# Patient Record
Sex: FEMALE | Race: WHITE | Employment: OTHER | URBAN - METROPOLITAN AREA
[De-identification: names, ages, dates, MRNs, and addresses within clinical notes are randomized per-mention and may not be internally consistent; named-entity substitution may affect disease eponyms.]

---

## 2017-01-04 ENCOUNTER — TELEPHONE (OUTPATIENT)
Dept: CARDIOLOGY CLINIC | Age: 72
End: 2017-01-04

## 2017-01-05 ENCOUNTER — OFFICE VISIT (OUTPATIENT)
Dept: CARDIOLOGY CLINIC | Age: 72
End: 2017-01-05

## 2017-01-05 ENCOUNTER — HOSPITAL ENCOUNTER (OUTPATIENT)
Dept: LAB | Age: 72
Discharge: HOME | End: 2017-01-05
Payer: MEDICARE

## 2017-01-05 ENCOUNTER — TELEPHONE (OUTPATIENT)
Dept: CARDIOLOGY CLINIC | Age: 72
End: 2017-01-05

## 2017-01-05 ENCOUNTER — OFFICE VISIT (OUTPATIENT)
Dept: INTERNAL MEDICINE CLINIC | Age: 72
End: 2017-01-05

## 2017-01-05 VITALS
BODY MASS INDEX: 24.75 KG/M2 | HEIGHT: 66 IN | WEIGHT: 154 LBS | DIASTOLIC BLOOD PRESSURE: 62 MMHG | HEART RATE: 61 BPM | SYSTOLIC BLOOD PRESSURE: 112 MMHG

## 2017-01-05 VITALS
BODY MASS INDEX: 24.78 KG/M2 | HEIGHT: 66 IN | HEART RATE: 79 BPM | WEIGHT: 154.2 LBS | TEMPERATURE: 97.9 F | RESPIRATION RATE: 16 BRPM | SYSTOLIC BLOOD PRESSURE: 110 MMHG | DIASTOLIC BLOOD PRESSURE: 80 MMHG | OXYGEN SATURATION: 96 %

## 2017-01-05 DIAGNOSIS — E78.5 DYSLIPIDEMIA: ICD-10-CM

## 2017-01-05 DIAGNOSIS — I15.9 SECONDARY HYPERTENSION: ICD-10-CM

## 2017-01-05 DIAGNOSIS — I95.1 ORTHOSTATIC HYPOTENSION: ICD-10-CM

## 2017-01-05 DIAGNOSIS — A60.00 GENITAL HERPES SIMPLEX, UNSPECIFIED SITE: ICD-10-CM

## 2017-01-05 DIAGNOSIS — Z86.711 HX OF PULMONARY EMBOLUS: ICD-10-CM

## 2017-01-05 DIAGNOSIS — E78.2 MIXED HYPERLIPIDEMIA: ICD-10-CM

## 2017-01-05 DIAGNOSIS — E11.9 CONTROLLED TYPE 2 DIABETES MELLITUS WITHOUT COMPLICATION, WITHOUT LONG-TERM CURRENT USE OF INSULIN (HCC): ICD-10-CM

## 2017-01-05 DIAGNOSIS — F31.78 BIPOLAR DISORDER, IN FULL REMISSION, MOST RECENT EPISODE MIXED (HCC): ICD-10-CM

## 2017-01-05 DIAGNOSIS — I26.99 OTHER ACUTE PULMONARY EMBOLISM WITHOUT ACUTE COR PULMONALE (HCC): Primary | ICD-10-CM

## 2017-01-05 DIAGNOSIS — G25.2 RESTING TREMOR: ICD-10-CM

## 2017-01-05 DIAGNOSIS — I95.1 ORTHOSTATIC HYPOTENSION: Primary | ICD-10-CM

## 2017-01-05 PROCEDURE — 36415 COLL VENOUS BLD VENIPUNCTURE: CPT

## 2017-01-05 PROCEDURE — 82043 UR ALBUMIN QUANTITATIVE: CPT

## 2017-01-05 PROCEDURE — 80061 LIPID PANEL: CPT

## 2017-01-05 PROCEDURE — 83036 HEMOGLOBIN GLYCOSYLATED A1C: CPT

## 2017-01-05 RX ORDER — ACYCLOVIR 50 MG/G
OINTMENT TOPICAL
Qty: 2 G | Refills: 3 | Status: SHIPPED | OUTPATIENT
Start: 2017-01-05 | End: 2017-01-23 | Stop reason: ALTCHOICE

## 2017-01-05 RX ORDER — FLUDROCORTISONE ACETATE 0.1 MG/1
0.1 TABLET ORAL DAILY
Qty: 30 TAB | Refills: 5 | Status: SHIPPED | OUTPATIENT
Start: 2017-01-05 | End: 2017-01-23 | Stop reason: SDUPTHER

## 2017-01-05 NOTE — MR AVS SNAPSHOT
Visit Information Date & Time Provider Department Dept. Phone Encounter #  
 1/5/2017  1:00 PM Paige Walker MD [facility] Sirisha Hoffman [phone] [id number] Your Appointments 7/5/2017  2:00 PM  
COMPLETE PHYSICAL with Theresa Villareal MD  
[address] [address] Internal Medicine [address]) Appt Note: medicare wellness [address] [facility] [address] [facility] [address]  
[address] [address] [address] [place] [address] Upcoming Health Maintenance Date Due  
 MEDICARE YEARLY EXAM 10/1/2016 FOOT EXAM Q1 10/1/2016 MICROALBUMIN Q1 10/1/2016 EYE EXAM RETINAL OR DILATED Q1 11/24/2016 HEMOGLOBIN A1C Q6M 1/29/2017 LIPID PANEL Q1 5/23/2017 GLAUCOMA SCREENING Q2Y 11/24/2017 BREAST CANCER SCRN MAMMOGRAM 2/23/2018 DTaP/Tdap/Td series (2 - Td) 8/17/2023 COLONOSCOPY 6/27/2026 Allergies as of 1/5/2017  Review Complete On: 1/5/2017 By: Jamal Amador LPN Severity Noted Reaction Type Reactions Adhesive  09/08/2009    Itching Hydrocodone  07/19/2010    Itching Lorazepam  04/02/2015    Other (comments) Other Medication  09/08/2009    Rash Tide detergent Percocet [Oxycodone-acetaminophen]  09/08/2009    Itching Sudafed [Pseudoephedrine Hcl]  10/17/2010   Side Effect Other (comments) Vertigo Wellbutrin [Bupropion Hcl]  08/31/2011    Nausea and Vomiting Zithromax [Azithromycin]  06/22/2011    Vertigo Zyrtec [Cetirizine]  10/14/2009    Nausea Only Current Immunizations  Reviewed on 7/28/2016 Name Date Influenza High Dose Vaccine PF 8/29/2013 Influenza Vaccine 8/8/2016, 10/1/2015, 9/15/2014 Influenza Vaccine Whole 9/25/2012 Pneumococcal Conjugate (PCV-13) 3/17/2015 Pneumococcal Polysaccharide (PPSV-23) 4/23/2016 Pneumococcal Vaccine (Unspecified Type) 12/1/2009 TDAP Vaccine 7/3/2012 Tdap 8/17/2013 Varicella Virus Vaccine Live 9/1/2007 Not reviewed this visit You Were Diagnosed With   
  
 Codes Comments Orthostatic hypotension    -  Primary ICD-10-CM: I95.1 ICD-9-CM: 458.0 Secondary hypertension     ICD-10-CM: I15.9 ICD-9-CM: 405.99 Hx of pulmonary embolus     ICD-10-CM: M66.022 ICD-9-CM: V12.55 Vitals BP Pulse Height(growth percentile) Weight(growth percentile) BMI OB Status 112/62 (BP 1 Location: Right arm, BP Patient Position: Standing) 61 5' 6\" (1.676 m) 154 lb (69.9 kg) 24.86 kg/m2 Hysterectomy Smoking Status Never Smoker Vitals History BMI and BSA Data Body Mass Index Body Surface Area  
 24.86 kg/m 2 1.8 m 2 Preferred Pharmacy Pharmacy Name Phone [facility] [address] [place] [address] [phone] Your Updated Medication List  
  
   
This list is accurate as of: 1/5/17  1:18 PM.  Always use your most recent med list.  
  
  
  
  
 acyclovir 5 % ointment Commonly known as:  ZOVIRAX Apply  to affected area every three (3) hours. atorvastatin 20 mg tablet Commonly known as:  LIPITOR  
TAKE 1 TABLET BY MOUTH EVERY EVENING  
  
 butalbital-acetaminophen-caff -40 mg per capsule Commonly known as:  [employer] Take 1 Cap by mouth every four (4) hours as needed for Pain for up to 20 doses. Max Daily Amount: 6 Caps. fexofenadine 180 mg tablet Commonly known as:  Glenna Sark Take 1 Tab by mouth daily as needed. fludrocortisone 0.1 mg tablet Commonly known as:  FLORINEF Take 1 Tab by mouth daily. gabapentin 100 mg capsule Commonly known as:  NEURONTIN Take 200 mg by mouth daily. As directed. 1 tablet in the am and 2 tablets at bedtime  
  
 meclizine 25 mg tablet Commonly known as:  ANTIVERT Take 1 Tab by mouth three (3) times daily as needed. Indications: VERTIGO  
  
 midodrine 5 mg tablet Commonly known as:  Vasyl Beecham  
 Take 1 Tab by mouth three (3) times daily (with meals) for 90 days. NASACORT AQ 55 mcg nasal inhaler Generic drug:  triamcinolone 2 Sprays by Both Nostrils route daily as needed. ondansetron 4 mg disintegrating tablet Commonly known as:  ZOFRAN ODT Take 1 Tab by mouth every eight (8) hours as needed for Nausea. PARoxetine 20 mg tablet Commonly known as:  PAXIL Take 20 mg by mouth daily. pindolol 5 mg tablet Commonly known as:  VISKIN Take 1 Tab by mouth daily as needed. pyridostigmine 60 mg tablet Commonly known as:  MESTINON  
TAKE 1 TABLET BY MOUTH THREE TIMES DAILY QUEtiapine 25 mg tablet Commonly known as:  SEROquel Take 25 mg by mouth nightly as needed. TYLENOL PO Take  by mouth. Prescriptions Sent to Pharmacy Refills  
 fludrocortisone (FLORINEF) 0.1 mg tablet 5 Sig: Take 1 Tab by mouth daily. Class: Normal  
 Pharmacy: [facility] [address] [place] [address] Ph #: [phone] Route: Oral  
  
Patient Instructions Follow up with Dr. Mingo Barrios in 6 months. Introducing [facility]! Dear Rob Dockery: Thank you for requesting a [employer] account. Our records indicate that you already have an active [employer] account. You can access your account anytime at [email or web address] Did you know that you can access your hospital and ER discharge instructions at any time in [employer]? You can also review all of your test results from your hospital stay or ER visit. Additional Information If you have questions, please visit the Frequently Asked Questions section of the [employer] website at [email or web address] Remember, [employer] is NOT to be used for urgent needs. For medical emergencies, dial 911. Now available from your iPhone and Android! Please provide this summary of care documentation to your next provider. Your primary care clinician is listed as RYLEE RASHID. If you have any questions after today's visit, please call [phone].

## 2017-01-05 NOTE — PROGRESS NOTES
Chief Complaint   Patient presents with    Syncope     othostatic hypotension     Verified medications with the patient.

## 2017-01-05 NOTE — PROGRESS NOTES
HISTORY OF PRESENT ILLNESS  Preeti Sam is a 66 y.o. female. LIDIA Saadia Tate is seen today for follow up of hyperlipidemia and other problems. 1. Mixed hyperlipidemia, diabetes type 2 controlled without complication without long term insulin use. Due for routine labs. 2. Orthostatic hypotension. Up to date with cardiology follow up, seems better. 3. Pulmonary embolism. Follows with hematology and pulmonary medicine. Off anticoagulation per specialist direction. 4. Tremor. Saw neuro, reviewed notes. It is unclear as to whether this represents Parkinsonism. She has a spot on her mons pubis that is itching, this is recurrent. She has a history of genital herpes. This appears herpetic, will treat with topical Zovirax. Review of systems notable for resolution of recent sinus infection. MedDATA/gwo     Current Outpatient Prescriptions   Medication Sig    acyclovir (ZOVIRAX) 5 % ointment Apply  to affected area every three (3) hours.  midodrine (PROAMITINE) 5 mg tablet Take 1 Tab by mouth three (3) times daily (with meals) for 90 days.  pindolol (VISKIN) 5 mg tablet Take 1 Tab by mouth daily as needed.  ondansetron (ZOFRAN ODT) 4 mg disintegrating tablet Take 1 Tab by mouth every eight (8) hours as needed for Nausea.  pyridostigmine (MESTINON) 60 mg tablet TAKE 1 TABLET BY MOUTH THREE TIMES DAILY    ACETAMINOPHEN (TYLENOL PO) Take  by mouth.  triamcinolone (NASACORT AQ) 55 mcg nasal inhaler 2 Sprays by Both Nostrils route daily as needed.  butalbital-acetaminophen-caff (FIORICET) -40 mg per capsule Take 1 Cap by mouth every four (4) hours as needed for Pain for up to 20 doses. Max Daily Amount: 6 Caps.  meclizine (ANTIVERT) 25 mg tablet Take 1 Tab by mouth three (3) times daily as needed. Indications: VERTIGO    QUEtiapine (SEROQUEL) 25 mg tablet Take 25 mg by mouth nightly as needed.  PARoxetine (PAXIL) 20 mg tablet Take 20 mg by mouth daily.     atorvastatin (LIPITOR) 20 mg tablet TAKE 1 TABLET BY MOUTH EVERY EVENING    gabapentin (NEURONTIN) 100 mg capsule Take 200 mg by mouth daily. As directed. 1 tablet in the am and 2 tablets at bedtime    fexofenadine (ALLEGRA) 180 mg tablet Take 1 Tab by mouth daily as needed.  fludrocortisone (FLORINEF) 0.1 mg tablet Take 1 Tab by mouth daily. No current facility-administered medications for this visit. Review of Systems   Constitutional: Negative for weight loss. Respiratory: Negative. Cardiovascular: Negative for chest pain, palpitations, leg swelling and PND. Musculoskeletal: Negative for myalgias. Skin: Positive for rash. Neurological: Positive for dizziness. Negative for focal weakness. Endo/Heme/Allergies: Does not bruise/bleed easily. Physical Exam   Constitutional: She appears well-nourished. Neck: Carotid bruit is not present. Cardiovascular: Normal rate and regular rhythm. Exam reveals no gallop and no friction rub. No murmur heard. Pulmonary/Chest: Effort normal and breath sounds normal. No respiratory distress. Musculoskeletal: She exhibits no edema. Skin:        Nursing note and vitals reviewed. ASSESSMENT and PLAN  Ben Pyle was seen today for medication evaluation and vaginal itching. Diagnoses and all orders for this visit:    Other acute pulmonary embolism without acute cor pulmonale ([facility] [address]- See specialists  as directed. Bipolar disorder, in full remission, most recent episode mixed [facility])- See psychiatrist as directed     Resting tremor- See neurologist as directed     Orthostatic hypotension- See cardiologist as directed. Mixed hyperlipidemia  -     LIPID PANEL    Genital herpes simplex, unspecified site  -     acyclovir (ZOVIRAX) 5 % ointment; Apply  to affected area every three (3) hours.     Controlled type 2 diabetes mellitus without complication, without long-term current use of insulin (HCC)  -     HEMOGLOBIN A1C WITH EAG  -     MICROALBUMIN, UR, RAND W/ MICROALBUMIN/CREA RATIO    Other orders  -     Cancel: METABOLIC PANEL, COMPREHENSIVE  -     Cancel: CBC WITH AUTOMATED DIFF    This has been fully explained to the patient, who indicates understanding.

## 2017-01-05 NOTE — PROGRESS NOTES
Cardiac Electrophysiology Office Note     Subjective:      Dom Mejia is a 66 y.o. female patient who is seen for significant orthostatic hypotension and supine hypertension. Last month she was given pindolol for SBP greater than 160. She says she has only had to take the pindolol 3 times. She was able to get compression stockings and has been wearing them. She says they help. She says she is feeling much better. Dizziness, lightheadedness and nausea have resolved. She thinks the sinus infection she had could have been related. No fainting or falling. Her BP laying down has been around 140-150's. Northera application completed. She is here today because her blood pressure has been very high the past couple days and she had to go the ED last night, for hypertension, chest pressure and SOB. She has also been nausea everyday. No syncope. She says she is wearing her compression stockings daily with her current regiment of midodrine, mestinon and florinef and still has a drastic drop in BP. Head CT did not show bleeding or stroke. Lexican cardiolite stress test 9/2016 showed normal perfusion  LVEF vigorous 85%    Dr Derrick Brewster 8/26/16: CT[facility] at [facility] 5/7/16 small PE RLL  6/9/16 CTA [facility] normal CTA no PE  Hypercoagulable work up and d dimer pending- on xarelto until results return    She had a tilt table test 8/2/16. Tilt table test revealed the patient had orthostatic hypotension response with sudden change in upright position and resolved quickly when she became supine  She has been on midodrine 5 mg tid. Patient was seen by neurologist in hospital and not clear she will have autonomic dysfunction and Parkinson's disease. I recommended to add mestinon 60 mg tid. She is using compression stockings bought from [facility] for IBS, RLS, Bipolar d/s, fibromyalgia, DDD, Paget's dz, dyslipidemia, DM, and PTE on Xarelto 3 months.  She reports 3 episodes of syncopal episodes with \"almost blacking out\" which prompted her to come to the ED. Long standing h/o hypotension with orthostatics, but never blackouts. She admits to seeing Dr. Albaro Chiang 6-7 years ago and completing an Echo and stress test for which all were normal. Recent h/o PTE (5/2016) and on Xarelto currently. Patient Active Problem List    Diagnosis Date Noted    Near syncope 08/02/2016    Resting tremor 07/29/2016    Orthostatic hypotension 07/05/2016    Advance directive on file 05/24/2016    Pulmonary embolism ([facility] [address]     Mixed hyperlipidemia 02/02/2016    Diabetes mellitus type 2, controlled, without complications ([facility] [address] 42/48/4870    Tubulovillous adenoma 08/21/2012    Abnormal mammogram 06/04/2012    Encounter for long-term (current) use of other medications 11/30/2011    Hammertoe 09/12/2011    Allergic rhinitis, cause unspecified 01/26/2011    Other diseases of nasal cavity and sinuses(478.19) 07/19/2010    IBS (irritable bowel syndrome)     RLS (restless legs syndrome)     Bipolar disorder (HCC)     Fibromyalgia     DJD (degenerative joint disease)     Lumbar disc disease     Paget's disease of bone     Migraine     Genital herpes      Current Outpatient Prescriptions   Medication Sig Dispense Refill    acyclovir (ZOVIRAX) 5 % ointment Apply  to affected area every three (3) hours. 2 g 3    fludrocortisone (FLORINEF) 0.1 mg tablet Take 1 Tab by mouth daily. 30 Tab 5    midodrine (PROAMITINE) 5 mg tablet Take 1 Tab by mouth three (3) times daily (with meals) for 90 days. 90 Tab 6    pindolol (VISKIN) 5 mg tablet Take 1 Tab by mouth daily as needed. 30 Tab 3    ondansetron (ZOFRAN ODT) 4 mg disintegrating tablet Take 1 Tab by mouth every eight (8) hours as needed for Nausea. 30 Tab 3    pyridostigmine (MESTINON) 60 mg tablet TAKE 1 TABLET BY MOUTH THREE TIMES DAILY 90 Tab 1    ACETAMINOPHEN (TYLENOL PO) Take  by mouth.       triamcinolone (NASACORT AQ) 55 mcg nasal inhaler 2 Sprays by Both Nostrils route daily as needed.  butalbital-acetaminophen-caff (FIORICET) -40 mg per capsule Take 1 Cap by mouth every four (4) hours as needed for Pain for up to 20 doses. Max Daily Amount: 6 Caps. 20 Cap 0    meclizine (ANTIVERT) 25 mg tablet Take 1 Tab by mouth three (3) times daily as needed. Indications: VERTIGO 30 Tab 3    QUEtiapine (SEROQUEL) 25 mg tablet Take 25 mg by mouth nightly as needed.  PARoxetine (PAXIL) 20 mg tablet Take 20 mg by mouth daily.  atorvastatin (LIPITOR) 20 mg tablet TAKE 1 TABLET BY MOUTH EVERY EVENING 30 Tab 11    gabapentin (NEURONTIN) 100 mg capsule Take 200 mg by mouth daily. As directed. 1 tablet in the am and 2 tablets at bedtime      fexofenadine (ALLEGRA) 180 mg tablet Take 1 Tab by mouth daily as needed.  1 Tab 0     Allergies   Allergen Reactions    Adhesive Itching    Hydrocodone Itching    Lorazepam Other (comments)    Other Medication Rash     Tide detergent    Percocet [Oxycodone-Acetaminophen] Itching    Sudafed [Pseudoephedrine Hcl] Other (comments)     Vertigo    Wellbutrin [Bupropion Hcl] Nausea and Vomiting    Zithromax [Azithromycin] Vertigo    Zyrtec [Cetirizine] Nausea Only     Past Medical History   Diagnosis Date    Bipolar disorder (HCC)      Nazmy    Chronic pain      BACK PAIN    Diabetes ([facility] [address]      BORDERLINE TX WITH DIET AND EXERCISE    DJD (degenerative joint disease)     Fibromyalgia     Genital herpes     GERD (gastroesophageal reflux disease)     Hypercholesterolemia     IBS (irritable bowel syndrome)     Lumbar disc disease      stimulation-Honza    Migraine     Nausea & vomiting     Other ill-defined conditions(799.89)      SEASONAL allergies    Other ill-defined conditions(799.89)      dysphagia has had esophagus dilated    Paget's disease     Pulmonary embolism (HCC)     RLS (restless legs syndrome)      Past Surgical History   Procedure Laterality Date    Endoscopy, colon, diagnostic       12, due 15    Hx heent       T & A    Hx cholecystectomy      Hx appendectomy      Hx hysterectomy       total for fibroid tumors    Hx other surgical       EGD x 2 with dilation/colonoscopy - no polyps per pt    Hx orthopaedic       lumbar laminectomy then fusion/neurostimulator implanted - inactive now but still in    Hx orthopaedic       REMOVAL OF NEUROSTIMULATOR    Hx back surgery  9/17/2013     back surgery - total of 3 as of 12/20/2013    Colonoscopy N/A 6/27/2016     COL[facility] ENDOSCOPY    Tilt table evaluation  8/2/2016           Family History   Problem Relation Age of Onset    Colon Cancer Mother     Cancer Mother 68     colon    Heart Disease Father     Heart Disease Maternal Grandmother     Heart Disease Maternal Grandfather     Heart Disease Other      Social History   Substance Use Topics    Smoking status: Never Smoker    Smokeless tobacco: Never Used    Alcohol use No      Comment: 1 per month        Review of Systems:   Constitutional: Negative for fever, chills, weight loss. HEENT: Negative for nosebleeds, vision changes. Respiratory: Negative for cough, hemoptysis, sputum production, and wheezing. Cardiovascular: no chest pain, no palpitations, orthopnea, claudication, leg swelling, syncope, and PND. Gastrointestinal: no  nausea, no vomiting she does not have diarrhea, constipation, blood in stool and melena. Genitourinary: Negative for dysuria, and hematuria. Musculoskeletal: Negative for myalgias, + lower leg arthralgia. Skin: Negative for rash. Heme: Does not bleed or bruise easily. Neurological: Negative for speech change and focal weakness     Objective:     Visit Vitals    /62 (BP 1 Location: Right arm, BP Patient Position: Standing)    Pulse 61    Ht 5' 6\" (1.676 m)    Wt 154 lb (69.9 kg)    BMI 24.86 kg/m2      Physical Exam:   Constitutional: well-developed and well-nourished. No distress.    Head: Normocephalic and atraumatic. Eyes: Pupils are equal, round. Neck: supple. No JVD present. Cardiovascular: Normal rate, regular rhythm. Exam reveals no gallop and no friction rub. No murmur heard. Pulmonary/Chest: Effort normal and breath sounds normal. No wheezes. Abdominal: Soft, no tenderness. Musculoskeletal: no edema. compression stockings are on. Neurological: alert,oriented. resting tremor in RUE  Skin: Skin is warm and dry  Psychiatric: normal mood and affect. Behavior is normal. Judgment and thought content normal.      Assessment/Plan:       ICD-10-CM ICD-9-CM    1. Orthostatic hypotension I95.1 458.0    2. Secondary hypertension I15.9 405.99    3. Hx of pulmonary embolus Z86.711 V12.55    4. Dyslipidemia E78.5 272.4       Ms. Zoya Hill has been doing well, BP is much better. Orthostatic BP improved, she is wearing her compression stockings today and she says she actually likes them. Continue the Mestinon, florinef and midodrine. Florinef refilled today. She has pindolol for SBP >160. She has only had to take this x3, she monitor her supine BP every morning. Dizziness, lightheadedness and nausea has resolved. Continue current medical regiment. I will have our nurse follow up on her Northera application as she has submitted and filled this out. She will follow up with Dr. Mo Meyer in 6 months or sooner if she starts to have symptoms or uncontrolled BP. Patient agreeable. Our office will contact her as soon as we find out the status of her Northera application. Follow-up Disposition:  Return in about 6 months (around 7/5/2017). Thank you for involving me in this patient's care and please call with further concerns or questions.     Aj Morataya NP  [phone]

## 2017-01-05 NOTE — MR AVS SNAPSHOT
Visit Information Date & Time Provider Department Dept. Phone Encounter #  
 1/5/2017 11:00 AM Stefania Puckett, [address] [facility] Internal Medicine [phone] [id number] Follow-up Instructions Return in about 6 months (around 7/5/2017). Your Appointments 1/5/2017  1:00 PM  
ESTABLISHED PATIENT with Olegario Henriquez MD  
[facility] ([facility]) Appt Note: 3 month follow up per dr miguel; 11/14 LM to move up appt. [place] Ac; Per Nilda benedict to 10 to 1120; 1 month f/u  
 [address] [facility] [address]  
[address] [facility] 100 [facility] 7 [id number] Upcoming Health Maintenance Date Due  
 MEDICARE YEARLY EXAM 10/1/2016 FOOT EXAM Q1 10/1/2016 MICROALBUMIN Q1 10/1/2016 EYE EXAM RETINAL OR DILATED Q1 11/24/2016 HEMOGLOBIN A1C Q6M 1/29/2017 LIPID PANEL Q1 5/23/2017 GLAUCOMA SCREENING Q2Y 11/24/2017 BREAST CANCER SCRN MAMMOGRAM 2/23/2018 DTaP/Tdap/Td series (2 - Td) 8/17/2023 COLONOSCOPY 6/27/2026 Allergies as of 1/5/2017  Review Complete On: 1/5/2017 By: Stefania Puckett MD  
  
 Severity Noted Reaction Type Reactions Adhesive  09/08/2009    Itching Hydrocodone  07/19/2010    Itching Lorazepam  04/02/2015    Other (comments) Other Medication  09/08/2009    Rash Tide detergent Percocet [Oxycodone-acetaminophen]  09/08/2009    Itching Sudafed [Pseudoephedrine Hcl]  10/17/2010   Side Effect Other (comments) Vertigo Wellbutrin [Bupropion Hcl]  08/31/2011    Nausea and Vomiting Zithromax [Azithromycin]  06/22/2011    Vertigo Zyrtec [Cetirizine]  10/14/2009    Nausea Only Current Immunizations  Reviewed on 7/28/2016 Name Date Influenza High Dose Vaccine PF 8/29/2013 Influenza Vaccine 8/8/2016, 10/1/2015, 9/15/2014 Influenza Vaccine Whole 9/25/2012 Pneumococcal Conjugate (PCV-13) 3/17/2015 Pneumococcal Polysaccharide (PPSV-23) 4/23/2016 Pneumococcal Vaccine (Unspecified Type) 12/1/2009 TDAP Vaccine 7/3/2012 Tdap 8/17/2013 Varicella Virus Vaccine Live 9/1/2007 Not reviewed this visit You Were Diagnosed With   
  
 Codes Comments Other acute pulmonary embolism without acute cor pulmonale (HCC)    -  Primary ICD-10-CM: N61.93 Bipolar disorder, in full remission, most recent episode mixed [facility])     ICD-10-CM: F31.78 
ICD-9-CM: 296.66 Resting tremor     ICD-10-CM: R25.9 ICD-9-CM: 781.0 Orthostatic hypotension     ICD-10-CM: I95.1 ICD-9-CM: 458.0 Mixed hyperlipidemia     ICD-10-CM: E78.2 ICD-9-CM: 272.2 Genital herpes simplex, unspecified site     ICD-10-CM: A60.00 ICD-9-CM: 054.10 Controlled type 2 diabetes mellitus without complication, without long-term current use of insulin ([facility]     ICD-10-CM: E11.9 ICD-9-CM: 250.00 Vitals BP Pulse Temp Resp Height(growth percentile) Weight(growth percentile) 110/80 (BP 1 Location: Right arm, BP Patient Position: Sitting) 79 97.9 °F (36.6 °C) (Oral) 16 5' 6\" (1.676 m) 154 lb 3.2 oz (69.9 kg) SpO2 BMI OB Status Smoking Status 96% 24.89 kg/m2 Hysterectomy Never Smoker BMI and BSA Data Body Mass Index Body Surface Area  
 24.89 kg/m 2 1.8 m 2 Preferred Pharmacy Pharmacy Name Phone [facility] [address] [place] [address], [address] [place] [address] [phone] Your Updated Medication List  
  
   
This list is accurate as of: 1/5/17 12:05 PM.  Always use your most recent med list.  
  
  
  
  
 acyclovir 5 % ointment Commonly known as:  ZOVIRAX Apply  to affected area every three (3) hours. atorvastatin 20 mg tablet Commonly known as:  LIPITOR  
TAKE 1 TABLET BY MOUTH EVERY EVENING  
  
 butalbital-acetaminophen-caff -40 mg per capsule Commonly known as:  [employer] Take 1 Cap by mouth every four (4) hours as needed for Pain for up to 20 doses. Max Daily Amount: 6 Caps. cefUROXime 250 mg tablet Commonly known as:  CEFTIN Take 2 Tabs by mouth two (2) times a day. fexofenadine 180 mg tablet Commonly known as:  Glenna Sark Take 1 Tab by mouth daily as needed. fludrocortisone 0.1 mg tablet Commonly known as:  FLORINEF Take 1 Tab by mouth daily. gabapentin 100 mg capsule Commonly known as:  NEURONTIN Take 200 mg by mouth daily. As directed. 1 tablet in the am and 2 tablets at bedtime L. acidoph & paracasei- S therm- Bifido 8 billion cell Cap cap Commonly known as:  HINA-Q/RISAQUAD Take 1 Cap by mouth daily. meclizine 25 mg tablet Commonly known as:  ANTIVERT Take 1 Tab by mouth three (3) times daily as needed. Indications: VERTIGO  
  
 midodrine 5 mg tablet Commonly known as:  Vasyl Beecham Take 1 Tab by mouth three (3) times daily (with meals) for 90 days. NASACORT AQ 55 mcg nasal inhaler Generic drug:  triamcinolone 2 Sprays by Both Nostrils route daily as needed. ondansetron 4 mg disintegrating tablet Commonly known as:  ZOFRAN ODT Take 1 Tab by mouth every eight (8) hours as needed for Nausea. PARoxetine 20 mg tablet Commonly known as:  PAXIL Take 20 mg by mouth daily. pindolol 5 mg tablet Commonly known as:  VISKIN Take 1 Tab by mouth daily as needed. pyridostigmine 60 mg tablet Commonly known as:  MESTINON  
TAKE 1 TABLET BY MOUTH THREE TIMES DAILY QUEtiapine 25 mg tablet Commonly known as:  SEROquel Take 25 mg by mouth nightly as needed. TYLENOL PO Take  by mouth. Prescriptions Sent to Pharmacy Refills  
 acyclovir (ZOVIRAX) 5 % ointment 3 Sig: Apply  to affected area every three (3) hours.   
 Class: Normal  
 Pharmacy: [facility] [address], [address] [place] [address] AT [facility] [address] [facility] #: [phone] Route: Topical  
  
Follow-up Instructions Return in about 6 months (around 7/5/2017). Introducing [facility] SERVICES! Dear Aura Chaney: Thank you for requesting a [employer] account. Our records indicate that you already have an active [employer] account. You can access your account anytime at [email or web address] Did you know that you can access your hospital and ER discharge instructions at any time in [employer]? You can also review all of your test results from your hospital stay or ER visit. Additional Information If you have questions, please visit the Frequently Asked Questions section of the [employer] website at [email or web address] Remember, [employer] is NOT to be used for urgent needs. For medical emergencies, dial 911. Now available from your iPhone and Android! Please provide this summary of care documentation to your next provider. Your primary care clinician is listed as RYLEE RASHID. If you have any questions after today's visit, please call [phone].

## 2017-01-05 NOTE — TELEPHONE ENCOUNTER
PA obtained for brandon pérez at # [phone]. Will find out determination in 72hours. Ref # [id number].

## 2017-01-06 LAB
ALBUMIN/CREAT UR: 5.8 MG/G CREAT (ref 0–30)
CHOLEST SERPL-MCNC: 134 MG/DL (ref 100–199)
CREAT UR-MCNC: 143.4 MG/DL
EST. AVERAGE GLUCOSE BLD GHB EST-MCNC: 169 MG/DL
HBA1C MFR BLD: 7.5 % (ref 4.8–5.6)
HDLC SERPL-MCNC: 53 MG/DL
LDLC SERPL CALC-MCNC: 58 MG/DL (ref 0–99)
MICROALBUMIN UR-MCNC: 8.3 UG/ML
TRIGL SERPL-MCNC: 115 MG/DL (ref 0–149)
VLDLC SERPL CALC-MCNC: 23 MG/DL (ref 5–40)

## 2017-01-10 ENCOUNTER — TELEPHONE (OUTPATIENT)
Dept: INTERNAL MEDICINE CLINIC | Age: 72
End: 2017-01-10

## 2017-01-10 ENCOUNTER — HOSPITAL ENCOUNTER (OUTPATIENT)
Age: 72
Setting detail: OBSERVATION
Discharge: HOME HEALTH | End: 2017-01-13
Attending: EMERGENCY MEDICINE | Admitting: INTERNAL MEDICINE
Payer: MEDICARE

## 2017-01-10 ENCOUNTER — TELEPHONE (OUTPATIENT)
Dept: CARDIOLOGY CLINIC | Age: 72
End: 2017-01-10

## 2017-01-10 DIAGNOSIS — R19.7 DIARRHEA, UNSPECIFIED TYPE: Primary | ICD-10-CM

## 2017-01-10 DIAGNOSIS — E86.0 DEHYDRATION: ICD-10-CM

## 2017-01-10 LAB
ALBUMIN SERPL BCP-MCNC: 3.4 G/DL (ref 3.5–5)
ALBUMIN/GLOB SERPL: 0.9 {RATIO} (ref 1.1–2.2)
ALP SERPL-CCNC: 75 U/L (ref 45–117)
ALT SERPL-CCNC: 26 U/L (ref 12–78)
ANION GAP BLD CALC-SCNC: 11 MMOL/L (ref 5–15)
AST SERPL W P-5'-P-CCNC: 33 U/L (ref 15–37)
ATRIAL RATE: 74 BPM
BILIRUB SERPL-MCNC: 0.5 MG/DL (ref 0.2–1)
BUN SERPL-MCNC: 17 MG/DL (ref 6–20)
BUN/CREAT SERPL: 17 (ref 12–20)
CALCIUM SERPL-MCNC: 8.5 MG/DL (ref 8.5–10.1)
CALCULATED P AXIS, ECG09: 17 DEGREES
CALCULATED R AXIS, ECG10: 10 DEGREES
CALCULATED T AXIS, ECG11: 54 DEGREES
CHLORIDE SERPL-SCNC: 103 MMOL/L (ref 97–108)
CK MB CFR SERPL CALC: NORMAL % (ref 0–2.5)
CK MB SERPL-MCNC: <1 NG/ML (ref 5–25)
CK SERPL-CCNC: 64 U/L (ref 26–192)
CO2 SERPL-SCNC: 26 MMOL/L (ref 21–32)
CREAT SERPL-MCNC: 1.01 MG/DL (ref 0.55–1.02)
DIAGNOSIS, 93000: NORMAL
ERYTHROCYTE [DISTWIDTH] IN BLOOD BY AUTOMATED COUNT: 14 % (ref 11.5–14.5)
GLOBULIN SER CALC-MCNC: 3.7 G/DL (ref 2–4)
GLUCOSE SERPL-MCNC: 124 MG/DL (ref 65–100)
HCT VFR BLD AUTO: 45.7 % (ref 35–47)
HGB BLD-MCNC: 15.2 G/DL (ref 11.5–16)
MCH RBC QN AUTO: 30.3 PG (ref 26–34)
MCHC RBC AUTO-ENTMCNC: 33.3 G/DL (ref 30–36.5)
MCV RBC AUTO: 91.2 FL (ref 80–99)
P-R INTERVAL, ECG05: 142 MS
PLATELET # BLD AUTO: 278 K/UL (ref 150–400)
POTASSIUM SERPL-SCNC: 3.4 MMOL/L (ref 3.5–5.1)
PROT SERPL-MCNC: 7.1 G/DL (ref 6.4–8.2)
Q-T INTERVAL, ECG07: 432 MS
QRS DURATION, ECG06: 84 MS
QTC CALCULATION (BEZET), ECG08: 479 MS
RBC # BLD AUTO: 5.01 M/UL (ref 3.8–5.2)
SODIUM SERPL-SCNC: 140 MMOL/L (ref 136–145)
TROPONIN I SERPL-MCNC: <0.04 NG/ML
VENTRICULAR RATE, ECG03: 74 BPM
WBC # BLD AUTO: 5 K/UL (ref 3.6–11)

## 2017-01-10 PROCEDURE — 85027 COMPLETE CBC AUTOMATED: CPT | Performed by: STUDENT IN AN ORGANIZED HEALTH CARE EDUCATION/TRAINING PROGRAM

## 2017-01-10 PROCEDURE — 96360 HYDRATION IV INFUSION INIT: CPT

## 2017-01-10 PROCEDURE — 99285 EMERGENCY DEPT VISIT HI MDM: CPT

## 2017-01-10 PROCEDURE — 93005 ELECTROCARDIOGRAM TRACING: CPT

## 2017-01-10 PROCEDURE — 74011250637 HC RX REV CODE- 250/637: Performed by: INTERNAL MEDICINE

## 2017-01-10 PROCEDURE — 99218 HC RM OBSERVATION: CPT

## 2017-01-10 PROCEDURE — 82550 ASSAY OF CK (CPK): CPT | Performed by: EMERGENCY MEDICINE

## 2017-01-10 PROCEDURE — 80053 COMPREHEN METABOLIC PANEL: CPT | Performed by: STUDENT IN AN ORGANIZED HEALTH CARE EDUCATION/TRAINING PROGRAM

## 2017-01-10 PROCEDURE — 74011250636 HC RX REV CODE- 250/636: Performed by: EMERGENCY MEDICINE

## 2017-01-10 PROCEDURE — 74011250636 HC RX REV CODE- 250/636: Performed by: INTERNAL MEDICINE

## 2017-01-10 PROCEDURE — 96361 HYDRATE IV INFUSION ADD-ON: CPT

## 2017-01-10 PROCEDURE — 96374 THER/PROPH/DIAG INJ IV PUSH: CPT

## 2017-01-10 PROCEDURE — 36415 COLL VENOUS BLD VENIPUNCTURE: CPT | Performed by: STUDENT IN AN ORGANIZED HEALTH CARE EDUCATION/TRAINING PROGRAM

## 2017-01-10 PROCEDURE — 84484 ASSAY OF TROPONIN QUANT: CPT | Performed by: EMERGENCY MEDICINE

## 2017-01-10 RX ORDER — GUAIFENESIN 600 MG/1
600 TABLET, EXTENDED RELEASE ORAL 2 TIMES DAILY
COMMUNITY
End: 2017-03-03 | Stop reason: ALTCHOICE

## 2017-01-10 RX ORDER — ACETAMINOPHEN 325 MG/1
325 TABLET ORAL
COMMUNITY

## 2017-01-10 RX ORDER — SODIUM CHLORIDE 9 MG/ML
125 INJECTION, SOLUTION INTRAVENOUS CONTINUOUS
Status: DISCONTINUED | OUTPATIENT
Start: 2017-01-10 | End: 2017-01-10

## 2017-01-10 RX ORDER — MECLIZINE HCL 12.5 MG 12.5 MG/1
25 TABLET ORAL
Status: DISCONTINUED | OUTPATIENT
Start: 2017-01-10 | End: 2017-01-13 | Stop reason: HOSPADM

## 2017-01-10 RX ORDER — BUTALBITAL, ACETAMINOPHEN AND CAFFEINE 50; 325; 40 MG/1; MG/1; MG/1
1 TABLET ORAL
Status: DISCONTINUED | OUTPATIENT
Start: 2017-01-10 | End: 2017-01-13 | Stop reason: HOSPADM

## 2017-01-10 RX ORDER — GABAPENTIN 100 MG/1
100 CAPSULE ORAL 2 TIMES DAILY
Status: DISCONTINUED | OUTPATIENT
Start: 2017-01-10 | End: 2017-01-13 | Stop reason: HOSPADM

## 2017-01-10 RX ORDER — GABAPENTIN 100 MG/1
200 CAPSULE ORAL
COMMUNITY
End: 2017-03-03 | Stop reason: ALTCHOICE

## 2017-01-10 RX ORDER — GABAPENTIN 100 MG/1
200 CAPSULE ORAL
Status: DISCONTINUED | OUTPATIENT
Start: 2017-01-10 | End: 2017-01-13 | Stop reason: HOSPADM

## 2017-01-10 RX ORDER — MIDODRINE HYDROCHLORIDE 5 MG/1
5 TABLET ORAL
Status: DISCONTINUED | OUTPATIENT
Start: 2017-01-10 | End: 2017-01-13 | Stop reason: HOSPADM

## 2017-01-10 RX ORDER — LORAZEPAM 0.5 MG/1
0.5 TABLET ORAL
Status: DISCONTINUED | OUTPATIENT
Start: 2017-01-10 | End: 2017-01-13 | Stop reason: HOSPADM

## 2017-01-10 RX ORDER — ENOXAPARIN SODIUM 100 MG/ML
40 INJECTION SUBCUTANEOUS EVERY 24 HOURS
Status: DISCONTINUED | OUTPATIENT
Start: 2017-01-11 | End: 2017-01-13 | Stop reason: HOSPADM

## 2017-01-10 RX ORDER — MINERAL OIL
180 ENEMA (ML) RECTAL DAILY
COMMUNITY
End: 2018-04-12

## 2017-01-10 RX ORDER — SODIUM CHLORIDE 9 MG/ML
75 INJECTION, SOLUTION INTRAVENOUS CONTINUOUS
Status: DISCONTINUED | OUTPATIENT
Start: 2017-01-10 | End: 2017-01-13 | Stop reason: HOSPADM

## 2017-01-10 RX ORDER — SODIUM CHLORIDE 0.9 % (FLUSH) 0.9 %
5-10 SYRINGE (ML) INJECTION AS NEEDED
Status: DISCONTINUED | OUTPATIENT
Start: 2017-01-10 | End: 2017-01-13 | Stop reason: HOSPADM

## 2017-01-10 RX ORDER — GABAPENTIN 100 MG/1
100 CAPSULE ORAL
COMMUNITY
End: 2017-09-05 | Stop reason: ALTCHOICE

## 2017-01-10 RX ORDER — QUETIAPINE FUMARATE 25 MG/1
25 TABLET, FILM COATED ORAL
Status: DISCONTINUED | OUTPATIENT
Start: 2017-01-10 | End: 2017-01-13 | Stop reason: HOSPADM

## 2017-01-10 RX ORDER — SODIUM CHLORIDE 0.9 % (FLUSH) 0.9 %
5-10 SYRINGE (ML) INJECTION EVERY 8 HOURS
Status: DISCONTINUED | OUTPATIENT
Start: 2017-01-10 | End: 2017-01-13 | Stop reason: HOSPADM

## 2017-01-10 RX ORDER — VALACYCLOVIR HYDROCHLORIDE 500 MG/1
500 TABLET, FILM COATED ORAL EVERY 12 HOURS
Status: COMPLETED | OUTPATIENT
Start: 2017-01-10 | End: 2017-01-13

## 2017-01-10 RX ORDER — FLUDROCORTISONE ACETATE 0.1 MG/1
0.1 TABLET ORAL DAILY
Status: DISCONTINUED | OUTPATIENT
Start: 2017-01-11 | End: 2017-01-13 | Stop reason: HOSPADM

## 2017-01-10 RX ADMIN — Medication 10 ML: at 21:52

## 2017-01-10 RX ADMIN — GABAPENTIN 200 MG: 100 CAPSULE ORAL at 21:52

## 2017-01-10 RX ADMIN — SODIUM CHLORIDE 1000 ML: 900 INJECTION, SOLUTION INTRAVENOUS at 15:10

## 2017-01-10 RX ADMIN — QUETIAPINE FUMARATE 25 MG: 25 TABLET, FILM COATED ORAL at 21:52

## 2017-01-10 RX ADMIN — SODIUM CHLORIDE 125 ML/HR: 900 INJECTION, SOLUTION INTRAVENOUS at 13:07

## 2017-01-10 RX ADMIN — SODIUM CHLORIDE 1000 ML: 900 INJECTION, SOLUTION INTRAVENOUS at 13:07

## 2017-01-10 RX ADMIN — VALACYCLOVIR HYDROCHLORIDE 500 MG: 500 TABLET, FILM COATED ORAL at 21:52

## 2017-01-10 RX ADMIN — SODIUM CHLORIDE 125 ML/HR: 900 INJECTION, SOLUTION INTRAVENOUS at 21:53

## 2017-01-10 NOTE — PROGRESS NOTES
Admission Medication Reconciliation:    Information obtained from: patient, Rx Query    Significant PMH/Disease States:   Past Medical History   Diagnosis Date    Bipolar disorder ([facility]      Nazmy    Chronic pain      BACK PAIN    Diabetes ([facility]      BORDERLINE TX WITH DIET AND EXERCISE    DJD (degenerative joint disease)     Fibromyalgia     Genital herpes     GERD (gastroesophageal reflux disease)     Hypercholesterolemia     IBS (irritable bowel syndrome)     Lumbar disc disease      stimulation-Honza    Migraine     Nausea & vomiting     Other ill-defined conditions(799.89)      SEASONAL allergies    Other ill-defined conditions(799.89)      dysphagia has had esophagus dilated    Paget's disease     Pulmonary embolism (HCC)     RLS (restless legs syndrome)        Chief Complaint for this Admission:    Chief Complaint   Patient presents with    Fatigue       Allergies:  Adhesive; Hydrocodone; Lorazepam; Other medication; Percocet [oxycodone-acetaminophen]; Sudafed [pseudoephedrine hcl]; Wellbutrin [bupropion hcl]; Zithromax [azithromycin]; and Zyrtec [cetirizine]    Prior to Admission Medications:   Prior to Admission Medications   Prescriptions Last Dose Informant Patient Reported? Taking? PARoxetine (PAXIL) 20 mg tablet   Yes Yes   Sig: Take 20 mg by mouth daily. QUEtiapine (SEROQUEL) 25 mg tablet   Yes Yes   Sig: Take 25 mg by mouth nightly as needed. acetaminophen (TYLENOL) 325 mg tablet   Yes Yes   Sig: Take 325 mg by mouth every four (4) hours as needed for Pain. acyclovir (ZOVIRAX) 5 % ointment   No Yes   Sig: Apply  to affected area every three (3) hours. atorvastatin (LIPITOR) 20 mg tablet   No Yes   Sig: TAKE 1 TABLET BY MOUTH EVERY EVENING   butalbital-acetaminophen-caff (FIORICET) -40 mg per capsule   No Yes   Sig: Take 1 Cap by mouth every four (4) hours as needed for Pain for up to 20 doses. Max Daily Amount: 6 Caps.    fexofenadine (ALLEGRA) 180 mg tablet   Yes Yes Sig: Take 180 mg by mouth daily. fludrocortisone (FLORINEF) 0.1 mg tablet   No Yes   Sig: Take 1 Tab by mouth daily. gabapentin (NEURONTIN) 100 mg capsule   Yes Yes   Sig: Take 100 mg by mouth two (2) times a day. Morning and noon   gabapentin (NEURONTIN) 100 mg capsule   Yes Yes   Sig: Take 200 mg by mouth nightly. guaiFENesin ER (MUCINEX) 600 mg ER tablet   Yes Yes   Sig: Take 600 mg by mouth two (2) times a day. meclizine (ANTIVERT) 25 mg tablet   No Yes   Sig: Take 1 Tab by mouth three (3) times daily as needed. Indications: VERTIGO   midodrine (PROAMITINE) 5 mg tablet   No Yes   Sig: Take 1 Tab by mouth three (3) times daily (with meals) for 90 days. ondansetron (ZOFRAN ODT) 4 mg disintegrating tablet   No Yes   Sig: Take 1 Tab by mouth every eight (8) hours as needed for Nausea. pindolol (VISKIN) 5 mg tablet   No Yes   Sig: Take 1 Tab by mouth daily as needed. pyridostigmine (MESTINON) 60 mg tablet   No Yes   Sig: TAKE 1 TABLET BY MOUTH THREE TIMES DAILY   triamcinolone (NASACORT AQ) 55 mcg nasal inhaler   Yes Yes   Si Sprays by Both Nostrils route daily as needed. Facility-Administered Medications: None         Comments/Recommendations: Reviewed PTA meds and uptdated with following changes:  1) Modified: gabapentin dosing; Allegra to daily from PRN  2) Added Mucinex - pt reports being prescribed 2 new meds - one OTC (mentioned Mucinex) and one Rx for recent symptoms. Do not see any new Rx listed in notes from recent office visit and nothing new on Rx Query so am unsure of this med.

## 2017-01-10 NOTE — ED TRIAGE NOTES
Patient arrives from home via EMS for generalized weakness over the last 48 hours. Reports multiple episodes of feeling like she was going to pass out. Denies LOC or syncope. Patient reports diarrhea. Near syncopal event today on toilet. Patient arrives alert and oriented x4.  equal. Speech clear. Denies chest pain.

## 2017-01-10 NOTE — H&P
History & Physical    Primary Care Provider: Alvaro Santiago MD  Source of Information: Patient , daughter ,chart    History of Presenting Illness:     Daisha Hassan is a 66 y.o. female who presents with:  Daisha Hassan  Chief complaint: diarrhea  While at home   (Location); To point of not ludy to stand and laying in bed with towel, ended up on floor w/o hurting himself (quality);    severe (severity);   2 days  (duration); Recurrent with 10 plus stools in 24 hours  (timing); Has h/o IBS  (context); None  (modifying factors). Janey Bi per history taken at time of admission  ,  but:  no nausea, vomiting,  , sob, no increased work of breathing, no seizures, no LOC episodes, no fever, but +  chills. (associated signs and symptoms); Other wise note co-morbidities  listed in this section under other issues. Impression    · Diarrhea with dehydration ;      w supporting data:  Lab and history ; Plan:  Ivf/ bolus, ns, obs status, PT eval,     · Genital herpes, recurrent , will replace cutaneous acyclovir ;with oral regimen,        Code: full   PPX:  Lovenox   Ambulation status PTA:  Needs PT, else up walking.    Expected DC:  1/11  Come form:   Home   Will need PT:  yes  Will need OT: no  Will need Speech:  no  Will need Case: no           Review of Systems:    ·  constitutional Weakness, chills  ·  genitourinary   hepes   ·  eyes neg  ·  musculoskeletal neg    ·  ENT: --ears neg  ·  neurologic  neg     --nose/sinuses neg  ·  psychiatric neg     --mouth/thorat neg  ·  endocrine neg     --neck neg  ·  peripheral vascular neg    ·  respiratory neg  ·  Skin/breast neg    ·  cardiac neg  ·  Hematologic/lymph neg    ·  gastroentestinal See above  ·  Allergy/immunologic neg             Past, Family, and/or Social History [facility])    Past Medical History   Diagnosis Date    Bipolar disorder ([facility]      Nazmy    Chronic pain      BACK PAIN    Diabetes ([facility]      BORDERLINE TX WITH DIET AND EXERCISE    DJD (degenerative joint disease)     Fibromyalgia     Genital herpes     GERD (gastroesophageal reflux disease)     Hypercholesterolemia     IBS (irritable bowel syndrome)     Lumbar disc disease      stimulation-Honza    Migraine     Nausea & vomiting     Other ill-defined conditions(799.89)      SEASONAL allergies    Other ill-defined conditions(799.89)      dysphagia has had esophagus dilated    Paget's disease     Pulmonary embolism (HCC)     RLS (restless legs syndrome)       Past Surgical History   Procedure Laterality Date    Endoscopy, colon, diagnostic       12, due 13    Hx heent       T & A    Hx cholecystectomy      Hx appendectomy      Hx hysterectomy       total for fibroid tumors    Hx other surgical       EGD x 2 with dilation/colonoscopy - no polyps per pt    Hx orthopaedic       lumbar laminectomy then fusion/neurostimulator implanted - inactive now but still in    Hx orthopaedic       REMOVAL OF NEUROSTIMULATOR    Hx back surgery  9/17/2013     back surgery - total of 3 as of 12/20/2013    Colonoscopy N/A 6/27/2016     COLONOSCOPY [facility] at [facility] ENDOSCOPY    Tilt table evaluation  8/2/2016           Prior to Admission medications    Medication Sig Start Date End Date Taking? Authorizing Provider   acetaminophen (TYLENOL) 325 mg tablet Take 325 mg by mouth every four (4) hours as needed for Pain. Yes Historical Provider   fexofenadine (ALLEGRA) 180 mg tablet Take 180 mg by mouth daily. Yes Historical Provider   gabapentin (NEURONTIN) 100 mg capsule Take 100 mg by mouth two (2) times a day. Morning and noon   Yes Historical Provider   gabapentin (NEURONTIN) 100 mg capsule Take 200 mg by mouth nightly. Yes Historical Provider   guaiFENesin ER (MUCINEX) 600 mg ER tablet Take 600 mg by mouth two (2) times a day. Yes Historical Provider   acyclovir (ZOVIRAX) 5 % ointment Apply  to affected area every three (3) hours.  1/5/17  Yes Yordan Soriano MD   fludrocortisone (FLORINEF) 0.1 mg tablet Take 1 Tab by mouth daily. 1/5/17  Yes Gen Charles NP   midodrine (PROAMITINE) 5 mg tablet Take 1 Tab by mouth three (3) times daily (with meals) for 90 days. 12/9/16 3/9/17 Yes Gen Charles NP   pindolol (VISKIN) 5 mg tablet Take 1 Tab by mouth daily as needed. 12/2/16  Yes Nataliya Tolliver MD   ondansetron (ZOFRAN ODT) 4 mg disintegrating tablet Take 1 Tab by mouth every eight (8) hours as needed for Nausea. 12/2/16  Yes Nataliya Tolliver MD   pyridostigmine (MESTINON) 60 mg tablet TAKE 1 TABLET BY MOUTH THREE TIMES DAILY 11/29/16  Yes Nataliya Tolliver MD   triamcinolone (NASACORT AQ) 55 mcg nasal inhaler 2 Sprays by Both Nostrils route daily as needed. Yes Historical Provider   butalbital-acetaminophen-caff (FIORICET) -40 mg per capsule Take 1 Cap by mouth every four (4) hours as needed for Pain for up to 20 doses. Max Daily Amount: 6 Caps. 11/9/16  Yes Genaro Abbott MD   meclizine (ANTIVERT) 25 mg tablet Take 1 Tab by mouth three (3) times daily as needed. Indications: VERTIGO 9/23/16  Yes Yordan Soriano MD   QUEtiapine (SEROQUEL) 25 mg tablet Take 25 mg by mouth nightly as needed. Yes Historical Provider   PARoxetine (PAXIL) 20 mg tablet Take 20 mg by mouth daily.    Yes Historical Provider   atorvastatin (LIPITOR) 20 mg tablet TAKE 1 TABLET BY MOUTH EVERY EVENING 5/25/16  Yes Yordan Soriano MD     Allergies   Allergen Reactions    Adhesive Itching    Hydrocodone Itching    Lorazepam Other (comments)    Other Medication Rash     Tide detergent    Percocet [Oxycodone-Acetaminophen] Itching    Sudafed [Pseudoephedrine Hcl] Other (comments)     Vertigo    Wellbutrin [Bupropion Hcl] Nausea and Vomiting    Zithromax [Azithromycin] Vertigo    Zyrtec [Cetirizine] Nausea Only      Family History   Problem Relation Age of Onset    Colon Cancer Mother     Cancer Mother 68     colon    Heart Disease Father     Heart Disease Maternal Grandmother     Heart Disease Maternal Grandfather     Heart Disease Other         SOCIAL HISTORY:  Patient resides:  Independently x   Assisted Living    SNF    With family care       Smoking history:   None x   Former    Chronic      Alcohol history:   None x   Social    Chronic      Patient ambulates:  Independently x   With Cane    With Walker    With WC             Objective:       Physical Exam:           Visit Vitals    /70    Pulse 65    Temp 98 °F (36.7 °C)    Resp 18    Ht 5' 6\" (1.676 m)    Wt 69.9 kg (154 lb)    SpO2 97%    BMI 24.86 kg/m2      O2 Device: Room air  General appearance: no acute distress  ,  Alert ;  conversant ;  Eyes: anicteric sclerae, moist conjunctivae; no lid-lag; PERRLA;  HENT: Atraumatic; oropharynx clear with moist mucous membranes and no mucosal ulcerations; normal hard and soft palate;  Neck: Trachea midline; FROM, supple, no thyromegaly or lymphadenopathy;  Lungs: CTA, with normal respiratory effort and no intercostal retractions  CV: RRR, no MRGs ; Abdomen: Soft, non-tender; no masses or HSM;  Extremities: No  peripheral edema or extremity lymphadenopathy;  Skin: Normal temperature ; turgor is fair ; the texture  nl ; herpetic rash, labia, ulcers or subcutaneous nodules; Neuro: CN 2- 12 intact, no focal motor weakness , sensation distally grossly nl ,  Psych: Appropriate affect, alert  and oriented to person, place and time;                    Data Review:     Recent Days:  Recent Labs      01/10/17   1207   WBC  5.0   HGB  15.2   HCT  45.7   PLT  278     Recent Labs      01/10/17   1207   NA  140   K  3.4*   CL  103   CO2  26   GLU  124*   BUN  17   CREA  1.01   CA  8.5   ALB  3.4*   TBILI  0.5   SGOT  33   ALT  26     No results for input(s): PH, PCO2, PO2, HCO3, FIO2 in the last 72 hours.     24 Hour Results:  Recent Results (from the past 24 hour(s))   EKG, 12 LEAD, INITIAL    Collection Time: 01/10/17 12:03 PM   Result Value Ref Range    Ventricular Rate 74 BPM    Atrial Rate 74 BPM    P-R Interval 142 ms    QRS Duration 84 ms    Q-T Interval 432 ms    QTC Calculation (Bezet) 479 ms    Calculated P Axis 17 degrees    Calculated R Axis 10 degrees    Calculated T Axis 54 degrees    Diagnosis       Sinus rhythm with occasional premature ventricular complexes  Nonspecific ST abnormality  When compared with ECG of 01-DEC-2016 17:07,  premature ventricular complexes are now present     CBC W/O DIFF    Collection Time: 01/10/17 12:07 PM   Result Value Ref Range    WBC 5.0 3.6 - 11.0 K/uL    RBC 5.01 3.80 - 5.20 M/uL    HGB 15.2 11.5 - 16.0 g/dL    HCT 45.7 35.0 - 47.0 %    MCV 91.2 80.0 - 99.0 FL    MCH 30.3 26.0 - 34.0 PG    MCHC 33.3 30.0 - 36.5 g/dL    RDW 14.0 11.5 - 14.5 %    PLATELET 261 965 - 159 K/uL   METABOLIC PANEL, COMPREHENSIVE    Collection Time: 01/10/17 12:07 PM   Result Value Ref Range    Sodium 140 136 - 145 mmol/L    Potassium 3.4 (L) 3.5 - 5.1 mmol/L    Chloride 103 97 - 108 mmol/L    CO2 26 21 - 32 mmol/L    Anion gap 11 5 - 15 mmol/L    Glucose 124 (H) 65 - 100 mg/dL    BUN 17 6 - 20 MG/DL    Creatinine 1.01 0.55 - 1.02 MG/DL    BUN/Creatinine ratio 17 12 - 20      GFR est AA >60 >60 ml/min/1.73m2    GFR est non-AA 54 (L) >60 ml/min/1.73m2    Calcium 8.5 8.5 - 10.1 MG/DL    Bilirubin, total 0.5 0.2 - 1.0 MG/DL    ALT 26 12 - 78 U/L    AST 33 15 - 37 U/L    Alk. phosphatase 75 45 - 117 U/L    Protein, total 7.1 6.4 - 8.2 g/dL    Albumin 3.4 (L) 3.5 - 5.0 g/dL    Globulin 3.7 2.0 - 4.0 g/dL    A-G Ratio 0.9 (L) 1.1 - 2.2     TROPONIN I    Collection Time: 01/10/17 12:07 PM   Result Value Ref Range    Troponin-I, Qt. <0.04 <0.05 ng/mL   CK W/ CKMB & INDEX    Collection Time: 01/10/17 12:07 PM   Result Value Ref Range    CK 64 26 - 192 U/L    CK - MB <1.0 <3.6 NG/ML    CK-MB Index CANNOT BE CALCULATED 0 - 2.5           Imaging:   CXR reviewed by me personally = na;  ECG reviewed by me personally = na; Old records:   Old records reviewed: = ;    Assessment: and Plan       Pt with new problem to me  yes   Pt and her problems are new to me  na   Life threatening issue is  na           ·  Diarrhea with dehydration ;      w supporting data:  Lab and history ; Plan:  Ivf/ bolus, ns, obs status, PT eval, , expect can go in am if lab and diarrhea stable/improved. Stool cult pending.    ·   · Genital herpes, recurrent , will replace cutaneous acyclovir ;with oral regimen,      Else see PMH,     Past Medical History   Diagnosis Date    B[facility] disorder ([facility]      Nazmy    Chronic pain      BACK PAIN    Diabetes ([facility]      BORDERLINE TX WITH DIET AND EXERCISE    DJD (degenerative joint disease)     Fibromyalgia     Genital herpes     GERD (gastroesophageal reflux disease)     Hypercholesterolemia     IBS (irritable bowel syndrome)     Lumbar disc disease      stimulation-Honza    Migraine     Nausea & vomiting     Other ill-defined conditions(799.89)      SEASONAL allergies    Other ill-defined conditions(799.89)      dysphagia has had esophagus dilated    Paget's disease     Pulmonary embolism (HCC)     RLS (restless legs syndrome)                     Signed By: Mandeep Hagen MD     January 10, 2017

## 2017-01-10 NOTE — TELEPHONE ENCOUNTER
Pt called stating has had diarrhea x 3 days. Passed out x 2 yesterday and once this morning. MD aware - advised to go to ED for further evaluation. She is calling rescue squad to transport her. Called Dr George Roman office - left message with Martita Mon to advise his nurse that pt is going to ED.

## 2017-01-10 NOTE — ED NOTES
Pt assisted to use bed pan. Pt voided 300 ml. Pt repositioned in bed, provided with warm blanket, and socks.

## 2017-01-10 NOTE — TELEPHONE ENCOUNTER
Spoke with Óscar Escobar and she just wanted to let us know that she passed out twice since having diarrhea.   Will inform MD.

## 2017-01-10 NOTE — IP AVS SNAPSHOT
[address] [place] [address] 
[phone] Patient: Nancy Ramsay MRN: [id number] STP:4/6/0298 You are allergic to the following Allergen Reactions Adhesive Itching Hydrocodone Itching Lorazepam Other (comments) Other Medication Rash Tide detergent Percocet (Oxycodone-Acetaminophen) Itching Sudafed (Pseudoephedrine Hcl) Other (comments) Vertigo Wellbutrin (Bupropion Hcl) Nausea and Vomiting Zithromax (Azithromycin) Vertigo Zyrtec (Cetirizine) Nausea Only Recent Documentation Height Weight BMI OB Status Smoking Status 1.676 m 69.9 kg 24.86 kg/m2 Hysterectomy Never Smoker Emergency Contacts  (Rel.) Home Phone Work Phone Mobile Phone Minna Coello (Child) [phone] -- -- About your hospitalization You were admitted on:  January 10, 2017 You last received care in the:  [facility] You were discharged on:  January 13, 2017 Why you were hospitalized Your primary diagnosis was:  Not on File Providers Seen During Your Hospitalizations Provider Role Specialty Primary office phone Hunter Duran MD Attending Provider Emergency Medicine [phone] Mandeep Hagen MD Attending Provider Internal Medicine [phone] Bette Malloy MD Attending Provider Internal Medicine [phone] Your Primary Care Physician (PCP) Primary Care Physician Office Phone Office Fax Itz Peralta [phone] Follow-up Information Follow up With Details Comments Contact Info Jade Mcneal MD In 1 week  [address] [facility] [address] [place] [address] 
[phone] Appointments for Next 14 days 1/23/2017  1:40 PM  [facility] Current Discharge Medication List  
  
 CONTINUE these medications which have NOT CHANGED Dose & Instructions Dispensing Information Comments Morning Noon Evening Bedtime  
 acetaminophen 325 mg tablet Commonly known as:  TYLENOL Your next dose is: Today, Tomorrow Other:  _________ Dose:  325 mg Take 325 mg by mouth every four (4) hours as needed for Pain. Refills:  0  
     
   
   
   
  
 acyclovir 5 % ointment Commonly known as:  ZOVIRAX Your next dose is: Today, Tomorrow Other:  _________ Apply  to affected area every three (3) hours. Quantity:  2 g Refills:  3  
     
   
   
   
  
 atorvastatin 20 mg tablet Commonly known as:  LIPITOR Your next dose is: Today, Tomorrow Other:  _________ TAKE 1 TABLET BY MOUTH EVERY EVENING Quantity:  30 Tab Refills:  11  
     
   
   
   
  
 butalbital-acetaminophen-caff -40 mg per capsule Commonly known as:  [employer] Your next dose is: Today, Tomorrow Other:  _________ Dose:  1 Cap Take 1 Cap by mouth every four (4) hours as needed for Pain for up to 20 doses. Max Daily Amount: 6 Caps. Quantity:  20 Cap Refills:  0  
     
   
   
   
  
 fexofenadine 180 mg tablet Commonly known as:  Kacey Mix Your next dose is: Today, Tomorrow Other:  _________ Dose:  180 mg Take 180 mg by mouth daily. Refills:  0  
     
   
   
   
  
 fludrocortisone 0.1 mg tablet Commonly known as:  FLORINEF Your next dose is: Today, Tomorrow Other:  _________ Dose:  0.1 mg Take 1 Tab by mouth daily. Quantity:  30 Tab Refills:  5  
     
   
   
   
  
 * gabapentin 100 mg capsule Commonly known as:  NEURONTIN Your next dose is: Today, Tomorrow Other:  _________ Dose:  100 mg Take 100 mg by mouth two (2) times a day. Morning and noon Refills:  0  
     
   
   
   
  
 * gabapentin 100 mg capsule Commonly known as:  NEURONTIN Your next dose is: Today, Tomorrow Other:  _________ Dose:  200 mg Take 200 mg by mouth nightly. Refills:  0  
     
   
   
   
  
 meclizine 25 mg tablet Commonly known as:  ANTIVERT Your next dose is: Today, Tomorrow Other:  _________ Dose:  25 mg Take 1 Tab by mouth three (3) times daily as needed. Indications: VERTIGO Quantity:  30 Tab Refills:  3  
     
   
   
   
  
 midodrine 5 mg tablet Commonly known as:  Samantha Harpreet Your next dose is: Today, Tomorrow Other:  _________ Dose:  5 mg Take 1 Tab by mouth three (3) times daily (with meals) for 90 days. Quantity:  90 Tab Refills:  6 MUCINEX 600 mg ER tablet Generic drug:  guaiFENesin ER Your next dose is: Today, Tomorrow Other:  _________ Dose:  600 mg Take 600 mg by mouth two (2) times a day. Refills:  0  
     
   
   
   
  
 NASACORT AQ 55 mcg nasal inhaler Generic drug:  triamcinolone Your next dose is: Today, Tomorrow Other:  _________ Dose:  2 Spray 2 Sprays by Both Nostrils route daily as needed. Refills:  0  
     
   
   
   
  
 ondansetron 4 mg disintegrating tablet Commonly known as:  ZOFRAN ODT Your next dose is: Today, Tomorrow Other:  _________ Dose:  4 mg Take 1 Tab by mouth every eight (8) hours as needed for Nausea. Quantity:  30 Tab Refills:  3 PARoxetine 20 mg tablet Commonly known as:  PAXIL Your next dose is: Today, Tomorrow Other:  _________ Dose:  20 mg Take 20 mg by mouth daily. Refills:  0  
     
   
   
   
  
 pindolol 5 mg tablet Commonly known as:  VISKIN Your next dose is: Today, Tomorrow Other:  _________ Dose:  5 mg Take 1 Tab by mouth daily as needed. Quantity:  30 Tab Refills:  3 pyridostigmine 60 mg tablet Commonly known as:  MESTINON Your next dose is: Today, Tomorrow Other:  _________ TAKE 1 TABLET BY MOUTH THREE TIMES DAILY Quantity:  90 Tab Refills:  1 QUEtiapine 25 mg tablet Commonly known as:  SEROquel Your next dose is: Today, Tomorrow Other:  _________ Dose:  25 mg Take 25 mg by mouth nightly as needed. Refills:  0  
     
   
   
   
  
 * Notice: This list has 2 medication(s) that are the same as other medications prescribed for you. Read the directions carefully, and ask your doctor or other care provider to review them with you. Discharge Instructions Discharge Instructions PATIENT ID: Corbin Parkinson MRN: [id number] YOB: 1945 DATE OF ADMISSION: 1/10/2017 11:51 AM   
DATE OF DISCHARGE: 1/13/2017 PRIMARY CARE PROVIDER: Nahomy Ramos MD  
 
ATTENDING PHYSICIAN: Jeanie Montiel MD 
DISCHARGING PROVIDER: Jeanie Montiel MD   
To contact this individual call [phone] and ask the  to page. If unavailable ask to be transferred the Adult Hospitalist Department. DISCHARGE DIAGNOSES Diarrhea,flare of IBS Dehydration CONSULTATIONS: IP CONSULT TO HOSPITALIST 
 
PROCEDURES/SURGERIES: * No surgery found * PENDING TEST RESULTS:  
At the time of discharge the following test results are still pending: none FOLLOW UP APPOINTMENTS:  
Follow-up Information Follow up With Details Comments Contact Info Nahomy Ramos MD In 1 week  [address] [facility] [address] [place] [address] 
[phone] ADDITIONAL CARE RECOMMENDATIONS:  
   
Dehydration: Care Instructions Your Care Instructions Dehydration happens when your body loses too much fluid. This might happen when you do not drink enough water or you lose large amounts of fluids from your body because of diarrhea, vomiting, or sweating.  Severe dehydration can be life-threatening. Water and minerals called electrolytes help put your body fluids back in balance. Learn the early signs of fluid loss, and drink more fluids to prevent dehydration. Follow-up care is a key part of your treatment and safety. Be sure to make and go to all appointments, and call your doctor if you are having problems. It's also a good idea to know your test results and keep a list of the medicines you take. How can you care for yourself at home? · To prevent dehydration, drink plenty of fluids, enough so that your urine is light yellow or clear like water. Choose water and other caffeine-free clear liquids until you feel better. If you have kidney, heart, or liver disease and have to limit fluids, talk with your doctor before you increase the amount of fluids you drink. · If you do not feel like eating or drinking, try taking small sips of water, sports drinks, or other rehydration drinks. · Get plenty of rest. 
To prevent dehydration · Add more fluids to your diet and daily routine, unless your doctor has told you not to. · During hot weather, drink more fluids. Drink even more fluids if you exercise a lot. Stay away from drinks with alcohol or caffeine. · Watch for the symptoms of dehydration. These include: ¨ A dry, sticky mouth. ¨ Dark yellow urine, and not much of it. ¨ Dry and sunken eyes. ¨ Feeling very tired. · Learn what problems can lead to dehydration. These include: ¨ Diarrhea, fever, and vomiting. ¨ Any illness with a fever, such as pneumonia or the flu. ¨ Activities that cause heavy sweating, such as endurance races and heavy outdoor work in hot or humid weather. ¨ Alcohol or drug abuse or withdrawal. 
¨ Certain medicines, such as cold and allergy pills (antihistamines), diet pills (diuretics), and laxatives. ¨ Certain diseases, such as diabetes, cancer, and heart or kidney disease. When should you call for help? Call 911 anytime you think you may need emergency care. For example, call if: 
· You passed out (lost consciousness). Call your doctor now or seek immediate medical care if: 
· You are confused and cannot think clearly. · You are dizzy or lightheaded, or you feel like you may faint. · You have signs of needing more fluids. You have sunken eyes and a dry mouth, and you pass only a little dark urine. · You cannot keep fluids down. Watch closely for changes in your health, and be sure to contact your doctor if: 
· You are not making tears. · Your skin is very dry and sags slowly back into place after you pinch it. · Your mouth and eyes are very dry. Where can you learn more? Go to http://ash-jeronimo.info/. Enter X273 in the search box to learn more about \"Dehydration: Care Instructions. \" Current as of: May 27, 2016 Content Version: 11.1 © 3004-3783 [employer]). Care instructions adapted under license by [employer] (which disclaims liability or warranty for this information). If you have questions about a medical condition or this instruction, always ask your healthcare professional. [place] [address] any warranty or liability for your use of this information. 
  
 
DIET: Cardiac Diet ACTIVITY: Activity as tolerated WOUND CARE: NA 
 
EQUIPMENT needed: NA 
 
 
DISCHARGE MEDICATIONS: 
 See Medication Reconciliation Form · It is important that you take the medication exactly as they are prescribed. · Keep your medication in the bottles provided by the pharmacist and keep a list of the medication names, dosages, and times to be taken in your wallet. · Do not take other medications without consulting your doctor. NOTIFY YOUR PHYSICIAN FOR ANY OF THE FOLLOWING:  
Fever over 101 degrees for 24 hours.   
Chest pain, shortness of breath, fever, chills, nausea, vomiting, diarrhea, change in mentation, falling, weakness, bleeding. Severe pain or pain not relieved by medications. Or, any other signs or symptoms that you may have questions about. DISPOSITION: 
 x Home With: 
 OT  PT  [facility]  RN  
  
 SNF/Inpatient Rehab/LTAC Independent/assisted living Hospice Other:  
 
 
 
Signed: Zoey Main MD 
1/13/2017 
4:00 PM 
 
Discharge Orders None [email or web address] Announcement We are excited to announce that we are making your provider's discharge notes available to you in [employer]. You will see these notes when they are completed and signed by the physician that discharged you from your recent hospital stay. If you have any questions or concerns about any information you see in [employer], please call the Health Information Department where you were seen or reach out to your Primary Care Provider for more information about your plan of care. Introducing [facility] [place] [facility]! Dear Juanito Tanner: Thank you for requesting a [employer] account. Our records indicate that you already have an active [employer] account. You can access your account anytime at [email or web address] Did you know that you can access your hospital and ER discharge instructions at any time in [employer]? You can also review all of your test results from your hospital stay or ER visit. Additional Information If you have questions, please visit the Frequently Asked Questions section of the [employer] website at [email or web address] Remember, [employer] is NOT to be used for urgent needs. For medical emergencies, dial 911. Now available from your iPhone and Android! General Information Please provide this summary of care documentation to your next provider. Patient Signature:  ____________________________________________________________  Date:  ____________________________________________________________  
  
Nish Lopez    
    
 Provider Signature:  ____________________________________________________________ Date:  ____________________________________________________________

## 2017-01-10 NOTE — TELEPHONE ENCOUNTER
Onelia Aranda from [facility] called and stated pt called with symptoms of diarrhea and syncope. She stated pt was sent to the [facility].  Onelia Aranda can be reached at [phone]

## 2017-01-10 NOTE — ED PROVIDER NOTES
HPI Comments: 66 y.o. female with past medical history significant for IBS, RLS, bipolar disorder, fibromyalgia, lumbar disc disorder, Paget's disease, DJD, migraine, hypercholesterolemia, borderline DM, and PE who presents from home via EMS with chief complaint of fatigue. Pt reports that she has been treated for sinusitis for the last 2 - 3 weeks with Abx, last taking a dose yesterday, with continued cough and congestion. For the last 3 days, pt states that she has been unable to eat or drink without diarrhea, having 3 - 4 episodes per day over that same period. Today, pt states that she felt like she would fall over after going to the bathroom so she lowered herself to the floor and was unable to get up for some time. Eventually she was able to get herself up and move back to bed. Denies vomiting, fever, chills, and blood in stool. No h/o heart disease. Borderline DM. There are no other acute medical concerns at this time. Social hx: Never smoker. Rare alcohol use. PCP: Alvaro Santiago MD    Note written by Yinka Bonilla Do, as dictated by Ruth Rosado MD 12:22 PM    The history is provided by the patient.         Past Medical History:   Diagnosis Date    Bipolar disorder ([facility] [address]      Nazmy    Chronic pain      BACK PAIN    Diabetes ([facility] [address]      BORDERLINE TX WITH DIET AND EXERCISE    DJD (degenerative joint disease)     Fibromyalgia     Genital herpes     GERD (gastroesophageal reflux disease)     Hypercholesterolemia     IBS (irritable bowel syndrome)     Lumbar disc disease      stimulation-Honza    Migraine     Nausea & vomiting     Other ill-defined conditions(799.89)      SEASONAL allergies    Other ill-defined conditions(799.89)      dysphagia has had esophagus dilated    Paget's disease     Pulmonary embolism (HCC)     RLS (restless legs syndrome)        Past Surgical History:   Procedure Laterality Date    Endoscopy, colon, diagnostic       12, due 15    Hx heent T & A    Hx cholecystectomy      Hx appendectomy      Hx hysterectomy       total for fibroid tumors    Hx other surgical       EGD x 2 with dilation/colonoscopy - no polyps per pt    Hx orthopaedic       lumbar laminectomy then fusion/neurostimulator implanted - inactive now but still in    Hx orthopaedic       REMOVAL OF NEUROSTIMULATOR    Hx back surgery  9/17/2013     back surgery - total of 3 as of 12/20/2013    Colonoscopy N/A 6/27/2016     COLONOS[facility] at [facility] ENDOSCOPY    Tilt table evaluation  8/2/2016               Family History:   Problem Relation Age of Onset    Colon Cancer Mother     Cancer Mother 68     colon    Heart Disease Father     Heart Disease Maternal Grandmother     Heart Disease Maternal Grandfather     Heart Disease Other        Social History     Social History    Marital status: SINGLE     Spouse name: N/A    Number of children: N/A    Years of education: N/A     Occupational History    volunteer Retired     [employer]     Social History Main Topics    Smoking status: Never Smoker    Smokeless tobacco: Never Used    Alcohol use No      Comment: 1 per month    Drug use: No    Sexual activity: Not on file     Other Topics Concern    Not on file     Social History Narrative         ALLERGIES: Adhesive; Hydrocodone; Lorazepam; Other medication; Percocet [oxycodone-acetaminophen]; Sudafed [pseudoephedrine hcl]; Wellbutrin [bupropion hcl]; Zithromax [azithromycin]; and Zyrtec [cetirizine]    Review of Systems   Constitutional: Positive for fatigue. Negative for activity change and appetite change. HENT: Positive for congestion. Negative for ear pain, facial swelling, sore throat and trouble swallowing. Eyes: Negative for pain, discharge and visual disturbance. Respiratory: Positive for cough. Negative for chest tightness, shortness of breath and wheezing. Cardiovascular: Negative for chest pain and palpitations. Gastrointestinal: Positive for diarrhea. Negative for abdominal pain, blood in stool, nausea and vomiting. Genitourinary: Negative for difficulty urinating, flank pain and hematuria. Musculoskeletal: Negative for arthralgias, joint swelling, myalgias and neck pain. Skin: Negative for color change and rash. Neurological: Positive for weakness. Negative for dizziness, numbness and headaches. Hematological: Negative for adenopathy. Does not bruise/bleed easily. Psychiatric/Behavioral: Negative for behavioral problems, confusion and sleep disturbance. All other systems reviewed and are negative. Vitals:    01/10/17 1157   BP: 104/67   Pulse: 75   Resp: 16   Temp: 98 °F (36.7 °C)   SpO2: 96%   Weight: 69.9 kg (154 lb)   Height: 5' 6\" (1.676 m)            Physical Exam   Constitutional: She is oriented to person, place, and time. She appears well-developed and well-nourished. No distress. HENT:   Head: Normocephalic and atraumatic. Nose: Nose normal.   Mouth/Throat: Oropharynx is clear and moist.   Eyes: Conjunctivae and EOM are normal. Pupils are equal, round, and reactive to light. No scleral icterus. Neck: Normal range of motion. Neck supple. No JVD present. No tracheal deviation present. No thyromegaly present. No carotid bruits noted. Cardiovascular: Normal rate, regular rhythm, normal heart sounds and intact distal pulses. Exam reveals no gallop and no friction rub. No murmur heard. Hypotensive. Pulse rate normal    Pulmonary/Chest: Effort normal and breath sounds normal. No respiratory distress. She has no wheezes. She has no rales. She exhibits no tenderness. Abdominal: Soft. She exhibits no distension and no mass. There is no tenderness. There is no rebound and no guarding. Hyperactive bowel sounds. Musculoskeletal: Normal range of motion. She exhibits no edema or tenderness. Lymphadenopathy:     She has no cervical adenopathy.    Neurological: She is alert and oriented to person, place, and time. She has normal reflexes. No cranial nerve deficit. Coordination normal.   Skin: Skin is warm and dry. No rash noted. No erythema. There is pallor. Psychiatric: She has a normal mood and affect. Her behavior is normal. Judgment and thought content normal.   Nursing note and vitals reviewed. Note written by Yinka Larose, as dictated by Saida Hilton MD 12:29 PM    MDM  Number of Diagnoses or Management Options     Amount and/or Complexity of Data Reviewed  Clinical lab tests: ordered and reviewed  Decide to obtain previous medical records or to obtain history from someone other than the patient: yes  Review and summarize past medical records: yes    Risk of Complications, Morbidity, and/or Mortality  Presenting problems: moderate  Diagnostic procedures: moderate  Management options: moderate    Patient Progress  Patient progress: stable    ED Course       Procedures    ED MD EKG interpretation: NSR with rate 74 BPM. No ectopy noted. LAD noted. Intervals are normal. Non specific ST T changes noted. Saida Hilton MD      2:02 PM  Putting in a consult with hospitalist for nausea and diarrhea over 3 days with weakness and HTN. CONSULT NOTE:  2:13 PM Saida Hilton MD spoke with Dr. Phill Fisher, Consult for Hospitalist.  Discussed available diagnostic tests and clinical findings. He is in agreement with care plans as outlined. Dr. Phill Fisher will evaluate and admit the pt.

## 2017-01-11 LAB
ANION GAP BLD CALC-SCNC: 9 MMOL/L (ref 5–15)
BUN SERPL-MCNC: 11 MG/DL (ref 6–20)
BUN/CREAT SERPL: 15 (ref 12–20)
CALCIUM SERPL-MCNC: 7.9 MG/DL (ref 8.5–10.1)
CHLORIDE SERPL-SCNC: 108 MMOL/L (ref 97–108)
CO2 SERPL-SCNC: 26 MMOL/L (ref 21–32)
CREAT SERPL-MCNC: 0.74 MG/DL (ref 0.55–1.02)
ERYTHROCYTE [DISTWIDTH] IN BLOOD BY AUTOMATED COUNT: 14.1 % (ref 11.5–14.5)
GLUCOSE SERPL-MCNC: 69 MG/DL (ref 65–100)
HCT VFR BLD AUTO: 41.6 % (ref 35–47)
HGB BLD-MCNC: 13.7 G/DL (ref 11.5–16)
MCH RBC QN AUTO: 30 PG (ref 26–34)
MCHC RBC AUTO-ENTMCNC: 32.9 G/DL (ref 30–36.5)
MCV RBC AUTO: 91.2 FL (ref 80–99)
PLATELET # BLD AUTO: 239 K/UL (ref 150–400)
POTASSIUM SERPL-SCNC: 3.3 MMOL/L (ref 3.5–5.1)
RBC # BLD AUTO: 4.56 M/UL (ref 3.8–5.2)
SODIUM SERPL-SCNC: 143 MMOL/L (ref 136–145)
WBC # BLD AUTO: 4.2 K/UL (ref 3.6–11)

## 2017-01-11 PROCEDURE — 80048 BASIC METABOLIC PNL TOTAL CA: CPT | Performed by: INTERNAL MEDICINE

## 2017-01-11 PROCEDURE — 74011250637 HC RX REV CODE- 250/637: Performed by: INTERNAL MEDICINE

## 2017-01-11 PROCEDURE — 97530 THERAPEUTIC ACTIVITIES: CPT

## 2017-01-11 PROCEDURE — 97116 GAIT TRAINING THERAPY: CPT

## 2017-01-11 PROCEDURE — 36415 COLL VENOUS BLD VENIPUNCTURE: CPT | Performed by: INTERNAL MEDICINE

## 2017-01-11 PROCEDURE — 96361 HYDRATE IV INFUSION ADD-ON: CPT

## 2017-01-11 PROCEDURE — 85027 COMPLETE CBC AUTOMATED: CPT | Performed by: INTERNAL MEDICINE

## 2017-01-11 PROCEDURE — 74011250637 HC RX REV CODE- 250/637: Performed by: HOSPITALIST

## 2017-01-11 PROCEDURE — 99218 HC RM OBSERVATION: CPT

## 2017-01-11 PROCEDURE — 74011250636 HC RX REV CODE- 250/636: Performed by: INTERNAL MEDICINE

## 2017-01-11 PROCEDURE — 96372 THER/PROPH/DIAG INJ SC/IM: CPT

## 2017-01-11 PROCEDURE — 97162 PT EVAL MOD COMPLEX 30 MIN: CPT

## 2017-01-11 RX ORDER — LORATADINE 10 MG/1
10 TABLET ORAL DAILY
Status: DISCONTINUED | OUTPATIENT
Start: 2017-01-12 | End: 2017-01-13 | Stop reason: HOSPADM

## 2017-01-11 RX ORDER — ATORVASTATIN CALCIUM 20 MG/1
20 TABLET, FILM COATED ORAL
Status: DISCONTINUED | OUTPATIENT
Start: 2017-01-11 | End: 2017-01-13 | Stop reason: HOSPADM

## 2017-01-11 RX ORDER — PAROXETINE HYDROCHLORIDE 20 MG/1
20 TABLET, FILM COATED ORAL DAILY
Status: DISCONTINUED | OUTPATIENT
Start: 2017-01-12 | End: 2017-01-13 | Stop reason: HOSPADM

## 2017-01-11 RX ORDER — PYRIDOSTIGMINE BROMIDE 60 MG/1
60 TABLET ORAL EVERY 8 HOURS
Status: DISCONTINUED | OUTPATIENT
Start: 2017-01-11 | End: 2017-01-13 | Stop reason: HOSPADM

## 2017-01-11 RX ORDER — ACETAMINOPHEN 325 MG/1
325 TABLET ORAL
Status: DISCONTINUED | OUTPATIENT
Start: 2017-01-11 | End: 2017-01-13 | Stop reason: HOSPADM

## 2017-01-11 RX ORDER — POTASSIUM CHLORIDE 750 MG/1
40 TABLET, FILM COATED, EXTENDED RELEASE ORAL DAILY
Status: COMPLETED | OUTPATIENT
Start: 2017-01-11 | End: 2017-01-13

## 2017-01-11 RX ADMIN — ACETAMINOPHEN 325 MG: 325 TABLET, FILM COATED ORAL at 21:08

## 2017-01-11 RX ADMIN — PYRIDOSTIGMINE BROMIDE 60 MG: 60 TABLET ORAL at 23:40

## 2017-01-11 RX ADMIN — FLUDROCORTISONE ACETATE 100 MCG: 0.1 TABLET ORAL at 09:03

## 2017-01-11 RX ADMIN — MIDODRINE HYDROCHLORIDE 5 MG: 5 TABLET ORAL at 18:26

## 2017-01-11 RX ADMIN — SODIUM CHLORIDE 125 ML/HR: 900 INJECTION, SOLUTION INTRAVENOUS at 05:50

## 2017-01-11 RX ADMIN — VALACYCLOVIR HYDROCHLORIDE 500 MG: 500 TABLET, FILM COATED ORAL at 21:03

## 2017-01-11 RX ADMIN — POTASSIUM CHLORIDE 40 MEQ: 750 TABLET, FILM COATED, EXTENDED RELEASE ORAL at 15:02

## 2017-01-11 RX ADMIN — GABAPENTIN 100 MG: 100 CAPSULE ORAL at 18:26

## 2017-01-11 RX ADMIN — ENOXAPARIN SODIUM 40 MG: 40 INJECTION SUBCUTANEOUS at 09:03

## 2017-01-11 RX ADMIN — MIDODRINE HYDROCHLORIDE 5 MG: 5 TABLET ORAL at 09:03

## 2017-01-11 RX ADMIN — Medication 10 ML: at 21:03

## 2017-01-11 RX ADMIN — GABAPENTIN 100 MG: 100 CAPSULE ORAL at 09:03

## 2017-01-11 RX ADMIN — GABAPENTIN 200 MG: 100 CAPSULE ORAL at 21:03

## 2017-01-11 RX ADMIN — Medication 10 ML: at 15:03

## 2017-01-11 RX ADMIN — MIDODRINE HYDROCHLORIDE 5 MG: 5 TABLET ORAL at 12:16

## 2017-01-11 RX ADMIN — VALACYCLOVIR HYDROCHLORIDE 500 MG: 500 TABLET, FILM COATED ORAL at 09:03

## 2017-01-11 RX ADMIN — PYRIDOSTIGMINE BROMIDE 60 MG: 60 TABLET ORAL at 15:50

## 2017-01-11 RX ADMIN — ATORVASTATIN CALCIUM 20 MG: 20 TABLET, FILM COATED ORAL at 21:03

## 2017-01-11 RX ADMIN — QUETIAPINE FUMARATE 25 MG: 25 TABLET, FILM COATED ORAL at 21:03

## 2017-01-11 NOTE — PROGRESS NOTES
TRANSFER - IN REPORT:    Verbal report received from Radha Hurley RN(name) on Silvia Brower  being received from ED(unit) for routine progression of care      Report consisted of patients Situation, Background, Assessment and   Recommendations(SBAR). Information from the following report(s) SBAR was reviewed with the receiving nurse. Opportunity for questions and clarification was provided. Assessment completed upon patients arrival to unit and care assumed.          Primary Nurse Alethea Avalos and Jenni RN performed a dual skin assessment on this patient No impairment noted  Chaitanya score is 18

## 2017-01-11 NOTE — PROGRESS NOTES
Spiritual Care Assessment/Progress Notes    Phill Suárez [id number]  [id number]    1945  66 y.o.  female    Patient Telephone Number: [phone] (home)   Sabianism Affiliation: Ubaldo Liter   Language: English   Extended Emergency Contact Information  Primary Emergency Contact: [address] Phone: [phone]  Relation: Child   Patient Active Problem List    Diagnosis Date Noted    Near syncope 08/02/2016    Resting tremor 07/29/2016    Orthostatic hypotension 07/05/2016    Advance directive on file 05/24/2016    Pulmonary embolism ([facility]     Mixed hyperlipidemia 02/02/2016    Diabetes mellitus type 2, controlled, without complications [facility] 88/98/0004    Tubulovillous adenoma 08/21/2012    Abnormal mammogram 06/04/2012    Encounter for long-term (current) use of other medications 11/30/2011    Hammertoe 09/12/2011    Allergic rhinitis, cause unspecified 01/26/2011    Other diseases of nasal cavity and sinuses(478.19) 07/19/2010    IBS (irritable bowel syndrome)     RLS (restless legs syndrome)     Bipolar disorder (HCC)     Fibromyalgia     DJD (degenerative joint disease)     Lumbar disc disease     Paget's disease of bone     Migraine     Genital herpes         Date: 1/11/2017       Level of Sabianism/Spiritual Activity:  [x]         Involved in amy tradition/spiritual practice    []         Not involved in amy tradition/spiritual practice  []         Spiritually oriented    []         Claims no spiritual orientation    []         seeking spiritual identity  []         Feels alienated from Yarsani practice/tradition  []         Feels angry about Yarsani practice/tradition  [x]         Spirituality/Yarsani tradition is a resource for coping at this time.   []         Not able to assess due to medical condition    Services Provided Today:  []         crisis intervention    []         reading Scriptures  [x]         spiritual assessment    [x] prayer  [x]         empathic listening/emotional support  []         rites and rituals (cite in comments)  []         life review     []         Zoroastrian support  []         theological development   []         advocacy  []         ethical dialog     []         blessing  []         bereavement support    []         support to family  []         anticipatory grief support   []         help with AMD  []         spiritual guidance    []         meditation      Spiritual Care Needs  []         Emotional Support  []         Spiritual/Sikh Care  []         Loss/Adjustment  []         Advocacy/Referral                /Ethics  [x]         No needs expressed at               this time  []         Other: (note in               comments)  Spiritual Care Plan  []         Follow up visits with               pt/family  []         Provide materials  []         Schedule sacraments  []         Contact Community               Clergy  [x]         Follow up as needed  []         Other: (note in               comments)     Comments: Ms. Raj Echeverria was visited by a [facility] Partner (volunteer) and requested a visit from me. She is a former volunteer of mine. Patricia is a very significant coping resource for her. I provided an encouraging presence and assisted her in discovering meaning in the midst of her current health challenges. I also prayed with her at the conclusion of the visit. Spiritual Care Services remains available as needed. Rev. Gregory Morrissey M.Div, [facility]

## 2017-01-11 NOTE — PROGRESS NOTES
Hospitalist Progress Note  Brennan Bowen MD  Office: [phone]  Cell: [phone]      Date of Service:  2017  NAME:  Bereket Johnson  :  1945  MRN:  [id number]      Admission Summary:   She came for weakness,near syncope and diarrhea. She states her IBS acted up and she was not eating for fear of diarrhea,she knows\" I am not supposed to do that. \"  Feeling better, but still weak,she is not confident to return today. She lives in [address] [place] [address] and hoping if she can get to the skilled level same place. NO more diarrhea since yesterday. Past Medical History   Diagnosis Date    Bipolar disorder ([facility]      Nazmy    Chronic pain      BACK PAIN    Diabetes ([facility]      BORDERLINE TX WITH DIET AND EXERCISE    DJD (degenerative joint disease)     Fibromyalgia     Genital herpes     GERD (gastroesophageal reflux disease)     Hypercholesterolemia     IBS (irritable bowel syndrome)     Lumbar disc disease      stimulation-Honza    Migraine     Nausea & vomiting     Other ill-defined conditions(799.89)      SEASONAL allergies    Other ill-defined conditions(799.89)      dysphagia has had esophagus dilated    Paget's disease     Pulmonary embolism (HCC)     RLS (restless legs syndrome)        Interval history / Subjective:     Feeling better, but still weak,she is not confident to return today. She lives in [address] [place] [address] and hoping if she can get to the skilled level same place. NO more diarrhea since yesterday. She has had tremor for the last year. She sees a neurologist,they are  considering may be parkinson's disease,not confirmed. .     Assessment & Plan:     Weakness,presyncope due to dehydration due to diarrhea , limiting oral intake,possibly non infection,flare of IBS  -Diarrhea stopped  -IV fluids,decreased to 75 cc/hr  -Advance diet  -No specimen for lab  -Orthostatic negative     She is on midodrine and florinef suspect for orthostatic hypotension:-Orthostatic negative 1/11    Hypokalemia: replete  Continue the rest of home medications        Code status: full  DVT prophylaxis: [address] discussed with: Patient/Family and Nurse  Disposition: TBD,she did not feel well enough to leave today and PT to re eval.     Hospital Problems  Date Reviewed: 1/5/2017    None            Review of Systems:   Pertinent items are noted in HPI. Vital Signs:    Last 24hrs VS reviewed since prior progress note. Most recent are:  Visit Vitals    /79 (BP 1 Location: Left arm, BP Patient Position: Sitting;Post activity)    Pulse 88    Temp 98.3 °F (36.8 °C)    Resp 18    Ht 5' 6\" (1.676 m)    Wt 69.9 kg (154 lb)    SpO2 95%    BMI 24.86 kg/m2         Intake/Output Summary (Last 24 hours) at 01/11/17 1356  Last data filed at 01/11/17 1046   Gross per 24 hour   Intake              240 ml   Output             1000 ml   Net             -760 ml        Physical Examination:             Constitutional:  No acute distress, cooperative, pleasant    ENT:  Oral mucous moist, oropharynx benign. Neck supple,    Resp:  CTA bilaterally. No wheezing/rhonchi/rales. No accessory muscle use   CV:  Regular rhythm, normal rate, no murmurs, gallops, rubs    GI:  Soft, non distended, non tender. normoactive bowel sounds, no hepatosplenomegaly     Musculoskeletal:  No edema, warm, 2+ pulses throughout    Neurologic:  Moves all extremities. AAOx3, CN II-XII reviewed     Psych:  Good insight, Not anxious nor agitated.   Skin:  Good turgor, no rashes or ulcers       Data Review:    Review and/or order of clinical lab test  Review and/or order of tests in the radiology section of CPT  Review and/or order of tests in the medicine section of CPT      Labs:     Recent Labs      01/11/17   0258  01/10/17   1207   WBC  4.2  5.0   HGB  13.7  15.2   HCT  41.6  45.7   PLT  239  278     Recent Labs      01/11/17   0258  01/10/17   1207   NA  143  140   K  3.3*  3.4*   CL  108  103 CO2  26  26   BUN  11  17   CREA  0.74  1.01   GLU  69  124*   CA  7.9*  8.5     Recent Labs      01/10/17   1207   SGOT  33   ALT  26   AP  75   TBILI  0.5   TP  7.1   ALB  3.4*   GLOB  3.7     No results for input(s): INR, PTP, APTT in the last 72 hours. No lab exists for component: INREXT   No results for input(s): FE, TIBC, PSAT, FERR in the last 72 hours. No results found for: FOL, RBCF   No results for input(s): PH, PCO2, PO2 in the last 72 hours.   Recent Labs      01/10/17   1207   CPK  64   CKNDX  CANNOT BE CALCULATED   TROIQ  <0.04     Lab Results   Component Value Date/Time    Cholesterol, total 134 01/05/2017 12:31 PM    HDL Cholesterol 53 01/05/2017 12:31 PM    LDL, calculated 58 01/05/2017 12:31 PM    Triglyceride 115 01/05/2017 12:31 PM    CHOL/HDL Ratio 3.3 07/23/2010 08:37 AM     Lab Results   Component Value Date/Time    Glucose (POC) 96 07/29/2016 12:21 PM    Glucose (POC) 98 07/29/2016 06:45 AM    Glucose (POC) 126 07/28/2016 09:44 PM    Glucose (POC) 84 07/28/2016 07:15 PM    Glucose (POC) 118 09/16/2015 08:20 AM    Glucose  09/30/2010 10:42 AM     Lab Results   Component Value Date/Time    Color YELLOW/STRAW 07/28/2016 02:39 PM    Appearance CLEAR 07/28/2016 02:39 PM    Specific gravity <1.005 07/28/2016 02:39 PM    pH (UA) 7.0 07/28/2016 02:39 PM    Protein NEGATIVE  07/28/2016 02:39 PM    Glucose NEGATIVE  07/28/2016 02:39 PM    Ketone NEGATIVE  07/28/2016 02:39 PM    Bilirubin NEGATIVE  07/28/2016 02:39 PM    Urobilinogen 0.2 07/28/2016 02:39 PM    Nitrites NEGATIVE  07/28/2016 02:39 PM    Leukocyte Esterase NEGATIVE  07/28/2016 02:39 PM    Epithelial cells FEW 07/28/2016 02:39 PM    Bacteria NEGATIVE  07/28/2016 02:39 PM    WBC 0-4 07/28/2016 02:39 PM    RBC 0-5 07/28/2016 02:39 PM         Medications Reviewed:     Current Facility-Administered Medications   Medication Dose Route Frequency    potassium chloride SR (KLOR-CON 10) tablet 40 mEq  40 mEq Oral DAILY    acetaminophen (TYLENOL) tablet 325 mg  325 mg Oral Q4H PRN    . PHARMACY TO SUBSTITUTE PER PROTOCOL    Per Protocol    pyridostigmine (MESTINON) tablet 60 mg  60 mg Oral Q8H    [START ON 1/12/2017] PARoxetine (PAXIL) tablet 20 mg  20 mg Oral DAILY    atorvastatin (LIPITOR) tablet 20 mg  20 mg Oral QHS    butalbital-acetaminophen-caffeine (FIORICET, ESGIC) -40 mg per tablet 1 Tab  1 Tab Oral Q4H PRN    fludrocortisone (FLORINEF) tablet 100 mcg  0.1 mg Oral DAILY    gabapentin (NEURONTIN) capsule 100 mg  100 mg Oral BID    gabapentin (NEURONTIN) capsule 200 mg  200 mg Oral QHS    meclizine (ANTIVERT) tablet 25 mg  25 mg Oral Q6H PRN    QUEtiapine (SEROquel) tablet 25 mg  25 mg Oral QHS    midodrine (PROAMITINE) tablet 5 mg  5 mg Oral TID WITH MEALS    0.9% sodium chloride infusion  75 mL/hr IntraVENous CONTINUOUS    sodium chloride (NS) flush 5-10 mL  5-10 mL IntraVENous Q8H    sodium chloride (NS) flush 5-10 mL  5-10 mL IntraVENous PRN    LORazepam (ATIVAN) tablet 0.5 mg  0.5 mg Oral BID PRN    enoxaparin (LOVENOX) injection 40 mg  40 mg SubCUTAneous Q24H    valACYclovir (VALTREX) tablet 500 mg  500 mg Oral Q12H     ______________________________________________________________________  EXPECTED LENGTH OF ST               Alex Faulkner MD

## 2017-01-11 NOTE — PROGRESS NOTES
NUTRITION       Nutrition screening referral was triggered based on results obtained during nursing admission assessment. The patient's chart was reviewed and nutrition assessment is not indicated at this time. Plan to see patient for rescreen as indicated. Thank you.     Wt Readings from Last 10 Encounters:   01/10/17 69.9 kg (154 lb)   01/05/17 69.9 kg (154 lb)   01/05/17 69.9 kg (154 lb 3.2 oz)   12/28/16 68.3 kg (150 lb 9.6 oz)   12/22/16 69.2 kg (152 lb 9.6 oz)   12/02/16 68.9 kg (152 lb)   12/01/16 70.3 kg (155 lb)   11/23/16 69.9 kg (154 lb)   11/08/16 71.6 kg (157 lb 12.8 oz)   10/11/16 72.2 kg (159 lb 3.2 oz)     Telma Matias, [address]

## 2017-01-11 NOTE — PROGRESS NOTES
Problem: Mobility Impaired (Adult and Pediatric)  Goal: *Acute Goals and Plan of Care (Insert Text)  Physical Therapy Goals  Initiated 1/11/2017    1. Patient will transfer from bed to chair and chair to bed with supervision/set-up using the least restrictive device within 7 day(s). 3. Patient will perform supine to sit and sit to stand with modified independence and no signs/symptoms of orthostatic hypotension within 7 day(s). 4. Patient will ambulate with modified independence on level surface for 250 feet with the least restrictive device within 7 day(s). 5. Patient will be able to stand statically unsupported for >/= 3 minutes without LOB within 7 days. PHYSICAL THERAPY EVALUATION  Patient: Sarai Lara (89 y.o. female)  Date: 1/11/2017  Primary Diagnosis: dehydration        Precautions:  Contact, Fall      ASSESSMENT :  Based on the objective data described below, the patient presents with generally decreased strength and ROM, impaired balance (dynamic sitting and static/dynamic standing), decreased activity tolerance, gait abnormalities, and fall risk. The patient did have >15 point drop in systolic blood pressure moving supine to sit, but did not experience dizziness; no drop in BP going sit to stand. She reports that her blood pressure has been dropping when she sits up, but it seems to be improving. The patient required only supervision to [facility] for all bed mobility and transfers. However, with gait she required Oscar without AD and CGA with use of rolling walker. The primary concern for this patient is impaired standing balance. She tends to sway posteriorly during static standing and required frequent verbal cuing to avoid leaning backward for support during guarding. She demonstrates increased trunk sway (without RW)  and path deviations during ambulation; she did much better with RW vs. CGA.  The patient was able to stand unsupported with EC, turn in a [place], and perform appropriate posterior stepping response with AP perturbation. She demonstrates a tremor in her (R) hand with pill-rolling pattern and presents with slow, shuffling gait. The patient reports that she is following with neurologist concerning possible Parkinson's Disease. The patient was educated regarding fall prevention and contacting nurse for help getting OOB to use the bathroom. Patient was encouraged to start trying to use the commode vs. going in her brief, which she did upon standing during treatment. Patient will benefit from skilled intervention to address the above impairments. Recommend SNF vs. Home health upon discharge. Patients rehabilitation potential is considered to be FAIR    Factors which may influence rehabilitation potential include:   [ ]         None noted  [ ]         Mental ability/status  [ ]         Medical condition  [X]         Home/family situation and support systems  [X]         Safety awareness  [ ]         Pain tolerance/management  [X]         Other:possible PD       PLAN :  Recommendations and Planned Interventions:  [ ]           Bed Mobility Training             [X]    Neuromuscular Re-Education  [X]           Transfer Training                   [ ]    Orthotic/Prosthetic Training  [X]           Gait Training                         [ ]    Modalities  [X]           Therapeutic Exercises           [ ]    Edema Management/Control  [X]           Therapeutic Activities            [X]    Patient and Family Training/Education  [ ]           Other (comment):     Frequency/Duration: Patient will be followed by physical therapy 5 times a week to address goals. Discharge Recommendations: [address]  Further Equipment Recommendations for Discharge: if going home with home health, then patient should use rolling walker. Patient only has rollator at home, but would be a safety concern. RN to notify  and PT will follow up later today.        SUBJECTIVE:   Patient stated I am definitely weaker than I was before all this started. I may need some help before I go home alone.       OBJECTIVE DATA SUMMARY:   HISTORY:    Past Medical History   Diagnosis Date    Bipolar disorder ([facility]         Nazmy    Chronic pain         BACK PAIN    Diabetes ([facility]         BORDERLINE TX WITH DIET AND EXERCISE    DJD (degenerative joint disease)      Fibromyalgia      Genital herpes      GERD (gastroesophageal reflux disease)      Hypercholesterolemia      IBS (irritable bowel syndrome)      Lumbar disc disease         stimulation-Honza    Migraine      Nausea & vomiting      Other ill-defined conditions(799.89)         SEASONAL allergies    Other ill-defined conditions(799.89)         dysphagia has had esophagus dilated    Paget's disease      Pulmonary embolism (HCC)      RLS (restless legs syndrome)       Past Surgical History   Procedure Laterality Date    Endoscopy, colon, diagnostic           12, due 13    Hx heent           T & A    Hx cholecystectomy        Hx appendectomy        Hx hysterectomy           total for fibroid tumors    Hx other surgical           EGD x 2 with dilation/colonoscopy - no polyps per pt    Hx orthopaedic           lumbar laminectomy then fusion/neurostimulator implanted - inactive now but still in    Hx orthopaedic           REMOVAL OF NEUROSTIMULATOR    Hx back surgery   9/17/2013       back surgery - total of 3 as of 12/20/2013    Colonoscopy N/A 6/27/2016       COLONOSCOPY performed by Janeen Carmona MD at [facility] ENDOSCOPY    Tilt table evaluation   8/2/2016             Prior Level of Function/Home Situation: Independent with all ADLs and functional mobility. Personal factors and/or comorbidities impacting plan of care: possible PD, lives alone.      Home Situation  Home Environment: Apartment (3rd floor--elevator)  # Steps to Enter: 0  One/Two Story Residence: One story  Living Alone: Yes  Support Systems: Family member(s)  Patient Expects to be Discharged to[de-identified] Apartment  Current DME Used/Available at Home: [employer], rollator  Tub or Shower Type: Tub/Shower combination (tub cut out)     EXAMINATION/PRESENTATION/DECISION MAKING:   Critical Behavior:  Neurologic State: Alert, Appropriate for age  Orientation Level: Oriented X4  Cognition: Appropriate decision making, Appropriate for age attention/concentration, Appropriate safety awareness, Follows commands     Skin:  Good integrity  Strength:    Strength: Generally decreased, functional: primarily hip flexors and knee extensors        Range Of Motion:  AROM: Generally decreased, functional  Coordination:  Coordination: Generally decreased, functional     Functional Mobility:  Bed Mobility:  Rolling: Modified independent (used bed rail)  Supine to Sit: Modified independent (used bed rail)        Transfers:  Sit to Stand: Supervision (pushed from bed)  Stand to Sit: Supervision        Bed to Chair: Supervision              Balance:   Sitting: Impaired  Sitting - Static: Good (unsupported)  Sitting - Dynamic: Fair (occasional)  Standing: Impaired  Standing - Static: Fair (occasional LOB--posterior sway)  Standing - Dynamic : Fair  Ambulation/Gait Training:  Distance (ft): 150 Feet (ft)  Assistive Device: Walker, rolling  Ambulation - Level of Assistance: Adaptive equipment (Oscar without walker, CGA with walker)        Gait Abnormalities: Decreased step clearance;Shuffling gait        Base of Support: Narrowed     Speed/Penny: Shuffled;Pace decreased (<100 feet/min)  Step Length: Left shortened;Right shortened        Interventions: Safety awareness training;Verbal cues;Manual cues                    The patient is able to ambulate with Oscar without AD, but demonstrates improved confidence and gait speed (still decreased) with RW. At this time, recommend ambulation with RW for safety reasons.                   Stairs: n/a since patient does not have to ascend/descend stairs during daily activities. Functional Measure:  Tinetti test:      Sitting Balance: 1  Arises: 1  Attempts to Rise: 2  Immediate Standing Balance: 0  Standing Balance: 0  Nudged: 1  Eyes Closed: 0  Turn 360 Degrees - Continuous/Discontinuous: 1  Turn 360 Degrees - Steady/Unsteady: 0  Sitting Down: 2  Balance Score: 8  Indication of Gait: 1  R Step Length/Height: 0  L Step Length/Height: 0  R Foot Clearance: 1  L Foot Clearance: 1  Step Symmetry: 1  Step Continuity: 1  Path: 1  Trunk: 0  Walking Time: 1  Gait Score: 7  Total Score: 15         Tinetti Test and G-code impairment scale:  Percentage of Impairment CH     0%    CI     1-19% CJ     20-39% CK     40-59% CL     60-79% CM     80-99% CN      100%   Tinetti  Score 0-28 28 23-27 17-22 12-16 6-11 1-5 0          Tinetti Tool Score Risk of Falls  <19 = High Fall Risk  19-24 = Moderate Fall Risk  25-28 = Low Fall Risk  Tinetti ME. Performance-Oriented Assessment of Mobility Problems in Elderly Patients. [address]; [id number]. (Scoring Description: PT Bulletin Feb. 10, 1993)     Older adults: Wendy Morillo et al, 2009; n = 1000 [facility] elderly evaluated with ABC, CRUZ, ADL, and IADL)  · Mean CRUZ score for males aged 69-68 years = 26.21(3.40)  · Mean CRUZ score for females age 69-68 years = 25.16(4.30)  · Mean CRUZ score for males over 80 years = 23.29(6.02)  · Mean CRUZ score for females over 80 years = 17.20(8.32)         G codes: In compliance with CMSs Claims Based Outcome Reporting, the following G-code set was chosen for this patient based on their primary functional limitation being treated: The outcome measure chosen to determine the severity of the functional limitation was the Tinnetti with a score of 15/28 which was correlated with the impairment scale.       · Mobility - Walking and Moving Around:               - CURRENT STATUS:    CK - 40%-59% impaired, limited or restricted               - GOAL STATUS:           CJ - 20%-39% impaired, limited or restricted  CL - 60%-79% impaired, limited or restricted               - D/C STATUS:                       ---------------To be determined---------------      Physical Therapy Evaluation Charge Determination   History Examination Presentation Decision-Making   MEDIUM  Complexity : 1-2 comorbidities / personal factors will impact the outcome/ POC  MEDIUM Complexity : 3 Standardized tests and measures addressing body structure, function, activity limitation and / or participation in recreation  MEDIUM Complexity : Evolving with changing characteristics  MEDIUM Complexity : FOTO score of 26-74      Based on the above components, the patient evaluation is determined to be of the following complexity level: MEDIUM     Pain:  Pain Scale 1: Numeric (0 - 10)  Pain Intensity 1: 0              Activity Tolerance:   Reduced, but did not experience any dizziness/lightheadedness  Please refer to the flowsheet for vital signs taken during this treatment. After treatment:   [X]         Patient left in no apparent distress sitting up in chair  [ ]         Patient left in no apparent distress in bed  [X]         Call bell left within reach  [X]         Nursing notified  [ ]         Caregiver present  [ ]         Bed alarm activated      COMMUNICATION/EDUCATION:   The patients plan of care was discussed with: Physical Therapist and Registered Nurse.  [X]         Fall prevention education was provided and the patient/caregiver indicated understanding. [X]         Patient/family have participated as able in goal setting and plan of care. [X]         Patient/family agree to work toward stated goals and plan of care. [ ]         Patient understands intent and goals of therapy, but is neutral about his/her participation. [ ]         Patient is unable to participate in goal setting and plan of care. Regarding student involvement in patient care:  A student participated in this treatment session.  Per CMS Medicare statements and APTA guidelines I certify that the following was true:  1. I was present and directly observed the entire session. 2. I made all skilled judgments and clinical decisions regarding care. 3. I am the practitioner responsible for assessment, treatment, and documentation.     Thank you for this referral.  Julio Valentine   Time Calculation: 45 mins

## 2017-01-11 NOTE — ED NOTES
TRANSFER - OUT REPORT:    Verbal report given to BECK King(name) on Chanel Rodas  being transferred to [address] (unit) for routine progression of care       Report consisted of patients Situation, Background, Assessment and   Recommendations(SBAR). Information from the following report(s) SBAR, ED Summary, [facility] and Recent Results was reviewed with the receiving nurse. Lines:   Peripheral IV 01/10/17 Right Wrist (Active)   Site Assessment Clean, dry, & intact 1/10/2017 11:55 AM   Phlebitis Assessment 0 1/10/2017 11:55 AM   Infiltration Assessment 0 1/10/2017 11:55 AM   Dressing Status Clean, dry, & intact 1/10/2017 11:55 AM        Opportunity for questions and clarification was provided.

## 2017-01-12 LAB
ANION GAP BLD CALC-SCNC: 7 MMOL/L (ref 5–15)
BUN SERPL-MCNC: 6 MG/DL (ref 6–20)
BUN/CREAT SERPL: 10 (ref 12–20)
CALCIUM SERPL-MCNC: 8 MG/DL (ref 8.5–10.1)
CHLORIDE SERPL-SCNC: 107 MMOL/L (ref 97–108)
CO2 SERPL-SCNC: 28 MMOL/L (ref 21–32)
CREAT SERPL-MCNC: 0.61 MG/DL (ref 0.55–1.02)
GLUCOSE SERPL-MCNC: 86 MG/DL (ref 65–100)
HEMOCCULT STL QL: NEGATIVE
POTASSIUM SERPL-SCNC: 3.5 MMOL/L (ref 3.5–5.1)
SODIUM SERPL-SCNC: 142 MMOL/L (ref 136–145)

## 2017-01-12 PROCEDURE — 74011250636 HC RX REV CODE- 250/636: Performed by: HOSPITALIST

## 2017-01-12 PROCEDURE — 96374 THER/PROPH/DIAG INJ IV PUSH: CPT

## 2017-01-12 PROCEDURE — 74011250637 HC RX REV CODE- 250/637: Performed by: INTERNAL MEDICINE

## 2017-01-12 PROCEDURE — 74011250636 HC RX REV CODE- 250/636

## 2017-01-12 PROCEDURE — 99218 HC RM OBSERVATION: CPT

## 2017-01-12 PROCEDURE — 74011250636 HC RX REV CODE- 250/636: Performed by: INTERNAL MEDICINE

## 2017-01-12 PROCEDURE — 97530 THERAPEUTIC ACTIVITIES: CPT

## 2017-01-12 PROCEDURE — 82272 OCCULT BLD FECES 1-3 TESTS: CPT | Performed by: HOSPITALIST

## 2017-01-12 PROCEDURE — 96376 TX/PRO/DX INJ SAME DRUG ADON: CPT

## 2017-01-12 PROCEDURE — G8978 MOBILITY CURRENT STATUS: HCPCS

## 2017-01-12 PROCEDURE — G8979 MOBILITY GOAL STATUS: HCPCS

## 2017-01-12 PROCEDURE — 97116 GAIT TRAINING THERAPY: CPT

## 2017-01-12 PROCEDURE — 36415 COLL VENOUS BLD VENIPUNCTURE: CPT | Performed by: HOSPITALIST

## 2017-01-12 PROCEDURE — 74011250637 HC RX REV CODE- 250/637: Performed by: HOSPITALIST

## 2017-01-12 PROCEDURE — 89055 LEUKOCYTE ASSESSMENT FECAL: CPT | Performed by: HOSPITALIST

## 2017-01-12 PROCEDURE — 96372 THER/PROPH/DIAG INJ SC/IM: CPT

## 2017-01-12 PROCEDURE — 96361 HYDRATE IV INFUSION ADD-ON: CPT

## 2017-01-12 PROCEDURE — 80048 BASIC METABOLIC PNL TOTAL CA: CPT | Performed by: HOSPITALIST

## 2017-01-12 RX ORDER — ONDANSETRON 2 MG/ML
INJECTION INTRAMUSCULAR; INTRAVENOUS
Status: COMPLETED
Start: 2017-01-12 | End: 2017-01-12

## 2017-01-12 RX ORDER — ONDANSETRON 2 MG/ML
4 INJECTION INTRAMUSCULAR; INTRAVENOUS
Status: DISCONTINUED | OUTPATIENT
Start: 2017-01-12 | End: 2017-01-13 | Stop reason: HOSPADM

## 2017-01-12 RX ADMIN — PAROXETINE HYDROCHLORIDE 20 MG: 20 TABLET, FILM COATED ORAL at 09:21

## 2017-01-12 RX ADMIN — QUETIAPINE FUMARATE 25 MG: 25 TABLET, FILM COATED ORAL at 21:40

## 2017-01-12 RX ADMIN — PYRIDOSTIGMINE BROMIDE 60 MG: 60 TABLET ORAL at 08:08

## 2017-01-12 RX ADMIN — FLUDROCORTISONE ACETATE 100 MCG: 0.1 TABLET ORAL at 09:21

## 2017-01-12 RX ADMIN — MIDODRINE HYDROCHLORIDE 5 MG: 5 TABLET ORAL at 12:14

## 2017-01-12 RX ADMIN — LORATADINE 10 MG: 10 TABLET ORAL at 09:21

## 2017-01-12 RX ADMIN — PYRIDOSTIGMINE BROMIDE 60 MG: 60 TABLET ORAL at 22:52

## 2017-01-12 RX ADMIN — Medication 10 ML: at 21:37

## 2017-01-12 RX ADMIN — MIDODRINE HYDROCHLORIDE 5 MG: 5 TABLET ORAL at 08:04

## 2017-01-12 RX ADMIN — ONDANSETRON 4 MG: 2 INJECTION INTRAMUSCULAR; INTRAVENOUS at 13:03

## 2017-01-12 RX ADMIN — ENOXAPARIN SODIUM 40 MG: 40 INJECTION SUBCUTANEOUS at 09:21

## 2017-01-12 RX ADMIN — VALACYCLOVIR HYDROCHLORIDE 500 MG: 500 TABLET, FILM COATED ORAL at 09:21

## 2017-01-12 RX ADMIN — PYRIDOSTIGMINE BROMIDE 60 MG: 60 TABLET ORAL at 13:39

## 2017-01-12 RX ADMIN — VALACYCLOVIR HYDROCHLORIDE 500 MG: 500 TABLET, FILM COATED ORAL at 21:39

## 2017-01-12 RX ADMIN — MIDODRINE HYDROCHLORIDE 5 MG: 5 TABLET ORAL at 17:06

## 2017-01-12 RX ADMIN — Medication 10 ML: at 13:03

## 2017-01-12 RX ADMIN — ONDANSETRON 4 MG: 2 INJECTION INTRAMUSCULAR; INTRAVENOUS at 17:14

## 2017-01-12 RX ADMIN — ATORVASTATIN CALCIUM 20 MG: 20 TABLET, FILM COATED ORAL at 21:39

## 2017-01-12 RX ADMIN — GABAPENTIN 200 MG: 100 CAPSULE ORAL at 21:40

## 2017-01-12 RX ADMIN — ACETAMINOPHEN 325 MG: 325 TABLET, FILM COATED ORAL at 20:07

## 2017-01-12 RX ADMIN — GABAPENTIN 100 MG: 100 CAPSULE ORAL at 17:06

## 2017-01-12 RX ADMIN — GABAPENTIN 100 MG: 100 CAPSULE ORAL at 09:21

## 2017-01-12 RX ADMIN — SODIUM CHLORIDE 75 ML/HR: 900 INJECTION, SOLUTION INTRAVENOUS at 04:41

## 2017-01-12 RX ADMIN — POTASSIUM CHLORIDE 40 MEQ: 750 TABLET, FILM COATED, EXTENDED RELEASE ORAL at 09:21

## 2017-01-12 NOTE — PROGRESS NOTES
1200: Patient c/o nausea. States she has no appetite and hasn't eaten due to nausea. Patient states she wants to try to drink some Ginger Ale to see if that helps with the nausea. 1250: Patient called out stating that she would like some medication for nausea as because the Arleen Coronado is not working. Spoke with Dr. Ozzie Razo who ordered Zofran 4mg IV Q6 hrs PRN for nausea. Will administer per MD orders.

## 2017-01-12 NOTE — PROGRESS NOTES
Patient requesting nausea medication so she can eat dinner. According to [facility], it is too soon for her PRN nausea medication. Spoke with Dr. Ozzie Razo who states that it is okay to give the patient her nausea medication early so she can eat dinner. Will administer per MD orders.

## 2017-01-12 NOTE — PROGRESS NOTES
Problem: Mobility Impaired (Adult and Pediatric)  Goal: *Acute Goals and Plan of Care (Insert Text)  Physical Therapy Goals  Initiated 1/11/2017    1. Patient will transfer from bed to chair and chair to bed with supervision/set-up using the least restrictive device within 7 day(s). 3. Patient will perform supine to sit and sit to stand with modified independence and no signs/symptoms of orthostatic hypotension within 7 day(s). 4. Patient will ambulate with modified independence on level surface for 250 feet with the least restrictive device within 7 day(s). 5. Patient will be able to stand statically unsupported for >/= 3 minutes without LOB within 7 days. PHYSICAL THERAPY TREATMENT  Patient: Marleen Quispe (21 y.o. female)  Date: 1/12/2017  Diagnosis: dehydration <principal problem not specified>       Precautions: Contact, Fall      ASSESSMENT:  Pt received in the bed agreeable to PT. She was able to come to sitting at EOB, mod I, stood to a rolling walker with supervision and amb with a rolling walker with supervision. She is more stable today in standing and with amb but am concerned about her safely meeting her self care needs in independent living. I would recommend a short rehab stay and and OT consult. If she is unable to be discharged to snf for rehab, then Sharron Mohamud and [address] [place] [address] Progression toward goals:  [ ]    Improving appropriately and progressing toward goals  [X]    Improving slowly and progressing toward goals  [ ]    Not making progress toward goals and plan of care will be adjusted       PLAN:  Patient continues to benefit from skilled intervention to address the above impairments. Continue treatment per established plan of care. Discharge Recommendations:  Rehab, [address] and To Be Determined  Further Equipment Recommendations for Discharge:  A rolling walker was delivered to pt's room yesterday.        SUBJECTIVE:   Patient stated I'm really tied from all that walking to the bathroom yesterday.       OBJECTIVE DATA SUMMARY:   Chart checked, pt cleared for PT  Critical Behavior:  Neurologic State: Alert, Appropriate for age  Orientation Level: Oriented X4  Cognition: Appropriate decision making, Appropriate for age attention/concentration, Appropriate safety awareness, Follows commands     Functional Mobility Training:  Bed Mobility:     Supine to Sit: Modified independent              Transfers:  Sit to Stand: Supervision  Stand to Sit: Supervision                             Balance:  Sitting: Intact  Standing: Impaired  Standing - Static: Good  Standing - Dynamic : Good  Ambulation/Gait Training:  Distance (ft): 200 Feet (ft)  Assistive Device: Gait belt;Walker, rolling  Ambulation - Level of Assistance: Supervision        Gait Abnormalities: Decreased step clearance        Base of Support: Narrowed     Speed/Penny: Slow  Step Length: Left shortened;Right shortened                               Stairs:                       Neuro Re-Education:     Therapeutic Exercises:      Pain:  Pain Scale 1: Numeric (0 - 10)  Pain Intensity 1: 0              Activity Tolerance:   Pt in NAD     After treatment:   [X]    Patient left in no apparent distress sitting up in chair  [ ]    Patient left in no apparent distress in bed  [X]    Call bell left within reach  [X]    Nursing notified  [ ]    Caregiver present  [ ]    Bed alarm activated      COMMUNICATION/COLLABORATION:   The patients plan of care was discussed with: Registered Nurse and      Elsy Mayorga   Time Calculation: 24 mins

## 2017-01-12 NOTE — PROGRESS NOTES
Bedside and Verbal shift change report given to Juanito Trotter RN (oncoming nurse) by Carolyn Benavides RN (offgoing nurse). Report included the following information SBAR, Kardex, MAR and Recent Results.

## 2017-01-12 NOTE — PROGRESS NOTES
Hospitalist Progress Note  Joslyn Salinas MD  Office: [phone]  Cell: [phone]      Date of Service:  2017  NAME:  Chanel Rodas  :  1945  MRN:  [id number]      Admission Summary:   She came for weakness,near syncope and diarrhea. She states her IBS acted up and she was not eating for fear of diarrhea,she knows\" I am not supposed to do that. \"  Feeling better, but still weak,she is not confident to return today. She lives in [address] [place] [address] and hoping if she can get to the skilled level same place. NO more diarrhea since yesterday. Past Medical History   Diagnosis Date    Bipolar disorder ([facility]      Nazmy    Chronic pain      BACK PAIN    Diabetes ([facility]      BORDERLINE TX WITH DIET AND EXERCISE    DJD (degenerative joint disease)     Fibromyalgia     Genital herpes     GERD (gastroesophageal reflux disease)     Hypercholesterolemia     IBS (irritable bowel syndrome)     Lumbar disc disease      stimulation-Honza    Migraine     Nausea & vomiting     Other ill-defined conditions(799.89)      SEASONAL allergies    Other ill-defined conditions(799.89)      dysphagia has had esophagus dilated    Paget's disease     Pulmonary embolism (HCC)     RLS (restless legs syndrome)        Interval history / Subjective:     Complains of nausea. No diarrhea since admission. Denied vomiting or abdominal pain.      Assessment & Plan:     Weakness,presyncope due to dehydration due to diarrhea , limiting oral intake,possibly non infection,flare of IBS  -Diarrhea stopped  -D/c IV fluid.  -Advance diet  -No specimen for lab  -Orthostatic negative     She is on midodrine and florinef suspect for orthostatic hypotension:-Orthostatic negative     Hypokalemia: replete  Continue the rest of home medications        Code status: full  DVT prophylaxis: [address] discussed with: Patient/Family and Nurse  Disposition:PT recommended inpatient rehab vs snf vs HHPT. Discussed with patient and daughter. Patient wanted us try inpatient rehab,I informed her she can't do SNF due to observation status. Daughter okay if she goes home with HHPT. Awaiting inpatient rehabeval outcome for discharge. Hospital Problems  Date Reviewed: 1/5/2017    None            Review of Systems:   Pertinent items are noted in HPI. Vital Signs:    Last 24hrs VS reviewed since prior progress note. Most recent are:  Visit Vitals    /72 (BP 1 Location: Left arm, BP Patient Position: At rest)    Pulse 74    Temp 98.5 °F (36.9 °C)    Resp 18    Ht 5' 6\" (1.676 m)    Wt 69.9 kg (154 lb)    SpO2 97%    BMI 24.86 kg/m2         Intake/Output Summary (Last 24 hours) at 01/12/17 1451  Last data filed at 01/12/17 1309   Gross per 24 hour   Intake             1849 ml   Output                0 ml   Net             1849 ml        Physical Examination:             Constitutional:  No acute distress, cooperative, pleasant    ENT:  Oral mucous moist, oropharynx benign. Neck supple,    Resp:  CTA bilaterally. No wheezing/rhonchi/rales. No accessory muscle use   CV:  Regular rhythm, normal rate, no murmurs, gallops, rubs    GI:  Soft, non distended, non tender. normoactive bowel sounds, no hepatosplenomegaly     Musculoskeletal:  No edema, warm, 2+ pulses throughout    Neurologic:  Moves all extremities. AAOx3, CN II-XII reviewed     Psych:  Good insight, Not anxious nor agitated.   Skin:  Good turgor, no rashes or ulcers       Data Review:    Review and/or order of clinical lab test  Review and/or order of tests in the radiology section of CPT  Review and/or order of tests in the medicine section of CPT      Labs:     Recent Labs      01/11/17   0258  01/10/17   1207   WBC  4.2  5.0   HGB  13.7  15.2   HCT  41.6  45.7   PLT  239  278     Recent Labs      01/12/17   0245  01/11/17   0258  01/10/17   1207   NA  142  143  140   K  3.5  3.3*  3.4*   CL  107  108  103   CO2  28  26  26   BUN  6 11  17   CREA  0.61  0.74  1.01   GLU  86  69  124*   CA  8.0*  7.9*  8.5     Recent Labs      01/10/17   1207   SGOT  33   ALT  26   AP  75   TBILI  0.5   TP  7.1   ALB  3.4*   GLOB  3.7     No results for input(s): INR, PTP, APTT in the last 72 hours. No lab exists for component: INREXT, INREXT   No results for input(s): FE, TIBC, PSAT, FERR in the last 72 hours. No results found for: FOL, RBCF   No results for input(s): PH, PCO2, PO2 in the last 72 hours.   Recent Labs      01/10/17   1207   CPK  64   CKNDX  CANNOT BE CALCULATED   TROIQ  <0.04     Lab Results   Component Value Date/Time    Cholesterol, total 134 01/05/2017 12:31 PM    HDL Cholesterol 53 01/05/2017 12:31 PM    LDL, calculated 58 01/05/2017 12:31 PM    Triglyceride 115 01/05/2017 12:31 PM    CHOL/HDL Ratio 3.3 07/23/2010 08:37 AM     Lab Results   Component Value Date/Time    Glucose (POC) 96 07/29/2016 12:21 PM    Glucose (POC) 98 07/29/2016 06:45 AM    Glucose (POC) 126 07/28/2016 09:44 PM    Glucose (POC) 84 07/28/2016 07:15 PM    Glucose (POC) 118 09/16/2015 08:20 AM    Glucose  09/30/2010 10:42 AM     Lab Results   Component Value Date/Time    Color YELLOW/STRAW 07/28/2016 02:39 PM    Appearance CLEAR 07/28/2016 02:39 PM    Specific gravity <1.005 07/28/2016 02:39 PM    pH (UA) 7.0 07/28/2016 02:39 PM    Protein NEGATIVE  07/28/2016 02:39 PM    Glucose NEGATIVE  07/28/2016 02:39 PM    Ketone NEGATIVE  07/28/2016 02:39 PM    Bilirubin NEGATIVE  07/28/2016 02:39 PM    Urobilinogen 0.2 07/28/2016 02:39 PM    Nitrites NEGATIVE  07/28/2016 02:39 PM    Leukocyte Esterase NEGATIVE  07/28/2016 02:39 PM    Epithelial cells FEW 07/28/2016 02:39 PM    Bacteria NEGATIVE  07/28/2016 02:39 PM    WBC 0-4 07/28/2016 02:39 PM    RBC 0-5 07/28/2016 02:39 PM         Medications Reviewed:     Current Facility-Administered Medications   Medication Dose Route Frequency    ondansetron (ZOFRAN) injection 4 mg  4 mg IntraVENous Q6H PRN    potassium chloride SR (KLOR-CON 10) tablet 40 mEq  40 mEq Oral DAILY    acetaminophen (TYLENOL) tablet 325 mg  325 mg Oral Q4H PRN    loratadine (CLARITIN) tablet 10 mg  10 mg Oral DAILY    pyridostigmine (MESTINON) tablet 60 mg  60 mg Oral Q8H    PARoxetine (PAXIL) tablet 20 mg  20 mg Oral DAILY    atorvastatin (LIPITOR) tablet 20 mg  20 mg Oral QHS    butalbital-acetaminophen-caffeine (FIORICET, ESGIC) -40 mg per tablet 1 Tab  1 Tab Oral Q4H PRN    fludrocortisone (FLORINEF) tablet 100 mcg  0.1 mg Oral DAILY    gabapentin (NEURONTIN) capsule 100 mg  100 mg Oral BID    gabapentin (NEURONTIN) capsule 200 mg  200 mg Oral QHS    meclizine (ANTIVERT) tablet 25 mg  25 mg Oral Q6H PRN    QUEtiapine (SEROquel) tablet 25 mg  25 mg Oral QHS    midodrine (PROAMITINE) tablet 5 mg  5 mg Oral TID WITH MEALS    0.9% sodium chloride infusion  75 mL/hr IntraVENous CONTINUOUS    sodium chloride (NS) flush 5-10 mL  5-10 mL IntraVENous Q8H    sodium chloride (NS) flush 5-10 mL  5-10 mL IntraVENous PRN    LORazepam (ATIVAN) tablet 0.5 mg  0.5 mg Oral BID PRN    enoxaparin (LOVENOX) injection 40 mg  40 mg SubCUTAneous Q24H    valACYclovir (VALTREX) tablet 500 mg  500 mg Oral Q12H     ______________________________________________________________________  EXPECTED LENGTH OF ST Abdirahman Dean MD

## 2017-01-13 VITALS
OXYGEN SATURATION: 96 % | WEIGHT: 154 LBS | TEMPERATURE: 99.2 F | HEIGHT: 66 IN | RESPIRATION RATE: 17 BRPM | SYSTOLIC BLOOD PRESSURE: 186 MMHG | BODY MASS INDEX: 24.75 KG/M2 | HEART RATE: 64 BPM | DIASTOLIC BLOOD PRESSURE: 100 MMHG

## 2017-01-13 LAB
GLUCOSE BLD STRIP.AUTO-MCNC: 101 MG/DL (ref 65–100)
SERVICE CMNT-IMP: ABNORMAL
WBC #/AREA STL HPF: NORMAL /HPF (ref 0–4)

## 2017-01-13 PROCEDURE — 82962 GLUCOSE BLOOD TEST: CPT

## 2017-01-13 PROCEDURE — 74011250637 HC RX REV CODE- 250/637: Performed by: INTERNAL MEDICINE

## 2017-01-13 PROCEDURE — 97165 OT EVAL LOW COMPLEX 30 MIN: CPT

## 2017-01-13 PROCEDURE — G8988 SELF CARE GOAL STATUS: HCPCS

## 2017-01-13 PROCEDURE — 96372 THER/PROPH/DIAG INJ SC/IM: CPT

## 2017-01-13 PROCEDURE — 74011250637 HC RX REV CODE- 250/637: Performed by: HOSPITALIST

## 2017-01-13 PROCEDURE — 74011250636 HC RX REV CODE- 250/636: Performed by: INTERNAL MEDICINE

## 2017-01-13 PROCEDURE — 99218 HC RM OBSERVATION: CPT

## 2017-01-13 PROCEDURE — G8987 SELF CARE CURRENT STATUS: HCPCS

## 2017-01-13 RX ORDER — GABAPENTIN 100 MG/1
100 CAPSULE ORAL ONCE
Status: COMPLETED | OUTPATIENT
Start: 2017-01-13 | End: 2017-01-13

## 2017-01-13 RX ADMIN — MIDODRINE HYDROCHLORIDE 5 MG: 5 TABLET ORAL at 12:32

## 2017-01-13 RX ADMIN — ENOXAPARIN SODIUM 40 MG: 40 INJECTION SUBCUTANEOUS at 09:22

## 2017-01-13 RX ADMIN — MIDODRINE HYDROCHLORIDE 5 MG: 5 TABLET ORAL at 07:43

## 2017-01-13 RX ADMIN — GABAPENTIN 100 MG: 100 CAPSULE ORAL at 09:22

## 2017-01-13 RX ADMIN — Medication 10 ML: at 07:42

## 2017-01-13 RX ADMIN — PAROXETINE HYDROCHLORIDE 20 MG: 20 TABLET, FILM COATED ORAL at 09:22

## 2017-01-13 RX ADMIN — PYRIDOSTIGMINE BROMIDE 60 MG: 60 TABLET ORAL at 14:06

## 2017-01-13 RX ADMIN — GABAPENTIN 100 MG: 100 CAPSULE ORAL at 14:06

## 2017-01-13 RX ADMIN — LORATADINE 10 MG: 10 TABLET ORAL at 09:21

## 2017-01-13 RX ADMIN — POTASSIUM CHLORIDE 40 MEQ: 750 TABLET, FILM COATED, EXTENDED RELEASE ORAL at 09:21

## 2017-01-13 RX ADMIN — VALACYCLOVIR HYDROCHLORIDE 500 MG: 500 TABLET, FILM COATED ORAL at 09:25

## 2017-01-13 RX ADMIN — PYRIDOSTIGMINE BROMIDE 60 MG: 60 TABLET ORAL at 07:41

## 2017-01-13 RX ADMIN — FLUDROCORTISONE ACETATE 100 MCG: 0.1 TABLET ORAL at 09:22

## 2017-01-13 NOTE — DISCHARGE SUMMARY
Discharge Summary       PATIENT ID: Eloy Mayo  MRN: [id number]   YOB: 1945    DATE OF ADMISSION: 1/10/2017 11:51 AM    DATE OF DISCHARGE: 1/13/2017  PRIMARY CARE PROVIDER: Qiana Lopez MD     ATTENDING PHYSICIAN: Roseanne Polk MD  DISCHARGING PROVIDER: Roseanne Polk MD    To contact this individual call [phone] and ask the  to page. If unavailable ask to be transferred the Adult Hospitalist Department. CONSULTATIONS: IP CONSULT TO HOSPITALIST    PROCEDURES/SURGERIES: * No surgery found *    ADMITTING [address] [place] [address] COURSE:      Admission Summary:   She came for weakness,near syncope and diarrhea. She states her IBS acted up and she was not eating for fear of diarrhea,she knows\" I am not supposed to do that. \"  Feeling better, but still weak,she is not confident to return today. She lives in [address] [place] [address] and hoping if she can get to the skilled level same place. No more diarrhea since admission        Past Medical History   Diagnosis Date    Bipolar disorder (HCC)         Nazmy    Chronic pain         BACK PAIN    Diabetes ([facility]         BORDERLINE TX WITH DIET AND EXERCISE    DJD (degenerative joint disease)      Fibromyalgia      Genital herpes      GERD (gastroesophageal reflux disease)      Hypercholesterolemia      IBS (irritable bowel syndrome)      Lumbar disc disease         stimulation-Honza    Migraine      Nausea & vomiting      Other ill-defined conditions(799.89)         SEASONAL allergies    Other ill-defined conditions(799.89)         dysphagia has had esophagus dilated    Paget's disease      Pulmonary embolism (HCC)      RLS (restless legs syndrome)           Interval history / Subjective:      Complains of nausea. No diarrhea since admission. Denied vomiting or abdominal pain.      Assessment & Plan:      Weakness,presyncope due to dehydration due to diarrhea , limiting oral intake,possibly non infection,flare of IBS  -Diarrhea stopped  -D/c IV fluid.  -Advance diet  -Orthostatic negative as of 1/11     She is on midodrine and florinef suspect for orthostatic hypotension:-Orthostatic negative 1/11  Patient Vitals for the past 4 hrs:   Temp Pulse Resp BP SpO2   01/13/17 1523 99.2 °F (37.3 °C) 64 17 (!) 186/100 96 %       1/13  prior to discharge checked BP myself with automatic machine and manually   SBP ranged 737-225 systolic and DBP in the 69P  Standing 81/58,patient up for 3-5 minutes,no symptoms at all. She has been up and moving all day with out problems. I advised her to monitor and log supine,night time  and standing  Mainly day time BP and discuss results with Dr Mary Hagan so medications can be adjusted to prevent excessive supine hypertension and symptomatic orthostatic hypotension! Counseled her on the importance of hydration especially during periods of fluid loss such as diarrhea, vomiting and febrile illness etc.       Hypokalemia: corrected  Continue the rest of home medications    Inpatient rehab declined. Patient going to stay with her daughter ,HHPT arranged. She is agreeable with plan and medically stable on discharge for discharge.                 DISCHARGE DIAGNOSES / PLAN:      See above. PENDING TEST RESULTS:   At the time of discharge the following test results are still pending: none    FOLLOW UP APPOINTMENTS:    Follow-up Information     Follow up With Details Comments [address] [place] [address] Shady Copeland MD In 1 week  [address] [facility] [address]  [phone]             ADDITIONAL CARE RECOMMENDATIONS:     DIET: Cardiac Diet    ACTIVITY: Activity as tolerated    WOUND CARE: NA    EQUIPMENT needed: NA      DISCHARGE MEDICATIONS:  Current Discharge Medication List      CONTINUE these medications which have NOT CHANGED    Details   acetaminophen (TYLENOL) 325 mg tablet Take 325 mg by mouth every four (4) hours as needed for Pain.       fexofenadine (ALLEGRA) 180 mg tablet Take 180 mg by mouth daily.      !! gabapentin (NEURONTIN) 100 mg capsule Take 100 mg by mouth two (2) times a day. Morning and noon      !! gabapentin (NEURONTIN) 100 mg capsule Take 200 mg by mouth nightly. guaiFENesin ER (MUCINEX) 600 mg ER tablet Take 600 mg by mouth two (2) times a day. acyclovir (ZOVIRAX) 5 % ointment Apply  to affected area every three (3) hours. Qty: 2 g, Refills: 3    Associated Diagnoses: Genital herpes simplex, unspecified site      fludrocortisone (FLORINEF) 0.1 mg tablet Take 1 Tab by mouth daily. Qty: 30 Tab, Refills: 5      midodrine (PROAMITINE) 5 mg tablet Take 1 Tab by mouth three (3) times daily (with meals) for 90 days. Qty: 90 Tab, Refills: 6      pindolol (VISKIN) 5 mg tablet Take 1 Tab by mouth daily as needed. Qty: 30 Tab, Refills: 3      ondansetron (ZOFRAN ODT) 4 mg disintegrating tablet Take 1 Tab by mouth every eight (8) hours as needed for Nausea. Qty: 30 Tab, Refills: 3      pyridostigmine (MESTINON) 60 mg tablet TAKE 1 TABLET BY MOUTH THREE TIMES DAILY  Qty: 90 Tab, Refills: 1      triamcinolone (NASACORT AQ) 55 mcg nasal inhaler 2 Sprays by Both Nostrils route daily as needed. butalbital-acetaminophen-caff (FIORICET) -40 mg per capsule Take 1 Cap by mouth every four (4) hours as needed for Pain for up to 20 doses. Max Daily Amount: 6 Caps. Qty: 20 Cap, Refills: 0      meclizine (ANTIVERT) 25 mg tablet Take 1 Tab by mouth three (3) times daily as needed. Indications: VERTIGO  Qty: 30 Tab, Refills: 3      QUEtiapine (SEROQUEL) 25 mg tablet Take 25 mg by mouth nightly as needed. PARoxetine (PAXIL) 20 mg tablet Take 20 mg by mouth daily. atorvastatin (LIPITOR) 20 mg tablet TAKE 1 TABLET BY MOUTH EVERY EVENING  Qty: 30 Tab, Refills: 11       !! - Potential duplicate medications found. Please discuss with provider. NOTIFY YOUR PHYSICIAN FOR ANY OF THE FOLLOWING:   Fever over 101 degrees for 24 hours.    Chest pain, shortness of breath, fever, chills, nausea, vomiting, diarrhea, change in mentation, falling, weakness, bleeding. Severe pain or pain not relieved by medications. Or, any other signs or symptoms that you may have questions about. DISPOSITION:  x  Home With:   OT x PT x HH  RN       Long term SNF/Inpatient Rehab    Independent/assisted living    Hospice    Other:       PATIENT CONDITION AT DISCHARGE:     Functional status    Poor    x Deconditioned     Independent      Cognition   x  Lucid     Forgetful     Dementia      Catheters/lines (plus indication)    Jc     PICC     PEG    x None      Code status    x Full code     DNR      PHYSICAL EXAMINATION AT DISCHARGE:     Visit Vitals    BP (!) 186/100    Pulse 64    Temp 99.2 °F (37.3 °C)    Resp 17    Ht 5' 6\" (1.676 m)    Wt 69.9 kg (154 lb)    SpO2 96%    BMI 24.86 kg/m2      O2 Device: Room air      Constitutional: No acute distress, cooperative, pleasant    ENT: Oral mucous moist, oropharynx benign. Neck supple,    Resp: CTA bilaterally. No wheezing/rhonchi/rales. No accessory muscle use   CV: Regular rhythm, normal rate, no murmurs, gallops, rubs   GI: Soft, non distended, non tender. normoactive bowel sounds, no hepatosplenomegaly    Musculoskeletal: No edema, warm, 2+ pulses throughout   Neurologic: Moves all extremities. AAOx3, CN II-XII reviewed   Psych: Good insight, Not anxious nor agitated. Skin: Good turgor, no rashes or ulcers        CHRONIC MEDICAL DIAGNOSES:  Problem List as of 1/13/2017  Date Reviewed: 1/5/2017          Codes Class Noted - Resolved    Near syncope ICD-10-CM: R55  ICD-9-CM: 780.2  8/2/2016 - Present    Overview Signed 8/2/2016  4:12 PM by Robert Abdullahi MD     Tilt test 8/2/2016: normal supine BP but abrupt drop in SBP consistent with orthostatic hypotension  Midodrine 5 mg tid has not improved.   Add mestinon 60 mg tid               Resting tremor ICD-10-CM: R25.9  ICD-9-CM: 781.0  7/29/2016 - Present        Orthostatic hypotension ICD-10-CM: I95.1  ICD-9-CM: 458.0  7/5/2016 - Present        Pulmonary embolism ([facility] ICD-10-CM: I26.99  ICD-9-CM: 415.19  Unknown - Present    Overview Addendum 9/15/2016  8:39 AM by TAI [address] Dr 8/26/16  [facility] at [facility] 5/7/16 small PE RLL    6/9/16 CTA [facility] normal CTA no PE    Hypercoagulable work up negative with th exception of low factor II at 72%  D dimer 0.22  Xarelto d/c'd 9/13/16             Advance directive on file ICD-10-CM: Z78.9  ICD-9-CM: V49.89  5/24/2016 - Present        Mixed hyperlipidemia ICD-10-CM: E78.2  ICD-9-CM: 272.2  2/2/2016 - Present        Diabetes mellitus type 2, controlled, without complications ([facility] ELK-55-HD: E11.9  ICD-9-CM: 250.00  10/1/2015 - Present        Tubulovillous adenoma ICD-10-CM: D36.9  ICD-9-CM: 229.9  8/21/2012 - Present        Abnormal mammogram ICD-10-CM: R92.8  ICD-9-CM: 793.80  6/4/2012 - Present        Encounter for long-term (current) use of other medications ICD-10-CM: Z79.899  ICD-9-CM: V58.69  11/30/2011 - Present        Hamm[place]oe ICD-10-CM: M20.40  ICD-9-CM: 735.4  9/12/2011 - Present        Allergic rhinitis, cause unspecified ICD-10-CM: J30.9  ICD-9-CM: 477.9  1/26/2011 - Present        Other diseases of nasal cavity and sinuses(478.19) ICD-9-CM: 478.19  7/19/2010 - Present        IBS (irritable bowel syndrome) ICD-10-CM: K58.9  ICD-9-CM: 564.1  Unknown - Present        RLS (restless legs syndrome) ICD-10-CM: G25.81  ICD-9-CM: 333.94  Unknown - Present        Bipolar disorder (HCC) ICD-10-CM: F31.9  ICD-9-CM: 296.80  Unknown - Present        Fibromyalgia ICD-10-CM: M79.7  ICD-9-CM: 729.1  Unknown - Present        DJD (degenerative joint disease) ICD-10-CM: M19.90  ICD-9-CM: 715.90  Unknown - Present        Lumbar disc disease ICD-10-CM: M51.9  ICD-9-CM: 722.93  Unknown - Present        Paget's disease of bone ICD-10-CM: M88.9  ICD-9-CM: 731.0  Unknown - Present        Migraine ICD-10-CM: 346  ICD-9-CM: 346  Unknown - Present        Genital herpes ICD-10-CM: A60.00  ICD-9-CM: 054.10  Unknown - Present        RESOLVED: Rectal bleeding ICD-10-CM: K62.5  ICD-9-CM: 569.3  6/25/2016 - 6/27/2016        RESOLVED: Rectal bleed ICD-10-CM: K62.5  ICD-9-CM: 569.3  6/25/2016 - 6/27/2016        RESOLVED: DM w/o complication type II ([facility] ICD-10-CM: E11.9  ICD-9-CM: 250.00  11/30/2011 - 10/1/2015        RESOLVED: Hypercholesterolemia ICD-10-CM: E78.00  ICD-9-CM: 272.0  Unknown - 10/1/2015        RESOLVED: Diabetes ([facility] ICD-10-CM: E11.9  ICD-9-CM: 250.00  Unknown - 11/30/2011                  Signed:    Nadja Fuentes MD  1/13/2017  4:02 PM

## 2017-01-13 NOTE — PROGRESS NOTES
Bedside and Verbal shift change report given to Bianka Gaffney (oncoming nurse) by Otto Ceja (offgoing nurse). Report included the following information SBAR and Kardex.

## 2017-01-13 NOTE — PROGRESS NOTES
Problem: Self Care Deficits Care Plan (Adult)  Goal: *Acute Goals and Plan of Care (Insert Text)  Occupational Therapy Goals  Initiated 1/13/2017  1. Patient will perform bathing with independence within 7 day(s). 2. Patient will perform grooming standing sink level with independence within 7 day(s). 3. Patient will perform lower body dressing with independence within 7 day(s). 4. Patient will perform toilet transfers with independence within 7 day(s). 5. Patient will perform all aspects of toileting with independence within 7 day(s). 6. Patient will participate in upper extremity therapeutic exercise/activities with independence for 10 minutes within 7 day(s). 7. Patient will utilize energy conservation techniques during functional activities with verbal and visual cues within 7 day(s). OCCUPATIONAL THERAPY EVALUATION  Patient: Juan Rivers (26 y.o. female)  Date: 1/13/2017  Primary Diagnosis: dehydration        Precautions:   Fall, Chair/bed alarm      ASSESSMENT :  Pt cleared for OT by RN. Pt received supine in bed, agreed to OT. Based on the objective data described below, the patient presents with generalized weakness, decreased cognition/safety with higher level ADL/IADL tasks, and impaired independence with ADL and related mobility. Pt with overall ADL performance minimum to set up assist. Pt with overall functional transfer performance CGA. Pt frequently changing direction of conversation/requires direction from therapist back to training/safety. Pt reportedly drives, completes grocery shopping using motorized cart then reportedly uses a shopping cart to transport groceries from car to apartment. Pt endorsed having significant difficulty with toileting/bowel management while in her apart PTA leading to stool throughout bathroom. Pt endorses she essentially was not eating/drinking anything for several days PTA as a strategy to manage IBS/diarrhea.  Patient will benefit from skilled intervention to address the above impairments. Pt presents with decline from baseline ADL performance (independent for ADL and modified independent for IADL) related to recent medical course. Do not recommend pt return home to [address] [place] [address] at current functional/cognitive levels. Patients rehabilitation potential is considered to be Good  Factors which may influence rehabilitation potential include:   [X]             None noted  [ ]             Mental ability/status  [ ]             Medical condition  [ ]             Home/family situation and support systems  [ ]             Safety awareness  [ ]             Pain tolerance/management  [ ]             Other:        PLAN :  Recommendations and Planned Interventions:  [X]               Self Care Training                  [X]        Therapeutic Activities  [X]               Functional Mobility Training    [X]        Cognitive Retraining  [X]               Therapeutic Exercises           [X]        Endurance Activities  [X]               Balance Training                   [ ]        Neuromuscular Re-Education  [ ]               Visual/Perceptual Training     [X]   Home Safety Training  [X]               Patient Education                 [X]        Family Training/Education  [ ]               Other (comment):     Frequency/Duration: Patient will be followed by occupational therapy 5 times a week to address goals. Discharge Recommendations: Rehab  Further Equipment Recommendations for Discharge: TBD at rehab       SUBJECTIVE:   Patient stated I have two speeds. Nothing and wide open.       OBJECTIVE DATA SUMMARY:   HISTORY:   Past Medical History   Diagnosis Date    Bipolar disorder ([facility]         Nazmy    Chronic pain         BACK PAIN    Diabetes ([facility]         BORDERLINE TX WITH DIET AND EXERCISE    DJD (degenerative joint disease)      Fibromyalgia      Genital herpes      GERD (gastroesophageal reflux disease)      Hypercholesterolemia      IBS (irritable bowel syndrome)  Lumbar disc disease         stimulation-Honza    Migraine      Nausea & vomiting      Other ill-defined conditions(799.89)         SEASONAL allergies    Other ill-defined conditions(799.89)         dysphagia has had esophagus dilated    Paget's disease      Pulmonary embolism (HCC)      RLS (restless legs syndrome)       Past Surgical History   Procedure Laterality Date    Endoscopy, colon, diagnostic           12, due 13    Hx heent           T & A    Hx cholecystectomy        Hx appendectomy        Hx hysterectomy           total for fibroid tumors    Hx other surgical           EGD x 2 with dilation/colonoscopy - no polyps per pt    Hx orthopaedic           lumbar laminectomy then fusion/neurostimulator implanted - inactive now but still in    Hx orthopaedic           REMOVAL OF NEUROSTIMULATOR    Hx back surgery   9/17/2013       back surgery - total of 3 as of 12/20/2013    Colonoscopy N/A 6/27/2016       COLONOSCOPY performed by Magalys Cote MD at [facility] ENDOSCOPY    Tilt table evaluation   8/2/2016                Prior Level of Function/Home Situation: Pt reportedly resides at [address] [place] [address] at [facility].  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  Expanded or extensive additional review of patient history:      Home Situation  Home Environment: Independent living ([address] [place] [address] apartment)  # Steps to Enter: 0  One/Two Story Residence: One story (Third floor apartment-elevator to access)  Living Alone: Yes  Support Systems: Child(lexis)  Patient Expects to be Discharged to[de-identified] Rehabilitation facility  Current DME Used/Available at Home: Watchung Ehrich, rollator, Walker, rolling  Tub or Shower Type: Tub/Shower combination  [X]  Right hand dominant             [ ]  Left hand dominant     EXAMINATION OF PERFORMANCE DEFICITS:  Cognitive/Behavioral Status:  Neurologic State: Alert  Orientation Level: Oriented to person;Oriented to place;Oriented to situation  Cognition: Follows commands  Perception: Appears intact  Perseveration: No perseveration noted  Safety/Judgement: Insight into deficits  Skin: Appears intact BUE  Edema: No edema noted BUE  Vision/Perceptual:    Tracking:  (Appears intact)              Visual Fields:  (Appears intact)       Acuity:  (Appears intact)    Corrective Lenses: Glasses  Range of Motion:     AROM: Generally decreased, functional                       Strength:     Strength: Generally decreased, functional              Coordination:  Coordination: Generally decreased, functional  Fine Motor Skills-Upper:  (Diminished bilaterally)    Gross Motor Skills-Upper: Right Intact; Left Intact  Tone & Sensation:     Tone: Normal  Sensation: Intact                       Balance:  Sitting: Impaired  Sitting - Static: Good (unsupported)  Sitting - Dynamic: Fair (occasional)  Standing: Impaired  Standing - Static: Constant support;Good  Standing - Dynamic : Fair     Functional Mobility and Transfers for ADLs:  Bed Mobility:  Rolling: Supervision     Transfers:  Sit to Stand: Contact guard assistance  Stand to Sit: Stand-by asssistance  Bed to Chair: Stand-by asssistance  Toilet Transfer : Contact guard assistance     ADL Assessment:  Feeding: Setup     Oral Facial Hygiene/Grooming: Stand-by assistance (Standing sink level)     Bathing: Minimum assistance     Upper Body Dressing: Setup     Lower Body Dressing: Contact guard assistance     Toileting: Contact guard assistance                 ADL Intervention and task modifications: For strengthening recommend pt OOB-chair at least 3x/day (all meals) and complete ADL chair level with nursing assistance. Recommend 1 person assist with toileting needs.                                        Cognitive Retraining  Safety/Judgement: Insight into deficits     Functional Measure:  Barthel Index:      Bathin  Bladder: 5  Bowels: 5  Groomin  Dressin  Feeding: 10  Mobility: 10  Stairs: 0  Toilet Use: 5  Transfer (Bed to Chair and Back): 10  Total: 55         Barthel and G-code impairment scale:  Percentage of impairment CH  0% CI  1-19% CJ  20-39% CK  40-59% CL  60-79% CM  80-99% CN  100%   Barthel Score 0-100 100 99-80 79-60 59-40 20-39 1-19    0   Barthel Score 0-20 20 17-19 13-16 9-12 5-8 1-4 0      The Barthel ADL Index: Guidelines  1. The index should be used as a record of what a patient does, not as a record of what a patient could do. 2. The main aim is to establish degree of independence from any help, physical or verbal, however minor and for whatever reason. 3. The need for supervision renders the patient not independent. 4. A patient's performance should be established using the best available evidence. Asking the patient, friends/relatives and nurses are the usual sources, but direct observation and common sense are also important. However direct testing is not needed. 5. Usually the patient's performance over the preceding 24-48 hours is important, but occasionally longer periods will be relevant. 6. Middle categories imply that the patient supplies over 50 per cent of the effort. 7. Use of aids to be independent is allowed. Marleen Collins., Barthel, D.W. (4401). Functional evaluation: the Barthel Index. [address] [facility] (14)2. PRABHU Enciso, Shamir Kay, Rosy Pereira., [place], [address] [place] [address] (1999). Measuring the change indisability after inpatient rehabilitation; comparison of the responsiveness of the Barthel Index and Functional [place] Measure. Journal of Neurology, Neurosurgery, and Psychiatry, 66(4), 540-393. Ita Martinez, N.J.A, HUMBERTO Navarrete, & Avery Dunlap, M.A. (2004.) Assessment of post-stroke quality of life in cost-effectiveness studies: The usefulness of the Barthel Index and the EuroQoL-5D. Quality of Life Research, 13, 302-11         G codes:   In compliance with CMSs Claims Based Outcome Reporting, the following G-code set was chosen for this patient based on their primary functional limitation being treated: The outcome measure chosen to determine the severity of the functional limitation was the Barthel Index with a score of 55/100 which was correlated with the impairment scale. · Self Care:               - CURRENT STATUS:    CK - 40%-59% impaired, limited or restricted               - GOAL STATUS:           CI - 1%-19% impaired, limited or restricted               - D/C STATUS:                       ---------------To be determined---------------      Occupational Therapy Evaluation Charge Determination   History Examination Decision-Making   LOW Complexity : Brief history review  LOW Complexity : 1-3 performance deficits relating to physical, cognitive , or psychosocial skils that result in activity limitations and / or participation restrictions  LOW Complexity : No comorbidities that affect functional and no verbal or physical assistance needed to complete eval tasks       Based on the above components, the patient evaluation is determined to be of the following complexity level: LOW               Activity Tolerance:   Pt denied dizziness and dyspnea throughout session. After treatment:   [X] Patient left in no apparent distress sitting up in chair  [ ] Patient left in no apparent distress in bed  [X] Call bell left within reach  [X] Nursing notified  [ ] Caregiver present  [X] Chair alarm activated      COMMUNICATION/EDUCATION:   The patients plan of care was discussed with: Physical Therapist and Registered Nurse.  [X] Home safety education was provided and the patient/caregiver indicated understanding. [X] Patient/family have participated as able in goal setting and plan of care. [X] Patient/family agree to work toward stated goals and plan of care. [ ] Patient understands intent and goals of therapy, but is neutral about his/her participation. [ ] Patient is unable to participate in goal setting and plan of care.   This patients plan of care is appropriate for delegation to EWA.      Thank you for this referral.  Gerrianne Gowers  Time Calculation: 17 mins

## 2017-01-13 NOTE — PROGRESS NOTES
I have reviewed discharge instructions with the patient. The patient verbalized understanding. Patient taken to vehicle via wheelchair accompanied by volunteer. Daughter at bedside. Patient's walker sent home with her. No s/s of distress noted upon departure.

## 2017-01-13 NOTE — DISCHARGE INSTRUCTIONS
Discharge Instructions       PATIENT ID: Max De  MRN: [id number]   YOB: 1945    DATE OF ADMISSION: 1/10/2017 11:51 AM    DATE OF DISCHARGE: 1/13/2017    PRIMARY CARE PROVIDER: Lashay Lanier MD     ATTENDING PHYSICIAN: Kaela Benson MD  DISCHARGING PROVIDER: Kaela Benson MD    To contact this individual call [phone] and ask the  to page. If unavailable ask to be transferred the Adult Hospitalist Department. DISCHARGE DIAGNOSES   Diarrhea,flare of IBS  Dehydration    CONSULTATIONS: IP CONSULT TO HOSPITALIST    PROCEDURES/SURGERIES: * No surgery found *    PENDING TEST RESULTS:   At the time of discharge the following test results are still pending: none    FOLLOW UP APPOINTMENTS:   Follow-up Information     Follow up With Details Comments [address] [place] [address] Shady Copeland MD In 1 week  [address] [facility]  [address] [facility]  [phone]             ADDITIONAL CARE RECOMMENDATIONS:       Dehydration: Care Instructions  Your Care Instructions  Dehydration happens when your body loses too much fluid. This might happen when you do not drink enough water or you lose large amounts of fluids from your body because of diarrhea, vomiting, or sweating. Severe dehydration can be life-threatening. Water and minerals called electrolytes help put your body fluids back in balance. Learn the early signs of fluid loss, and drink more fluids to prevent dehydration. Follow-up care is a key part of your treatment and safety. Be sure to make and go to all appointments, and call your doctor if you are having problems. It's also a good idea to know your test results and keep a list of the medicines you take. How can you care for yourself at home? · To prevent dehydration, drink plenty of fluids, enough so that your urine is light yellow or clear like water. Choose water and other caffeine-free clear liquids until you feel better.  If you have kidney, heart, or liver disease and have to limit fluids, talk with your doctor before you increase the amount of fluids you drink. · If you do not feel like eating or drinking, try taking small sips of water, sports drinks, or other rehydration drinks. · Get plenty of rest.  To prevent dehydration  · Add more fluids to your diet and daily routine, unless your doctor has told you not to. · During hot weather, drink more fluids. Drink even more fluids if you exercise a lot. Stay away from drinks with alcohol or caffeine. · Watch for the symptoms of dehydration. These include:  ¨ A dry, sticky mouth. ¨ Dark yellow urine, and not much of it. ¨ Dry and sunken eyes. ¨ Feeling very tired. · Learn what problems can lead to dehydration. These include:  ¨ Diarrhea, fever, and vomiting. ¨ Any illness with a fever, such as pneumonia or the flu. ¨ Activities that cause heavy sweating, such as endurance races and heavy outdoor work in hot or humid weather. ¨ Alcohol or drug abuse or withdrawal.  ¨ Certain medicines, such as cold and allergy pills (antihistamines), diet pills (diuretics), and laxatives. ¨ Certain diseases, such as diabetes, cancer, and heart or kidney disease. When should you call for help? Call 911 anytime you think you may need emergency care. For example, call if:  · You passed out (lost consciousness). Call your doctor now or seek immediate medical care if:  · You are confused and cannot think clearly. · You are dizzy or lightheaded, or you feel like you may faint. · You have signs of needing more fluids. You have sunken eyes and a dry mouth, and you pass only a little dark urine. · You cannot keep fluids down. Watch closely for changes in your health, and be sure to contact your doctor if:  · You are not making tears. · Your skin is very dry and sags slowly back into place after you pinch it. · Your mouth and eyes are very dry. Where can you learn more?   Go to http://ash-jeronimo.info/. Enter X361 in the search box to learn more about \"Dehydration: Care Instructions. \"  Current as of: May 27, 2016  Content Version: 11.1  © 6639-4856 [employer]. Care instructions adapted under license by [employer] (which disclaims liability or warranty for this information). If you have questions about a medical condition or this instruction, always ask your healthcare professional. [place] [address] any warranty or liability for your use of this information.       DIET: Cardiac Diet    ACTIVITY: Activity as tolerated    WOUND CARE: NA    EQUIPMENT needed: NA      DISCHARGE MEDICATIONS:   See Medication Reconciliation Form    · It is important that you take the medication exactly as they are prescribed. · Keep your medication in the bottles provided by the pharmacist and keep a list of the medication names, dosages, and times to be taken in your wallet. · Do not take other medications without consulting your doctor. NOTIFY YOUR PHYSICIAN FOR ANY OF THE FOLLOWING:   Fever over 101 degrees for 24 hours. Chest pain, shortness of breath, fever, chills, nausea, vomiting, diarrhea, change in mentation, falling, weakness, bleeding. Severe pain or pain not relieved by medications. Or, any other signs or symptoms that you may have questions about. DISPOSITION:   x Home With:   OT  PT  HH  RN       SNF/Inpatient Rehab/LTAC    Independent/assisted living    Hospice    Other:         Signed:    Zoey Main MD  1/13/2017  4:00 PM

## 2017-01-13 NOTE — PROGRESS NOTES
Patient complaining of leg pain. Offered patient PRN Tylenol for pain. Patient states that she wants her gabapentin that she takes 3 times a day. Per orders, gabapentin is scheduled for 0900 (100 mg), 1800 (100 mg), and 2100 (200 mg). Spoke with Dr. Benita Olivares who states to give the patient a one time dose of gabapentin now as patient is scheduled to leave later today. Will administer per orders.

## 2017-01-13 NOTE — PROGRESS NOTES
Patient denied inpatient rehab admission to [address] [place] [address]. Patient is agreeable to home health. Patient will be recuperating with her daughter ([place] [address] [place] # is [phone]. [place] of choice offered to patient and no preference given. CM spoke with [facility] liaison Cece Brannon) and agency can't have therapist out until next Tuesday. Referral will be sent to [facility] via [employer].    Jj Gifford, BSW, CRM

## 2017-01-13 NOTE — PROGRESS NOTES
Bedside and Verbal shift change report given to Hiram Butler RN (oncoming nurse) by Zuleika Mcdonald RN (offgoing nurse). Report included the following information SBAR, Kardex, MAR and Recent Results.

## 2017-01-16 ENCOUNTER — TELEPHONE (OUTPATIENT)
Dept: CARDIOLOGY CLINIC | Age: 72
End: 2017-01-16

## 2017-01-16 ENCOUNTER — TELEPHONE (OUTPATIENT)
Dept: INTERNAL MEDICINE CLINIC | Age: 72
End: 2017-01-16

## 2017-01-16 NOTE — TELEPHONE ENCOUNTER
Pt home health nurse, Ian Tello, called with a possible drug interaction for florinef and mestinon. Ian Tello can be reached at [phone]. Thank you!

## 2017-01-16 NOTE — TELEPHONE ENCOUNTER
Will you follow with home helen?  If so she is having some drug interactions and would need an order for a home health aid

## 2017-01-16 NOTE — TELEPHONE ENCOUNTER
Spoke with Soledad Galvez with [facility] - she wanted to know if MD will follow her for home health and orders? Requesting order for pt to have home health aid to assist pt with ADL - due to weakness. She also states while reviewing pt med list noted a few meds that had interactions with each other - Seroquel & Fioricet, Florinef & Mestinon. Wants to know if ok for pt to continue?  Will forward to MD.

## 2017-01-16 NOTE — TELEPHONE ENCOUNTER
Spoke with Sharon Uribe - advised MD will follow [facility] - pt may have home health aid to assist with ADL. She needs to call Dr Cortes He re mestinon and florinef - he prescribes those.  Discontinue fioricet - has been removed from list.remove from the list

## 2017-01-17 NOTE — TELEPHONE ENCOUNTER
Notified Destiney that we are aware that she is on both medications. She also states that her BP still drops when she stands up and that she is still not eating or drinking that much due to nausea. Notified her that we gave her zofran and told her to use for nausea to see if this helps. Has appt with Dr Pimentel Mon 1/23/17 and she will call if symptoms get worse.

## 2017-01-19 ENCOUNTER — PATIENT OUTREACH (OUTPATIENT)
Dept: INTERNAL MEDICINE CLINIC | Age: 72
End: 2017-01-19

## 2017-01-19 NOTE — PROGRESS NOTES
NNTOCIP    NN Transitions of Care Note:  Hospital Follow Up for [facility] Admission from 1/10/17 - 1/13/17 for Generalized Weakness. RRAT score: 16 Moderate    Advance Medical Directive on file in EMR? yes has an advanced directive - a copy has been provided  Future Appointments  Date Time Provider Reji Thibodeauxi   1/23/2017 1:40 PM Lyn Posada  [address]   1/23/2017 2:20 PM Yoseph Montgomery MD [facility] KRISTIN SCHED   7/5/2017 11:40 AM Lyn Posada  [address]   7/5/2017 2:00 PM Yoseph Montgomery MD [address] [facility]       Called daughter Sobia Smalls (on HIPPA) and verified with 2 identifiers. Pt is staying with her at this time while recovering. Reports pt is doing well and working with therapy. HHA was out today to assist with washing. States that she seems \"down or fatigued\". Recommended that they take advantage of the warm weather over the next couple days and at least let her walk outside with assist for a few minutes. Pt is scheduled to see Dr. Scarlet Obrien on Monday, 1/23/17 for hospital f/u and med adjust. PCP is managing HH and F2F needed, so daughter agreed to 2:20 appt. She will register pt when they arrive to Dr. Scarlet Obrien so that she isn't further delayed with that process once she gets to [facility]. Red flags reviewed. Course of current Hospitalization (referenced by Dr. Alexandrea Stevenson Hospitalist Discharge Summary):   Discharge Summary         PATIENT ID: Ian Tamayo  MRN: [id number]   YOB: 1945    DATE OF ADMISSION: 1/10/2017 11:51 AM    DATE OF DISCHARGE: 1/13/2017  PRIMARY CARE PROVIDER: Yoseph Montgomery MD      ATTENDING PHYSICIAN: María Heath MD  DISCHARGING PROVIDER: María Heath MD   To contact this individual call [phone] and ask the  to page.  If unavailable ask to be transferred the Adult Hospitalist Department.     CONSULTATIONS: IP CONSULT TO HOSPITALIST     PROCEDURES/SURGERIES: * No surgery found *     ADMITTING [address] [place] [address] COURSE:    Admission Summary:   She came for weakness,near syncope and diarrhea. She states her IBS acted up and she was not eating for fear of diarrhea,she knows\" I am not supposed to do that. \"  Feeling better, but still weak,she is not confident to return today. She lives in [address] [place] [address] and hoping if she can get to the skilled level same place. No more diarrhea since admission            Past Medical History   Diagnosis Date    Bipolar disorder ([facility]            Nazmy    Chronic pain            BACK PAIN    Diabetes ([facility]            BORDERLINE TX WITH DIET AND EXERCISE    DJD (degenerative joint disease)       Fibromyalgia       Genital herpes       GERD (gastroesophageal reflux disease)       Hypercholesterolemia       IBS (irritable bowel syndrome)       Lumbar disc disease            stimulation-Honza    Migraine       Nausea & vomiting       Other ill-defined conditions(799.89)            SEASONAL allergies    Other ill-defined conditions(799.89)            dysphagia has had esophagus dilated    Paget's disease       Pulmonary embolism (HCC)       RLS (restless legs syndrome)              Interval history / Subjective:       Complains of nausea. No diarrhea since admission. Denied vomiting or abdominal pain.       Assessment & Plan:   Toan Quintero due to dehydration due to diarrhea , limiting oral intake,possibly non infection,flare of IBS  -Diarrhea stopped  -D/c IV fluid.  -Advance diet  -Orthostatic negative as of 1/11      She is on midodrine and florinef suspect for orthostatic hypotension:-Orthostatic negative 1/11  Patient Vitals for the past 4 hrs:    Temp Pulse Resp BP SpO2   01/13/17 1523 99.2 °F (37.3 °C) 64 17 (!) 186/100 96 %      1/13 prior to discharge checked BP myself with automatic machine and manually   SBP ranged 568-669 systolic and DBP in the 15R  Standing 81/58,patient up for 3-5 minutes,no symptoms at all. She has been up and moving all day with out problems. I advised her to monitor and log supine,night time and standing Mainly day time BP and discuss results with Dr Tye Renner so medications can be adjusted to prevent excessive supine hypertension and symptomatic orthostatic hypotension! Counseled her on the importance of hydration especially during periods of fluid loss such as diarrhea, vomiting and febrile illness etc.         Hypokalemia: corrected  Continue the rest of home medications     Inpatient rehab declined. Patient going to stay with her daughter ,HHPT arranged. She is agreeable with plan and medically stable on discharge for discharge.              DISCHARGE DIAGNOSES / PLAN:       See above.         PENDING TEST RESULTS:   At the time of discharge the following test results are still pending: none     FOLLOW UP APPOINTMENTS:   Follow-up Information     Follow up With Details Comments Contact Info     Laith Roger MD In 1 week   [address]   [address] [facility]  [phone]            DISCHARGE MEDICATIONS:       Current Discharge Medication List            CONTINUE these medications which have NOT CHANGED     Details   acetaminophen (TYLENOL) 325 mg tablet Take 325 mg by mouth every four (4) hours as needed for Pain.       fexofenadine (ALLEGRA) 180 mg tablet Take 180 mg by mouth daily.       !! gabapentin (NEURONTIN) 100 mg capsule Take 100 mg by mouth two (2) times a day. Morning and noon       !! gabapentin (NEURONTIN) 100 mg capsule Take 200 mg by mouth nightly.       guaiFENesin ER (MUCINEX) 600 mg ER tablet Take 600 mg by mouth two (2) times a day.       acyclovir (ZOVIRAX) 5 % ointment Apply to affected area every three (3) hours. Qty: 2 g, Refills: 3     Associated Diagnoses: Genital herpes simplex, unspecified site       fludrocortisone (FLORINEF) 0.1 mg tablet Take 1 Tab by mouth daily. Qty: 30 Tab, Refills: 5       midodrine (PROAMITINE) 5 mg tablet Take 1 Tab by mouth three (3) times daily (with meals) for 90 days.   Qty: 90 Tab, Refills: 6       pindolol (VISKIN) 5 mg tablet Take 1 Tab by mouth daily as needed. Qty: 30 Tab, Refills: 3       ondansetron (ZOFRAN ODT) 4 mg disintegrating tablet Take 1 Tab by mouth every eight (8) hours as needed for Nausea. Qty: 30 Tab, Refills: 3       pyridostigmine (MESTINON) 60 mg tablet TAKE 1 TABLET BY MOUTH THREE TIMES DAILY  Qty: 90 Tab, Refills: 1       triamcinolone (NASACORT AQ) 55 mcg nasal inhaler 2 Sprays by Both Nostrils route daily as needed.       butalbital-acetaminophen-caff (FIORICET) -40 mg per capsule Take 1 Cap by mouth every four (4) hours as needed for Pain for up to 20 doses. Max Daily Amount: 6 Caps. Qty: 20 Cap, Refills: 0       meclizine (ANTIVERT) 25 mg tablet Take 1 Tab by mouth three (3) times daily as needed. Indications: VERTIGO  Qty: 30 Tab, Refills: 3       QUEtiapine (SEROQUEL) 25 mg tablet Take 25 mg by mouth nightly as needed.       PARoxetine (PAXIL) 20 mg tablet Take 20 mg by mouth daily.       atorvastatin (LIPITOR) 20 mg tablet TAKE 1 TABLET BY MOUTH EVERY EVENING  Qty: 30 Tab, Refills: 11       !! - Potential duplicate medications found. Please discuss with provider.             DISPOSITION:  x  Home With:    OT x PT x HH   RN        Long term SNF/Inpatient Rehab     Independent/assisted living     Hospice     Other:         PATIENT CONDITION AT DISCHARGE:      Functional status     Poor    x Deconditioned      Independent       Cognition   x  Lucid      Forgetful      Dementia       Catheters/lines (plus indication)     Jc      PICC      PEG    x None       Code status   x Full code      DNR       PHYSICAL EXAMINATION AT DISCHARGE:           Visit Vitals    BP (!) 186/100    Pulse 64    Temp 99.2 °F (37.3 °C)    Resp 17    Ht 5' 6\" (1.676 m)    Wt 69.9 kg (154 lb)    SpO2 96%    BMI 24.86 kg/m2      O2 Device: Room air      Signed:    Rudy Levi MD  1/13/2017  4:02 PM    Significant Lab/Diagnostic Findings:   Lab Results  Component Value Date/Time WBC 4.2 01/11/2017 02:58 AM   HGB 13.7 01/11/2017 02:58 AM   HCT 41.6 01/11/2017 02:58 AM   PLATELET 683 71/02/4764 02:58 AM   MCV 91.2 01/11/2017 02:58 AM       Lab Results   Component Value Date/Time    Sodium 142 01/12/2017 02:45 AM    Potassium 3.5 01/12/2017 02:45 AM    Chloride 107 01/12/2017 02:45 AM    CO2 28 01/12/2017 02:45 AM    Anion gap 7 01/12/2017 02:45 AM    Glucose 86 01/12/2017 02:45 AM    BUN 6 01/12/2017 02:45 AM    Creatinine 0.61 01/12/2017 02:45 AM    BUN/Creatinine ratio 10 01/12/2017 02:45 AM    GFR est AA >60 01/12/2017 02:45 AM    GFR est non-AA >60 01/12/2017 02:45 AM    Calcium 8.0 01/12/2017 02:45 AM    Bilirubin, total 0.5 01/10/2017 12:07 PM    ALT 26 01/10/2017 12:07 PM    AST 33 01/10/2017 12:07 PM    Alk. phosphatase 75 01/10/2017 12:07 PM    Protein, total 7.1 01/10/2017 12:07 PM    Albumin 3.4 01/10/2017 12:07 PM    Globulin 3.7 01/10/2017 12:07 PM    A-G Ratio 0.9 01/10/2017 12:07 PM        Total Hospitalizations/ED visits last 12 months? 7 ED visits and 2 Hospitalization    Home Health orders at discharge? yes If yes, what agency and what services?

## 2017-01-23 ENCOUNTER — OFFICE VISIT (OUTPATIENT)
Dept: INTERNAL MEDICINE CLINIC | Age: 72
End: 2017-01-23

## 2017-01-23 ENCOUNTER — OFFICE VISIT (OUTPATIENT)
Dept: CARDIOLOGY CLINIC | Age: 72
End: 2017-01-23

## 2017-01-23 VITALS
BODY MASS INDEX: 24.59 KG/M2 | DIASTOLIC BLOOD PRESSURE: 63 MMHG | HEIGHT: 66 IN | SYSTOLIC BLOOD PRESSURE: 98 MMHG | HEART RATE: 78 BPM | WEIGHT: 153 LBS

## 2017-01-23 VITALS
OXYGEN SATURATION: 95 % | HEART RATE: 89 BPM | BODY MASS INDEX: 24.75 KG/M2 | WEIGHT: 154 LBS | SYSTOLIC BLOOD PRESSURE: 120 MMHG | DIASTOLIC BLOOD PRESSURE: 80 MMHG | HEIGHT: 66 IN | TEMPERATURE: 98.9 F

## 2017-01-23 DIAGNOSIS — K58.0 IRRITABLE BOWEL SYNDROME WITH DIARRHEA: ICD-10-CM

## 2017-01-23 DIAGNOSIS — I15.9 SECONDARY HYPERTENSION: ICD-10-CM

## 2017-01-23 DIAGNOSIS — R42 DIZZINESS: ICD-10-CM

## 2017-01-23 DIAGNOSIS — G25.2 RESTING TREMOR: ICD-10-CM

## 2017-01-23 DIAGNOSIS — J34.89 RHINORRHEA: ICD-10-CM

## 2017-01-23 DIAGNOSIS — I95.1 ORTHOSTATIC HYPOTENSION: Primary | ICD-10-CM

## 2017-01-23 DIAGNOSIS — K59.1 FUNCTIONAL DIARRHEA: Primary | ICD-10-CM

## 2017-01-23 DIAGNOSIS — I95.1 ORTHOSTATIC HYPOTENSION: ICD-10-CM

## 2017-01-23 DIAGNOSIS — E11.9 CONTROLLED TYPE 2 DIABETES MELLITUS WITHOUT COMPLICATION, WITHOUT LONG-TERM CURRENT USE OF INSULIN (HCC): ICD-10-CM

## 2017-01-23 RX ORDER — IPRATROPIUM BROMIDE 42 UG/1
2 SPRAY, METERED NASAL 4 TIMES DAILY
Qty: 15 ML | Refills: 5 | Status: SHIPPED | OUTPATIENT
Start: 2017-01-23 | End: 2017-07-27

## 2017-01-23 RX ORDER — FLUDROCORTISONE ACETATE 0.1 MG/1
0.1 TABLET ORAL 2 TIMES DAILY
Qty: 180 TAB | Refills: 1 | Status: SHIPPED | OUTPATIENT
Start: 2017-01-23 | End: 2017-08-10 | Stop reason: SDUPTHER

## 2017-01-23 NOTE — MR AVS SNAPSHOT
Visit Information Date & Time Provider Department Dept. Phone Encounter #  
 1/23/2017  2:20 PM Patrick Wright, [address] [facility] [phone] [id number] Your Appointments 3/3/2017  9:40 AM  
ESTABLISHED PATIENT with Jeremy Snyder MD  
[facility] ([facility]) Appt Note: 1 month per Dr. Raina Otto [address]  
[address] [facility] [address]  
  
    
 7/5/2017 11:40 AM  
ESTABLISHED PATIENT with Jeremy Snyder MD  
[facility] ([facility]) Appt Note: 6 month follow up  
 Yuri  [facility]  
[phone]  
  
    
 7/5/2017  2:00 PM  
COMPLETE PHYSICAL with Patrick Wright MD  
[address] [address] Internal Medicine [facility]) Appt Note: medicare wellness [address] [facility] [address] [facility] [address]  
[address] [address] [address] Upcoming Health Maintenance Date Due  
 MEDICARE YEARLY EXAM 10/1/2016 FOOT EXAM Q1 10/1/2016 EYE EXAM RETINAL OR DILATED Q1 11/24/2016 HEMOGLOBIN A1C Q6M 7/5/2017 GLAUCOMA SCREENING Q2Y 11/24/2017 MICROALBUMIN Q1 1/5/2018 LIPID PANEL Q1 1/5/2018 BREAST CANCER SCRN MAMMOGRAM 2/23/2018 DTaP/Tdap/Td series (2 - Td) 8/17/2023 COLONOSCOPY 6/27/2026 Allergies as of 1/23/2017  Review Complete On: 1/23/2017 By: Keny You Severity Noted Reaction Type Reactions Adhesive  09/08/2009    Itching Hydrocodone  07/19/2010    Itching Lorazepam  04/02/2015    Other (comments) Other Medication  09/08/2009    Rash Tide detergent Percocet [Oxycodone-acetaminophen]  09/08/2009    Itching Sudafed [Pseudoephedrine Hcl]  10/17/2010   Side Effect Other (comments) Vertigo Wellbutrin [Bupropion Hcl]  08/31/2011    Nausea and Vomiting Zithromax [Azithromycin]  06/22/2011    Vertigo Zyrtec [Cetirizine]  10/14/2009    Nausea Only Current Immunizations  Reviewed on 7/28/2016 Name Date Influenza High Dose Vaccine PF 8/29/2013 Influenza Vaccine 8/8/2016, 10/1/2015, 9/15/2014 Influenza Vaccine Whole 9/25/2012 Pneumococcal Conjugate (PCV-13) 3/17/2015 Pneumococcal Polysaccharide (PPSV-23) 4/23/2016 Pneumococcal Vaccine (Unspecified Type) 12/1/2009 TDAP Vaccine 7/3/2012 Tdap 8/17/2013 Varicella Virus Vaccine Live 9/1/2007 Not reviewed this visit You Were Diagnosed With   
  
 Codes Comments Functional diarrhea    -  Primary ICD-10-CM: K59.1 ICD-9-CM: 564.5 Controlled type 2 diabetes mellitus without complication, without long-term current use of insulin ([facility]     ICD-10-CM: E11.9 ICD-9-CM: 250.00 Orthostatic hypotension     ICD-10-CM: I95.1 ICD-9-CM: 458.0 Rhinorrhea     ICD-10-CM: J34.89 ICD-9-CM: 478.19 Vitals BP Pulse Temp Height(growth percentile) Weight(growth percentile) SpO2  
 120/80 (BP 1 Location: Right arm, BP Patient Position: Sitting) 89 98.9 °F (37.2 °C) (Oral) 5' 6\" (1.676 m) 154 lb (69.9 kg) 95% BMI OB Status Smoking Status 24.86 kg/m2 Hysterectomy Never Smoker BMI and BSA Data Body Mass Index Body Surface Area  
 24.86 kg/m 2 1.8 m 2 Preferred Pharmacy Pharmacy Name Phone [facility] [address] [place] [address], [address] [place] [address] [phone] Your Updated Medication List  
  
   
This list is accurate as of: 1/23/17  3:50 PM.  Always use your most recent med list.  
  
  
  
  
 acetaminophen 325 mg tablet Commonly known as:  TYLENOL Take 325 mg by mouth every four (4) hours as needed for Pain. atorvastatin 20 mg tablet Commonly known as:  LIPITOR  
TAKE 1 TABLET BY MOUTH EVERY EVENING  
  
 fexofenadine 180 mg tablet Commonly known as:  Rella Soulier  
 Take 180 mg by mouth daily. fludrocortisone 0.1 mg tablet Commonly known as:  FLORINEF Take 1 Tab by mouth two (2) times a day. * gabapentin 100 mg capsule Commonly known as:  NEURONTIN Take 100 mg by mouth two (2) times a day. Morning and noon * gabapentin 100 mg capsule Commonly known as:  NEURONTIN Take 200 mg by mouth nightly. ipratropium 0.06 % nasal spray Commonly known as:  ATROVENT  
2 Sprays by Both Nostrils route four (4) times daily. meclizine 25 mg tablet Commonly known as:  ANTIVERT Take 1 Tab by mouth three (3) times daily as needed. Indications: VERTIGO  
  
 midodrine 5 mg tablet Commonly known as:  Tammi Alea Take 1 Tab by mouth three (3) times daily (with meals) for 90 days. MUCINEX 600 mg ER tablet Generic drug:  guaiFENesin ER Take 600 mg by mouth two (2) times a day. PARoxetine 20 mg tablet Commonly known as:  PAXIL Take 20 mg by mouth daily. pindolol 5 mg tablet Commonly known as:  VISKIN Take 1 Tab by mouth daily as needed. pyridostigmine 60 mg tablet Commonly known as:  MESTINON  
TAKE 1 TABLET BY MOUTH THREE TIMES DAILY QUEtiapine 25 mg tablet Commonly known as:  SEROquel Take 25 mg by mouth nightly as needed. * Notice: This list has 2 medication(s) that are the same as other medications prescribed for you. Read the directions carefully, and ask your doctor or other care provider to review them with you. Prescriptions Sent to Pharmacy Refills  
 ipratropium (ATROVENT) 0.06 % nasal spray 5 Si Sprays by Both Nostrils route four (4) times daily. Class: Normal  
 Pharmacy: [facility] [address],  [facility]nai Lombardi of [address] [place] [address] Ph #: [phone] Route: Both Nostrils Introducing [facility] & HEALTH SERVICES! Dear Soham Bhakta: Thank you for requesting a [employer] account.   Our records indicate that you already have an active [employer] account. You can access your account anytime at [email or web address] Did you know that you can access your hospital and ER discharge instructions at any time in [employer]? You can also review all of your test results from your hospital stay or ER visit. Additional Information If you have questions, please visit the Frequently Asked Questions section of the [employer] website at [email or web address] Remember, [employer] is NOT to be used for urgent needs. For medical emergencies, dial 911. Now available from your iPhone and Android! Please provide this summary of care documentation to your next provider. Your primary care clinician is listed as RYLEE RASHID. If you have any questions after today's visit, please call [phone].

## 2017-01-23 NOTE — PATIENT INSTRUCTIONS
Increase your Florinef to 0.1mg twice daily. You will need to call [facility] at [phone] to initiate shipment of the medication. Return to see Dr. Luba Ceron in 1 month.

## 2017-01-23 NOTE — MR AVS SNAPSHOT
Visit Information Date & Time Provider Department Dept. Phone Encounter #  
 1/23/2017  1:40 PM Delisa Valenzuela MD [facility] [phone] [id number] Your Appointments 7/5/2017 11:40 AM  
ESTABLISHED PATIENT with Delisa Valenzuela MD  
[facility] ([address]) Appt Note: 6 month follow up  
 Brenita Cymro [address] [facility] [address]  
[address] [place] [address]  
  
    
 7/5/2017  2:00 PM  
COMPLETE PHYSICAL with Rj Dorantes MD  
[facility] [address]) Appt Note: medicare wellness [address] [facility] [address]  
[address] [address] [address] [place] [address] Upcoming Health Maintenance Date Due  
 MEDICARE YEARLY EXAM 10/1/2016 FOOT EXAM Q1 10/1/2016 EYE EXAM RETINAL OR DILATED Q1 11/24/2016 HEMOGLOBIN A1C Q6M 7/5/2017 GLAUCOMA SCREENING Q2Y 11/24/2017 MICROALBUMIN Q1 1/5/2018 LIPID PANEL Q1 1/5/2018 BREAST CANCER SCRN MAMMOGRAM 2/23/2018 DTaP/Tdap/Td series (2 - Td) 8/17/2023 COLONOSCOPY 6/27/2026 Allergies as of 1/23/2017  Review Complete On: 1/23/2017 By: Delisa Valenzuela MD  
  
 Severity Noted Reaction Type Reactions Adhesive  09/08/2009    Itching Hydrocodone  07/19/2010    Itching Lorazepam  04/02/2015    Other (comments) Other Medication  09/08/2009    Rash Tide detergent Percocet [Oxycodone-acetaminophen]  09/08/2009    Itching Sudafed [Pseudoephedrine Hcl]  10/17/2010   Side Effect Other (comments) Vertigo Wellbutrin [Bupropion Hcl]  08/31/2011    Nausea and Vomiting Zithromax [Azithromycin]  06/22/2011    Vertigo Zyrtec [Cetirizine]  10/14/2009    Nausea Only Current Immunizations  Reviewed on 7/28/2016 Name Date Influenza High Dose Vaccine PF 8/29/2013 Influenza Vaccine 8/8/2016, 10/1/2015, 9/15/2014 Influenza Vaccine Whole 9/25/2012 Pneumococcal Conjugate (PCV-13) 3/17/2015 Pneumococcal Polysaccharide (PPSV-23) 4/23/2016 Pneumococcal Vaccine (Unspecified Type) 12/1/2009 TDAP Vaccine 7/3/2012 Tdap 8/17/2013 Varicella Virus Vaccine Live 9/1/2007 Not reviewed this visit Vitals BP Pulse Height(growth percentile) Weight(growth percentile) BMI OB Status 98/63 (BP 1 Location: Right arm, BP Patient Position: Sitting) 78 5' 6\" (1.676 m) 153 lb (69.4 kg) 24.69 kg/m2 Hysterectomy Smoking Status Never Smoker Vitals History BMI and BSA Data Body Mass Index Body Surface Area  
 24.69 kg/m 2 1.8 m 2 Preferred Pharmacy Pharmacy Name Phone [facility] [address] [place] [address], [address] [place] [address] [phone] Your Updated Medication List  
  
   
This list is accurate as of: 1/23/17  2:55 PM.  Always use your most recent med list.  
  
  
  
  
 acetaminophen 325 mg tablet Commonly known as:  TYLENOL Take 325 mg by mouth every four (4) hours as needed for Pain. atorvastatin 20 mg tablet Commonly known as:  LIPITOR  
TAKE 1 TABLET BY MOUTH EVERY EVENING  
  
 fexofenadine 180 mg tablet Commonly known as:  Madrigal Holstein Take 180 mg by mouth daily. fludrocortisone 0.1 mg tablet Commonly known as:  FLORINEF Take 1 Tab by mouth two (2) times a day. * gabapentin 100 mg capsule Commonly known as:  NEURONTIN Take 100 mg by mouth two (2) times a day. Morning and noon * gabapentin 100 mg capsule Commonly known as:  NEURONTIN Take 200 mg by mouth nightly. meclizine 25 mg tablet Commonly known as:  ANTIVERT Take 1 Tab by mouth three (3) times daily as needed. Indications: VERTIGO  
  
 midodrine 5 mg tablet Commonly known as:  Gurinder Sallies Take 1 Tab by mouth three (3) times daily (with meals) for 90 days. MUCINEX 600 mg ER tablet Generic drug:  guaiFENesin ER Take 600 mg by mouth two (2) times a day. PARoxetine 20 mg tablet Commonly known as:  PAXIL Take 20 mg by mouth daily. pindolol 5 mg tablet Commonly known as:  VISKIN Take 1 Tab by mouth daily as needed. pyridostigmine 60 mg tablet Commonly known as:  MESTINON  
TAKE 1 TABLET BY MOUTH THREE TIMES DAILY QUEtiapine 25 mg tablet Commonly known as:  SEROquel Take 25 mg by mouth nightly as needed. * Notice: This list has 2 medication(s) that are the same as other medications prescribed for you. Read the directions carefully, and ask your doctor or other care provider to review them with you. Prescriptions Sent to Pharmacy Refills  
 fludrocortisone (FLORINEF) 0.1 mg tablet 1 Sig: Take 1 Tab by mouth two (2) times a day. Class: Normal  
 Pharmacy: [facility] [address] [place] [address] #: [phone] Route: Oral  
  
Patient Instructions Increase your Florinef to 0.1mg twice daily. You will need to call [facility] at [phone] to initiate shipment of the medication. Return to see Dr. Grace Kapoor in 1 month. Introducing \[facility]\"" & HEALTH SERVICES! Dear Rosa Fails: Thank you for requesting a [employer] account. Our records indicate that you already have an active [employer] account. You can access your account anytime at [email or web address] Did you know that you can access your hospital and ER discharge instructions at any time in [employer]? You can also review all of your test results from your hospital stay or ER visit. Additional Information If you have questions, please visit the Frequently Asked Questions section of the [employer] website at [email or web address] Remember, [employer] is NOT to be used for urgent needs. For medical emergencies, dial 911. Now available from your iPhone and Android! Please provide this summary of care documentation to your next provider. Your primary care clinician is listed as RYLEE RASHID. If you have any questions after today's visit, please call [phone].

## 2017-01-23 NOTE — PROGRESS NOTES
Chief Complaint   Patient presents with    Hypotension     Orthostatic    Dizziness    Follow-up     To discuss starting Northera     Denies chest pain, shortness of breath, and palpitations. Verified medications with the patient.

## 2017-01-24 NOTE — PROGRESS NOTES
Cardiac Electrophysiology Office Note     Subjective:      Corrinne Peabody is a 66 y.o. female patient who is seen because of dizziness and high BP   She is here today because her blood pressure has been lower again and she was in hospital with syncope and diarrhea  Her daughter is here with her today, Ranjit Courser  She says she is wearing her compression stockings daily with her current regiment of midodrine, mestinon and florinef and still has a drastic drop in BP. Head CT did not show bleeding or stroke     Lexican cardiolite stress test 9/2016 showed normal perfusion  LVEF vigorous 85%    Dr Carlos Bledsoe 8/26/16: [facility] at [facility] 5/7/16 small PE RLL  6/9/16 CTA [facility] normal CTA no PE  Hypercoagulable work up and d dimer pending- on xarelto until results return    She had a tilt table test 8/2/16. Tilt table test revealed the patient had orthostatic hypotension response with sudden change in upright position and resolved quickly when she became supine  She has been on midodrine 5 mg tid. Patient was seen by neurologist in hospital and not clear she will have autonomic dysfunction and Parkinson's disease. I recommended to add mestinon 60 mg tid. She is using compression stockings bought from [facility] for IBS, RLS, Bipolar d/s, fibromyalgia, DDD, Paget's dz, dyslipidemia, DM, and PTE on Xarelto 3 months. She reports 3 episodes of syncopal episodes with \"almost blacking out\" which prompted her to come to the ED. Long standing h/o hypotension with orthostatics, but never blackouts. She admits to seeing Dr. Neeraj Martines 6-7 years ago and completing an Echo and stress test for which all were normal. Recent h/o PTE (5/2016) and on Xarelto currently.       Patient Active Problem List    Diagnosis Date Noted    Near syncope 08/02/2016    Resting tremor 07/29/2016    Orthostatic hypotension 07/05/2016    Advance directive on file 05/24/2016    Pulmonary embolism ([facility]     Mixed hyperlipidemia 02/02/2016    Diabetes mellitus type 2, controlled, without complications [facility] 45/74/2444    Tubulovillous adenoma 08/21/2012    Abnormal mammogram 06/04/2012    Encounter for long-term (current) use of other medications 11/30/2011    Rupertoertoe 09/12/2011    Allergic rhinitis, cause unspecified 01/26/2011    Other diseases of nasal cavity and sinuses(478.19) 07/19/2010    IBS (irritable bowel syndrome)     RLS (restless legs syndrome)     Bipolar disorder (HCC)     Fibromyalgia     DJD (degenerative joint disease)     Lumbar disc disease     Paget's disease of bone     Migraine     Genital herpes      Current Outpatient Prescriptions   Medication Sig Dispense Refill    fludrocortisone (FLORINEF) 0.1 mg tablet Take 1 Tab by mouth two (2) times a day. 180 Tab 1    acetaminophen (TYLENOL) 325 mg tablet Take 325 mg by mouth every four (4) hours as needed for Pain.  fexofenadine (ALLEGRA) 180 mg tablet Take 180 mg by mouth daily.  gabapentin (NEURONTIN) 100 mg capsule Take 100 mg by mouth two (2) times a day. Morning and noon      gabapentin (NEURONTIN) 100 mg capsule Take 200 mg by mouth nightly.  guaiFENesin ER (MUCINEX) 600 mg ER tablet Take 600 mg by mouth two (2) times a day.  midodrine (PROAMITINE) 5 mg tablet Take 1 Tab by mouth three (3) times daily (with meals) for 90 days. 90 Tab 6    pindolol (VISKIN) 5 mg tablet Take 1 Tab by mouth daily as needed. 30 Tab 3    pyridostigmine (MESTINON) 60 mg tablet TAKE 1 TABLET BY MOUTH THREE TIMES DAILY 90 Tab 1    meclizine (ANTIVERT) 25 mg tablet Take 1 Tab by mouth three (3) times daily as needed. Indications: VERTIGO 30 Tab 3    QUEtiapine (SEROQUEL) 25 mg tablet Take 25 mg by mouth nightly as needed.  PARoxetine (PAXIL) 20 mg tablet Take 20 mg by mouth daily.       atorvastatin (LIPITOR) 20 mg tablet TAKE 1 TABLET BY MOUTH EVERY EVENING 30 Tab 11    ipratropium (ATROVENT) 0.06 % nasal spray 2 Sprays by Both Nostrils route four (4) times daily.  15 mL 5     Allergies   Allergen Reactions    Adhesive Itching    Hydrocodone Itching    Lorazepam Other (comments)    Other Medication Rash     Tide detergent    Percocet [Oxycodone-Acetaminophen] Itching    Sudafed [Pseudoephedrine Hcl] Other (comments)     Vertigo    Wellbutrin [Bupropion Hcl] Nausea and Vomiting    Zithromax [Azithromycin] Vertigo    Zyrtec [Cetirizine] Nausea Only     Past Medical History   Diagnosis Date    Bipolar disorder ([facility]      Nazmy    Chronic pain      BACK PAIN    Diabetes ([facility]      BORDERLINE TX WITH DIET AND EXERCISE    DJD (degenerative joint disease)     Fibromyalgia     Genital herpes     GERD (gastroesophageal reflux disease)     Hypercholesterolemia     IBS (irritable bowel syndrome)     Lumbar disc disease      stimulation-Honza    Migraine     Nausea & vomiting     Other ill-defined conditions(799.89)      SEASONAL allergies    Other ill-defined conditions(799.89)      dysphagia has had esophagus dilated    Paget's disease     Pulmonary embolism (HCC)     RLS (restless legs syndrome)      Past Surgical History   Procedure Laterality Date    Endoscopy, colon, diagnostic       12, due 15    Hx heent       T & A    Hx cholecystectomy      Hx appendectomy      Hx hysterectomy       total for fibroid tumors    Hx other surgical       EGD x 2 with dilation/colonoscopy - no polyps per pt    Hx orthopaedic       lumbar laminectomy then fusion/neurostimulator implanted - inactive now but still in    Hx orthopaedic       REMOVAL OF NEUROSTIMULATOR    Hx back surgery  9/17/2013     back surgery - total of 3 as of 12/20/2013    Colonoscopy N/A 6/27/2016     COLONOSCOPY performed by [facility]ong [facility] at [facility] ENDOSCOPY    Tilt table evaluation  8/2/2016           Family History   Problem Relation Age of Onset    Colon Cancer Mother     Cancer Mother 68     colon    Heart Disease Father     Heart Disease Maternal Grandmother     Heart Disease Maternal Grandfather     Heart Disease Other      Social History   Substance Use Topics    Smoking status: Never Smoker    Smokeless tobacco: Never Used    Alcohol use No      Comment: 1 per month        Review of Systems:   Constitutional: Negative for fever, chills, weight loss, + fatigue, + dizziness. HEENT: Negative for nosebleeds, vision changes. Respiratory: Negative for cough, hemoptysis, sputum production, and wheezing. Cardiovascular: no chest pain, no palpitations, orthopnea, claudication, leg swelling, syncope, and PND. Gastrointestinal: + nausea, no vomiting she does not have diarrhea, constipation, blood in stool and melena. Genitourinary: Negative for dysuria, and hematuria. Musculoskeletal: Negative for myalgias, + lower leg arthralgia. Skin: Negative for rash. Heme: Does not bleed or bruise easily. Neurological: Negative for speech change and focal weakness     Objective:     Visit Vitals    BP 98/63 (BP 1 Location: Right arm, BP Patient Position: Sitting)    Pulse 78    Ht 5' 6\" (1.676 m)    Wt 153 lb (69.4 kg)    BMI 24.69 kg/m2      Physical Exam:   Constitutional: well-developed and well-nourished. No distress. Head: Normocephalic and atraumatic. Eyes: Pupils are equal, round  Neck: supple. No JVD present. Cardiovascular: Normal rate, regular rhythm. Exam reveals no gallop and no friction rub. No murmur heard. Pulmonary/Chest: Effort normal and breath sounds normal. No wheezes. Abdominal: Soft, no tenderness. Musculoskeletal: no edema. Neurological: alert,oriented. resting tremor in RUE  Skin: Skin is warm and dry  Psychiatric: normal mood and affect. Behavior is normal. Judgment and thought content normal.      Assessment/Plan:       ICD-10-CM ICD-9-CM    1. Orthostatic hypotension I95.1 458.0    2. Secondary hypertension I15.9 405.99    3. Dizziness R42 780.4    4. Resting tremor R25.9 781.0    5.  Irritable bowel syndrome with diarrhea K58.0 564.1       Very difficult to treat her current conditions  Increase Florinef to 0.1mg twice daily. she will need to call [facility] at [phone] to initiate shipment of the medication. wear compression stockings daily. I gave her pindolol 5 for supine BP > 160 mmHg. Zofran for for nausea PRN. Continue to monitor BP at home and call in as needed. Normal nuclear stress test 9/2016. This has been difficult to strike a balance with supine hypertension and orthostatic hypotension  She sees Dr Matt Esquivel of GI for IBS  She is on neurontin for neuropathy  Her daughter said she will see neurologist again for ? Parkinson disease  She takes seroquel at sleep time only and this could worsen orthostatic hypotension if she takes daytime    Follow-up Disposition:  Return in about 4 weeks (around 2/20/2017). Thank you for involving me in this patient's care and please call with further concerns or questions. Eleazar Trimble M.D.   Electrophysiology/Cardiology  [facility]  [facility], [facility] [address] [place] [address]  [phone]

## 2017-01-26 ENCOUNTER — TELEPHONE (OUTPATIENT)
Dept: CARDIOLOGY CLINIC | Age: 72
End: 2017-01-26

## 2017-01-26 RX ORDER — PYRIDOSTIGMINE BROMIDE 60 MG/1
TABLET ORAL
Qty: 90 TAB | Refills: 0 | Status: SHIPPED | OUTPATIENT
Start: 2017-01-26 | End: 2017-03-03 | Stop reason: ALTCHOICE

## 2017-01-26 NOTE — TELEPHONE ENCOUNTER
Spoke with Ms. Konstantin Renae regarding her Northera medication. She received shipment from the company, they are providing first 30 days free. She is afraid to take the medication and had some questions. We reviewed that Dr. Mingo Barrios did prescribe for her to take the medication, she would be fine to initiate the drug. She is worried about affordability, I have advised the patient to begin taking medication and let us know in 1-2 weeks if effective. If effective, will work with patient to determine if drug cost assistance is available to the patient. The patient verbalized understanding and will call our office with any further questions or concerns.

## 2017-01-26 NOTE — TELEPHONE ENCOUNTER
Please call Ms. Nohelia Manley at [phone]. She received her supply of Northera and she's decided not to take it due to cost and other reasons. She was advised by the drug company that she could not return it but that she could bring it to our office to have it disposed of.      Thank you,  Megan Florian

## 2017-01-26 NOTE — TELEPHONE ENCOUNTER
Patient called regarding a new medication that has been added. She stated is was \"special order\" medication. She wants to let Dr. Marti Zhou know that she will start taking it she just wants to make sure it will not interact with any other medications she is currently taking.  Please give her a call at [phone]

## 2017-01-27 NOTE — TELEPHONE ENCOUNTER
Spoke with Ms. Longjackeline Martin regarding her Northera medication. She was advised may proceed to take the medication as prescribed. She states she did take a dose on yesterday and feels that she may have felt better-- did some walking, and \"felt like doing things. \" Indra Christiansenra her to continue and let our office know how she is feeling after 1-2 weeks on medication. The patient verbalized understanding and will call our office with any further questions or concerns.

## 2017-02-02 ENCOUNTER — TELEPHONE (OUTPATIENT)
Dept: CARDIOLOGY CLINIC | Age: 72
End: 2017-02-02

## 2017-02-02 NOTE — TELEPHONE ENCOUNTER
Please call Destiney with At [facility] at [phone]. Re:  Orthostatic /90 while sitting 100/70 while standing.      Thank you,      Shelley Severin

## 2017-02-02 NOTE — TELEPHONE ENCOUNTER
Attempted to reach [address] [place] [address] by telephone. A message was left for return call.

## 2017-02-03 NOTE — TELEPHONE ENCOUNTER
Attempted to reach [facility] by telephone. A message was left for return call. Patient has known history of orthostasis, this is not a change in status.

## 2017-02-06 NOTE — TELEPHONE ENCOUNTER
Spoke with Derril Severance, she states patient was doing laundry at time of vitals and had no new symptoms, other than those that are chronic for this patient. She will continue to monitor. The patient is taking all medications as prescribed.

## 2017-02-06 NOTE — TELEPHONE ENCOUNTER
[address] nurse called to report patient BP readings from this am:    BP   Sitting 143/96          Standing 75-61    If you would like to speak with Jessica Lowery she can be reached at [phone].  Thank you

## 2017-02-15 ENCOUNTER — TELEPHONE (OUTPATIENT)
Dept: CARDIOLOGY CLINIC | Age: 72
End: 2017-02-15

## 2017-02-15 NOTE — TELEPHONE ENCOUNTER
Pt calling because another Dr. Avi Sutton to put her on Amantadine 100 for her shaking hand. She need to know if that would be ok. She can be reached at [phone].  [employer]

## 2017-02-15 NOTE — TELEPHONE ENCOUNTER
She may try amantadine  1-5% chance it could orthostatic hypotension   If she notices she is having difficultly with the BP after starting then she will have to stop

## 2017-02-21 ENCOUNTER — TELEPHONE (OUTPATIENT)
Dept: INTERNAL MEDICINE CLINIC | Age: 72
End: 2017-02-21

## 2017-02-21 NOTE — TELEPHONE ENCOUNTER
Spoke with Destiney with At [facility] - she states pt's neuro Dr Marylen Laura started pt on Amantadine 100 mg bid which sometimes interferes with BP medications. Josephine Lab is requesting to extend home health for 2 weeks to make sure no effects from new med and BP med.  Will forward to MD.

## 2017-02-21 NOTE — TELEPHONE ENCOUNTER
Left detailed message for Destiney with At [facility] that MD has approved her request to extend pt's home health for 2 weeks.

## 2017-02-21 NOTE — TELEPHONE ENCOUNTER
Please call Yoli: suppose to discharge pt today, Dr. Ashely Gaines, neurologist gave pt new bp meds today, she wants to extend home health for 2 weeks to make sure no effects from new bp meds

## 2017-02-23 ENCOUNTER — OFFICE VISIT (OUTPATIENT)
Dept: INTERNAL MEDICINE CLINIC | Age: 72
End: 2017-02-23

## 2017-02-23 VITALS
BODY MASS INDEX: 24.23 KG/M2 | SYSTOLIC BLOOD PRESSURE: 100 MMHG | WEIGHT: 150.8 LBS | TEMPERATURE: 98.7 F | DIASTOLIC BLOOD PRESSURE: 80 MMHG | OXYGEN SATURATION: 96 % | HEART RATE: 79 BPM | RESPIRATION RATE: 16 BRPM | HEIGHT: 66 IN

## 2017-02-23 DIAGNOSIS — E78.2 MIXED HYPERLIPIDEMIA: ICD-10-CM

## 2017-02-23 DIAGNOSIS — E11.9 CONTROLLED TYPE 2 DIABETES MELLITUS WITHOUT COMPLICATION, WITHOUT LONG-TERM CURRENT USE OF INSULIN (HCC): ICD-10-CM

## 2017-02-23 DIAGNOSIS — G25.2 RESTING TREMOR: ICD-10-CM

## 2017-02-23 DIAGNOSIS — I95.1 ORTHOSTATIC HYPOTENSION: Primary | ICD-10-CM

## 2017-02-23 RX ORDER — DROXIDOPA 100 MG/1
100 CAPSULE ORAL 3 TIMES DAILY
Qty: 1 CAP | Refills: 0
Start: 2017-02-23 | End: 2017-07-27 | Stop reason: SDUPTHER

## 2017-02-23 RX ORDER — AMANTADINE HYDROCHLORIDE 100 MG/1
CAPSULE, GELATIN COATED ORAL 2 TIMES DAILY
COMMUNITY
End: 2017-03-03 | Stop reason: ALTCHOICE

## 2017-02-27 ENCOUNTER — TELEPHONE (OUTPATIENT)
Dept: CARDIOLOGY CLINIC | Age: 72
End: 2017-02-27

## 2017-02-27 NOTE — TELEPHONE ENCOUNTER
----- Message from Debi Michaud NP sent at 2/27/2017 11:25 AM EST -----   She has an appt on the 3/3.  Please call and check on her thanks Adelaida Gonzalez  ----- Message -----     From: Carlene White MD     Sent: 2/26/2017   9:50 AM       To: Maribel Mosley MD, Debi Michaud NP    Presyncope again   Told her to stop amantadine and follow up with you

## 2017-02-27 NOTE — TELEPHONE ENCOUNTER
Spoke with Ms. Daily Born to f/u on her call this weekend. She reports feeling better since d/c amantadine. She had another \"episode\" this morning, but states overall feeling better without medication amantadine. Advised her to continue holding the medication, continue to monitor/record BP and to attend MD visit this week. The patient verbalized understanding and will call our office with any further questions or concerns.     Future Appointments  Date Time Provider Reji Smith   2/28/2017 9:30 AM [facility] 1 [address] [place] [address]   3/3/2017 9:40 AM Ruma Herman MD [facility] KRISTIN SCHED   4/25/2017 9:20 AM Gemini Macario MD [facility] KRISTIN SCHED   7/5/2017 11:40 AM Ruma Herman  [address]   7/5/2017 2:00 PM Gemini Macario MD [address] [facility]

## 2017-02-28 ENCOUNTER — HOSPITAL ENCOUNTER (OUTPATIENT)
Dept: MAMMOGRAPHY | Age: 72
Discharge: HOME | End: 2017-02-28
Attending: INTERNAL MEDICINE
Payer: MEDICARE

## 2017-02-28 DIAGNOSIS — Z12.31 VISIT FOR SCREENING MAMMOGRAM: ICD-10-CM

## 2017-02-28 PROCEDURE — 77067 SCR MAMMO BI INCL CAD: CPT

## 2017-03-03 ENCOUNTER — OFFICE VISIT (OUTPATIENT)
Dept: CARDIOLOGY CLINIC | Age: 72
End: 2017-03-03

## 2017-03-03 VITALS
SYSTOLIC BLOOD PRESSURE: 110 MMHG | BODY MASS INDEX: 24.27 KG/M2 | WEIGHT: 151 LBS | HEART RATE: 64 BPM | HEIGHT: 66 IN | DIASTOLIC BLOOD PRESSURE: 62 MMHG

## 2017-03-03 DIAGNOSIS — I10 ESSENTIAL HYPERTENSION: ICD-10-CM

## 2017-03-03 DIAGNOSIS — G25.2 RESTING TREMOR: ICD-10-CM

## 2017-03-03 DIAGNOSIS — I95.1 ORTHOSTATIC HYPOTENSION: Primary | ICD-10-CM

## 2017-03-03 NOTE — MR AVS SNAPSHOT
Visit Information Date & Time Provider Department Dept. Phone Encounter #  
 3/3/2017  9:40 AM Elsy Zhou MD [facility] [place] [phone] [id number] Your Appointments 4/25/2017  9:20 AM  
ROUTINE CARE with Cosme Nogueira MD  
[facility] Internal Medicine [address] [place] [address]) Appt Note: 2mo f/u  
 [address] [place] [address] [address]  
[id number] [place] [address] [place]  
  
    
 7/5/2017 11:40 AM  
ESTABLISHED PATIENT with Elsy Zhou MD  
[facility] ([address]) Appt Note: 6 month follow up  
 [address] [place] [address] [address]  
[address] [address]  
  
    
 7/5/2017  2:00 PM  
COMPLETE PHYSICAL with Cosme Nogueira MD  
[facility] Internal Medicine [address] [place] [address]) Appt Note: medicare wellness [address] [place] [address] [address]  
[id number] [address] Upcoming Health Maintenance Date Due  
 MEDICARE YEARLY EXAM 10/1/2016 FOOT EXAM Q1 10/1/2016 HEMOGLOBIN A1C Q6M 7/5/2017 MICROALBUMIN Q1 1/5/2018 LIPID PANEL Q1 1/5/2018 EYE EXAM RETINAL OR DILATED Q1 2/9/2018 GLAUCOMA SCREENING Q2Y 2/9/2019 BREAST CANCER SCRN MAMMOGRAM 2/28/2019 DTaP/Tdap/Td series (2 - Td) 8/17/2023 COLONOSCOPY 6/27/2026 Allergies as of 3/3/2017  Review Complete On: 3/3/2017 By: Elsy Zhou MD  
  
 Severity Noted Reaction Type Reactions Adhesive  09/08/2009    Itching Hydrocodone  07/19/2010    Itching Lorazepam  04/02/2015    Other (comments) Other Medication  09/08/2009    Rash Tide detergent Percocet [Oxycodone-acetaminophen]  09/08/2009    Itching Sudafed [Pseudoephedrine Hcl]  10/17/2010   Side Effect Other (comments) Vertigo Wellbutrin [Bupropion Hcl]  08/31/2011    Nausea and Vomiting Zithromax [Azithromycin]  06/22/2011    Vertigo Zyrtec [Cetirizine]  10/14/2009    Nausea Only Current Immunizations  Reviewed on 7/28/2016 Name Date Influenza High Dose Vaccine PF 8/29/2013 Influenza Vaccine 8/8/2016, 10/1/2015, 9/15/2014 Influenza Vaccine Whole 9/25/2012 Pneumococcal Conjugate (PCV-13) 3/17/2015 Pneumococcal Polysaccharide (PPSV-23) 4/23/2016 Pneumococcal Vaccine (Unspecified Type) 12/1/2009 TDAP Vaccine 7/3/2012 Tdap 8/17/2013 Varicella Virus Vaccine Live 9/1/2007 Not reviewed this visit You Were Diagnosed With   
  
 Codes Comments Orthostatic hypotension    -  Primary ICD-10-CM: I95.1 ICD-9-CM: 458.0 Resting tremor     ICD-10-CM: R25.9 ICD-9-CM: 781.0 Essential hypertension     ICD-10-CM: I10 
ICD-9-CM: 401.9 Vitals BP  
  
  
  
  
  
 110/62 (BP 1 Location: Left arm, BP Patient Position: Sitting) Vitals History BMI and BSA Data Body Mass Index Body Surface Area  
 24.37 kg/m 2 1.79 m 2 Preferred Pharmacy Pharmacy Name Phone [facility] [address] [place] [address] [phone] Your Updated Medication List  
  
   
This list is accurate as of: 3/3/17 10:43 AM.  Always use your most recent med list.  
  
  
  
  
 acetaminophen 325 mg tablet Commonly known as:  TYLENOL Take 325 mg by mouth every four (4) hours as needed for Pain. atorvastatin 20 mg tablet Commonly known as:  LIPITOR  
TAKE 1 TABLET BY MOUTH EVERY EVENING  
  
 droxidopa 100 mg Cap capsule Commonly known as:  [place] Beat Take 1 Cap by mouth three (3) times daily. Per Dr Steph Ruff  
  
 fexofenadine 180 mg tablet Commonly known as:  Sri Ravens Take 180 mg by mouth daily. fludrocortisone 0.1 mg tablet Commonly known as:  FLORINEF Take 1 Tab by mouth two (2) times a day.  
  
 gabapentin 100 mg capsule Commonly known as:  NEURONTIN  
 Take 100 mg by mouth three (3) times daily. Then two tablets at bedtime  
  
 ipratropium 0.06 % nasal spray Commonly known as:  ATROVENT  
2 Sprays by Both Nostrils route four (4) times daily. meclizine 25 mg tablet Commonly known as:  ANTIVERT Take 1 Tab by mouth three (3) times daily as needed. Indications: VERTIGO  
  
 midodrine 5 mg tablet Commonly known as:  Dylan Cobia Take 1 Tab by mouth three (3) times daily (with meals) for 90 days. PARoxetine 20 mg tablet Commonly known as:  PAXIL Take 20 mg by mouth daily. pindolol 5 mg tablet Commonly known as:  VISKIN Take 1 Tab by mouth daily as needed. pyridostigmine 60 mg tablet Commonly known as:  MESTINON  
TAKE 1 TABLET BY MOUTH THREE TIMES DAILY QUEtiapine 25 mg tablet Commonly known as:  SEROquel Take 25 mg by mouth nightly as needed. Patient Instructions Follow up with Dr. Cornelious Felty in July 2017. Introducing [facility] & [facility] SERVICES! Dear Denver Gilman: Thank you for requesting a [employer] account. Our records indicate that you already have an active [employer] account. You can access your account anytime at [email or web address] [place] [email or web address]. [facility] [place] Did you know that you can access your hospital and ER discharge instructions at any time in [employer]? You can also review all of your test results from your hospital stay or ER visit. Additional Information If you have questions, please visit the Frequently Asked Questions section of the [employer] website at [email or web address]. [facility] [place] Remember, [employer] is NOT to be used for urgent needs. For medical emergencies, dial 911. Now available from your iPhone and Android! Please provide this summary of care documentation to your next provider. Your primary care clinician is listed as RYLEE RASHID. If you have any questions after today's visit, please call [phone].

## 2017-03-03 NOTE — PROGRESS NOTES
Chief Complaint   Patient presents with    Hypotension    Follow-up     1 month follow up      Verified medications with the patient. Verified pharmacy with patient.

## 2017-03-03 NOTE — PROGRESS NOTES
Cardiac Electrophysiology Office Note     Subjective:      Eloy Mayo is a 67 y.o. female patient who is seen after [facility]  She is better but intermittently she needs pindolol for SBP > 160 mmHg  Her daughter is here with her today, Annemarie Cardoza  She says she is wearing her compression stockings and back binder daily with her current regiment of midodrine, mestinon and florinef   Head CT did not show bleeding or stroke  No recent diarrhea     Lexican cardiolite stress test 9/2016 showed normal perfusion  LVEF vigorous 85%    Dr Vince Mast 8/26/16: CTA at [facility] 5/7/16 small PE RLL  6/9/16 CTA [facility] normal CTA no PE  Hypercoagulable work up and d dimer pending- on xarelto until results return    She had a tilt table test 8/2/16. Tilt table test revealed the patient had orthostatic hypotension response with sudden change in upright position and resolved quickly when she became supine  She has been on midodrine 5 mg tid. Patient was seen by neurologist in hospital and not clear she will have autonomic dysfunction and Parkinson's disease. I recommended to add mestinon 60 mg tid. She is using compression stockings bought from [facility] for IBS, RLS, Bipolar d/s, fibromyalgia, DDD, Paget's dz, dyslipidemia, DM, and PTE on Xarelto 3 months. She reports 3 episodes of syncopal episodes with \"almost blacking out\" which prompted her to come to the ED. Long standing h/o hypotension with orthostatics, but never blackouts. She admits to seeing Dr. Tamir Dickens 6-7 years ago and completing an Echo and stress test for which all were normal. Recent h/o PTE (5/2016) and on Xarelto currently.       Patient Active Problem List    Diagnosis Date Noted    Near syncope 08/02/2016    Resting tremor 07/29/2016    Orthostatic hypotension 07/05/2016    Advance directive on file 05/24/2016    Pulmonary embolism ([facility]     Mixed hyperlipidemia 02/02/2016    Diabetes mellitus type 2, controlled, without complications ([facility] [address] 10/01/2015    Tubulovillous adenoma 08/21/2012    Abnormal mammogram 06/04/2012    Encounter for long-term (current) use of other medications 11/30/2011    Rupertoertoe 09/12/2011    Allergic rhinitis, cause unspecified 01/26/2011    Other diseases of nasal cavity and sinuses(478.19) 07/19/2010    IBS (irritable bowel syndrome)     RLS (restless legs syndrome)     Bipolar disorder (HCC)     Fibromyalgia     DJD (degenerative joint disease)     Lumbar disc disease     Paget's disease of bone     Migraine     Genital herpes      Current Outpatient Prescriptions   Medication Sig Dispense Refill    droxidopa (NORTHERA) 100 mg cap capsule Take 1 Cap by mouth three (3) times daily. Per Dr Gallego Seat 1 Cap 0    fludrocortisone (FLORINEF) 0.1 mg tablet Take 1 Tab by mouth two (2) times a day. 180 Tab 1    ipratropium (ATROVENT) 0.06 % nasal spray 2 Sprays by Both Nostrils route four (4) times daily. 15 mL 5    acetaminophen (TYLENOL) 325 mg tablet Take 325 mg by mouth every four (4) hours as needed for Pain.  fexofenadine (ALLEGRA) 180 mg tablet Take 180 mg by mouth daily.  gabapentin (NEURONTIN) 100 mg capsule Take 100 mg by mouth three (3) times daily. Then two tablets at bedtime      midodrine (PROAMITINE) 5 mg tablet Take 1 Tab by mouth three (3) times daily (with meals) for 90 days. 90 Tab 6    pindolol (VISKIN) 5 mg tablet Take 1 Tab by mouth daily as needed. 30 Tab 3    pyridostigmine (MESTINON) 60 mg tablet TAKE 1 TABLET BY MOUTH THREE TIMES DAILY 90 Tab 1    meclizine (ANTIVERT) 25 mg tablet Take 1 Tab by mouth three (3) times daily as needed. Indications: VERTIGO 30 Tab 3    QUEtiapine (SEROQUEL) 25 mg tablet Take 25 mg by mouth nightly as needed.  PARoxetine (PAXIL) 20 mg tablet Take 20 mg by mouth daily.  atorvastatin (LIPITOR) 20 mg tablet TAKE 1 TABLET BY MOUTH EVERY EVENING 30 Tab 11    LACTOBACILLUS ACIDOPHILUS (PROBIOTIC PO) Take  by mouth.        Allergies   Allergen Reactions    Adhesive Itching    Hydrocodone Itching    Lorazepam Other (comments)    Other Medication Rash     Tide detergent    Percocet [Oxycodone-Acetaminophen] Itching    Sudafed [Pseudoephedrine Hcl] Other (comments)     Vertigo    Wellbutrin [Bupropion Hcl] Nausea and Vomiting    Zithromax [Azithromycin] Vertigo    Zyrtec [Cetirizine] Nausea Only     Past Medical History:   Diagnosis Date    Bipolar disorder (HCC)     Nazmy    Chronic pain     BACK PAIN    Diabetes ([address]     BORDERLINE TX WITH DIET AND EXERCISE    DJD (degenerative joint disease)     Fibromyalgia     Genital herpes     GERD (gastroesophageal reflux disease)     Hypercholesterolemia     IBS (irritable bowel syndrome)     Lumbar disc disease     stimulation-Honza    Migraine     Nausea & vomiting     Other ill-defined conditions(799.89)     SEASONAL allergies    Other ill-defined conditions(799.89)     dysphagia has had esophagus dilated    Paget's disease     Pulmonary embolism (HCC)     RLS (restless legs syndrome)      Past Surgical History:   Procedure Laterality Date    COLONOSCOPY N/A 6/27/2016    COLONOSCOPY performed by [facility] Duong [facility] at [facility] ENDOSCOPY    ENDOSCOPY, COLON, DIAGNOSTIC      12, due 13    HX APPENDECTOMY      HX BACK SURGERY  9/17/2013    back surgery - total of 3 as of 12/20/2013    HX BREAST BIOPSY Right years ago    negative    HX CHOLECYSTECTOMY      HX HEENT      T & A    HX HYSTERECTOMY      total for fibroid tumors    HX ORTHOPAEDIC      lumbar laminectomy then fusion/neurostimulator implanted - inactive now but still in    HX ORTHOPAEDIC      REMOVAL OF NEUROSTIMULATOR    HX OTHER SURGICAL      EGD x 2 with dilation/colonoscopy - no polyps per pt    TILT TABLE EVALUATION  8/2/2016          Family History   Problem Relation Age of Onset    Colon Cancer Mother     Cancer Mother 68     colon    Heart Disease Father     Heart Disease Maternal Grandmother     Heart Disease Maternal Grandfather     Heart Disease Other      Social History   Substance Use Topics    Smoking status: Never Smoker    Smokeless tobacco: Never Used    Alcohol use No      Comment: 1 per month        Review of Systems:   Constitutional: Negative for fever, chills, weight loss,   + dizziness. HEENT: Negative for nosebleeds, vision changes. Respiratory: Negative for cough, hemoptysis, sputum production, and wheezing. Cardiovascular: no chest pain, no palpitations, orthopnea, claudication, leg swelling, syncope, and PND. Gastrointestinal: + nausea, no vomiting she does not have diarrhea, constipation, blood in stool and melena. Genitourinary: Negative for dysuria, and hematuria. Musculoskeletal: Negative for myalgias   Skin: Negative for rash. Heme: Does not bleed or bruise easily. Neurological: Negative for speech change and focal weakness     Objective:     Visit Vitals    /62 (BP 1 Location: Left arm, BP Patient Position: Sitting)    Pulse 64    Ht 5' 6\" (1.676 m)    Wt 151 lb (68.5 kg)    BMI 24.37 kg/m2      Physical Exam:   Constitutional: well-developed and well-nourished. No distress. Head: Normocephalic and atraumatic. Eyes: Pupils are equal, round  Neck: supple. No JVD present. Cardiovascular: Normal rate, regular rhythm. Exam reveals no gallop and no friction rub. No murmur heard. Pulmonary/Chest: Effort normal and breath sounds normal. No wheezes. Abdominal: Soft, no tenderness. Musculoskeletal: no edema. Neurological: alert,oriented. resting tremor in BUE  Skin: Skin is warm and dry  Psychiatric: normal mood and affect. Behavior is normal. Judgment and thought content normal.      Assessment/Plan:       ICD-10-CM ICD-9-CM    1. Orthostatic hypotension I95.1 458.0    2. Resting tremor R25.9 781.0    3.  Essential hypertension I10 401.9       Very difficult to treat her current conditions  Northera and 3 other drugs to keep her BP up  wear compression stockings and abdominal compression daily. I gave her pindolol 5 for supine BP > 180 mmHg. I am concerned that she would drop BP too much with it  Continue to monitor BP at home and call in as needed. Normal nuclear stress test 9/2016. This has been difficult to strike a balance with supine hypertension and orthostatic hypotension  She is using walker now  She sees Dr Davida Faulkner of  for IBS  She will see neurologist Dr Oskar Trinidad and told that she does not have Parkinson disease  She takes seroquel at sleep time only and this could worsen orthostatic hypotension if she takes daytime    Follow-up Disposition: 7/5/2017      Thank you for involving me in this patient's care and please call with further concerns or questions. Brittni Wheeler M.D.   Electrophysiology/Cardiology  [facility]  [address], [facility] [address] [place] [address]  [phone]

## 2017-03-07 ENCOUNTER — TELEPHONE (OUTPATIENT)
Dept: CARDIOLOGY CLINIC | Age: 72
End: 2017-03-07

## 2017-03-07 NOTE — TELEPHONE ENCOUNTER
Called and spoke with Leon Chavis regarding the patient's BP readings. She is required to report any SBP > 150 and so was reporting. Patient is feeling well today. Patient is baseline orthostatic. Advised to call if symptoms. The patient's home health nurse verbalized understanding and will call our office with any further questions or concerns.

## 2017-03-07 NOTE — TELEPHONE ENCOUNTER
Nayeli Mtz called with pt's BP readings   165/90 sitting  138/74 standing   She can be reached at [phone]. Thanks!

## 2017-03-16 ENCOUNTER — OFFICE VISIT (OUTPATIENT)
Dept: INTERNAL MEDICINE CLINIC | Age: 72
End: 2017-03-16

## 2017-03-16 VITALS
SYSTOLIC BLOOD PRESSURE: 140 MMHG | WEIGHT: 150.4 LBS | OXYGEN SATURATION: 97 % | RESPIRATION RATE: 16 BRPM | HEART RATE: 85 BPM | BODY MASS INDEX: 24.17 KG/M2 | DIASTOLIC BLOOD PRESSURE: 80 MMHG | TEMPERATURE: 98.1 F | HEIGHT: 66 IN

## 2017-03-16 DIAGNOSIS — M94.0 COSTOCHONDRITIS: Primary | ICD-10-CM

## 2017-03-16 RX ORDER — DICLOFENAC SODIUM 75 MG/1
75 TABLET, DELAYED RELEASE ORAL
Qty: 30 TAB | Refills: 0 | Status: SHIPPED | OUTPATIENT
Start: 2017-03-16 | End: 2017-07-27

## 2017-03-16 NOTE — PATIENT INSTRUCTIONS
Costochondritis: Care Instructions  Your Care Instructions  You have chest pain because the cartilage of your rib cage is inflamed. This problem is called costochondritis. This type of chest wall pain may last from days to weeks. It is not a heart problem. Sometimes costochondritis occurs with a cold or the flu, and other times the exact cause is not known. Follow-up care is a key part of your treatment and safety. Be sure to make and go to all appointments, and call your doctor if you are having problems. Its also a good idea to know your test results and keep a list of the medicines you take. How can you care for yourself at home? · Take medicines for pain and inflammation exactly as directed. ¨ If the doctor gave you a prescription medicine, take it as prescribed. ¨ If you are not taking a prescription pain medicine, ask your doctor if you can take an over-the-counter medicine. ¨ Do not take two or more pain medicines at the same time unless the doctor told you to. Many pain medicines have acetaminophen, which is Tylenol. Too much acetaminophen (Tylenol) can be harmful. · It may help to use a warm compress or heating pad (set on low) on your chest. You can also try alternating heat and ice. Put ice or a cold pack on the area for 10 to 20 minutes at a time. Put a thin cloth between the ice and your skin. · Avoid any activity that strains the chest area. As your pain gets better, you can slowly return to your normal activities. · Do not use tape, an elastic bandage, a \"rib belt,\" or anything else that restricts your chest wall motion. When should you call for help? Call 911 anytime you think you may need emergency care. For example, call if:  · You have new or different chest pain or pressure. This may occur with:  ¨ Sweating. ¨ Shortness of breath. ¨ Nausea or vomiting. ¨ Pain that spreads from the chest to the neck, jaw, or one or both shoulders or arms. ¨ Dizziness or lightheadedness.   ¨ A fast or uneven pulse. After calling 911, chew 1 adult-strength aspirin. Wait for an ambulance. Do not try to drive yourself. · You have severe trouble breathing. Call your doctor now or seek immediate medical care if:  · You have a fever or cough. · You have any trouble breathing. · Your chest pain gets worse. Watch closely for changes in your health, and be sure to contact your doctor if:  · Your chest pain continues even though you are taking anti-inflammatory medicine. · Your chest wall pain has not improved after 5 to 7 days. Where can you learn more? Go to http://ash-jeronimo.info/. Enter W656 in the search box to learn more about \"Costochondritis: Care Instructions. \"  Current as of: May 27, 2016  Content Version: 11.1  © 4291-5335 [employer]. Care instructions adapted under license by [employer] (which disclaims liability or warranty for this information). If you have questions about a medical condition or this instruction, always ask your healthcare professional. [address] any warranty or liability for your use of this information.

## 2017-03-16 NOTE — MR AVS SNAPSHOT
Visit Information Date & Time Provider Department Dept. Phone Encounter #  
 3/16/2017  4:35 PM Janell Limon, [facility] Internal Medicine [phone] [id number] Follow-up Instructions Return if symptoms worsen or fail to improve. Your Appointments 4/25/2017  9:20 AM  
ROUTINE CARE with Janell Limon MD  
[facility] Internal Medicine [facility] Appt Note: 2mo f/u  
 [address] [facility] [address]  
[phone] [id number] [address]  
  
    
 7/5/2017 11:40 AM  
ESTABLISHED PATIENT with Zaida Alvarez MD  
[facility] ([facility]) Appt Note: 6 month follow up  
 [address] [facility] [address]  
[address] [facility] [address]  
  
    
 7/5/2017  2:00 PM  
COMPLETE PHYSICAL with Janell Limon MD  
[facility] Internal Medicine [facility]) Appt Note: medicare wellness [address] [facility] [address]  
[address] [id number] [place] [address] Upcoming Health Maintenance Date Due  
 MEDICARE YEARLY EXAM 10/1/2016 FOOT EXAM Q1 10/1/2016 HEMOGLOBIN A1C Q6M 7/5/2017 MICROALBUMIN Q1 1/5/2018 LIPID PANEL Q1 1/5/2018 EYE EXAM RETINAL OR DILATED Q1 2/9/2018 GLAUCOMA SCREENING Q2Y 2/9/2019 BREAST CANCER SCRN MAMMOGRAM 2/28/2019 DTaP/Tdap/Td series (2 - Td) 8/17/2023 COLONOSCOPY 6/27/2026 Allergies as of 3/16/2017  Review Complete On: 3/16/2017 By: Janell Limon MD  
  
 Severity Noted Reaction Type Reactions Adhesive  09/08/2009    Itching Hydrocodone  07/19/2010    Itching Lorazepam  04/02/2015    Other (comments) Other Medication  09/08/2009    Rash Tide detergent Percocet [Oxycodone-acetaminophen]  09/08/2009    Itching Sudafed [Pseudoephedrine Hcl]  10/17/2010   Side Effect Other (comments) Vertigo Wellbutrin [Bupropion Hcl]  08/31/2011    Nausea and Vomiting Zithromax [Azithromycin]  06/22/2011    Vertigo Zyrtec [Cetirizine]  10/14/2009    Nausea Only Current Immunizations  Reviewed on 7/28/2016 Name Date Influenza High Dose Vaccine PF 8/29/2013 Influenza Vaccine 8/8/2016, 10/1/2015, 9/15/2014 Influenza Vaccine Whole 9/25/2012 Pneumococcal Conjugate (PCV-13) 3/17/2015 Pneumococcal Polysaccharide (PPSV-23) 4/23/2016 Pneumococcal Vaccine (Unspecified Type) 12/1/2009 TDAP Vaccine 7/3/2012 Tdap 8/17/2013 Varicella Virus Vaccine Live 9/1/2007 Not reviewed this visit You Were Diagnosed With   
  
 Codes Comments Costochondritis    -  Primary ICD-10-CM: M94.0 ICD-9-CM: 733.6 Vitals BP Pulse Temp Resp Height(growth percentile) Weight(growth percentile) 140/80 (BP 1 Location: Right arm, BP Patient Position: Sitting) 85 98.1 °F (36.7 °C) (Oral) 16 5' 6\" (1.676 m) 150 lb 6.4 oz (68.2 kg) SpO2 BMI OB Status Smoking Status 97% 24.28 kg/m2 Hysterectomy Never Smoker BMI and BSA Data Body Mass Index Body Surface Area  
 24.28 kg/m 2 1.78 m 2 Preferred Pharmacy Pharmacy Name Phone [facility] [address] [place] [address] [phone] Your Updated Medication List  
  
   
This list is accurate as of: 3/16/17  5:25 PM.  Always use your most recent med list.  
  
  
  
  
 acetaminophen 325 mg tablet Commonly known as:  TYLENOL Take 325 mg by mouth every four (4) hours as needed for Pain. atorvastatin 20 mg tablet Commonly known as:  LIPITOR  
TAKE 1 TABLET BY MOUTH EVERY EVENING  
  
 diclofenac EC 75 mg EC tablet Commonly known as:  VOLTAREN Take 1 Tab by mouth two (2) times daily as needed. Indications: pain  
  
 droxidopa 100 mg Cap capsule Commonly known as:  Kalin Patter Take 1 Cap by mouth three (3) times daily. Per Dr Patricia Lopez  
  
 fexofenadine 180 mg tablet Commonly known as:  Maureen Jc Take 180 mg by mouth daily. fludrocortisone 0.1 mg tablet Commonly known as:  FLORINEF Take 1 Tab by mouth two (2) times a day.  
  
 gabapentin 100 mg capsule Commonly known as:  NEURONTIN Take 100 mg by mouth three (3) times daily. Then two tablets at bedtime  
  
 ipratropium 0.06 % nasal spray Commonly known as:  ATROVENT  
2 Sprays by Both Nostrils route four (4) times daily. meclizine 25 mg tablet Commonly known as:  ANTIVERT Take 1 Tab by mouth three (3) times daily as needed. Indications: VERTIGO PARoxetine 20 mg tablet Commonly known as:  PAXIL Take 20 mg by mouth daily. pindolol 5 mg tablet Commonly known as:  VISKIN Take 1 Tab by mouth daily as needed. pyridostigmine 60 mg tablet Commonly known as:  MESTINON  
TAKE 1 TABLET BY MOUTH THREE TIMES DAILY QUEtiapine 25 mg tablet Commonly known as:  SEROquel Take 25 mg by mouth nightly as needed. Prescriptions Sent to Pharmacy Refills  
 diclofenac EC (VOLTAREN) 75 mg EC tablet 0 Sig: Take 1 Tab by mouth two (2) times daily as needed. Indications: pain  
 Class: Normal  
 Pharmacy: [facility] [address] [place] [address] #: [phone] Route: Oral  
  
Follow-up Instructions Return if symptoms worsen or fail to improve. Patient Instructions Costochondritis: Care Instructions Your Care Instructions You have chest pain because the cartilage of your rib cage is inflamed. This problem is called costochondritis. This type of chest wall pain may last from days to weeks. It is not a heart problem. Sometimes costochondritis occurs with a cold or the flu, and other times the exact cause is not known. Follow-up care is a key part of your treatment and safety. Be sure to make and go to all appointments, and call your doctor if you are having problems. Its also a good idea to know your test results and keep a list of the medicines you take. How can you care for yourself at home? · Take medicines for pain and inflammation exactly as directed. ¨ If the doctor gave you a prescription medicine, take it as prescribed. ¨ If you are not taking a prescription pain medicine, ask your doctor if you can take an over-the-counter medicine. ¨ Do not take two or more pain medicines at the same time unless the doctor told you to. Many pain medicines have acetaminophen, which is Tylenol. Too much acetaminophen (Tylenol) can be harmful. · It may help to use a warm compress or heating pad (set on low) on your chest. You can also try alternating heat and ice. Put ice or a cold pack on the area for 10 to 20 minutes at a time. Put a thin cloth between the ice and your skin. · Avoid any activity that strains the chest area. As your pain gets better, you can slowly return to your normal activities. · Do not use tape, an elastic bandage, a \"rib belt,\" or anything else that restricts your chest wall motion. When should you call for help? Call 911 anytime you think you may need emergency care. For example, call if: 
· You have new or different chest pain or pressure. This may occur with: ¨ Sweating. ¨ Shortness of breath. ¨ Nausea or vomiting. ¨ Pain that spreads from the chest to the neck, jaw, or one or both shoulders or arms. ¨ Dizziness or lightheadedness. ¨ A fast or uneven pulse. After calling 911, chew 1 adult-strength aspirin. Wait for an ambulance. Do not try to drive yourself. · You have severe trouble breathing. Call your doctor now or seek immediate medical care if: 
· You have a fever or cough. · You have any trouble breathing. · Your chest pain gets worse. Watch closely for changes in your health, and be sure to contact your doctor if: 
· Your chest pain continues even though you are taking anti-inflammatory medicine. · Your chest wall pain has not improved after 5 to 7 days. Where can you learn more? Go to http://ash-jeronimo.info/. Enter C462 in the search box to learn more about \"Costochondritis: Care Instructions. \" Current as of: May 27, 2016 Content Version: 11.1 © 4400-2996 [employer]. Care instructions adapted under license by [employer] (which disclaims liability or warranty for this information). If you have questions about a medical condition or this instruction, always ask your healthcare professional. [address] any warranty or liability for your use of this information. Introducing [facility]! Dear Aiden Jackson: Thank you for requesting a [employer] account. Our records indicate that you already have an active [employer] account. You can access your account anytime at [email or web address] Did you know that you can access your hospital and ER discharge instructions at any time in [employer]? You can also review all of your test results from your hospital stay or ER visit. Additional Information If you have questions, please visit the Frequently Asked Questions section of the [employer] website at [email or web address] Remember, [employer] is NOT to be used for urgent needs. For medical emergencies, dial 911. Now available from your iPhone and Android! Please provide this summary of care documentation to your next provider. Your primary care clinician is listed as RYLEE RASHID. If you have any questions after today's visit, please call [phone].

## 2017-03-17 NOTE — PROGRESS NOTES
HISTORY OF PRESENT ILLNESS  Jaret Back is a 67 y.o. female. Chest Pain    The history is provided by the patient and relative (daughter present. she was evaluated on site by EMS, found to be stable and Wilver Keita declined ER evaluation ). This is a recurrent problem. Episode onset: 3/11/17. The problem has been resolved. Duration of episode(s) is 30 minutes. The pain is associated with normal activity. The pain is present in the lateral region and left side. The pain is moderate. The quality of the pain is described as sharp. The pain radiates to the left shoulder. The symptoms are aggravated by movement, deep breathing and certain positions. Associated symptoms include dizziness. Pertinent negatives include no exertional chest pressure. She has tried aspirin for the symptoms. The treatment provided significant relief. Risk factors include diabetes mellitus and dyslipidemia. Her past medical history is significant for DM and PE. Her past medical history does not include CHF. Review of Systems   Cardiovascular: Positive for chest pain. Neurological: Positive for dizziness. Physical Exam   Constitutional: No distress. Cardiovascular: Normal rate and regular rhythm. Exam reveals no gallop and no friction rub. No murmur heard. Pulmonary/Chest: Effort normal and breath sounds normal. She exhibits no tenderness, no bony tenderness, no deformity and no swelling. Nursing note and vitals reviewed. ASSESSMENT and PLAN  Wilver Keita was seen today for medication evaluation. Diagnoses and all orders for this visit:    Costochondritis  -     diclofenac EC (VOLTAREN) 75 mg EC tablet; Take 1 Tab by mouth two (2) times daily as needed. Indications: pain   See cardiologist as directed.  If sx are recurrent    Plan was reviewed with patient and family, understanding expressed

## 2017-04-03 ENCOUNTER — TELEPHONE (OUTPATIENT)
Dept: INTERNAL MEDICINE CLINIC | Age: 72
End: 2017-04-03

## 2017-04-03 NOTE — TELEPHONE ENCOUNTER
[phone] pt wants to know when she can start driving again, her bp has been under control for a while.

## 2017-04-25 ENCOUNTER — OFFICE VISIT (OUTPATIENT)
Dept: INTERNAL MEDICINE CLINIC | Age: 72
End: 2017-04-25

## 2017-04-25 VITALS
TEMPERATURE: 97.9 F | HEART RATE: 68 BPM | WEIGHT: 152 LBS | RESPIRATION RATE: 16 BRPM | DIASTOLIC BLOOD PRESSURE: 90 MMHG | HEIGHT: 66 IN | SYSTOLIC BLOOD PRESSURE: 140 MMHG | BODY MASS INDEX: 24.43 KG/M2 | OXYGEN SATURATION: 96 %

## 2017-04-25 DIAGNOSIS — G25.2 RESTING TREMOR: ICD-10-CM

## 2017-04-25 DIAGNOSIS — E78.2 MIXED HYPERLIPIDEMIA: ICD-10-CM

## 2017-04-25 DIAGNOSIS — Z11.59 ENCOUNTER FOR HEPATITIS C SCREENING TEST FOR LOW RISK PATIENT: ICD-10-CM

## 2017-04-25 DIAGNOSIS — I95.1 ORTHOSTATIC HYPOTENSION: ICD-10-CM

## 2017-04-25 DIAGNOSIS — F31.78 BIPOLAR DISORDER, IN FULL REMISSION, MOST RECENT EPISODE MIXED (HCC): ICD-10-CM

## 2017-04-25 DIAGNOSIS — E11.9 ENCOUNTER FOR COMPREHENSIVE DIABETIC FOOT EXAMINATION, TYPE 2 DIABETES MELLITUS (HCC): ICD-10-CM

## 2017-04-25 DIAGNOSIS — E11.9 CONTROLLED TYPE 2 DIABETES MELLITUS WITHOUT COMPLICATION, WITHOUT LONG-TERM CURRENT USE OF INSULIN (HCC): Primary | ICD-10-CM

## 2017-04-25 RX ORDER — MIDODRINE HYDROCHLORIDE 5 MG/1
5 TABLET ORAL 3 TIMES DAILY
COMMUNITY
End: 2017-07-12 | Stop reason: SDUPTHER

## 2017-04-25 NOTE — PROGRESS NOTES
HISTORY OF PRESENT ILLNESS  Parrish Alcantar is a 67 y.o. female. HPI Azam Gonzáles is seen today for follow up of diabetes and other problems. 1. Diabetes, hyperlipidemia. Due for routine labs. 2. Orthostatic hypotension. Clinically stable, seems better. She follows up with Dr. Nimesh Story as directed. 3. Tremor. Follows up with neurology as directed. 4. Bipolar disorder. Follows up with psychiatry as directed. Full medication review undertaken. She is on multiple meds for her other concerns. The problems mentioned above account for eight different medications. In reviewing this with her, it is not entirely clear that she fully understands all meds. I have asked her to bring her full collection of meds that she is taking and review them with my nurse at a nurse visit. She will do this when she has her lab work done later this week. MedDATA/gwo     Current Outpatient Prescriptions   Medication Sig    midodrine (PROAMITINE) 5 mg tablet Take 5 mg by mouth three (3) times daily. Dr Kavitha Alvarado diclofenac EC (VOLTAREN) 75 mg EC tablet Take 1 Tab by mouth two (2) times daily as needed. Indications: pain    droxidopa (NORTHERA) 100 mg cap capsule Take 1 Cap by mouth three (3) times daily. Per Dr Kavitha Alvarado fludrocortisone (FLORINEF) 0.1 mg tablet Take 1 Tab by mouth two (2) times a day. (Patient taking differently: Take 0.1 mg by mouth two (2) times a day. Dr Nimesh Story)    ipratropium (ATROVENT) 0.06 % nasal spray 2 Sprays by Both Nostrils route four (4) times daily. (Patient taking differently: 2 Sprays by Both Nostrils route four (4) times daily as needed. Dr Jacklyn Velazquez  Indications: CHRONIC NON-ALLERGIC RHINITIS)    acetaminophen (TYLENOL) 325 mg tablet Take 325 mg by mouth every four (4) hours as needed for Pain.  fexofenadine (ALLEGRA) 180 mg tablet Take 180 mg by mouth daily.  gabapentin (NEURONTIN) 100 mg capsule Take 100 mg by mouth five (5) times daily.     pindolol (VISKIN) 5 mg tablet Take 1 Tab by mouth daily as needed. (Patient taking differently: Take 5 mg by mouth daily as needed. Dr Jayashree Crawford take if systolic > 782  Indications: hypertension)    pyridostigmine (MESTINON) 60 mg tablet TAKE 1 TABLET BY MOUTH THREE TIMES DAILY (Patient taking differently: TAKE 1 TABLET BY MOUTH THREE TIMES DAILY Dr Jayashree Crawford)   Honor Maclachlnikko meclizine (ANTIVERT) 25 mg tablet Take 1 Tab by mouth three (3) times daily as needed. Indications: VERTIGO    QUEtiapine (SEROQUEL) 25 mg tablet Take 25 mg by mouth nightly as needed. Take 1-2 tabs qhs prn Charleen Boston NP/Dr Orlando Roberts atorvastatin (LIPITOR) 20 mg tablet TAKE 1 TABLET BY MOUTH EVERY EVENING (Patient taking differently: Take 20 mg by mouth daily. TAKE 1 TABLET BY Jenae [facility] Dr Sd Slade)   Valley Maclachlnikko PARoxetine (PAXIL) 30 mg tablet Take 30 mg by mouth daily. Charleen Boston NP/ Dr Orlando Roberts primidone (MYSOLINE) 50 mg tablet Take 12.5 mg by mouth nightly. Dr Alo Pereira is titrating to 4 tabs qhs   Indications: ESSENTIAL TREMOR     No current facility-administered medications for this visit. Review of Systems   Constitutional: Negative for weight loss. Respiratory: Negative. Cardiovascular: Negative for chest pain, palpitations, leg swelling and PND. Musculoskeletal: Negative for myalgias. Neurological: Positive for dizziness. Negative for focal weakness. Physical Exam   Constitutional: She appears well-nourished. Neck: Carotid bruit is not present. Cardiovascular: Normal rate and regular rhythm. Exam reveals no gallop and no friction rub. No murmur heard. Pulmonary/Chest: Effort normal and breath sounds normal. No respiratory distress. Musculoskeletal: She exhibits no edema. Feet without lesion or ulcer    Nursing note and vitals reviewed. ASSESSMENT and PLAN  Laura Moreno was seen today for medication evaluation.     Diagnoses and all orders for this visit:    Controlled type 2 diabetes mellitus without complication, without long-term current use of insulin ([facility]  -     HEMOGLOBIN A1C WITH EAG  -     METABOLIC PANEL, COMPREHENSIVE    Mixed hyperlipidemia  -     LIPID PANEL  -     CBC WITH AUTOMATED DIFF    Encounter for hepatitis C screening test for low risk patient  -     HEPATITIS C AB    Bipolar disorder, in full remission, most recent episode [facility])- See psychiatrist as directed     Resting tremor- See neurologist as directed     Orthostatic hypotension- See cardiologist as directed.      Encounter for comprehensive diabetic foot examination, type 2 diabetes mellitus ([facility].)  -      DIABETES FOOT EXAM    Other orders  -     Cancel: MICROALBUMIN, UR, RAND W/ MICROALBUMIN/CREA RATIO

## 2017-04-25 NOTE — MR AVS SNAPSHOT
Visit Information Date & Time Provider Department Dept. Phone Encounter #  
 4/25/2017  9:20 AM Nupur Red, [address] [facility] [phone] [id number] Your Appointments 7/5/2017 11:40 AM  
ESTABLISHED PATIENT with Johnathan Moise MD  
[facility] ([facility]) Appt Note: 6 month follow up  
 [address] [place] [address] [address]  
[address] [address]  
  
    
 7/5/2017  2:00 PM  
COMPLETE PHYSICAL with Nupur Red MD  
[facility] Internal [facility]) Appt Note: medicare wellness [address] [place] [address] [address]  
[address] [id number] [place] [address] Upcoming Health Maintenance Date Due  
 MEDICARE YEARLY EXAM 10/1/2016 FOOT EXAM Q1 10/1/2016 HEMOGLOBIN A1C Q6M 7/5/2017 MICROALBUMIN Q1 1/5/2018 LIPID PANEL Q1 1/5/2018 EYE EXAM RETINAL OR DILATED Q1 2/9/2018 GLAUCOMA SCREENING Q2Y 2/9/2019 BREAST CANCER SCRN MAMMOGRAM 2/28/2019 DTaP/Tdap/Td series (2 - Td) 8/17/2023 COLONOSCOPY 6/27/2026 Allergies as of 4/25/2017  Review Complete On: 4/25/2017 By: Nupur Red MD  
  
 Severity Noted Reaction Type Reactions Adhesive  09/08/2009    Itching Hydrocodone  07/19/2010    Itching Lorazepam  04/02/2015    Other (comments) Other Medication  09/08/2009    Rash Tide detergent Percocet [Oxycodone-acetaminophen]  09/08/2009    Itching Sudafed [Pseudoephedrine Hcl]  10/17/2010   Side Effect Other (comments) Vertigo Wellbutrin [Bupropion Hcl]  08/31/2011    Nausea and Vomiting Zithromax [Azithromycin]  06/22/2011    Vertigo Zyrtec [Cetirizine]  10/14/2009    Nausea Only Current Immunizations  Reviewed on 7/28/2016 Name Date Influenza High Dose Vaccine PF 8/29/2013 Influenza Vaccine 8/8/2016, 10/1/2015, 9/15/2014 Influenza Vaccine Whole 9/25/2012 Pneumococcal Conjugate (PCV-13) 3/17/2015 Pneumococcal Polysaccharide (PPSV-23) 4/23/2016 Pneumococcal Vaccine (Unspecified Type) 12/1/2009 TDAP Vaccine 7/3/2012 Tdap 8/17/2013 Varicella Virus Vaccine Live 9/1/2007 Not reviewed this visit You Were Diagnosed With   
  
 Codes Comments Controlled type 2 diabetes mellitus without complication, without long-term current use of insulin ([facility]    -  Primary ICD-10-CM: E11.9 ICD-9-CM: 250.00 Mixed hyperlipidemia     ICD-10-CM: E78.2 ICD-9-CM: 272.2 Encounter for hepatitis C screening test for low risk patient     ICD-10-CM: Z11.59 
ICD-9-CM: V73.89 Bipolar disorder, in full remission, most recent episode [facility])     ICD-10-CM: F31.78 
ICD-9-CM: 296.66 Resting tremor     ICD-10-CM: R25.9 ICD-9-CM: 781.0 Orthostatic hypotension     ICD-10-CM: I95.1 ICD-9-CM: 458.0 Encounter for comprehensive diabetic foot examination, type 2 diabetes mellitus ([facility]     ICD-10-CM: E11.9 ICD-9-CM: 250.00 Vitals BP Pulse Temp Resp Height(growth percentile) Weight(growth percentile) 140/90 (BP 1 Location: Right arm, BP Patient Position: Sitting) 68 97.9 °F (36.6 °C) (Oral) 16 5' 6\" (1.676 m) 152 lb (68.9 kg) SpO2 BMI OB Status Smoking Status 96% 24.53 kg/m2 Hysterectomy Never Smoker BMI and BSA Data Body Mass Index Body Surface Area 24.53 kg/m 2 1.79 m 2 Preferred Pharmacy Pharmacy Name Phone [facility] [address] [place] [address], [address] [place] [address] [phone] Your Updated Medication List  
  
   
This list is accurate as of: 4/25/17 10:06 AM.  Always use your most recent med list.  
  
  
  
  
 acetaminophen 325 mg tablet Commonly known as:  TYLENOL Take 325 mg by mouth every four (4) hours as needed for Pain. atorvastatin 20 mg tablet Commonly known as:  LIPITOR TAKE 1 TABLET BY MOUTH EVERY EVENING  
  
 diclofenac EC 75 mg EC tablet Commonly known as:  VOLTAREN Take 1 Tab by mouth two (2) times daily as needed. Indications: pain  
  
 droxidopa 100 mg Cap capsule Commonly known as:  Jeanie Lanes Take 1 Cap by mouth three (3) times daily. Per Dr Jayashree Crawford  
  
 fexofenadine 180 mg tablet Commonly known as:  Linette Don Take 180 mg by mouth daily. fludrocortisone 0.1 mg tablet Commonly known as:  FLORINEF Take 1 Tab by mouth two (2) times a day.  
  
 gabapentin 100 mg capsule Commonly known as:  NEURONTIN Take 100 mg by mouth three (3) times daily. Then two tablets at bedtime  
  
 ipratropium 0.06 % nasal spray Commonly known as:  ATROVENT  
2 Sprays by Both Nostrils route four (4) times daily. meclizine 25 mg tablet Commonly known as:  ANTIVERT Take 1 Tab by mouth three (3) times daily as needed. Indications: VERTIGO  
  
 midodrine 5 mg tablet Commonly known as:  Shiprock Richer Take  by mouth three (3) times daily. PARoxetine 20 mg tablet Commonly known as:  PAXIL Take 20 mg by mouth daily. pindolol 5 mg tablet Commonly known as:  VISKIN Take 1 Tab by mouth daily as needed. pyridostigmine 60 mg tablet Commonly known as:  MESTINON  
TAKE 1 TABLET BY MOUTH THREE TIMES DAILY QUEtiapine 25 mg tablet Commonly known as:  SEROquel Take 25 mg by mouth nightly as needed. We Performed the Following CBC WITH AUTOMATED DIFF [72759 CPT(R)] HEMOGLOBIN A1C WITH EAG [96879 CPT(R)] HEPATITIS C AB [74092 CPT(R)]  DIABETES FOOT EXAM [HM7 Custom] LIPID PANEL [18103 CPT(R)] METABOLIC PANEL, COMPREHENSIVE [29168 CPT(R)] Introducing \[facility] [place] [facility]\"" & HEALTH SERVICES! Dear Laura Moreno: Thank you for requesting a [employer] account. Our records indicate that you already have an active [employer] account. You can access your account anytime at [email or web address] MD/[employer] Did you know that you can access your hospital and ER discharge instructions at any time in [employer]? You can also review all of your test results from your hospital stay or ER visit. Additional Information If you have questions, please visit the Frequently Asked Questions section of the [employer] website at [email or web address] Remember, [employer] is NOT to be used for urgent needs. For medical emergencies, dial 911. Now available from your iPhone and Android! Please provide this summary of care documentation to your next provider. Your primary care clinician is listed as RYLEE RASHID. If you have any questions after today's visit, please call [phone].

## 2017-04-27 ENCOUNTER — CLINICAL SUPPORT (OUTPATIENT)
Dept: INTERNAL MEDICINE CLINIC | Age: 72
End: 2017-04-27

## 2017-04-27 ENCOUNTER — HOSPITAL ENCOUNTER (OUTPATIENT)
Dept: LAB | Age: 72
Discharge: HOME | End: 2017-04-27
Payer: MEDICARE

## 2017-04-27 DIAGNOSIS — Z79.899 POLYPHARMACY: Primary | ICD-10-CM

## 2017-04-27 PROCEDURE — 85025 COMPLETE CBC W/AUTO DIFF WBC: CPT

## 2017-04-27 PROCEDURE — 86803 HEPATITIS C AB TEST: CPT

## 2017-04-27 PROCEDURE — 83036 HEMOGLOBIN GLYCOSYLATED A1C: CPT

## 2017-04-27 PROCEDURE — 36415 COLL VENOUS BLD VENIPUNCTURE: CPT

## 2017-04-27 PROCEDURE — 80053 COMPREHEN METABOLIC PANEL: CPT

## 2017-04-27 PROCEDURE — 80061 LIPID PANEL: CPT

## 2017-04-27 RX ORDER — PRIMIDONE 50 MG/1
12.5 TABLET ORAL
COMMUNITY
End: 2017-07-27

## 2017-04-27 RX ORDER — PAROXETINE 30 MG/1
30 TABLET, FILM COATED ORAL DAILY
COMMUNITY
End: 2019-11-27 | Stop reason: ALTCHOICE

## 2017-04-28 LAB
ALBUMIN SERPL-MCNC: 4.1 G/DL (ref 3.5–4.8)
ALBUMIN/GLOB SERPL: 2.1 {RATIO} (ref 1.2–2.2)
ALP SERPL-CCNC: 80 IU/L (ref 39–117)
ALT SERPL-CCNC: 18 IU/L (ref 0–32)
AST SERPL-CCNC: 23 IU/L (ref 0–40)
BASOPHILS # BLD AUTO: 0 X10E3/UL (ref 0–0.2)
BASOPHILS NFR BLD AUTO: 1 %
BILIRUB SERPL-MCNC: 0.6 MG/DL (ref 0–1.2)
BUN SERPL-MCNC: 13 MG/DL (ref 8–27)
BUN/CREAT SERPL: 18 (ref 12–28)
CALCIUM SERPL-MCNC: 8.7 MG/DL (ref 8.7–10.3)
CHLORIDE SERPL-SCNC: 100 MMOL/L (ref 96–106)
CHOLEST SERPL-MCNC: 139 MG/DL (ref 100–199)
CO2 SERPL-SCNC: 28 MMOL/L (ref 18–29)
CREAT SERPL-MCNC: 0.71 MG/DL (ref 0.57–1)
EOSINOPHIL # BLD AUTO: 0.3 X10E3/UL (ref 0–0.4)
EOSINOPHIL NFR BLD AUTO: 4 %
ERYTHROCYTE [DISTWIDTH] IN BLOOD BY AUTOMATED COUNT: 14.2 % (ref 12.3–15.4)
EST. AVERAGE GLUCOSE BLD GHB EST-MCNC: 148 MG/DL
GLOBULIN SER CALC-MCNC: 2 G/DL (ref 1.5–4.5)
GLUCOSE SERPL-MCNC: 121 MG/DL (ref 65–99)
HBA1C MFR BLD: 6.8 % (ref 4.8–5.6)
HCT VFR BLD AUTO: 42.7 % (ref 34–46.6)
HCV AB S/CO SERPL IA: <0.1 S/CO RATIO (ref 0–0.9)
HDLC SERPL-MCNC: 61 MG/DL
HGB BLD-MCNC: 14 G/DL (ref 11.1–15.9)
IMM GRANULOCYTES # BLD: 0 X10E3/UL (ref 0–0.1)
IMM GRANULOCYTES NFR BLD: 0 %
LDLC SERPL CALC-MCNC: 56 MG/DL (ref 0–99)
LYMPHOCYTES # BLD AUTO: 2.4 X10E3/UL (ref 0.7–3.1)
LYMPHOCYTES NFR BLD AUTO: 37 %
MCH RBC QN AUTO: 30.4 PG (ref 26.6–33)
MCHC RBC AUTO-ENTMCNC: 32.8 G/DL (ref 31.5–35.7)
MCV RBC AUTO: 93 FL (ref 79–97)
MONOCYTES # BLD AUTO: 0.5 X10E3/UL (ref 0.1–0.9)
MONOCYTES NFR BLD AUTO: 7 %
NEUTROPHILS # BLD AUTO: 3.3 X10E3/UL (ref 1.4–7)
NEUTROPHILS NFR BLD AUTO: 51 %
PLATELET # BLD AUTO: 298 X10E3/UL (ref 150–379)
POTASSIUM SERPL-SCNC: 3.3 MMOL/L (ref 3.5–5.2)
PROT SERPL-MCNC: 6.1 G/DL (ref 6–8.5)
RBC # BLD AUTO: 4.61 X10E6/UL (ref 3.77–5.28)
SODIUM SERPL-SCNC: 146 MMOL/L (ref 134–144)
TRIGL SERPL-MCNC: 108 MG/DL (ref 0–149)
VLDLC SERPL CALC-MCNC: 22 MG/DL (ref 5–40)
WBC # BLD AUTO: 6.5 X10E3/UL (ref 3.4–10.8)

## 2017-05-01 RX ORDER — POTASSIUM CHLORIDE 20 MEQ/1
20 TABLET, EXTENDED RELEASE ORAL DAILY
Qty: 30 TAB | Refills: 11 | Status: SHIPPED | OUTPATIENT
Start: 2017-05-01 | End: 2018-04-18 | Stop reason: SDUPTHER

## 2017-05-01 NOTE — PROGRESS NOTES
Advised pt her labs look great overall - cholesterol and sugar are great. Potassium continues to be low. Start K Dur 20 meq every day - sent to pharmacy - continue this as a maintenance medication.

## 2017-05-01 NOTE — PROGRESS NOTES
Call- Labs look great overall , cholesterol and sugar are great. Potassium continues to be low.  Start K Dur 20 meq every day, continue this as a maintenance medication

## 2017-05-01 NOTE — PROGRESS NOTES
Advised pt labs look great overall - cholesterol and sugar are great. Potassium continues to be low. Start K Dur 20 meq every day - sent to pharmacy - continue this as a maintenance medication.

## 2017-05-03 RX ORDER — PYRIDOSTIGMINE BROMIDE 60 MG/1
TABLET ORAL
Qty: 90 TAB | Refills: 1 | Status: SHIPPED | OUTPATIENT
Start: 2017-05-03 | End: 2017-07-27

## 2017-05-03 NOTE — TELEPHONE ENCOUNTER
Request for mestinon 60mg three times a day. Last office visit 3/3/17, next office visit 7/5/17. Refills per verbal order from Dr. Felix Smith.

## 2017-05-31 RX ORDER — ATORVASTATIN CALCIUM 20 MG/1
TABLET, FILM COATED ORAL
Qty: 30 TAB | Refills: 11 | Status: SHIPPED | OUTPATIENT
Start: 2017-05-31 | End: 2017-07-28 | Stop reason: SDUPTHER

## 2017-06-25 ENCOUNTER — HOSPITAL ENCOUNTER (EMERGENCY)
Age: 72
Discharge: HOME | End: 2017-06-25
Attending: EMERGENCY MEDICINE
Payer: MEDICARE

## 2017-06-25 ENCOUNTER — TELEPHONE (OUTPATIENT)
Dept: INTERNAL MEDICINE CLINIC | Age: 72
End: 2017-06-25

## 2017-06-25 VITALS
RESPIRATION RATE: 16 BRPM | HEIGHT: 66 IN | SYSTOLIC BLOOD PRESSURE: 156 MMHG | WEIGHT: 150.6 LBS | DIASTOLIC BLOOD PRESSURE: 95 MMHG | BODY MASS INDEX: 24.2 KG/M2 | TEMPERATURE: 98.4 F | HEART RATE: 67 BPM | OXYGEN SATURATION: 98 %

## 2017-06-25 DIAGNOSIS — M54.42 CHRONIC BILATERAL LOW BACK PAIN WITH BILATERAL SCIATICA: Primary | ICD-10-CM

## 2017-06-25 DIAGNOSIS — G89.29 CHRONIC BILATERAL LOW BACK PAIN WITH BILATERAL SCIATICA: Primary | ICD-10-CM

## 2017-06-25 DIAGNOSIS — M54.41 CHRONIC BILATERAL LOW BACK PAIN WITH BILATERAL SCIATICA: Primary | ICD-10-CM

## 2017-06-25 PROCEDURE — 99284 EMERGENCY DEPT VISIT MOD MDM: CPT

## 2017-06-25 RX ORDER — CYCLOBENZAPRINE HCL 5 MG
5 TABLET ORAL 2 TIMES DAILY
Qty: 20 TAB | Refills: 0 | Status: SHIPPED | OUTPATIENT
Start: 2017-06-25 | End: 2017-07-27

## 2017-06-25 RX ORDER — METHYLPREDNISOLONE 4 MG/1
TABLET ORAL
Qty: 1 DOSE PACK | Refills: 0 | Status: SHIPPED | OUTPATIENT
Start: 2017-06-25 | End: 2017-07-27

## 2017-06-25 NOTE — TELEPHONE ENCOUNTER
Patient called to let me know she went to the ER for her back and leg pain and her BP was 187/108 there. She is still in the ER and being treated there. I asked her to continue to seek treatment there and I would notify Dr. Yadira Velez.

## 2017-06-25 NOTE — TELEPHONE ENCOUNTER
Patient called for back and leg pain but did not answer phone. Left message for her to return call if needed.

## 2017-06-25 NOTE — DISCHARGE INSTRUCTIONS
Back Pain: Care Instructions  Your Care Instructions    Back pain has many possible causes. It is often related to problems with muscles and ligaments of the back. It may also be related to problems with the nerves, discs, or bones of the back. Moving, lifting, standing, sitting, or sleeping in an awkward way can strain the back. Sometimes you don't notice the injury until later. Arthritis is another common cause of back pain. Although it may hurt a lot, back pain usually improves on its own within several weeks. Most people recover in 12 weeks or less. Using good home treatment and being careful not to stress your back can help you feel better sooner. Follow-up care is a key part of your treatment and safety. Be sure to make and go to all appointments, and call your doctor if you are having problems. Its also a good idea to know your test results and keep a list of the medicines you take. How can you care for yourself at home? · Sit or lie in positions that are most comfortable and reduce your pain. Try one of these positions when you lie down:  ¨ Lie on your back with your knees bent and supported by large pillows. ¨ Lie on the floor with your legs on the seat of a sofa or chair. Nickola Armstrong on your side with your knees and hips bent and a pillow between your legs. ¨ Lie on your stomach if it does not make pain worse. · Do not sit up in bed, and avoid soft couches and twisted positions. Bed rest can help relieve pain at first, but it delays healing. Avoid bed rest after the first day of back pain. · Change positions every 30 minutes. If you must sit for long periods of time, take breaks from sitting. Get up and walk around, or lie in a comfortable position. · Try using a heating pad on a low or medium setting for 15 to 20 minutes every 2 or 3 hours. Try a warm shower in place of one session with the heating pad. · You can also try an ice pack for 10 to 15 minutes every 2 to 3 hours.  Put a thin cloth between the ice pack and your skin. · Take pain medicines exactly as directed. ¨ If the doctor gave you a prescription medicine for pain, take it as prescribed. ¨ If you are not taking a prescription pain medicine, ask your doctor if you can take an over-the-counter medicine. · Take short walks several times a day. You can start with 5 to 10 minutes, 3 or 4 times a day, and work up to longer walks. Walk on level surfaces and avoid hills and stairs until your back is better. · Return to work and other activities as soon as you can. Continued rest without activity is usually not good for your back. · To prevent future back pain, do exercises to stretch and strengthen your back and stomach. Learn how to use good posture, safe lifting techniques, and proper body mechanics. When should you call for help? Call your doctor now or seek immediate medical care if:  · You have new or worsening numbness in your legs. · You have new or worsening weakness in your legs. (This could make it hard to stand up.)  · You lose control of your bladder or bowels. Watch closely for changes in your health, and be sure to contact your doctor if:  · Your pain gets worse. · You are not getting better after 2 weeks. Where can you learn more? Go to http://ash-jeronimo.info/. Enter K223 in the search box to learn more about \"Back Pain: Care Instructions. \"  Current as of: March 21, 2017  Content Version: 11.3  © 1898-3850 [email or web address]. Care instructions adapted under license by [employer] (which disclaims liability or warranty for this information). If you have questions about a medical condition or this instruction, always ask your healthcare professional. [address] any warranty or liability for your use of this information. We hope that we have addressed all of your medical concerns.  The examination and treatment you received in the Emergency Department were for an emergent problem and were not intended as complete care. It is important that you follow up with your healthcare provider(s) for ongoing care. If your symptoms worsen or do not improve as expected, and you are unable to reach your usual health care provider(s), you should return to the Emergency Department. Today's healthcare is undergoing tremendous change, and patient satisfaction surveys are one of the many tools to assess the quality of medical care. You may receive a survey from the [employer] regarding your experience in the Emergency Department. I hope that your experience has been completely positive, particularly the medical care that I provided. As such, please participate in the survey; anything less than excellent does not meet my expectations or intentions. [facility] and [facility] participate in nationally recognized quality of care measures. If your blood pressure is greater than 120/80, as reported below, we urge that you seek medical care to address the potential of high blood pressure, commonly known as hypertension. Hypertension can be hereditary or can be caused by certain medical conditions, pain, stress, or \"white coat syndrome. \"       Please make an appointment with your health care provider(s) for follow up of your Emergency Department visit. VITALS:   Patient Vitals for the past 8 hrs:   Temp Pulse Resp BP   06/25/17 1300 98.5 °F (36.9 °C) 67 16 (!) 183/101          Thank you for allowing us to provide you with medical care today. We realize that you have many choices for your emergency care needs. Please choose us in the future for any continued health care needs. Douglas Mobley, [facility] Patrick Patel: [phone]            No results found for this or any previous visit (from the past 24 hour(s)). No results found.

## 2017-06-26 ENCOUNTER — TELEPHONE (OUTPATIENT)
Dept: INTERNAL MEDICINE CLINIC | Age: 72
End: 2017-06-26

## 2017-06-26 NOTE — CALL BACK NOTE
[facility] Emergency Department Follow Up Call Record    Discharged to : Home/Family Home/Home Health/Skilled Facility/Rehab/Assisted Living/Other___home____  1) Did you receive your discharge instructions? Yes        2) Do you understand them? Yes    Lori Tejada reports some relief from pain due to medication treatment/prescriptions. She will be following up with [facility]. 3) Are you able to follow them? Yes          If NO, what can I clarify for you? 4) Do you understand your diagnosis? Yes         5) Do you know which symptoms should prompt you to call the doctor? Yes     6) Were you able to fill and  any medications that were prescribed? Yes     7) You were prescribed _flexeril, medrol dose pack __________for ___sciatic pain_________________. Common side effects of this medication are_rash, drowsiness___________________. This is not a complete list so please review the forms given from the pharmacy for a complete list.      8) Are there any questions about your medications? No            Have you scheduled any recommended doctors appointments (specialty, PCP) YES. Appointment with Dr. Mushtaq Rosario July 7,2017. If NO, what barriers are you encountering (transportation/lost contact info/cost/  didnt think necessary/no PCP  9) If discharged with Home Health, has the agency contacted you to schedule visit? Not applicable  10) Is there anyone available to help you at home (meals, errands, transportation    monitoring) (adult children, neighbors, private duty companions) Yes    11) Are you on a special diet? No         If YES, do you understand the requirements for this diet? Education provided? 12) If presented with cough, bronchitis, COPD, asthma, is it ok to ask that the   respiratory disease management educator call you? Not applicable      13)  A) If presented with fall, were you issued an assistive device in the ED    Are you using? No  B) If given RX for device, have you obtained? Not applicable       If NO, barriers? C) Therapist recommended:NO   Are you able to implement the suggestions? Not applicable        If NO, barriers to implementation? D) Are you having any difficulties with mobility inside your home?     (steps, bed, tub)Yes Some pain with ambulation due to sciatic pain   If YES, ask if the [facility] PT can contact patient and good time and number?  14)  At the end of your discharge instructions, there is information about accessing [facility] SERVICES, have you had a chance to review those? No         Do you have any questions about signing up for this service? We encourage our patients to be active participants in their healthcare and this site is one of the ways to do that. It will allow you to access parts of your medical record, email your doctors office, schedule appointments, and request medications refills . 15) Are there any other questions that I can answer for you regarding    your Emergency department visit?  NO             Estimated Call Time:___11:52 AM    ________________ Date/Time:_______________

## 2017-06-26 NOTE — ED PROVIDER NOTES
HPI Comments: 67 y.o. female with past medical history significant for chronic low back pain (lumbar laminectomy by Dr Herman Magaña), fibromyalgia, sciatica, and dyslipidemia presents with complaints of back pain radiating down both of her legs x 1 week. The pt explained that she normal experiences her sciatica flare ups on the left side but now she is experiencing them on both sides. She denies loss of bowel/bladder continence, fever, weight loss, or or hx of IV drug use. She has been taking gabapentin at home for the pain. She has been seen by Dr. Herman aMgaña in the past for chronic low back pain. There are no other acute medical complaints at this time. PCP: MD Rebeca Torres PA-C      Patient is a 67 y.o. female presenting with back pain. Back Pain    Pertinent negatives include no chest pain, no fever, no numbness, no abdominal pain and no dysuria.         Past Medical History:   Diagnosis Date    Bipolar disorder ([facility]     Nazmy    Chronic pain     BACK PAIN    Diabetes ([facility]     BORDERLINE TX WITH DIET AND EXERCISE    DJD (degenerative joint disease)     Fibromyalgia     Genital herpes     GERD (gastroesophageal reflux disease)     Hypercholesterolemia     IBS (irritable bowel syndrome)     Lumbar disc disease     stimulation-Honza    Migraine     Nausea & vomiting     Other ill-defined conditions     SEASONAL allergies    Other ill-defined conditions     dysphagia has had esophagus dilated    Paget's disease     Pulmonary embolism (HCC)     RLS (restless legs syndrome)        Past Surgical History:   Procedure Laterality Date    COLONOSCOPY N/A 6/27/2016    COLONOSCOPY performed by Lisha Toribio MD at [facility] ENDOSCOPY    ENDOSCOPY, COLON, DIAGNOSTIC      12, due 15    HX APPENDECTOMY      HX BACK SURGERY  9/17/2013    back surgery - total of 3 as of 12/20/2013    HX BREAST BIOPSY Right years ago    negative    HX CHOLECYSTECTOMY      HX HEENT      T & A    HX HYSTERECTOMY      total for fibroid tumors    HX ORTHOPAEDIC      lumbar laminectomy then fusion/neurostimulator implanted - inactive now but still in    HX ORTHOPAEDIC      REMOVAL OF NEUROSTIMULATOR    HX OTHER SURGICAL      EGD x 2 with dilation/colonoscopy - no polyps per pt    TILT TABLE EVALUATION  8/2/2016              Family History:   Problem Relation Age of Onset    Colon Cancer Mother     Cancer Mother 68     colon    Heart Disease Father     Heart Disease Maternal Grandmother     Heart Disease Maternal Grandfather     Heart Disease Other        Social History     Social History    Marital status: SINGLE     Spouse name: N/A    Number of children: N/A    Years of education: N/A     Occupational History    volunteer Retired     [address] History Main Topics    Smoking status: Never Smoker    Smokeless tobacco: Never Used    Alcohol use No      Comment: 1 per month    Drug use: No    Sexual activity: Not on file     Other Topics Concern    Not on file     Social History Narrative         ALLERGIES: Adhesive; Hydrocodone; Lorazepam; Other medication; Percocet [oxycodone-acetaminophen]; Sudafed [pseudoephedrine hcl]; Wellbutrin [bupropion hcl]; Zithromax [azithromycin]; and Zyrtec [cetirizine]    Review of Systems   Constitutional: Negative for activity change, appetite change, diaphoresis and fever. HENT: Negative for ear discharge, ear pain, facial swelling, rhinorrhea, sore throat, tinnitus, trouble swallowing and voice change. Eyes: Negative for photophobia, pain, discharge, redness and visual disturbance. Respiratory: Negative for cough, chest tightness, shortness of breath, wheezing and stridor. Cardiovascular: Negative for chest pain and palpitations. Gastrointestinal: Negative for abdominal pain, constipation, diarrhea, nausea and vomiting. Endocrine: Negative for polydipsia and polyuria. Genitourinary: Negative for dysuria, flank pain and hematuria. Musculoskeletal: Positive for back pain. Negative for arthralgias and myalgias. Skin: Negative for color change and rash. Neurological: Negative for dizziness, syncope, speech difficulty, light-headedness and numbness. Psychiatric/Behavioral: Negative for behavioral problems. Vitals:    06/25/17 1300 06/25/17 1330 06/25/17 1400 06/25/17 1500   BP: (!) 183/101 (!) 176/93 (!) 173/99 (!) 156/95   Pulse: 67   67   Resp: 16   16   Temp: 98.5 °F (36.9 °C)   98.4 °F (36.9 °C)   SpO2:  97% 98% 98%   Weight: 68.3 kg (150 lb 9.6 oz)      Height: 5' 6\" (1.676 m)               Physical Exam   Constitutional: She is oriented to person, place, and time. She appears well-developed and well-nourished. HENT:   Head: Normocephalic and atraumatic. Eyes: Conjunctivae are normal. Pupils are equal, round, and reactive to light. Right eye exhibits no discharge. Left eye exhibits no discharge. Neck: Normal range of motion. Neck supple. No thyromegaly present. Cardiovascular: Normal rate, regular rhythm and normal heart sounds. Exam reveals no gallop and no friction rub. No murmur heard. Pulmonary/Chest: Effort normal and breath sounds normal. No respiratory distress. She has no wheezes. Abdominal: Soft. Bowel sounds are normal. She exhibits no distension. There is no tenderness. There is no rebound and no guarding. Musculoskeletal: Normal range of motion. No C, T, L, S spine tenderness. Pt has full mobility of upper and lower extremities. Pt is able to ambulate without difficulty. No perineal numbness. Pt is NVI. Neurological: She is alert and oriented to person, place, and time. Skin: Skin is warm. Psychiatric: She has a normal mood and affect. MDM  Number of Diagnoses or Management Options  Chronic bilateral low back pain with bilateral sciatica:   Diagnosis management comments: Pt with hx of chronic low back pain. Followed by Dr. Zia Perea. No loss of bowel/bladder continence. Presentation does not suggest central cord syndrome or any other acute emergent conditions. Pt is able to ambulate. Will d/c home and advise follow up with Dr. Christen Perry for further evaluation of symptoms. Reviewed treatment plan with attending and they agree.     Nelda Hernandez PA-C      ED Course       Procedures

## 2017-06-26 NOTE — TELEPHONE ENCOUNTER
Verified patient identity with two identifiers. Spoke with patient by phone to follow up from ED visit for back pain yesterday 6/25/17. Patient was given steroid taper dose pack and Flexeril. Patient states she has gotten some pain relief from taking these medications and was able to sleep last night. Chronic low back pain (s/p lumbar laminectomy by Dr Nishant Farmer), patient has made follow up appointment with Dr. Nishant Farmer on 7/7/17. Follow up appointment with Dr. Siri Robert, [address] [place] [address] cardiology  on 7/5/17. Red flags reviewed that would warrant medical care. Patient verbalized understanding.

## 2017-07-12 RX ORDER — MIDODRINE HYDROCHLORIDE 5 MG/1
5 TABLET ORAL 3 TIMES DAILY
Qty: 90 TAB | Refills: 11 | Status: SHIPPED | OUTPATIENT
Start: 2017-07-12 | End: 2017-07-27 | Stop reason: ALTCHOICE

## 2017-07-17 RX ORDER — PYRIDOSTIGMINE BROMIDE 60 MG/1
TABLET ORAL
Qty: 90 TAB | Refills: 1 | Status: SHIPPED | OUTPATIENT
Start: 2017-07-17 | End: 2017-07-27

## 2017-07-17 NOTE — TELEPHONE ENCOUNTER
Request for mestinon 60mg three times a day. Last office visit 3/3/17, next office visit 7/27/17. Refills per verbal order from Dr. Ken Suh.

## 2017-07-19 RX ORDER — POTASSIUM CHLORIDE 20 MEQ/1
20 TABLET, EXTENDED RELEASE ORAL DAILY
Qty: 30 TAB | Refills: 11 | OUTPATIENT
Start: 2017-07-19

## 2017-07-27 ENCOUNTER — OFFICE VISIT (OUTPATIENT)
Dept: CARDIOLOGY CLINIC | Age: 72
End: 2017-07-27

## 2017-07-27 VITALS
SYSTOLIC BLOOD PRESSURE: 110 MMHG | WEIGHT: 150 LBS | HEART RATE: 82 BPM | OXYGEN SATURATION: 98 % | HEIGHT: 66 IN | DIASTOLIC BLOOD PRESSURE: 80 MMHG | BODY MASS INDEX: 24.11 KG/M2

## 2017-07-27 DIAGNOSIS — I95.1 ORTHOSTATIC HYPOTENSION: Primary | ICD-10-CM

## 2017-07-27 DIAGNOSIS — E11.9 CONTROLLED TYPE 2 DIABETES MELLITUS WITHOUT COMPLICATION, WITHOUT LONG-TERM CURRENT USE OF INSULIN (HCC): ICD-10-CM

## 2017-07-27 DIAGNOSIS — R42 DIZZINESS: ICD-10-CM

## 2017-07-27 RX ORDER — DROXIDOPA 100 MG/1
200 CAPSULE ORAL 3 TIMES DAILY
Qty: 180 CAP | Refills: 12 | Status: SHIPPED | OUTPATIENT
Start: 2017-07-27 | End: 2017-07-27 | Stop reason: SDUPTHER

## 2017-07-27 RX ORDER — DROXIDOPA 100 MG/1
200 CAPSULE ORAL 3 TIMES DAILY
Qty: 180 CAP | Refills: 12 | Status: SHIPPED | OUTPATIENT
Start: 2017-07-27 | End: 2017-10-17 | Stop reason: SDUPTHER

## 2017-07-27 NOTE — MR AVS SNAPSHOT
Visit Information Date & Time Provider Department Dept. Phone Encounter #  
 7/27/2017  9:40 AM Braulio Polk MD [facility] [place] [phone] [id number] Follow-up Instructions Return in about 3 months (around 10/27/2017). Your Appointments 7/31/2017 11:00 AM  
Medicare Physical with Sara Hairston MD  
[facility] [address] [place] [address]) Appt Note: medicare wellness; ; reschedule from 7/5/17  
 [address] [facility] [address]  
[address]  
  
    
 9/5/2017  9:35 AM  
ROUTINE CARE with Sara Hairston MD  
[facility] [address] [place] [address]) Appt Note: 4 month f/u  
 [address] [facility] [address]  
[address] Upcoming Health Maintenance Date Due  
 MEDICARE YEARLY EXAM 10/1/2016 INFLUENZA AGE 9 TO ADULT 8/1/2017 HEMOGLOBIN A1C Q6M 10/27/2017 MICROALBUMIN Q1 1/5/2018 EYE EXAM RETINAL OR DILATED Q1 2/9/2018 FOOT EXAM Q1 4/25/2018 LIPID PANEL Q1 4/27/2018 GLAUCOMA SCREENING Q2Y 2/9/2019 BREAST CANCER SCRN MAMMOGRAM 2/28/2019 DTaP/Tdap/Td series (2 - Td) 8/17/2023 COLONOSCOPY 6/27/2026 Allergies as of 7/27/2017  Review Complete On: 7/27/2017 By: Braulio Polk MD  
  
 Severity Noted Reaction Type Reactions Adhesive  09/08/2009    Itching Hydrocodone  07/19/2010    Itching Lorazepam  04/02/2015    Other (comments) Other Medication  09/08/2009    Rash Tide detergent Percocet [Oxycodone-acetaminophen]  09/08/2009    Itching Sudafed [Pseudoephedrine Hcl]  10/17/2010   Side Effect Other (comments) Vertigo Wellbutrin [Bupropion Hcl]  08/31/2011    Nausea and Vomiting Zithromax [Azithromycin]  06/22/2011    Vertigo Zyrtec [Cetirizine]  10/14/2009    Nausea Only Current Immunizations  Reviewed on 7/28/2016 Name Date Influenza High Dose Vaccine PF 8/29/2013 Influenza Vaccine 8/8/2016, 10/1/2015, 9/15/2014 Influenza Vaccine Whole 9/25/2012 Pneumococcal Conjugate (PCV-13) 3/17/2015 Pneumococcal Polysaccharide (PPSV-23) 4/23/2016 TDAP Vaccine 7/3/2012 Tdap 8/17/2013 Varicella Virus Vaccine Live 9/1/2007 ZZZ-RETIRED (DO NOT USE) Pneumococcal Vaccine (Unspecified Type) 12/1/2009 Not reviewed this visit You Were Diagnosed With   
  
 Codes Comments Orthostatic hypotension    -  Primary ICD-10-CM: I95.1 ICD-9-CM: 458.0 Dizziness     ICD-10-CM: A32 ICD-9-CM: 780.4 Controlled type 2 diabetes mellitus without complication, without long-term current use of insulin ([facility]     ICD-10-CM: E11.9 ICD-9-CM: 250.00 Vitals BP Pulse Height(growth percentile) Weight(growth percentile) SpO2 BMI  
 110/80 (BP 1 Location: Left arm, BP Patient Position: Sitting) 82 5' 6\" (1.676 m) 150 lb (68 kg) 98% 24.21 kg/m2 OB Status Smoking Status Hysterectomy Never Smoker Vitals History BMI and BSA Data Body Mass Index Body Surface Area  
 24.21 kg/m 2 1.78 m 2 Preferred Pharmacy Pharmacy Name Phone [address] [place] [address] [phone] Your Updated Medication List  
  
   
This list is accurate as of: 7/27/17 10:22 AM.  Always use your most recent med list.  
  
  
  
  
 acetaminophen 325 mg tablet Commonly known as:  TYLENOL Take 325 mg by mouth every four (4) hours as needed for Pain. atorvastatin 20 mg tablet Commonly known as:  LIPITOR  
TAKE 1 TABLET BY MOUTH EVERY EVENING  
  
 droxidopa 100 mg Cap capsule Commonly known as:  Summer Bachelor Take 2 Caps by mouth three (3) times daily. Per Dr Carlita Winston  Indications: Symptomatic Orthostatic Hypotension  
  
 fexofenadine 180 mg tablet Commonly known as:  Marietta Hernandez  
 Take 180 mg by mouth daily. fludrocortisone 0.1 mg tablet Commonly known as:  FLORINEF Take 1 Tab by mouth two (2) times a day.  
  
 gabapentin 100 mg capsule Commonly known as:  NEURONTIN Take 100 mg by mouth five (5) times daily. meclizine 25 mg tablet Commonly known as:  ANTIVERT Take 1 Tab by mouth three (3) times daily as needed. Indications: VERTIGO PARoxetine 30 mg tablet Commonly known as:  PAXIL Take 30 mg by mouth daily. Memo Blackburn NP/ Dr Celeste Butt  
  
 potassium chloride 20 mEq tablet Commonly known as:  K-DUR, KLOR-CON Take 1 Tab by mouth daily. pyridostigmine 60 mg tablet Commonly known as:  MESTINON  
TAKE 1 TABLET BY MOUTH THREE TIMES DAILY QUEtiapine 25 mg tablet Commonly known as:  SEROquel Take 25 mg by mouth nightly as needed. Take 1-2 tabs qhs prn Memo Blackburn NP/Dr Celeste Butt Prescriptions Sent to Pharmacy Refills  
 droxidopa (NORTHERA) 100 mg cap capsule 12 Sig: Take 2 Caps by mouth three (3) times daily. Per Dr Aline Cruz  Indications: Symptomatic Orthostatic Hypotension Class: Normal  
 Pharmacy: [address] Ph #: [phone] Route: Oral  
  
Follow-up Instructions Return in about 3 months (around 10/27/2017). Patient Instructions Increase Northera to 200mg three times a day Stop Midodrine You will need to follow up in clinic with Dr. Aline Cruz in 3 months. Introducing [facility]! Dear Tatiana Ellis: Thank you for requesting a [email or web address] account. Our records indicate that you already have an active [email or web address] account. You can access your account anytime at [email or web address] "[email or web address] Did you know that you can access your hospital and ER discharge instructions at any time in [email or web address]? You can also review all of your test results from your hospital stay or ER visit. Additional Information If you have questions, please visit the Frequently Asked Questions section of the [employer] website at [email or web address] Remember, [employer] is NOT to be used for urgent needs. For medical emergencies, dial 911. Now available from your iPhone and Android! Please provide this summary of care documentation to your next provider. Your primary care clinician is listed as RYLEE RASHID. If you have any questions after today's visit, please call [phone].

## 2017-07-27 NOTE — PATIENT INSTRUCTIONS
Increase Northera to 200mg three times a day    Stop Midodrine    You will need to follow up in clinic with Dr. Ramiro Chavis in 3 months.

## 2017-07-27 NOTE — PROGRESS NOTES
Cardiac Electrophysiology Office Note     Subjective:      Mayda Hutchison is a 67 y.o. female patient who is seen after [facility]  She is better but intermittently she needs pindolol for SBP > 160 mmHg     She says she is wearing her compression stockings and back binder daily with her current regiment of midodrine, mestinon and florinef   She fell only once in bathroom when she tried to step over the side of the tub  No syncope     Lexican cardiolite stress test 9/2016 showed normal perfusion  LVEF vigorous 85%    Dr Hay James 8/26/16: CT[facility] at [facility] 5/7/16 small PE RLL  6/9/16 CTA [facility] normal CTA no PE  Hypercoagulable work up and d dimer pending- on xarelto until results return    She had a tilt table test 8/2/16. Tilt table test revealed the patient had orthostatic hypotension response with sudden change in upright position and resolved quickly when she became supine  She has been on midodrine 5 mg tid. Patient was seen by neurologist in hospital and not clear she will have autonomic dysfunction and Parkinson's disease. I recommended to add mestinon 60 mg tid. She is using compression stockings bought from [facility] for IBS, RLS, Bipolar d/s, fibromyalgia, DDD, Paget's dz, dyslipidemia, DM, and PTE on Xarelto 3 months. She reports 3 episodes of syncopal episodes with \"almost blacking out\" which prompted her to come to the ED. Long standing h/o hypotension with orthostatics, but never blackouts. She admits to seeing Dr. Rosa Stone 6-7 years ago and completing an Echo and stress test for which all were normal. Recent h/o PTE (5/2016) and on Xarelto currently.       Patient Active Problem List    Diagnosis Date Noted    Near syncope 08/02/2016    Resting tremor 07/29/2016    Orthostatic hypotension 07/05/2016    Advance directive on file 05/24/2016    Pulmonary embolism ([address]     Mixed hyperlipidemia 02/02/2016    Diabetes mellitus type 2, controlled, without complications ([address] 23/12/9991  Tubulovillous adenoma 08/21/2012    Abnormal mammogram 06/04/2012    Encounter for long-term (current) use of other medications 11/30/2011    Hammertoe 09/12/2011    Allergic rhinitis, cause unspecified 01/26/2011    Other diseases of nasal cavity and sinuses 07/19/2010    IBS (irritable bowel syndrome)     RLS (restless legs syndrome)     Bipolar disorder (HCC)     Fibromyalgia     DJD (degenerative joint disease)     Lumbar disc disease     Paget's disease of bone     Migraine     Genital herpes      Current Outpatient Prescriptions   Medication Sig Dispense Refill    droxidopa (NORTHERA) 100 mg cap capsule Take 2 Caps by mouth three (3) times daily. Per Dr Scarlet Obrien  Indications: Symptomatic Orthostatic Hypotension 180 Cap 12    atorvastatin (LIPITOR) 20 mg tablet TAKE 1 TABLET BY MOUTH EVERY EVENING 30 Tab 11    potassium chloride (K-DUR, KLOR-CON) 20 mEq tablet Take 1 Tab by mouth daily. 30 Tab 11    PARoxetine (PAXIL) 30 mg tablet Take 30 mg by mouth daily. Gwen Zhu NP/ Dr Eun Mcarthur fludrocortisone (FLORINEF) 0.1 mg tablet Take 1 Tab by mouth two (2) times a day. (Patient taking differently: Take 0.1 mg by mouth two (2) times a day. Dr Scarlet Obrien) 180 Tab 1    acetaminophen (TYLENOL) 325 mg tablet Take 325 mg by mouth every four (4) hours as needed for Pain.  fexofenadine (ALLEGRA) 180 mg tablet Take 180 mg by mouth daily.  gabapentin (NEURONTIN) 100 mg capsule Take 100 mg by mouth five (5) times daily.  pyridostigmine (MESTINON) 60 mg tablet TAKE 1 TABLET BY MOUTH THREE TIMES DAILY (Patient taking differently: TAKE 1 TABLET BY MOUTH THREE TIMES DAILY Dr Scarlet Obrien) 90 Tab 1    meclizine (ANTIVERT) 25 mg tablet Take 1 Tab by mouth three (3) times daily as needed. Indications: VERTIGO 30 Tab 3    QUEtiapine (SEROQUEL) 25 mg tablet Take 25 mg by mouth nightly as needed.  Take 1-2 tabs qhs prn Gwen Zhu NP/Dr Eun Mcarthur pyridostigmine (MESTINON) 60 mg tablet TAKE 1 TABLET BY MOUTH THREE TIMES DAILY 90 Tab 1    methylPREDNISolone (MEDROL, SELMA,) 4 mg tablet Please take as directed. 1 Dose Pack 0    cyclobenzaprine (FLEXERIL) 5 mg tablet Take 1 Tab by mouth two (2) times a day. 20 Tab 0    pyridostigmine (MESTINON) 60 mg tablet TAKE 1 TABLET BY MOUTH THREE TIMES DAILY 90 Tab 1    primidone (MYSOLINE) 50 mg tablet Take 12.5 mg by mouth nightly. Dr Dion Herzog is titrating to 4 tabs qhs   Indications: ESSENTIAL TREMOR      diclofenac EC (VOLTAREN) 75 mg EC tablet Take 1 Tab by mouth two (2) times daily as needed. Indications: pain 30 Tab 0    ipratropium (ATROVENT) 0.06 % nasal spray 2 Sprays by Both Nostrils route four (4) times daily. (Patient taking differently: 2 Sprays by Both Nostrils route four (4) times daily as needed. Dr Lyssa Rose  Indications: CHRONIC NON-ALLERGIC RHINITIS) 15 mL 5    pindolol (VISKIN) 5 mg tablet Take 1 Tab by mouth daily as needed. (Patient taking differently: Take 5 mg by mouth daily as needed.  Dr Tye Renner take if systolic > 104  Indications: hypertension) 30 Tab 3     Allergies   Allergen Reactions    Adhesive Itching    Hydrocodone Itching    Lorazepam Other (comments)    Other Medication Rash     Tide detergent    Percocet [Oxycodone-Acetaminophen] Itching    Sudafed [Pseudoephedrine Hcl] Other (comments)     Vertigo    Wellbutrin [Bupropion Hcl] Nausea and Vomiting    Zithromax [Azithromycin] Vertigo    Zyrtec [Cetirizine] Nausea Only     Past Medical History:   Diagnosis Date    Bipolar disorder ([facility]     Nazmy    Chronic pain     BACK PAIN    Diabetes ([facility]     BORDERLINE TX WITH DIET AND EXERCISE    DJD (degenerative joint disease)     Fibromyalgia     Genital herpes     GERD (gastroesophageal reflux disease)     Hypercholesterolemia     IBS (irritable bowel syndrome)     Lumbar disc disease     stimulation-Honza    Migraine     Nausea & vomiting     Other ill-defined conditions     SEASONAL allergies    Other ill-defined conditions dysphagia has had esophagus dilated    Paget's disease     Pulmonary embolism (HCC)     RLS (restless legs syndrome)      Past Surgical History:   Procedure Laterality Date    COLONOSCOPY N/A 6/27/2016    [facility] ENDOSCOPY    ENDOSCOPY, COLON, DIAGNOSTIC      12, due 13    HX APPENDECTOMY      HX BACK SURGERY  9/17/2013    back surgery - total of 3 as of 12/20/2013    HX BREAST BIOPSY Right years ago    negative    HX CHOLECYSTECTOMY      HX HEENT      T & A    HX HYSTERECTOMY      total for fibroid tumors    HX ORTHOPAEDIC      lumbar laminectomy then fusion/neurostimulator implanted - inactive now but still in    HX ORTHOPAEDIC      REMOVAL OF NEUROSTIMULATOR    HX OTHER SURGICAL      EGD x 2 with dilation/colonoscopy - no polyps per pt    TILT TABLE EVALUATION  8/2/2016          Family History   Problem Relation Age of Onset    Colon Cancer Mother     Cancer Mother 68     colon    Heart Disease Father     Heart Disease Maternal Grandmother     Heart Disease Maternal Grandfather     Heart Disease Other      Social History   Substance Use Topics    Smoking status: Never Smoker    Smokeless tobacco: Never Used    Alcohol use No      Comment: 1 per month        Review of Systems:   Constitutional: Negative for fever, chills, weight loss   HEENT: Negative for nosebleeds, vision changes. Respiratory: Negative for cough, hemoptysis, sputum production, and wheezing. Cardiovascular: no chest pain, no palpitations, orthopnea, claudication, leg swelling, syncope, and PND. Gastrointestinal:  no vomiting she does not have diarrhea, constipation, blood in stool and melena. Genitourinary: Negative for dysuria, and hematuria. Musculoskeletal: Negative for myalgias   Skin: Negative for rash. Heme: Does not bleed or bruise easily.    Neurological: Negative for speech change and focal weakness     Objective:     Visit Vitals    /80 (BP 1 Location: Left arm, BP Patient Position: Sitting)    Pulse 82    Ht 5' 6\" (1.676 m)    Wt 150 lb (68 kg)    SpO2 98%    BMI 24.21 kg/m2      Physical Exam:   Constitutional: well-developed and well-nourished. No distress. Head: Normocephalic and atraumatic. Eyes: Pupils are equal, round  Neck: supple. No JVD present. Cardiovascular: Normal rate, regular rhythm. Exam reveals no gallop and no friction rub. No murmur heard. Pulmonary/Chest: Effort normal and breath sounds normal. No wheezes. Abdominal: Soft, no tenderness. Musculoskeletal: no edema. Neurological: alert,oriented. resting tremor in BUE  Skin: Skin is warm and dry  Psychiatric: normal mood and affect. Behavior is normal. Judgment and thought content normal.      Assessment/Plan:       ICD-10-CM ICD-9-CM    1. Orthostatic hypotension I95.1 458.0 droxidopa (NORTHERA) 100 mg cap capsule   2. Dizziness R42 780.4    3. Controlled type 2 diabetes mellitus without complication, without long-term current use of insulin (HCC) E11.9 250.00       Very difficult to treat her current condition   Northera and 3 other drugs to keep her BP up  wear compression stockings    I gave her pindolol 5 for supine BP > 180 mmHg. She has done well in the past few months  Will increase Northera to 200 mg tid and when she gets this from the drug company (they said to  from [employer]) and stop midodrine  If BP tolerates and she has no syncope or severe dizziness, I would try to go up on Northera to 300 mg tid and take her off mestinon    Normal nuclear stress test 9/2016. She sees Dr Marni Cox of GI for IBS  She will see neurologist Dr Ayanna Victor and told that she does not have Parkinson disease     RTC 3 months    Thank you for involving me in this patient's care and please call with further concerns or questions. Ronni Bonner M.D.   Electrophysiology/Cardiology  [facility] [place]  [address] [place] [address] Elie Alfaro [address] [place] [address]                             [facility]  [phone]

## 2017-07-28 RX ORDER — ATORVASTATIN CALCIUM 20 MG/1
TABLET, FILM COATED ORAL
Qty: 90 TAB | Refills: 11 | Status: SHIPPED | OUTPATIENT
Start: 2017-07-28 | End: 2018-10-26 | Stop reason: SDUPTHER

## 2017-07-31 ENCOUNTER — OFFICE VISIT (OUTPATIENT)
Dept: INTERNAL MEDICINE CLINIC | Age: 72
End: 2017-07-31

## 2017-07-31 VITALS
DIASTOLIC BLOOD PRESSURE: 84 MMHG | HEART RATE: 81 BPM | HEIGHT: 66 IN | BODY MASS INDEX: 24.4 KG/M2 | RESPIRATION RATE: 18 BRPM | TEMPERATURE: 98 F | WEIGHT: 151.8 LBS | OXYGEN SATURATION: 98 % | SYSTOLIC BLOOD PRESSURE: 120 MMHG

## 2017-07-31 DIAGNOSIS — F31.78 BIPOLAR DISORDER, IN FULL REMISSION, MOST RECENT EPISODE MIXED (HCC): ICD-10-CM

## 2017-07-31 DIAGNOSIS — H81.13 BPV (BENIGN POSITIONAL VERTIGO), BILATERAL: ICD-10-CM

## 2017-07-31 DIAGNOSIS — D36.9 TUBULOVILLOUS ADENOMA: ICD-10-CM

## 2017-07-31 DIAGNOSIS — Z00.00 MEDICARE ANNUAL WELLNESS VISIT, SUBSEQUENT: Primary | ICD-10-CM

## 2017-07-31 DIAGNOSIS — Z13.39 SCREENING FOR ALCOHOLISM: ICD-10-CM

## 2017-07-31 DIAGNOSIS — E78.2 MIXED HYPERLIPIDEMIA: ICD-10-CM

## 2017-07-31 DIAGNOSIS — E11.9 CONTROLLED TYPE 2 DIABETES MELLITUS WITHOUT COMPLICATION, WITHOUT LONG-TERM CURRENT USE OF INSULIN (HCC): ICD-10-CM

## 2017-07-31 DIAGNOSIS — G25.2 RESTING TREMOR: ICD-10-CM

## 2017-07-31 DIAGNOSIS — I95.1 ORTHOSTATIC HYPOTENSION: ICD-10-CM

## 2017-07-31 DIAGNOSIS — M48.061 SPINAL STENOSIS OF LUMBAR REGION: ICD-10-CM

## 2017-07-31 DIAGNOSIS — Z13.31 SCREENING FOR DEPRESSION: ICD-10-CM

## 2017-07-31 RX ORDER — MECLIZINE HYDROCHLORIDE 25 MG/1
25 TABLET ORAL
Qty: 30 TAB | Refills: 3 | Status: SHIPPED | OUTPATIENT
Start: 2017-07-31 | End: 2018-03-14 | Stop reason: ALTCHOICE

## 2017-07-31 NOTE — PROGRESS NOTES
HISTORY OF PRESENT ILLNESS  Maxie Opitz is a 67 y.o. female. [facility] is seen today for a complete physical examination, a Medicare Wellness Visit and follow up of chronic problems. Preventive medicine. Fully reviewed today. She is due for a complete physical examination. She is up to date with the remainder of measures including bone density study and mammogram.    Chronic medical problems are reviewed. 1. Diabetes, hyperlipidemia. Up to date with labs. 2. Orthostatic hypotension. She is up to date with cardiology follow up. She is off one of her medications. 3. Chronic anxiety. She is up to date with psychiatry follow up. 4. Tremor, memory loss. Up to date with neurology. Review of systems notable for some nausea. She has this at times. She also has a little back pain. An MRI has been completed and an epidural is pending. MedDATA/gwo     Current Outpatient Prescriptions   Medication Sig    meclizine (ANTIVERT) 25 mg tablet Take 1 Tab by mouth three (3) times daily as needed. Indications: VERTIGO    atorvastatin (LIPITOR) 20 mg tablet TAKE 1 TABLET BY MOUTH EVERY EVENING    droxidopa (NORTHERA) 100 mg cap capsule Take 2 Caps by mouth three (3) times daily. Per Dr Kori Blanchard  Indications: Symptomatic Orthostatic Hypotension    potassium chloride (K-DUR, KLOR-CON) 20 mEq tablet Take 1 Tab by mouth daily.  PARoxetine (PAXIL) 30 mg tablet Take 30 mg by mouth daily. Fayrene Serum NP/ Dr Luis Angel Shabazz fludrocortisone (FLORINEF) 0.1 mg tablet Take 1 Tab by mouth two (2) times a day. (Patient taking differently: Take 0.1 mg by mouth two (2) times a day. Dr Kori Blanchard)   Stephen acetaminophen (TYLENOL) 325 mg tablet Take 325 mg by mouth every four (4) hours as needed for Pain.  fexofenadine (ALLEGRA) 180 mg tablet Take 180 mg by mouth daily.  gabapentin (NEURONTIN) 100 mg capsule Take 100 mg by mouth five (5) times daily.     pyridostigmine (MESTINON) 60 mg tablet TAKE 1 TABLET BY MOUTH THREE TIMES DAILY (Patient taking differently: TAKE 1 TABLET BY MOUTH THREE TIMES DAILY Dr Chastity Keene)   [facility] QUEtiapine (SEROQUEL) 25 mg tablet Take 25 mg by mouth nightly as needed. Take 1-2 tabs qhs prn Leonela Martin NP/Dr Cody Lombardo     No current facility-administered medications for this visit. Review of Systems   Constitutional: Negative for weight loss. Respiratory: Negative. Cardiovascular: Negative for chest pain, palpitations, leg swelling and PND. Gastrointestinal: Positive for nausea. Episodic   Musculoskeletal: Positive for back pain and falls. Negative for myalgias. Neurological: Positive for dizziness and tremors. Negative for focal weakness. Psychiatric/Behavioral: The patient is nervous/anxious. Physical Exam   Constitutional: She is oriented to person, place, and time. She appears well-developed and well-nourished. No distress. HENT:   Head: Normocephalic and atraumatic. Right Ear: Tympanic membrane, external ear and ear canal normal.   Left Ear: Tympanic membrane, external ear and ear canal normal.   Eyes: EOM are normal. Pupils are equal, round, and reactive to light. Right eye exhibits no discharge. Left eye exhibits no discharge. Neck: Normal range of motion. Neck supple. Carotid bruit is not present. No thyromegaly present. Cardiovascular: Normal rate, regular rhythm, normal heart sounds and intact distal pulses. Exam reveals no gallop and no friction rub. No murmur heard. Pulmonary/Chest: Effort normal and breath sounds normal. No respiratory distress. She has no wheezes. She has no rales. Abdominal: Soft. Bowel sounds are normal. She exhibits no distension and no mass. There is no tenderness. There is no rebound and no guarding. Musculoskeletal: Normal range of motion. She exhibits no edema or tenderness. Lymphadenopathy:     She has no cervical adenopathy. Neurological: She is alert and oriented to person, place, and time. She has normal reflexes.    Skin: Skin is warm and dry. No rash noted. Psychiatric: She has a normal mood and affect. Her behavior is normal.   Nursing note and vitals reviewed. ASSESSMENT and PLAN  Diagnoses and all orders for this visit:    1. Medicare annual wellness visit, subsequent    2. Controlled type 2 diabetes mellitus without complication, without long-term current use of insulin (HCC)  -     HEMOGLOBIN A1C WITH EAG  -     URINALYSIS W/ RFLX MICROSCOPIC    3. Bipolar disorder, in full remission, most recent episode mixed [facility])- See psychiatrist as directed     4. Spinal stenosis of lumbar region- See orthopedic surgeon as directed     5. Resting tremor- See neurologist as directed     6. Orthostatic hypotension- Continue current regimen of prescription and / or OTC medications , See cardiologist as directed. 7. Tubulovillous adenoma- See gastroenterologist as directed     8. BPV (benign positional vertigo), bilateral  -     meclizine (ANTIVERT) 25 mg tablet; Take 1 Tab by mouth three (3) times daily as needed. Indications: VERTIGO    9. Mixed hyperlipidemia  -     TSH 3RD GENERATION  -     METABOLIC PANEL, COMPREHENSIVE  -     CBC WITH AUTOMATED DIFF  -     LIPID PANEL- Check lipid panel and appropriate studies to rule out med side effects in 6 months. High risk med management. 10. Screening for alcoholism    11.  Screening for depression  -     Depression Screen Annual

## 2017-07-31 NOTE — PROGRESS NOTES
This is a Subsequent Medicare Annual Wellness Visit providing Personalized Prevention Plan Services (PPPS) (Performed 12 months after initial AWV and PPPS )    I have reviewed the patient's medical history in detail and updated the computerized patient record. History     Past Medical History:   Diagnosis Date    Bipolar disorder (HCC)     Nazmy    Chronic pain     BACK PAIN    Diabetes (HCC)     BORDERLINE TX WITH DIET AND EXERCISE    DJD (degenerative joint disease)     Fibromyalgia     Genital herpes     GERD (gastroesophageal reflux disease)     Hypercholesterolemia     IBS (irritable bowel syndrome)     Lumbar disc disease     stimulation-Honza    Migraine     Nausea & vomiting     Other ill-defined conditions     SEASONAL allergies    Other ill-defined conditions     dysphagia has had esophagus dilated    Paget's disease     Pulmonary embolism (HCC)     RLS (restless legs syndrome)     Spinal stenosis       Past Surgical History:   Procedure Laterality Date    COLONOSCOPY N/A 6/27/2016    COLONOSCOPY performed by [facility]teo [facility] at [facility] ENDOSCOPY    ENDOSCOPY, COLON, DIAGNOSTIC      12, due 13    HX APPENDECTOMY      HX BACK SURGERY  9/17/2013    back surgery - total of 3 as of 12/20/2013    HX BREAST BIOPSY Right years ago    negative    HX CHOLECYSTECTOMY      HX HEENT      T & A    HX HYSTERECTOMY      total for fibroid tumors    HX ORTHOPAEDIC      lumbar laminectomy then fusion/neurostimulator implanted - inactive now but still in    HX ORTHOPAEDIC      REMOVAL OF NEUROSTIMULATOR    HX OTHER SURGICAL      EGD x 2 with dilation/colonoscopy - no polyps per pt    TILT TABLE EVALUATION  8/2/2016          Current Outpatient Prescriptions   Medication Sig Dispense Refill    meclizine (ANTIVERT) 25 mg tablet Take 1 Tab by mouth three (3) times daily as needed.  Indications: VERTIGO 30 Tab 3    atorvastatin (LIPITOR) 20 mg tablet TAKE 1 TABLET BY MOUTH EVERY EVENING 90 Tab 11    droxidopa (NORTHERA) 100 mg cap capsule Take 2 Caps by mouth three (3) times daily. Per Dr Barb Walker  Indications: Symptomatic Orthostatic Hypotension 180 Cap 12    potassium chloride (K-DUR, KLOR-CON) 20 mEq tablet Take 1 Tab by mouth daily. 30 Tab 11    PARoxetine (PAXIL) 30 mg tablet Take 30 mg by mouth daily. Joy Motley NP/ Dr Rachell Lew fludrocortisone (FLORINEF) 0.1 mg tablet Take 1 Tab by mouth two (2) times a day. (Patient taking differently: Take 0.1 mg by mouth two (2) times a day. Dr Barb Walker) 180 Tab 1    acetaminophen (TYLENOL) 325 mg tablet Take 325 mg by mouth every four (4) hours as needed for Pain.  fexofenadine (ALLEGRA) 180 mg tablet Take 180 mg by mouth daily.  gabapentin (NEURONTIN) 100 mg capsule Take 100 mg by mouth five (5) times daily.  pyridostigmine (MESTINON) 60 mg tablet TAKE 1 TABLET BY MOUTH THREE TIMES DAILY (Patient taking differently: TAKE 1 TABLET BY MOUTH THREE TIMES DAILY Dr Barb Walker) 90 Tab 1    QUEtiapine (SEROQUEL) 25 mg tablet Take 25 mg by mouth nightly as needed.  Take 1-2 tabs qhs prn Joy Motley NP/Dr Del Toro Captain       Allergies   Allergen Reactions    Adhesive Itching    Hydrocodone Itching    Lorazepam Other (comments)    Other Medication Rash     Tide detergent    Percocet [Oxycodone-Acetaminophen] Itching    Sudafed [Pseudoephedrine Hcl] Other (comments)     Vertigo    Wellbutrin [Bupropion Hcl] Nausea and Vomiting    Zithromax [Azithromycin] Vertigo    Zyrtec [Cetirizine] Nausea Only     Family History   Problem Relation Age of Onset    Colon Cancer Mother     Cancer Mother 68     colon    Heart Disease Father     Heart Disease Maternal Grandmother     Heart Disease Maternal Grandfather     Heart Disease Other      Social History   Substance Use Topics    Smoking status: Never Smoker    Smokeless tobacco: Never Used    Alcohol use No      Comment: 1 per month     Patient Active Problem List   Diagnosis Code    IBS (irritable bowel syndrome) K58.9    RLS (restless legs syndrome) G25.81    Bipolar disorder ([facility]) F31.9    Fibromyalgia M79.7    DJD (degenerative joint disease) M19.90    Lumbar disc disease M51.9    Paget's disease of bone M88.9    Migraine 346    Genital herpes A60.00    Other diseases of nasal cavity and sinuses J34.89    Allergic rhinitis, cause unspecified J30.9    Hammertoe M20.40    Encounter for long-term (current) use of other medications Z79.899    Abnormal mammogram R92.8    Tubulovillous adenoma D36.9    Diabetes mellitus type 2, controlled, without complications ([facility]) E93.1    Mixed hyperlipidemia E78.2    Pulmonary embolism ([facility]) I26.99    Advance directive on file Z78.9    Orthostatic hypotension I95.1    Resting tremor R25.9    Near syncope R55    Spinal stenosis M48.00       Depression Risk Factor Screening:     PHQ over the last two weeks 10/1/2015   Little interest or pleasure in doing things Not at all   Feeling down, depressed or hopeless Not at all   Total Score PHQ 2 0     Alcohol Risk Factor Screening: On any occasion during the past 3 months, have you had more than 3 drinks containing alcohol? No    Do you average more than 7 drinks per week? No        Functional Ability and Level of Safety:     Hearing Loss   normal-to-mild    Activities of Daily Living   Self-care. Requires assistance with: no ADLs    Fall Risk   Fall Risk Assessment, last 12 mths 7/31/2017   Able to walk? Yes   Fall in past 12 months? Yes   Fall with injury? Yes   Number of falls in past 12 months 4   Fall Risk Score 5     Abuse Screen   Patient is not abused    Review of Systems   A comprehensive review of systems was negative except for that written in the HPI. Physical Examination     Evaluation of Cognitive Function:  Mood/affect:  neutral  Appearance: age appropriate  Family member/caregiver input: Not applicable      exam performed today, See attached exam documentation .     Patient Care Team:  Juan Galo Marsha Riddle MD as PCP - [facility] MD Monica as Physician (Orthopedic Surgery)  Eloy Younger RN as [address] (Internal Medicine)  Cathy Daily MD as Physician (Gastroenterology)  Rosemary Obregon MD as Physician (Rheumatology)  Radha De La Fuente MD as Physician (Ophthalmology)  Gerhard Coe, RN as Nurse Navigator (Internal Medicine)  Memo Fraser MD as Physician (Psychiatry)  Jose A Joy MD (Cardiology)  Cherie Clemens, TAI (Nurse Practitioner)  Gerhard Coe, RN as Ambulatory Care Navigator (Internal Medicine)    Advice/Referrals/Counseling   Education and counseling provided:  Are appropriate based on today's review and evaluation      Assessment/Plan   Diagnoses and all orders for this visit:    1. Medicare annual wellness visit, subsequent    2. Controlled type 2 diabetes mellitus without complication, without long-term current use of insulin (HCC)  -     HEMOGLOBIN A1C WITH EAG  -     URINALYSIS W/ RFLX MICROSCOPIC    3. Bipolar disorder, in full remission, most recent episode mixed ([address]    4. Spinal stenosis of lumbar region    5. Resting tremor    6. Orthostatic hypotension    7. Tubulovillous adenoma    8. BPV (benign positional vertigo), bilateral  -     meclizine (ANTIVERT) 25 mg tablet; Take 1 Tab by mouth three (3) times daily as needed. Indications: VERTIGO    9. Mixed hyperlipidemia  -     TSH 3RD GENERATION  -     METABOLIC PANEL, COMPREHENSIVE  -     CBC WITH AUTOMATED DIFF  -     LIPID PANEL    10. Screening for alcoholism    11.  Screening for depression  -     Depression Screen Annual    .

## 2017-07-31 NOTE — MR AVS SNAPSHOT
Visit Information Date & Time Provider Department Dept. Phone Encounter #  
 7/31/2017 11:00 AM Randall Shahid, [facility], [facility] Internal Medicine [phone] [id number] Follow-up Instructions Return in about 6 months (around 1/31/2018). Your Appointments 9/5/2017  9:35 AM  
ROUTINE CARE with Randall Shahid MD  
[address] [address] Internal Medicine [address]) Appt Note: 4 month f/u  
 [address] [facility] [address]  
[address] [address] [place]  
  
    
 10/17/2017  9:40 AM  
ESTABLISHED PATIENT with Donovan Hernandez MD  
[facility] ([address]) Appt Note: 3 mnth f/u  
 [address] [place] [address]  
[address] [facility] [address] Upcoming Health Maintenance Date Due INFLUENZA AGE 9 TO ADULT 8/1/2017 HEMOGLOBIN A1C Q6M 10/27/2017 MICROALBUMIN Q1 1/5/2018 EYE EXAM RETINAL OR DILATED Q1 2/9/2018 FOOT EXAM Q1 4/25/2018 LIPID PANEL Q1 4/27/2018 MEDICARE YEARLY EXAM 8/1/2018 GLAUCOMA SCREENING Q2Y 2/9/2019 BREAST CANCER SCRN MAMMOGRAM 2/28/2019 DTaP/Tdap/Td series (2 - Td) 8/17/2023 COLONOSCOPY 6/27/2026 Allergies as of 7/31/2017  Review Complete On: 7/31/2017 By: Randall Shahid MD  
  
 Severity Noted Reaction Type Reactions Adhesive  09/08/2009    Itching Hydrocodone  07/19/2010    Itching Lorazepam  04/02/2015    Other (comments) Other Medication  09/08/2009    Rash Tide detergent Percocet [Oxycodone-acetaminophen]  09/08/2009    Itching Sudafed [Pseudoephedrine Hcl]  10/17/2010   Side Effect Other (comments) Vertigo Wellbutrin [Bupropion Hcl]  08/31/2011    Nausea and Vomiting Zithromax [Azithromycin]  06/22/2011    Vertigo Zyrtec [Cetirizine]  10/14/2009    Nausea Only Current Immunizations  Reviewed on 7/31/2017 Name Date Influenza High Dose Vaccine PF 8/29/2013 Influenza Vaccine 8/8/2016, 10/1/2015, 9/15/2014 Influenza Vaccine Whole 9/25/2012 Pneumococcal Conjugate (PCV-13) 3/17/2015 Pneumococcal Polysaccharide (PPSV-23) 4/23/2016 TDAP Vaccine 7/3/2012 Tdap 8/17/2013 Varicella Virus Vaccine Live 9/1/2007 ZZZ-RETIRED (DO NOT USE) Pneumococcal Vaccine (Unspecified Type) 12/1/2009 Reviewed by Evelin Barrios MD on 7/31/2017 at 11:45 AM  
You Were Diagnosed With   
  
 Codes Comments Medicare annual wellness visit, subsequent    -  Primary ICD-10-CM: Z00.00 ICD-9-CM: V70.0 Controlled type 2 diabetes mellitus without complication, without long-term current use of insulin ([facility] [address]     ICD-10-CM: E11.9 ICD-9-CM: 250.00 Bipolar disorder, in full remission, most recent episode mixed [facility])     ICD-10-CM: F31.78 
ICD-9-CM: 296.66 Spinal stenosis of lumbar region     ICD-10-CM: M48.06 
ICD-9-CM: 724.02 Resting tremor     ICD-10-CM: R25.9 ICD-9-CM: 781.0 Orthostatic hypotension     ICD-10-CM: I95.1 ICD-9-CM: 458.0 Tubulovillous adenoma     ICD-10-CM: D36.9 ICD-9-CM: 229.9 BPV (benign positional vertigo), bilateral     ICD-10-CM: H81.13 
ICD-9-CM: 386.11 Mixed hyperlipidemia     ICD-10-CM: E78.2 ICD-9-CM: 272.2 Screening for alcoholism     ICD-10-CM: Z13.89 ICD-9-CM: V79.1 Screening for depression     ICD-10-CM: Z13.89 ICD-9-CM: V79.0 Vitals BP Pulse Temp Resp Height(growth percentile) Weight(growth percentile) 120/84 (BP 1 Location: Right arm, BP Patient Position: Sitting) 81 98 °F (36.7 °C) (Oral) 18 5' 5.5\" (1.664 m) 151 lb 12.8 oz (68.9 kg) SpO2 BMI OB Status Smoking Status 98% 24.88 kg/m2 Hysterectomy Never Smoker BMI and BSA Data Body Mass Index Body Surface Area  
 24.88 kg/m 2 1.78 m 2 Preferred Pharmacy Pharmacy Name Phone  [phone] [place] [address] [address] [phone] Your Updated Medication List  
  
   
This list is accurate as of: 7/31/17 11:50 AM.  Always use your most recent med list.  
  
  
  
  
 acetaminophen 325 mg tablet Commonly known as:  TYLENOL Take 325 mg by mouth every four (4) hours as needed for Pain. atorvastatin 20 mg tablet Commonly known as:  LIPITOR  
TAKE 1 TABLET BY MOUTH EVERY EVENING  
  
 droxidopa 100 mg Cap capsule Commonly known as:  Aleene Pounds Take 2 Caps by mouth three (3) times daily. Per Dr Zaria Edwards  Indications: Symptomatic Orthostatic Hypotension  
  
 fexofenadine 180 mg tablet Commonly known as:  Kenith Fitting Take 180 mg by mouth daily. fludrocortisone 0.1 mg tablet Commonly known as:  FLORINEF Take 1 Tab by mouth two (2) times a day.  
  
 gabapentin 100 mg capsule Commonly known as:  NEURONTIN Take 100 mg by mouth five (5) times daily. meclizine 25 mg tablet Commonly known as:  ANTIVERT Take 1 Tab by mouth three (3) times daily as needed. Indications: VERTIGO PARoxetine 30 mg tablet Commonly known as:  PAXIL Take 30 mg by mouth daily. Peter Gaffney NP/ Dr Marietta Cole  
  
 potassium chloride 20 mEq tablet Commonly known as:  K-DUR, KLOR-CON Take 1 Tab by mouth daily. pyridostigmine 60 mg tablet Commonly known as:  MESTINON  
TAKE 1 TABLET BY MOUTH THREE TIMES DAILY QUEtiapine 25 mg tablet Commonly known as:  SEROquel Take 25 mg by mouth nightly as needed. Take 1-2 tabs qhs prn Peter Gaffney NP/Dr Marietta Cole Prescriptions Sent to Pharmacy Refills  
 meclizine (ANTIVERT) 25 mg tablet 3 Sig: Take 1 Tab by mouth three (3) times daily as needed. Indications: VERTIGO Class: Normal  
 Pharmacy: [facility] [address], [address] [place] [address] Katt Baires of [address] [place] [address] Ph #: [phone] Route: Oral  
  
We Performed the Following CBC WITH AUTOMATED DIFF [65342 CPT(R)] [address] [[id number] \[facility]\""] HEMOGLOBIN A1C WITH EAG [37232 CPT(R)] LIPID PANEL [76915 CPT(R)] METABOLIC PANEL, COMPREHENSIVE [15117 CPT(R)] TSH 3RD GENERATION [82985 CPT(R)] URINALYSIS W/ RFLX MICROSCOPIC [58691 CPT(R)] Follow-up Instructions Return in about 6 months (around 1/31/2018). Introducing [facility]! Dear Cathy Cee: Thank you for requesting a [employer] account. Our records indicate that you already have an active [employer] account. You can access your account anytime at [email or web address] Did you know that you can access your hospital and ER discharge instructions at any time in [employer]? You can also review all of your test results from your hospital stay or ER visit. Additional Information If you have questions, please visit the Frequently Asked Questions section of the [employer] website at [email or web address] Remember, [employer] is NOT to be used for urgent needs. For medical emergencies, dial 911. Now available from your iPhone and Android! Please provide this summary of care documentation to your next provider. Your primary care clinician is listed as RYLEE RASHID. If you have any questions after today's visit, please call [phone].

## 2017-08-04 ENCOUNTER — TELEPHONE (OUTPATIENT)
Dept: CARDIOLOGY CLINIC | Age: 72
End: 2017-08-04

## 2017-08-04 NOTE — TELEPHONE ENCOUNTER
Pt is feeling nauseous and would like to speak with you about this. Please give her a call back at [phone].     Thank you,  Juan Trimble

## 2017-08-04 NOTE — TELEPHONE ENCOUNTER
PA requested for Northera. Sent request thru cover my med's. Will take 3-5 days for a response by fax.

## 2017-08-04 NOTE — TELEPHONE ENCOUNTER
Patient having persistent nausea since increasing northera  She can decrease northera back down to 100 mg TID

## 2017-08-04 NOTE — TELEPHONE ENCOUNTER
Verified patient with two types of identifiers. /81 standing. Notified her that she can decrease the Northera back to her other dose or she could try to see how next week will go with the increased dose. Told to call next week to let us know how she is doing and let us know what she has decided to do.

## 2017-08-04 NOTE — TELEPHONE ENCOUNTER
Verified patient with two types of identifiers. Spoke to patient and she states that she has been having symptoms since increasing the Northera. She is laying down now due to feeling dizzy and nauseous. Her BP she checked laying down was a systolic of 043. She is afraid to stand up and take her other Northera dose.   Will check with NP.

## 2017-08-10 RX ORDER — FLUDROCORTISONE ACETATE 0.1 MG/1
TABLET ORAL
Qty: 180 TAB | Refills: 0 | Status: SHIPPED | OUTPATIENT
Start: 2017-08-10 | End: 2017-11-02 | Stop reason: SDUPTHER

## 2017-08-22 ENCOUNTER — TELEPHONE (OUTPATIENT)
Dept: INTERNAL MEDICINE CLINIC | Age: 72
End: 2017-08-22

## 2017-08-22 NOTE — TELEPHONE ENCOUNTER
Spoke with pt - states she started Sunday 8/20/17 with neck pain/pressure which runs across her shoulders. Noted swelling back of neck today. No other symptoms such as dizziness or headache. Has not taken any medication for pain nor tried heat or ice. Laying down relieves pressure. She states has appt with Dr Patrick Thomas (back & spine specialist) om Thursday 8/24/17. Wants to know what MD can recommend for pain until then?  Will forward to MD

## 2017-08-23 NOTE — TELEPHONE ENCOUNTER
Advised pt MD recommends she try Tylenol 500 mg qid as needed - as well as heat alternating with ice. She states she is feeling a little better today.

## 2017-09-05 ENCOUNTER — HOSPITAL ENCOUNTER (OUTPATIENT)
Dept: LAB | Age: 72
Discharge: HOME | End: 2017-09-05
Payer: MEDICARE

## 2017-09-05 ENCOUNTER — TELEPHONE (OUTPATIENT)
Dept: INTERNAL MEDICINE CLINIC | Age: 72
End: 2017-09-05

## 2017-09-05 ENCOUNTER — OFFICE VISIT (OUTPATIENT)
Dept: INTERNAL MEDICINE CLINIC | Age: 72
End: 2017-09-05

## 2017-09-05 VITALS
SYSTOLIC BLOOD PRESSURE: 138 MMHG | RESPIRATION RATE: 18 BRPM | HEART RATE: 80 BPM | OXYGEN SATURATION: 94 % | TEMPERATURE: 98.6 F | DIASTOLIC BLOOD PRESSURE: 80 MMHG | HEIGHT: 66 IN | BODY MASS INDEX: 24.27 KG/M2 | WEIGHT: 151 LBS

## 2017-09-05 DIAGNOSIS — G25.2 RESTING TREMOR: ICD-10-CM

## 2017-09-05 DIAGNOSIS — M79.7 FIBROMYALGIA: ICD-10-CM

## 2017-09-05 DIAGNOSIS — M48.061 SPINAL STENOSIS OF LUMBAR REGION: ICD-10-CM

## 2017-09-05 DIAGNOSIS — E11.9 CONTROLLED TYPE 2 DIABETES MELLITUS WITHOUT COMPLICATION, WITHOUT LONG-TERM CURRENT USE OF INSULIN (HCC): Primary | ICD-10-CM

## 2017-09-05 DIAGNOSIS — I95.1 ORTHOSTATIC HYPOTENSION: ICD-10-CM

## 2017-09-05 DIAGNOSIS — E78.2 MIXED HYPERLIPIDEMIA: ICD-10-CM

## 2017-09-05 DIAGNOSIS — K59.1 FUNCTIONAL DIARRHEA: ICD-10-CM

## 2017-09-05 DIAGNOSIS — F31.78 BIPOLAR DISORDER, IN FULL REMISSION, MOST RECENT EPISODE MIXED (HCC): ICD-10-CM

## 2017-09-05 PROCEDURE — 36415 COLL VENOUS BLD VENIPUNCTURE: CPT

## 2017-09-05 PROCEDURE — 85025 COMPLETE CBC W/AUTO DIFF WBC: CPT

## 2017-09-05 PROCEDURE — 80061 LIPID PANEL: CPT

## 2017-09-05 PROCEDURE — 80053 COMPREHEN METABOLIC PANEL: CPT

## 2017-09-05 PROCEDURE — 83036 HEMOGLOBIN GLYCOSYLATED A1C: CPT

## 2017-09-05 PROCEDURE — 84443 ASSAY THYROID STIM HORMONE: CPT

## 2017-09-05 PROCEDURE — 81001 URINALYSIS AUTO W/SCOPE: CPT

## 2017-09-05 PROCEDURE — 81003 URINALYSIS AUTO W/O SCOPE: CPT

## 2017-09-05 RX ORDER — DIPHENOXYLATE HYDROCHLORIDE AND ATROPINE SULFATE 2.5; .025 MG/1; MG/1
1 TABLET ORAL
Qty: 30 TAB | Refills: 1 | Status: SHIPPED | OUTPATIENT
Start: 2017-09-05 | End: 2017-10-10 | Stop reason: ALTCHOICE

## 2017-09-05 RX ORDER — CARBIDOPA AND LEVODOPA 25; 100 MG/1; MG/1
1 TABLET ORAL 3 TIMES DAILY
Status: ON HOLD | COMMUNITY
End: 2020-06-03

## 2017-09-05 NOTE — MR AVS SNAPSHOT
Visit Information Date & Time Provider Department Dept. Phone Encounter #  
 9/5/2017  9:35 AM Randall Shahid, [address] [facility] [phone] [id number] Your Appointments 10/17/2017  9:40 AM  
ESTABLISHED PATIENT with Donovan Hernandez MD  
[facility] ([address]) Appt Note: 3 mnth f/u  
 [address]  [address] [place] [address]  
[address] [address] Upcoming Health Maintenance Date Due HEMOGLOBIN A1C Q6M 10/27/2017 MICROALBUMIN Q1 1/5/2018 EYE EXAM RETINAL OR DILATED Q1 2/9/2018 FOOT EXAM Q1 4/25/2018 LIPID PANEL Q1 4/27/2018 MEDICARE YEARLY EXAM 8/1/2018 GLAUCOMA SCREENING Q2Y 2/9/2019 BREAST CANCER SCRN MAMMOGRAM 2/28/2019 DTaP/Tdap/Td series (2 - Td) 8/17/2023 COLONOSCOPY 6/27/2026 Allergies as of 9/5/2017  Review Complete On: 9/5/2017 By: Randall Shahid MD  
  
 Severity Noted Reaction Type Reactions Adhesive  09/08/2009    Itching Hydrocodone  07/19/2010    Itching Lorazepam  04/02/2015    Other (comments) Other Medication  09/08/2009    Rash Tide detergent Percocet [Oxycodone-acetaminophen]  09/08/2009    Itching Sudafed [Pseudoephedrine Hcl]  10/17/2010   Side Effect Other (comments) Vertigo Wellbutrin [Bupropion Hcl]  08/31/2011    Nausea and Vomiting Zithromax [Azithromycin]  06/22/2011    Vertigo Zyrtec [Cetirizine]  10/14/2009    Nausea Only Current Immunizations  Reviewed on 8/15/2017 Name Date Influenza High Dose Vaccine PF 8/14/2017, 8/29/2013 Influenza Vaccine 8/8/2016, 10/1/2015, 9/15/2014 Influenza Vaccine Whole 9/25/2012 Pneumococcal Conjugate (PCV-13) 3/17/2015 Pneumococcal Polysaccharide (PPSV-23) 4/23/2016 TDAP Vaccine 7/3/2012 Tdap 8/17/2013 Varicella Virus Vaccine Live 9/1/2007  ZZZ-RETIRED (DO NOT USE) Pneumococcal Vaccine (Unspecified Type) 12/1/2009 Not reviewed this visit You Were Diagnosed With   
  
 Codes Comments Controlled type 2 diabetes mellitus without complication, without long-term current use of insulin ([facility]    -  Primary ICD-10-CM: E11.9 ICD-9-CM: 250.00 Mixed hyperlipidemia     ICD-10-CM: E78.2 ICD-9-CM: 272.2 Bipolar disorder, in full remission, most recent episode mixed [facility])     ICD-10-CM: F31.78 
ICD-9-CM: 296.66 Spinal stenosis of lumbar region     ICD-10-CM: M48.06 
ICD-9-CM: 724.02 Orthostatic hypotension     ICD-10-CM: I95.1 ICD-9-CM: 458.0 Resting tremor     ICD-10-CM: R25.9 ICD-9-CM: 781.0 Fibromyalgia     ICD-10-CM: M79.7 ICD-9-CM: 729.1 Functional diarrhea     ICD-10-CM: K59.1 ICD-9-CM: 564.5 Vitals BP Pulse Temp Resp Height(growth percentile) Weight(growth percentile) 138/80 (BP 1 Location: Left arm, BP Patient Position: Sitting) 80 98.6 °F (37 °C) (Oral) 18 5' 5.5\" (1.664 m) 151 lb (68.5 kg) SpO2 BMI OB Status Smoking Status 94% 24.75 kg/m2 Hysterectomy Never Smoker BMI and BSA Data Body Mass Index Body Surface Area 24.75 kg/m 2 1.78 m 2 Preferred Pharmacy Pharmacy Name Phone [facility] [address] [place] [address] [phone] Your Updated Medication List  
  
   
This list is accurate as of: 9/5/17 10:31 AM.  Always use your most recent med list.  
  
  
  
  
 acetaminophen 325 mg tablet Commonly known as:  TYLENOL Take 325 mg by mouth every four (4) hours as needed for Pain. atorvastatin 20 mg tablet Commonly known as:  LIPITOR  
TAKE 1 TABLET BY MOUTH EVERY EVENING  
  
 carbidopa-levodopa  mg per tablet Commonly known as:  SINEMET Take 1 Tab by mouth two (2) times a day. diphenoxylate-atropine 2.5-0.025 mg per tablet Commonly known as:  LOMOTIL Take 1 Tab by mouth four (4) times daily as needed for Diarrhea. Max Daily Amount: 4 Tabs. droxidopa 100 mg Cap capsule Commonly known as:  Ross Grant Take 2 Caps by mouth three (3) times daily. Per Dr Marly Slade  Indications: Symptomatic Orthostatic Hypotension  
  
 fexofenadine 180 mg tablet Commonly known as:  Nahomy Calico Take 180 mg by mouth daily. fludrocortisone 0.1 mg tablet Commonly known as:  FLORINEF  
TAKE 1 TABLET BY MOUTH TWICE DAILY  
  
 meclizine 25 mg tablet Commonly known as:  ANTIVERT Take 1 Tab by mouth three (3) times daily as needed. Indications: VERTIGO PARoxetine 30 mg tablet Commonly known as:  PAXIL Take 30 mg by mouth daily. Cindy Negron NP/ Dr Alverto Nair  
  
 potassium chloride 20 mEq tablet Commonly known as:  K-DUR, KLOR-CON Take 1 Tab by mouth daily. pyridostigmine 60 mg tablet Commonly known as:  MESTINON  
TAKE 1 TABLET BY MOUTH THREE TIMES DAILY QUEtiapine 25 mg tablet Commonly known as:  SEROquel Take 25 mg by mouth nightly as needed. Take 1-2 tabs qhs prn Cindy Negron NP/Dr Alverto Nair Prescriptions Printed Refills diphenoxylate-atropine (LOMOTIL) 2.5-0.025 mg per tablet 1 Sig: Take 1 Tab by mouth four (4) times daily as needed for Diarrhea. Max Daily Amount: 4 Tabs. Class: Print Route: Oral  
  
We Performed the Following CBC WITH AUTOMATED DIFF [70801 CPT(R)] HEMOGLOBIN A1C WITH EAG [69999 CPT(R)] LIPID PANEL [67616 CPT(R)] METABOLIC PANEL, COMPREHENSIVE [34319 CPT(R)] Introducing [facility]! Dear Sheila Mills: Thank you for requesting a [employer] account. Our records indicate that you already have an active [employer] account. You can access your account anytime at [email or web address] Did you know that you can access your hospital and ER discharge instructions at any time in [employer]? You can also review all of your test results from your hospital stay or ER visit. Additional Information If you have questions, please visit the Frequently Asked Questions section of the [employer] website at [email or web address] Remember, [employer] is NOT to be used for urgent needs. For medical emergencies, dial 911. Now available from your iPhone and Android! Please provide this summary of care documentation to your next provider. Your primary care clinician is listed as RYLEE RASHID. If you have any questions after today's visit, please call [phone].

## 2017-09-05 NOTE — PROGRESS NOTES
HISTORY OF PRESENT ILLNESS  Domonique Estrella is a 67 y.o. female. HPI Beata Corrales is seen today for follow up of diabetes and other problems. New problem, diarrhea. This appears to be diet triggered. She will be more careful with avoidance but it does not appear she needs a workup at this time. Orthostatic low blood pressure. Doing much better. One of her four medications has been discontinued. Tremor. Up to date with neurology follow up and symptoms are improved. Diabetes, hyperlipidemia. Due for routine labs. Bipolar disorder. Up to date with psychiatry follow up. Fibromyalgia. Up to date with rheumatology follow up. Medications reviewed as noted above. MedDATA/gwo     Current Outpatient Prescriptions   Medication Sig    carbidopa-levodopa (SINEMET)  mg per tablet Take 1 Tab by mouth two (2) times a day.  diphenoxylate-atropine (LOMOTIL) 2.5-0.025 mg per tablet Take 1 Tab by mouth four (4) times daily as needed for Diarrhea. Max Daily Amount: 4 Tabs.  fludrocortisone (FLORINEF) 0.1 mg tablet TAKE 1 TABLET BY MOUTH TWICE DAILY    meclizine (ANTIVERT) 25 mg tablet Take 1 Tab by mouth three (3) times daily as needed. Indications: VERTIGO    atorvastatin (LIPITOR) 20 mg tablet TAKE 1 TABLET BY MOUTH EVERY EVENING    droxidopa (NORTHERA) 100 mg cap capsule Take 2 Caps by mouth three (3) times daily. Per Dr Susi Ortega  Indications: Symptomatic Orthostatic Hypotension    potassium chloride (K-DUR, KLOR-CON) 20 mEq tablet Take 1 Tab by mouth daily.  PARoxetine (PAXIL) 30 mg tablet Take 30 mg by mouth daily. Marcelina Ballard NP/ Dr Hermila Florian acetaminophen (TYLENOL) 325 mg tablet Take 325 mg by mouth every four (4) hours as needed for Pain.  fexofenadine (ALLEGRA) 180 mg tablet Take 180 mg by mouth daily.     pyridostigmine (MESTINON) 60 mg tablet TAKE 1 TABLET BY MOUTH THREE TIMES DAILY (Patient taking differently: TAKE 1 TABLET BY MOUTH THREE TIMES DAILY Dr Susi Ortega)    QUEtiapine (SEROQUEL) 25 mg tablet Take 25 mg by mouth nightly as needed. Take 1-2 tabs qhs prn Ti Rosa NP/Dr Gerhardt Puls     No current facility-administered medications for this visit. Review of Systems   Constitutional: Negative for weight loss. Respiratory: Negative. Cardiovascular: Negative for chest pain, palpitations, leg swelling and PND. Musculoskeletal: Negative for myalgias. Neurological: Positive for dizziness and tremors. Negative for focal weakness. Psychiatric/Behavioral: Negative for depression. Physical Exam   Constitutional: She appears well-nourished. Neck: Carotid bruit is not present. Cardiovascular: Normal rate and regular rhythm. Exam reveals no gallop and no friction rub. No murmur heard. Pulmonary/Chest: Effort normal and breath sounds normal. No respiratory distress. Musculoskeletal: She exhibits no edema. Nursing note and vitals reviewed. ASSESSMENT and PLAN  Diagnoses and all orders for this visit:    1. Controlled type 2 diabetes mellitus without complication, without long-term current use of insulin (HCC)  -     HEMOGLOBIN A1C WITH EAG  -     METABOLIC PANEL, COMPREHENSIVE    2. Mixed hyperlipidemia  -     LIPID PANEL  -     CBC WITH AUTOMATED DIFF    3. Bipolar disorder, in full remission, most recent episode mixed [facility])- See psychiatrist as directed     4. Spinal stenosis of lumbar region- See orthopedic surgeon as directed     5. Orthostatic hypotension- See cardiologist as directed. 6. Resting tremor- See neurologist as directed     7. Fibromyalgia- See rheumatologist as directed     8. Functional diarrhea  -     diphenoxylate-atropine (LOMOTIL) 2.5-0.025 mg per tablet; Take 1 Tab by mouth four (4) times daily as needed for Diarrhea. Max Daily Amount: 4 Tabs.

## 2017-09-06 DIAGNOSIS — N39.0 URINARY TRACT INFECTION WITHOUT HEMATURIA, SITE UNSPECIFIED: Primary | ICD-10-CM

## 2017-09-06 LAB
ALBUMIN SERPL-MCNC: 4.4 G/DL (ref 3.5–4.8)
ALBUMIN/GLOB SERPL: 2 {RATIO} (ref 1.2–2.2)
ALP SERPL-CCNC: 78 IU/L (ref 39–117)
ALT SERPL-CCNC: 7 IU/L (ref 0–32)
APPEARANCE UR: ABNORMAL
AST SERPL-CCNC: 19 IU/L (ref 0–40)
BACTERIA #/AREA URNS HPF: ABNORMAL /[HPF]
BASOPHILS # BLD AUTO: 0 X10E3/UL (ref 0–0.2)
BASOPHILS NFR BLD AUTO: 1 %
BILIRUB SERPL-MCNC: 0.4 MG/DL (ref 0–1.2)
BILIRUB UR QL STRIP: NEGATIVE
BUN SERPL-MCNC: 9 MG/DL (ref 8–27)
BUN/CREAT SERPL: 15 (ref 12–28)
CALCIUM SERPL-MCNC: 9.1 MG/DL (ref 8.7–10.3)
CASTS URNS QL MICRO: ABNORMAL /LPF
CHLORIDE SERPL-SCNC: 102 MMOL/L (ref 96–106)
CHOLEST SERPL-MCNC: 128 MG/DL (ref 100–199)
CO2 SERPL-SCNC: 27 MMOL/L (ref 18–29)
COLOR UR: YELLOW
CREAT SERPL-MCNC: 0.61 MG/DL (ref 0.57–1)
CRYSTALS URNS MICRO: ABNORMAL
EOSINOPHIL # BLD AUTO: 0.3 X10E3/UL (ref 0–0.4)
EOSINOPHIL NFR BLD AUTO: 4 %
EPI CELLS #/AREA URNS HPF: ABNORMAL /HPF
ERYTHROCYTE [DISTWIDTH] IN BLOOD BY AUTOMATED COUNT: 13.2 % (ref 12.3–15.4)
EST. AVERAGE GLUCOSE BLD GHB EST-MCNC: 143 MG/DL
GLOBULIN SER CALC-MCNC: 2.2 G/DL (ref 1.5–4.5)
GLUCOSE SERPL-MCNC: 106 MG/DL (ref 65–99)
GLUCOSE UR QL: NEGATIVE
HBA1C MFR BLD: 6.6 % (ref 4.8–5.6)
HCT VFR BLD AUTO: 43.3 % (ref 34–46.6)
HDLC SERPL-MCNC: 61 MG/DL
HGB BLD-MCNC: 14.3 G/DL (ref 11.1–15.9)
HGB UR QL STRIP: NEGATIVE
IMM GRANULOCYTES # BLD: 0 X10E3/UL (ref 0–0.1)
IMM GRANULOCYTES NFR BLD: 0 %
KETONES UR QL STRIP: NEGATIVE
LDLC SERPL CALC-MCNC: 38 MG/DL (ref 0–99)
LEUKOCYTE ESTERASE UR QL STRIP: ABNORMAL
LYMPHOCYTES # BLD AUTO: 2.4 X10E3/UL (ref 0.7–3.1)
LYMPHOCYTES NFR BLD AUTO: 33 %
MCH RBC QN AUTO: 31.2 PG (ref 26.6–33)
MCHC RBC AUTO-ENTMCNC: 33 G/DL (ref 31.5–35.7)
MCV RBC AUTO: 95 FL (ref 79–97)
MICRO URNS: ABNORMAL
MONOCYTES # BLD AUTO: 0.5 X10E3/UL (ref 0.1–0.9)
MONOCYTES NFR BLD AUTO: 7 %
MUCOUS THREADS URNS QL MICRO: PRESENT
NEUTROPHILS # BLD AUTO: 4 X10E3/UL (ref 1.4–7)
NEUTROPHILS NFR BLD AUTO: 55 %
NITRITE UR QL STRIP: NEGATIVE
PH UR STRIP: 6.5 [PH] (ref 5–7.5)
PLATELET # BLD AUTO: 332 X10E3/UL (ref 150–379)
POTASSIUM SERPL-SCNC: 3.7 MMOL/L (ref 3.5–5.2)
PROT SERPL-MCNC: 6.6 G/DL (ref 6–8.5)
PROT UR QL STRIP: NEGATIVE
RBC # BLD AUTO: 4.58 X10E6/UL (ref 3.77–5.28)
RBC #/AREA URNS HPF: ABNORMAL /HPF
SODIUM SERPL-SCNC: 141 MMOL/L (ref 134–144)
SP GR UR: 1.01 (ref 1–1.03)
TRIGL SERPL-MCNC: 143 MG/DL (ref 0–149)
TSH SERPL DL<=0.005 MIU/L-ACNC: 1.25 UIU/ML (ref 0.45–4.5)
UNIDENT CRYS URNS QL MICRO: PRESENT
UROBILINOGEN UR STRIP-MCNC: 0.2 MG/DL (ref 0.2–1)
VLDLC SERPL CALC-MCNC: 29 MG/DL (ref 5–40)
WBC # BLD AUTO: 7.2 X10E3/UL (ref 3.4–10.8)
WBC #/AREA URNS HPF: ABNORMAL /HPF

## 2017-09-06 NOTE — PROGRESS NOTES
There may be a uti. If symptoms are present obtain culture and start treatment. If no symptoms, obtain culture and treat only if positive.    Will review rest of labs following return of urine culture

## 2017-09-06 NOTE — PROGRESS NOTES
Advised pt her urinalysis shows she may have a UTI. Denies any symptoms at this time. MD recommends she return for urine culture - will treat if positive. Lab slip at . Pt will return tomorrow. MD will review rest of labs following return of urine culture.

## 2017-09-07 ENCOUNTER — HOSPITAL ENCOUNTER (OUTPATIENT)
Dept: LAB | Age: 72
Discharge: HOME | End: 2017-09-07
Payer: MEDICARE

## 2017-09-07 PROCEDURE — 87086 URINE CULTURE/COLONY COUNT: CPT

## 2017-09-14 RX ORDER — PYRIDOSTIGMINE BROMIDE 60 MG/1
TABLET ORAL
Qty: 90 TAB | Refills: 0 | Status: SHIPPED | OUTPATIENT
Start: 2017-09-14 | End: 2017-10-10 | Stop reason: SDUPTHER

## 2017-09-22 LAB — BACTERIA UR CULT: NORMAL

## 2017-10-10 ENCOUNTER — OFFICE VISIT (OUTPATIENT)
Dept: INTERNAL MEDICINE CLINIC | Age: 72
End: 2017-10-10

## 2017-10-10 VITALS
HEART RATE: 76 BPM | TEMPERATURE: 98.5 F | HEIGHT: 66 IN | DIASTOLIC BLOOD PRESSURE: 76 MMHG | SYSTOLIC BLOOD PRESSURE: 120 MMHG | WEIGHT: 148.4 LBS | RESPIRATION RATE: 20 BRPM | BODY MASS INDEX: 23.85 KG/M2 | OXYGEN SATURATION: 96 %

## 2017-10-10 DIAGNOSIS — K58.2 IRRITABLE BOWEL SYNDROME WITH BOTH CONSTIPATION AND DIARRHEA: Primary | ICD-10-CM

## 2017-10-10 DIAGNOSIS — R11.0 NAUSEA: ICD-10-CM

## 2017-10-10 RX ORDER — ONDANSETRON 4 MG/1
4 TABLET, ORALLY DISINTEGRATING ORAL
Qty: 20 TAB | Refills: 1 | Status: SHIPPED | OUTPATIENT
Start: 2017-10-10 | End: 2018-01-18

## 2017-10-10 NOTE — MR AVS SNAPSHOT
Visit Information Date & Time Provider Department Dept. Phone Encounter #  
 10/10/2017 11:45 AM Lety Presley MD [facility] [phone] [id number] Follow-up Instructions Return if symptoms worsen or fail to improve. Your Appointments 10/17/2017  9:40 AM  
ESTABLISHED PATIENT with Edilma Domínguez MD  
[facility] ([address]) Appt Note: 3 mnth f/u  
 [address] [facility] [address]  
[address] [place] [address]  
  
    
 3/13/2018  9:35 AM  
ROUTINE CARE with Lety Presley MD  
[facility] [address]) Appt Note: 6 month f/u  
 [address] [facility] [address]  
[address] [address] [address] [place] [address] Upcoming Health Maintenance Date Due MICROALBUMIN Q1 1/5/2018 EYE EXAM RETINAL OR DILATED Q1 2/9/2018 HEMOGLOBIN A1C Q6M 3/5/2018 FOOT EXAM Q1 4/25/2018 MEDICARE YEARLY EXAM 8/1/2018 LIPID PANEL Q1 9/5/2018 GLAUCOMA SCREENING Q2Y 2/9/2019 BREAST CANCER SCRN MAMMOGRAM 2/28/2019 DTaP/Tdap/Td series (2 - Td) 8/17/2023 COLONOSCOPY 6/27/2026 Allergies as of 10/10/2017  Review Complete On: 10/10/2017 By: Lety Presley MD  
  
 Severity Noted Reaction Type Reactions Adhesive  09/08/2009    Itching Hydrocodone  07/19/2010    Itching Lorazepam  04/02/2015    Other (comments) Other Medication  09/08/2009    Rash Tide detergent Percocet [Oxycodone-acetaminophen]  09/08/2009    Itching Sudafed [Pseudoephedrine Hcl]  10/17/2010   Side Effect Other (comments) Vertigo Wellbutrin [Bupropion Hcl]  08/31/2011    Nausea and Vomiting Zithromax [Azithromycin]  06/22/2011    Vertigo Zyrtec [Cetirizine]  10/14/2009    Nausea Only Current Immunizations  Reviewed on 8/15/2017 Name Date Influenza High Dose Vaccine PF 8/14/2017, 8/29/2013 Influenza Vaccine 8/8/2016, 10/1/2015, 9/15/2014 Influenza Vaccine Whole 9/25/2012 Pneumococcal Conjugate (PCV-13) 3/17/2015 Pneumococcal Polysaccharide (PPSV-23) 4/23/2016 TDAP Vaccine 7/3/2012 Tdap 8/17/2013 Varicella Virus Vaccine Live 9/1/2007 ZZZ-RETIRED (DO NOT USE) Pneumococcal Vaccine (Unspecified Type) 12/1/2009 Not reviewed this visit You Were Diagnosed With   
  
 Codes Comments Irritable bowel syndrome with both constipation and diarrhea    -  Primary ICD-10-CM: N57.2 ICD-9-CM: 938.0 Nausea     ICD-10-CM: R11.0 ICD-9-CM: 787.02 Vitals BP Pulse Temp Resp Height(growth percentile) Weight(growth percentile) 120/76 (BP 1 Location: Right arm, BP Patient Position: Sitting) 76 98.5 °F (36.9 °C) (Oral) 20 5' 5.5\" (1.664 m) 148 lb 6.4 oz (67.3 kg) SpO2 BMI OB Status Smoking Status 96% 24.32 kg/m2 Hysterectomy Never Smoker BMI and BSA Data Body Mass Index Body Surface Area  
 24.32 kg/m 2 1.76 m 2 Preferred Pharmacy Pharmacy Name Phone [facility] [address] [place] [address] [phone] Your Updated Medication List  
  
   
This list is accurate as of: 10/10/17 12:29 PM.  Always use your most recent med list.  
  
  
  
  
 acetaminophen 325 mg tablet Commonly known as:  TYLENOL Take 325 mg by mouth every four (4) hours as needed for Pain. ANTI-DIARRHEA PO Take  by mouth. Otc med. ANTI-NAUSEA PO Take  by mouth. otc med. atorvastatin 20 mg tablet Commonly known as:  LIPITOR  
TAKE 1 TABLET BY MOUTH EVERY EVENING  
  
 carbidopa-levodopa  mg per tablet Commonly known as:  SINEMET Take 1 Tab by mouth two (2) times a day. droxidopa 100 mg Cap capsule Commonly known as:  Gillermina Sensor Take 2 Caps by mouth three (3) times daily.  Per Dr Felicia Muñoz  Indications: Symptomatic Orthostatic Hypotension  
  
 fexofenadine 180 mg tablet Commonly known as:  Marina Chock Take 180 mg by mouth daily. fludrocortisone 0.1 mg tablet Commonly known as:  FLORINEF  
TAKE 1 TABLET BY MOUTH TWICE DAILY  
  
 meclizine 25 mg tablet Commonly known as:  ANTIVERT Take 1 Tab by mouth three (3) times daily as needed. Indications: VERTIGO  
  
 ondansetron 4 mg disintegrating tablet Commonly known as:  ZOFRAN ODT Take 1 Tab by mouth every eight (8) hours as needed for Nausea. PARoxetine 30 mg tablet Commonly known as:  PAXIL Take 30 mg by mouth daily. Leatha Us NP/ Dr Yue De Los Santos  
  
 potassium chloride 20 mEq tablet Commonly known as:  K-DUR, KLOR-CON Take 1 Tab by mouth daily. pyridostigmine 60 mg tablet Commonly known as:  MESTINON  
TAKE 1 TABLET BY MOUTH THREE TIMES DAILY QUEtiapine 25 mg tablet Commonly known as:  SEROquel Take 25 mg by mouth nightly as needed. Take 1-2 tabs qhs prn Leatha Us NP/Dr Yue De Los Santos Prescriptions Sent to Pharmacy Refills  
 ondansetron (ZOFRAN ODT) 4 mg disintegrating tablet 1 Sig: Take 1 Tab by mouth every eight (8) hours as needed for Nausea. Class: Normal  
 Pharmacy: [facility] [address] [place] [address] Ph #: [phone] Route: Oral  
  
We Performed the Following C DIFFICILE TOXIN A & B BY EIA [26643 CPT(R)] CULTURE, STOOL [id number] CPT(R)] OVA & PARASITES, STOOL [id number] CPT(R)] REFERRAL TO GASTROENTEROLOGY [RIF93 Custom] WBC, STOOL [55674 CPT(R)] Follow-up Instructions Return if symptoms worsen or fail to improve. Referral Information Referral ID Referred By Referred To  
  
 [id number] G[address] [facility] [address] [place] [address] Phone: [phone] Fax: [phone] Visits Status Start Date End Date 1 New Request 10/10/17 10/10/18 If your referral has a status of pending review or denied, additional information will be sent to support the outcome of this decision. Patient Instructions Irritable Bowel Syndrome: Care Instructions Your Care Instructions Irritable bowel syndrome, or IBS, is a problem with the intestines that causes belly pain, bloating, gas, constipation, and diarrhea. The cause of IBS is not well known. IBS can last for many years, but it does not get worse over time or lead to serious disease. Most people can control their symptoms by changing their diet and reducing stress. Follow-up care is a key part of your treatment and safety. Be sure to make and go to all appointments, and call your doctor if you are having problems. It's also a good idea to know your test results and keep a list of the medicines you take. How can you care for yourself at home? · For constipation: 
¨ Include fruits, vegetables, beans, and whole grains in your diet each day. These foods are high in fiber. ¨ Drink plenty of fluids, enough so that your urine is light yellow or clear like water. If you have kidney, heart, or liver disease and have to limit fluids, talk with your doctor before you increase the amount of fluids you drink. ¨ Get some exercise every day. Build up slowly to 30 to 60 minutes a day on 5 or more days of the week. ¨ Take a fiber supplement, such as Citrucel or Metamucil, every day if needed. Read and follow all instructions on the label. ¨ Schedule time each day for a bowel movement. Having a daily routine may help. Take your time and do not strain when having a bowel movement. · If you often have diarrhea, limit foods and drinks that make it worse.  These are different for each person but may include caffeine (found in coffee, tea, chocolate, and cola drinks), alcohol, fatty foods, gas-producing foods (such as beans, cabbage, and broccoli), some dairy products, and spicy foods. Do not eat candy or gum that contains sorbitol. · Keep a daily diary of what you eat and what symptoms you have. This may help find foods that cause you problems. · Eat slowly. Try to make mealtime relaxing. · Find ways to reduce stress. · Get at least 30 minutes of exercise on most days of the week. Exercise can help reduce tension and prevent constipation. Walking is a good choice. You also may want to do other activities, such as running, swimming, cycling, or playing tennis or team sports. When should you call for help? Call your doctor now or seek immediate medical care if: 
· Your pain is different than usual or occurs with fever. · You lose weight without trying, or you lose your appetite and you do not know why. · Your symptoms often wake you from sleep. · Your stools are black and tarlike or have streaks of blood. Watch closely for changes in your health, and be sure to contact your doctor if: 
· Your IBS symptoms get worse or begin to disrupt your day-to-day life. · You become more tired than usual. 
· Your home treatment stops working. Where can you learn more? Go to http://ash-jeronimo.info/. Enter R855 in the search box to learn more about \"Irritable Bowel Syndrome: Care Instructions. \" Current as of: August 9, 2016 Content Version: 11.3 © 2594-2498 Movitas Mobile. Care instructions adapted under license by ShowNearby (which disclaims liability or warranty for this information). If you have questions about a medical condition or this instruction, always ask your healthcare professional. [place] [address] any warranty or liability for your use of this information. Diet for Irritable Bowel Syndrome: Care Instructions Your Care Instructions Irritable bowel syndrome, or IBS, is a problem with the intestines. IBS can cause belly pain, bloating, gas, constipation, and diarrhea.  Most people can control their symptoms by changing their diet and easing stress. No specific foods cause everyone with IBS to have symptoms. Doctors don't offer a specific diet to manage symptoms. But many people find that they feel better when they stop eating certain foods. A high-fiber diet may help if you have constipation. Follow-up care is a key part of your treatment and safety. Be sure to make and go to all appointments, and call your doctor if you are having problems. It's also a good idea to know your test results and keep a list of the medicines you take. How can you care for yourself at home? To reduce constipation · Include fruits, vegetables, beans, and whole grains in your diet each day. These foods are high in fiber. Slowly increase the amount of fiber you eat. This helps you avoid a lot of gas. · Drink plenty of fluids, enough so that your urine is light yellow or clear like water. If you have kidney, heart, or liver disease and have to limit fluids, talk with your doctor before you increase the amount of fluids you drink. · Get some exercise every day. Build up slowly to 30 to 60 minutes a day on 5 or more days of the week. · Take a fiber supplement, such as Citrucel or Metamucil, every day if needed. Read and follow all instructions on the label. · Schedule time each day for a bowel movement. Having a daily routine may help. Take your time and do not strain when having a bowel movement. · Check with your doctor before you increase the amount of fiber in your diet. For some people who have IBS, eating more fiber may make some symptoms worse. This includes bloating. To reduce diarrhea You may try giving up foods or drinks one at a time to see whether symptoms improve. Limit or avoid the following: · Alcohol · Caffeine, which is found in coffee, tea, cola drinks, and chocolate · Nicotine, from smoking or chewing tobacco 
· Gas-producing foods, such as beans, broccoli, cabbage, and apples · Dairy products that contain lactose (milk sugar), such as ice cream, milk, cheese, and sour cream 
· Foods and drinks high in sugar, especially fruit juice, soda, candy, and other packaged sweets (such as cookies) · Foods high in fat, including cardona, sausage, butter, oils, and anything deep-fried · Sorbitol and xylitol, artificial sweeteners found in some sugarless candies and chewing gum Keep track of foods · Some people with IBS use a daily food diary to keep track of what they eat and whether they have any symptoms after eating certain foods. The diary also can be a good way to record what is going on in your life. · Stress plays a role in IBS. So if you are aware that certain stresses bring on symptoms, you can try to reduce those stresses. Keep mealtimes pleasant · Try to maintain a pleasant environment when you eat. This may reduce stress that can make symptoms likely to occur. · Give yourself plenty of time to eat, rather than eating on the go. Chew your food slowly. Try not to swallow air, which can cause bloating. Where can you learn more? Go to http://ahs-jeronimo.info/. Enter H771 in the search box to learn more about \"Diet for Irritable Bowel Syndrome: Care Instructions. \" Current as of: July 26, 2016 Content Version: 11.3 © 3052-6650 IMayGou. Care instructions adapted under license by iContainers (which disclaims liability or warranty for this information). If you have questions about a medical condition or this instruction, always ask your healthcare professional. [place] [address] any warranty or liability for your use of this information. Introducing [facility]! Dear Trey Bautista: Thank you for requesting a Recipharm account. Our records indicate that you already have an active Recipharm account. You can access your account anytime at [email or web address] Did you know that you can access your hospital and ER discharge instructions at any time in TechLoaner? You can also review all of your test results from your hospital stay or ER visit. Additional Information If you have questions, please visit the Frequently Asked Questions section of the TechLoaner website at [email or web address] Remember, TechLoaner is NOT to be used for urgent needs. For medical emergencies, dial 911. Now available from your iPhone and Android! Please provide this summary of care documentation to your next provider. Your primary care clinician is listed as RYLEE RASHID. If you have any questions after today's visit, please call [phone].

## 2017-10-10 NOTE — PATIENT INSTRUCTIONS
Irritable Bowel Syndrome: Care Instructions  Your Care Instructions  Irritable bowel syndrome, or IBS, is a problem with the intestines that causes belly pain, bloating, gas, constipation, and diarrhea. The cause of IBS is not well known. IBS can last for many years, but it does not get worse over time or lead to serious disease. Most people can control their symptoms by changing their diet and reducing stress. Follow-up care is a key part of your treatment and safety. Be sure to make and go to all appointments, and call your doctor if you are having problems. It's also a good idea to know your test results and keep a list of the medicines you take. How can you care for yourself at home? · For constipation:  ¨ Include fruits, vegetables, beans, and whole grains in your diet each day. These foods are high in fiber. ¨ Drink plenty of fluids, enough so that your urine is light yellow or clear like water. If you have kidney, heart, or liver disease and have to limit fluids, talk with your doctor before you increase the amount of fluids you drink. ¨ Get some exercise every day. Build up slowly to 30 to 60 minutes a day on 5 or more days of the week. ¨ Take a fiber supplement, such as Citrucel or Metamucil, every day if needed. Read and follow all instructions on the label. ¨ Schedule time each day for a bowel movement. Having a daily routine may help. Take your time and do not strain when having a bowel movement. · If you often have diarrhea, limit foods and drinks that make it worse. These are different for each person but may include caffeine (found in coffee, tea, chocolate, and cola drinks), alcohol, fatty foods, gas-producing foods (such as beans, cabbage, and broccoli), some dairy products, and spicy foods. Do not eat candy or gum that contains sorbitol. · Keep a daily diary of what you eat and what symptoms you have. This may help find foods that cause you problems. · Eat slowly.  Try to make mealtime relaxing. · Find ways to reduce stress. · Get at least 30 minutes of exercise on most days of the week. Exercise can help reduce tension and prevent constipation. Walking is a good choice. You also may want to do other activities, such as running, swimming, cycling, or playing tennis or team sports. When should you call for help? Call your doctor now or seek immediate medical care if:  · Your pain is different than usual or occurs with fever. · You lose weight without trying, or you lose your appetite and you do not know why. · Your symptoms often wake you from sleep. · Your stools are black and tarlike or have streaks of blood. Watch closely for changes in your health, and be sure to contact your doctor if:  · Your IBS symptoms get worse or begin to disrupt your day-to-day life. · You become more tired than usual.  · Your home treatment stops working. Where can you learn more? Go to http://ashA LITTLE WORLDjeronimo.info/. Enter H227 in the search box to learn more about \"Irritable Bowel Syndrome: Care Instructions. \"  Current as of: August 9, 2016  Content Version: 11.3  © 0656-0255 Scality. Care instructions adapted under license by Cook Taste Eat (which disclaims liability or warranty for this information). If you have questions about a medical condition or this instruction, always ask your healthcare professional. [address] any warranty or liability for your use of this information. Diet for Irritable Bowel Syndrome: Care Instructions  Your Care Instructions  Irritable bowel syndrome, or IBS, is a problem with the intestines. IBS can cause belly pain, bloating, gas, constipation, and diarrhea. Most people can control their symptoms by changing their diet and easing stress. No specific foods cause everyone with IBS to have symptoms. Doctors don't offer a specific diet to manage symptoms.  But many people find that they feel better when they stop eating certain foods. A high-fiber diet may help if you have constipation. Follow-up care is a key part of your treatment and safety. Be sure to make and go to all appointments, and call your doctor if you are having problems. It's also a good idea to know your test results and keep a list of the medicines you take. How can you care for yourself at home? To reduce constipation  · Include fruits, vegetables, beans, and whole grains in your diet each day. These foods are high in fiber. Slowly increase the amount of fiber you eat. This helps you avoid a lot of gas. · Drink plenty of fluids, enough so that your urine is light yellow or clear like water. If you have kidney, heart, or liver disease and have to limit fluids, talk with your doctor before you increase the amount of fluids you drink. · Get some exercise every day. Build up slowly to 30 to 60 minutes a day on 5 or more days of the week. · Take a fiber supplement, such as Citrucel or Metamucil, every day if needed. Read and follow all instructions on the label. · Schedule time each day for a bowel movement. Having a daily routine may help. Take your time and do not strain when having a bowel movement. · Check with your doctor before you increase the amount of fiber in your diet. For some people who have IBS, eating more fiber may make some symptoms worse. This includes bloating. To reduce diarrhea  You may try giving up foods or drinks one at a time to see whether symptoms improve.  Limit or avoid the following:  · Alcohol  · Caffeine, which is found in coffee, tea, cola drinks, and chocolate  · Nicotine, from smoking or chewing tobacco  · Gas-producing foods, such as beans, broccoli, cabbage, and apples  · Dairy products that contain lactose (milk sugar), such as ice cream, milk, cheese, and sour cream  · Foods and drinks high in sugar, especially fruit juice, soda, candy, and other packaged sweets (such as cookies)  · Foods high in fat, including cardona, sausage, butter, oils, and anything deep-fried  · Sorbitol and xylitol, artificial sweeteners found in some sugarless candies and chewing gum  Keep track of foods  · Some people with IBS use a daily food diary to keep track of what they eat and whether they have any symptoms after eating certain foods. The diary also can be a good way to record what is going on in your life. · Stress plays a role in IBS. So if you are aware that certain stresses bring on symptoms, you can try to reduce those stresses. Keep mealtimes pleasant  · Try to maintain a pleasant environment when you eat. This may reduce stress that can make symptoms likely to occur. · Give yourself plenty of time to eat, rather than eating on the go. Chew your food slowly. Try not to swallow air, which can cause bloating. Where can you learn more? Go to http://ash-jeronimo.info/. Enter U761 in the search box to learn more about \"Diet for Irritable Bowel Syndrome: Care Instructions. \"  Current as of: July 26, 2016  Content Version: 11.3  © 7370-4336 Mobbr Crowd Payments. Care instructions adapted under license by Kaikeba.com (which disclaims liability or warranty for this information). If you have questions about a medical condition or this instruction, always ask your healthcare professional. [address] any warranty or liability for your use of this information.

## 2017-10-10 NOTE — PROGRESS NOTES
HISTORY OF PRESENT ILLNESS  Sebastian Rouse is a 67 y.o. female. Diarrhea    The history is provided by the patient. This is a recurrent problem. Episode onset: flare up 6 wks. The problem has not changed since onset. There has been no fever. The stool consistency is described as loose. Associated symptoms include back pain. Pertinent negatives include no vomiting, no chills, no sweats and no anal bleeding. She has tried anti-motility drugs for the symptoms. The treatment provided mild relief. Her past medical history is significant for irritable bowel syndrome. Nausea    The history is provided by the patient. This is a recurrent problem. The problem has not changed since onset. Associated symptoms include diarrhea. Pertinent negatives include no chills and no sweats. Her past medical history is significant for irritable bowel syndrome. Review of Systems   Constitutional: Negative for chills. Gastrointestinal: Positive for diarrhea and nausea. Negative for anal bleeding and vomiting. Musculoskeletal: Positive for back pain. Chronic       Physical Exam   Constitutional: No distress. Cardiovascular: Normal rate and regular rhythm. Exam reveals no gallop and no friction rub. No murmur heard. Pulmonary/Chest: Effort normal and breath sounds normal.   Abdominal: Soft. Bowel sounds are normal. She exhibits distension. She exhibits no mass. There is no tenderness. There is no rebound and no guarding. Nursing note and vitals reviewed. ASSESSMENT and PLAN  Diagnoses and all orders for this visit:    1. Irritable bowel syndrome with both constipation and diarrhea  -     WBC, STOOL  -     OVA & PARASITES, STOOL  -     CULTURE, STOOL  -     C DIFFICILE TOXIN A & B BY EIA  -     REFERRAL TO GASTROENTEROLOGY    2. Nausea  -     ondansetron (ZOFRAN ODT) 4 mg disintegrating tablet; Take 1 Tab by mouth every eight (8) hours as needed for Nausea.

## 2017-10-14 ENCOUNTER — HOSPITAL ENCOUNTER (OUTPATIENT)
Dept: LAB | Age: 72
Discharge: HOME | End: 2017-10-14
Payer: MEDICARE

## 2017-10-14 PROCEDURE — 87427 SHIGA-LIKE TOXIN AG IA: CPT

## 2017-10-14 PROCEDURE — 87209 SMEAR COMPLEX STAIN: CPT

## 2017-10-14 PROCEDURE — 87324 CLOSTRIDIUM AG IA: CPT

## 2017-10-17 ENCOUNTER — HOSPITAL ENCOUNTER (OUTPATIENT)
Dept: LAB | Age: 72
Discharge: HOME | End: 2017-10-17
Payer: MEDICARE

## 2017-10-17 ENCOUNTER — OFFICE VISIT (OUTPATIENT)
Dept: CARDIOLOGY CLINIC | Age: 72
End: 2017-10-17

## 2017-10-17 VITALS
BODY MASS INDEX: 25.29 KG/M2 | SYSTOLIC BLOOD PRESSURE: 106 MMHG | WEIGHT: 151.8 LBS | DIASTOLIC BLOOD PRESSURE: 64 MMHG | HEIGHT: 65 IN | HEART RATE: 80 BPM

## 2017-10-17 DIAGNOSIS — E11.9 CONTROLLED TYPE 2 DIABETES MELLITUS WITHOUT COMPLICATION, WITHOUT LONG-TERM CURRENT USE OF INSULIN (HCC): ICD-10-CM

## 2017-10-17 DIAGNOSIS — I95.1 ORTHOSTATIC HYPOTENSION: ICD-10-CM

## 2017-10-17 DIAGNOSIS — R11.0 NAUSEA: ICD-10-CM

## 2017-10-17 DIAGNOSIS — R55 SYNCOPE AND COLLAPSE: Primary | ICD-10-CM

## 2017-10-17 DIAGNOSIS — K58.2 IRRITABLE BOWEL SYNDROME WITH BOTH CONSTIPATION AND DIARRHEA: ICD-10-CM

## 2017-10-17 DIAGNOSIS — R55 SYNCOPE AND COLLAPSE: ICD-10-CM

## 2017-10-17 PROCEDURE — 89055 LEUKOCYTE ASSESSMENT FECAL: CPT

## 2017-10-17 RX ORDER — DIPHENOXYLATE HYDROCHLORIDE AND ATROPINE SULFATE 2.5; .025 MG/1; MG/1
TABLET ORAL
COMMUNITY
End: 2018-01-18

## 2017-10-17 RX ORDER — DROXIDOPA 100 MG/1
100 CAPSULE ORAL 2 TIMES DAILY
Qty: 180 CAP | Refills: 12
Start: 2017-10-17 | End: 2017-12-13 | Stop reason: SDUPTHER

## 2017-10-17 NOTE — PROGRESS NOTES
Cardiac Electrophysiology Office Note     Subjective:      Jeyson Bagley is a 67 y.o. female patient who is seen after starting Northera. She is taking 200 mg TID. She called in August c/o persistent nausea and lightheadedness with the increase dose. I instructed her decrease dose back to 100 mg TID, but she did not do this. She saw Dr Jonathan Morales yesterday and he sent stool samples. She has been vomitting  Reportedly BP sitting 130-140. No syncope. She is now using a rolling walker. She is better but intermittently she needs pindolol for SBP > 160 mmHg. She says she is wearing her compression stockings and back binder daily with her current regiment of northera, mestinon and florinef. Lexican cardiolite stress test 9/2016 showed normal perfusion  LVEF vigorous 85%. Dr Quita Jasso 8/26/16: CTA at [facility] 5/7/16 small PE RLL  6/9/16 CTA [facility] normal CTA no PE  Hypercoagulable work up and d dimer pending- on xarelto until results return    She had a tilt table test 8/2/16. Tilt table test revealed the patient had orthostatic hypotension response with sudden change in upright position and resolved quickly when she became supine  She has been on midodrine 5 mg tid. Patient was seen by neurologist in hospital and not clear she will have autonomic dysfunction and Parkinson's disease. I recommended to add mestinon 60 mg tid. She is using compression stockings bought from [facility] for IBS, RLS, Bipolar d/s, fibromyalgia, DDD, Paget's dz, dyslipidemia, DM, and PTE on Xarelto 3 months. She reports 3 episodes of syncopal episodes with \"almost blacking out\" which prompted her to come to the ED. Long standing h/o hypotension with orthostatics, but never blackouts. She admits to seeing Dr. Roxy Miramontes 6-7 years ago and completing an Echo and stress test for which all were normal. Recent h/o PTE (5/2016) and on Xarelto currently.       Patient Active Problem List    Diagnosis Date Noted    Spinal stenosis     Near syncope 08/02/2016    Resting tremor 07/29/2016    Orthostatic hypotension 07/05/2016    Advance directive on file 05/24/2016    Pulmonary embolism ([facility] [address]     Mixed hyperlipidemia 02/02/2016    Diabetes mellitus type 2, controlled, without complications ([facility] [address] 61/40/2867    Tubulovillous adenoma 08/21/2012    Abnormal mammogram 06/04/2012    Encounter for long-term (current) use of other medications 11/30/2011    Hammertoe 09/12/2011    Allergic rhinitis, cause unspecified 01/26/2011    Other diseases of nasal cavity and sinuses(478.19) 07/19/2010    IBS (irritable bowel syndrome)     RLS (restless legs syndrome)     Bipolar disorder (HCC)     Fibromyalgia     DJD (degenerative joint disease)     Lumbar disc disease     Paget's disease of bone     Migraine     Genital herpes      Current Outpatient Prescriptions   Medication Sig Dispense Refill    diphenoxylate-atropine (LOMOTIL) 2.5-0.025 mg per tablet Take  by mouth four (4) times daily as needed for Diarrhea.  droxidopa (NORTHERA) 100 mg cap capsule Take 1 Cap by mouth two (2) times a day. Per Dr Herbert Conway  Indications: Symptomatic Orthostatic Hypotension 180 Cap 12    ondansetron (ZOFRAN ODT) 4 mg disintegrating tablet Take 1 Tab by mouth every eight (8) hours as needed for Nausea. 20 Tab 1    carbidopa-levodopa (SINEMET)  mg per tablet Take 1 Tab by mouth two (2) times a day.  fludrocortisone (FLORINEF) 0.1 mg tablet TAKE 1 TABLET BY MOUTH TWICE DAILY 180 Tab 0    meclizine (ANTIVERT) 25 mg tablet Take 1 Tab by mouth three (3) times daily as needed. Indications: VERTIGO 30 Tab 3    atorvastatin (LIPITOR) 20 mg tablet TAKE 1 TABLET BY MOUTH EVERY EVENING 90 Tab 11    potassium chloride (K-DUR, KLOR-CON) 20 mEq tablet Take 1 Tab by mouth daily. 30 Tab 11    PARoxetine (PAXIL) 30 mg tablet Take 30 mg by mouth daily.  Martin Fraire NP/ Dr Samson Genaro acetaminophen (TYLENOL) 325 mg tablet Take 325 mg by mouth every four (4) hours as needed for Pain.  fexofenadine (ALLEGRA) 180 mg tablet Take 180 mg by mouth daily.  pyridostigmine (MESTINON) 60 mg tablet TAKE 1 TABLET BY MOUTH THREE TIMES DAILY (Patient taking differently: TAKE 1 TABLET BY MOUTH THREE TIMES DAILY Dr Loya January) 90 Tab 1    QUEtiapine (SEROQUEL) 25 mg tablet Take 25 mg by mouth nightly as needed.  Take 1-2 tabs qhs prn Chaparrita Pale NP/Dr Gutierrez Malin       Allergies   Allergen Reactions    Adhesive Itching    Hydrocodone Itching    Lorazepam Other (comments)    Other Medication Rash     Tide detergent    Percocet [Oxycodone-Acetaminophen] Itching    Sudafed [Pseudoephedrine Hcl] Other (comments)     Vertigo    Wellbutrin [Bupropion Hcl] Nausea and Vomiting    Zithromax [Azithromycin] Vertigo    Zyrtec [Cetirizine] Nausea Only     Past Medical History:   Diagnosis Date    Bipolar disorder (HCC)     Nazmy    Chronic pain     BACK PAIN    Diabetes ([address]     BORDERLINE TX WITH DIET AND EXERCISE    DJD (degenerative joint disease)     Fibromyalgia     Genital herpes     GERD (gastroesophageal reflux disease)     Hypercholesterolemia     IBS (irritable bowel syndrome)     Lumbar disc disease     stimulation-Honza    Migraine     Nausea & vomiting     Other ill-defined conditions(799.89)     SEASONAL allergies    Other ill-defined conditions(799.89)     dysphagia has had esophagus dilated    Paget's disease     Pulmonary embolism (HCC)     RLS (restless legs syndrome)     Spinal stenosis      Past Surgical History:   Procedure Laterality Date    COLONOSCOPY N/A 6/27/2016    COLONOSCOPY performed by Lukas Auguste MD at [facility] ENDOSCOPY    ENDOSCOPY, COLON, DIAGNOSTIC      12, due 15    HX APPENDECTOMY      HX BACK SURGERY  9/17/2013    back surgery - total of 3 as of 12/20/2013    HX BREAST BIOPSY Right years ago    negative    HX CHOLECYSTECTOMY      HX HEENT      T & A    HX HYSTERECTOMY      total for fibroid tumors    HX ORTHOPAEDIC      lumbar laminectomy then fusion/neurostimulator implanted - inactive now but still in    HX ORTHOPAEDIC      REMOVAL OF NEUROSTIMULATOR    HX OTHER SURGICAL      EGD x 2 with dilation/colonoscopy - no polyps per pt    TILT TABLE EVALUATION  8/2/2016          Family History   Problem Relation Age of Onset    Colon Cancer Mother     Cancer Mother 68     colon    Heart Disease Father     Heart Disease Maternal Grandmother     Heart Disease Maternal Grandfather     Heart Disease Other      Social History   Substance Use Topics    Smoking status: Never Smoker    Smokeless tobacco: Never Used    Alcohol use No      Comment: 1 per month        Review of Systems:   Constitutional: Negative for fever, chills, weight loss. + fatigue  HEENT: Negative for nosebleeds, vision changes. Respiratory: Negative for cough, hemoptysis, sputum production, and wheezing. Cardiovascular: no chest pain, no palpitations, orthopnea, claudication, leg swelling, syncope, and PND. Gastrointestinal:  no vomiting, she does have diarrhea, no constipation, blood in stool and melena. + nausea   Genitourinary: Negative for dysuria, and hematuria. Musculoskeletal: Negative for myalgias. + sciatica   Skin: Negative for rash. Heme: Does not bleed or bruise easily. Neurological: Negative for speech change and focal weakness     Objective:     Visit Vitals    /64 (BP 1 Location: Left arm, BP Patient Position: Sitting)    Pulse 80    Ht 5' 5\" (1.651 m)    Wt 151 lb 12.8 oz (68.9 kg)    BMI 25.26 kg/m2      Physical Exam:   Constitutional: well-developed and well-nourished. No distress. Head: Normocephalic and atraumatic. Eyes: Pupils are equal, round  Neck: supple. No JVD present. Cardiovascular: Normal rate, regular rhythm. Exam reveals no gallop and no friction rub. No murmur heard. Pulmonary/Chest: Effort normal and breath sounds normal. No wheezes. Abdominal: Soft, no tenderness. Musculoskeletal: no edema. compression stockings on  Neurological: alert,oriented. resting tremor in BUE  Skin: Skin is warm and dry  Psychiatric: normal mood and affect. Behavior is normal. Judgment and thought content normal.      Assessment/Plan:       ICD-10-CM ICD-9-CM    1. Syncope and collapse R55 780.2 LOOP MONITOR   2. Orthostatic hypotension I95.1 458.0 droxidopa (NORTHERA) 100 mg cap capsule      LOOP MONITOR   3. Irritable bowel syndrome with both constipation and diarrhea K58.2 564.1 LOOP MONITOR   4. Nausea R11.0 787.02 LOOP MONITOR   5. Controlled type 2 diabetes mellitus without complication, without long-term current use of insulin (HCC) E11.9 250.00 LOOP MONITOR      Very difficult to treat her current condition. BP better with increased dose of Northera, but I think this could be the cause of her nausea/diarrhea. She will once again decrease it  Northera and 3 other drugs to keep her BP up  She is wearing compression stockings as well  I gave her pindolol 5 for supine BP > 180 mmHg. I recommend loop monitor for sinus rate. If bradycardia, will recommend pacemaker with CLS   She sees Dr Alvin Hooper of GI for IBS  She will see neurologist Dr Jair Trinh and told that she does not have Parkinson disease     RTC 3 months    Thank you for involving me in this patient's care and please call with further concerns or questions. Anette Greene M.D.   Electrophysiology/Cardiology  [facility] [place]  [address] , [address]  [phone]

## 2017-10-17 NOTE — PROGRESS NOTES
Cardiac Electrophysiology Office Note     Subjective:      Navin Aguilar is a 67 y.o. female patient who is seen after starting Northera. She is taking 200 mg TID. She called in August c/o persistent nausea and lightheadedness with the increase dose. I instructed her decrease dose back to 100 mg TID, but she did not do this. She saw Dr Juanjose King yesterday and he sent stool samples. Reportedly BP sitting 130-140. No syncope. She is now using a rolling walker. She is better but intermittently she needs pindolol for SBP > 160 mmHg. She says she is wearing her compression stockings and back binder daily with her current regiment of northera, mestinon and florinef. Lexican cardiolite stress test 9/2016 showed normal perfusion  LVEF vigorous 85%. Dr Earlene Pino 8/26/16: [facility] at [facility] 5/7/16 small PE RLL  6/9/16  [facility] normal CTA no PE  Hypercoagulable work up and d dimer pending- on xarelto until results return    She had a tilt table test 8/2/16. Tilt table test revealed the patient had orthostatic hypotension response with sudden change in upright position and resolved quickly when she became supine  She has been on midodrine 5 mg tid. Patient was seen by neurologist in hospital and not clear she will have autonomic dysfunction and Parkinson's disease. I recommended to add mestinon 60 mg tid. She is using compression stockings bought from [facility] for IBS, RLS, Bipolar d/s, fibromyalgia, DDD, Paget's dz, dyslipidemia, DM, and PTE on Xarelto 3 months. She reports 3 episodes of syncopal episodes with \"almost blacking out\" which prompted her to come to the ED. Long standing h/o hypotension with orthostatics, but never blackouts. She admits to seeing Dr. Cathy Esparza 6-7 years ago and completing an Echo and stress test for which all were normal. Recent h/o PTE (5/2016) and on Xarelto currently.       Patient Active Problem List    Diagnosis Date Noted    Spinal stenosis     Near syncope 08/02/2016    Resting tremor 07/29/2016    Orthostatic hypotension 07/05/2016    Advance directive on file 05/24/2016    Pulmonary embolism ([facility] [address]     Mixed hyperlipidemia 02/02/2016    Diabetes mellitus type 2, controlled, without complications ([facility] [address] 15/50/6102    Tubulovillous adenoma 08/21/2012    Abnormal mammogram 06/04/2012    Encounter for long-term (current) use of other medications 11/30/2011    Hammertoe 09/12/2011    Allergic rhinitis, cause unspecified 01/26/2011    Other diseases of nasal cavity and sinuses(478.19) 07/19/2010    IBS (irritable bowel syndrome)     RLS (restless legs syndrome)     Bipolar disorder (HCC)     Fibromyalgia     DJD (degenerative joint disease)     Lumbar disc disease     Paget's disease of bone     Migraine     Genital herpes      Current Outpatient Prescriptions   Medication Sig Dispense Refill    diphenoxylate-atropine (LOMOTIL) 2.5-0.025 mg per tablet Take  by mouth four (4) times daily as needed for Diarrhea.  ondansetron (ZOFRAN ODT) 4 mg disintegrating tablet Take 1 Tab by mouth every eight (8) hours as needed for Nausea. 20 Tab 1    carbidopa-levodopa (SINEMET)  mg per tablet Take 1 Tab by mouth two (2) times a day.  fludrocortisone (FLORINEF) 0.1 mg tablet TAKE 1 TABLET BY MOUTH TWICE DAILY 180 Tab 0    meclizine (ANTIVERT) 25 mg tablet Take 1 Tab by mouth three (3) times daily as needed. Indications: VERTIGO 30 Tab 3    atorvastatin (LIPITOR) 20 mg tablet TAKE 1 TABLET BY MOUTH EVERY EVENING 90 Tab 11    droxidopa (NORTHERA) 100 mg cap capsule Take 2 Caps by mouth three (3) times daily. Per Dr Kinjal Brady  Indications: Symptomatic Orthostatic Hypotension 180 Cap 12    potassium chloride (K-DUR, KLOR-CON) 20 mEq tablet Take 1 Tab by mouth daily. 30 Tab 11    PARoxetine (PAXIL) 30 mg tablet Take 30 mg by mouth daily.  Onel Lozada NP/ Dr Yvette Martinez acetaminophen (TYLENOL) 325 mg tablet Take 325 mg by mouth every four (4) hours as needed for Pain.  fexofenadine (ALLEGRA) 180 mg tablet Take 180 mg by mouth daily.  pyridostigmine (MESTINON) 60 mg tablet TAKE 1 TABLET BY MOUTH THREE TIMES DAILY (Patient taking differently: TAKE 1 TABLET BY MOUTH THREE TIMES DAILY Dr Mary Hagan) 90 Tab 1    QUEtiapine (SEROQUEL) 25 mg tablet Take 25 mg by mouth nightly as needed.  Take 1-2 tabs qhs prn Genie Space NP/Dr Fabrice Landis       Allergies   Allergen Reactions    Adhesive Itching    Hydrocodone Itching    Lorazepam Other (comments)    Other Medication Rash     Tide detergent    Percocet [Oxycodone-Acetaminophen] Itching    Sudafed [Pseudoephedrine Hcl] Other (comments)     Vertigo    Wellbutrin [Bupropion Hcl] Nausea and Vomiting    Zithromax [Azithromycin] Vertigo    Zyrtec [Cetirizine] Nausea Only     Past Medical History:   Diagnosis Date    Bipolar disorder (HCC)     Nazmy    Chronic pain     BACK PAIN    Diabetes ([address]     BORDERLINE TX WITH DIET AND EXERCISE    DJD (degenerative joint disease)     Fibromyalgia     Genital herpes     GERD (gastroesophageal reflux disease)     Hypercholesterolemia     IBS (irritable bowel syndrome)     Lumbar disc disease     stimulation-Honza    Migraine     Nausea & vomiting     Other ill-defined conditions(799.89)     SEASONAL allergies    Other ill-defined conditions(799.89)     dysphagia has had esophagus dilated    Paget's disease     Pulmonary embolism (HCC)     RLS (restless legs syndrome)     Spinal stenosis      Past Surgical History:   Procedure Laterality Date    COLONOSCOPY N/A 6/27/2016    COL[facility] ENDOSCOPY    ENDOSCOPY, COLON, DIAGNOSTIC      12, due 15    HX APPENDECTOMY      HX BACK SURGERY  9/17/2013    back surgery - total of 3 as of 12/20/2013    HX BREAST BIOPSY Right years ago    negative    HX CHOLECYSTECTOMY      HX HEENT      T & A    HX HYSTERECTOMY      total for fibroid tumors    HX ORTHOPAEDIC lumbar laminectomy then fusion/neurostimulator implanted - inactive now but still in    HX ORTHOPAEDIC      REMOVAL OF NEUROSTIMULATOR    HX OTHER SURGICAL      EGD x 2 with dilation/colonoscopy - no polyps per pt    TILT TABLE EVALUATION  8/2/2016          Family History   Problem Relation Age of Onset    Colon Cancer Mother     Cancer Mother 68     colon    Heart Disease Father     Heart Disease Maternal Grandmother     Heart Disease Maternal Grandfather     Heart Disease Other      Social History   Substance Use Topics    Smoking status: Never Smoker    Smokeless tobacco: Never Used    Alcohol use No      Comment: 1 per month        Review of Systems:   Constitutional: Negative for fever, chills, weight loss. + fatigue  HEENT: Negative for nosebleeds, vision changes. Respiratory: Negative for cough, hemoptysis, sputum production, and wheezing. Cardiovascular: no chest pain, no palpitations, orthopnea, claudication, leg swelling, syncope, and PND. Gastrointestinal:  no vomiting, she does have diarrhea, no constipation, blood in stool and melena. + nausea   Genitourinary: Negative for dysuria, and hematuria. Musculoskeletal: Negative for myalgias. + sciatica   Skin: Negative for rash. Heme: Does not bleed or bruise easily. Neurological: Negative for speech change and focal weakness     Objective:     Visit Vitals    /64 (BP 1 Location: Left arm, BP Patient Position: Sitting)    Pulse 80    Ht 5' 5\" (1.651 m)    Wt 151 lb 12.8 oz (68.9 kg)    BMI 25.26 kg/m2      Physical Exam:   Constitutional: well-developed and well-nourished. No distress. Head: Normocephalic and atraumatic. Eyes: Pupils are equal, round  Neck: supple. No JVD present. Cardiovascular: Normal rate, regular rhythm. Exam reveals no gallop and no friction rub. No murmur heard. Pulmonary/Chest: Effort normal and breath sounds normal. No wheezes. Abdominal: Soft, no tenderness. Musculoskeletal: no edema. compression stockings on  Neurological: alert,oriented. resting tremor in BUE  Skin: Skin is warm and dry  Psychiatric: normal mood and affect. Behavior is normal. Judgment and thought content normal.      Assessment/Plan:       ICD-10-CM ICD-9-CM    1. Orthostatic hypotension I95.1 458.0    2. Irritable bowel syndrome with both constipation and diarrhea K58.2 564.1    3. Nausea R11.0 787.02    4. Controlled type 2 diabetes mellitus without complication, without long-term current use of insulin ([facility]) E11.9 250.00       Very difficult to treat her current condition. BP better with increased dose of Northera, but I think this could be the cause of her nausea. If not then we can go back to 200 mg TID. Northera and 3 other drugs to keep her BP up  She is wearing compression stockings    I gave her pindolol 5 for supine BP > 180 mmHg. She sees Dr Melinda Deng of GI for IBS  She will see neurologist Dr Kayla Zayas and told that she does not have Parkinson disease     RTC 3 months    Thank you for involving me in this patient's care and please call with further concerns or questions. Mady Kaminski M.D.   Electrophysiology/Cardiology  [facility] [place]  [address], [address] [address] , [address]  [phone]

## 2017-10-17 NOTE — PATIENT INSTRUCTIONS
Decrease Northera to 100mg twice a day. A 30 day loop monitor has been ordered for you. This will be mailed to the address given to us. Please read over the instructions given to you about [employer] loop monitors. If you have any insurance questions regarding coverage and out of pocket cost please contact the number on the instructions. You will need to follow up in clinic with Dr. Loya January in 3 months.

## 2017-10-17 NOTE — PROGRESS NOTES
Chief Complaint   Patient presents with    Follow-up     Verified medications with the patient's medication bottles     Verified pharmacy .

## 2017-10-17 NOTE — MR AVS SNAPSHOT
Visit Information Date & Time Provider Department Dept. Phone Encounter #  
 10/17/2017  9:40 AM Zainab Fernandes MD [facility] Kameron Godoy [phone] [id number] Follow-up Instructions Return in about 3 months (around 1/17/2018). Your Appointments 3/13/2018  9:35 AM  
ROUTINE CARE with Ira Winston MD  
[facility] Internal Medicine [address]) Appt Note: 6 month f/u  
 [address] [facility] [address] [facility] [address]  
[address] [address] [facility] [place] [address] Upcoming Health Maintenance Date Due MICROALBUMIN Q1 1/5/2018 EYE EXAM RETINAL OR DILATED Q1 2/9/2018 HEMOGLOBIN A1C Q6M 3/5/2018 FOOT EXAM Q1 4/25/2018 MEDICARE YEARLY EXAM 8/1/2018 LIPID PANEL Q1 9/5/2018 GLAUCOMA SCREENING Q2Y 2/9/2019 BREAST CANCER SCRN MAMMOGRAM 2/28/2019 DTaP/Tdap/Td series (2 - Td) 8/17/2023 COLONOSCOPY 6/27/2026 Allergies as of 10/17/2017  Review Complete On: 10/17/2017 By: Zainab Fernandes MD  
  
 Severity Noted Reaction Type Reactions Adhesive  09/08/2009    Itching Hydrocodone  07/19/2010    Itching Lorazepam  04/02/2015    Other (comments) Other Medication  09/08/2009    Rash Tide detergent Percocet [Oxycodone-acetaminophen]  09/08/2009    Itching Sudafed [Pseudoephedrine Hcl]  10/17/2010   Side Effect Other (comments) Vertigo Wellbutrin [Bupropion Hcl]  08/31/2011    Nausea and Vomiting Zithromax [Azithromycin]  06/22/2011    Vertigo Zyrtec [Cetirizine]  10/14/2009    Nausea Only Current Immunizations  Reviewed on 8/15/2017 Name Date Influenza High Dose Vaccine PF 8/14/2017, 8/29/2013 Influenza Vaccine 8/8/2016, 10/1/2015, 9/15/2014 Influenza Vaccine Whole 9/25/2012 Pneumococcal Conjugate (PCV-13) 3/17/2015 Pneumococcal Polysaccharide (PPSV-23) 4/23/2016 TDAP Vaccine 7/3/2012 Tdap 8/17/2013 Varicella Virus Vaccine Live 9/1/2007 ZZZ-RETIRED (DO NOT USE) Pneumococcal Vaccine (Unspecified Type) 12/1/2009 Not reviewed this visit You Were Diagnosed With   
  
 Codes Comments Syncope and collapse    -  Primary ICD-10-CM: R55 
ICD-9-CM: 780.2 Orthostatic hypotension     ICD-10-CM: I95.1 ICD-9-CM: 458.0 Irritable bowel syndrome with both constipation and diarrhea     ICD-10-CM: K58.2 ICD-9-CM: 687.8 Nausea     ICD-10-CM: R11.0 ICD-9-CM: 787.02 Controlled type 2 diabetes mellitus without complication, without long-term current use of insulin ([facility] [address]     ICD-10-CM: E11.9 ICD-9-CM: 250.00 Vitals BP Pulse Height(growth percentile) Weight(growth percentile) BMI OB Status 106/64 (BP 1 Location: Left arm, BP Patient Position: Sitting) 80 5' 5\" (1.651 m) 151 lb 12.8 oz (68.9 kg) 25.26 kg/m2 Hysterectomy Smoking Status Never Smoker Vitals History BMI and BSA Data Body Mass Index Body Surface Area  
 25.26 kg/m 2 1.78 m 2 Preferred Pharmacy Pharmacy Name Phone [facility] [address] [place] [address] [phone] Your Updated Medication List  
  
   
This list is accurate as of: 10/17/17 10:33 AM.  Always use your most recent med list.  
  
  
  
  
 acetaminophen 325 mg tablet Commonly known as:  TYLENOL Take 325 mg by mouth every four (4) hours as needed for Pain. atorvastatin 20 mg tablet Commonly known as:  LIPITOR  
TAKE 1 TABLET BY MOUTH EVERY EVENING  
  
 carbidopa-levodopa  mg per tablet Commonly known as:  SINEMET Take 1 Tab by mouth two (2) times a day. diphenoxylate-atropine 2.5-0.025 mg per tablet Commonly known as:  LOMOTIL Take  by mouth four (4) times daily as needed for Diarrhea. droxidopa 100 mg Cap capsule Commonly known as:  Gae Flash Take 1 Cap by mouth two (2) times a day.  Per Dr Luba Ceron  Indications: Symptomatic Orthostatic Hypotension  
  
 fexofenadine 180 mg tablet Commonly known as:  Ltanya Warner Take 180 mg by mouth daily. fludrocortisone 0.1 mg tablet Commonly known as:  FLORINEF  
TAKE 1 TABLET BY MOUTH TWICE DAILY  
  
 meclizine 25 mg tablet Commonly known as:  ANTIVERT Take 1 Tab by mouth three (3) times daily as needed. Indications: VERTIGO  
  
 ondansetron 4 mg disintegrating tablet Commonly known as:  ZOFRAN ODT Take 1 Tab by mouth every eight (8) hours as needed for Nausea. PARoxetine 30 mg tablet Commonly known as:  PAXIL Take 30 mg by mouth daily. Jim Cannon NP/ Dr Martine Millan  
  
 potassium chloride 20 mEq tablet Commonly known as:  K-DUR, KLOR-CON Take 1 Tab by mouth daily. pyridostigmine 60 mg tablet Commonly known as:  MESTINON  
TAKE 1 TABLET BY MOUTH THREE TIMES DAILY QUEtiapine 25 mg tablet Commonly known as:  SEROquel Take 25 mg by mouth nightly as needed. Take 1-2 tabs qhs prn Jim Cannon NP/Dr Martine Millan Follow-up Instructions Return in about 3 months (around 1/17/2018). To-Do List   
 10/17/2017 Cardiac Services:  LOOP MONITOR Patient Instructions Decrease Northera to 100mg twice a day. A 30 day loop monitor has been ordered for you. This will be mailed to the address given to us. Please read over the instructions given to you about [employer] loop monitors. If you have any insurance questions regarding coverage and out of pocket cost please contact the number on the instructions. You will need to follow up in clinic with Dr. Mingo Barrios in 3 months. Introducing [facility] & HEALTH SERVICES! Dear Rob Dockery: Thank you for requesting a [employer] account. Our records indicate that you already have an active [employer] account. You can access your account anytime at [email or web address] Did you know that you can access your hospital and ER discharge instructions at any time in HeadSprout? You can also review all of your test results from your hospital stay or ER visit. Additional Information If you have questions, please visit the Frequently Asked Questions section of the HeadSpr[employer] website at [email or web address] Remember, HeadSprout is NOT to be used for urgent needs. For medical emergencies, dial 911. Now available from your iPhone and Android! Please provide this summary of care documentation to your next provider. Your primary care clinician is listed as RYLEE RASHID. If you have any questions after today's visit, please call [phone].

## 2017-10-19 LAB — C DIFF TOX A+B STL QL IA: NEGATIVE

## 2017-10-20 LAB — O+P SPEC MICRO: NORMAL

## 2017-10-21 LAB
CAMPYLOBACTER STL CULT: NORMAL
E COLI SXT STL QL IA: NEGATIVE
SALM + SHIG STL CULT: NORMAL

## 2017-10-24 LAB — WBC STL QL MICRO: NORMAL

## 2017-10-24 RX ORDER — PYRIDOSTIGMINE BROMIDE 60 MG/1
TABLET ORAL
Qty: 90 TAB | Refills: 6 | Status: SHIPPED | OUTPATIENT
Start: 2017-10-24 | End: 2018-05-23 | Stop reason: SDUPTHER

## 2017-10-24 NOTE — TELEPHONE ENCOUNTER
Request for mestinon 60mg three times a day. Last office visit 10/7/17, next office visit 1/18/18. Refills per verbal order from Dr. Rachael Verdugo.

## 2017-11-03 RX ORDER — FLUDROCORTISONE ACETATE 0.1 MG/1
TABLET ORAL
Qty: 180 TAB | Refills: 0 | Status: SHIPPED | OUTPATIENT
Start: 2017-11-03 | End: 2018-02-04 | Stop reason: SDUPTHER

## 2017-11-07 ENCOUNTER — TELEPHONE (OUTPATIENT)
Dept: CARDIOLOGY CLINIC | Age: 72
End: 2017-11-07

## 2017-11-07 NOTE — TELEPHONE ENCOUNTER
Patient states that she would like discontinue use of the 30 day monitor. States that it is causing pain. Requests a call back at [phone]. Thanks!

## 2017-11-07 NOTE — TELEPHONE ENCOUNTER
Verified patient with two types of identifiers. States that she is having back pain and feels the monitor is causing this. Asked if the weight of the monitor is causing the pain and she states that it is the monitor that must be doing something to cause it. Notified her that the monitor does not cause pain and that it does not put out any energy. Notified her to contact her PCP about her back pain. Patient verbalizes understanding. And will call with any questions or concerns.

## 2017-12-13 ENCOUNTER — TELEPHONE (OUTPATIENT)
Dept: CARDIOLOGY CLINIC | Age: 72
End: 2017-12-13

## 2017-12-13 DIAGNOSIS — I95.1 ORTHOSTATIC HYPOTENSION: ICD-10-CM

## 2017-12-13 RX ORDER — DROXIDOPA 100 MG/1
100 CAPSULE ORAL 2 TIMES DAILY
Qty: 180 CAP | Refills: 3 | Status: SHIPPED | OUTPATIENT
Start: 2017-12-13 | End: 2018-04-12

## 2017-12-28 LAB — CREATININE, EXTERNAL: 0.68

## 2018-01-18 ENCOUNTER — OFFICE VISIT (OUTPATIENT)
Dept: CARDIOLOGY CLINIC | Age: 73
End: 2018-01-18

## 2018-01-18 VITALS
DIASTOLIC BLOOD PRESSURE: 80 MMHG | BODY MASS INDEX: 25.16 KG/M2 | WEIGHT: 151 LBS | HEART RATE: 56 BPM | OXYGEN SATURATION: 98 % | RESPIRATION RATE: 16 BRPM | HEIGHT: 65 IN | SYSTOLIC BLOOD PRESSURE: 140 MMHG

## 2018-01-18 DIAGNOSIS — I95.1 ORTHOSTATIC HYPOTENSION: Primary | ICD-10-CM

## 2018-01-18 DIAGNOSIS — R42 DIZZINESS: ICD-10-CM

## 2018-01-18 DIAGNOSIS — G25.2 RESTING TREMOR: ICD-10-CM

## 2018-01-18 DIAGNOSIS — R07.9 CHEST PAIN AT REST: ICD-10-CM

## 2018-01-18 NOTE — MR AVS SNAPSHOT
[address] [facility] [address] [place] [address] 
[phone] Patient: Aleksander Smalls MRN:  [id number] Visit Information Date & Time Provider Department Dept. Phone Encounter #  
 1/18/2018  1:20 PM Sonido Becker MD [facility] [place] [phone] [id number] Follow-up Instructions Return in about 6 months (around 7/18/2018). Your Appointments 3/13/2018  9:35 AM  
ROUTINE CARE with Solomon Holden MD  
[facility] Internal Medicine [facility]) Appt Note: 6 month f/u  
 [address] [facility] [address] [facility] [address]  
[facility] [address] [place] [address] Upcoming Health Maintenance Date Due MICROALBUMIN Q1 1/5/2018 EYE EXAM RETINAL OR DILATED Q1 2/9/2018 HEMOGLOBIN A1C Q6M 3/5/2018 FOOT EXAM Q1 4/25/2018 MEDICARE YEARLY EXAM 8/1/2018 LIPID PANEL Q1 9/5/2018 GLAUCOMA SCREENING Q2Y 2/9/2019 BREAST CANCER SCRN MAMMOGRAM 2/28/2019 DTaP/Tdap/Td series (2 - Td) 8/17/2023 COLONOSCOPY 6/27/2026 Allergies as of 1/18/2018  Review Complete On: 1/18/2018 By: Sonido Becker MD  
  
 Severity Noted Reaction Type Reactions Adhesive  09/08/2009    Itching Hydrocodone  07/19/2010    Itching Lorazepam  04/02/2015    Other (comments) Other Medication  09/08/2009    Rash Tide detergent Percocet [Oxycodone-acetaminophen]  09/08/2009    Itching Sudafed [Pseudoephedrine Hcl]  10/17/2010   Side Effect Other (comments) Vertigo Wellbutrin [Bupropion Hcl]  08/31/2011    Nausea and Vomiting Zithromax [Azithromycin]  06/22/2011    Vertigo Zyrtec [Cetirizine]  10/14/2009    Nausea Only Current Immunizations  Reviewed on 8/15/2017 Name Date Influenza High Dose Vaccine PF 8/14/2017, 8/29/2013 Influenza Vaccine 8/8/2016, 10/1/2015, 9/15/2014 Influenza Vaccine Whole 9/25/2012 Pneumococcal Conjugate (PCV-13) 3/17/2015 Pneumococcal Polysaccharide (PPSV-23) 4/23/2016 TDAP Vaccine 7/3/2012 Tdap 8/17/2013 Varicella Virus Vaccine Live 9/1/2007 ZZZ-RETIRED (DO NOT USE) Pneumococcal Vaccine (Unspecified Type) 12/1/2009 Not reviewed this visit You Were Diagnosed With   
  
 Codes Comments Orthostatic hypotension    -  Primary ICD-10-CM: I95.1 ICD-9-CM: 458.0 Dizziness     ICD-10-CM: O16 ICD-9-CM: 780.4 Resting tremor     ICD-10-CM: R25.9 ICD-9-CM: 117. 0 Vitals BP Pulse Resp Height(growth percentile) Weight(growth percentile) SpO2  
 140/80 (BP 1 Location: Left arm, BP Patient Position: Sitting) (!) 56 16 5' 5\" (1.651 m) 151 lb (68.5 kg) 98% BMI OB Status Smoking Status 25.13 kg/m2 Hysterectomy Never Smoker Vitals History BMI and BSA Data Body Mass Index Body Surface Area  
 25.13 kg/m 2 1.77 m 2 Preferred Pharmacy Pharmacy Name Phone [facility] [address] [place] [address] [phone] Your Updated Medication List  
  
   
This list is accurate as of: 1/18/18  1:57 PM.  Always use your most recent med list.  
  
  
  
  
 acetaminophen 325 mg tablet Commonly known as:  TYLENOL Take 325 mg by mouth every four (4) hours as needed for Pain. atorvastatin 20 mg tablet Commonly known as:  LIPITOR  
TAKE 1 TABLET BY MOUTH EVERY EVENING  
  
 carbidopa-levodopa  mg per tablet Commonly known as:  SINEMET Take 1 Tab by mouth two (2) times a day. droxidopa 100 mg Cap capsule Commonly known as:  Hetal Irene Take 1 Cap by mouth two (2) times a day. Per Dr Mohini Shea  Indications: Symptomatic Orthostatic Hypotension  
  
 fexofenadine 180 mg tablet Commonly known as:  Kris Bimler Take 180 mg by mouth daily. fludrocortisone 0.1 mg tablet Commonly known as:  FLORINEF  
TAKE 1 TABLET BY MOUTH TWICE DAILY meclizine 25 mg tablet Commonly known as:  ANTIVERT Take 1 Tab by mouth three (3) times daily as needed. Indications: VERTIGO PARoxetine 30 mg tablet Commonly known as:  PAXIL Take 30 mg by mouth daily. Alisa Belcher NP/ Dr Magali Uriarte  
  
 potassium chloride 20 mEq tablet Commonly known as:  K-DUR, KLOR-CON Take 1 Tab by mouth daily. pyridostigmine 60 mg tablet Commonly known as:  MESTINON  
TAKE 1 TABLET BY MOUTH THREE TIMES DAILY Dr Franki Hayward QUEtiapine 25 mg tablet Commonly known as:  SEROquel Take 25 mg by mouth nightly as needed. Take 1-2 tabs qhs prn Alisa Belcher NP/Dr Magali Uriarte Follow-up Instructions Return in about 6 months (around 7/18/2018). To-Do List   
 01/18/2018 ECG:  STRESS TEST LEXISCAN/CARDIOLITE Patient Instructions You will be scheduled for a Lexiscan stress test.  
 
You will need to follow up in clinic with Dr. Franki Hayward in 6 months. Introducing [facility] & [facility] SERVICES! Dear Nick Lopez: Thank you for requesting a [employer] account. Our records indicate that you already have an active [employer] account. You can access your account anytime at [email or web address] ([email or web address][employer] [email or web address] Did you know that you can access your hospital and ER discharge instructions at any time in [employer]? You can also review all of your test results from your hospital stay or ER visit. Additional Information If you have questions, please visit the Frequently Asked Questions section of the [employer] website at [email or web address] Remember, [employer] is NOT to be used for urgent needs. For medical emergencies, dial 911. Now available from your iPhone and Android! Please provide this summary of care documentation to your next provider. Your primary care clinician is listed as RYLEE RASHID. If you have any questions after today's visit, please call [phone].

## 2018-01-18 NOTE — PROGRESS NOTES
Cardiac Electrophysiology Office Note     Subjective:      Berkley Muhammad is a 67 y.o. female patient who is seen after starting Northera. No syncope. She is now using a rolling walker. She is better but intermittently she needs pindolol for SBP > 160 mmHg at home. She has chest pain again    Lexican cardiolite stress test 9/2016 showed normal perfusion  LVEF vigorous 85%. Dr Savage Mercado 8/26/16: [facility] 5/7/16 small PE RLL  6/9/16 CTA [facility] normal CTA no PE  Hypercoagulable work up and d dimer pending- on xarelto until results return    She had a tilt table test 8/2/16. Tilt table test revealed the patient had orthostatic hypotension response with sudden change in upright position and resolved quickly when she became supine  She has been on midodrine 5 mg tid. Patient was seen by neurologist in hospital and not clear she will have autonomic dysfunction and Parkinson's disease. I recommended to add mestinon 60 mg tid. She is using compression stockings bought from [facility] for IBS, RLS, Bipolar d/s, fibromyalgia, DDD, Paget's dz, dyslipidemia, DM, and PTE on Xarelto 3 months. She reports 3 episodes of syncopal episodes with \"almost blacking out\" which prompted her to come to the ED. Long standing h/o hypotension with orthostatics, but never blackouts. She admits to seeing Dr. Lili Doyle 6-7 years ago and completing an Echo and stress test for which all were normal. Recent h/o PTE (5/2016) and on Xarelto currently.       Patient Active Problem List    Diagnosis Date Noted    Spinal stenosis     Near syncope 08/02/2016    Resting tremor 07/29/2016    Orthostatic hypotension 07/05/2016    Advance directive on file 05/24/2016    Pulmonary embolism ([address]     Mixed hyperlipidemia 02/02/2016    Diabetes mellitus type 2, controlled, without complications ([address] 30/53/3030    Tubulovillous adenoma 08/21/2012    Abnormal mammogram 06/04/2012    Encounter for long-term (current) use of other medications 11/30/2011    Luisito 09/12/2011    Allergic rhinitis, cause unspecified 01/26/2011    Other diseases of nasal cavity and sinuses(478.19) 07/19/2010    IBS (irritable bowel syndrome)     RLS (restless legs syndrome)     Bipolar disorder (HCC)     Fibromyalgia     DJD (degenerative joint disease)     Lumbar disc disease     Paget's disease of bone     Migraine     Genital herpes      Current Outpatient Prescriptions   Medication Sig Dispense Refill    droxidopa (NORTHERA) 100 mg cap capsule Take 1 Cap by mouth two (2) times a day. Per Dr Franklin Sanchez  Indications: Symptomatic Orthostatic Hypotension 180 Cap 3    fludrocortisone (FLORINEF) 0.1 mg tablet TAKE 1 TABLET BY MOUTH TWICE DAILY 180 Tab 0    pyridostigmine (MESTINON) 60 mg tablet TAKE 1 TABLET BY MOUTH THREE TIMES DAILY Dr Franklin Sanchez 90 Tab 6    carbidopa-levodopa (SINEMET)  mg per tablet Take 1 Tab by mouth two (2) times a day.  meclizine (ANTIVERT) 25 mg tablet Take 1 Tab by mouth three (3) times daily as needed. Indications: VERTIGO 30 Tab 3    atorvastatin (LIPITOR) 20 mg tablet TAKE 1 TABLET BY MOUTH EVERY EVENING 90 Tab 11    potassium chloride (K-DUR, KLOR-CON) 20 mEq tablet Take 1 Tab by mouth daily. 30 Tab 11    PARoxetine (PAXIL) 30 mg tablet Take 30 mg by mouth daily. Maxime Chiang NP/ Dr Fatou Gan acetaminophen (TYLENOL) 325 mg tablet Take 325 mg by mouth every four (4) hours as needed for Pain.  fexofenadine (ALLEGRA) 180 mg tablet Take 180 mg by mouth daily.  QUEtiapine (SEROQUEL) 25 mg tablet Take 25 mg by mouth nightly as needed. Take 1-2 tabs qhs prn Maxime Chiang NP/Dr Fatou Gan diphenoxylate-atropine (LOMOTIL) 2.5-0.025 mg per tablet Take  by mouth four (4) times daily as needed for Diarrhea.  ondansetron (ZOFRAN ODT) 4 mg disintegrating tablet Take 1 Tab by mouth every eight (8) hours as needed for Nausea.  20 Tab 1     Allergies   Allergen Reactions    Adhesive Itching    Hydrocodone Itching    Lorazepam Other (comments)    Other Medication Rash     Tide detergent    Percocet [Oxycodone-Acetaminophen] Itching    Sudafed [Pseudoephedrine Hcl] Other (comments)     Vertigo    Wellbutrin [Bupropion Hcl] Nausea and Vomiting    Zithromax [Azithromycin] Vertigo    Zyrtec [Cetirizine] Nausea Only     Past Medical History:   Diagnosis Date    Bipolar disorder ([facility] [address]     Nazmy    Chronic pain     BACK PAIN    Diabetes ([facility] [address]     BORDERLINE TX WITH DIET AND EXERCISE    DJD (degenerative joint disease)     Fibromyalgia     Genital herpes     GERD (gastroesophageal reflux disease)     Hypercholesterolemia     IBS (irritable bowel syndrome)     Lumbar disc disease     stimulation-Honza    Migraine     Nausea & vomiting     Other ill-defined conditions(799.89)     SEASONAL allergies    Other ill-defined conditions(799.89)     dysphagia has had esophagus dilated    Paget's disease     Pulmonary embolism (HCC)     RLS (restless legs syndrome)     Spinal stenosis      Past Surgical History:   Procedure Laterality Date    COLONOSCOPY N/A 6/27/2016    COL[facility]ilo [facility] at [facility] ENDOSCOPY    ENDOSCOPY, COLON, DIAGNOSTIC      12, due 13    HX APPENDECTOMY      HX BACK SURGERY  9/17/2013    back surgery - total of 3 as of 12/20/2013    HX BREAST BIOPSY Right years ago    negative    HX CHOLECYSTECTOMY      HX HEENT      T & A    HX HYSTERECTOMY      total for fibroid tumors    HX ORTHOPAEDIC      lumbar laminectomy then fusion/neurostimulator implanted - inactive now but still in    HX ORTHOPAEDIC      REMOVAL OF NEUROSTIMULATOR    HX OTHER SURGICAL      EGD x 2 with dilation/colonoscopy - no polyps per pt    TILT TABLE EVALUATION  8/2/2016          Family History   Problem Relation Age of Onset    Colon Cancer Mother     Cancer Mother 68     colon    Heart Disease Father     Heart Disease Maternal Grandmother     Heart Disease Maternal Grandfather     Heart Disease Other      Social History   Substance Use Topics    Smoking status: Never Smoker    Smokeless tobacco: Never Used    Alcohol use No      Comment: 1 per month        Review of Systems:   Constitutional: Negative for fever, chills, weight loss   HEENT: Negative for nosebleeds, vision changes. Respiratory: Negative for cough, hemoptysis, sputum production, and wheezing. Cardiovascular: no palpitations, orthopnea, claudication, leg swelling, syncope, and PND. Gastrointestinal:  no vomiting, blood in stool and melena. Genitourinary: Negative for dysuria, and hematuria. Musculoskeletal: Negative for myalgias. + sciatica   Skin: Negative for rash. Heme: Does not bleed or bruise easily. Neurological: Negative for speech change and focal weakness     Objective:     Visit Vitals    /80 (BP 1 Location: Left arm, BP Patient Position: Sitting)    Pulse (!) 56    Resp 16    Ht 5' 5\" (1.651 m)    Wt 151 lb (68.5 kg)    SpO2 98%    BMI 25.13 kg/m2      Physical Exam:   Constitutional: well-developed and well-nourished. No distress. Head: Normocephalic and atraumatic. Eyes: Pupils are equal, round  Neck: supple. No JVD present. Cardiovascular: Normal rate, regular rhythm. Exam reveals no gallop and no friction rub. No murmur heard. Pulmonary/Chest: Effort normal and breath sounds normal. No wheezes. Abdominal: Soft, distended  Musculoskeletal: no edema. compression stockings on  Neurological: alert,oriented. resting tremor in BUE  Skin: Skin is warm and dry  Psychiatric: normal mood and affect. Behavior is normal. Judgment and thought content normal.      Assessment/Plan:       ICD-10-CM ICD-9-CM    1. Orthostatic hypotension I95.1 458.0 STRESS TEST LEXISCAN/CARDIOLITE   2. Dizziness R42 780.4 STRESS TEST LEXISCAN/CARDIOLITE   3. Resting tremor R25.9 781.0 STRESS TEST LEXISCAN/CARDIOLITE   4.  Chest pain at rest R07.9 786.50         Northera BID and if BP is high in am she can hold it  I gave her pindolol 5 for supine BP > 180 mmHg. I recommend lexican cardiolite stress test given recurrent and new chest pain at rest again  She sees Dr Diana Srivastava of GI for IBS and there is no change in IBS  She will see neurologist Dr Jayla Andrea and told that she does not have Parkinson disease     RTC 6 months    Thank you for involving me in this patient's care and please call with further concerns or questions. Bettye Suárez M.D.   Electrophysiology/Cardiology  [facility] [place]  [address] [place] [address]  [phone]

## 2018-01-18 NOTE — PATIENT INSTRUCTIONS
You will be scheduled for a Lexiscan stress test.     You will need to follow up in clinic with Dr. Patricio Rodriges in 6 months.

## 2018-01-24 ENCOUNTER — TELEPHONE (OUTPATIENT)
Dept: CARDIOLOGY CLINIC | Age: 73
End: 2018-01-24

## 2018-01-24 ENCOUNTER — CLINICAL SUPPORT (OUTPATIENT)
Dept: CARDIOLOGY CLINIC | Age: 73
End: 2018-01-24

## 2018-01-24 DIAGNOSIS — R07.9 CHEST PAIN, UNSPECIFIED TYPE: Primary | ICD-10-CM

## 2018-01-24 DIAGNOSIS — G25.2 RESTING TREMOR: ICD-10-CM

## 2018-01-24 DIAGNOSIS — I95.1 ORTHOSTATIC HYPOTENSION: ICD-10-CM

## 2018-01-24 DIAGNOSIS — R42 DIZZINESS: ICD-10-CM

## 2018-01-24 NOTE — TELEPHONE ENCOUNTER
Verified patient with two types of identifiers. Advised patient that it looks like she needs to reapply for the assistance for Northera. She will contact them to see what she needs to do next. She will call if we need anything else.

## 2018-01-24 NOTE — TELEPHONE ENCOUNTER
Came in for her nuclear stress test today stating that her [place] [employer] will no longer be covered and wants to know what to do at this point. She has plenty for right now but wanted to check about changing for once she runs out.  Will check with MD.

## 2018-01-24 NOTE — PROGRESS NOTES
See scanned document. Ordering Doctor:Dr. Rik Miramontes  Reading Doctor:Dr. Rik Miramontes    Patient tolerated procedure well.

## 2018-01-24 NOTE — TELEPHONE ENCOUNTER
It was supplied by company and this has helped her when other meds did not  Is this due to her income status  If not then perhaps Orlin Thomas can look into options

## 2018-01-24 NOTE — PROGRESS NOTES
aniceto cardiolite stress test 1/24/2018  Normal   LVEF 74%  Short run of atrial tachycardia     Continue follow up and medications

## 2018-01-25 ENCOUNTER — TELEPHONE (OUTPATIENT)
Dept: CARDIOLOGY CLINIC | Age: 73
End: 2018-01-25

## 2018-01-25 NOTE — TELEPHONE ENCOUNTER
----- Message from Jeffery Mcneill MD sent at 1/24/2018  4:59 PM EST -----  lexican cardiolite stress test 1/24/2018  Normal   LVEF 74%  Short run of atrial tachycardia     Continue follow up and medications

## 2018-01-26 ENCOUNTER — TELEPHONE (OUTPATIENT)
Dept: CARDIOLOGY CLINIC | Age: 73
End: 2018-01-26

## 2018-01-26 DIAGNOSIS — R07.9 CHEST PAIN, UNSPECIFIED TYPE: Primary | ICD-10-CM

## 2018-01-26 NOTE — TELEPHONE ENCOUNTER
Make sure her BP is controlled or she is at risk of aortic dissection, MI  Stress test was normal but if pain persists, need chest CT with contrast for PE, aortic dissection

## 2018-01-26 NOTE — TELEPHONE ENCOUNTER
Verified patient with two types of identifiers. Patient states that her blood pressure has been controled- today it was 134/84 but she is still having chest pain. Notified patient of Dr. José Luis Womack recommendation. Chest CT with contrast ordered. Notified patient that coordination of care will be contacting patient to schedule. Patient verbalized understanding and will call with any other questions.

## 2018-01-26 NOTE — TELEPHONE ENCOUNTER
Verified patient with two types of identifiers. Patient states that she has still been having chest pain and was wondering about her stress test results. Notified patient results were sent via a Streak message but that her test was negative. Patient states that her pain radiates through to her back. Patient states pain does not change with resting or activity. Patient states she has not taken her medication yet this morning. Recommended patient take her medication.  Will notify MD.

## 2018-01-30 ENCOUNTER — OFFICE VISIT (OUTPATIENT)
Dept: INTERNAL MEDICINE CLINIC | Age: 73
End: 2018-01-30

## 2018-01-30 ENCOUNTER — TELEPHONE (OUTPATIENT)
Dept: CARDIOLOGY CLINIC | Age: 73
End: 2018-01-30

## 2018-01-30 VITALS
BODY MASS INDEX: 24.79 KG/M2 | SYSTOLIC BLOOD PRESSURE: 170 MMHG | HEIGHT: 65 IN | OXYGEN SATURATION: 97 % | HEART RATE: 75 BPM | RESPIRATION RATE: 19 BRPM | WEIGHT: 148.8 LBS | DIASTOLIC BLOOD PRESSURE: 99 MMHG

## 2018-01-30 DIAGNOSIS — R05.9 COUGH: ICD-10-CM

## 2018-01-30 DIAGNOSIS — J30.9 ALLERGIC SINUSITIS: Primary | ICD-10-CM

## 2018-01-30 RX ORDER — AMOXICILLIN 875 MG/1
875 TABLET, FILM COATED ORAL 2 TIMES DAILY
Qty: 14 TAB | Refills: 0 | Status: SHIPPED | OUTPATIENT
Start: 2018-01-30 | End: 2018-02-06

## 2018-01-30 RX ORDER — METHYLPREDNISOLONE 4 MG/1
TABLET ORAL
Refills: 0 | COMMUNITY
Start: 2018-01-27 | End: 2018-03-14 | Stop reason: ALTCHOICE

## 2018-01-30 NOTE — TELEPHONE ENCOUNTER
Walked in to let us know that she has tried to contacted everyone her insurance company suggested for help for the [address] [place] [address] Road but they are not helping pay for it any more. Notified Dr Keyanna Zamorano of this and he would like to see if [facility] has any other routes to take. Will check with Ely Ashraf RN.

## 2018-01-30 NOTE — PROGRESS NOTES
Acute Care Note    Marcelo Cantu is 67 y.o. female. she presents for evaluation of Wheezing and Cough    History of allergy. She had a hacking cough starting 4 days ago. Went to [facility] on Saturday and given a medrol dosepack. She has some ongoing cough which has persisted despite the medrol. She presents today with this. No fevers, no chills. She does note ongoing nasal congestion and maxillary sinus pain. Prior to Admission medications    Medication Sig Start Date End Date Taking? Authorizing Provider   methylPREDNISolone (MEDROL DOSEPACK) 4 mg tablet TK UTD 1/27/18  Yes Historical Provider   droxidopa (NORTHERA) 100 mg cap capsule Take 1 Cap by mouth two (2) times a day. Per Dr Trevor Friedman  Indications: Symptomatic Orthostatic Hypotension 12/13/17  Yes Kate Eisenberg MD   fludrocortisone (FLORINEF) 0.1 mg tablet TAKE 1 TABLET BY MOUTH TWICE DAILY 11/3/17  Yes Kate Eisenberg MD   pyridostigmine (MESTINON) 60 mg tablet TAKE 1 TABLET BY MOUTH THREE TIMES DAILY Dr Trevor Friedman 10/24/17  Yes Kate Eisenberg MD   carbidopa-levodopa (SINEMET)  mg per tablet Take 1 Tab by mouth two (2) times a day. Yes Historical Provider   meclizine (ANTIVERT) 25 mg tablet Take 1 Tab by mouth three (3) times daily as needed. Indications: VERTIGO 7/31/17  Yes Giovani Graham MD   atorvastatin (LIPITOR) 20 mg tablet TAKE 1 TABLET BY MOUTH EVERY EVENING 7/28/17  Yes Giovani Graham MD   potassium chloride (K-DUR, KLOR-CON) 20 mEq tablet Take 1 Tab by mouth daily. 5/1/17  Yes Giovani Graham MD   PARoxetine (PAXIL) 30 mg tablet Take 30 mg by mouth daily. Katty Franks NP/ Dr Kate Irizarry   Yes Historical Provider   acetaminophen (TYLENOL) 325 mg tablet Take 325 mg by mouth every four (4) hours as needed for Pain. Yes Historical Provider   fexofenadine (ALLEGRA) 180 mg tablet Take 180 mg by mouth daily. Yes Historical Provider   QUEtiapine (SEROQUEL) 25 mg tablet Take 25 mg by mouth nightly as needed.  Take 1-2 tabs qhs prn Sagar Romeo NP/Dr Gely Gutierrez   Yes Historical Provider         Patient Active Problem List   Diagnosis Code    IBS (irritable bowel syndrome) K58.9    RLS (restless legs syndrome) G25.81    Bipolar disorder ([facility]) F31.9    Fibromyalgia M79.7    DJD (degenerative joint disease) M19.90    Lumbar disc disease M51.9    Paget's disease of bone M88.9    Migraine 346    Genital herpes A60.00    Other diseases of nasal cavity and sinuses(478.19) J34.89    Allergic rhinitis, cause unspecified J30.9    Hammertoe M20.40    Encounter for long-term (current) use of other medications Z79.899    Abnormal mammogram R92.8    Tubulovillous adenoma D36.9    Diabetes mellitus type 2, controlled, without complications ([facility]) C51.9    Mixed hyperlipidemia E78.2    Pulmonary embolism ([facility]) I26.99    Advance directive on file Z78.9    Orthostatic hypotension I95.1    Resting tremor R25.9    Near syncope R55    Spinal stenosis M48.00         Review of Systems   Constitutional: Negative. HENT: Positive for sinus pain. Respiratory: Positive for cough. Negative for sputum production. Cardiovascular: Negative. Visit Vitals    BP (!) 170/99 (BP 1 Location: Right arm, BP Patient Position: Sitting)    Pulse 75    Resp 19    Ht 5' 5\" (1.651 m)    Wt 148 lb 12.8 oz (67.5 kg)    SpO2 97%    BMI 24.76 kg/m2       Physical Exam   Constitutional: No distress. HENT:   Nose: Right sinus exhibits maxillary sinus tenderness. Right sinus exhibits no frontal sinus tenderness. Left sinus exhibits maxillary sinus tenderness. Left sinus exhibits no frontal sinus tenderness. Cardiovascular: Normal rate and regular rhythm. Pulmonary/Chest: Effort normal and breath sounds normal. She has no wheezes. ASSESSMENT/PLAN  Diagnoses and all orders for this visit:    1. Allergic sinusitis - Given the ongoing issues with the patient's sinuses, we will cover with an antibiotic.   Discussed with the patient that if no improvement, she should return to the clinic.   -     amoxicillin (AMOXIL) 875 mg tablet; Take 1 Tab by mouth two (2) times a day for 7 days. 2. Cough       Advised the patient to call back or return to office if symptoms worsen/change/persist.   Discussed expected course/resolution/complications of diagnosis in detail with patient. Medication risks/benefits/costs/interactions/alternatives discussed with patient. The patient was given an after visit summary which includes diagnoses, current medications, & vitals. They expressed understanding with the diagnosis and plan.

## 2018-01-30 NOTE — PROGRESS NOTES
Pt was diagnosed with rhinitis on Saturday by [facility]; medrol dose pack still in progress; pt is c/o cough and wheezing.

## 2018-02-01 ENCOUNTER — HOSPITAL ENCOUNTER (OUTPATIENT)
Dept: CT IMAGING | Age: 73
Discharge: HOME | End: 2018-02-01
Attending: INTERNAL MEDICINE
Payer: MEDICARE

## 2018-02-01 DIAGNOSIS — R07.9 CHEST PAIN, UNSPECIFIED TYPE: ICD-10-CM

## 2018-02-01 PROCEDURE — 71260 CT THORAX DX C+: CPT

## 2018-02-01 RX ORDER — SODIUM CHLORIDE 9 MG/ML
50 INJECTION, SOLUTION INTRAVENOUS
Status: ACTIVE | OUTPATIENT
Start: 2018-02-01 | End: 2018-02-02

## 2018-02-02 ENCOUNTER — TELEPHONE (OUTPATIENT)
Dept: CARDIOLOGY CLINIC | Age: 73
End: 2018-02-02

## 2018-02-02 PROCEDURE — 74011250636 HC RX REV CODE- 250/636: Performed by: INTERNAL MEDICINE

## 2018-02-02 PROCEDURE — 74011636320 HC RX REV CODE- 636/320: Performed by: INTERNAL MEDICINE

## 2018-02-02 RX ORDER — SODIUM CHLORIDE 9 MG/ML
50 INJECTION, SOLUTION INTRAVENOUS
Status: COMPLETED | OUTPATIENT
Start: 2018-02-02 | End: 2018-02-02

## 2018-02-02 RX ADMIN — SODIUM CHLORIDE 50 ML/HR: 900 INJECTION, SOLUTION INTRAVENOUS at 13:00

## 2018-02-02 RX ADMIN — IOPAMIDOL 100 ML: 755 INJECTION, SOLUTION INTRAVENOUS at 13:00

## 2018-02-02 NOTE — TELEPHONE ENCOUNTER
Contacted brandon at [phone] to see why the Karen Fuentes is not covered anymore. They stated that all she has is a copay as it is covered under her insurance and the foundation that was helping pay for most of her copay is no longer open. No other foundations will help cover her northera. Will check with MD to see what the next step would be.

## 2018-02-03 NOTE — TELEPHONE ENCOUNTER
I would see her back in 1 week after she completely run out of Reyes Haney (if she can count her pill and make appt) so I can recheck BP and all other meds

## 2018-02-05 RX ORDER — FLUDROCORTISONE ACETATE 0.1 MG/1
TABLET ORAL
Qty: 180 TAB | Refills: 0 | Status: SHIPPED | OUTPATIENT
Start: 2018-02-05 | End: 2018-04-30 | Stop reason: SDUPTHER

## 2018-02-05 NOTE — TELEPHONE ENCOUNTER
Verified patient with two types of identifiers. States that she has about a years worth of Northera. Advised her to contact the office once she is down to 6 months left and we will contact northera to see if anything has changed and if we can get her assistance. Patient verbalizes understanding. And will call with any questions or concerns.

## 2018-02-07 ENCOUNTER — TELEPHONE (OUTPATIENT)
Dept: CARDIOLOGY CLINIC | Age: 73
End: 2018-02-07

## 2018-02-07 NOTE — TELEPHONE ENCOUNTER
Verified patient with two types of identifiers. Moved appt up with Dr Franklin Sanchez. Patient verbalizes understanding. And will call with any questions or concerns.

## 2018-02-07 NOTE — TELEPHONE ENCOUNTER
Patient said she spoke with you the other day regarding her medication. She said not counting this week she has 105 left and she takes two a day.    Phone: [phone]

## 2018-02-20 ENCOUNTER — OFFICE VISIT (OUTPATIENT)
Dept: INTERNAL MEDICINE CLINIC | Age: 73
End: 2018-02-20

## 2018-02-20 VITALS
TEMPERATURE: 97.6 F | RESPIRATION RATE: 17 BRPM | HEIGHT: 65 IN | DIASTOLIC BLOOD PRESSURE: 60 MMHG | BODY MASS INDEX: 24.03 KG/M2 | WEIGHT: 144.2 LBS | SYSTOLIC BLOOD PRESSURE: 80 MMHG | OXYGEN SATURATION: 95 % | HEART RATE: 80 BPM

## 2018-02-20 DIAGNOSIS — R05.8 POST-VIRAL COUGH SYNDROME: Primary | ICD-10-CM

## 2018-02-20 RX ORDER — BENZONATATE 100 MG/1
100 CAPSULE ORAL
Qty: 30 CAP | Refills: 0 | Status: SHIPPED | OUTPATIENT
Start: 2018-02-20 | End: 2018-02-27

## 2018-02-20 NOTE — PATIENT INSTRUCTIONS
Please purchase Coricidin for your nasal congestion. Also, you can use the Tessalon (that I ordered) for your cough. Also continue Mucinex. Please also check to see that you are taking your FLORINEF (also knows as FLUDRICORTISONE) every day. Cough: Care Instructions  Your Care Instructions    A cough is your body's response to something that bothers your throat or airways. Many things can cause a cough. You might cough because of a cold or the flu, bronchitis, or asthma. Smoking, postnasal drip, allergies, and stomach acid that backs up into your throat also can cause coughs. A cough is a symptom, not a disease. Most coughs stop when the cause, such as a cold, goes away. You can take a few steps at home to cough less and feel better. Follow-up care is a key part of your treatment and safety. Be sure to make and go to all appointments, and call your doctor if you are having problems. It's also a good idea to know your test results and keep a list of the medicines you take. How can you care for yourself at home? · Drink lots of water and other fluids. This helps thin the mucus and soothes a dry or sore throat. Honey or lemon juice in hot water or tea may ease a dry cough. · Take cough medicine as directed by your doctor. · Prop up your head on pillows to help you breathe and ease a dry cough. · Try cough drops to soothe a dry or sore throat. Cough drops don't stop a cough. Medicine-flavored cough drops are no better than candy-flavored drops or hard candy. · Do not smoke. Avoid secondhand smoke. If you need help quitting, talk to your doctor about stop-smoking programs and medicines. These can increase your chances of quitting for good. When should you call for help? Call 911 anytime you think you may need emergency care. For example, call if:  ? · You have severe trouble breathing. ?Call your doctor now or seek immediate medical care if:  ? · You cough up blood.    ? · You have new or worse trouble breathing. ? · You have a new or higher fever. ? · You have a new rash. ? Watch closely for changes in your health, and be sure to contact your doctor if:  ? · You cough more deeply or more often, especially if you notice more mucus or a change in the color of your mucus. ? · You have new symptoms, such as a sore throat, an earache, or sinus pain. ? · You do not get better as expected. Where can you learn more? Go to http://ash-jeronimo.info/. Enter D279 in the search box to learn more about \"Cough: Care Instructions. \"  Current as of: May 12, 2017  Content Version: 11.4  © 9443-4233 [employer]. Care instructions adapted under license by [employer] (which disclaims liability or warranty for this information). If you have questions about a medical condition or this instruction, always ask your healthcare professional. [address] any warranty or liability for your use of this information.

## 2018-02-20 NOTE — MR AVS SNAPSHOT
[address] [facility] [address] [place] 
[phone] Patient: Berkley Muhammad MRN:  [id number] Visit Information Date & Time Provider Department Dept. Phone Encounter #  
 2/20/2018  4:15 PM Tino Stevenson MD [address] Internal Medicine [phone] [id number] Your Appointments 3/13/2018  9:35 AM  
ROUTINE CARE with Remigio Resendiz MD  
[address] Internal Medicine [address] [place] [address]) Appt Note: 6 month f/u  
 [address] [facility] [address] [place]  
[id number] [place] [address] [place]  
  
    
 4/12/2018  2:00 PM  
ESTABLISHED PATIENT with Audra Vela MD  
[facility] ([address]) Appt Note: 6 month f/u; f/u after northera finished  
 [address] [facility] [address] [place] [address]  
[address] [facility] [phone] Upcoming Health Maintenance Date Due MICROALBUMIN Q1 1/5/2018 EYE EXAM RETINAL OR DILATED Q1 2/9/2018 HEMOGLOBIN A1C Q6M 3/5/2018 FOOT EXAM Q1 4/25/2018 MEDICARE YEARLY EXAM 8/1/2018 LIPID PANEL Q1 9/5/2018 GLAUCOMA SCREENING Q2Y 2/9/2019 BREAST CANCER SCRN MAMMOGRAM 2/28/2019 DTaP/Tdap/Td series (2 - Td) 8/17/2023 COLONOSCOPY 6/27/2026 Allergies as of 2/20/2018  Review Complete On: 2/20/2018 By: Kim Karimi Severity Noted Reaction Type Reactions Adhesive  09/08/2009    Itching Hydrocodone  07/19/2010    Itching Lorazepam  04/02/2015    Other (comments) Other Medication  09/08/2009    Rash Tide detergent Percocet [Oxycodone-acetaminophen]  09/08/2009    Itching Sudafed [Pseudoephedrine Hcl]  10/17/2010   Side Effect Other (comments) Vertigo Wellbutrin [Bupropion Hcl]  08/31/2011    Nausea and Vomiting Zithromax [Azithromycin]  06/22/2011    Vertigo Zyrtec [Cetirizine]  10/14/2009    Nausea Only Current Immunizations  Reviewed on 8/15/2017 Name Date Influenza High Dose Vaccine PF 8/14/2017, 8/29/2013 Influenza Vaccine 8/8/2016, 10/1/2015, 9/15/2014 Influenza Vaccine Whole 9/25/2012 Pneumococcal Conjugate (PCV-13) 3/17/2015 Pneumococcal Polysaccharide (PPSV-23) 4/23/2016 TDAP Vaccine 7/3/2012 Tdap 8/17/2013 Varicella Virus Vaccine Live 9/1/2007 ZZZ-RETIRED (DO NOT USE) Pneumococcal Vaccine (Unspecified Type) 12/1/2009 Not reviewed this visit You Were Diagnosed With   
  
 Codes Comments Post-viral cough syndrome    -  Primary ICD-10-CM: W00 ICD-9-CM: 813. 2 Vitals BP Pulse Temp Resp Height(growth percentile) Weight(growth percentile) (!) 80/60 (BP 1 Location: Right arm, BP Patient Position: Sitting) 80 97.6 °F (36.4 °C) (Oral) 17 5' 5\" (1.651 m) 144 lb 3.2 oz (65.4 kg) SpO2 BMI OB Status Smoking Status 95% 24 kg/m2 Hysterectomy Never Smoker Vitals History BMI and BSA Data Body Mass Index Body Surface Area  
 24 kg/m 2 1.73 m 2 Preferred Pharmacy Pharmacy Name Phone [facility] [address] [place] [address] [phone] Your Updated Medication List  
  
   
This list is accurate as of: 2/20/18  4:59 PM.  Always use your most recent med list.  
  
  
  
  
 acetaminophen 325 mg tablet Commonly known as:  TYLENOL Take 325 mg by mouth every four (4) hours as needed for Pain. atorvastatin 20 mg tablet Commonly known as:  LIPITOR  
TAKE 1 TABLET BY MOUTH EVERY EVENING  
  
 benzonatate 100 mg capsule Commonly known as:  TESSALON Take 1 Cap by mouth three (3) times daily as needed for Cough for up to 7 days. carbidopa-levodopa  mg per tablet Commonly known as:  SINEMET Take 1 Tab by mouth two (2) times a day. droxidopa 100 mg Cap capsule Commonly known as:  Jake Stone  
 Take 1 Cap by mouth two (2) times a day. Per Dr Patricio Rodriges  Indications: Symptomatic Orthostatic Hypotension  
  
 fexofenadine 180 mg tablet Commonly known as:  Isaak García Take 180 mg by mouth daily. fludrocortisone 0.1 mg tablet Commonly known as:  FLORINEF  
TAKE 1 TABLET BY MOUTH TWICE DAILY  
  
 meclizine 25 mg tablet Commonly known as:  ANTIVERT Take 1 Tab by mouth three (3) times daily as needed. Indications: VERTIGO  
  
 methylPREDNISolone 4 mg tablet Commonly known as:  Ashfield Peed TK UTD PARoxetine 30 mg tablet Commonly known as:  PAXIL Take 30 mg by mouth daily. Alex Nash NP/ Dr Sabas Spring  
  
 potassium chloride 20 mEq tablet Commonly known as:  K-DUR, KLOR-CON Take 1 Tab by mouth daily. pyridostigmine 60 mg tablet Commonly known as:  MESTINON  
TAKE 1 TABLET BY MOUTH THREE TIMES DAILY Dr Patricio Rodriges QUEtiapine 25 mg tablet Commonly known as:  SEROquel Take 25 mg by mouth nightly as needed. Take 1-2 tabs qhs prn Alex Nash NP/Dr Sabas Spring Prescriptions Sent to Pharmacy Refills  
 benzonatate (TESSALON) 100 mg capsule 0 Sig: Take 1 Cap by mouth three (3) times daily as needed for Cough for up to 7 days. Class: Normal  
 Pharmacy: [facility] [address] [place] [address], [address] [place] [address] #: [phone] Route: Oral  
  
Patient Instructions Please purchase Coricidin for your nasal congestion. Also, you can use the Tessalon (that I ordered) for your cough. Also continue Mucinex. Please also check to see that you are taking your FLORINEF (also knows as FLUDRICORTISONE) every day. Cough: Care Instructions Your Care Instructions A cough is your body's response to something that bothers your throat or airways. Many things can cause a cough. You might cough because of a cold or the flu, bronchitis, or asthma.  Smoking, postnasal drip, allergies, and stomach acid that backs up into your throat also can cause coughs. A cough is a symptom, not a disease. Most coughs stop when the cause, such as a cold, goes away. You can take a few steps at home to cough less and feel better. Follow-up care is a key part of your treatment and safety. Be sure to make and go to all appointments, and call your doctor if you are having problems. It's also a good idea to know your test results and keep a list of the medicines you take. How can you care for yourself at home? · Drink lots of water and other fluids. This helps thin the mucus and soothes a dry or sore throat. Honey or lemon juice in hot water or tea may ease a dry cough. · Take cough medicine as directed by your doctor. · Prop up your head on pillows to help you breathe and ease a dry cough. · Try cough drops to soothe a dry or sore throat. Cough drops don't stop a cough. Medicine-flavored cough drops are no better than candy-flavored drops or hard candy. · Do not smoke. Avoid secondhand smoke. If you need help quitting, talk to your doctor about stop-smoking programs and medicines. These can increase your chances of quitting for good. When should you call for help? Call 911 anytime you think you may need emergency care. For example, call if: 
? · You have severe trouble breathing. ?Call your doctor now or seek immediate medical care if: 
? · You cough up blood. ? · You have new or worse trouble breathing. ? · You have a new or higher fever. ? · You have a new rash. ? Watch closely for changes in your health, and be sure to contact your doctor if: 
? · You cough more deeply or more often, especially if you notice more mucus or a change in the color of your mucus. ? · You have new symptoms, such as a sore throat, an earache, or sinus pain. ? · You do not get better as expected. Where can you learn more? Go to http://ash-jeronimo.info/. Enter D279 in the search box to learn more about \"Cough: Care Instructions. \" Current as of: May 12, 2017 Content Version: 11.4 © 0766-3649 [employer]. Care instructions adapted under license by [employer] (which disclaims liability or warranty for this information). If you have questions about a medical condition or this instruction, always ask your healthcare professional. [address] any warranty or liability for your use of this information. Introducing [facility]! Dear Mindy Villalta: Thank you for requesting a [email or web address] account. Our records indicate that you already have an active [email or web address] account. You can access your account anytime at [email or web address] Did you know that you can access your hospital and ER discharge instructions at any time in [email or web address]? You can also review all of your test results from your hospital stay or ER visit. Additional Information If you have questions, please visit the Frequently Asked Questions section of the [email or web address] website at [email or web address] Remember, [email or web address] is NOT to be used for urgent needs. For medical emergencies, dial 911. Now available from your iPhone and Android! Please provide this summary of care documentation to your next provider. Your primary care clinician is listed as RYLEE RASHID. If you have any questions after today's visit, please call [phone].

## 2018-02-26 NOTE — PROGRESS NOTES
Acute Care Note    Gordo Romero is 75 y.o. female. she presents for evaluation of Cough       COUGH  Pedro Green is here to talk about an ongoing. This is a follow up of a pre-existing problem problem. Symptoms began 2 weeks ago, gradually improving since that time. The cough is non-productive and has been persistent since that time. Associated symptoms include: Some mild shortness of breath. She denies: fever, chills. She has tried Mucinex, and has been somewhat effective. Prior to Admission medications    Medication Sig Start Date End Date Taking? Authorizing Provider   benzonatate (TESSALON) 100 mg capsule Take 1 Cap by mouth three (3) times daily as needed for Cough for up to 7 days. 2/20/18 2/27/18 Yes Zuleyma Lowe MD   fludrocortisone (FLORINEF) 0.1 mg tablet TAKE 1 TABLET BY MOUTH TWICE DAILY 2/5/18  Yes Darrell Chacon MD   droxidopa (NORTHERA) 100 mg cap capsule Take 1 Cap by mouth two (2) times a day. Per Dr Patrice Villa  Indications: Symptomatic Orthostatic Hypotension 12/13/17  Yes Darrell Chacon MD   pyridostigmine (MESTINON) 60 mg tablet TAKE 1 TABLET BY MOUTH THREE TIMES DAILY Dr Patrice Villa 10/24/17  Yes Darrell Chacon MD   carbidopa-levodopa (SINEMET)  mg per tablet Take 1 Tab by mouth two (2) times a day. Yes Historical Provider   meclizine (ANTIVERT) 25 mg tablet Take 1 Tab by mouth three (3) times daily as needed. Indications: VERTIGO 7/31/17  Yes Violeta Moy MD   atorvastatin (LIPITOR) 20 mg tablet TAKE 1 TABLET BY MOUTH EVERY EVENING 7/28/17  Yes Violeta Moy MD   potassium chloride (K-DUR, KLOR-CON) 20 mEq tablet Take 1 Tab by mouth daily. 5/1/17  Yes Violeta Moy MD   PARoxetine (PAXIL) 30 mg tablet Take 30 mg by mouth daily. Aileen Mercedes NP/ Dr Paty Medrano   Yes Historical Provider   acetaminophen (TYLENOL) 325 mg tablet Take 325 mg by mouth every four (4) hours as needed for Pain.    Yes Historical Provider   fexofenadine (ALLEGRA) 180 mg tablet Take 180 mg by mouth daily. Yes Historical Provider   QUEtiapine (SEROQUEL) 25 mg tablet Take 25 mg by mouth nightly as needed. 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    Migraine 346    Genital herpes A60.00    Other diseases of nasal cavity and sinuses(478.19) J34.89    Allergic rhinitis, cause unspecified J30.9    Hammertoe M20.40    Encounter for long-term (current) use of other medications Z79.899    Abnormal mammogram R92.8    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 Respiratory: Positive for cough and shortness of breath (mild). Negative for hemoptysis and sputum production. Cardiovascular: Negative. Visit Vitals    BP (!) 80/60 (BP 1 Location: Right arm, BP Patient Position: Sitting)    Pulse 80    Temp 97.6 °F (36.4 °C) (Oral)    Resp 17    Ht 5' 5\" (1.651 m)    Wt 144 lb 3.2 oz (65.4 kg)    SpO2 95%    BMI 24 kg/m2       Physical Exam   Constitutional: No distress. Cardiovascular: Normal rate and regular rhythm. No murmur heard. Pulmonary/Chest: Effort normal and breath sounds normal. No respiratory distress. ASSESSMENT/PLAN  Diagnoses and all orders for this visit:    1.  Post-viral cough syndrome  -     benzonatate (TESSALON) 100 mg capsule; Take 1 Cap by mouth three (3) times daily as needed for Cough for up to 7 days. Advised the patient to call back or return to office if symptoms worsen/change/persist.   Discussed expected course/resolution/complications of diagnosis in detail with patient. Medication risks/benefits/costs/interactions/alternatives discussed with patient. The patient was given an after visit summary which includes diagnoses, current medications, & vitals. They expressed understanding with the diagnosis and plan.

## 2018-03-08 ENCOUNTER — HOSPITAL ENCOUNTER (OUTPATIENT)
Dept: LAB | Age: 73
Discharge: HOME | End: 2018-03-08
Payer: MEDICARE

## 2018-03-08 PROCEDURE — 85025 COMPLETE CBC W/AUTO DIFF WBC: CPT

## 2018-03-08 PROCEDURE — 83036 HEMOGLOBIN GLYCOSYLATED A1C: CPT

## 2018-03-08 PROCEDURE — 80053 COMPREHEN METABOLIC PANEL: CPT

## 2018-03-08 PROCEDURE — 80061 LIPID PANEL: CPT

## 2018-03-08 PROCEDURE — 36415 COLL VENOUS BLD VENIPUNCTURE: CPT

## 2018-03-09 LAB
ALBUMIN SERPL-MCNC: 3.9 G/DL (ref 3.5–4.8)
ALBUMIN/GLOB SERPL: 1.7 {RATIO} (ref 1.2–2.2)
ALP SERPL-CCNC: 72 IU/L (ref 39–117)
ALT SERPL-CCNC: 12 IU/L (ref 0–32)
AST SERPL-CCNC: 13 IU/L (ref 0–40)
BASOPHILS # BLD AUTO: 0 X10E3/UL (ref 0–0.2)
BASOPHILS NFR BLD AUTO: 1 %
BILIRUB SERPL-MCNC: 0.6 MG/DL (ref 0–1.2)
BUN SERPL-MCNC: 9 MG/DL (ref 8–27)
BUN/CREAT SERPL: 13 (ref 12–28)
CALCIUM SERPL-MCNC: 8.8 MG/DL (ref 8.7–10.3)
CHLORIDE SERPL-SCNC: 107 MMOL/L (ref 96–106)
CHOLEST SERPL-MCNC: 133 MG/DL (ref 100–199)
CO2 SERPL-SCNC: 27 MMOL/L (ref 18–29)
CREAT SERPL-MCNC: 0.7 MG/DL (ref 0.57–1)
EOSINOPHIL # BLD AUTO: 0.2 X10E3/UL (ref 0–0.4)
EOSINOPHIL NFR BLD AUTO: 2 %
ERYTHROCYTE [DISTWIDTH] IN BLOOD BY AUTOMATED COUNT: 13.7 % (ref 12.3–15.4)
EST. AVERAGE GLUCOSE BLD GHB EST-MCNC: 146 MG/DL
GFR SERPLBLD CREATININE-BSD FMLA CKD-EPI: 86 ML/MIN/1.73
GFR SERPLBLD CREATININE-BSD FMLA CKD-EPI: 99 ML/MIN/1.73
GLOBULIN SER CALC-MCNC: 2.3 G/DL (ref 1.5–4.5)
GLUCOSE SERPL-MCNC: 94 MG/DL (ref 65–99)
HBA1C MFR BLD: 6.7 % (ref 4.8–5.6)
HCT VFR BLD AUTO: 40 % (ref 34–46.6)
HDLC SERPL-MCNC: 54 MG/DL
HGB BLD-MCNC: 13.3 G/DL (ref 11.1–15.9)
IMM GRANULOCYTES # BLD: 0 X10E3/UL (ref 0–0.1)
IMM GRANULOCYTES NFR BLD: 0 %
LDLC SERPL CALC-MCNC: 48 MG/DL (ref 0–99)
LYMPHOCYTES # BLD AUTO: 2.4 X10E3/UL (ref 0.7–3.1)
LYMPHOCYTES NFR BLD AUTO: 34 %
MCH RBC QN AUTO: 30.6 PG (ref 26.6–33)
MCHC RBC AUTO-ENTMCNC: 33.3 G/DL (ref 31.5–35.7)
MCV RBC AUTO: 92 FL (ref 79–97)
MONOCYTES # BLD AUTO: 0.5 X10E3/UL (ref 0.1–0.9)
MONOCYTES NFR BLD AUTO: 7 %
NEUTROPHILS # BLD AUTO: 4 X10E3/UL (ref 1.4–7)
NEUTROPHILS NFR BLD AUTO: 56 %
PLATELET # BLD AUTO: 330 X10E3/UL (ref 150–379)
POTASSIUM SERPL-SCNC: 3.8 MMOL/L (ref 3.5–5.2)
PROT SERPL-MCNC: 6.2 G/DL (ref 6–8.5)
RBC # BLD AUTO: 4.34 X10E6/UL (ref 3.77–5.28)
SODIUM SERPL-SCNC: 149 MMOL/L (ref 134–144)
TRIGL SERPL-MCNC: 157 MG/DL (ref 0–149)
VLDLC SERPL CALC-MCNC: 31 MG/DL (ref 5–40)
WBC # BLD AUTO: 7.1 X10E3/UL (ref 3.4–10.8)

## 2018-03-14 ENCOUNTER — OFFICE VISIT (OUTPATIENT)
Dept: INTERNAL MEDICINE CLINIC | Age: 73
End: 2018-03-14

## 2018-03-14 VITALS
HEART RATE: 79 BPM | SYSTOLIC BLOOD PRESSURE: 135 MMHG | WEIGHT: 149.2 LBS | BODY MASS INDEX: 24.86 KG/M2 | RESPIRATION RATE: 16 BRPM | TEMPERATURE: 98.1 F | HEIGHT: 65 IN | OXYGEN SATURATION: 95 % | DIASTOLIC BLOOD PRESSURE: 86 MMHG

## 2018-03-14 DIAGNOSIS — G25.2 RESTING TREMOR: ICD-10-CM

## 2018-03-14 DIAGNOSIS — Z01.00 ROUTINE EYE EXAM: ICD-10-CM

## 2018-03-14 DIAGNOSIS — I95.1 ORTHOSTATIC HYPOTENSION: ICD-10-CM

## 2018-03-14 DIAGNOSIS — M79.7 FIBROMYALGIA: ICD-10-CM

## 2018-03-14 DIAGNOSIS — E78.2 MIXED HYPERLIPIDEMIA: ICD-10-CM

## 2018-03-14 DIAGNOSIS — I26.99 OTHER ACUTE PULMONARY EMBOLISM WITHOUT ACUTE COR PULMONALE (HCC): ICD-10-CM

## 2018-03-14 DIAGNOSIS — E11.9 CONTROLLED TYPE 2 DIABETES MELLITUS WITHOUT COMPLICATION, WITHOUT LONG-TERM CURRENT USE OF INSULIN (HCC): Primary | ICD-10-CM

## 2018-03-14 DIAGNOSIS — F31.78 BIPOLAR DISORDER, IN FULL REMISSION, MOST RECENT EPISODE MIXED (HCC): ICD-10-CM

## 2018-03-14 RX ORDER — DONEPEZIL HYDROCHLORIDE 5 MG/1
TABLET, FILM COATED ORAL
COMMUNITY
End: 2018-04-12

## 2018-03-14 NOTE — MR AVS SNAPSHOT
[address] [facility] [address] 
[phone] Patient: Katelynn Roman MRN:  [id number] Visit Information Date & Time Provider Department Dept. Phone Encounter #  
 3/14/2018  3:25 PM Tania Parks, [address] [place] [address] Internal Medicine [phone] [id number] Follow-up Instructions Return in about 6 months (around 9/14/2018) for mwv. Your Appointments 4/12/2018  2:00 PM  
ESTABLISHED PATIENT with Vinnie Rinne, MD  
[facility] ([facility]) Appt Note: 6 month f/u; f/u after northera finished  
 7001 [facility] 200 [address]  
[address] [facility] [address] Upcoming Health Maintenance Date Due MICROALBUMIN Q1 1/5/2018 EYE EXAM RETINAL OR DILATED Q1 2/9/2018 FOOT EXAM Q1 4/25/2018 MEDICARE YEARLY EXAM 8/1/2018 HEMOGLOBIN A1C Q6M 9/8/2018 GLAUCOMA SCREENING Q2Y 2/9/2019 BREAST CANCER SCRN MAMMOGRAM 2/28/2019 LIPID PANEL Q1 3/8/2019 DTaP/Tdap/Td series (2 - Td) 8/17/2023 COLONOSCOPY 6/27/2026 Allergies as of 3/14/2018  Review Complete On: 3/14/2018 By: Tania Parks MD  
  
 Severity Noted Reaction Type Reactions Adhesive  09/08/2009    Itching Hydrocodone  07/19/2010    Itching Lorazepam  04/02/2015    Other (comments) Other Medication  09/08/2009    Rash Tide detergent Percocet [Oxycodone-acetaminophen]  09/08/2009    Itching Sudafed [Pseudoephedrine Hcl]  10/17/2010   Side Effect Other (comments) Vertigo Wellbutrin [Bupropion Hcl]  08/31/2011    Nausea and Vomiting Zithromax [Azithromycin]  06/22/2011    Vertigo Zyrtec [Cetirizine]  10/14/2009    Nausea Only Current Immunizations  Reviewed on 8/15/2017 Name Date Influenza High Dose Vaccine PF 8/14/2017, 8/29/2013 Influenza Vaccine 8/8/2016, 10/1/2015, 9/15/2014 Influenza Vaccine Whole 9/25/2012 Pneumococcal Conjugate (PCV-13) 3/17/2015 Pneumococcal Polysaccharide (PPSV-23) 4/23/2016 TDAP Vaccine 7/3/2012 Tdap 8/17/2013 Varicella Virus Vaccine Live 9/1/2007 ZZZ-RETIRED (DO NOT USE) Pneumococcal Vaccine (Unspecified Type) 12/1/2009 Not reviewed this visit You Were Diagnosed With   
  
 Codes Comments Controlled type 2 diabetes mellitus without complication, without long-term current use of insulin ([facility]    -  Primary ICD-10-CM: E11.9 ICD-9-CM: 250.00 Routine eye exam     ICD-10-CM: Z01.00 ICD-9-CM: V72.0 Mixed hyperlipidemia     ICD-10-CM: E78.2 ICD-9-CM: 272.2 Bipolar disorder, in full remission, most recent episode [facility])     ICD-10-CM: F31.78 
ICD-9-CM: 296.66 Other acute pulmonary embolism without acute cor pulmonale (HCC)     ICD-10-CM: I26.99 
ICD-9-CM: 415.19 Fibromyalgia     ICD-10-CM: M79.7 ICD-9-CM: 729.1 Orthostatic hypotension     ICD-10-CM: I95.1 ICD-9-CM: 458.0 Resting tremor     ICD-10-CM: R25.9 ICD-9-CM: 440. 0 Vitals BP Pulse Temp Resp Height(growth percentile) Weight(growth percentile) 135/86 (BP 1 Location: Left arm, BP Patient Position: Sitting) 79 98.1 °F (36.7 °C) (Oral) 16 5' 5\" (1.651 m) 149 lb 3.2 oz (67.7 kg) SpO2 BMI OB Status Smoking Status 95% 24.83 kg/m2 Hysterectomy Never Smoker BMI and BSA Data Body Mass Index Body Surface Area  
 24.83 kg/m 2 1.76 m 2 Preferred Pharmacy Pharmacy Name Phone [facility] [address] [place] [address], [address] [place] [address] [phone] Your Updated Medication List  
  
   
This list is accurate as of 3/14/18  4:21 PM.  Always use your most recent med list.  
  
  
  
  
 acetaminophen 325 mg tablet Commonly known as:  TYLENOL Take 325 mg by mouth every four (4) hours as needed for Pain. atorvastatin 20 mg tablet Commonly known as:  LIPITOR  
TAKE 1 TABLET BY MOUTH EVERY EVENING  
  
 carbidopa-levodopa  mg per tablet Commonly known as:  SINEMET Take 1 Tab by mouth two (2) times a day. CORICIDIN PO Take  by mouth as needed. donepezil 5 mg tablet Commonly known as:  ARICEPT Take  by mouth nightly. droxidopa 100 mg Cap capsule Commonly known as:  Pauleen Reny Take 1 Cap by mouth two (2) times a day. Per Dr Sushila Martinez  Indications: Symptomatic Orthostatic Hypotension  
  
 fexofenadine 180 mg tablet Commonly known as:  [place] Denier Take 180 mg by mouth daily. fludrocortisone 0.1 mg tablet Commonly known as:  FLORINEF  
TAKE 1 TABLET BY MOUTH TWICE DAILY MUCINEX PO Take  by mouth as needed. PARoxetine 30 mg tablet Commonly known as:  PAXIL Take 30 mg by mouth daily. Cyndra Libman NP/ Dr Ventura Cat  
  
 potassium chloride 20 mEq tablet Commonly known as:  K-DUR, KLOR-CON Take 1 Tab by mouth daily. PROBIOTIC PO Take  by mouth daily. pyridostigmine 60 mg tablet Commonly known as:  MESTINON  
TAKE 1 TABLET BY MOUTH THREE TIMES DAILY Dr Sushila Martinez QUEtiapine 25 mg tablet Commonly known as:  SEROquel Take 25 mg by mouth nightly as needed. Take 1-2 tabs qhs prn Cyndra Libman NP/Dr Ventura Cat We Performed the Following CBC WITH AUTOMATED DIFF [15858 CPT(R)] HEMOGLOBIN A1C WITH EAG [37065 CPT(R)] LIPID PANEL [32974 CPT(R)] METABOLIC PANEL, COMPREHENSIVE [21713 CPT(R)] MICROALBUMIN, UR, RAND W/ MICROALB/CREAT RATIO [id number] CPT(R)] REFERRAL TO OPHTHALMOLOGY [REF57 Custom] Comments:  
 Please evaluate patient for routine eye exam. URINALYSIS W/ RFLX MICROSCOPIC [96919 CPT(R)] Follow-up Instructions Return in about 6 months (around 9/14/2018) for mwv. Referral Information Referral ID Referred By Referred To  
  
 [id number] GAY, [address] [place] [address] [place], [address] [facility] Visits Status Start Date End Date 1 New Request 3/14/18 3/14/19 If your referral has a status of pending review or denied, additional information will be sent to support the outcome of this decision. Introducing [facility]! Dear Lillie Pryor: Thank you for requesting a Wikia account. Our records indicate that you already have an active Wikia account. You can access your account anytime at [email or web address] "[email or web address]"/[email or web address] Did you know that you can access your hospital and ER discharge instructions at any time in Wikia? You can also review all of your test results from your hospital stay or ER visit. Additional Information If you have questions, please visit the Frequently Asked Questions section of the Wikia website at [email or web address] Remember, Wikia is NOT to be used for urgent needs. For medical emergencies, dial 911. Now available from your iPhone and Android! Please provide this summary of care documentation to your next provider. Your primary care clinician is listed as RYLEE RASHID. If you have any questions after today's visit, please call [phone].

## 2018-03-14 NOTE — PROGRESS NOTES
HISTORY OF PRESENT ILLNESS  Angela Merchant is a 75 y.o. female. LIDIA Zhang is seen today for follow-up of diabetes, hyperlipidemia and other problems. 1. Diabetes, hyperlipidemia. Reviewed labs, stable. She denies medication side effects and has no cardiac symptoms. 2. Fibromyalgia, bipolar disorder. She is up-to-date with specialist follow-up. 3. Orthostatic hypotension. She is up-to-date with cardiology follow-up. She is on three medication treatments. Apparently Reyes Haney is going out of production. I strongly encouraged her to call Dr. Lee Jett, her cardiologist, to review treatment modification. 4. Preventative medicine. Medicare wellness visit in six months. Review of Systems:  Notable for a recent viral URI that has resolved. Current Outpatient Prescriptions   Medication Sig    donepezil (ARICEPT) 5 mg tablet Take  by mouth nightly.  LACTOBACILLUS ACIDOPHILUS (PROBIOTIC PO) Take  by mouth daily.  GUAIFENESIN (MUCINEX PO) Take  by mouth as needed.  ACETAMINOPHEN/CHLORPHENIRAMINE (CORICIDIN PO) Take  by mouth as needed.  fludrocortisone (FLORINEF) 0.1 mg tablet TAKE 1 TABLET BY MOUTH TWICE DAILY    droxidopa (NORTHERA) 100 mg cap capsule Take 1 Cap by mouth two (2) times a day. Per Dr Lee Jett  Indications: Symptomatic Orthostatic Hypotension    pyridostigmine (MESTINON) 60 mg tablet TAKE 1 TABLET BY MOUTH THREE TIMES DAILY Dr Shweta Arango carbidopa-levodopa (SINEMET)  mg per tablet Take 1 Tab by mouth two (2) times a day.  atorvastatin (LIPITOR) 20 mg tablet TAKE 1 TABLET BY MOUTH EVERY EVENING    potassium chloride (K-DUR, KLOR-CON) 20 mEq tablet Take 1 Tab by mouth daily.  PARoxetine (PAXIL) 30 mg tablet Take 30 mg by mouth daily. Yissel Leung NP/ Dr Tim Damon acetaminophen (TYLENOL) 325 mg tablet Take 325 mg by mouth every four (4) hours as needed for Pain.  fexofenadine (ALLEGRA) 180 mg tablet Take 180 mg by mouth daily.     QUEtiapine (SEROQUEL) 25 mg tablet Take 25 mg by mouth nightly as needed. Take 1-2 tabs qhs prn Pankaj Gomes NP/Dr Aliyah Uribe     No current facility-administered medications for this visit. Review of Systems   Constitutional: Negative for weight loss. Respiratory: Negative. Cardiovascular: Negative for chest pain, palpitations, leg swelling and PND. Musculoskeletal: Negative for myalgias. Neurological: Negative for dizziness and focal weakness. Psychiatric/Behavioral: The patient is nervous/anxious. Physical Exam   Constitutional: She appears well-nourished. Neck: Carotid bruit is not present. Cardiovascular: Normal rate and regular rhythm. Exam reveals no gallop and no friction rub. No murmur heard. Pulmonary/Chest: Effort normal and breath sounds normal. No respiratory distress. Musculoskeletal: She exhibits no edema. Nursing note and vitals reviewed. ASSESSMENT and PLAN  Diagnoses and all orders for this visit:    1. Controlled type 2 diabetes mellitus without complication, without long-term current use of insulin (HCC)  -     MICROALBUMIN, UR, RAND W/ MICROALB/CREAT RATIO  -     HEMOGLOBIN A1C WITH EAG  -     METABOLIC PANEL, COMPREHENSIVE  -     URINALYSIS W/ RFLX MICROSCOPIC    2. Routine eye exam  -     REFERRAL TO OPHTHALMOLOGY    3. Mixed hyperlipidemia  -     LIPID PANEL  -     CBC WITH AUTOMATED DIFF    4. Bipolar disorder, in full remission, most recent episode mixed [facility])- See psychiatrist as directed     5. Other acute pulmonary embolism without acute cor pulmonale ([facility]- Follow off treatment     6. Fibromyalgia- See specialists  as directed. 7. Orthostatic hypotension- See cardiologist as directed.      8. Resting tremor- See neurologist as directed

## 2018-04-12 ENCOUNTER — OFFICE VISIT (OUTPATIENT)
Dept: CARDIOLOGY CLINIC | Age: 73
End: 2018-04-12

## 2018-04-12 VITALS
BODY MASS INDEX: 24.46 KG/M2 | WEIGHT: 146.8 LBS | DIASTOLIC BLOOD PRESSURE: 64 MMHG | HEIGHT: 65 IN | HEART RATE: 80 BPM | SYSTOLIC BLOOD PRESSURE: 110 MMHG

## 2018-04-12 DIAGNOSIS — Z86.711 HX OF PULMONARY EMBOLUS: ICD-10-CM

## 2018-04-12 DIAGNOSIS — R42 DIZZINESS: ICD-10-CM

## 2018-04-12 DIAGNOSIS — I95.1 ORTHOSTATIC HYPOTENSION: Primary | ICD-10-CM

## 2018-04-12 NOTE — PROGRESS NOTES
Cardiac Electrophysiology Office Note     Subjective:      Maday Shaw is a 75 y.o. female patient who is seen for 6 month follow up, has long-standing history of dizziness & near syncope. Etiology on previous tilt noted to be orthostatic hypotension    She ran out of Northera 3-4 days ago, states has had nausea & some dizziness since, but has been able to do some shopping despite this. BP logs show lowest SBP in the 80s, but may vary into 130s. States she typically lies down when she experiences symptoms. Continues to use rolling walker. Continues pyridostigmine 60 mg po tid & florinef. She is using compression stockings bought from [employer]. Denies syncope. When she was last seen, she reported chest pain & dizziness. Subsequent Lexiscan/cardiolite stress test on 01/24/18 was essentially normal, with LVEF 74% & no ischemia noted. Chest CTA (02/01/2018) showed no abnormalities that would explain chest pain. She used to have chest pain, has been told that it is due to neuro/spine issues. States no plans to proceed with surgery yet, but is currently discussing this with Dr. Yanely Sidhu. States her whole body hurts at times, attributes this to fibromyalgia. Has been told that fibromyalgia should be better controlled before proceeding with back surgery. Previous:  Dr Mani Matias 8/26/16: CTA at [facility] 5/7/16 small PE RLL  6/9/16  [facility] normal CTA no PE  Hypercoagulable work up and d dimer performed. No longer taking Xarelto. She had a tilt table test 8/2/16. Tilt table test revealed the patient had orthostatic hypotension response with sudden change in upright position and resolved quickly when she became supine  She had been on midodrine 5 mg tid, but discontinued this when starting Northera. Patient was seen by neurologist in hospital and not clear she has autonomic dysfunction and Parkinson's disease.         Patient Active Problem List    Diagnosis Date Noted    Spinal stenosis  Near syncope 08/02/2016    Resting tremor 07/29/2016    Orthostatic hypotension 07/05/2016    Advance directive on file 05/24/2016    Pulmonary embolism ([facility]     Mixed hyperlipidemia 02/02/2016    Diabetes mellitus type 2, controlled, without complications ([facility] 43/57/5033    Tubulovillous adenoma 08/21/2012    Abnormal mammogram 06/04/2012    Encounter for long-term (current) use of other medications 11/30/2011    Hammertoe 09/12/2011    Allergic rhinitis, cause unspecified 01/26/2011    Other diseases of nasal cavity and sinuses(478.19) 07/19/2010    IBS (irritable bowel syndrome)     RLS (restless legs syndrome)     Bipolar disorder (HCC)     Fibromyalgia     DJD (degenerative joint disease)     Lumbar disc disease     Paget's disease of bone     Migraine     Genital herpes      Current Outpatient Prescriptions   Medication Sig Dispense Refill    LACTOBACILLUS ACIDOPHILUS (PROBIOTIC PO) Take  by mouth daily.  ACETAMINOPHEN/CHLORPHENIRAMINE (CORICIDIN PO) Take  by mouth as needed.  fludrocortisone (FLORINEF) 0.1 mg tablet TAKE 1 TABLET BY MOUTH TWICE DAILY 180 Tab 0    pyridostigmine (MESTINON) 60 mg tablet TAKE 1 TABLET BY MOUTH THREE TIMES DAILY Dr Blaire King 90 Tab 6    carbidopa-levodopa (SINEMET)  mg per tablet Take 1 Tab by mouth two (2) times a day.  atorvastatin (LIPITOR) 20 mg tablet TAKE 1 TABLET BY MOUTH EVERY EVENING 90 Tab 11    potassium chloride (K-DUR, KLOR-CON) 20 mEq tablet Take 1 Tab by mouth daily. 30 Tab 11    PARoxetine (PAXIL) 30 mg tablet Take 30 mg by mouth daily. James Perla NP/ Dr Yared Perdomo acetaminophen (TYLENOL) 325 mg tablet Take 325 mg by mouth every four (4) hours as needed for Pain.  QUEtiapine (SEROQUEL) 25 mg tablet Take 25 mg by mouth nightly as needed.  Take 1-2 tabs qhs prn James Perla NP/Dr Ronald Newsome       Allergies   Allergen Reactions    Adhesive Itching    Hydrocodone Itching    Lorazepam Other (comments)    Other Medication Rash     Tide detergent    Percocet [Oxycodone-Acetaminophen] Itching    Sudafed [Pseudoephedrine Hcl] Other (comments)     Vertigo    Wellbutrin [Bupropion Hcl] Nausea and Vomiting    Zithromax [Azithromycin] Vertigo    Zyrtec [Cetirizine] Nausea Only     Past Medical History:   Diagnosis Date    Bipolar disorder ([facility]     Nazmy    Chronic pain     BACK PAIN    Diabetes ([facility]     BORDERLINE TX WITH DIET AND EXERCISE    DJD (degenerative joint disease)     Fibromyalgia     Genital herpes     GERD (gastroesophageal reflux disease)     Hypercholesterolemia     IBS (irritable bowel syndrome)     Lumbar disc disease     stimulation-Honza    Migraine     Nausea & vomiting     Other ill-defined conditions(799.89)     SEASONAL allergies    Other ill-defined conditions(799.89)     dysphagia has had esophagus dilated    Paget's disease     Pulmonary embolism (HCC)     RLS (restless legs syndrome)     Spinal stenosis      Past Surgical History:   Procedure Laterality Date    COLONOSCOPY N/A 6/27/2016    COL[facility] at [facility] ENDOSCOPY    ENDOSCOPY, COLON, DIAGNOSTIC      12, due 13    HX APPENDECTOMY      HX BACK SURGERY  9/17/2013    back surgery - total of 3 as of 12/20/2013    HX BREAST BIOPSY Right years ago    negative    HX CHOLECYSTECTOMY      HX HEENT      T & A    HX HYSTERECTOMY      total for fibroid tumors    HX ORTHOPAEDIC      lumbar laminectomy then fusion/neurostimulator implanted - inactive now but still in    HX ORTHOPAEDIC      REMOVAL OF NEUROSTIMULATOR    HX OTHER SURGICAL      EGD x 2 with dilation/colonoscopy - no polyps per pt    TILT TABLE EVALUATION  8/2/2016          Family History   Problem Relation Age of Onset    Colon Cancer Mother     Cancer Mother 68     colon    Heart Disease Father     Heart Disease Maternal Grandmother     Heart Disease Maternal Grandfather     Heart Disease Other      Social History Substance Use Topics    Smoking status: Never Smoker    Smokeless tobacco: Never Used    Alcohol use No      Comment: 1 per month        Review of Systems:   Constitutional: Negative for fever, chills, weight loss . + intermittent dizziness  HEENT: Negative for nosebleeds, vision changes. Respiratory: Negative for cough, hemoptysis, sputum production, and wheezing. Cardiovascular: no palpitations, orthopnea, claudication, leg swelling, syncope, and PND. Gastrointestinal:  no vomiting, blood in stool and melena.  + intermittent nausea  Genitourinary: Negative for dysuria, and hematuria. Musculoskeletal: + for myalgias, sciatica   Skin: Negative for rash. Heme: Does not bleed or bruise easily. Neurological: Negative for speech change and focal weakness     Objective:     Visit Vitals    /64 (BP 1 Location: Right arm, BP Patient Position: Sitting)    Pulse 80    Ht 5' 5\" (1.651 m)    Wt 146 lb 12.8 oz (66.6 kg)    BMI 24.43 kg/m2      Physical Exam:   Constitutional: well-developed and well-nourished. No distress. Head: Normocephalic and atraumatic. Eyes: Pupils are equal, round  Neck: supple. No JVD present. Cardiovascular: Normal rate, regular rhythm. Exam reveals no gallop and no friction rub. No murmur heard. Pulmonary/Chest: Effort normal and breath sounds normal. No wheezes. Abdominal: Soft, distended  Musculoskeletal: no edema. compression stockings on  Neurological: Alert,oriented. resting tremor in BUE  Skin: Skin is warm and dry  Psychiatric: Normal mood and affect. Behavior is normal. Judgment and thought content normal.      Assessment/Plan:       ICD-10-CM ICD-9-CM    1. Orthostatic hypotension I95.1 458.0    2. Dizziness R42 780.4    3. Hx of pulmonary embolus Z86.711 V12.55    Unfortunately, she no longer has patient assistance for Northera. She is doing relatively well despite discontinuing it.   She has had very few episodes of hypotension to mid 80-90 mmHg per BP log.  Continue with current doses of florinef & pyridostigmine. RTC 3 months with Jose Frazier NP. I will her in 6 months   Call in the interim for new/worsening signs/symptoms. Thank you for involving me in this patient's care and please call with further concerns or questions. Galen Barraza M.D.   Electrophysiology/Cardiology  [facility] [place]  [facility], [facility] [address] [place] [address]  [phone]

## 2018-04-12 NOTE — MR AVS SNAPSHOT
[address] [facility] [address] 
[phone] Patient: Shaylee Matta MRN:  [id number] Visit Information Date & Time Provider Department Dept. Phone Encounter #  
 4/12/2018  2:00 PM Sarah Green MD [facility] [address]  Your Appointments 9/18/2018 10:35 AM  
ROUTINE CARE with Ede Vernon MD  
[facility] Internal Medicine [address]) Appt Note: f/u 6m  
 [address] [facility] [address] [facility] [address]  
[address] [id number] [address] Upcoming Health Maintenance Date Due MICROALBUMIN Q1 1/5/2018 EYE EXAM RETINAL OR DILATED Q1 2/9/2018 FOOT EXAM Q1 4/25/2018 MEDICARE YEARLY EXAM 8/1/2018 HEMOGLOBIN A1C Q6M 9/8/2018 GLAUCOMA SCREENING Q2Y 2/9/2019 BREAST CANCER SCRN MAMMOGRAM 2/28/2019 LIPID PANEL Q1 3/8/2019 DTaP/Tdap/Td series (2 - Td) 8/17/2023 COLONOSCOPY 6/27/2026 Allergies as of 4/12/2018  Review Complete On: 3/14/2018 By: Ede Vernon MD  
  
 Severity Noted Reaction Type Reactions Adhesive  09/08/2009    Itching Hydrocodone  07/19/2010    Itching Lorazepam  04/02/2015    Other (comments) Other Medication  09/08/2009    Rash Tide detergent Percocet [Oxycodone-acetaminophen]  09/08/2009    Itching Sudafed [Pseudoephedrine Hcl]  10/17/2010   Side Effect Other (comments) Vertigo Wellbutrin [Bupropion Hcl]  08/31/2011    Nausea and Vomiting Zithromax [Azithromycin]  06/22/2011    Vertigo Zyrtec [Cetirizine]  10/14/2009    Nausea Only Current Immunizations  Reviewed on 8/15/2017 Name Date Influenza High Dose Vaccine PF 8/14/2017, 8/29/2013 Influenza Vaccine 8/8/2016, 10/1/2015, 9/15/2014 Influenza Vaccine Whole 9/25/2012 Pneumococcal Conjugate (PCV-13) 3/17/2015 Pneumococcal Polysaccharide (PPSV-23) 4/23/2016 TDAP Vaccine 7/3/2012 Tdap 8/17/2013 Varicella Virus Vaccine Live 9/1/2007 ZZZ-RETIRED (DO NOT USE) Pneumococcal Vaccine (Unspecified Type) 12/1/2009 Not reviewed this visit Vitals BP Pulse Height(growth percentile) Weight(growth percentile) BMI OB Status 110/64 (BP 1 Location: Right arm, BP Patient Position: Sitting) 80 5' 5\" (1.651 m) 146 lb 12.8 oz (66.6 kg) 24.43 kg/m2 Hysterectomy Smoking Status Never Smoker Vitals History BMI and BSA Data Body Mass Index Body Surface Area  
 24.43 kg/m 2 1.75 m 2 Preferred Pharmacy Pharmacy Name Phone [facility] DR[facility] STORE [address] [place] [address], [address] [place] [address] [phone] Your Updated Medication List  
  
   
This list is accurate as of 4/12/18  2:59 PM.  Always use your most recent med list.  
  
  
  
  
 acetaminophen 325 mg tablet Commonly known as:  TYLENOL Take 325 mg by mouth every four (4) hours as needed for Pain. atorvastatin 20 mg tablet Commonly known as:  LIPITOR  
TAKE 1 TABLET BY MOUTH EVERY EVENING  
  
 carbidopa-levodopa  mg per tablet Commonly known as:  SINEMET Take 1 Tab by mouth two (2) times a day. CORICIDIN PO Take  by mouth as needed. fludrocortisone 0.1 mg tablet Commonly known as:  FLORINEF  
TAKE 1 TABLET BY MOUTH TWICE DAILY PARoxetine 30 mg tablet Commonly known as:  PAXIL Take 30 mg by mouth daily. Casandra Fink NP/ Dr Jose Huston  
  
 potassium chloride 20 mEq tablet Commonly known as:  K-DUR, KLOR-CON Take 1 Tab by mouth daily. PROBIOTIC PO Take  by mouth daily. pyridostigmine 60 mg tablet Commonly known as:  MESTINON  
TAKE 1 TABLET BY MOUTH THREE TIMES DAILY Dr Jaky Jorgensen QUEtiapine 25 mg tablet Commonly known as:  SEROquel Take 25 mg by mouth nightly as needed. Take 1-2 tabs qhs prn Casandra Fink NP/Dr Jose Huston Patient Instructions You will need to follow up in clinic with Molly Olivares NP in 3 months. Introducing [facility]! Dear Migel Ray: Thank you for requesting a [employer] account. Our records indicate that you already have an active [employer] account. You can access your account anytime at [email or web address] Did you know that you can access your hospital and ER discharge instructions at any time in [employer]? You can also review all of your test results from your hospital stay or ER visit. Additional Information If you have questions, please visit the Frequently Asked Questions section of the [employer] website at [email or web address] Remember, [employer] is NOT to be used for urgent needs. For medical emergencies, dial 911. Now available from your iPhone and Android! Please provide this summary of care documentation to your next provider. Your primary care clinician is listed as RYLEE RASHID. If you have any questions after today's visit, please call [phone].

## 2018-04-17 ENCOUNTER — TELEPHONE (OUTPATIENT)
Dept: CARDIOLOGY CLINIC | Age: 73
End: 2018-04-17

## 2018-04-17 NOTE — TELEPHONE ENCOUNTER
Verified patient with two types of identifiers. Per Dr. Pili Dickens last office note patient to follow up in 3 months with Lobo Rowland NP. Patient scheduled for follow up visit. Patient verbalized understanding and will call with any other questions.

## 2018-04-18 RX ORDER — POTASSIUM CHLORIDE 20 MEQ/1
TABLET, EXTENDED RELEASE ORAL
Qty: 90 TAB | Refills: 3 | Status: SHIPPED | OUTPATIENT
Start: 2018-04-18 | End: 2019-04-12 | Stop reason: SDUPTHER

## 2018-04-18 RX ORDER — POTASSIUM CHLORIDE 20 MEQ/1
20 TABLET, EXTENDED RELEASE ORAL DAILY
Qty: 30 TAB | Refills: 11 | Status: SHIPPED | OUTPATIENT
Start: 2018-04-18 | End: 2018-04-18 | Stop reason: SDUPTHER

## 2018-05-01 RX ORDER — FLUDROCORTISONE ACETATE 0.1 MG/1
TABLET ORAL
Qty: 180 TAB | Refills: 1 | Status: SHIPPED | OUTPATIENT
Start: 2018-05-01 | End: 2018-10-30 | Stop reason: SDUPTHER

## 2018-05-01 NOTE — TELEPHONE ENCOUNTER
Request for Florinef 0.1 mg BID. Last office visit 4/12/18, next office visit 7/19/18. Refills per verbal order from Dr. Bennett Quinonez.

## 2018-05-02 ENCOUNTER — HOSPITAL ENCOUNTER (EMERGENCY)
Age: 73
Discharge: HOME | End: 2018-05-02
Attending: EMERGENCY MEDICINE
Payer: MEDICARE

## 2018-05-02 VITALS
WEIGHT: 146 LBS | OXYGEN SATURATION: 92 % | SYSTOLIC BLOOD PRESSURE: 147 MMHG | BODY MASS INDEX: 23.46 KG/M2 | RESPIRATION RATE: 16 BRPM | TEMPERATURE: 98.4 F | HEIGHT: 66 IN | DIASTOLIC BLOOD PRESSURE: 82 MMHG | HEART RATE: 64 BPM

## 2018-05-02 DIAGNOSIS — G89.29 CHRONIC BILATERAL BACK PAIN, UNSPECIFIED BACK LOCATION: Primary | ICD-10-CM

## 2018-05-02 DIAGNOSIS — M54.9 CHRONIC BILATERAL BACK PAIN, UNSPECIFIED BACK LOCATION: Primary | ICD-10-CM

## 2018-05-02 LAB
ALBUMIN SERPL-MCNC: 3.5 G/DL (ref 3.5–5)
ALBUMIN/GLOB SERPL: 1 {RATIO} (ref 1.1–2.2)
ALP SERPL-CCNC: 76 U/L (ref 45–117)
ALT SERPL-CCNC: 8 U/L (ref 12–78)
ANION GAP SERPL CALC-SCNC: 5 MMOL/L (ref 5–15)
APPEARANCE UR: ABNORMAL
AST SERPL-CCNC: 15 U/L (ref 15–37)
ATRIAL RATE: 69 BPM
BACTERIA URNS QL MICRO: ABNORMAL /HPF
BASOPHILS # BLD: 0 K/UL (ref 0–0.1)
BASOPHILS NFR BLD: 1 % (ref 0–1)
BILIRUB SERPL-MCNC: 0.8 MG/DL (ref 0.2–1)
BILIRUB UR QL: NEGATIVE
BUN SERPL-MCNC: 14 MG/DL (ref 6–20)
BUN/CREAT SERPL: 18 (ref 12–20)
CALCIUM SERPL-MCNC: 9 MG/DL (ref 8.5–10.1)
CALCULATED P AXIS, ECG09: -27 DEGREES
CALCULATED R AXIS, ECG10: 17 DEGREES
CALCULATED T AXIS, ECG11: 92 DEGREES
CHLORIDE SERPL-SCNC: 108 MMOL/L (ref 97–108)
CO2 SERPL-SCNC: 28 MMOL/L (ref 21–32)
COLOR UR: ABNORMAL
CREAT SERPL-MCNC: 0.79 MG/DL (ref 0.55–1.02)
DIAGNOSIS, 93000: NORMAL
DIFFERENTIAL METHOD BLD: NORMAL
EOSINOPHIL # BLD: 0.3 K/UL (ref 0–0.4)
EOSINOPHIL NFR BLD: 4 % (ref 0–7)
EPITH CASTS URNS QL MICRO: ABNORMAL /LPF
ERYTHROCYTE [DISTWIDTH] IN BLOOD BY AUTOMATED COUNT: 12.6 % (ref 11.5–14.5)
GLOBULIN SER CALC-MCNC: 3.4 G/DL (ref 2–4)
GLUCOSE SERPL-MCNC: 113 MG/DL (ref 65–100)
GLUCOSE UR STRIP.AUTO-MCNC: NEGATIVE MG/DL
HCT VFR BLD AUTO: 41.9 % (ref 35–47)
HGB BLD-MCNC: 14 G/DL (ref 11.5–16)
HGB UR QL STRIP: NEGATIVE
IMM GRANULOCYTES # BLD: 0 K/UL (ref 0–0.04)
IMM GRANULOCYTES NFR BLD AUTO: 0 % (ref 0–0.5)
KETONES UR QL STRIP.AUTO: NEGATIVE MG/DL
LEUKOCYTE ESTERASE UR QL STRIP.AUTO: ABNORMAL
LIPASE SERPL-CCNC: 229 U/L (ref 73–393)
LYMPHOCYTES # BLD: 2.9 K/UL (ref 0.8–3.5)
LYMPHOCYTES NFR BLD: 39 % (ref 12–49)
MCH RBC QN AUTO: 32 PG (ref 26–34)
MCHC RBC AUTO-ENTMCNC: 33.4 G/DL (ref 30–36.5)
MCV RBC AUTO: 95.9 FL (ref 80–99)
MONOCYTES # BLD: 0.6 K/UL (ref 0–1)
MONOCYTES NFR BLD: 8 % (ref 5–13)
NEUTS SEG # BLD: 3.7 K/UL (ref 1.8–8)
NEUTS SEG NFR BLD: 49 % (ref 32–75)
NITRITE UR QL STRIP.AUTO: NEGATIVE
NRBC # BLD: 0 K/UL (ref 0–0.01)
NRBC BLD-RTO: 0 PER 100 WBC
P-R INTERVAL, ECG05: 152 MS
PH UR STRIP: 7 [PH] (ref 5–8)
PLATELET # BLD AUTO: 266 K/UL (ref 150–400)
PMV BLD AUTO: 9.7 FL (ref 8.9–12.9)
POTASSIUM SERPL-SCNC: 3.4 MMOL/L (ref 3.5–5.1)
PROT SERPL-MCNC: 6.9 G/DL (ref 6.4–8.2)
PROT UR STRIP-MCNC: NEGATIVE MG/DL
Q-T INTERVAL, ECG07: 426 MS
QRS DURATION, ECG06: 82 MS
QTC CALCULATION (BEZET), ECG08: 456 MS
RBC # BLD AUTO: 4.37 M/UL (ref 3.8–5.2)
RBC #/AREA URNS HPF: ABNORMAL /HPF (ref 0–5)
SODIUM SERPL-SCNC: 141 MMOL/L (ref 136–145)
SP GR UR REFRACTOMETRY: 1.02 (ref 1–1.03)
TROPONIN I SERPL-MCNC: <0.04 NG/ML
UROBILINOGEN UR QL STRIP.AUTO: 0.2 EU/DL (ref 0.2–1)
VENTRICULAR RATE, ECG03: 69 BPM
WBC # BLD AUTO: 7.5 K/UL (ref 3.6–11)
WBC URNS QL MICRO: ABNORMAL /HPF (ref 0–4)

## 2018-05-02 PROCEDURE — 81001 URINALYSIS AUTO W/SCOPE: CPT | Performed by: EMERGENCY MEDICINE

## 2018-05-02 PROCEDURE — 84484 ASSAY OF TROPONIN QUANT: CPT | Performed by: EMERGENCY MEDICINE

## 2018-05-02 PROCEDURE — 36415 COLL VENOUS BLD VENIPUNCTURE: CPT | Performed by: EMERGENCY MEDICINE

## 2018-05-02 PROCEDURE — 80053 COMPREHEN METABOLIC PANEL: CPT | Performed by: EMERGENCY MEDICINE

## 2018-05-02 PROCEDURE — 93005 ELECTROCARDIOGRAM TRACING: CPT

## 2018-05-02 PROCEDURE — 85025 COMPLETE CBC W/AUTO DIFF WBC: CPT | Performed by: EMERGENCY MEDICINE

## 2018-05-02 PROCEDURE — 83690 ASSAY OF LIPASE: CPT | Performed by: EMERGENCY MEDICINE

## 2018-05-02 PROCEDURE — 99285 EMERGENCY DEPT VISIT HI MDM: CPT

## 2018-05-02 NOTE — ED PROVIDER NOTES
HPI Comments: 75 y.o. female with past medical history significant for IBS, bipolar disorder, fibromyalgia, lumbar disc disease, Paget's disease, DJD, hypercholesterolemia, borderline DM, and PE who presents from home with chief complaint of chest pain. Pt reports progressively worsening lower sternal chest pain / epigastric abdominal pain for 2 weeks which radiates around her L lateral ribs to her back. She states this pain is a new \"burning, aching\" pain and it is accompanied by difficulty sleeping, SOB w/ exertion, and diarrhea. Pt had back surgery by Dr. Vania Torres in 2013. Pt notes she was recently seen by Dr. Vania Torres who noted her \"pain would get much worse\" after recent X-rays. She is scheduled to see him again in 6 days. Pt called his office today, but a nurse recommended ED evaluation. She walks with a Rollator. Pt has been taking Tylenol, but no other pain medication per Dr. Sung Painting recommendations. Pt denies fever, chills, diaphoresis, and bladder changes. There are no other acute medical concerns at this time. PCP: Evelin Barrios MD    Note written by Yinka Villeda, as dictated by Venkata Sutton MD 1:40 PM    The history is provided by the patient.         Past Medical History:   Diagnosis Date    Bipolar disorder ([facility] [address]     Nazmy    Chronic pain     BACK PAIN    Diabetes ([facility] [address]     BORDERLINE TX WITH DIET AND EXERCISE    DJD (degenerative joint disease)     Fibromyalgia     Genital herpes     GERD (gastroesophageal reflux disease)     Hypercholesterolemia     IBS (irritable bowel syndrome)     Lumbar disc disease     stimulation-Honza    Migraine     Nausea & vomiting     Other ill-defined conditions(799.89)     SEASONAL allergies    Other ill-defined conditions(799.89)     dysphagia has had esophagus dilated    Paget's disease     Pulmonary embolism (HCC)     RLS (restless legs syndrome)     Spinal stenosis        Past Surgical History:   Procedure Laterality Date    COLONOSCOPY N/A 6/27/2016    COL[facility]mer [facility]ñor [facility] at [facility] END[facility]COPY    ENDOSCOPY, COLON, DIAGNOSTIC      12, due 13    HX APPENDECTOMY      HX BACK SURGERY  9/17/2013    back surgery - total of 3 as of 12/20/2013    HX BREAST BIOPSY Right years ago    negative    HX CHOLECYSTECTOMY      HX HEENT      T & A    HX HYSTERECTOMY      total for fibroid tumors    HX ORTHOPAEDIC      lumbar laminectomy then fusion/neurostimulator implanted - inactive now but still in    HX ORTHOPAEDIC      REMOVAL OF NEUROSTIMULATOR    HX OTHER SURGICAL      EGD x 2 with dilation/colonoscopy - no polyps per pt    TILT TABLE EVALUATION  8/2/2016              Family History:   Problem Relation Age of Onset    Colon Cancer Mother     Cancer Mother 68     colon    Heart Disease Father     Heart Disease Maternal Grandmother     Heart Disease Maternal Grandfather     Heart Disease Other        Social History     Social History    Marital status: SINGLE     Spouse name: N/A    Number of children: N/A    Years of education: N/A     Occupational History    volunteer [employer]     [employer]     Social History Main Topics    Smoking status: Never Smoker    Smokeless tobacco: Never Used    Alcohol use No      Comment: 1 per month    Drug use: No    Sexual activity: Not on file     Other Topics Concern    Not on file     Social History Narrative         ALLERGIES: Adhesive; Hydrocodone; Lorazepam; Other medication; Percocet [oxycodone-acetaminophen]; Sudafed [pseudoephedrine hcl]; Wellbutrin [bupropion hcl]; Zithromax [azithromycin]; and Zyrtec [cetirizine]    Review of Systems   Constitutional: Negative for activity change, appetite change, chills, diaphoresis, fatigue and fever. HENT: Negative for ear pain, facial swelling, sore throat and trouble swallowing. Eyes: Negative for pain, discharge and visual disturbance. Respiratory: Positive for shortness of breath.  Negative for chest tightness and wheezing. Cardiovascular: Positive for chest pain. Negative for palpitations. Gastrointestinal: Positive for abdominal pain and diarrhea. Negative for blood in stool, nausea and vomiting. Genitourinary: Negative for difficulty urinating, dysuria, flank pain and hematuria. Musculoskeletal: Positive for back pain. Negative for arthralgias, joint swelling, myalgias and neck pain. Skin: Negative for color change and rash. Neurological: Negative for dizziness, weakness, numbness and headaches. Hematological: Negative for adenopathy. Does not bruise/bleed easily. Psychiatric/Behavioral: Positive for sleep disturbance. Negative for behavioral problems and confusion. All other systems reviewed and are negative. Vitals:    05/02/18 1321   BP: 168/88   Pulse: 67   Resp: 19   Temp: 98.1 °F (36.7 °C)   SpO2: 100%   Weight: 66.2 kg (146 lb)   Height: 5' 6\" (1.676 m)            Physical Exam   Constitutional: She is oriented to person, place, and time. She appears well-developed and well-nourished. No distress. NAD. HENT:   Head: Normocephalic and atraumatic. Nose: Nose normal.   Mouth/Throat: Oropharynx is clear and moist.   Eyes: Conjunctivae and EOM are normal. Pupils are equal, round, and reactive to light. No scleral icterus. Neck: Normal range of motion. Neck supple. No JVD present. No tracheal deviation present. No thyromegaly present. No carotid bruits noted. Cardiovascular: Normal rate, regular rhythm, normal heart sounds and intact distal pulses. Exam reveals no gallop and no friction rub. No murmur heard. Pulmonary/Chest: Effort normal and breath sounds normal. No respiratory distress. She has no wheezes. She has no rales. She exhibits tenderness. Tender over sternum. Abdominal: Soft. Bowel sounds are normal. She exhibits no distension and no mass. There is tenderness (epigastric). There is no rebound and no guarding. Musculoskeletal: Normal range of motion.  She exhibits tenderness. She exhibits no edema. Discomfort over lower back up to T spine w/ palpation. Lymphadenopathy:     She has no cervical adenopathy. Neurological: She is alert and oriented to person, place, and time. She has normal reflexes. No cranial nerve deficit. Coordination normal.   Skin: Skin is warm and dry. No rash noted. No erythema. Psychiatric: She has a normal mood and affect. Her behavior is normal. Judgment and thought content normal.   Nursing note and vitals reviewed. Note written by Yinka Chen, as dictated by Willy Berkowitz MD 1:40 PM    MDM  Number of Diagnoses or Management Options  Chronic bilateral back pain, unspecified back location: new and requires workup     Amount and/or Complexity of Data Reviewed  Clinical lab tests: ordered and reviewed  Tests in the radiology section of CPT®: ordered and reviewed  Decide to obtain previous medical records or to obtain history from someone other than the patient: yes  Review and summarize past medical records: yes  Discuss the patient with other providers: yes  Independent visualization of images, tracings, or specimens: yes    Risk of Complications, Morbidity, and/or Mortality  Presenting problems: high  Diagnostic procedures: high  Management options: high    Patient Progress  Patient progress: stable        ED Course       Procedures    EDMD EKG interpretation: Normal sinus rhythm rate 69 beats a minute axis normal intervals normal. There is T wave inversion in V2 and V3 with nonspecific findings in V4 through 6. No ectopy is noted. Willy Berkowitz MD.   5/2/18    1:38 PM       PROGRESS NOTE:  1:51 PM  Plan to check pancreas, heart, and liver. If normal, will call Dr. Uche Andrew for his recommendations. CONSULT NOTE:  3:59 PM Willy Berkowitz MD spoke with Dr. Uche Andrew, Consult for Orthopedics. Discussed available diagnostic tests and clinical findings. Dr. Uche Andrew agrees with plan.

## 2018-05-02 NOTE — ED NOTES
Discharge instructions given to pt by MD and RN . Pt has been given counseling on med use and verbalizes  understanding . IV discontinued . Pt ambulated off unit with no signs of distress.

## 2018-05-02 NOTE — ED NOTES
[facility] EKG interpretation: Normal sinus rhythm rate 69 beats a minute axis normal intervals normal. There is T wave inversion in V2 and V3 with nonspecific findings in V4 through 6. No ectopy is noted.  Aparna Valerio MD

## 2018-05-02 NOTE — PROGRESS NOTES
Date of previous inpatient admission/ ED visit? 6/25/17 ED    What brought the patient back to ED? Patient presents to the ED today w/ chest pain    Did patient decline recommended services during last admission/ ED visit (if yes, what)? No     Has patient seen a provider since their last inpatient admission/ED visit (if yes, when)? Yes PCP on 3/14/18    CM Interventions:  From previous inpatient admission/ED visit: No interventions last ED visit  From current inpatient admission/ED visit: Patient receptive of f/u PCP appointment being made. Noted [facility], EMR reviewed. History significant for IBS, bipolar disorder, fibromyalgia, lumbar disc disease, Paget's disease, DJD, hypercholesterolemia, borderline DM, and PE. Patient presents to the ED w/ chest pain. Met w/patient and introduced to role of CM. Patient resides at [address] [place] [address] (since 2006). Mendoza Conley states here in the ED for chest pain and having history of back surgery and pain. DME utilized is a rollator - informed it helps w/ balance. Previous providers HH ([facility]) and OP Therapy. Currently Ms. Chelsea Love receives pool therapy 2Xs /week at [facility]. Support received from children 2 daughters and 1 son (Annabella Baez lives in [place] and other children in [place]). No transitional care needs verbalized at this time. Discussed [facility] program. Patient receptive of f/u PCP appointment being scheduled. [facility] Medicare A/B and [place] [id number] are insurance providers. [facility] is local pharmacy utilized. Care Management Interventions  PCP Verified by CM: Yes  Last Visit to PCP: 03/14/18  Palliative Care Criteria Met (RRAT>21 & CHF Dx)?: No  Transition of Care Consult (CM Consult):  Other ([facility])  Dinh Signup: No  Discharge Durable Medical Equipment: No  Health Maintenance Reviewed: Yes  Physical Therapy Consult: No  Occupational Therapy Consult: No  Speech Therapy Consult: No  Current Support Network: Lives Alone  Confirm Follow Up Transport:  (TBD)  Plan discussed with Pt/Family/Caregiver: Yes  Discharge Location  Discharge Placement:  (TBD)

## 2018-05-15 ENCOUNTER — HOSPITAL ENCOUNTER (OUTPATIENT)
Dept: MRI IMAGING | Age: 73
Discharge: HOME | End: 2018-05-15
Attending: ORTHOPAEDIC SURGERY
Payer: MEDICARE

## 2018-05-15 DIAGNOSIS — M54.2 CERVICALGIA: ICD-10-CM

## 2018-05-15 DIAGNOSIS — M48.061 SPINAL STENOSIS, LUMBAR REGION, WITHOUT NEUROGENIC CLAUDICATION: ICD-10-CM

## 2018-05-15 DIAGNOSIS — M43.06 LUMBAR SPONDYLOLYSIS: ICD-10-CM

## 2018-05-15 DIAGNOSIS — M54.9 DORSALGIA: ICD-10-CM

## 2018-05-15 DIAGNOSIS — M51.36 DEGENERATION OF LUMBAR INTERVERTEBRAL DISC: ICD-10-CM

## 2018-05-15 PROCEDURE — 72141 MRI NECK SPINE W/O DYE: CPT

## 2018-05-15 PROCEDURE — 72148 MRI LUMBAR SPINE W/O DYE: CPT

## 2018-05-15 PROCEDURE — 72146 MRI CHEST SPINE W/O DYE: CPT

## 2018-05-23 RX ORDER — PYRIDOSTIGMINE BROMIDE 60 MG/1
TABLET ORAL
Qty: 90 TAB | Refills: 5 | Status: SHIPPED | OUTPATIENT
Start: 2018-05-23 | End: 2018-11-27 | Stop reason: SDUPTHER

## 2018-05-23 NOTE — TELEPHONE ENCOUNTER
Request for Mestinon 60 mg TID. Last office visit 4/12/18, next office visit not yet scheduled. Refills per verbal order from Dr. Charles Adams.

## 2018-05-28 ENCOUNTER — HOSPITAL ENCOUNTER (OUTPATIENT)
Age: 73
Setting detail: OBSERVATION
Discharge: HOME | End: 2018-05-30
Attending: EMERGENCY MEDICINE | Admitting: INTERNAL MEDICINE
Payer: MEDICARE

## 2018-05-28 ENCOUNTER — APPOINTMENT (OUTPATIENT)
Dept: CT IMAGING | Age: 73
End: 2018-05-28
Attending: EMERGENCY MEDICINE
Payer: MEDICARE

## 2018-05-28 DIAGNOSIS — R55 NEAR SYNCOPE: Primary | ICD-10-CM

## 2018-05-28 LAB
ALBUMIN SERPL-MCNC: 3.7 G/DL (ref 3.5–5)
ALBUMIN/GLOB SERPL: 1 {RATIO} (ref 1.1–2.2)
ALP SERPL-CCNC: 83 U/L (ref 45–117)
ALT SERPL-CCNC: 15 U/L (ref 12–78)
ANION GAP SERPL CALC-SCNC: 5 MMOL/L (ref 5–15)
APPEARANCE UR: CLEAR
AST SERPL-CCNC: 21 U/L (ref 15–37)
ATRIAL RATE: 78 BPM
BACTERIA URNS QL MICRO: NEGATIVE /HPF
BASOPHILS # BLD: 0.1 K/UL (ref 0–0.1)
BASOPHILS NFR BLD: 1 % (ref 0–1)
BILIRUB SERPL-MCNC: 0.6 MG/DL (ref 0.2–1)
BILIRUB UR QL: NEGATIVE
BUN SERPL-MCNC: 10 MG/DL (ref 6–20)
BUN/CREAT SERPL: 11 (ref 12–20)
CALCIUM SERPL-MCNC: 9 MG/DL (ref 8.5–10.1)
CALCULATED P AXIS, ECG09: 35 DEGREES
CALCULATED R AXIS, ECG10: 28 DEGREES
CALCULATED T AXIS, ECG11: 108 DEGREES
CHLORIDE SERPL-SCNC: 106 MMOL/L (ref 97–108)
CO2 SERPL-SCNC: 29 MMOL/L (ref 21–32)
COLOR UR: NORMAL
CREAT SERPL-MCNC: 0.87 MG/DL (ref 0.55–1.02)
DIAGNOSIS, 93000: NORMAL
DIFFERENTIAL METHOD BLD: NORMAL
EOSINOPHIL # BLD: 0.2 K/UL (ref 0–0.4)
EOSINOPHIL NFR BLD: 3 % (ref 0–7)
EPITH CASTS URNS QL MICRO: NORMAL /LPF
ERYTHROCYTE [DISTWIDTH] IN BLOOD BY AUTOMATED COUNT: 12.9 % (ref 11.5–14.5)
GLOBULIN SER CALC-MCNC: 3.7 G/DL (ref 2–4)
GLUCOSE SERPL-MCNC: 147 MG/DL (ref 65–100)
GLUCOSE UR STRIP.AUTO-MCNC: NEGATIVE MG/DL
HCT VFR BLD AUTO: 44.5 % (ref 35–47)
HGB BLD-MCNC: 14.7 G/DL (ref 11.5–16)
HGB UR QL STRIP: NEGATIVE
HYALINE CASTS URNS QL MICRO: NORMAL /LPF (ref 0–5)
IMM GRANULOCYTES # BLD: 0 K/UL (ref 0–0.04)
IMM GRANULOCYTES NFR BLD AUTO: 0 % (ref 0–0.5)
KETONES UR QL STRIP.AUTO: NEGATIVE MG/DL
LEUKOCYTE ESTERASE UR QL STRIP.AUTO: NEGATIVE
LIPASE SERPL-CCNC: 705 U/L (ref 73–393)
LYMPHOCYTES # BLD: 2.8 K/UL (ref 0.8–3.5)
LYMPHOCYTES NFR BLD: 32 % (ref 12–49)
MCH RBC QN AUTO: 31.7 PG (ref 26–34)
MCHC RBC AUTO-ENTMCNC: 33 G/DL (ref 30–36.5)
MCV RBC AUTO: 96.1 FL (ref 80–99)
MONOCYTES # BLD: 0.6 K/UL (ref 0–1)
MONOCYTES NFR BLD: 7 % (ref 5–13)
NEUTS SEG # BLD: 5 K/UL (ref 1.8–8)
NEUTS SEG NFR BLD: 58 % (ref 32–75)
NITRITE UR QL STRIP.AUTO: NEGATIVE
NRBC # BLD: 0 K/UL (ref 0–0.01)
NRBC BLD-RTO: 0 PER 100 WBC
P-R INTERVAL, ECG05: 150 MS
PH UR STRIP: 7.5 [PH] (ref 5–8)
PLATELET # BLD AUTO: 285 K/UL (ref 150–400)
PMV BLD AUTO: 10 FL (ref 8.9–12.9)
POTASSIUM SERPL-SCNC: 3.7 MMOL/L (ref 3.5–5.1)
PROT SERPL-MCNC: 7.4 G/DL (ref 6.4–8.2)
PROT UR STRIP-MCNC: NEGATIVE MG/DL
Q-T INTERVAL, ECG07: 378 MS
QRS DURATION, ECG06: 82 MS
QTC CALCULATION (BEZET), ECG08: 430 MS
RBC # BLD AUTO: 4.63 M/UL (ref 3.8–5.2)
RBC #/AREA URNS HPF: NORMAL /HPF (ref 0–5)
SODIUM SERPL-SCNC: 140 MMOL/L (ref 136–145)
SP GR UR REFRACTOMETRY: 1.03 (ref 1–1.03)
TROPONIN I SERPL-MCNC: <0.04 NG/ML
TROPONIN I SERPL-MCNC: <0.04 NG/ML
UR CULT HOLD, URHOLD: NORMAL
UROBILINOGEN UR QL STRIP.AUTO: 0.2 EU/DL (ref 0.2–1)
VENTRICULAR RATE, ECG03: 78 BPM
WBC # BLD AUTO: 8.7 K/UL (ref 3.6–11)
WBC URNS QL MICRO: NORMAL /HPF (ref 0–4)

## 2018-05-28 PROCEDURE — 74011250637 HC RX REV CODE- 250/637: Performed by: INTERNAL MEDICINE

## 2018-05-28 PROCEDURE — 74011000258 HC RX REV CODE- 258: Performed by: EMERGENCY MEDICINE

## 2018-05-28 PROCEDURE — 99218 HC RM OBSERVATION: CPT

## 2018-05-28 PROCEDURE — 96360 HYDRATION IV INFUSION INIT: CPT

## 2018-05-28 PROCEDURE — 83690 ASSAY OF LIPASE: CPT | Performed by: EMERGENCY MEDICINE

## 2018-05-28 PROCEDURE — 80053 COMPREHEN METABOLIC PANEL: CPT | Performed by: PHYSICIAN ASSISTANT

## 2018-05-28 PROCEDURE — 99285 EMERGENCY DEPT VISIT HI MDM: CPT

## 2018-05-28 PROCEDURE — 84484 ASSAY OF TROPONIN QUANT: CPT | Performed by: PHYSICIAN ASSISTANT

## 2018-05-28 PROCEDURE — 74011636320 HC RX REV CODE- 636/320: Performed by: EMERGENCY MEDICINE

## 2018-05-28 PROCEDURE — 74011250636 HC RX REV CODE- 250/636: Performed by: EMERGENCY MEDICINE

## 2018-05-28 PROCEDURE — 85025 COMPLETE CBC W/AUTO DIFF WBC: CPT | Performed by: PHYSICIAN ASSISTANT

## 2018-05-28 PROCEDURE — 74177 CT ABD & PELVIS W/CONTRAST: CPT

## 2018-05-28 PROCEDURE — 93306 TTE W/DOPPLER COMPLETE: CPT | Performed by: INTERNAL MEDICINE

## 2018-05-28 PROCEDURE — 36415 COLL VENOUS BLD VENIPUNCTURE: CPT | Performed by: INTERNAL MEDICINE

## 2018-05-28 PROCEDURE — 81001 URINALYSIS AUTO W/SCOPE: CPT | Performed by: PHYSICIAN ASSISTANT

## 2018-05-28 PROCEDURE — 96361 HYDRATE IV INFUSION ADD-ON: CPT

## 2018-05-28 RX ORDER — FLUDROCORTISONE ACETATE 0.1 MG/1
100 TABLET ORAL 2 TIMES DAILY
Status: DISCONTINUED | OUTPATIENT
Start: 2018-05-28 | End: 2018-05-30 | Stop reason: HOSPADM

## 2018-05-28 RX ORDER — ATORVASTATIN CALCIUM 20 MG/1
20 TABLET, FILM COATED ORAL DAILY
Status: DISCONTINUED | OUTPATIENT
Start: 2018-05-29 | End: 2018-05-30 | Stop reason: HOSPADM

## 2018-05-28 RX ORDER — SODIUM CHLORIDE 0.9 % (FLUSH) 0.9 %
10 SYRINGE (ML) INJECTION
Status: COMPLETED | OUTPATIENT
Start: 2018-05-28 | End: 2018-05-28

## 2018-05-28 RX ORDER — SODIUM CHLORIDE 0.9 % (FLUSH) 0.9 %
5-10 SYRINGE (ML) INJECTION EVERY 8 HOURS
Status: DISCONTINUED | OUTPATIENT
Start: 2018-05-28 | End: 2018-05-30 | Stop reason: HOSPADM

## 2018-05-28 RX ADMIN — FLUDROCORTISONE ACETATE 100 MCG: 0.1 TABLET ORAL at 21:42

## 2018-05-28 RX ADMIN — SODIUM CHLORIDE 100 ML: 900 INJECTION, SOLUTION INTRAVENOUS at 15:54

## 2018-05-28 RX ADMIN — Medication 10 ML: at 15:54

## 2018-05-28 RX ADMIN — SODIUM CHLORIDE 1000 ML: 900 INJECTION, SOLUTION INTRAVENOUS at 16:10

## 2018-05-28 RX ADMIN — Medication 10 ML: at 21:42

## 2018-05-28 RX ADMIN — IOPAMIDOL 100 ML: 755 INJECTION, SOLUTION INTRAVENOUS at 15:54

## 2018-05-28 NOTE — ED TRIAGE NOTES
Triage note: pt arrives via EMS from [facility] for sudden feeling of fatigue and lightheadedness. [place] [facility] [place]. Pt was told her K was low and sent back after treatment. Pt was eating lunch today and afterward felt the lightheadedness and laid down and called EMS, no LOC.

## 2018-05-28 NOTE — H&P
History and Physical  Primary Care Provider: Deb Dawkins MD    Subjective:     Isabella Wall is a 75 y.o. female with a history of chronic pain, fibromyalgia, GERD, DJD, and orthostatic hypotension who presents after 2 syncopal episodes in the past week. On Saturday she was doing laundry and talking to her friend when she felt hot and \"blacked out\" for a second. She \"slid down\" with her back against the wall and did not hit her head. Subsequently she was evaluated at a different ED, told she had low potassium, repleted, and discharged home. Then again today she felt the same \"hot\" sensation and blacked out during lunch. She tried to press her medical alert [employer], but it did not work. She then reached EMS and came to the ED. She states that EMS told her her systolic was in the 47J. She currently feels \"okay,\" no CP, SOB. She has some upper abdominal and back pain. Her back pain is chronic. No dizziness, N/V, but she is \"wiped out. \"    She states that about a year ago she had two episodes of syncope and her cardiac work-up was negative. \"It was not my heart  they said. \" She followed up with Dr. Chastity Keene as an outpatient and was started on Northera, but had to stop taking it due to the drug cost.    Review of Systems:  Further 10 point review of systems is benign. Pertinent items are noted in the History of Present Illness.      Past Medical History:   Diagnosis Date    Bipolar disorder ([facility] [address]     Nazmy    Chronic pain     BACK PAIN    Diabetes ([facility] [address]     BORDERLINE TX WITH DIET AND EXERCISE    DJD (degenerative joint disease)     Fibromyalgia     Genital herpes     GERD (gastroesophageal reflux disease)     Hypercholesterolemia     IBS (irritable bowel syndrome)     Lumbar disc disease     stimulation-Honza    Migraine     Nausea & vomiting     Other ill-defined conditions(799.89)     SEASONAL allergies    Other ill-defined conditions(799.89)     dysphagia has had esophagus dilated    Paget's disease     Pulmonary embolism (HCC)     RLS (restless legs syndrome)     Spinal stenosis       Past Surgical History:   Procedure Laterality Date    COLONOSCOPY N/A 6/27/2016    COL[facility] performed by [facility] MD at [facility] ENDOSCOPY    ENDOSCOPY, COLON, DIAGNOSTIC      12, due 13    HX APPENDECTOMY      HX BACK SURGERY  9/17/2013    back surgery - total of 3 as of 12/20/2013    HX BREAST BIOPSY Right years ago    negative    HX CHOLECYSTECTOMY      HX HEENT      T & A    HX HYSTERECTOMY      total for fibroid tumors    HX ORTHOPAEDIC      lumbar laminectomy then fusion/neurostimulator implanted - inactive now but still in    HX ORTHOPAEDIC      REMOVAL OF NEUROSTIMULATOR    HX OTHER SURGICAL      EGD x 2 with dilation/colonoscopy - no polyps per pt    TILT TABLE EVALUATION  8/2/2016          Prior to Admission medications    Medication Sig Start Date End Date Taking? Authorizing Provider   pyridostigmine (MESTINON) 60 mg tablet TAKE 1 TABLET BY MOUTH THREE TIMES DAILY 5/23/18   Ana Mota MD   fludrocortisone (FLORINEF) 0.1 mg tablet TAKE 1 TABLET BY MOUTH TWICE DAILY 5/1/18   Ana Mota MD   potassium chloride (K-DUR, KLOR-CON) 20 mEq tablet TAKE 1 TABLET BY MOUTH DAILY 4/18/18   Vijaya Tong MD   LACTOBACILLUS ACIDOPHILUS (PROBIOTIC PO) Take  by mouth daily. Historical Provider   ACETAMINOPHEN/CHLORPHENIRAMINE (CORICIDIN PO) Take  by mouth as needed. Historical Provider   carbidopa-levodopa (SINEMET)  mg per tablet Take 1 Tab by mouth two (2) times a day. Historical Provider   atorvastatin (LIPITOR) 20 mg tablet TAKE 1 TABLET BY MOUTH EVERY EVENING 7/28/17   Vijaya Tong MD   PARoxetine (PAXIL) 30 mg tablet Take 30 mg by mouth daily. Alroy Credit NP/ Dr Alisson Love Provider   acetaminophen (TYLENOL) 325 mg tablet Take 325 mg by mouth every four (4) hours as needed for Pain.     Historical Provider   QUEtiapine (SEROQUEL) 25 mg tablet Take 25 mg by mouth nightly as needed. Take 1-2 tabs qhs prn James Perla NP/Dr Ronald Newsome    Historical Provider     Allergies   Allergen Reactions    Adhesive Itching    Hydrocodone Itching    Lorazepam Other (comments)    Other Medication Rash     Tide detergent    Percocet [Oxycodone-Acetaminophen] Itching    Sudafed [Pseudoephedrine Hcl] Other (comments)     Vertigo    Wellbutrin [Bupropion Hcl] Nausea and Vomiting    Zithromax [Azithromycin] Vertigo    Zyrtec [Cetirizine] Nausea Only      Family History   Problem Relation Age of Onset    Colon Cancer Mother     Cancer Mother 68     colon    Heart Disease Father     Heart Disease Maternal Grandmother     Heart Disease Maternal Grandfather     Heart Disease Other         SOCIAL HISTORY:  Patient resides at [facility] independent living. Patient ambulates with a walker. Smoking history: Non-smoker. Alcohol history: Non-drinker. Objective:     Physical Exam:   Visit Vitals    BP (!) 167/92    Pulse 68    Temp 98.5 °F (36.9 °C)    Resp (!) 31    Ht 5' 6\" (1.676 m)    Wt 66.2 kg (146 lb)    SpO2 98%    BMI 23.57 kg/m2     General:  Alert, cooperative, no distress, appears stated age. Head:  Normocephalic. Nose: Nares normal. Septum midline. Throat: Lips, mucosa, and tongue normal.    Neck: Supple, symmetrical, trachea midline. Lungs:   Clear to auscultation bilaterally. Heart:  Regular rate and rhythm, S1, S2 normal, no murmur, click, rub or gallop. Abdomen:   Soft, tender in epigastrium and upper quadrants. No rigidity or guarding. Bowel sounds normal.    Extremities: Extremities normal, no cyanosis or edema. Neurologic: CNII-XII grossly intact. No focal deficit. ECG:  unchanged from previous tracings, normal sinus rhythm     Data Review: All diagnostic labs and studies have been reviewed.     Assessment:     Active Problems:    Syncope (5/28/2018)      Plan:     Syncope: Patient admitted with recurrent syncope, likely due to orthostatic etiology.  -admit to telemetry bed under observation. -repeat TTE  -initial troponin negative, will check one more given questionable abdominal/epigastric pain  -continue Florinef  -consult to Cardiology, Dr. Julio Fisher    Elevated glucose, Known History of Diet controlled Diabetes. Abdominal Pain and elevated Lipase. CT scan without any acute abdominal process. HLD: Continue statin    ? Bipolar/Depression: Awaiting pharmacy to verify meds. RLS: Have pharmacy verify dose of Carbidopa-Levodopa. FULL Code, daughter is [address] [facility], [address] [place] [address] [phone]. SCDs    Likely discharge tomorrow.     Signed By: Dahlia Gonzalez MD     May 28, 2018

## 2018-05-28 NOTE — IP AVS SNAPSHOT
[address] 
[phone] Patient: Myesha Galvan MRN: [id number] BJU:[id number] A check naomi indicates which time of day the medication should be taken. My Medications START taking these medications Instructions Each Dose to Equal  
 Morning Noon Evening Bedtime  
 midodrine 5 mg tablet Commonly known as:  Johntatiana Bridges Take 1 Tab by mouth three (3) times daily (with meals) for 30 days. 5 mg CONTINUE taking these medications Instructions Each Dose to Equal  
 Morning Noon Evening Bedtime  
 acetaminophen 325 mg tablet Commonly known as:  TYLENOL Take 325 mg by mouth every four (4) hours as needed for Pain. 325 mg  
    
   
   
   
  
 atorvastatin 20 mg tablet Commonly known as:  LIPITOR  
   
 TAKE 1 TABLET BY MOUTH EVERY EVENING  
     
   
   
   
  
 carbidopa-levodopa  mg per tablet Commonly known as:  SINEMET Take 1 Tab by mouth two (2) times a day. 1 Tab CORICIDIN PO Take  by mouth as needed. fludrocortisone 0.1 mg tablet Commonly known as:  FLORINEF  
   
 TAKE 1 TABLET BY MOUTH TWICE DAILY PARoxetine 30 mg tablet Commonly known as:  PAXIL Take 30 mg by mouth daily. Benita Rand NP/ Dr Augusta Gastelum 30 mg  
    
   
   
   
  
 potassium chloride 20 mEq tablet Commonly known as:  K-DUR, KLOR-CON  
   
 TAKE 1 TABLET BY MOUTH DAILY PROBIOTIC PO Take  by mouth daily. pyridostigmine 60 mg tablet Commonly known as:  MESTINON  
   
 TAKE 1 TABLET BY MOUTH THREE TIMES DAILY QUEtiapine 25 mg tablet Commonly known as:  SEROquel Take 25 mg by mouth nightly as needed. Take 1-2 tabs qhs prn Benita Rand NP/Dr Augusta Gastelum  25 mg  
    
   
   
   
  
  
  
 Where to Get Your Medications Information on where to get these meds will be given to you by the nurse or doctor. ! Ask your nurse or doctor about these medications  
  midodrine 5 mg tablet

## 2018-05-28 NOTE — IP AVS SNAPSHOT
[address] 
[phone] Patient: Kamaljit Willis MRN: [id number] About your hospitalization You were admitted on:  May 28, 2018 You last received care in the:  [facility] 6S NEURO-SCI TELE You were discharged on:  May 30, 2018 Why you were hospitalized Your primary diagnosis was:  Not on File Your diagnoses also included:  Syncope Follow-up Information Follow up With Details Comments Contact [facility] On 5/31/2018 Hospital f/u PCP appointment Thursday, 5/31/18. Service will contact patient prior to their arrival.    
 Erickson Rolon MD   [address] [place] [address] 
[phone] Discharge Orders None A check naomi indicates which time of day the medication should be taken. My Medications START taking these medications Instructions Each Dose to Equal  
 Morning Noon Evening Bedtime  
 midodrine 5 mg tablet Commonly known as:  [place] Budge Take 1 Tab by mouth three (3) times daily (with meals) for 30 days. 5 mg CONTINUE taking these medications Instructions Each Dose to Equal  
 Morning Noon Evening Bedtime  
 acetaminophen 325 mg tablet Commonly known as:  TYLENOL Take 325 mg by mouth every four (4) hours as needed for Pain. 325 mg  
    
   
   
   
  
 atorvastatin 20 mg tablet Commonly known as:  LIPITOR  
   
 TAKE 1 TABLET BY MOUTH EVERY EVENING  
     
   
   
   
  
 carbidopa-levodopa  mg per tablet Commonly known as:  SINEMET Take 1 Tab by mouth two (2) times a day. 1 Tab CORICIDIN PO Take  by mouth as needed. fludrocortisone 0.1 mg tablet Commonly known as:  FLORINEF  
   
 TAKE 1 TABLET BY MOUTH TWICE DAILY PARoxetine 30 mg tablet Commonly known as:  PAXIL Take 30 mg by mouth daily. Roger Lopez NP/  Art Pop 30 mg  
    
   
   
   
  
 potassium chloride 20 mEq tablet Commonly known as:  K-DUR, KLOR-CON  
   
 TAKE 1 TABLET BY MOUTH DAILY PROBIOTIC PO Take  by mouth daily. pyridostigmine 60 mg tablet Commonly known as:  MESTINON  
   
 TAKE 1 TABLET BY MOUTH THREE TIMES DAILY QUEtiapine 25 mg tablet Commonly known as:  SEROquel Take 25 mg by mouth nightly as needed. Take 1-2 tabs qhs prn Roger Lopez NP/ Art Pop 25 mg Where to Get Your Medications Information on where to get these meds will be given to you by the nurse or doctor. ! Ask your nurse or doctor about these medications  
  midodrine 5 mg tablet Discharge Instructions None ACO Transitions of Care Introducing [place] [address] Brooklynn Giraldo offers a voluntary care coordination program to provide high quality service and care to [facility] fee-for-service beneficiaries. Annelise Murcia was designed to help you enhance your health and well-being through the following services: ? Transitions of Care  support for individuals who are transitioning from one care setting to another (example: Hospital to home). ? Chronic and Complex Care Coordination  support for individuals and caregivers of those with serious or chronic illnesses or with more than one chronic (ongoing) condition and those who take a number of different medications. If you meet specific medical criteria, a [address] [place] [address] Rd may call you directly to coordinate your care with your primary care physician and your other care providers. For questions about the [facility] programs, please, contact your physicians office. For general questions or additional information about Accountable Care Organizations: 
Please visit www.medicare.gov/acos. html or call 1-800-MEDICARE (1-950.663.5600) TTY users should call 9-656.140.4533. PsychologyOnline Announcement We are excited to announce that we are making your provider's discharge notes available to you in PsychologyOnline. You will see these notes when they are completed and signed by the physician that discharged you from your recent hospital stay. If you have any questions or concerns about any information you see in PsychologyOnline, please call the Health Information Department where you were seen or reach out to your Primary Care Provider for more information about your plan of care. Introducing \[facility]\"" & HEALTH SERVICES! Dear [place]: Thank you for requesting a PsychologyOnline account. Our records indicate that you already have an active PsychologyOnline account. You can access your account anytime at [email or web address] Did you know that you can access your hospital and ER discharge instructions at any time in PsychologyOnline? You can also review all of your test results from your hospital stay or ER visit. Additional Information If you have questions, please visit the Frequently Asked Questions section of the PsychologyOnline website at [email or web address] Remember, PsychologyOnline is NOT to be used for urgent needs. For medical emergencies, dial 911. Now available from your iPhone and Android! Introducing Won Stephenson As a [facility] patient, I wanted to make you aware of our electronic visit tool called Won Stephenson. [facility] 24/7 allows you to connect within minutes with a medical provider 24 hours a day, seven days a week via a mobile device or tablet or logging into a secure website from your computer. You can access Won Stephenson from anywhere in the [place]. A virtual visit might be right for you when you have a simple condition and feel like you just dont want to get out of bed, or cant get away from work for an appointment, when your regular [facility] provider is not available (evenings, weekends or holidays), or when youre out of town and need minor care. Electronic visits cost only $49 and if the [employer] 24/Plei provider determines a prescription is needed to treat your condition, one can be electronically transmitted to a nearby pharmacy*. Please take a moment to enroll today if you have not already done so. The enrollment process is free and takes just a few minutes. To enroll, please download the [employer] july to your tablet or phone, or visit [email or web address] to enroll on your computer. And, as an [address] [place] [address] patient with a [employer] account, the results of your visits will be scanned into your electronic medical record and your primary care provider will be able to view the scanned results. We urge you to continue to see your regular [facility] provider for your ongoing medical care. And while your primary care provider may not be the one available when you seek a Won Chavezabdelrahman virtual visit, the peace of mind you get from getting a real diagnosis real time can be priceless. For more information on Won Scottabdelrahman, view our Frequently Asked Questions (FAQs) at [email or web address]. Sincerely, 
 
Joslyn Noonan MD 
Chief Medical Officer [place] [employer] *:  certain medications cannot be prescribed via Won [facility] [facility] Unresulted Labs-Please follow up with your PCP about these lab tests Order Current Status EKG, 12 LEAD, INITIAL Preliminary result Providers Seen During Your Hospitalization Provider Specialty Primary office phone General Freya MD Emergency Medicine [phone] Nuha Yepez MD Hospitalist [phone] Jose Guadalupe Yousif MD Hospitalist [phone] Your Primary Care Physician (PCP) Primary Care Physician Office Phone Office Fax Cathy Gaitan [phone] You are allergic to the following Allergen Reactions Adhesive Itching Hydrocodone Itching Lorazepam Other (comments) Other Medication Rash Tide detergent Percocet (Oxycodone-Acetaminophen) Itching Sudafed (Pseudoephedrine Hcl) Other (comments) Vertigo Wellbutrin (Bupropion Hcl) Nausea and Vomiting Zithromax (Azithromycin) Vertigo Zyrtec (Cetirizine) Nausea Only Recent Documentation Height Weight BMI OB Status Smoking Status 1.676 m 65.5 kg 23.29 kg/m2 Hysterectomy Never Smoker Emergency Contacts Name Discharge Info Relation Home Work Mobile [address] [facility] CAREGIVER [3] Daughter [21] [phone] Patient Belongings The following personal items are in your possession at time of discharge: 
  Dental Appliances: None  Visual Aid: Glasses      Home Medications: None   Jewelry: With patient, Earrings, Ring  Clothing: At bedside    Other Valuables: Cell Phone Please provide this summary of care documentation to your next provider. Signatures-by signing, you are acknowledging that this After Visit Summary has been reviewed with you and you have received a copy. Patient Signature:  ____________________________________________________________ Date:  ____________________________________________________________  
  
Rochelle Givens Provider Signature:  ____________________________________________________________ Date:  ____________________________________________________________

## 2018-05-28 NOTE — ED NOTES
1950: Assumed care of pt. Bedside report received from [facility], [facility]. Pt sitting in chair finishing dinner, pt cell phone placed at nurses station to charge.   2005: Troponin drawn

## 2018-05-28 NOTE — ED PROVIDER NOTES
HPI Comments: 75 yr old presenting after a near-syncopal episode. Pt was at lunch when she felt hot, lightheaded, an woozy - she laid down and called for help. No LOC, no seizure activity. Patient had similar episodes last year and was started on a medication for orthostatic hypotension at that time by her cardiologist. She had resolution of her symptoms until 2 days ago when she had a near syncopal episode while standing in her laundry room; she was seen at [facility] and diagnosed with hypokalemia, after which she was discharged home to follow-up with cardiology. Yesterday she had decreased appetite and fatigue and laid in bed most of the day, today she felt slightly better and went downstairs to lunch. She denies fever, chills, cough, nausea, and vomiting. She has some mild left sided chest pain and reports IBS with worsening diarrhea over the last week. Patient is a 75 y.o. female presenting with fatigue and dizziness. Fatigue   This is a new problem. The current episode started 2 days ago. The problem has not changed since onset. Pertinent negatives include no focal weakness. There has been no fever. Associated symptoms include chest pain. Pertinent negatives include no shortness of breath, no vomiting, no altered mental status, no nausea and no bladder incontinence. Associated medical issues do not include seizures. Associated medical issues comments: hx of orthostatic hypotension. Dizziness   This is a new problem. The current episode started 2 days ago. The problem has not changed since onset. Pertinent negatives include no focal weakness. There has been no fever. Associated symptoms include chest pain. Pertinent negatives include no shortness of breath, no vomiting, no altered mental status, no nausea and no bladder incontinence. Associated medical issues do not include seizures. Associated medical issues comments: hx of orthostatic hypotension.         Past Medical History:   Diagnosis Date  Bipolar disorder (HCC)     Nazmy    Chronic pain     BACK PAIN    Diabetes ([facility] [address]     BORDERLINE TX WITH DIET AND EXERCISE    DJD (degenerative joint disease)     Fibromyalgia     Genital herpes     GERD (gastroesophageal reflux disease)     Hypercholesterolemia     IBS (irritable bowel syndrome)     Lumbar disc disease     stimulation-Honza    Migraine     Nausea & vomiting     Other ill-defined conditions(799.89)     SEASONAL allergies    Other ill-defined conditions(799.89)     dysphagia has had esophagus dilated    Paget's disease     Pulmonary embolism (HCC)     RLS (restless legs syndrome)     Spinal stenosis        Past Surgical History:   Procedure Laterality Date    COLONOSCOPY N/A 6/27/2016    COLONOSCOPY performed by Levora Runner, MD at [facility] ENDOSCOPY    ENDOSCOPY, COLON, DIAGNOSTIC      12, due 13    HX APPENDECTOMY      HX BACK SURGERY  9/17/2013    back surgery - total of 3 as of 12/20/2013    HX BREAST BIOPSY Right years ago    negative    HX CHOLECYSTECTOMY      HX HEENT      T & A    HX HYSTERECTOMY      total for fibroid tumors    HX ORTHOPAEDIC      lumbar laminectomy then fusion/neurostimulator implanted - inactive now but still in    HX ORTHOPAEDIC      REMOVAL OF NEUROSTIMULATOR    HX OTHER SURGICAL      EGD x 2 with dilation/colonoscopy - no polyps per pt    TILT TABLE EVALUATION  8/2/2016              Family History:   Problem Relation Age of Onset    Colon Cancer Mother     Cancer Mother 68     colon    Heart Disease Father     Heart Disease Maternal Grandmother     Heart Disease Maternal Grandfather     Heart Disease Other        Social History     Social History    Marital status: SINGLE     Spouse name: N/A    Number of children: N/A    Years of education: N/A     Occupational History    volunteer Retired     [employer]     Social History Main Topics    Smoking status: Never Smoker    Smokeless tobacco: Never Used    Alcohol use No      Comment: 1 per month    Drug use: No    Sexual activity: Not on file     Other Topics Concern    Not on file     Social History Narrative         ALLERGIES: Adhesive; Hydrocodone; Lorazepam; Other medication; Percocet [oxycodone-acetaminophen]; Sudafed [pseudoephedrine hcl]; Wellbutrin [bupropion hcl]; Zithromax [azithromycin]; and Zyrtec [cetirizine]    Review of Systems   Constitutional: Positive for fatigue. Negative for chills and fever. Respiratory: Negative for shortness of breath. Cardiovascular: Positive for chest pain. Gastrointestinal: Positive for diarrhea. Negative for abdominal pain, nausea and vomiting. Genitourinary: Negative for bladder incontinence, dysuria, flank pain and urgency. Neurological: Positive for dizziness and light-headedness. Negative for focal weakness. Vitals:    05/28/18 1337   BP: 100/65   Pulse: 82   Resp: 18   Temp: 98.5 °F (36.9 °C)   SpO2: 98%   Weight: 66.2 kg (146 lb)   Height: 5' 6\" (1.676 m)            Physical Exam   Constitutional: She is oriented to person, place, and time. She appears well-developed and well-nourished. No distress. HENT:   Head: Normocephalic and atraumatic. Eyes: EOM are normal. Pupils are equal, round, and reactive to light. No scleral icterus. Neck: Normal range of motion. Neck supple. Cardiovascular: Normal rate and regular rhythm. Pulmonary/Chest: Effort normal and breath sounds normal.   Abdominal: Soft. She exhibits no distension. There is tenderness (RUQ & RLQ). There is no rebound and no guarding. Musculoskeletal: Normal range of motion. She exhibits no edema. Neurological: She is alert and oriented to person, place, and time. No cranial nerve deficit. Skin: Skin is warm and dry. She is not diaphoretic. Psychiatric: She has a normal mood and affect. Her behavior is normal. Thought content normal.   Nursing note and vitals reviewed.        MDM  Number of Diagnoses or Management Options  Near syncope:   Diagnosis management comments: The patient is resting comfortably and feels better, is alert, talkative, interactive and in no distress. The repeat examination is unremarkable and benign. The patient is neurologically intact, has a normal mental status and is ambulatory in the ED. The history, exam, diagnostic testing (if any) and the patient's current condition do not suggest STEMI, seizure, meningitis, stroke, sepsis, subarachnoid hemorrhage, intracranial bleeding, or encephalitis. Given the multiple near syncopal episodes this week after a year of being asymptomatic from her previously diagnosed orthostatic hypotension, will admit for telemetry to further evaluate for possible arrhythmias.         ED Course       Procedures

## 2018-05-28 NOTE — ED NOTES
1:39 PM  I have evaluated the patient as the Provider in Triage. I have reviewed Her vital signs and the triage nurse assessment. I have talked with the patient and any available family and advised that I am the provider in triage and have ordered the appropriate study to initiate their work up based on the clinical presentation during my assessment. I have advised that the patient will be accommodated in the Main ED as soon as possible. I have also requested to contact the triage nurse or myself immediately if the patient experiences any changes in their condition during this brief waiting period. Near syncope Friday, seen at [facility]. Here today for fatigue, lightheadedness. Had low BP and K on Friday - followed by Dr. Constantin Cortes for orthostatic hypotension. Denies CP, SOB, recent illness. + diarrhea which is chronic and unchanged.     PILY Lloyd

## 2018-05-29 LAB
ANION GAP SERPL CALC-SCNC: 6 MMOL/L (ref 5–15)
BUN SERPL-MCNC: 10 MG/DL (ref 6–20)
BUN/CREAT SERPL: 14 (ref 12–20)
CALCIUM SERPL-MCNC: 8.3 MG/DL (ref 8.5–10.1)
CHLORIDE SERPL-SCNC: 108 MMOL/L (ref 97–108)
CO2 SERPL-SCNC: 27 MMOL/L (ref 21–32)
CREAT SERPL-MCNC: 0.71 MG/DL (ref 0.55–1.02)
ERYTHROCYTE [DISTWIDTH] IN BLOOD BY AUTOMATED COUNT: 12.6 % (ref 11.5–14.5)
GLUCOSE SERPL-MCNC: 118 MG/DL (ref 65–100)
HCT VFR BLD AUTO: 39.1 % (ref 35–47)
HGB BLD-MCNC: 13 G/DL (ref 11.5–16)
MCH RBC QN AUTO: 31.6 PG (ref 26–34)
MCHC RBC AUTO-ENTMCNC: 33.2 G/DL (ref 30–36.5)
MCV RBC AUTO: 95.1 FL (ref 80–99)
NRBC # BLD: 0 K/UL (ref 0–0.01)
NRBC BLD-RTO: 0 PER 100 WBC
PLATELET # BLD AUTO: 253 K/UL (ref 150–400)
PMV BLD AUTO: 9.5 FL (ref 8.9–12.9)
POTASSIUM SERPL-SCNC: 3.5 MMOL/L (ref 3.5–5.1)
RBC # BLD AUTO: 4.11 M/UL (ref 3.8–5.2)
SODIUM SERPL-SCNC: 141 MMOL/L (ref 136–145)
WBC # BLD AUTO: 7.6 K/UL (ref 3.6–11)

## 2018-05-29 PROCEDURE — 74011250637 HC RX REV CODE- 250/637: Performed by: INTERNAL MEDICINE

## 2018-05-29 PROCEDURE — 80048 BASIC METABOLIC PNL TOTAL CA: CPT | Performed by: INTERNAL MEDICINE

## 2018-05-29 PROCEDURE — 99218 HC RM OBSERVATION: CPT

## 2018-05-29 PROCEDURE — 93306 TTE W/DOPPLER COMPLETE: CPT

## 2018-05-29 PROCEDURE — 85027 COMPLETE CBC AUTOMATED: CPT | Performed by: INTERNAL MEDICINE

## 2018-05-29 PROCEDURE — 77030032490 HC SLV COMPR SCD KNE COVD -B

## 2018-05-29 PROCEDURE — 36415 COLL VENOUS BLD VENIPUNCTURE: CPT | Performed by: INTERNAL MEDICINE

## 2018-05-29 RX ORDER — MIDODRINE HYDROCHLORIDE 5 MG/1
5 TABLET ORAL
Status: DISCONTINUED | OUTPATIENT
Start: 2018-05-29 | End: 2018-05-30 | Stop reason: HOSPADM

## 2018-05-29 RX ADMIN — MIDODRINE HYDROCHLORIDE 5 MG: 5 TABLET ORAL at 17:12

## 2018-05-29 RX ADMIN — FLUDROCORTISONE ACETATE 100 MCG: 0.1 TABLET ORAL at 18:32

## 2018-05-29 RX ADMIN — FLUDROCORTISONE ACETATE 100 MCG: 0.1 TABLET ORAL at 10:03

## 2018-05-29 RX ADMIN — Medication 10 ML: at 22:25

## 2018-05-29 RX ADMIN — ATORVASTATIN CALCIUM 20 MG: 20 TABLET, FILM COATED ORAL at 10:03

## 2018-05-29 RX ADMIN — Medication 10 ML: at 06:00

## 2018-05-29 RX ADMIN — Medication 10 ML: at 15:07

## 2018-05-29 NOTE — INTERDISCIPLINARY ROUNDS
IDR/SLIDR Summary          Patient: Yulissa Reyes MRN: [id number]    Age: 75 y.o. YOB: 1945 Room/Bed: [facility]   Admit Diagnosis: Syncope  Principal Diagnosis: <principal problem not specified>   Goals: cardio consult, safety  Readmission: NO  Quality Measure: Not applicable  VTE Prophylaxis: Mechanical  Influenza Vaccine screening completed? NO  Pneumococcal Vaccine screening completed? NO  Mobility needs: No   Nutrition plan:No  Consults: cardio    Financial concerns:No  Escalated to CM? NO  RRAT Score: 12   Interventions:H2H  Testing due for pt today?  YES  LOS: 0 days Expected length of stay 2 days  Discharge plan: [address]   PCP: Albina Grant MD  Transportation needs: Yes    Days before discharge:one day until discharge   Discharge disposition: ind living    Signed:     Bro Cheatham  5/29/2018  4:04 AM

## 2018-05-29 NOTE — PROGRESS NOTES
Problem: Falls - Risk of  Goal: *Absence of Falls  Document Marti Fall Risk and appropriate interventions in the flowsheet.   Outcome: Progressing Towards Goal  Fall Risk Interventions:  Mobility Interventions: Assess mobility with egress test, Patient to call before getting OOB, Utilize walker, cane, or other assitive device         Medication Interventions: Assess postural VS orthostatic hypotension, Patient to call before getting OOB, Teach patient to arise slowly    Elimination Interventions: Call light in reach, Patient to call for help with toileting needs, Toileting schedule/hourly rounds    History of Falls Interventions: Consult care management for discharge planning, Door open when patient unattended, Evaluate medications/consider consulting pharmacy

## 2018-05-29 NOTE — ROUTINE PROCESS
Bedside and Verbal shift change report given to Ileana (oncoming nurse) by Wu Lyles (offgoing nurse). Report included the following information SBAR, Kardex, Intake/Output, MAR, Recent Results, Med Rec Status and Cardiac Rhythm NSR.

## 2018-05-29 NOTE — ROUTINE PROCESS
TRANSFER - IN REPORT:    Verbal report received from Kell(name) on Iliana Burgess  being received from ED(unit) for routine progression of care      Report consisted of patients Situation, Background, Assessment and   Recommendations(SBAR). Information from the following report(s) SBAR, Kardex, ED Summary, Intake/Output, MAR, Recent Results, Med Rec Status and Cardiac Rhythm NSR was reviewed with the receiving nurse. Opportunity for questions and clarification was provided. Assessment completed upon patients arrival to unit and care assumed.

## 2018-05-29 NOTE — CONSULTS
Cardiac Electrophysiology Consultation Note      Subjective:      James Dorado is a 75 y.o. patient who is seen for evaluation of  Syncope  She is known to me as I have been taking care of her in clinic and she has known severe orthostatic hypotension associated with syncope    I saw her in clinic April 2018  Lexiscan/cardiolite stress test on 01/24/18 was essentially normal, with LVEF 74% & no ischemia noted. Chest CTA (02/01/2018) showed no abnormalities that would explain chest pain. She used to have chest pain, has been told that it is due to neuro/spine issues. She sees Dr. Sayda Drummond. States her whole body hurts at times, attributes this to fibromyalgia. Has been told that fibromyalgia should be better controlled before proceeding with back surgery.     Previously:  Dr Alba Zaidi 8/26/16: CTA at [facility] 5/7/16 small PE RLL  6/9/16 CTA [facility] normal CTA no PE  Hypercoagulable work up and d dimer performed. No longer taking Xarelto.     She had a tilt table test 8/2/16. Tilt table test revealed the patient had orthostatic hypotension response with sudden change in upright position and resolved quickly when she became supine  She had been on midodrine 5 mg tid, but discontinued this when starting Northera. Patient was seen by neurologist in hospital and not clear she has autonomic dysfunction and Parkinson's disease.      At one point she was stabilized with compression stockings, florinef, mestinon, Northera  Echo with normal LVEF  She then lost Northera drug assistance and off mestinon due to high BP, off compression stockings due to hot weather  Then 2 days in a row she had 2 syncope  3 days ago she was doing laundry and talking to her friend when she felt hot and \"blacked out\" for a second. She \"slid down\" with her back against the wall and did not hit her head.   She felt hot before this and was standing for 5 minutes  Subsequently she was evaluated at a different ED, told she had low potassium, repleted, and discharged home. Then again 1 day ago she felt the same \"hot\" sensation and blacked out during lunch while she was sitting down. She tried to press her medical alert bracelet, but it did not work. She then reached EMS and came to the ED. She states that EMS told her her systolic BP was in the 26E. She was told by EMS to sit down and lie down      IBS has been bothering her too    EKG: normal sinus rhythm, nonspecific T waves changes. Echocardiogram: normal LVEF in 2017       Patient Active Problem List   Diagnosis Code    IBS (irritable bowel syndrome) K58.9    RLS (restless legs syndrome) G25.81    Bipolar disorder ([facility]) F31.9    Fibromyalgia M79.7    DJD (degenerative joint disease) M19.90    Lumbar disc disease M51.9    Paget's disease of bone M88.9    Migraine 346    Genital herpes A60.00    Other diseases of nasal cavity and sinuses(478.19) J34.89    Allergic rhinitis, cause unspecified J30.9    Hammertoe M20.40    Encounter for long-term (current) use of other medications Z79.899    Abnormal mammogram R92.8    Tubulovillous adenoma D36.9    Diabetes mellitus type 2, controlled, without complications ([facility]) B90.4    Mixed hyperlipidemia E78.2    Pulmonary embolism ([facility]) I26.99    Advance directive on file Z78.9    Orthostatic hypotension I95.1    Resting tremor R25.9    Near syncope R55    Spinal stenosis M48.00    Syncope R55     No current facility-administered medications on file prior to encounter. Current Outpatient Prescriptions on File Prior to Encounter   Medication Sig Dispense Refill    pyridostigmine (MESTINON) 60 mg tablet TAKE 1 TABLET BY MOUTH THREE TIMES DAILY 90 Tab 5    fludrocortisone (FLORINEF) 0.1 mg tablet TAKE 1 TABLET BY MOUTH TWICE DAILY 180 Tab 1    potassium chloride (K-DUR, KLOR-CON) 20 mEq tablet TAKE 1 TABLET BY MOUTH DAILY 90 Tab 3    LACTOBACILLUS ACIDOPHILUS (PROBIOTIC PO) Take  by mouth daily.       carbidopa-levodopa (SINEMET)  mg per tablet Take 1 Tab by mouth two (2) times a day.  atorvastatin (LIPITOR) 20 mg tablet TAKE 1 TABLET BY MOUTH EVERY EVENING 90 Tab 11    PARoxetine (PAXIL) 30 mg tablet Take 30 mg by mouth daily. Sarai Hoffman NP/ Dr Doonvan Gore acetaminophen (TYLENOL) 325 mg tablet Take 325 mg by mouth every four (4) hours as needed for Pain.  QUEtiapine (SEROQUEL) 25 mg tablet Take 25 mg by mouth nightly as needed. Take 1-2 tabs qhs prn Sarai Hoffman NP/Dr Donovan Gore ACETAMINOPHEN/CHLORPHENIRAMINE (CORICIDIN PO) Take  by mouth as needed.          Current Facility-Administered Medications   Medication Dose Route Frequency Provider Last Rate Last Dose    midodrine (PROAMITINE) tablet 5 mg  5 mg Oral TID WITH MEALS Chapito Reddy MD   5 mg at 05/29/18 1712    sodium chloride (NS) flush 5-10 mL  5-10 mL IntraVENous Q8H Jose Sharma MD   10 mL at 05/29/18 1507    atorvastatin (LIPITOR) tablet 20 mg  20 mg Oral DAILY Jose Sharma MD   20 mg at 05/29/18 1003    fludrocortisone (FLORINEF) tablet 100 mcg  100 mcg Oral BID Jose Sharma MD   100 mcg at 05/29/18 1832     Allergies   Allergen Reactions    Adhesive Itching    Hydrocodone Itching    Lorazepam Other (comments)    Other Medication Rash     Tide detergent    Percocet [Oxycodone-Acetaminophen] Itching    Sudafed [Pseudoephedrine Hcl] Other (comments)     Vertigo    Wellbutrin [Bupropion Hcl] Nausea and Vomiting    Zithromax [Azithromycin] Vertigo    Zyrtec [Cetirizine] Nausea Only     Past Medical History:   Diagnosis Date    Bipolar disorder ([facility]     Nazmy    Chronic pain     BACK PAIN    Diabetes ([facility]     BORDERLINE TX WITH DIET AND EXERCISE    DJD (degenerative joint disease)     Fibromyalgia     Genital herpes     GERD (gastroesophageal reflux disease)     Hypercholesterolemia     IBS (irritable bowel syndrome)     Lumbar disc disease     stimulation-Honza    Migraine     Nausea & vomiting     Other ill-defined conditions(799.89)     SEASONAL allergies    Other ill-defined conditions(799.89)     dysphagia has had esophagus dilated    Paget's disease     Pulmonary embolism (H[facility])     RLS (restless legs syndrome)     Spinal stenosis      Past Surgical History:   Procedure Laterality Date    COLONOSCOPY N/A 6/27/2016    COLONOSCOPY performed by Cathy Daily MD at [facility] ENDOSCOPY    ENDOSCOPY, COLON, DIAGNOSTIC      12, due 13    HX APPENDECTOMY      HX BACK SURGERY  9/17/2013    back surgery - total of 3 as of 12/20/2013    HX BREAST BIOPSY Right years ago    negative    HX CHOLECYSTECTOMY      HX HEENT      T & A    HX HYSTERECTOMY      total for fibroid tumors    HX ORTHOPAEDIC      lumbar laminectomy then fusion/neurostimulator implanted - inactive now but still in    HX ORTHOPAEDIC      REMOVAL OF NEUROSTIMULATOR    HX OTHER SURGICAL      EGD x 2 with dilation/colonoscopy - no polyps per pt    TILT TABLE EVALUATION  8/2/2016          Family History   Problem Relation Age of Onset    Colon Cancer Mother     Cancer Mother 68     colon    Heart Disease Father     Heart Disease Maternal Grandmother     Heart Disease Maternal Grandfather     Heart Disease Other      Social History   Substance Use Topics    Smoking status: Never Smoker    Smokeless tobacco: Never Used    Alcohol use No      Comment: 1 per month        Review of Systems:   Constitutional: Negative for fever, chills, weight loss, + malaise/fatigue. HEENT: Negative for nosebleeds, vision changes. Respiratory: Negative for cough, hemoptysis  Cardiovascular: Negative for chest pain, palpitations, orthopnea, claudication, leg swelling, + syncope, and PND. Gastrointestinal: Negative for nausea, vomiting, diarrhea, blood in stool and melena. Genitourinary: Negative for dysuria, and hematuria. Musculoskeletal: + for myalgias, arthralgia. Skin: Negative for rash. Heme: Does not bleed or bruise easily.    Neurological: Negative for speech change and focal weakness     Objective:     Visit Vitals    /88    Pulse 69    Temp 98.2 °F (36.8 °C)    Resp 17    Ht 5' 6\" (1.676 m)    Wt 144 lb (65.3 kg)    SpO2 100%    BMI 23.24 kg/m2      Physical Exam:   Constitutional: well-developed and well-nourished. No respiratory distress. Head: Normocephalic and atraumatic. Eyes: Pupils are equal, round  ENT: hearing normal  Neck: supple. No JVD present. Cardiovascular: Normal rate, regular rhythm. Exam reveals no gallop and no friction rub. No murmur heard. Pulmonary/Chest: Effort normal and breath sounds normal. No wheezes. Abdominal: Soft, no tenderness. Musculoskeletal: no edema. Neurological: alert,oriented. Skin: Skin is warm and dry  Psychiatric: normal mood and affect.  Behavior is normal. Judgment and thought content normal.      BMP:   Lab Results   Component Value Date/Time     05/29/2018 02:34 AM    K 3.5 05/29/2018 02:34 AM     05/29/2018 02:34 AM    CO2 27 05/29/2018 02:34 AM    AGAP 6 05/29/2018 02:34 AM     (H) 05/29/2018 02:34 AM    BUN 10 05/29/2018 02:34 AM    CREA 0.71 05/29/2018 02:34 AM    GFRAA >60 05/29/2018 02:34 AM    GFRNA >60 05/29/2018 02:34 AM     CMP:   Lab Results   Component Value Date/Time     05/29/2018 02:34 AM    K 3.5 05/29/2018 02:34 AM     05/29/2018 02:34 AM    CO2 27 05/29/2018 02:34 AM    AGAP 6 05/29/2018 02:34 AM     (H) 05/29/2018 02:34 AM    BUN 10 05/29/2018 02:34 AM    CREA 0.71 05/29/2018 02:34 AM    GFRAA >60 05/29/2018 02:34 AM    GFRNA >60 05/29/2018 02:34 AM    CA 8.3 (L) 05/29/2018 02:34 AM     CBC:   Lab Results   Component Value Date/Time    WBC 7.6 05/29/2018 02:34 AM    HGB 13.0 05/29/2018 02:34 AM    HCT 39.1 05/29/2018 02:34 AM     05/29/2018 02:34 AM     All Cardiac Markers in the last 24 hours:   Lab Results   Component Value Date/Time    TROIQ <0.04 05/28/2018 08:02 PM     COAGS: No results found for: APTT, PTP, INR    Assessment/Plan:     Recurrent syncope   Orthostatic hypotension  Supine hypertension  IBS    IBS does not help her and could have precipitate these syncope too. She needs to see GI for this  reviewed medications and side effects in detail   Need to add midodrine and accept some high supine BP. SBP dropped to  at times when she had syncope and near syncope  Unfortunately she cannot afford Northera and company does not assist any more  Mestinon seemed to lose efficacy  Continue with florinef  She does not want to use stockings in Summer but she has no choice so she said she will use it if she is in [facility] zone    Thank you for involving me in this patient's care and please call with further concerns or questions. Galindo Hernández M.D.   Electrophysiology/Cardiology  [facility] [place]  [facility], [place] [address] [place] [address]  [phone]

## 2018-05-29 NOTE — PROGRESS NOTES
Care Management: reviewed chart independently and demographics with patient. Patient lives alone with daughter 5 minutes away. Many friends around. Last saw PCP a few months ago: Dr. Kelly Quinonez: [phone]. Medications are filled at [facility]. Family support, emergency contact  and transportation is daughter : Maranda Ebbs: [phone]. DME equipment in use are : rollator, kelly potty and shower chair. To follow transition of care. Reason for Admission:   syncope                   RRAT Score:   12                  Plan for utilizing home health:      no                    Likelihood of Readmission:  low                         Transition of Care Plan:  Home with daughter and friends 5 minutes away. Care Management Interventions  Last Visit to PCP: 02/28/18  Palliative Care Criteria Met (RRAT>21 & CHF Dx)?: No (RRAT=12)  Mode of Transport at Discharge: Other (see comment) (private auto / daughter)  Transition of Care Consult (CM Consult):  Other (home)  Discharge Durable Medical Equipment: Yes (rollator / joyce potty and shower chair)  Physical Therapy Consult: No  Occupational Therapy Consult: No  Speech Therapy Consult: No  Current Support Network: Lives Alone (daughter 5 minutes away and many friends.)  Confirm Follow Up Transport: Family  Plan discussed with Pt/Family/Caregiver: Yes (patient)  [address] Provided?: No  Discharge Location  Discharge Placement: [address], RN

## 2018-05-29 NOTE — PROGRESS NOTES
Bedside shift change report given to [address] [place] [address] (oncoming nurse) by Astrid Parks (offgoing nurse). Report included the following information SBAR, Kardex, Intake/Output, MAR, Recent Results and Cardiac Rhythm NSR.

## 2018-05-29 NOTE — PROGRESS NOTES
Bedside shift change report given to Jessy Parra (oncoming nurse) by Bao Zhou RN (offgoing nurse). Report included the following information SBAR, Kardex, Intake/Output, MAR, Accordion and Cardiac Rhythm NSR.

## 2018-05-29 NOTE — PROGRESS NOTES
Hospitalist Progress Note  Ana Maria Burden MD  Answering service: [phone] OR 36 from in house phone  Cell: [phone]      Date of Service:  2018  NAME:  Robert Wong  :  1945  MRN:  [id number]      Admission Summary:   75 y.o. female with a history of chronic pain, fibromyalgia, GERD, DJD, and orthostatic hypotension who presents after 2 syncopal episodes in the past week. Interval history / Subjective:   Patient resting comfortably bedside. No complaints. Assessment & Plan:     Syncope:  likely due to orthostatic etiology. -Monitor on tele   -repeat TTE  -Troponin negative  -continue Florinef  -consult to Cardiology, Dr. Julio Fisher     Elevated glucose, Known History of Diet controlled Diabetes.     Abdominal Pain and elevated Lipase. CT scan without any acute abdominal process.     HLD: Continue statin     RLS: c/w Carbidopa-Levodopa.     Code status: Full  DVT prophylaxis: SCDs    Care Plan discussed with: Patient/Family  Disposition: TBD     Hospital Problems  Date Reviewed: 2018          Codes Class Noted POA    Syncope ICD-10-CM: R55  ICD-9-CM: 780.2  2018 Unknown                Review of Systems:   A comprehensive review of systems was negative except for that written in the HPI. Vital Signs:    Last 24hrs VS reviewed since prior progress note. Most recent are:  Visit Vitals    /70 (BP 1 Location: Left arm, BP Patient Position: Standing)    Pulse 79    Temp 98 °F (36.7 °C)    Resp 17    Ht 5' 6\" (1.676 m)    Wt 65.3 kg (144 lb)    SpO2 100%    BMI 23.24 kg/m2       No intake or output data in the 24 hours ending 18 1217     Physical Examination:             Constitutional:  No acute distress, cooperative, pleasant    ENT:  Oral mucous moist, oropharynx benign. Neck supple,    Resp:  CTA bilaterally. No wheezing/rhonchi/rales.  No accessory muscle use   CV:  Regular rhythm, normal rate, no murmurs, gallops, rubs    GI:  Soft, non distended, non tender. normoactive bowel sounds, no hepatosplenomegaly     Musculoskeletal:  No edema, warm, 2+ pulses throughout    Neurologic:  Moves all extremities. AAOx3, CN II-XII reviewed     Psych:  Good insight, Not anxious nor agitated. Data Review:    Review and/or order of clinical lab test      Labs:     Recent Labs      05/29/18   0234  05/28/18   1354   WBC  7.6  8.7   HGB  13.0  14.7   HCT  39.1  44.5   PLT  253  285     Recent Labs      05/29/18   0234  05/28/18   1354   NA  141  140   K  3.5  3.7   CL  108  106   CO2  27  29   BUN  10  10   CREA  0.71  0.87   GLU  118*  147*   CA  8.3*  9.0     Recent Labs      05/28/18   1354   SGOT  21   ALT  15   AP  83   TBILI  0.6   TP  7.4   ALB  3.7   GLOB  3.7   LPSE  705*     No results for input(s): INR, PTP, APTT in the last 72 hours. No lab exists for component: INREXT   No results for input(s): FE, TIBC, PSAT, FERR in the last 72 hours. No results found for: FOL, RBCF   No results for input(s): PH, PCO2, PO2 in the last 72 hours.   Recent Labs      05/28/18 2002 05/28/18   1354   TROIQ  <0.04  <0.04     Lab Results   Component Value Date/Time    Cholesterol, total 133 03/08/2018 09:25 AM    HDL Cholesterol 54 03/08/2018 09:25 AM    LDL, calculated 48 03/08/2018 09:25 AM    Triglyceride 157 (H) 03/08/2018 09:25 AM    CHOL/HDL Ratio 3.3 07/23/2010 08:37 AM     Lab Results   Component Value Date/Time    Glucose (POC) 101 (H) 01/13/2017 04:14 PM    Glucose (POC) 96 07/29/2016 12:21 PM    Glucose (POC) 98 07/29/2016 06:45 AM    Glucose (POC) 126 (H) 07/28/2016 09:44 PM    Glucose (POC) 84 07/28/2016 07:15 PM     Lab Results   Component Value Date/Time    Color YELLOW/STRAW 05/28/2018 04:51 PM    Appearance CLEAR 05/28/2018 04:51 PM    Specific gravity 1.030 05/28/2018 04:51 PM    pH (UA) 7.5 05/28/2018 04:51 PM    Protein NEGATIVE  05/28/2018 04:51 PM    Glucose NEGATIVE  05/28/2018 04:51 PM    Ketone NEGATIVE  05/28/2018 04:51 PM    Bilirubin NEGATIVE  05/28/2018 04:51 PM    Urobilinogen 0.2 05/28/2018 04:51 PM    Nitrites NEGATIVE  05/28/2018 04:51 PM    Leukocyte Esterase NEGATIVE  05/28/2018 04:51 PM    Epithelial cells FEW 05/28/2018 04:51 PM    Bacteria NEGATIVE  05/28/2018 04:51 PM    WBC 0-4 05/28/2018 04:51 PM    RBC 0-5 05/28/2018 04:51 PM         Medications Reviewed:     Current Facility-Administered Medications   Medication Dose Route Frequency    sodium chloride (NS) flush 5-10 mL  5-10 mL IntraVENous Q8H    atorvastatin (LIPITOR) tablet 20 mg  20 mg Oral DAILY    fludrocortisone (FLORINEF) tablet 100 mcg  100 mcg Oral BID     ______________________________________________________________________  EXPECTED LENGTH OF STAY: - - -  ACTUAL LENGTH OF STAY:          0                 Pippa Acosta MD

## 2018-05-29 NOTE — ED NOTES
TRANSFER - OUT REPORT:    Verbal report given to Kandi (name) on Henry De Luna  being transferred to [address] [place] [address]   (unit) for routine progression of care       Report consisted of patients Situation, Background, Assessment and   Recommendations(SBAR). Information from the following report(s) SBAR, ED Summary, [facility] and Recent Results was reviewed with the receiving nurse. Lines:   Peripheral IV 05/28/18 Left Forearm (Active)   Site Assessment Clean, dry, & intact 5/28/2018  4:12 PM   Phlebitis Assessment 0 5/28/2018  4:12 PM   Infiltration Assessment 0 5/28/2018  4:12 PM   Dressing Status Clean, dry, & intact 5/28/2018  4:12 PM   Hub Color/Line Status Pink 5/28/2018  4:12 PM        Opportunity for questions and clarification was provided.       Patient transported with:   Monitor  Tech

## 2018-05-30 VITALS
WEIGHT: 144.3 LBS | SYSTOLIC BLOOD PRESSURE: 147 MMHG | RESPIRATION RATE: 15 BRPM | OXYGEN SATURATION: 97 % | HEIGHT: 66 IN | TEMPERATURE: 98.3 F | BODY MASS INDEX: 23.19 KG/M2 | HEART RATE: 68 BPM | DIASTOLIC BLOOD PRESSURE: 72 MMHG

## 2018-05-30 LAB
ANION GAP SERPL CALC-SCNC: 10 MMOL/L (ref 5–15)
BASOPHILS # BLD: 0.1 K/UL (ref 0–0.1)
BASOPHILS NFR BLD: 1 % (ref 0–1)
BUN SERPL-MCNC: 13 MG/DL (ref 6–20)
BUN/CREAT SERPL: 16 (ref 12–20)
CALCIUM SERPL-MCNC: 8.7 MG/DL (ref 8.5–10.1)
CHLORIDE SERPL-SCNC: 106 MMOL/L (ref 97–108)
CO2 SERPL-SCNC: 27 MMOL/L (ref 21–32)
CREAT SERPL-MCNC: 0.81 MG/DL (ref 0.55–1.02)
DIFFERENTIAL METHOD BLD: NORMAL
EOSINOPHIL # BLD: 0.3 K/UL (ref 0–0.4)
EOSINOPHIL NFR BLD: 4 % (ref 0–7)
ERYTHROCYTE [DISTWIDTH] IN BLOOD BY AUTOMATED COUNT: 12.7 % (ref 11.5–14.5)
GLUCOSE SERPL-MCNC: 129 MG/DL (ref 65–100)
HCT VFR BLD AUTO: 38.8 % (ref 35–47)
HGB BLD-MCNC: 12.9 G/DL (ref 11.5–16)
IMM GRANULOCYTES # BLD: 0 K/UL (ref 0–0.04)
IMM GRANULOCYTES NFR BLD AUTO: 0 % (ref 0–0.5)
LYMPHOCYTES # BLD: 3.2 K/UL (ref 0.8–3.5)
LYMPHOCYTES NFR BLD: 38 % (ref 12–49)
MCH RBC QN AUTO: 31.6 PG (ref 26–34)
MCHC RBC AUTO-ENTMCNC: 33.2 G/DL (ref 30–36.5)
MCV RBC AUTO: 95.1 FL (ref 80–99)
MONOCYTES # BLD: 0.6 K/UL (ref 0–1)
MONOCYTES NFR BLD: 7 % (ref 5–13)
NEUTS SEG # BLD: 4.3 K/UL (ref 1.8–8)
NEUTS SEG NFR BLD: 51 % (ref 32–75)
NRBC # BLD: 0 K/UL (ref 0–0.01)
NRBC BLD-RTO: 0 PER 100 WBC
PLATELET # BLD AUTO: 257 K/UL (ref 150–400)
PMV BLD AUTO: 9.6 FL (ref 8.9–12.9)
POTASSIUM SERPL-SCNC: 3.3 MMOL/L (ref 3.5–5.1)
RBC # BLD AUTO: 4.08 M/UL (ref 3.8–5.2)
SODIUM SERPL-SCNC: 143 MMOL/L (ref 136–145)
WBC # BLD AUTO: 8.4 K/UL (ref 3.6–11)

## 2018-05-30 PROCEDURE — 99218 HC RM OBSERVATION: CPT

## 2018-05-30 PROCEDURE — 74011250637 HC RX REV CODE- 250/637: Performed by: INTERNAL MEDICINE

## 2018-05-30 PROCEDURE — 80048 BASIC METABOLIC PNL TOTAL CA: CPT

## 2018-05-30 PROCEDURE — 85025 COMPLETE CBC W/AUTO DIFF WBC: CPT

## 2018-05-30 PROCEDURE — 36415 COLL VENOUS BLD VENIPUNCTURE: CPT

## 2018-05-30 RX ORDER — MIDODRINE HYDROCHLORIDE 5 MG/1
5 TABLET ORAL
Qty: 90 TAB | Refills: 0 | Status: SHIPPED | OUTPATIENT
Start: 2018-05-30 | End: 2018-06-29

## 2018-05-30 RX ADMIN — FLUDROCORTISONE ACETATE 100 MCG: 0.1 TABLET ORAL at 08:07

## 2018-05-30 RX ADMIN — MIDODRINE HYDROCHLORIDE 5 MG: 5 TABLET ORAL at 12:54

## 2018-05-30 RX ADMIN — ATORVASTATIN CALCIUM 20 MG: 20 TABLET, FILM COATED ORAL at 08:07

## 2018-05-30 RX ADMIN — MIDODRINE HYDROCHLORIDE 5 MG: 5 TABLET ORAL at 08:07

## 2018-05-30 NOTE — PROGRESS NOTES
Cardiac Electrophysiology Hospital Progress Note     Subjective:      Maxie Opitz is a 75 y.o. patient who was seen for evaluation of syncope. She admits to not wearing compression stockings due to hot weather. Reports syncope x 2 consecutive days. Was evaluated in different ED, diagnosed with hypokalemia, supplemented, & discharged home. Experienced additional syncopal episode while sitting down at lunch, was told by EMS that SBP was in the 80s. Midodrine was reinitiated yesterday, and Florinef has been continued. She denies any lightheadedness, dizziness, or syncope. She has been able to get up to go into restroom without experiencing any symptoms. SBP 120s-160s overnight. NSR with PVCs on monitor. She does continue to describe short episodes of aching chest pain, not correlated with activity or position. Previous:  Lexiscan/cardiolite stress test (01/24/2018): essentially normal, LVEF 74%, no ischemia. Chest CTA (02/01/2018): no abnormalities to explain chest pain. She has history of known severe orthostatic hypotension associated with syncope, was diagnosed after tilt table test in August 2016. Previous midodrine, but discontinued this with starting Northera. She has since had to discontinue Northera due to financial constraints. Discontinued Mestinon due to hypertension. Prior PE, had hypercoagulable eval & d-dimer. Follow up CTA showed no PE. No longer taking Xarelto. Seen by neurologist, unclear if she has autonomic dysfunction or Parkinson's disease. Has had problems with IBS. Frequent diffuse body pains (including chest), relates this to fibromyalgia. Sees Dr. Christopher Gant for spine issues, but intervention for this has been postponed until fibromyalgia better controlled.     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    Migraine 346    Genital herpes A60.00    Other diseases of nasal cavity and sinuses(478.19) J34.89    Allergic rhinitis, cause unspecified J30.9    Hammertoe M20.40    Encounter for long-term (current) use of other medications Z79.899    Abnormal mammogram R92.8    Tubulovillous adenoma D36.9    Diabetes mellitus type 2, controlled, without complications (HCC) J27.9    Mixed hyperlipidemia E78.2    Pulmonary embolism (HCC) I26.99    Advance directive on file Z78.9    Orthostatic hypotension I95.1    Resting tremor R25.9    Near syncope R55    Spinal stenosis M48.00    Syncope R55     Current Facility-Administered Medications   Medication Dose Route Frequency Provider Last Rate Last Dose    midodrine (PROAMITINE) tablet 5 mg  5 mg Oral TID WITH MEALS Paradise Vargas MD   5 mg at 05/30/18 8063    sodium chloride (NS) flush 5-10 mL  5-10 mL IntraVENous Q8H Jericho Serrano MD   10 mL at 05/29/18 2225    atorvastatin (LIPITOR) tablet 20 mg  20 mg Oral DAILY Jericho Serrano MD   20 mg at 05/30/18 8316    fludrocortisone (FLORINEF) tablet 100 mcg  100 mcg Oral BID Jericho Serrano MD   100 mcg at 05/30/18 0037     Allergies   Allergen Reactions    Adhesive Itching    Hydrocodone Itching    Lorazepam Other (comments)    Other Medication Rash     Tide detergent    Percocet [Oxycodone-Acetaminophen] Itching    Sudafed [Pseudoephedrine Hcl] Other (comments)     Vertigo    Wellbutrin [Bupropion Hcl] Nausea and Vomiting    Zithromax [Azithromycin] Vertigo    Zyrtec [Cetirizine] Nausea Only     Past Medical History:   Diagnosis Date    Bipolar disorder ([facility]     Nazmy    Chronic pain     BACK PAIN    Diabetes ([facility]     BORDERLINE TX WITH DIET AND EXERCISE    DJD (degenerative joint disease)     Fibromyalgia     Genital herpes     GERD (gastroesophageal reflux disease)     Hypercholesterolemia     IBS (irritable bowel syndrome)     Lumbar disc disease     stimulation-Honza    Migraine     Nausea & vomiting     Other ill-defined conditions(799.89)     SEASONAL allergies    Other ill-defined conditions(799.89)     dysphagia has had esophagus dilated    Paget's disease     Pulmonary embolism (HCC)     RLS (restless legs syndrome)     Spinal stenosis      Past Surgical History:   Procedure Laterality Date    COLONOSCOPY N/A 6/27/2016    COLONOSCOPY performed by [facility]teo [facility] at [facility] ENDOSCOPY    ENDOSCOPY, COLON, DIAGNOSTIC      12, due 13    HX APPENDECTOMY      HX BACK SURGERY  9/17/2013    back surgery - total of 3 as of 12/20/2013    HX BREAST BIOPSY Right years ago    negative    HX CHOLECYSTECTOMY      HX HEENT      T & A    HX HYSTERECTOMY      total for fibroid tumors    HX ORTHOPAEDIC      lumbar laminectomy then fusion/neurostimulator implanted - inactive now but still in    HX ORTHOPAEDIC      REMOVAL OF NEUROSTIMULATOR    HX OTHER SURGICAL      EGD x 2 with dilation/colonoscopy - no polyps per pt    TILT TABLE EVALUATION  8/2/2016          Family History   Problem Relation Age of Onset    Colon Cancer Mother     Cancer Mother 68     colon    Heart Disease Father     Heart Disease Maternal Grandmother     Heart Disease Maternal Grandfather     Heart Disease Other      Social History   Substance Use Topics    Smoking status: Never Smoker    Smokeless tobacco: Never Used    Alcohol use No      Comment: 1 per month        Review of Systems:   Constitutional: Negative for fever, chills, weight loss, malaise/fatigue. HEENT: Negative for nosebleeds, vision changes. Respiratory: Negative for cough, hemoptysis  Cardiovascular: + chest pain, no palpitations, orthopnea, claudication, leg swelling, + syncope, and no PND. Gastrointestinal: Negative for nausea, vomiting, diarrhea, blood in stool and melena.    Genitourinary: Negative for dysuria, and hematuria. Musculoskeletal: + myalgias, arthralgia. Skin: Negative for rash. Heme: Does not bleed or bruise easily. Neurological: Negative for speech change and focal weakness     Objective:     Visit Vitals    /77 (BP 1 Location: Right arm, BP Patient Position: Sitting;During activity)    Pulse 77    Temp 98.2 °F (36.8 °C)    Resp 15    Ht 5' 6\" (1.676 m)    Wt 144 lb 4.8 oz (65.5 kg)    SpO2 99%    BMI 23.29 kg/m2      Physical Exam:   Constitutional: well-developed and well-nourished. No respiratory distress. Head: Normocephalic and atraumatic. Eyes: Pupils are equal, round  ENT: hearing normal  Neck: supple. No JVD present. Cardiovascular: Normal rate, regular rhythm. Exam reveals no gallop and no friction rub. No murmur heard. Pulmonary/Chest: Effort normal and breath sounds normal. No wheezes. Abdominal: Soft, no tenderness. Musculoskeletal: no edema. Neurological: alert,oriented. Skin: Skin is warm and dry  Psychiatric: normal mood and affect. Behavior is normal. Judgment and thought content normal.        Assessment/Plan:   Ms. Keli Harrington has again been experiencing syncope due to her orthostatic hypotension. Previously well-controlled, but no longer able to afford Northera. Some supine hypertension, but may unfortunately be necessary in order to avoid orthostatic hypotension. Tolerating current medication regimen well, continue Midodrine & Florinef. She agrees to wear compression stockings when in climate controlled areas.     Addendum from EP attending:   I have seen, examined patient, and discussed with nurse practitioner, registered nurse, reviewed, updated note and agree with the assessment and plan    I have talked to her this am. She is feeling better ok to go home  Vital signs are stable  Exam shows regular rhythm and no rub  Assessment and Plan:  Continue to monitor BP in office  Will see her in office next Tuesday PM      Thank you for involving me in this patient's care and please call with further concerns or questions. Rose Araiza M.D.   Electrophysiology/Cardiology  [facility] [place]  [facility], [facility] [address] [place] [address]  [phone]

## 2018-05-30 NOTE — PROGRESS NOTES
Pharmacist Discharge Medication Reconciliation    Discharging Provider: Dr. Hillary Garcia PMH:   Past Medical History:   Diagnosis Date    Bipolar disorder ([facility]     Nazmy    Chronic pain     BACK PAIN    Diabetes ([facility]     BORDERLINE TX WITH DIET AND EXERCISE    DJD (degenerative joint disease)     Fibromyalgia     Genital herpes     GERD (gastroesophageal reflux disease)     Hypercholesterolemia     IBS (irritable bowel syndrome)     Lumbar disc disease     stimulation-Honza    Migraine     Nausea & vomiting     Other ill-defined conditions(799.89)     SEASONAL allergies    Other ill-defined conditions(799.89)     dysphagia has had esophagus dilated    Paget's disease     Pulmonary embolism (HCC)     RLS (restless legs syndrome)     Spinal stenosis      Chief Complaint for this Admission:   Chief Complaint   Patient presents with    Fatigue    Dizziness     Allergies: Adhesive; Hydrocodone; Lorazepam; Other medication; Percocet [oxycodone-acetaminophen]; Sudafed [pseudoephedrine hcl]; Wellbutrin [bupropion hcl]; Zithromax [azithromycin]; and Zyrtec [cetirizine]    Discharge Medications:   Current Discharge Medication List        START taking these medications    Details   midodrine (PROAMITINE) 5 mg tablet Take 1 Tab by mouth three (3) times daily (with meals) for 30 days. Qty: 90 Tab, Refills: 0           CONTINUE these medications which have NOT CHANGED    Details   pyridostigmine (MESTINON) 60 mg tablet TAKE 1 TABLET BY MOUTH THREE TIMES DAILY  Qty: 90 Tab, Refills: 5      fludrocortisone (FLORINEF) 0.1 mg tablet TAKE 1 TABLET BY MOUTH TWICE DAILY  Qty: 180 Tab, Refills: 1      potassium chloride (K-DUR, KLOR-CON) 20 mEq tablet TAKE 1 TABLET BY MOUTH DAILY  Qty: 90 Tab, Refills: 3    Comments: **Patient requests 90 days supply**      LACTOBACILLUS ACIDOPHILUS (PROBIOTIC PO) Take  by mouth daily.       carbidopa-levodopa (SINEMET)  mg per tablet Take 1 Tab by mouth two (2) times a day.      atorvastatin (LIPITOR) 20 mg tablet TAKE 1 TABLET BY MOUTH EVERY EVENING  Qty: 90 Tab, Refills: 11    Comments: **Patient requests 90 days supply**      PARoxetine (PAXIL) 30 mg tablet Take 30 mg by mouth daily. Zane Maria NP/ Dr Lazarus Wild      acetaminophen (TYLENOL) 325 mg tablet Take 325 mg by mouth every four (4) hours as needed for Pain. QUEtiapine (SEROQUEL) 25 mg tablet Take 25 mg by mouth nightly as needed. Take 1-2 tabs qhs prn Zane Maria NP/Dr Lazarus Wild      ACETAMINOPHEN/CHLORPHENIRAMINE (CORICIDIN PO) Take  by mouth as needed. The patient's chart, MAR and AVS were reviewed by Patricia Presley.

## 2018-05-30 NOTE — PROGRESS NOTES
I have reviewed discharge instructions with the patient. The patient verbalized understanding. Discharge medications reviewed with patient and appropriate educational materials and side effects teaching were provided. Signed copy placed on chart; pt's copy placed in d/c binder. PIV removed. Pt's daughter providing transportation home. Pt wheeled downstairs by staff. Bedside RN performed patient education and medication education. Discharge concerns initiated and discussed with patient, including clarification on \"who\" assists the patient at their home and instructions for when the home going patient should call their provider after discharge. Opportunity for questions and clarification was provided. Patient receptive to education: YES  Patient stated: \"My daughter has a meeting at 2 then come get me\"  Barriers to Education: None  Diagnosis Education given:  YES    Length of stay: 0  Expected Day of Discharge: 5/30  Ask if they have \"Help at Home\" & add to white board?   YES    Education Day #: 1    Medication Education Given:  YES  M in the box Medication name: Midodrine     Pt aware of [facility]CA[facility]PS survey: YES

## 2018-05-30 NOTE — DISCHARGE SUMMARY
Discharge Summary       PATIENT ID: Myesha Galvan  MRN: [id number]   YOB: 1945    DATE OF ADMISSION: 5/28/2018  1:55 PM    DATE OF DISCHARGE: 5/30/2018  PRIMARY CARE PROVIDER: Isaiah Sarmiento MD     ATTENDING PHYSICIAN: Dr. Pa Blackman  DISCHARGING PROVIDER: Sherron Rico MD    To contact this individual call [phone] and ask the  to page. If unavailable ask to be transferred the Adult Hospitalist Department. CONSULTATIONS: IP CONSULT TO HOSPITALIST  IP CONSULT TO CARDIOLOGY    PROCEDURES/SURGERIES: * No surgery found *    ADMITTING DIAGNOSES & HOSPITAL COURSE:   75 y. o. female with a history of chronic pain, fibromyalgia, GERD, DJD, and orthostatic hypotension who presented after 2 syncopal episodes in the past week. DISCHARGE DIAGNOSES / PLAN:      Syncope:  due to orthostatic etiology.  -Troponin negative  -continue Florinef  -Cardiology, Dr. Cummings Nim added midodrine 5mg TID  - DC home with outpatient follow up       PENDING TEST RESULTS:   At the time of discharge the following test results are still pending    FOLLOW UP APPOINTMENTS:    Follow-up Information     None           ADDITIONAL CARE RECOMMENDATIONS:     DIET: Resume previous diet  Oral Nutritional Supplements:    ACTIVITY: Activity as tolerated    WOUND CARE:     EQUIPMENT needed:       DISCHARGE MEDICATIONS:  Current Discharge Medication List      START taking these medications    Details   midodrine (PROAMITINE) 5 mg tablet Take 1 Tab by mouth three (3) times daily (with meals) for 30 days.   Qty: 90 Tab, Refills: 0         CONTINUE these medications which have NOT CHANGED    Details   pyridostigmine (MESTINON) 60 mg tablet TAKE 1 TABLET BY MOUTH THREE TIMES DAILY  Qty: 90 Tab, Refills: 5      fludrocortisone (FLORINEF) 0.1 mg tablet TAKE 1 TABLET BY MOUTH TWICE DAILY  Qty: 180 Tab, Refills: 1      potassium chloride (K-DUR, KLOR-CON) 20 mEq tablet TAKE 1 TABLET BY MOUTH DAILY  Qty: 90 Tab, Refills: 3 Comments: **Patient requests 90 days supply**      LACTOBACILLUS ACIDOPHILUS (PROBIOTIC PO) Take  by mouth daily. carbidopa-levodopa (SINEMET)  mg per tablet Take 1 Tab by mouth two (2) times a day. atorvastatin (LIPITOR) 20 mg tablet TAKE 1 TABLET BY MOUTH EVERY EVENING  Qty: 90 Tab, Refills: 11    Comments: **Patient requests 90 days supply**      PARoxetine (PAXIL) 30 mg tablet Take 30 mg by mouth daily. James Perla NP/ Dr Ronald Newsome      acetaminophen (TYLENOL) 325 mg tablet Take 325 mg by mouth every four (4) hours as needed for Pain. QUEtiapine (SEROQUEL) 25 mg tablet Take 25 mg by mouth nightly as needed. Take 1-2 tabs qhs prn James Perla NP/Dr Ronald Newsome      ACETAMINOPHEN/CHLORPHENIRAMINE (CORICIDIN PO) Take  by mouth as needed. NOTIFY YOUR PHYSICIAN FOR ANY OF THE FOLLOWING:   Fever over 101 degrees for 24 hours. Chest pain, shortness of breath, fever, chills, nausea, vomiting, diarrhea, change in mentation, falling, weakness, bleeding. Severe pain or pain not relieved by medications. Or, any other signs or symptoms that you may have questions about.     DISPOSITION:    Home With:   OT  PT  HH  RN       Long term SNF/Inpatient Rehab    Independent/assisted living    Hospice    Other:       PATIENT CONDITION AT DISCHARGE:     Functional status    Poor     Deconditioned     Independent      Cognition     Lucid     Forgetful     Dementia      Catheters/lines (plus indication)    Jc     PICC     PEG     None      Code status     Full code     DNR      PHYSICAL EXAMINATION AT DISCHARGE:   Refer to Progress Note      CHRONIC MEDICAL DIAGNOSES:  Problem List as of 5/30/2018  Date Reviewed: 5/30/2018          Codes Class Noted - Resolved    Syncope ICD-10-CM: R55  ICD-9-CM: 780.2  5/28/2018 - Present        Spinal stenosis ICD-10-CM: M48.00  ICD-9-CM: 724.00  Unknown - Present        Near syncope ICD-10-CM: R55  ICD-9-CM: 780.2  8/2/2016 - Present    Overview Signed 8/2/2016  4:12 PM by Karin Alba MD     Tilt test 8/2/2016: normal supine BP but abrupt drop in SBP consistent with orthostatic hypotension  Midodrine 5 mg tid has not improved.   Add mestinon 60 mg tid               Resting tremor ICD-10-CM: R25.9  ICD-9-CM: 781.0  7/29/2016 - Present        Orthostatic hypotension ICD-10-CM: I95.1  ICD-9-CM: 458.0  7/5/2016 - Present        Pulmonary embolism ([facility] ICD-10-CM: I26.99  ICD-9-CM: 415.19  Unknown - Present    Overview Addendum 9/15/2016  8:39 AM by TAI Davis Dr Favorite 8/26/16  [facility] at [facility] 5/7/16 small PE RLL    6/9/16 CTA [facility] normal CTA no PE    Hypercoagulable work up negative with th exception of low factor II at 72%  D dimer 0.22  Xarelto d/c'd 9/13/16             Advance directive on file ICD-10-CM: Z78.9  ICD-9-CM: V49.89  5/24/2016 - Present        Mixed hyperlipidemia ICD-10-CM: E78.2  ICD-9-CM: 272.2  2/2/2016 - Present        Diabetes mellitus type 2, controlled, without complications ([facility] JVC-56-HD: E11.9  ICD-9-CM: 250.00  10/1/2015 - Present        Tubulovillous adenoma ICD-10-CM: D36.9  ICD-9-CM: 229.9  8/21/2012 - Present        Abnormal mammogram ICD-10-CM: R92.8  ICD-9-CM: 793.80  6/4/2012 - Present        Encounter for long-term (current) use of other medications ICD-10-CM: Z79.899  ICD-9-CM: V58.69  11/30/2011 - Present        Hammertoe ICD-10-CM: M20.40  ICD-9-CM: 735.4  9/12/2011 - Present        Allergic rhinitis, cause unspecified ICD-10-CM: J30.9  ICD-9-CM: 477.9  1/26/2011 - Present        Other diseases of nasal cavity and sinuses(478.19) ICD-10-CM: J34.89  ICD-9-CM: 478.19  7/19/2010 - Present        IBS (irritable bowel syndrome) ICD-10-CM: K58.9  ICD-9-CM: 564.1  Unknown - Present        RLS (restless legs syndrome) ICD-10-CM: G25.81  ICD-9-CM: 333.94  Unknown - Present        Bipolar disorder ([facility] ICD-10-CM: F31.9  ICD-9-CM: 296.80  Unknown - Present        Fibromyalgia ICD-10-CM: M79.7  ICD-9-CM: 729.1  Unknown - Present        DJD (degenerative joint disease) ICD-10-CM: M19.90  ICD-9-CM: 715.90  Unknown - Present        Lumbar disc disease ICD-10-CM: M51.9  ICD-9-CM: 722.93  Unknown - Present        Paget's disease of bone ICD-10-CM: M88.9  ICD-9-CM: 731.0  Unknown - Present        Migraine ICD-10-CM: 346  ICD-9-CM: 520  Unknown - Present        Genital herpes ICD-10-CM: A60.00  ICD-9-CM: 054.10  Unknown - Present        RESOLVED: Rectal bleeding ICD-10-CM: K62.5  ICD-9-CM: 569.3  6/25/2016 - 6/27/2016        RESOLVED: Rectal bleed ICD-10-CM: K62.5  ICD-9-CM: 569.3  6/25/2016 - 6/27/2016        RESOLVED: DM w/o complication type II ([facility] ICD-10-CM: E11.9  ICD-9-CM: 250.00  11/30/2011 - 10/1/2015        RESOLVED: Hypercholesterolemia ICD-10-CM: E78.00  ICD-9-CM: 272.0  Unknown - 10/1/2015        RESOLVED: Diabetes ([facility] ICD-10-CM: E11.9  ICD-9-CM: 250.00  Unknown - 11/30/2011              Greater than 30 minutes were spent with the patient on counseling and coordination of care    Signed:   Pedro Bautista MD  5/30/2018  11:52 AM

## 2018-05-30 NOTE — PROGRESS NOTES
Hospital follow-up PCP [facility] will contact patient prior to their arrival.  Pending patient discharge.   Tee Patton, Care Management Specialist.

## 2018-05-30 NOTE — PHYSICIAN ADVISORY
Letter of Status Determination:   Recommend hospitalization status upgraded from   OBSERVATION  to INPATIENT  Status     Pt Name:  Rylan Sanches   MR#   [address] [facility] # [id number] /  [id number]   [facility]#  [id number]   Room and Hospital  663/01  @ [facility]   Hospitalization date  5/28/2018  1:55 PM   Current Attending Physician  Valerio Owen MD   Principal diagnosis  <principal problem not specified>   Syncope    Clinicals  75 y.o. y.o  female hospitalized with above diagnosis   This pt has had recurrent syncopal episodes. She is known to have had orthostatic hypotension and for various reasons she has not been able to fully comply with her medications. Her past medical history includes PE, orthostatic hypotension, GERD, DJD etc.      [place] (MCG) criteria   Does  NOT apply    STATUS DETERMINATION  This patient is at high risk of adverse events and deterioration based on documented clinical data, comorbid conditions and current acute care course. Ms. Rylan Sanches is expected to meet Inpatient Admission status criteria in accordance with CMS regulation Section 43 .3. Specifically, due to medical necessity the patient's stay is expected to exceed Two Midnights. It is our recommendation that this patient's hospitalization status should be upgraded from  OBSERVATION to INPATIENT status. The final decision of the patient's hospitalization status depends on the attending physician's judgment. Additional comments     Payor: Peter Shafer / Plan: [address] / Product Type: Medicare /         Vi Mcmanus MD MPH FACP     Physician Advisor    [address]   [facility] [employer], [address] [place] [address]  [phone]        [phone]    .

## 2018-05-30 NOTE — PROGRESS NOTES
Problem: Falls - Risk of  Goal: *Absence of Falls  Document Marti Fall Risk and appropriate interventions in the flowsheet. Outcome: Progressing Towards Goal  Fall Risk Interventions:  Mobility Interventions: Assess mobility with egress test, Communicate number of staff needed for ambulation/transfer, Patient to call before getting OOB, Strengthening exercises (ROM-active/passive)         Medication Interventions: Patient to call before getting OOB, Teach patient to arise slowly    Elimination Interventions: Call light in reach, Patient to call for help with toileting needs, Toilet paper/wipes in reach, Toileting schedule/hourly rounds    History of Falls Interventions:  Investigate reason for fall, Room close to nurse's station

## 2018-05-30 NOTE — PROGRESS NOTES
Care Management: plan for discharge today, awaiting visit from cardiology. Plan is to return to [address] and transportation will be provided by daughter. No care management needs at this time.   Roe Vital RN BSN CM

## 2018-05-30 NOTE — INTERDISCIPLINARY ROUNDS
IDR/SLIDR Summary             Patient: Gary Rod           MRN: [id number]    Age: 75 y.o. YOB: 1945               Room/Bed: [facility]   Admit Diagnosis: Syncope                Principal Diagnosis: <principal problem not specified>   Goals: cardio to review echo; discharge  Readmission: NO                  Quality Measure: Not applicable  VTE Prophylaxis: Mechanical  Influenza Vaccine screening completed? YES  Pneumococcal Vaccine screening completed? NO  Mobility needs: No                                                            Nutrition plan:YES  Consults: cardio                                            Financial concerns:No                                            Escalated to CM? NO  RRAT Score: 12                                                                Interventions:H2H  Testing due for pt today? NO  LOS: 0 days              Expected length of stay 2 days  Discharge plan: [address]                                                           PCP: Lashay Washburn MD  Transportation needs:  Yes                                       Days before discharge:d/c pending  Discharge disposition: ind living     Signed:      BECK [facility]  5/30/2018  917AM

## 2018-05-30 NOTE — PROGRESS NOTES
05/30/18 1048 05/30/18 1051 05/30/18 1053   Vital Signs   Pulse (Heart Rate) 60 72 77   /86 151/77 127/74   MAP (Calculated) 115 102 92   BP 1 Method Automatic Automatic Automatic   BP 1 Location Left arm Left arm Left arm   BP Patient Position At rest;Supine Sitting Standing       Orthostatics performed per order. Pt denies dizziness or N/V, but c/o left chest pain. Pt states chest pain is chronic and has been told in the past it is not cardiac related.  Pt placed back in bed safely, call bell at bedside, bed alarm on for fall risk, and VSS at rest.

## 2018-05-30 NOTE — DISCHARGE INSTRUCTIONS
Follow up  6/5/18 at 3:20 pm with Laura Flores NP and Dr Loraine Zimmerman M.D.  [facility] [place]  Electrophysiology/Cardiology  [facility] [place]  [address] [place] [address]                              [phone]

## 2018-05-30 NOTE — PROGRESS NOTES
Bedside and Verbal shift change report given to Ramya Olivares (oncoming nurse) by Jazmín Sanchez (offgoing nurse). Report included the following information SBAR, Kardex and Recent Results.

## 2018-05-31 ENCOUNTER — PATIENT OUTREACH (OUTPATIENT)
Dept: CARDIOLOGY CLINIC | Age: 73
End: 2018-05-31

## 2018-05-31 ENCOUNTER — PATIENT OUTREACH (OUTPATIENT)
Dept: INTERNAL MEDICINE CLINIC | Age: 73
End: 2018-05-31

## 2018-05-31 NOTE — PROGRESS NOTES
Received update from [facility] NN recommending PCP f/u for IBS. Reached out to pt, confirmed with 2 identifiers. She agreed to f/u appt with Dr. Davis Bennett on 6/13/18 and will drive herself. She states she has not heard from [facility] and was unaware that this service would be coming out to her home today. She does agree to this appt. 3-way call with [facility] [employer] and spoke to Denice Layton in intake. Pt is scheduled for visit between 2-3pm today.

## 2018-05-31 NOTE — Clinical Note
Seen in hospital for syncope - (sounds like following an IBS flare-up ) with associated hypokalemia -  She is on Midodrine, Florinef, K supplement - (Kayden Avery was cost prohibitive) Seeing Dr. Adrianne Pham 6/5 - [facility] was ordered for follow up -Pt is checking bp bid - recording - I asked her to do daily weights - He is erring on the higher side with bp - to hopefully sheyla orthostasis -  Cautioned about heat - and encouraged compression stockings.   We will probably check labs at that visit - 6/5 -  Christina Verma NN CAV robin [phone]

## 2018-05-31 NOTE — PROGRESS NOTES
Hospital Discharge Follow-Up      Date/Time:  2018 9:28 AM    Patient was admitted to [facility] on  and discharged on 2018  for near syncope/ hypotension eval; hypokalemia. The physician discharge summary was available at the time of outreach. Patient was contacted within 1 business days of discharge. Attempt to reach pt post discharge - she has [facility] visit ordered for today -   Started on Midodrine in addition to Florinef -   -monitor and record bp/ pulse / daily weights/(pt is checking bid - will start daily weights  ttk)  - Fluids - avoid dehydration - (hx IBS with flareup)  - Labs - monitor K at intervals  - per cardiology notes - might need to tolerate some elevated bp to avert syncope. - compression stockings  - avoid excessive heat    Top Challenges reviewed with the provider   Near syncope/ hypotension  Hypokalemia  IBS and mgmt - dehydration       Method of communication with provider :staff message    Inpatient RRAT score: 12  Was this a readmission? no   Patient stated reason for the readmission: n/a    Nurse Navigator (NN) contactedthe patient by telephone to perform post hospital discharge assessment. Verified name and  with patient as identifiers. Provided introduction to self, and explanation of the Nurse Navigator role. We discussed particular monitoring of fluid status if IBS flare up - diarrhea and dehydration/ and hypokalemia -. Information re: Potassium sources in food mailed to pt. Reviewed discharge instructions and red flags with patient who verbalized understanding. Patient given an opportunity to ask questions and does not have any further questions or concerns at this time. The patient agrees to contact the PCP office for questions related to their healthcare. NN provided contact information for future reference. Summary of patient's top problems:  1. Syncope  2. IBS management/ dehydration  3.  Hypokalemia    Home Health orders at discharge: Naresh Quiroga 1 time visit   [address]   Date of initial visit: 5/31/18    Durable Medical Equipment ordered/company: none  Durable Medical Equipment received: n/a    Barriers to care? Age 75; chronic conditions - with bp management challenges    Advance Care Planning:   Does patient have an Advance Directive:  reviewed and current     Medication(s):     New Medications at Discharge:   START taking these medications     Details   midodrine (PROAMITINE) 5 mg tablet Take 1 Tab by mouth three (3) times daily (with meals) for 30 days. Qty: 90 Tab, Refills: 0   CONTINUE these medications which have NOT CHANGED     Details   pyridostigmine (MESTINON) 60 mg tablet TAKE 1 TABLET BY MOUTH THREE TIMES DAILY  Qty: 90 Tab, Refills: 5       fludrocortisone (FLORINEF) 0.1 mg tablet TAKE 1 TABLET BY MOUTH TWICE DAILY  Qty: 180 Tab, Refills: 1       potassium chloride (K-DUR, KLOR-CON) 20 mEq tablet TAKE 1 TABLET BY MOUTH DAILY  Qty: 90 Tab, Refills: 3     Comments: **Patient requests 90 days supply**       LACTOBACILLUS ACIDOPHILUS (PROBIOTIC PO) Take  by mouth daily.       carbidopa-levodopa (SINEMET)  mg per tablet Take 1 Tab by mouth two (2) times a day.       atorvastatin (LIPITOR) 20 mg tablet TAKE 1 TABLET BY MOUTH EVERY EVENING  Qty: 90 Tab, Refills: 11     Comments: **Patient requests 90 days supply**       PARoxetine (PAXIL) 30 mg tablet Take 30 mg by mouth daily. Joycelyn Weiss NP/ Dr Keaotn Nicole       acetaminophen (TYLENOL) 325 mg tablet Take 325 mg by mouth every four (4) hours as needed for Pain.       QUEtiapine (SEROQUEL) 25 mg tablet Take 25 mg by mouth nightly as needed.  Take 1-2 tabs qhs prn Joycelyn Weiss NP/Dr Keaton Nicole       ACETAMINOPHEN/CHLORPHENIRAMINE (CORICIDIN PO) Take  by mouth as needed.       Changed Medications at Discharge: none  Discontinued Medications at Discharge: none    Medication reconciliation was performed with patient,/pending return call who verbalizes understanding of administration of home medications. There were no barriers to obtaining medications identified at this time. Referral to Pharm D needed: no     Goals      Knowledge and adherence of prescribed medication (ie. action, side effects, missed dose, etc.).            5/31  Midodrine started tid - for hypotension prevention     Tolerating current medication regimen well, continue Midodrine & Florinef. She agrees to wear compression stockings when in climate controlled areas. ttk         Returns to baseline activity level.             5/31  Compression stockings to assist with fluid management and bp management  Midodrine and Florinef for bp   Avoid extreme heat -   ttk          PCP/Specialist follow up: Future Appointments  Date Time Provider Reji Sarah   6/5/2018 3:20 PM Casandra Teran  [address]   9/18/2018 10:35 AM [facility] Rogelio Tam RN , [facility], [facility]   [address]  [phone]

## 2018-06-05 ENCOUNTER — OFFICE VISIT (OUTPATIENT)
Dept: CARDIOLOGY CLINIC | Age: 73
End: 2018-06-05

## 2018-06-05 VITALS
WEIGHT: 145 LBS | HEART RATE: 84 BPM | SYSTOLIC BLOOD PRESSURE: 88 MMHG | BODY MASS INDEX: 23.3 KG/M2 | DIASTOLIC BLOOD PRESSURE: 58 MMHG | HEIGHT: 66 IN

## 2018-06-05 DIAGNOSIS — R42 DIZZINESS: ICD-10-CM

## 2018-06-05 DIAGNOSIS — R55 SYNCOPE AND COLLAPSE: ICD-10-CM

## 2018-06-05 DIAGNOSIS — I95.1 ORTHOSTATIC HYPOTENSION: Primary | ICD-10-CM

## 2018-06-05 NOTE — PROGRESS NOTES
Chief Complaint   Patient presents with   [address] Follow Up     Verified patient with two types of identifiers. Verified medications with the patient.     Verified patient's pharmacy

## 2018-06-05 NOTE — PROGRESS NOTES
Cardiac Electrophysiology Office Note     Subjective:      Robert Wong is a 75 y.o. female patient who is seen for follow up, after recent hospitalization for syncope. Midodrine 5 mg po tid was restarted, and she has continued Florinef. She has long-standing history of dizziness & near syncope. Etiology on previous tilt noted to be orthostatic hypotension. States has been feeling poorly since discharge from hospital.  Near syncope for the past 3 days, but no full syncope. SBP this morning while standing 78 mmHg. Continues with supine hypertension. She is using compression stockings bought from [facility], states is adding salt to foods and staying well hydrated. Previous:  Had improvement on Northera, but cannot afford, financial assistance program has been discontinued. Discontinued Mestinon due to hypertension. Lexiscan/cardiolite stress test (01/24/2018): essentially normal, LVEF 74%, no ischemia.     Chest CTA (02/01/2018): no abnormalities to explain chest pain.     Prior PE, had hypercoagulable eval & d-dimer. Follow up CTA showed no PE. No longer taking Xarelto.     Seen by neurologist, unclear if she has autonomic dysfunction or Parkinson's disease.     Has had problems with IBS.    Frequent diffuse body pains (including chest), relates this to fibromyalgia.     Sees Dr. Augusta Zhang for spine issues, but intervention for this has been postponed until fibromyalgia better controlled.        Patient Active Problem List    Diagnosis Date Noted    Syncope 05/28/2018    Spinal stenosis     Near syncope 08/02/2016    Resting tremor 07/29/2016    Orthostatic hypotension 07/05/2016    Advance directive on file 05/24/2016    Pulmonary embolism [facility] [address]     Mixed hyperlipidemia 02/02/2016    Diabetes mellitus type 2, controlled, without complications ([address] 29/37/0133    Tubulovillous adenoma 08/21/2012    Abnormal mammogram 06/04/2012    Encounter for long-term (current) use of other medications 11/30/2011    Luisito 09/12/2011    Allergic rhinitis, cause unspecified 01/26/2011    Other diseases of nasal cavity and sinuses(478.19) 07/19/2010    IBS (irritable bowel syndrome)     RLS (restless legs syndrome)     Bipolar disorder (HCC)     Fibromyalgia     DJD (degenerative joint disease)     Lumbar disc disease     Paget's disease of bone     Migraine     Genital herpes      Current Outpatient Prescriptions   Medication Sig Dispense Refill    midodrine (PROAMITINE) 5 mg tablet Take 1 Tab by mouth three (3) times daily (with meals) for 30 days. 90 Tab 0    pyridostigmine (MESTINON) 60 mg tablet TAKE 1 TABLET BY MOUTH THREE TIMES DAILY 90 Tab 5    fludrocortisone (FLORINEF) 0.1 mg tablet TAKE 1 TABLET BY MOUTH TWICE DAILY 180 Tab 1    potassium chloride (K-DUR, KLOR-CON) 20 mEq tablet TAKE 1 TABLET BY MOUTH DAILY 90 Tab 3    LACTOBACILLUS ACIDOPHILUS (PROBIOTIC PO) Take  by mouth daily.  ACETAMINOPHEN/CHLORPHENIRAMINE (CORICIDIN PO) Take  by mouth as needed.  carbidopa-levodopa (SINEMET)  mg per tablet Take 1 Tab by mouth two (2) times a day.  atorvastatin (LIPITOR) 20 mg tablet TAKE 1 TABLET BY MOUTH EVERY EVENING 90 Tab 11    PARoxetine (PAXIL) 30 mg tablet Take 30 mg by mouth daily. Sarai Hoffman NP/ Dr Donovan Gore acetaminophen (TYLENOL) 325 mg tablet Take 325 mg by mouth every four (4) hours as needed for Pain.  QUEtiapine (SEROQUEL) 25 mg tablet Take 25 mg by mouth nightly as needed.  Take 1-2 tabs qhs prn Sarai Hoffman NP/Dr Lo Arellano       Allergies   Allergen Reactions    Adhesive Itching    Hydrocodone Itching    Lorazepam Other (comments)    Other Medication Rash     Tide detergent    Percocet [Oxycodone-Acetaminophen] Itching    Sudafed [Pseudoephedrine Hcl] Other (comments)     Vertigo    Wellbutrin [Bupropion Hcl] Nausea and Vomiting    Zithromax [Azithromycin] Vertigo    Zyrtec [Cetirizine] Nausea Only     Past Medical History:   Diagnosis Date    Bipolar disorder (HCC)     Nazmy    Chronic pain     BACK PAIN    Diabetes (HCC)     BORDERLINE TX WITH DIET AND EXERCISE    DJD (degenerative joint disease)     Fibromyalgia     Genital herpes     GERD (gastroesophageal reflux disease)     Hypercholesterolemia     IBS (irritable bowel syndrome)     Lumbar disc disease     stimulation-Honza    Migraine     Nausea & vomiting     Other ill-defined conditions(799.89)     SEASONAL allergies    Other ill-defined conditions(799.89)     dysphagia has had esophagus dilated    Paget's disease     Pulmonary embolism (HCC)     RLS (restless legs syndrome)     Spinal stenosis      Past Surgical History:   Procedure Laterality Date    COLONOSCOPY N/A 6/27/2016    COLON[facility] performed [facility] at [facility]COPY    ENDOSCOPY, COLON, DIAGNOSTIC      12, due 13    HX APPENDECTOMY      HX BACK SURGERY  9/17/2013    back surgery - total of 3 as of 12/20/2013    HX BREAST BIOPSY Right years ago    negative    HX CHOLECYSTECTOMY      HX HEENT      T & A    HX HYSTERECTOMY      total for fibroid tumors    HX ORTHOPAEDIC      lumbar laminectomy then fusion/neurostimulator implanted - inactive now but still in    HX ORTHOPAEDIC      REMOVAL OF NEUROSTIMULATOR    HX OTHER SURGICAL      EGD x 2 with dilation/colonoscopy - no polyps per pt    TILT TABLE EVALUATION  8/2/2016          Family History   Problem Relation Age of Onset    Colon Cancer Mother     Cancer Mother 68     colon    Heart Disease Father     Heart Disease Maternal Grandmother     Heart Disease Maternal Grandfather     Heart Disease Other      Social History   Substance Use Topics    Smoking status: Never Smoker    Smokeless tobacco: Never Used    Alcohol use No      Comment: 1 per month        Review of Systems:   Constitutional: Negative for fever, chills, weight loss .  + intermittent dizziness  HEENT: Negative for nosebleeds, vision changes. Respiratory: Negative for cough, hemoptysis, sputum production, and wheezing. Cardiovascular: no palpitations, orthopnea, claudication, leg swelling, syncope, and PND. + near syncope  Gastrointestinal:  no vomiting, blood in stool and melena.  + intermittent nausea  Genitourinary: Negative for dysuria, and hematuria. Musculoskeletal: + for myalgias, sciatica   Skin: Negative for rash. Heme: Does not bleed or bruise easily. Neurological: Negative for speech change and focal weakness     Objective:     Visit Vitals    BP (!) 88/58 (BP 1 Location: Left arm, BP Patient Position: Sitting)    Pulse 84    Ht 5' 6\" (1.676 m)    Wt 145 lb (65.8 kg)    BMI 23.4 kg/m2      Physical Exam:   Constitutional: well-developed and well-nourished. No distress. Head: Normocephalic and atraumatic. Eyes: Pupils are equal, round  Neck: supple. No JVD present. Cardiovascular: Normal rate, slightly irregular rhythm. Exam reveals no gallop and no friction rub. No murmur heard. Pulmonary/Chest: Effort normal and breath sounds normal. No wheezes. Abdominal: Soft, distended  Musculoskeletal: No edema. compression stockings on. Neurological: Alert,oriented. Skin: Skin is warm and dry  Psychiatric: Normal mood and affect. Behavior is normal. Judgment and thought content normal.      Assessment/Plan:       ICD-10-CM ICD-9-CM    1. Orthostatic hypotension I95.1 458.0    2. Syncope and collapse R55 780.2    3. Dizziness R42 780.4      She continues with symptomatic orthostatic hypotension despite readdition of Midodrine 5 mg po tid & continuing Florinef. Unable to titrate Midodrine further due to supine hypertension. Unfortunately, she no longer has patient assistance for [employer], though she had good response. Recommend salt tablets with breakfast & lunch, will continue to add salt to foods, try V8 & other high sodium content foods/drinks. Continue compression stockings.     Follow up with EP NP in 2 weeks to assess response. Call in the interim for new/worsening signs/symptoms. Discussed with Dr. Blaire King, who agrees with plan of care. Future Appointments  Date Time Provider Reji Thibodeauxi   6/13/2018 10:20 AM London Silva MD [id number] [facility]   6/19/2018 2:20 PM Colby Moon  [address]   9/18/2018 10:35 AM London Silva MD [id number] [facility]       Thank you for involving me in this patient's care and please call with further concerns or questions.       TAMY Alexis  [facility]  06/05/18

## 2018-06-12 ENCOUNTER — OFFICE VISIT (OUTPATIENT)
Dept: INTERNAL MEDICINE CLINIC | Age: 73
End: 2018-06-12

## 2018-06-12 ENCOUNTER — TELEPHONE (OUTPATIENT)
Dept: CARDIOLOGY CLINIC | Age: 73
End: 2018-06-12

## 2018-06-12 VITALS
TEMPERATURE: 98 F | RESPIRATION RATE: 16 BRPM | OXYGEN SATURATION: 98 % | SYSTOLIC BLOOD PRESSURE: 104 MMHG | BODY MASS INDEX: 23.63 KG/M2 | HEIGHT: 66 IN | HEART RATE: 72 BPM | WEIGHT: 147 LBS | DIASTOLIC BLOOD PRESSURE: 71 MMHG

## 2018-06-12 DIAGNOSIS — E11.9 CONTROLLED TYPE 2 DIABETES MELLITUS WITHOUT COMPLICATION, WITHOUT LONG-TERM CURRENT USE OF INSULIN (HCC): ICD-10-CM

## 2018-06-12 DIAGNOSIS — R55 SYNCOPE, UNSPECIFIED SYNCOPE TYPE: ICD-10-CM

## 2018-06-12 DIAGNOSIS — K58.2 IRRITABLE BOWEL SYNDROME WITH BOTH CONSTIPATION AND DIARRHEA: ICD-10-CM

## 2018-06-12 DIAGNOSIS — I26.99 OTHER ACUTE PULMONARY EMBOLISM WITHOUT ACUTE COR PULMONALE (HCC): ICD-10-CM

## 2018-06-12 DIAGNOSIS — E78.2 MIXED HYPERLIPIDEMIA: ICD-10-CM

## 2018-06-12 DIAGNOSIS — I95.1 ORTHOSTATIC HYPOTENSION: Primary | ICD-10-CM

## 2018-06-12 DIAGNOSIS — F31.78 BIPOLAR DISORDER, IN FULL REMISSION, MOST RECENT EPISODE MIXED (HCC): ICD-10-CM

## 2018-06-12 RX ORDER — SODIUM CHLORIDE TAB 1 GM 1 G
1 TAB MISCELLANEOUS 2 TIMES DAILY
COMMUNITY
End: 2019-01-24 | Stop reason: ALTCHOICE

## 2018-06-12 NOTE — TELEPHONE ENCOUNTER
Patient walked into clinic asking if the compression stockings she was wearing were correct. Patient states that these are her very first pair of stockings. Stockings are very loose on patient's legs. Notified patient that stockings need to be tighter. Asked if she had received a prescription from Dr. Neptali Garland. Patient states that she did not receive a prescription. Will ask NP to review and write new prescription if needed.

## 2018-06-12 NOTE — PROGRESS NOTES
HISTORY OF PRESENT ILLNESS  Ian Tamayo is a 75 y.o. female. HPI Stephanie Covington is seen today for hospital follow up for admission with syncope. She was admitted to [address] [place] [address] on 5/28/18 and discharged on 5/30/18. Nurse navigator contact is documented in the electronic record and it was within 2 business days of discharge. Her office visit is today 6/12/18. This represents a transitional care visit. Syncope. She has known orthostatic hypotension. It has been significant in the past requiring multiple drugs. The medication that seem to help the most was Northera and is no longer available to her as she cannot afford it and the patient assistance program is no longer available. She is taking other medications and ProAmatine was restarted. She is due for follow up with cardiology on 6/19/18. IBS. This appears to be diet-dependent. It is diarrhea-predominant. Her CT scan was unremarkable. Reviewed with her some simple measures that can be used but also follow up with GI if her symptoms are refractory. Past Medical History:   Diagnosis Date    Bipolar disorder ([facility] [address]     Nazmy    Chronic pain     BACK PAIN    Diabetes ([facility] [address]     BORDERLINE TX WITH DIET AND EXERCISE    DJD (degenerative joint disease)     Fibromyalgia     Genital herpes     GERD (gastroesophageal reflux disease)     Hypercholesterolemia     IBS (irritable bowel syndrome)     Lumbar disc disease     stimulation-Honza    Migraine     Nausea & vomiting     Other ill-defined conditions(799.89)     SEASONAL allergies    Other ill-defined conditions(799.89)     dysphagia has had esophagus dilated    Paget's disease     Pulmonary embolism (HCC)     RLS (restless legs syndrome)     Spinal stenosis          Review of Systems   Constitutional: Negative for chills and fever. Gastrointestinal: Positive for abdominal pain and diarrhea. Neurological: Positive for dizziness and loss of consciousness. Negative for headaches. Physical Exam   Constitutional: No distress. Cardiovascular: Normal rate and regular rhythm. Exam reveals no gallop and no friction rub. No murmur heard. Pulmonary/Chest: Effort normal and breath sounds normal.   Musculoskeletal: She exhibits no edema. Nursing note and vitals reviewed. ASSESSMENT and PLAN  Diagnoses and all orders for this visit:    1. Orthostatic hypotension- Continue current regimen of prescription and / or OTC medications , See cardiologist as directed. 2. Other acute pulmonary embolism without acute cor pulmonale ([facility] [address]- Follow off treatment     3. Controlled type 2 diabetes mellitus without complication, without long-term current use of insulin ([facility] [address]- Follow off treatment, Diet and exercise     4. Bipolar disorder, in full remission, most recent episode [facility])- See psychiatrist as directed      5. Syncope, unspecified syncope type- See cardiologist as directed. , Call with recurrence     6. Mixed hyperlipidemia- Continue current regimen of prescription and / or OTC medications     7.  Irritable bowel syndrome with both constipation and diarrhea

## 2018-06-12 NOTE — MR AVS SNAPSHOT
[address] [facility] [address] [place] [address] 
[phone] Patient: Jinny Osborne MRN:  [id number] Visit Information Date & Time Provider Department Dept. Phone Encounter #  
 6/12/2018  4:20 PM Chandler Bello, [address] [facility] Internal Medicine [phone] [id number] Follow-up Instructions Return in about 3 months (around 9/18/2018). Your Appointments 6/19/2018  2:20 PM  
ESTABLISHED PATIENT with Jose M Amador NP  
[facility] ([address]) Appt Note: 2 week f/u  
 [address] [facility] [address]  
[address] [facility] [address]  
  
    
 9/18/2018 10:35 AM  
ROUTINE CARE with Chandler Bello MD  
[facility] Internal Medicine [address] [place] [address]) Appt Note: f/u 6m  
 [address] [facility] [address]  
[address] [place] [address] Upcoming Health Maintenance Date Due MICROALBUMIN Q1 1/5/2018 EYE EXAM RETINAL OR DILATED Q1 2/9/2018 FOOT EXAM Q1 4/25/2018 MEDICARE YEARLY EXAM 8/1/2018 Influenza Age 5 to Adult 8/1/2018 HEMOGLOBIN A1C Q6M 9/8/2018 GLAUCOMA SCREENING Q2Y 2/9/2019 BREAST CANCER SCRN MAMMOGRAM 2/28/2019 LIPID PANEL Q1 3/8/2019 DTaP/Tdap/Td series (2 - Td) 8/17/2023 COLONOSCOPY 6/27/2026 Allergies as of 6/12/2018  Review Complete On: 6/12/2018 By: Chandler Bello MD  
  
 Severity Noted Reaction Type Reactions Adhesive  09/08/2009    Itching Hydrocodone  07/19/2010    Itching Lorazepam  04/02/2015    Other (comments) Other Medication  09/08/2009    Rash Tide detergent Percocet [Oxycodone-acetaminophen]  09/08/2009    Itching Sudafed [Pseudoephedrine Hcl]  10/17/2010   Side Effect Other (comments) Vertigo Wellbutrin [Bupropion Hcl]  08/31/2011    Nausea and Vomiting Zithromax [Azithromycin]  06/22/2011    Vertigo Zyrtec [Cetirizine]  10/14/2009    Nausea Only Current Immunizations  Reviewed on 8/15/2017 Name Date Influenza High Dose Vaccine PF 8/14/2017, 8/29/2013 Influenza Vaccine 8/8/2016, 10/1/2015, 9/15/2014 Influenza Vaccine Whole 9/25/2012 Pneumococcal Conjugate (PCV-13) 3/17/2015 Pneumococcal Polysaccharide (PPSV-23) 4/23/2016 TDAP Vaccine 7/3/2012 Tdap 8/17/2013 Varicella Virus Vaccine Live 9/1/2007 ZZZ-RETIRED (DO NOT USE) Pneumococcal Vaccine (Unspecified Type) 12/1/2009 Not reviewed this visit You Were Diagnosed With   
  
 Codes Comments Orthostatic hypotension    -  Primary ICD-10-CM: I95.1 ICD-9-CM: 458.0 Other acute pulmonary embolism without acute cor pulmonale (HCC)     ICD-10-CM: I26.99 
ICD-9-CM: 415.19 Controlled type 2 diabetes mellitus without complication, without long-term current use of insulin ([facility]     ICD-10-CM: E11.9 ICD-9-CM: 250.00 Bipolar disorder, in full remission, most recent episode mixed [facility])     ICD-10-CM: F31.78 
ICD-9-CM: 296.66 Syncope, unspecified syncope type     ICD-10-CM: R55 
ICD-9-CM: 780.2 Mixed hyperlipidemia     ICD-10-CM: E78.2 ICD-9-CM: 272.2 Irritable bowel syndrome with both constipation and diarrhea     ICD-10-CM: K58.2 ICD-9-CM: 979.1 Vitals BP Pulse Temp Resp Height(growth percentile) Weight(growth percentile) 104/71 (BP 1 Location: Right arm, BP Patient Position: Standing) 72 98 °F (36.7 °C) (Oral) 16 5' 6\" (1.676 m) 147 lb (66.7 kg) SpO2 BMI OB Status Smoking Status 98% 23.73 kg/m2 Hysterectomy Never Smoker Vitals History BMI and BSA Data Body Mass Index Body Surface Area  
 23.73 kg/m 2 1.76 m 2 Preferred Pharmacy Pharmacy Name Phone [facility] [address], [address] [place], [place] [phone] Your Updated Medication List  
  
   
This list is accurate as of 6/12/18  4:53 PM.  Always use your most recent med list.  
  
  
  
  
 acetaminophen 325 mg tablet Commonly known as:  TYLENOL Take 325 mg by mouth every four (4) hours as needed for Pain. atorvastatin 20 mg tablet Commonly known as:  LIPITOR  
TAKE 1 TABLET BY MOUTH EVERY EVENING  
  
 carbidopa-levodopa  mg per tablet Commonly known as:  SINEMET Take 1 Tab by mouth two (2) times a day. CORICIDIN PO Take  by mouth as needed. fludrocortisone 0.1 mg tablet Commonly known as:  FLORINEF  
TAKE 1 TABLET BY MOUTH TWICE DAILY  
  
 midodrine 5 mg tablet Commonly known as:  Noretta Spurr Take 1 Tab by mouth three (3) times daily (with meals) for 30 days. PARoxetine 30 mg tablet Commonly known as:  PAXIL Take 30 mg by mouth daily. Junior Yuen NP/ Dr Jamison Felix  
  
 potassium chloride 20 mEq tablet Commonly known as:  K-DUR, KLOR-CON  
TAKE 1 TABLET BY MOUTH DAILY PROBIOTIC PO Take  by mouth daily. pyridostigmine 60 mg tablet Commonly known as:  MESTINON  
TAKE 1 TABLET BY MOUTH THREE TIMES DAILY QUEtiapine 25 mg tablet Commonly known as:  SEROquel Take 25 mg by mouth nightly as needed. Take 1-2 tabs qhs prn Junior Yuen NP/Dr Woodson  
  
 sodium chloride 1 gram tablet Take 1 g by mouth two (2) times a day. Follow-up Instructions Return in about 3 months (around 9/18/2018). Introducing [facility] & HEALTH SERVICES! Dear Beena Quijano: Thank you for requesting a [employer] account. Our records indicate that you already have an active Add[employer] account. You can access your account anytime at [email or web address] Did you know that you can access your hospital and ER discharge instructions at any time in Add[employer]? You can also review all of your test results from your hospital stay or ER visit. Additional Information If you have questions, please visit the Frequently Asked Questions section of the [employer] website at [email or web address] Remember, [employer] is NOT to be used for urgent needs. For medical emergencies, dial 911. Now available from your iPhone and Android! Please provide this summary of care documentation to your next provider. Your primary care clinician is listed as RYLEE RASHID. If you have any questions after today's visit, please call [phone].

## 2018-06-13 ENCOUNTER — TELEPHONE (OUTPATIENT)
Dept: CARDIOLOGY CLINIC | Age: 73
End: 2018-06-13

## 2018-06-13 NOTE — TELEPHONE ENCOUNTER
Verified patient with two types of identifiers. Notified patient that I can mail prescription to patient. Did notify patient that there might be a 1.5-2 week delay due to slower mail. Confirmed address with patient. Patient verbalized understanding and will call with any other questions.

## 2018-06-13 NOTE — TELEPHONE ENCOUNTER
Prescription written. Please call patient to determine whether she would like it mailed or picked up. She may also purchase on expresscoin site.

## 2018-06-13 NOTE — TELEPHONE ENCOUNTER
Verified patient with two types of identifiers. Advised patient of this and she will  script today. Patient verbalizes understanding. And will call with any questions or concerns.

## 2018-06-19 ENCOUNTER — OFFICE VISIT (OUTPATIENT)
Dept: CARDIOLOGY CLINIC | Age: 73
End: 2018-06-19

## 2018-06-19 VITALS
HEART RATE: 81 BPM | SYSTOLIC BLOOD PRESSURE: 90 MMHG | BODY MASS INDEX: 22.66 KG/M2 | WEIGHT: 141 LBS | RESPIRATION RATE: 16 BRPM | DIASTOLIC BLOOD PRESSURE: 60 MMHG | OXYGEN SATURATION: 97 % | HEIGHT: 66 IN

## 2018-06-19 DIAGNOSIS — I95.1 ORTHOSTATIC HYPOTENSION: Primary | ICD-10-CM

## 2018-06-19 DIAGNOSIS — R42 DIZZINESS: ICD-10-CM

## 2018-06-19 NOTE — MR AVS SNAPSHOT
[address] [facility] [address] 
[phone] Patient: Johanna Vasquez MRN:  [id number] Visit Information Date & Time Provider Department Dept. Phone Encounter #  
 6/19/2018  2:20 PM Ti Diaz NP [facility] [phone] [id number] Your Appointments 7/17/2018  3:00 PM  
ESTABLISHED PATIENT with Ti Diaz NP  
[facility] ([facility]) Appt Note: 1 month follow up  
 [address] [facility] [address]  
[address] [facility] [address]  
  
    
 9/18/2018 10:35 AM  
ROUTINE CARE with Tata Cabrales MD  
[facility] Internal Medicine [facility]) Appt Note: f/u 6m  
 [address] [facility] [address] [facility] [address]  
[facility] [address] Upcoming Health Maintenance Date Due MICROALBUMIN Q1 1/5/2018 EYE EXAM RETINAL OR DILATED Q1 2/9/2018 FOOT EXAM Q1 4/25/2018 MEDICARE YEARLY EXAM 8/1/2018 Influenza Age 5 to Adult 8/1/2018 HEMOGLOBIN A1C Q6M 9/8/2018 GLAUCOMA SCREENING Q2Y 2/9/2019 BREAST CANCER SCRN MAMMOGRAM 2/28/2019 LIPID PANEL Q1 3/8/2019 DTaP/Tdap/Td series (2 - Td) 8/17/2023 COLONOSCOPY 6/27/2026 Allergies as of 6/19/2018  Review Complete On: 6/19/2018 By: Ti Diaz NP Severity Noted Reaction Type Reactions Adhesive  09/08/2009    Itching Hydrocodone  07/19/2010    Itching Lorazepam  04/02/2015    Other (comments) Other Medication  09/08/2009    Rash Tide detergent Percocet [Oxycodone-acetaminophen]  09/08/2009    Itching Sudafed [Pseudoephedrine Hcl]  10/17/2010   Side Effect Other (comments) Vertigo Wellbutrin [Bupropion Hcl]  08/31/2011    Nausea and Vomiting Zithromax [Azithromycin]  06/22/2011    Vertigo Zyrtec [Cetirizine]  10/14/2009    Nausea Only Current Immunizations  Reviewed on 8/15/2017 Name Date Influenza High Dose Vaccine PF 8/14/2017, 8/29/2013 Influenza Vaccine 8/8/2016, 10/1/2015, 9/15/2014 Influenza Vaccine Whole 9/25/2012 Pneumococcal Conjugate (PCV-13) 3/17/2015 Pneumococcal Polysaccharide (PPSV-23) 4/23/2016 TDAP Vaccine 7/3/2012 Tdap 8/17/2013 Varicella Virus Vaccine Live 9/1/2007 ZZZ-RETIRED (DO NOT USE) Pneumococcal Vaccine (Unspecified Type) 12/1/2009 Not reviewed this visit You Were Diagnosed With   
  
 Codes Comments Orthostatic hypotension    -  Primary ICD-10-CM: I95.1 ICD-9-CM: 458.0 Dizziness     ICD-10-CM: C11 ICD-9-CM: 780.4 Vitals BP Pulse Resp Height(growth percentile) Weight(growth percentile) SpO2  
 90/60 (BP 1 Location: Left arm, BP Patient Position: Sitting) 81 16 5' 6\" (1.676 m) 141 lb (64 kg) 97% BMI OB Status Smoking Status 22.76 kg/m2 Hysterectomy Never Smoker Vitals History BMI and BSA Data Body Mass Index Body Surface Area  
 22.76 kg/m 2 1.73 m 2 Preferred Pharmacy Pharmacy Name Phone [facility] [address] [place] [address] [phone] Your Updated Medication List  
  
   
This list is accurate as of 6/19/18  3:12 PM.  Always use your most recent med list.  
  
  
  
  
 acetaminophen 325 mg tablet Commonly known as:  TYLENOL Take 325 mg by mouth every four (4) hours as needed for Pain. atorvastatin 20 mg tablet Commonly known as:  LIPITOR  
TAKE 1 TABLET BY MOUTH EVERY EVENING  
  
 carbidopa-levodopa  mg per tablet Commonly known as:  SINEMET Take 1 Tab by mouth two (2) times a day. CORICIDIN PO Take  by mouth as needed. fludrocortisone 0.1 mg tablet Commonly known as:  FLORINEF  
TAKE 1 TABLET BY MOUTH TWICE DAILY  
  
 midodrine 5 mg tablet Commonly known as:  Myrtice Deutscher Take 1 Tab by mouth three (3) times daily (with meals) for 30 days. PARoxetine 30 mg tablet Commonly known as:  PAXIL Take 30 mg by mouth daily. Marcelina Ballard NP/ Dr Marnie Gunn  
  
 potassium chloride 20 mEq tablet Commonly known as:  K-DUR, KLOR-CON  
TAKE 1 TABLET BY MOUTH DAILY PROBIOTIC PO Take  by mouth daily. pyridostigmine 60 mg tablet Commonly known as:  MESTINON  
TAKE 1 TABLET BY MOUTH THREE TIMES DAILY QUEtiapine 25 mg tablet Commonly known as:  SEROquel Take 25 mg by mouth nightly as needed. Take 1-2 tabs qhs prn Marcelina Ballard NP/Dr Woodson  
  
 sodium chloride 1 gram tablet Take 1 g by mouth two (2) times a day. Introducing [facility] & HEALTH SERVICES! Dear Beata Corrales: Thank you for requesting a [employer] account. Our records indicate that you already have an active [employer] account. You can access your account anytime at [email or web address] Did you know that you can access your hospital and ER discharge instructions at any time in [employer]? You can also review all of your test results from your hospital stay or ER visit. Additional Information If you have questions, please visit the Frequently Asked Questions section of the [employer] website at [email or web address] Remember, [employer] is NOT to be used for urgent needs. For medical emergencies, dial 911. Now available from your iPhone and Android! Please provide this summary of care documentation to your next provider. Your primary care clinician is listed as RYLEE RASHID. If you have any questions after today's visit, please call [phone].

## 2018-06-19 NOTE — PROGRESS NOTES
Cardiac Electrophysiology Office Note     Subjective:      Shana Auguste is a 75 y.o. female patient who is seen for follow up of orthostatic hypotension, was last seen on 06/05/2017. Salt tablets 1 g po bid were added to regimen at that time, and she was encouraged to incorporate more high sodium foods in her diet, specifically discussed AF. She has been checking daily BP, but forgot log. Occasionally hypotensive, mainly in the morning. Overall, states has improved. Enjoys V8. Tried compression stockings, but they were quite loose. Just received prescription for correct type (thigh high, 20-30 mmHg) in the mail, is picking them up today. Denies any further syncope. Able to get in the pool twice weekly. Previous:  Hospitalization for syncope 05/28/2018-05/30/2018. Midodrine 5 mg po tid was restarted, and she has continued Florinef. She has long-standing history of dizziness & near syncope. Etiology on previous tilt noted to be orthostatic hypotension. Had improvement on Northera, but cannot afford, financial assistance program has been discontinued. Discontinued Mestinon due to hypertension. Lexiscan/cardiolite stress test (01/24/2018): essentially normal, LVEF 74%, no ischemia.     Chest CTA (02/01/2018): no abnormalities to explain chest pain.     Prior PE, had hypercoagulable eval & d-dimer. Follow up CTA showed no PE. No longer taking Xarelto.     Seen by neurologist, unclear if she has autonomic dysfunction or Parkinson's disease.     Has had problems with IBS.    Frequent diffuse body pains (including chest), relates this to fibromyalgia.     Sees Dr. Nidia Loredo for spine issues, but intervention for this has been postponed until fibromyalgia better controlled.        Patient Active Problem List    Diagnosis Date Noted    Syncope 05/28/2018    Spinal stenosis     Near syncope 08/02/2016    Resting tremor 07/29/2016    Orthostatic hypotension 07/05/2016    Advance directive on file 05/24/2016    Pulmonary embolism ([facility]     Mixed hyperlipidemia 02/02/2016    Diabetes mellitus type 2, controlled, without complications ([facility] 45/35/0971    Tubulovillous adenoma 08/21/2012    Abnormal mammogram 06/04/2012    Encounter for long-term (current) use of other medications 11/30/2011    Hammertoe 09/12/2011    Allergic rhinitis, cause unspecified 01/26/2011    Other diseases of nasal cavity and sinuses(478.19) 07/19/2010    IBS (irritable bowel syndrome)     RLS (restless legs syndrome)     Bipolar disorder (HCC)     Fibromyalgia     DJD (degenerative joint disease)     Lumbar disc disease     Paget's disease of bone     Migraine     Genital herpes      Current Outpatient Prescriptions   Medication Sig Dispense Refill    sodium chloride 1 gram tablet Take 1 g by mouth two (2) times a day.  midodrine (PROAMITINE) 5 mg tablet Take 1 Tab by mouth three (3) times daily (with meals) for 30 days. 90 Tab 0    pyridostigmine (MESTINON) 60 mg tablet TAKE 1 TABLET BY MOUTH THREE TIMES DAILY 90 Tab 5    fludrocortisone (FLORINEF) 0.1 mg tablet TAKE 1 TABLET BY MOUTH TWICE DAILY 180 Tab 1    potassium chloride (K-DUR, KLOR-CON) 20 mEq tablet TAKE 1 TABLET BY MOUTH DAILY 90 Tab 3    LACTOBACILLUS ACIDOPHILUS (PROBIOTIC PO) Take  by mouth daily.  ACETAMINOPHEN/CHLORPHENIRAMINE (CORICIDIN PO) Take  by mouth as needed.  carbidopa-levodopa (SINEMET)  mg per tablet Take 1 Tab by mouth two (2) times a day.  atorvastatin (LIPITOR) 20 mg tablet TAKE 1 TABLET BY MOUTH EVERY EVENING 90 Tab 11    PARoxetine (PAXIL) 30 mg tablet Take 30 mg by mouth daily. Casandra Fink NP/ Dr Latricia Shin acetaminophen (TYLENOL) 325 mg tablet Take 325 mg by mouth every four (4) hours as needed for Pain.  QUEtiapine (SEROQUEL) 25 mg tablet Take 25 mg by mouth nightly as needed.  Take 1-2 tabs qhs prn Casandra Fink NP/Dr Jose Huston       Allergies   Allergen Reactions    Adhesive Itching    Hydrocodone Itching    Lorazepam Other (comments)    Other Medication Rash     Tide detergent    Percocet [Oxycodone-Acetaminophen] Itching    Sudafed [Pseudoephedrine Hcl] Other (comments)     Vertigo    Wellbutrin [Bupropion Hcl] Nausea and Vomiting    Zithromax [Azithromycin] Vertigo    Zyrtec [Cetirizine] Nausea Only     Past Medical History:   Diagnosis Date    Bipolar disorder ([facility]     Nazmy    Chronic pain     BACK PAIN    Diabetes ([facility]     BORDERLINE TX WITH DIET AND EXERCISE    DJD (degenerative joint disease)     Fibromyalgia     Genital herpes     GERD (gastroesophageal reflux disease)     Hypercholesterolemia     IBS (irritable bowel syndrome)     Lumbar disc disease     stimulation-Honza    Migraine     Nausea & vomiting     Other ill-defined conditions(799.89)     SEASONAL allergies    Other ill-defined conditions(799.89)     dysphagia has had esophagus dilated    Paget's disease     Pulmonary embolism (HCC)     RLS (restless legs syndrome)     Spinal stenosis      Past Surgical History:   Procedure Laterality Date    COLONOSCOPY N/A 6/27/2016    COLON[facility]COPY performed by Merlin Mo[facility] at [facility] ENDOSCOPY    ENDOSCOPY, COLON, DIAGNOSTIC      12, due 13    HX APPENDECTOMY      HX BACK SURGERY  9/17/2013    back surgery - total of 3 as of 12/20/2013    HX BREAST BIOPSY Right years ago    negative    HX CHOLECYSTECTOMY      HX HEENT      T & A    HX HYSTERECTOMY      total for fibroid tumors    HX ORTHOPAEDIC      lumbar laminectomy then fusion/neurostimulator implanted - inactive now but still in    HX ORTHOPAEDIC      REMOVAL OF NEUROSTIMULATOR    HX OTHER SURGICAL      EGD x 2 with dilation/colonoscopy - no polyps per pt    TILT TABLE EVALUATION  8/2/2016          Family History   Problem Relation Age of Onset    Colon Cancer Mother     Cancer Mother 68     colon    Heart Disease Father     Heart Disease Maternal Grandmother     Heart Disease Maternal Grandfather     Heart Disease Other      Social History   Substance Use Topics    Smoking status: Never Smoker    Smokeless tobacco: Never Used    Alcohol use No      Comment: 1 per month        Review of Systems:   Constitutional: Negative for fever, chills, weight loss . + intermittent dizziness  HEENT: Negative for nosebleeds, vision changes. Respiratory: Negative for cough, hemoptysis, sputum production, and wheezing. Cardiovascular: no palpitations, orthopnea, claudication, leg swelling, syncope, and PND. Gastrointestinal:  no vomiting, blood in stool and melena.  + intermittent nausea  Genitourinary: Negative for dysuria, and hematuria. Musculoskeletal: + for myalgias, sciatica   Skin: Negative for rash. Heme: Does not bleed or bruise easily. Neurological: Negative for speech change and focal weakness     Objective:     Visit Vitals    BP 90/60 (BP 1 Location: Left arm, BP Patient Position: Sitting)    Pulse 81    Resp 16    Ht 5' 6\" (1.676 m)    Wt 141 lb (64 kg)    SpO2 97%    BMI 22.76 kg/m2      Physical Exam:   Constitutional: well-developed and well-nourished. No distress. Head: Normocephalic and atraumatic. Eyes: Pupils are equal, round  Neck: supple. No JVD present. Cardiovascular: Normal rate, slightly regular rhythm. Exam reveals no gallop and no friction rub. No murmur heard. Pulmonary/Chest: Effort normal and breath sounds normal. No wheezes. Abdominal: Soft, distended  Musculoskeletal: No edema. compression stockings on. Neurological: Alert,oriented. Skin: Skin is warm and dry  Psychiatric: Normal mood and affect. Behavior is normal. Judgment and thought content normal.      Assessment/Plan:       ICD-10-CM ICD-9-CM    1. Orthostatic hypotension I95.1 458.0    2. Dizziness R42 780.4      Symptomatic orthostatic hypotension slightly improved with continuing Midodrine 5 mg po tid, continuing Florinef, adding salt tablets bid, & increasing sodium in diet. Unable to titrate Midodrine further due to supine hypertension. Also unable to add additional dose of salt tablet due to supine hypertension. Encouraged to consume most sodium during the morning hours. She will purchase & wear appropriate compression stockings. Follow up with EP NP in 1 month to assess response. Call in the interim for new/worsening signs/symptoms. Discussed with Dr. Julio Fisher, who agrees with plan of care. Future Appointments  Date Time Provider [facility]   7/17/2018 3:00 PM Rashawn Alonso  [address]    9/18/2018 10:35 AM [place] [address] [facility]       Thank you for involving me in this patient's care and please call with further concerns or questions.       TAMY Tiwari  [facility] [place]  06/19/18

## 2018-06-26 ENCOUNTER — PATIENT OUTREACH (OUTPATIENT)
Dept: CARDIOLOGY CLINIC | Age: 73
End: 2018-06-26

## 2018-06-26 NOTE — PROGRESS NOTES
Nurse navigator note - cardiology  Pt called on call - Dr. Denzel Mcgraw - pt with history of hypotension - and orthostasis - recently seen in EP clinic with Bruno Ireland -     6/23 - 67/55 at noon;             91/47 81/59   5:30 pm  She called EMS - and they came and checked on her - they did not transport - systolic bp for them was 979/    6/24 - 108/70             81/54 -     6/25 - 112/69  She had diarrhea - on Sunday - she has been drinking Gatorade, water - she is eating some - encouraged bland foods -     Medic alert stopped working - the company to send her a new machine.   MD on call 24/7 at [phone] -   Hydration discussed - call pcp if stomach issues do not resolve  Counseled re: cautions with the heat -     Maria Dolores Gonzales RN , [facility], [facility]   [address]  [phone]

## 2018-07-13 ENCOUNTER — TELEPHONE (OUTPATIENT)
Dept: INTERNAL MEDICINE CLINIC | Age: 73
End: 2018-07-13

## 2018-07-16 ENCOUNTER — TELEPHONE (OUTPATIENT)
Dept: INTERNAL MEDICINE CLINIC | Age: 73
End: 2018-07-16

## 2018-07-16 DIAGNOSIS — E11.9 CONTROLLED TYPE 2 DIABETES MELLITUS WITHOUT COMPLICATION, WITHOUT LONG-TERM CURRENT USE OF INSULIN (HCC): Primary | ICD-10-CM

## 2018-07-16 NOTE — TELEPHONE ENCOUNTER
----- Message from Adry Bruce sent at 7/13/2018  3:26 PM EDT -----  Regarding: acs   labslip -- urine  Please put labslip up front for urine microalbumin.

## 2018-07-17 ENCOUNTER — OFFICE VISIT (OUTPATIENT)
Dept: CARDIOLOGY CLINIC | Age: 73
End: 2018-07-17

## 2018-07-17 ENCOUNTER — HOSPITAL ENCOUNTER (OUTPATIENT)
Dept: LAB | Age: 73
Discharge: HOME | End: 2018-07-17
Payer: MEDICARE

## 2018-07-17 VITALS
HEART RATE: 76 BPM | RESPIRATION RATE: 16 BRPM | WEIGHT: 143.2 LBS | OXYGEN SATURATION: 98 % | DIASTOLIC BLOOD PRESSURE: 70 MMHG | HEIGHT: 66 IN | SYSTOLIC BLOOD PRESSURE: 110 MMHG | BODY MASS INDEX: 23.01 KG/M2

## 2018-07-17 DIAGNOSIS — I95.1 ORTHOSTATIC HYPOTENSION: Primary | ICD-10-CM

## 2018-07-17 DIAGNOSIS — R55 SYNCOPE AND COLLAPSE: ICD-10-CM

## 2018-07-17 PROCEDURE — 82043 UR ALBUMIN QUANTITATIVE: CPT

## 2018-07-17 RX ORDER — MIDODRINE HYDROCHLORIDE 5 MG/1
TABLET ORAL 3 TIMES DAILY
COMMUNITY
End: 2018-08-15 | Stop reason: SDUPTHER

## 2018-07-17 NOTE — MR AVS SNAPSHOT
[address] [facility] [address] [facility] 
[phone] Patient: Isai Richardson MRN:  [id number] Visit Information Date & Time Provider Department Dept. Phone Encounter #  
 7/17/2018  3:00 PM Radha Howard NP [facility] Simran Goel [phone] [id number] Your Appointments 9/18/2018 10:35 AM  
ROUTINE CARE with Yesy Stapleton MD  
[address] [address] Internal Medicine [address] [place] [address]) Appt Note: f/u 6m  
 [address] [facility] [address] [facility] [id number] [place] [address] [id number] [facility] [address] [place] [address] Upcoming Health Maintenance Date Due MICROALBUMIN Q1 1/5/2018 EYE EXAM RETINAL OR DILATED Q1 2/9/2018 FOOT EXAM Q1 4/25/2018 MEDICARE YEARLY EXAM 8/1/2018 Influenza Age 5 to Adult 8/1/2018 HEMOGLOBIN A1C Q6M 9/8/2018 GLAUCOMA SCREENING Q2Y 2/9/2019 BREAST CANCER SCRN MAMMOGRAM 2/28/2019 LIPID PANEL Q1 3/8/2019 DTaP/Tdap/Td series (2 - Td) 8/17/2023 COLONOSCOPY 6/27/2026 Allergies as of 7/17/2018  Review Complete On: 6/19/2018 By: Radha Howard NP Severity Noted Reaction Type Reactions Adhesive  09/08/2009    Itching Hydrocodone  07/19/2010    Itching Lorazepam  04/02/2015    Other (comments) Other Medication  09/08/2009    Rash Tide detergent Percocet [Oxycodone-acetaminophen]  09/08/2009    Itching Sudafed [Pseudoephedrine Hcl]  10/17/2010   Side Effect Other (comments) Vertigo Wellbutrin [Bupropion Hcl]  08/31/2011    Nausea and Vomiting Zithromax [Azithromycin]  06/22/2011    Vertigo Zyrtec [Cetirizine]  10/14/2009    Nausea Only Current Immunizations  Reviewed on 8/15/2017 Name Date Influenza High Dose Vaccine PF 8/14/2017, 8/29/2013 Influenza Vaccine 8/8/2016, 10/1/2015, 9/15/2014 Influenza Vaccine Whole 9/25/2012 Pneumococcal Conjugate (PCV-13) 3/17/2015 Pneumococcal Polysaccharide (PPSV-23) 4/23/2016 TDAP Vaccine 7/3/2012 Tdap 8/17/2013 Varicella Virus Vaccine Live 9/1/2007 ZZZ-RETIRED (DO NOT USE) Pneumococcal Vaccine (Unspecified Type) 12/1/2009 Not reviewed this visit You Were Diagnosed With   
  
 Codes Comments Orthostatic hypotension    -  Primary ICD-10-CM: I95.1 ICD-9-CM: 458.0 Syncope and collapse     ICD-10-CM: R55 
ICD-9-CM: 780. 2 Chronic anticoagulation     ICD-10-CM: Z79.01 
ICD-9-CM: V58.61 Vitals BP Pulse Resp Height(growth percentile) Weight(growth percentile) SpO2  
 110/70 (BP 1 Location: Left arm, BP Patient Position: Sitting) 76 16 5' 6\" (1.676 m) 143 lb 3.2 oz (65 kg) 98% BMI OB Status Smoking Status 23.11 kg/m2 Hysterectomy Never Smoker Vitals History BMI and BSA Data Body Mass Index Body Surface Area  
 23.11 kg/m 2 1.74 m 2 Preferred Pharmacy Pharmacy Name Phone [facility] [address] [place] [address] [phone] Your Updated Medication List  
  
   
This list is accurate as of 7/17/18  4:09 PM.  Always use your most recent med list.  
  
  
  
  
 acetaminophen 325 mg tablet Commonly known as:  TYLENOL Take 325 mg by mouth every four (4) hours as needed for Pain. atorvastatin 20 mg tablet Commonly known as:  LIPITOR  
TAKE 1 TABLET BY MOUTH EVERY EVENING  
  
 carbidopa-levodopa  mg per tablet Commonly known as:  SINEMET Take 1 Tab by mouth two (2) times a day. CORICIDIN PO Take  by mouth as needed. fludrocortisone 0.1 mg tablet Commonly known as:  FLORINEF  
TAKE 1 TABLET BY MOUTH TWICE DAILY  
  
 midodrine 5 mg tablet Commonly known as:  Jolane Cranker Take  by mouth three (3) times daily. PARoxetine 30 mg tablet Commonly known as:  PAXIL Take 30 mg by mouth daily. Jennifer Eden NP/ Dr Jairon Tomlinson potassium chloride 20 mEq tablet Commonly known as:  K-DUR, KLOR-CON  
TAKE 1 TABLET BY MOUTH DAILY PROBIOTIC PO Take  by mouth daily. pyridostigmine 60 mg tablet Commonly known as:  MESTINON  
TAKE 1 TABLET BY MOUTH THREE TIMES DAILY QUEtiapine 25 mg tablet Commonly known as:  SEROquel Take 25 mg by mouth nightly as needed. Take 1-2 tabs qhs prn Félix Beard NP/Dr Woodson  
  
 sodium chloride 1 gram tablet Take 1 g by mouth two (2) times a day. Patient Instructions You will need to follow up in clinic with Dr. Denys Garsia in 3 months. Introducing [facility] SERVICES! Dear Wilver Keita: Thank you for requesting a [email or web address] account. Our records indicate that you already have an active [email or web address] account. You can access your account anytime at [email or web address] Did you know that you can access your hospital and ER discharge instructions at any time in [email or web address]? You can also review all of your test results from your hospital stay or ER visit. Additional Information If you have questions, please visit the Frequently Asked Questions section of the [email or web address] website at [email or web address] Remember, [email or web address] is NOT to be used for urgent needs. For medical emergencies, dial 911. Now available from your iPhone and Android! Please provide this summary of care documentation to your next provider. Your primary care clinician is listed as RYLEE RASHID. If you have any questions after today's visit, please call [phone].

## 2018-07-17 NOTE — PROGRESS NOTES
Cardiac Electrophysiology Office Note     Subjective:      Julián Preciado is a 75 y.o. female patient who is seen for follow up of orthostatic hypotension, was last seen on 06/19/2017. Salt tablets 1 g po bid were added to regimen in early June, and she was encouraged to incorporate more high sodium foods in her diet, specifically discussed AF. She has been checking daily BP, typically 699-903 systolic. Overall, states has improved. Has knee high compression stockings, believes this has helped with improvement. [address] stated that thigh high compression stockings are not available, has waist-high on order. Denies any further syncope. Able to get in the pool twice weekly. Was told previously to stop Mestinon due to supine hypertension, but states she has still been taking it. Has continued Midodrine & Florinef as prescribed. Supine BP today 164/82. Has had nausea over the past few days, aided by Zofran. Currently seeing Dr. Alex Larose for evaluation. Previous:  Hospitalization for syncope 05/28/2018-05/30/2018. Midodrine 5 mg po tid was restarted, and she has continued Florinef. She has long-standing history of dizziness & near syncope. Etiology on previous tilt noted to be orthostatic hypotension. Had improvement on Northera, but cannot afford, financial assistance program has been discontinued. Lexiscan/cardiolite stress test (01/24/2018): essentially normal, LVEF 74%, no ischemia.     Chest CTA (02/01/2018): no abnormalities to explain chest pain.     Prior PE, had hypercoagulable eval & d-dimer. Follow up CTA showed no PE. No longer taking Xarelto.     Seen by neurologist, unclear if she has autonomic dysfunction or Parkinson's disease.     Has had problems with IBS.      Frequent diffuse body pains (including chest), relates this to fibromyalgia.     Sees Dr. Damaris Hodges for spine issues, but intervention for this has been postponed until fibromyalgia better controlled. Patient Active Problem List    Diagnosis Date Noted    Syncope 05/28/2018    Spinal stenosis     Near syncope 08/02/2016    Resting tremor 07/29/2016    Orthostatic hypotension 07/05/2016    Advance directive on file 05/24/2016    Pulmonary embolism ([facility]     Mixed hyperlipidemia 02/02/2016    Diabetes mellitus type 2, controlled, without complications ([facility] 65/22/7309    Tubulovillous adenoma 08/21/2012    Abnormal mammogram 06/04/2012    Encounter for long-term (current) use of other medications 11/30/2011    Hammertoe 09/12/2011    Allergic rhinitis, cause unspecified 01/26/2011    Other diseases of nasal cavity and sinuses(478.19) 07/19/2010    IBS (irritable bowel syndrome)     RLS (restless legs syndrome)     Bipolar disorder (HCC)     Fibromyalgia     DJD (degenerative joint disease)     Lumbar disc disease     Paget's disease of bone     Migraine     Genital herpes      Current Outpatient Prescriptions   Medication Sig Dispense Refill    midodrine (PROAMITINE) 5 mg tablet Take  by mouth three (3) times daily.  sodium chloride 1 gram tablet Take 1 g by mouth two (2) times a day.  pyridostigmine (MESTINON) 60 mg tablet TAKE 1 TABLET BY MOUTH THREE TIMES DAILY 90 Tab 5    fludrocortisone (FLORINEF) 0.1 mg tablet TAKE 1 TABLET BY MOUTH TWICE DAILY 180 Tab 1    potassium chloride (K-DUR, KLOR-CON) 20 mEq tablet TAKE 1 TABLET BY MOUTH DAILY 90 Tab 3    LACTOBACILLUS ACIDOPHILUS (PROBIOTIC PO) Take  by mouth daily.  ACETAMINOPHEN/CHLORPHENIRAMINE (CORICIDIN PO) Take  by mouth as needed.  carbidopa-levodopa (SINEMET)  mg per tablet Take 1 Tab by mouth two (2) times a day.  atorvastatin (LIPITOR) 20 mg tablet TAKE 1 TABLET BY MOUTH EVERY EVENING 90 Tab 11    PARoxetine (PAXIL) 30 mg tablet Take 30 mg by mouth daily.  Reynold Tam NP/ Dr Dejuan Cunningham acetaminophen (TYLENOL) 325 mg tablet Take 325 mg by mouth every four (4) hours as needed for Pain.      QUEtiapine (SEROQUEL) 25 mg tablet Take 25 mg by mouth nightly as needed.  Take 1-2 tabs qhs prn Lavjenny Nett NP/Dr Weston Lombardi       Allergies   Allergen Reactions    Adhesive Itching    Hydrocodone Itching    Lorazepam Other (comments)    Other Medication Rash     Tide detergent    Percocet [Oxycodone-Acetaminophen] Itching    Sudafed [Pseudoephedrine Hcl] Other (comments)     Vertigo    Wellbutrin [Bupropion Hcl] Nausea and Vomiting    Zithromax [Azithromycin] Vertigo    Zyrtec [Cetirizine] Nausea Only     Past Medical History:   Diagnosis Date    Bipolar disorder (HCC)     Nazmy    Chronic pain     BACK PAIN    Diabetes ([address]     BORDERLINE TX WITH DIET AND EXERCISE    DJD (degenerative joint disease)     Fibromyalgia     Genital herpes     GERD (gastroesophageal reflux disease)     Hypercholesterolemia     IBS (irritable bowel syndrome)     Lumbar disc disease     stimulation-Honza    Migraine     Nausea & vomiting     Other ill-defined conditions(799.89)     SEASONAL allergies    Other ill-defined conditions(799.89)     dysphagia has had esophagus dilated    Paget's disease     Pulmonary embolism (HCC)     RLS (restless legs syndrome)     Spinal stenosis      Past Surgical History:   Procedure Laterality Date    COLONOSCOPY N/A 6/27/2016    COLON[facility]ose Curling[facility] ENDOSCOPY    ENDOSCOPY, COLON, DIAGNOSTIC      12, due 15    HX APPENDECTOMY      HX BACK SURGERY  9/17/2013    back surgery - total of 3 as of 12/20/2013    HX BREAST BIOPSY Right years ago    negative    HX CHOLECYSTECTOMY      HX HEENT      T & A    HX HYSTERECTOMY      total for fibroid tumors    HX ORTHOPAEDIC      lumbar laminectomy then fusion/neurostimulator implanted - inactive now but still in    HX ORTHOPAEDIC      REMOVAL OF NEUROSTIMULATOR    HX OTHER SURGICAL      EGD x 2 with dilation/colonoscopy - no polyps per pt    TILT TABLE EVALUATION  8/2/2016 Family History   Problem Relation Age of Onset    Colon Cancer Mother     Cancer Mother 68     colon    Heart Disease Father     Heart Disease Maternal Grandmother     Heart Disease Maternal Grandfather     Heart Disease Other      Social History   Substance Use Topics    Smoking status: Never Smoker    Smokeless tobacco: Never Used    Alcohol use No      Comment: 1 per month        Review of Systems:   Constitutional: Negative for fever, chills, weight loss . + intermittent dizziness improved. Mild fatigue. HEENT: Negative for nosebleeds, vision changes. Respiratory: Negative for cough, hemoptysis, sputum production, and wheezing. Cardiovascular: no palpitations, orthopnea, claudication, leg swelling, syncope, and PND. Gastrointestinal:  no vomiting, blood in stool and melena.  + intermittent nausea  Genitourinary: Negative for dysuria, and hematuria. Musculoskeletal: + for myalgias, sciatica   Skin: Negative for rash. Heme: Does not bleed or bruise easily. Neurological: Negative for speech change and focal weakness     Objective:     Visit Vitals    /70 (BP 1 Location: Left arm, BP Patient Position: Sitting)    Pulse 76    Resp 16    Ht 5' 6\" (1.676 m)    Wt 143 lb 3.2 oz (65 kg)    SpO2 98%    BMI 23.11 kg/m2      Physical Exam:   Constitutional: well-developed and well-nourished. No distress. Head: Normocephalic and atraumatic. Eyes: Pupils are equal, round  Neck: supple. No JVD present. Cardiovascular: Normal rate, regular rhythm. Exam reveals no gallop and no friction rub. No murmur heard. Pulmonary/Chest: Effort normal and breath sounds normal. No wheezes. Abdominal: Soft, distended  Musculoskeletal: No edema. Compression stockings on. Neurological: Alert,oriented. Skin: Skin is warm and dry  Psychiatric: Normal mood and affect. Behavior is normal. Judgment and thought content normal.      Assessment/Plan:       ICD-10-CM ICD-9-CM    1.  Orthostatic hypotension I95.1 458.0    2. Syncope and collapse R55 780.2      Symptomatic orthostatic hypotension improved with continuing Midodrine 5 mg po tid, continuing Florinef, adding salt tablets bid, & increasing sodium in diet. Question regarding if patient is continuing Mestinon, will check medication bottles once she gets home, then call and confirm actual meds & dosages that she's been taking. Unable to titrate medications upward or add additional dose of salt tablet due to supine hypertension. Encouraged to consume most sodium during the morning hours. Offered follow up in 6 months, prefers to follow up in 3 months. Will follow up with Dr. Elvira Olvera at that time. Discussed with Dr. Elvira Olvera, who agrees with plan of care. Future Appointments  Date Time Provider Department Center   2018 10:35 AM Shadia Joseph MD [facility]   2018 10:20 AM Tonie Alford  [address]       Thank you for involving me in this patient's care and please call with further concerns or questions.       TAMY Licona  [facility]  [address]

## 2018-07-20 LAB
ALBUMIN/CREAT UR: 34.7 MG/G CREAT (ref 0–30)
CREAT UR-MCNC: 65.5 MG/DL
MICROALBUMIN UR-MCNC: 22.7 UG/ML

## 2018-07-24 ENCOUNTER — APPOINTMENT (OUTPATIENT)
Dept: GENERAL RADIOLOGY | Age: 73
End: 2018-07-24
Attending: EMERGENCY MEDICINE
Payer: MEDICARE

## 2018-07-24 ENCOUNTER — HOSPITAL ENCOUNTER (EMERGENCY)
Age: 73
Discharge: HOME | End: 2018-07-24
Attending: EMERGENCY MEDICINE
Payer: MEDICARE

## 2018-07-24 VITALS
DIASTOLIC BLOOD PRESSURE: 72 MMHG | RESPIRATION RATE: 16 BRPM | OXYGEN SATURATION: 98 % | SYSTOLIC BLOOD PRESSURE: 148 MMHG | HEIGHT: 66 IN | WEIGHT: 143 LBS | BODY MASS INDEX: 22.98 KG/M2 | HEART RATE: 60 BPM | TEMPERATURE: 98 F

## 2018-07-24 DIAGNOSIS — I95.1 ORTHOSTASIS: Primary | ICD-10-CM

## 2018-07-24 LAB
ALBUMIN SERPL-MCNC: 3.8 G/DL (ref 3.5–5)
ALBUMIN/GLOB SERPL: 1.2 {RATIO} (ref 1.1–2.2)
ALP SERPL-CCNC: 82 U/L (ref 45–117)
ALT SERPL-CCNC: 8 U/L (ref 12–78)
ANION GAP SERPL CALC-SCNC: 4 MMOL/L (ref 5–15)
AST SERPL-CCNC: 20 U/L (ref 15–37)
ATRIAL RATE: 55 BPM
BASOPHILS # BLD: 0.1 K/UL (ref 0–0.1)
BASOPHILS NFR BLD: 1 % (ref 0–1)
BILIRUB SERPL-MCNC: 0.7 MG/DL (ref 0.2–1)
BUN SERPL-MCNC: 12 MG/DL (ref 6–20)
BUN/CREAT SERPL: 14 (ref 12–20)
CALCIUM SERPL-MCNC: 9.2 MG/DL (ref 8.5–10.1)
CALCULATED P AXIS, ECG09: 28 DEGREES
CALCULATED R AXIS, ECG10: 16 DEGREES
CALCULATED T AXIS, ECG11: 83 DEGREES
CHLORIDE SERPL-SCNC: 105 MMOL/L (ref 97–108)
CO2 SERPL-SCNC: 32 MMOL/L (ref 21–32)
CREAT SERPL-MCNC: 0.83 MG/DL (ref 0.55–1.02)
DIAGNOSIS, 93000: NORMAL
DIFFERENTIAL METHOD BLD: NORMAL
EOSINOPHIL # BLD: 0.3 K/UL (ref 0–0.4)
EOSINOPHIL NFR BLD: 3 % (ref 0–7)
ERYTHROCYTE [DISTWIDTH] IN BLOOD BY AUTOMATED COUNT: 12.4 % (ref 11.5–14.5)
GLOBULIN SER CALC-MCNC: 3.3 G/DL (ref 2–4)
GLUCOSE SERPL-MCNC: 129 MG/DL (ref 65–100)
HCT VFR BLD AUTO: 40.6 % (ref 35–47)
HGB BLD-MCNC: 13.5 G/DL (ref 11.5–16)
IMM GRANULOCYTES # BLD: 0 K/UL (ref 0–0.04)
IMM GRANULOCYTES NFR BLD AUTO: 0 % (ref 0–0.5)
LYMPHOCYTES # BLD: 2.7 K/UL (ref 0.8–3.5)
LYMPHOCYTES NFR BLD: 33 % (ref 12–49)
MCH RBC QN AUTO: 31.4 PG (ref 26–34)
MCHC RBC AUTO-ENTMCNC: 33.3 G/DL (ref 30–36.5)
MCV RBC AUTO: 94.4 FL (ref 80–99)
MONOCYTES # BLD: 0.6 K/UL (ref 0–1)
MONOCYTES NFR BLD: 8 % (ref 5–13)
NEUTS SEG # BLD: 4.4 K/UL (ref 1.8–8)
NEUTS SEG NFR BLD: 55 % (ref 32–75)
NRBC # BLD: 0 K/UL (ref 0–0.01)
NRBC BLD-RTO: 0 PER 100 WBC
P-R INTERVAL, ECG05: 170 MS
PLATELET # BLD AUTO: 260 K/UL (ref 150–400)
PMV BLD AUTO: 10 FL (ref 8.9–12.9)
POTASSIUM SERPL-SCNC: 3.7 MMOL/L (ref 3.5–5.1)
PROT SERPL-MCNC: 7.1 G/DL (ref 6.4–8.2)
Q-T INTERVAL, ECG07: 496 MS
QRS DURATION, ECG06: 86 MS
QTC CALCULATION (BEZET), ECG08: 474 MS
RBC # BLD AUTO: 4.3 M/UL (ref 3.8–5.2)
SODIUM SERPL-SCNC: 141 MMOL/L (ref 136–145)
TROPONIN I SERPL-MCNC: <0.05 NG/ML
VENTRICULAR RATE, ECG03: 55 BPM
WBC # BLD AUTO: 8.1 K/UL (ref 3.6–11)

## 2018-07-24 PROCEDURE — 96360 HYDRATION IV INFUSION INIT: CPT

## 2018-07-24 PROCEDURE — 80053 COMPREHEN METABOLIC PANEL: CPT | Performed by: EMERGENCY MEDICINE

## 2018-07-24 PROCEDURE — G8978 MOBILITY CURRENT STATUS: HCPCS

## 2018-07-24 PROCEDURE — 93005 ELECTROCARDIOGRAM TRACING: CPT

## 2018-07-24 PROCEDURE — 36415 COLL VENOUS BLD VENIPUNCTURE: CPT | Performed by: EMERGENCY MEDICINE

## 2018-07-24 PROCEDURE — 74011250636 HC RX REV CODE- 250/636: Performed by: EMERGENCY MEDICINE

## 2018-07-24 PROCEDURE — 96361 HYDRATE IV INFUSION ADD-ON: CPT

## 2018-07-24 PROCEDURE — 97162 PT EVAL MOD COMPLEX 30 MIN: CPT

## 2018-07-24 PROCEDURE — 71046 X-RAY EXAM CHEST 2 VIEWS: CPT

## 2018-07-24 PROCEDURE — 97116 GAIT TRAINING THERAPY: CPT

## 2018-07-24 PROCEDURE — 84484 ASSAY OF TROPONIN QUANT: CPT | Performed by: EMERGENCY MEDICINE

## 2018-07-24 PROCEDURE — G8979 MOBILITY GOAL STATUS: HCPCS

## 2018-07-24 PROCEDURE — G8980 MOBILITY D/C STATUS: HCPCS

## 2018-07-24 PROCEDURE — 85025 COMPLETE CBC W/AUTO DIFF WBC: CPT | Performed by: EMERGENCY MEDICINE

## 2018-07-24 PROCEDURE — 99285 EMERGENCY DEPT VISIT HI MDM: CPT

## 2018-07-24 PROCEDURE — 97530 THERAPEUTIC ACTIVITIES: CPT

## 2018-07-24 RX ADMIN — SODIUM CHLORIDE 1000 ML: 900 INJECTION, SOLUTION INTRAVENOUS at 13:07

## 2018-07-24 NOTE — SENIOR SERVICES NOTE
physical Therapy Emergency Department EVALUATION/DISCHARGE  Patient: Jeyson Bagley (13 y.o. female)  Date: 7/24/2018  Primary Diagnosis: Back Pain, Hypotension       Precautions:  Fall  ASSESSMENT :  Based on the objective data described below, the patient presents with orthostatic hypotension (currently hypertensive at rest), intermittent and mild c/o dizziness with positional changes, and rib/back pain. Patient tolerated ambulation with rollator walker despite hypotension. No LOB noted. She reports this is mostly her baseline. When discussing outpatient PT services, she states she has recently completed that and is now self paying for aquatic PT. No acute changes noted and no additional therapy needs indicated. Patient verbalized frustration with physicians (in community and ED) not being able to determine the cause of her orthostatic hypotension, rib pain, and decreased appetite. Patient reports skipping multiple meals. Discussed types of supplemental drinks/puddings/shakes etc. She reports being borderline diabetic and educated on types more appropriate for diabetics. Encouraged her to discuss supplemental options further with PCP. Patient reports feeling safe to return home post ambulation assessment. She drove herself to her doctors office and then was transported to the ED via EMS. She has been unable to get in touch with her daughter who was supposed to be out of [place] on a job work site (construction). Spoke with ED Case Manager about patient likely needing assistance with transportation home. Rounded with ED Physician about above findings and recommendations. Further acute physical therapy is not indicated at this time.      PLAN :  Discharge Recommendations:   [x]   Home with family assist PRN  []   Skilled nursing facility  []   Admission to hospital with rehab likely needed  []   Inpatient rehab referral  []   Outpatient physical therapy referral  [x]   Other: Continue with private pay aquatic PT at residence     Further Equipment Recommendations for Discharge: None, patient has needed equipment   []   Rolling walker with 5\" wheels  []   Crutches   []   Charlanne Bienenstock   []   Wheelchair   []   Other:     COMMUNICATION/EDUCATION:   Communication/Collaboration:  [x]   Fall prevention education was provided and the patient/caregiver indicated understanding. [x]   Patient/family have participated as able and agree with findings and recommendations. []   Patient is unable to participate in plan of care at this time. Findings and recommendations were discussed with: ED physician and ED Case Manager     SUBJECTIVE:   Patient stated It is just so frustrating. No one has answers for why I have this.     OBJECTIVE DATA SUMMARY:   HISTORY:    Past Medical History:   Diagnosis Date    Bipolar disorder ([facility] [address]     Nazmy    Chronic pain     BACK PAIN    Diabetes ([facility] [address]     BORDERLINE TX WITH DIET AND EXERCISE    DJD (degenerative joint disease)     Fibromyalgia     Genital herpes     GERD (gastroesophageal reflux disease)     Hypercholesterolemia     IBS (irritable bowel syndrome)     Lumbar disc disease     stimulation-Honza    Migraine     Nausea & vomiting     Other ill-defined conditions(799.89)     SEASONAL allergies    Other ill-defined conditions(799.89)     dysphagia has had esophagus dilated    Paget's disease     Pulmonary embolism (HCC)     RLS (restless legs syndrome)     Spinal stenosis      Past Surgical History:   Procedure Laterality Date    COLONOSCOPY N/A 6/27/2016    COL[facility] performed by [facility]ris Leonard [facility] at [facility] ENDOSCOPY    ENDOSCOPY, COLON, DIAGNOSTIC      12, due 13    HX APPENDECTOMY      HX BACK SURGERY  9/17/2013    back surgery - total of 3 as of 12/20/2013    HX BREAST BIOPSY Right years ago    negative    HX CHOLECYSTECTOMY      HX HEENT      T & A    HX HYSTERECTOMY      total for fibroid tumors    HX ORTHOPAEDIC      lumbar laminectomy then fusion/neurostimulator implanted - inactive now but still in    HX ORTHOPAEDIC      REMOVAL OF NEUROSTIMULATOR    HX OTHER SURGICAL      EGD x 2 with dilation/colonoscopy - no polyps per pt    TILT TABLE EVALUATION  8/2/2016          Prior Level of Function/Home Situation: Patient lives alone in [place] [address] apartment at [facility], reports one daughter lives close and other 2 children live in [place]. Patient still drives. Reports using rollator walker at baseline due to history of hypotension and needing to sit when symptoms occur. Personal factors and/or comorbidities impacting plan of care:     Home Situation  Home Environment: Apartment (Independent living apt at [facility])  One/Two Story Residence: One story (lives on 3rd floor, uses elevator)  Living Alone: Yes  Support Systems: Child(lexis)  Patient Expects to be Discharged to[de-identified] Apartment  Current DME Used/Available at Home: Colleen Crawford, rollator, Shower chair  Tub or Shower Type: Shower    EXAMINATION/PRESENTATION/DECISION MAKING:   Critical Behavior:   Patient alert          Range Of Motion:  AROM: Within functional limits                       Strength:    Strength:  Within functional limits                    Tone & Sensation:   Tone: Normal              Sensation: Intact               Coordination:  Coordination: Within functional limits    Functional Mobility:  Bed Mobility:  Rolling: Independent  Supine to Sit: Independent  Sit to Supine: Independent  Scooting: Independent  Transfers:  Sit to Stand: Supervision (due to c/o dizziness and orthostatic hypotension)  Stand to Sit: Supervision                       Balance:   Sitting: Intact  Standing: Impaired  Standing - Static: Good  Standing - Dynamic : Fair (good with support of AD)  Ambulation/Gait Training:  Distance (ft): 50 Feet (ft)  Assistive Device: Walker, rollator;Gait belt  Ambulation - Level of Assistance: Supervision        Gait Abnormalities: Decreased step clearance              Speed/Penny: Slow  Step Length: Right shortened;Left shortened                  Patient tolerated ambulation with support of rollator walker. She reports at baseline. Supervision due to hypotension. Special Tests:  Barthel Index:    Bathin (seated in shower chair)  Bladder: 5  Bowels: 10  Groomin  Dressing: 10  Feeding: 10  Mobility: 10 (supervision for distance)  Stairs: 5  Toilet Use: 10  Transfer (Bed to Chair and Back): 15  Total: 85     Barthel and G-code impairment scale:  Percentage of impairment CH  0% CI  1-19% CJ  20-39% CK  40-59% CL  60-79% CM  80-99% CN  100%   Barthel Score 0-100 100 99-80 79-60 59-40 20-39 1-19   0   Barthel Score 0-20 20 17-19 13-16 9-12 5-8 1-4 0      The Barthel ADL Index: Guidelines  1. The index should be used as a record of what a patient does, not as a record of what a patient could do. 2. The main aim is to establish degree of independence from any help, physical or verbal, however minor and for whatever reason. 3. The need for supervision renders the patient not independent. 4. A patient's performance should be established using the best available evidence. Asking the patient, friends/relatives and nurses are the usual sources, but direct observation and common sense are also important. However direct testing is not needed. 5. Usually the patient's performance over the preceding 24-48 hours is important, but occasionally longer periods will be relevant. 6. Middle categories imply that the patient supplies over 50 per cent of the effort. 7. Use of aids to be independent is allowed. Iraida Fox., Barthel, D.W. (3006). Functional evaluation: the Barthel Index. [facility] (14)2. ANA MontgomeryF, Valdez Art., Len Naples., [place], [address] [place] [address] Ave (). Measuring the change indisability after inpatient rehabilitation; comparison of the responsiveness of the Barthel Index and Functional Christian Measure.  Journal of Neurology, Neurosurgery, and Psychiatry, 66(4), [phone]. KIERRA Mcknight, HUMBERTO Navarrete, & Karol Herman M.A. (2004.) Assessment of post-stroke quality of life in cost-effectiveness studies: The usefulness of the Barthel Index and the EuroQoL-5D. Quality of Life Research, 13, 427-43        In compliance with CMSs Claims Based Outcome Reporting, the following G-code set was chosen for this patient based on their primary functional limitation being treated: The outcome measure chosen to determine the severity of the functional limitation was the Barthel Index with a score of 85/100 which was correlated with the impairment scale. ? Mobility - Walking and Moving Around:     - CURRENT STATUS: CI - 1%-19% impaired, limited or restricted    - GOAL STATUS: CI - 1%-19% impaired, limited or restricted    - D/C STATUS:  CI - 1%-19% impaired, limited or restricted    Physical Therapy Evaluation Charge Determination   History Examination Presentation Decision-Making   MEDIUM  Complexity : 1-2 comorbidities / personal factors will impact the outcome/ POC  MEDIUM Complexity : 3 Standardized tests and measures addressing body structure, function, activity limitation and / or participation in recreation  MEDIUM Complexity : Evolving with changing characteristics  Other outcome measures Barthel Index  MEDIUM      Based on the above components, the patient evaluation is determined to be of the following complexity level: MEDIUM    Pain:  Pain Scale 1: Numeric (0 - 10)  Pain Intensity 1: 5  Pain Location 1: Back  Pain Orientation 1: Mid  Pain Description 1: Aching  Pain Intervention(s) 1: Rest  Activity Tolerance:   Patient remains orthostatic hypotensive, minimal symptoms noted. Patient reports typical of baseline. Please refer to the flowsheet for vital signs taken during this treatment.   After treatment:   []         Patient left in no apparent distress sitting up in chair  [x]         Patient left in no apparent distress in bed  [x] Call bell left within reach  [x]         Nursing notified  []         Caregiver present  []         Bed alarm activated    Thank you for this referral.  Petra De La Fuente, PT, DPT   Time Calculation: 41 mins

## 2018-07-24 NOTE — ED NOTES
Pt given discharge instructions, patient education, and follow up information. Pt verbalizes understanding. All questions answered. Pt discharged to home in private cab to parked car at patient's GI doctor's office, ambulatory. Pt A/Jose Manuel4, RA, pain controlled. Patient ambulatory to ER bazzi bed awaiting discharge transportation via cab home. Transportation arranged by Case Management.

## 2018-07-24 NOTE — DISCHARGE INSTRUCTIONS
Orthostatic Hypotension: Care Instructions  Your Care Instructions    Orthostatic hypotension is a quick drop in blood pressure. It happens when you get up from sitting or lying down. You may feel faint, lightheaded, or dizzy. When a person sits up or stands up, the body changes the way it pumps blood. This can slow the flow of blood to the brain for a very short time. And that can make you feel lightheaded. Many medicines can cause this problem, especially in older people. Lack of fluids (dehydration) or illnesses such as diabetes or heart disease also can cause it. Follow-up care is a key part of your treatment and safety. Be sure to make and go to all appointments, and call your doctor if you are having problems. It's also a good idea to know your test results and keep a list of the medicines you take. How can you care for yourself at home? · Tell your doctor about any problems you have with your medicines. · If your doctor prescribes medicine to help prevent a low blood pressure problem, take it exactly as prescribed. Call your doctor if you think you are having a problem with your medicine. · Drink plenty of fluids, enough so that your urine is light yellow or clear like water. Choose water and other caffeine-free clear liquids. If you have kidney, heart, or liver disease and have to limit fluids, talk with your doctor before you increase the amount of fluids you drink. · Limit or avoid alcohol and caffeine. · Get up slowly from bed or after sitting for a long time. If you are in bed, roll to your side and swing your legs over the edge of the bed and onto the floor. Push your body up to a sitting position. Wait for a while before you slowly stand up. If you are dizzy or lightheaded, sit or lie down. When should you call for help? Call 911 anytime you think you may need emergency care.  For example, call if:    · You passed out (lost consciousness).    Watch closely for changes in your health, and be sure to contact your doctor if:    · You do not get better as expected. Where can you learn more? Go to http://ash-jeronimo.info/. Enter U479 in the search box to learn more about \"Orthostatic Hypotension: Care Instructions. \"  Current as of: May 10, 2017  Content Version: 11.7  © 3917-6721 [employer]. Care instructions adapted under license by [employer] (which disclaims liability or warranty for this information). If you have questions about a medical condition or this instruction, always ask your healthcare professional. [address] any warranty or liability for your use of this information.

## 2018-07-24 NOTE — ED NOTES
Patient assisted to [facility] with RN at bedside. 1 urine occurrence, patient returned to bed. PT at bedside.

## 2018-07-24 NOTE — ED PROVIDER NOTES
HPI Comments: 75 y.o. female with extensive past medical history, please see list, significant for IBS, PE, GERD, and fibromyalgia who presents via EMS from Dr. Alfredo Greene office with chief complaint of chest pain. Patient was at rheumatologist's office this morning to evaluate 6-month history of chest pain that was previous evaluated by cardiology without identifiable cardiac etiology. At rheumatology appt this morning, patient was notably hypotensive with systolic BP in 43D. Patient was subsequently referred to the ED via private vehicle. She decided to stop at [facility] office en route to the ED to obtain appointment with Dr. Jazmín Rios d/t nausea and decreased appetite. Vital signs there revealed BP 96/48. She was subsequently referred to the ED via EMS. EMS noted patient was hypertensive en route with systolic BP in 499S. Patient denies history of HTN. She takes fludrocortisone and midodrine to treat persistent hypotension. She denies missed doses and noncompliance. Patient denies history of cardiac disease, but notes strong familial disease of MI. Patient's only complaints at this time are ongoing left-sided rib pain and nausea. There are no other acute medical concerns at this time. Old Chart Review:  Patient evaluated by cardiology in office on 7/17/18. Supine BP today 164/82 at that time. Patient discharged home with continued Midodrine 5 mg TID, Florinef, sodium supplements. Long-standing history of dizziness and near-syncope noted. Etiology on previous tilt noted to be orthostatic hypotension. Chest CTA (02/01/2018): No abnormalities to explain chest pain. Social hx: Never tobacco smoker; Rare EtOH use (1/month);  Denies illicit drug use  PCP: Caprice Birmingham MD  Cardiology: Rachael Walsh MD  GI: Juan Zuluaga MD  Ortho: Tori Faulkner MD    Note written by Yinka Fall, as dictated by Domenico Gilmore MD 10:58 AM        The history is provided by the patient, medical records and the EMS personnel. No  was used.         Past Medical History:   Diagnosis Date    Bipolar disorder (HCC)     Nazmy    Chronic pain     BACK PAIN    Diabetes (HCC)     BORDERLINE TX WITH DIET AND EXERCISE    DJD (degenerative joint disease)     Fibromyalgia     Genital herpes     GERD (gastroesophageal reflux disease)     Hypercholesterolemia     IBS (irritable bowel syndrome)     Lumbar disc disease     stimulation-Honza    Migraine     Nausea & vomiting     Other ill-defined conditions(799.89)     SEASONAL allergies    Other ill-defined conditions(799.89)     dysphagia has had esophagus dilated    Paget's disease     Pulmonary embolism (HCC)     RLS (restless legs syndrome)     Spinal stenosis        Past Surgical History:   Procedure Laterality Date    COLONOSCOPY N/A 6/27/2016    [facility]ys [facility]ote [facility] at [facility] ENDOSCOPY    ENDOSCOPY, COLON, DIAGNOSTIC      12, due 13    HX APPENDECTOMY      HX BACK SURGERY  9/17/2013    back surgery - total of 3 as of 12/20/2013    HX BREAST BIOPSY Right years ago    negative    HX CHOLECYSTECTOMY      HX HEENT      T & A    HX HYSTERECTOMY      total for fibroid tumors    HX ORTHOPAEDIC      lumbar laminectomy then fusion/neurostimulator implanted - inactive now but still in    HX ORTHOPAEDIC      REMOVAL OF NEUROSTIMULATOR    HX OTHER SURGICAL      EGD x 2 with dilation/colonoscopy - no polyps per pt    TILT TABLE EVALUATION  8/2/2016              Family History:   Problem Relation Age of Onset    Colon Cancer Mother     Cancer Mother 68     colon    Heart Disease Father     Heart Disease Maternal Grandmother     Heart Disease Maternal Grandfather     Heart Disease Other        Social History     Social History    Marital status: SINGLE     Spouse name: N/A    Number of children: N/A    Years of education: N/A     Occupational History    volunteer Retired     [employer]     Social History Main Topics  Smoking status: Never Smoker    Smokeless tobacco: Never Used    Alcohol use No      Comment: 1 per month    Drug use: No    Sexual activity: Not on file     Other Topics Concern    Not on file     Social History Narrative         ALLERGIES: Adhesive; Hydrocodone; Lorazepam; Other medication; Percocet [oxycodone-acetaminophen]; Sudafed [pseudoephedrine hcl]; Wellbutrin [bupropion hcl]; Zithromax [azithromycin]; and Zyrtec [cetirizine]    Review of Systems   Constitutional: Negative for activity change, chills and fever. HENT: Negative for nosebleeds, sore throat, trouble swallowing and voice change. Eyes: Negative for visual disturbance. Respiratory: Negative for shortness of breath. Cardiovascular: Positive for chest pain. Negative for palpitations. Gastrointestinal: Positive for nausea. Negative for abdominal pain, constipation and diarrhea. Genitourinary: Negative for difficulty urinating, dysuria, hematuria and urgency. Musculoskeletal: Negative for back pain, neck pain and neck stiffness. Skin: Negative for color change. Allergic/Immunologic: Negative for immunocompromised state. Neurological: Negative for dizziness, seizures, syncope, weakness, light-headedness, numbness and headaches. Psychiatric/Behavioral: Negative for behavioral problems, confusion, hallucinations, self-injury and suicidal ideas. All other systems reviewed and are negative. Vitals:    07/24/18 1024 07/24/18 1115 07/24/18 1140 07/24/18 1142   BP: 187/88 184/90 186/81    Pulse: (!) 59 (!) 56  (!) 58   Resp: 18 15  16   Temp: 97.7 °F (36.5 °C)      SpO2: 99% 95%  98%   Weight: 64.9 kg (143 lb)      Height: 5' 6\" (1.676 m)               Physical Exam   Constitutional: She is oriented to person, place, and time. She appears well-developed and well-nourished. No distress. HENT:   Head: Normocephalic and atraumatic. Eyes: Pupils are equal, round, and reactive to light. Neck: Normal range of motion. Neck supple. Cardiovascular: Normal rate, regular rhythm and normal heart sounds. Exam reveals no gallop and no friction rub. No murmur heard. Pulmonary/Chest: Effort normal and breath sounds normal. No respiratory distress. She has no wheezes. Abdominal: Soft. Bowel sounds are normal. She exhibits no distension. There is no tenderness. There is no rebound and no guarding. Musculoskeletal: Normal range of motion. Neurological: She is alert and oriented to person, place, and time. Skin: Skin is warm. No rash noted. She is not diaphoretic. Psychiatric: She has a normal mood and affect. Her behavior is normal. Judgment and thought content normal.   Nursing note and vitals reviewed. Note written by Yinka Reyes, as dictated by Derrick Ramires MD 10:58 AM    [facility]      ED Course     This is a 53-year-old female with past medical history, review of systems, physical exam as above, presenting with complaints of hypotension and chest pain. Patient with a history of both, 6 months of chest pressure, but has been evaluated by her cardiologist, and her gastroenterologist. She states she was at her rheumatologist's office, was noted to be hypotensive with systolics in the 04U, the setting of a history of the same, being administered midodrine, and fludrocortisone for symptoms. She states she is compliant with her medications, denying new complaints today including dizziness or lightheadedness. Her review indicates EGD, CTA for her work up of chest pain without significant findings. Physical exam remarkable for well-appearing elderly female in no acute stress with clear breath sounds, soft nontender abdomen, regular rate and rhythm without murmurs gallops or rubs, be hypertensive with blood pressures in the 170s. Chart review indicates possible abnormal tilt table test, and history of orthostatic hypotension. Plan to obtain CMP, CBC, EKG, chest x-ray, cardiac enzymes.  Disposition based on the patient's diagnostics and response to therapy. Procedures    ED EKG Interpretation:  Rhythm: sinus bradycardia and regular . Rate (approx.): 55; Axis: normal; QT Interval: Prolonged; ST/T wave: non-specific changes. Note written by Yinka Roper, as dictated by Nils Rousseau MD 10:33 AM    CBC, CMP unremarkable  Troponin negative    Chest X-ray: Impression: No acute process or change when compared to prior exam.    12:31 PM  Patient orthostatic. IV fluids ordered. 1:49 PM  Patient reassessed. Continues to c/o same chest pain that has been ongoing for 6 months. Discussed available lab and imaging results with patient. She verbalizes understanding and agrees with care plan. Will consult PT to evaluate patient. 2:05 PM  PT evaluating patient. She is ambulatory around unit with walker, steady gait. Per PT, patient remains orthostatic.     2:48 PM  PT evaluated patient. She is stable for discharge home. She will require assistance from case management to obtain transportation as she was brought by EMS, her personal vehicle remains at Dr. Bethany Patterson office, and no family is in the area to offer assistance. Discussed available lab and imaging results with patient. She verbalizes understanding and agrees with care plan. Will discharge patient home to [facility] with specific return precautions; no prescriptions; advised patient to continue current medication regimen; f/u with PCP, [facility] [facility] as needed. Patient's results have been reviewed with them. Patient and/or family have verbally conveyed their understanding and agreement of the patient's signs, symptoms, diagnosis, treatment and prognosis and additionally agree to follow up as recommended or return to the Emergency Room should their condition change prior to follow-up.   Discharge instructions have also been provided to the patient with some educational information regarding their diagnosis as well a list of reasons why they would want to return to the ER prior to their follow-up appointment should their condition change.

## 2018-07-24 NOTE — PROGRESS NOTES
Date of previous inpatient admission/ ED visit? N/A    What brought the patient back to ED? N/A  Seen at rheumatology office today to evaluate chest pain, hypotensive. Referred to ED, stopped by GI office and EMS called. Did patient decline recommended services during last admission/ ED visit (if yes, what)? No    Has patient seen a provider since their last inpatient admission/ED visit (if yes, when)? Yes, 6/12/18    CM Interventions:    Care Management Interventions  PCP Verified by CM: Yes (Dr. Purnell Dakins)  Last Visit to PCP: 06/12/18  Palliative Care Criteria Met (RRAT>21 & CHF Dx)?: No  Mode of Transport at Discharge: Other (see comment) (Taxi transport [employer])  Transition of Care Consult (CM Consult): Discharge Planning (Returning to Independent Living/daughter aware, [facility])  MyChart Signup: No  Discharge Durable Medical Equipment: No  Health Maintenance Reviewed: Yes  Physical Therapy Consult: No  Occupational Therapy Consult: No  Speech Therapy Consult: No  Current Support Network: Own Home    Juanita Baez is a 75year old female to [address] [place] [address] ED with complaints of blood pressure problems. [facility] ED Consult. IVF bolus, CXR, EKG, labs, troponin. Verfied face sheet and demographics. .  She lives at [facility]. Medicalaly stable for discharge. Taxi transport set up for transportion back home. Encouraged her to get her car tomorrow. PCP:  Dr. .Walden Nageotte - NN Brendan Safer message sent    Care Management Interventions  PCP Verified by CM: Yes (Dr. Purnell Dakins)  Last Visit to PCP: 06/12/18  Palliative Care Criteria Met (RRAT>21 & CHF Dx)?: No  Mode of Transport at Discharge:  Other (see comment) (Taxi transport [employer])  Transition of Care Consult (CM Consult): Discharge Planning (Returning to Independent Living/daughter aware, [facility])  MyChart Signup: No  Discharge Durable Medical Equipment: No  Health Maintenance Reviewed: Yes  Physical Therapy Consult: No  Occupational Therapy Consult: No  Speech Therapy Consult: No  Current Support Network: Own Home    Clarisse Chavez RN, BSN, [place]  ED Care Management  [phone]

## 2018-07-24 NOTE — ED NOTES
Verbal shift change report given to [address] [place] [address] (oncoming nurse) by Corona Dukes (offgoing nurse). Report included the following information SBAR, ED Summary, Recent Results and Cardiac Rhythm Sinus Laisha Caruso.

## 2018-07-24 NOTE — ED TRIAGE NOTES
Triage: Patient brought in by EMS from GI provider for SBP of 90. Patient states, \"I feel like crap. \"  Complains of nausea, fluctuating blood pressures, and generally not feeling well. EMS reports elevated blood pressures en route to [facility] ED.      1025: Patient now complains of rib pain and back pain.

## 2018-07-25 NOTE — CALL BACK NOTE
[facility] Emergency Department Follow Up Call Record    Discharged to : Home/Family Home/Home Health/Skilled Facility/Rehab/Assisted Living/Other__Home_____  1) Did you receive your discharge instructions? Yes This writer spoke with AUDELIA Estes verified. 2) Do you understand them? Yes         3) Are you able to follow them? Yes          If NO, what can I clarify for you? 4) Do you understand your diagnosis? Yes         5) Do you know which symptoms should prompt you to call the doctor? Yes     6) Were you able to fill and  any medications that were prescribed? Not applicable     7) You were prescribed __none_________for ____________________. Common side effects of this medication are____________________. This is not a complete list so please review the forms given from the pharmacy for a complete list.      8) Are there any questions about your medications? No            Have you scheduled any recommended doctors appointments (specialty, PCP)YES. This patient states she has made appointment to follow up with Dr. Alex Larose 18 . If NO, what barriers are you encountering (transportation/lost contact info/cost/  didnt think necessary/no PCP  9) If discharged with Home Health, has the agency contacted you to schedule visit? Not applicable      Is there anyone available to help you at home (meals, errands, transportation    monitoring) (adult children, neighbors, private duty companions) Yes  daughter   8) Are you on a special diet? No         If YES, do you understand the requirements for this diet? Education provided? 11) If presented with cough, bronchitis, COPD, asthma, is it ok to ask that the   respiratory disease management educator call you? Not applicable      12)  A) If presented with fall, were you issued an assistive device in the ED    Are you using? Yes Patient uses own walker         B) If given RX for device, have you obtained? If NO, barriers?   C) Therapist recommended: NO   Are you able to implement the suggestions? Not applicable        If NO, barriers to implementation? D) Are you having any difficulties with mobility inside your home?     (steps, bed, tub)No Patient reports no difficulties walking , but not feeling well today due to lower BP again. She is resting. Encouraged this patient to be safe and consider returning to ED is any symptoms should become worse . If YES, ask if the [facility] PT can contact patient and good time and number?  13)  At the end of your discharge instructions, there is information about accessing [facility] SERVICES, have you had a chance to review those? Not applicable         Do you have any questions about signing up for this service? NO   We encourage our patients to be active participants in their healthcare and this site is one of the ways to do that. It will allow you to access parts of your medical record, email your doctors office, schedule appointments, and request medications refills . 14) Are there any other questions that I can answer for you regarding    your Emergency department visit?  NO             Estimated Call Time:____1:01 PM  _______________ Date/Time:_______________

## 2018-07-26 ENCOUNTER — OFFICE VISIT (OUTPATIENT)
Dept: INTERNAL MEDICINE CLINIC | Age: 73
End: 2018-07-26

## 2018-07-26 ENCOUNTER — HOSPITAL ENCOUNTER (OUTPATIENT)
Dept: LAB | Age: 73
Discharge: HOME | End: 2018-07-26
Payer: MEDICARE

## 2018-07-26 VITALS
BODY MASS INDEX: 22.92 KG/M2 | OXYGEN SATURATION: 99 % | DIASTOLIC BLOOD PRESSURE: 76 MMHG | RESPIRATION RATE: 16 BRPM | TEMPERATURE: 97.6 F | SYSTOLIC BLOOD PRESSURE: 120 MMHG | WEIGHT: 142.6 LBS | HEART RATE: 76 BPM | HEIGHT: 66 IN

## 2018-07-26 DIAGNOSIS — I95.1 ORTHOSTATIC HYPOTENSION: ICD-10-CM

## 2018-07-26 DIAGNOSIS — G25.2 RESTING TREMOR: ICD-10-CM

## 2018-07-26 DIAGNOSIS — R11.0 NAUSEA: Primary | ICD-10-CM

## 2018-07-26 PROBLEM — E11.21 TYPE 2 DIABETES WITH NEPHROPATHY (HCC): Status: ACTIVE | Noted: 2018-07-26

## 2018-07-26 LAB
BASOPHILS # BLD AUTO: 0 X10E3/UL (ref 0–0.2)
BASOPHILS NFR BLD AUTO: 0 %
EOSINOPHIL # BLD AUTO: 0.2 X10E3/UL (ref 0–0.4)
EOSINOPHIL NFR BLD AUTO: 2 %
ERYTHROCYTE [DISTWIDTH] IN BLOOD BY AUTOMATED COUNT: 13.1 % (ref 12.3–15.4)
HCT VFR BLD AUTO: 41.8 % (ref 34–46.6)
HGB BLD-MCNC: 14.4 G/DL (ref 11.1–15.9)
LIPASE SERPL-CCNC: 30 U/L (ref 14–85)
LYMPHOCYTES # BLD AUTO: 2.8 X10E3/UL (ref 0.7–3.1)
LYMPHOCYTES NFR BLD AUTO: 31 %
MCH RBC QN AUTO: 31.7 PG (ref 26.6–33)
MCHC RBC AUTO-ENTMCNC: 34.4 G/DL (ref 31.5–35.7)
MCV RBC AUTO: 92 FL (ref 79–97)
MONOCYTES # BLD AUTO: 0.6 X10E3/UL (ref 0.1–0.9)
MONOCYTES NFR BLD AUTO: 6 %
NEUTROPHILS # BLD AUTO: 5.4 X10E3/UL (ref 1.4–7)
NEUTROPHILS NFR BLD AUTO: 61 %
PLATELET # BLD AUTO: 302 X10E3/UL (ref 150–379)
RBC # BLD AUTO: 4.54 X10E6/UL (ref 3.77–5.28)
WBC # BLD AUTO: 9 X10E3/UL (ref 3.4–10.8)

## 2018-07-26 PROCEDURE — 36415 COLL VENOUS BLD VENIPUNCTURE: CPT

## 2018-07-26 PROCEDURE — 85025 COMPLETE CBC W/AUTO DIFF WBC: CPT

## 2018-07-26 PROCEDURE — 83690 ASSAY OF LIPASE: CPT

## 2018-07-26 RX ORDER — ONDANSETRON 4 MG/1
4 TABLET, ORALLY DISINTEGRATING ORAL
Qty: 30 TAB | Refills: 0 | Status: SHIPPED | OUTPATIENT
Start: 2018-07-26 | End: 2018-10-16 | Stop reason: ALTCHOICE

## 2018-07-26 NOTE — PROGRESS NOTES
Chief Complaint   Patient presents with   [facility] Follow Up     1. Have you been to the ER, urgent care clinic since your last visit? Hospitalized since your last visit? Yes Where: [facility] Reason for visit: hypotension, back pain    2. Have you seen or consulted any other health care providers outside of the [address] [place] [address] since your last visit? Include any pap smears or colon screening.  Yes Where: Rheumatologist Reason for visit: Fibromyalgia

## 2018-07-26 NOTE — PROGRESS NOTES
HISTORY OF PRESENT ILLNESS  Julián Preciado is a 75 y.o. female. ER Follow-up     Location: [facility]. Janice's  Diagnosis:  Orthostatic Hypotension   ER records reviewed. Cardiovascular Review:  The patient has diabetes, hyperlipidemia and orthostatic hypotension followed by Dr. Felicia Muñoz. Seen 7/17/18 records reviewed. Diet and Lifestyle: nonsmoker  Home BP Monitoring: is well controlled at home,  Reviewed blood pressure diary with patient at todays visit. Norberto Garcia Pertinent ROS: taking medications as instructed, no medication side effects noted, no TIA's, no chest pain on exertion, no dyspnea on exertion, no swelling of ankles. Nausea    This is a new problem. The current episode started more than 1 week ago (3 weeks ). The problem occurs continuously (intermittent ). There has been no fever. Pertinent negatives include no chills, no fever and no diarrhea. Associated symptoms comments: Constipation  . Her past medical history is significant for irritable bowel syndrome. Sinemet recently increased by Dr. Vipul Landis    +LUQ and Epigastric pain. Review of Systems   Constitutional: Negative for chills and fever. Gastrointestinal: Positive for nausea. Negative for diarrhea.      Patient Active Problem List    Diagnosis Date Noted    Type 2 diabetes with nephropathy ([facility] [address] 07/26/2018    Syncope 05/28/2018    Spinal stenosis     Near syncope 08/02/2016    Resting tremor 07/29/2016    Orthostatic hypotension 07/05/2016    Advance directive on file 05/24/2016    Pulmonary embolism ([facility]     Mixed hyperlipidemia 02/02/2016    Diabetes mellitus type 2, controlled, without complications ([facility] [address] 76/40/2574    Tubulovillous adenoma 08/21/2012    Abnormal mammogram 06/04/2012    Encounter for long-term (current) use of other medications 11/30/2011    Hammertoe 09/12/2011    Allergic rhinitis, cause unspecified 01/26/2011    Other diseases of nasal cavity and sinuses(478.19) 07/19/2010    IBS (irritable bowel syndrome)     RLS (restless legs syndrome)     Bipolar disorder (HCC)     Fibromyalgia     DJD (degenerative joint disease)     Lumbar disc disease     Paget's disease of bone     Migraine     Genital herpes        Current Outpatient Prescriptions   Medication Sig Dispense Refill    midodrine (PROAMITINE) 5 mg tablet Take  by mouth three (3) times daily.  sodium chloride 1 gram tablet Take 1 g by mouth two (2) times a day.  pyridostigmine (MESTINON) 60 mg tablet TAKE 1 TABLET BY MOUTH THREE TIMES DAILY 90 Tab 5    fludrocortisone (FLORINEF) 0.1 mg tablet TAKE 1 TABLET BY MOUTH TWICE DAILY 180 Tab 1    potassium chloride (K-DUR, KLOR-CON) 20 mEq tablet TAKE 1 TABLET BY MOUTH DAILY 90 Tab 3    LACTOBACILLUS ACIDOPHILUS (PROBIOTIC PO) Take  by mouth daily.  ACETAMINOPHEN/CHLORPHENIRAMINE (CORICIDIN PO) Take  by mouth as needed.  carbidopa-levodopa (SINEMET)  mg per tablet Take 1 Tab by mouth three (3) times daily.  atorvastatin (LIPITOR) 20 mg tablet TAKE 1 TABLET BY MOUTH EVERY EVENING 90 Tab 11    PARoxetine (PAXIL) 30 mg tablet Take 30 mg by mouth daily. Lester Jay NP/ Dr Melina Gaona acetaminophen (TYLENOL) 325 mg tablet Take 325 mg by mouth every four (4) hours as needed for Pain.  QUEtiapine (SEROQUEL) 25 mg tablet Take 25 mg by mouth nightly as needed.  Take 1-2 tabs qhs prn Lester Jay NP/Dr Anuradha Skelton         Allergies   Allergen Reactions    Adhesive Itching    Hydrocodone Itching    Lorazepam Other (comments)    Other Medication Rash     Tide detergent    Percocet [Oxycodone-Acetaminophen] Itching    Sudafed [Pseudoephedrine Hcl] Other (comments)     Vertigo    Wellbutrin [Bupropion Hcl] Nausea and Vomiting    Zithromax [Azithromycin] Vertigo    Zyrtec [Cetirizine] Nausea Only      Visit Vitals    /76 (BP 1 Location: Right arm, BP Patient Position: Sitting)    Pulse 76    Temp 97.6 °F (36.4 °C) (Oral)    Resp 16    Ht 5' 6\" (1.676 m)    Wt 142 lb 9.6 oz (64.7 kg)    SpO2 99%    BMI 23.02 kg/m2       Physical Exam   Constitutional: She is oriented to person, place, and time. She appears distressed (lying down ). Nontoxic   Daughter present   Cardiovascular: Normal rate and regular rhythm. Pulmonary/Chest: Breath sounds normal. No respiratory distress. She has no wheezes. She has no rales. Abdominal: Bowel sounds are normal. She exhibits no distension. There is tenderness in the epigastric area and left upper quadrant. Neurological: She is alert and oriented to person, place, and time. Psychiatric: She has a normal mood and affect. ASSESSMENT and PLAN  Diagnoses and all orders for this visit:    1. Nausea- likely medication side effect based on s/s as nausea onset correlates with Sinemet dosage increase. Lipase not done during ER visit will check today with cbc to trend. Sx relief with Zofran. Advised to contact her neurologist to discuss side effects including worsening orthostatic hypotension.   -     ondansetron (ZOFRAN ODT) 4 mg disintegrating tablet; Take 1 Tab by mouth every eight (8) hours as needed for Nausea. -     CBC WITH AUTOMATED DIFF  -     LIPASE    2. Resting tremor- per neurology. On Sinemet. 3. Orthostatic hypotension- bp at goal in office today. Of note this has also worsened with increased Sinemet dose. Followed by Cardiology seen 7/17/18. Follow-up Disposition:  Return if symptoms worsen or fail to improve before scheduled appointment. Medication risks/benefits/costs/interactions/alternatives discussed with patient. Meredith Dupont  was given an after visit summary which includes diagnoses, current medications, & vitals. she expressed understanding with the diagnosis and plan.     20 minutes of 25 minutes of care coordination was spent counseling patient on treatment plan and assessing understanding

## 2018-07-26 NOTE — PATIENT INSTRUCTIONS
It was a pleasure to see you! As discussed: Your symptoms are likely from a medication side effect (Sinemet). I've ordered labs to look for other causes. Please contact Dr. Sheron Robbins Nausea and Vomiting: Care Instructions Your Care Instructions When you are nauseated, you may feel weak and sweaty and notice a lot of saliva in your mouth. Nausea often leads to vomiting. Most of the time you do not need to worry about nausea and vomiting, but they can be signs of other illnesses. Two common causes of nausea and vomiting are stomach flu and food poisoning. Nausea and vomiting from viral stomach flu will usually start to improve within 24 hours. Nausea and vomiting from food poisoning may last from 12 to 48 hours. The doctor has checked you carefully, but problems can develop later. If you notice any problems or new symptoms, get medical treatment right away. Follow-up care is a key part of your treatment and safety. Be sure to make and go to all appointments, and call your doctor if you are having problems. It's also a good idea to know your test results and keep a list of the medicines you take. How can you care for yourself at home? · To prevent dehydration, drink plenty of fluids, enough so that your urine is light yellow or clear like water. Choose water and other caffeine-free clear liquids until you feel better. If you have kidney, heart, or liver disease and have to limit fluids, talk with your doctor before you increase the amount of fluids you drink. · Rest in bed until you feel better. · When you are able to eat, try clear soups, mild foods, and liquids until all symptoms are gone for 12 to 48 hours. Other good choices include dry toast, crackers, cooked cereal, and gelatin dessert, such as Jell-O. When should you call for help? Call 911 anytime you think you may need emergency care.  For example, call if: 
  · You passed out (lost consciousness).  
 Call your doctor now or seek immediate medical care if: 
  · You have symptoms of dehydration, such as: ¨ Dry eyes and a dry mouth. ¨ Passing only a little dark urine. ¨ Feeling thirstier than usual.  
  · You have new or worsening belly pain.  
  · You have a new or higher fever.  
  · You vomit blood or what looks like coffee grounds.  
 Watch closely for changes in your health, and be sure to contact your doctor if: 
  · You have ongoing nausea and vomiting.  
  · Your vomiting is getting worse.  
  · Your vomiting lasts longer than 2 days.  
  · You are not getting better as expected. Where can you learn more? Go to http://ash-jeronimo.info/. Enter 25 [id number] in the search box to learn more about \"Nausea and Vomiting: Care Instructions. \" Current as of: November 20, 2017 Content Version: 11.7 © 4416-0308 [employer]. Care instructions adapted under license by [employer] (which disclaims liability or warranty for this information). If you have questions about a medical condition or this instruction, always ask your healthcare professional. [place] [address] any warranty or liability for your use of this information.

## 2018-08-15 RX ORDER — MIDODRINE HYDROCHLORIDE 5 MG/1
TABLET ORAL
Qty: 90 TAB | Refills: 0 | OUTPATIENT
Start: 2018-08-15

## 2018-08-15 RX ORDER — MIDODRINE HYDROCHLORIDE 5 MG/1
5 TABLET ORAL 3 TIMES DAILY
Qty: 270 TAB | Refills: 1 | Status: SHIPPED | OUTPATIENT
Start: 2018-08-15 | End: 2019-02-05 | Stop reason: SDUPTHER

## 2018-08-15 NOTE — TELEPHONE ENCOUNTER
Request for midodrine 5mg three times a day. Last office visit 7/17/18, next office visit 11/13/18.  Refills per verbal order from Queen Tarik NP.

## 2018-08-28 ENCOUNTER — TELEPHONE (OUTPATIENT)
Dept: CARDIOLOGY CLINIC | Age: 73
End: 2018-08-28

## 2018-08-28 NOTE — TELEPHONE ENCOUNTER
Pt states she is having trouble putting on support hose that dr miguel prescribed. She is wondering if someone will be able to help her put it on if she comes by the office this week.    Phone: [phone]

## 2018-08-28 NOTE — TELEPHONE ENCOUNTER
Verified patient with two types of identifiers. Patient states that she is having a difficult time getting her support hose on. Recommended patient got [address] to help to see if they have any tools. Patient states she started there and just cant see where to hook them in the back. Patient to come by tomorrow to get help from nurse. Patient verbalized understanding and will call with any other questions.

## 2018-08-29 ENCOUNTER — OFFICE VISIT (OUTPATIENT)
Dept: INTERNAL MEDICINE CLINIC | Age: 73
End: 2018-08-29

## 2018-08-29 VITALS
HEIGHT: 66 IN | SYSTOLIC BLOOD PRESSURE: 138 MMHG | BODY MASS INDEX: 22.66 KG/M2 | DIASTOLIC BLOOD PRESSURE: 88 MMHG | OXYGEN SATURATION: 98 % | TEMPERATURE: 97.6 F | RESPIRATION RATE: 16 BRPM | HEART RATE: 81 BPM | WEIGHT: 141 LBS

## 2018-08-29 DIAGNOSIS — M51.16 LUMBAR DISC DISEASE WITH RADICULOPATHY: Primary | ICD-10-CM

## 2018-08-29 RX ORDER — PREDNISONE 10 MG/1
TABLET ORAL
Qty: 25 TAB | Refills: 0 | Status: SHIPPED | OUTPATIENT
Start: 2018-08-29 | End: 2018-09-18 | Stop reason: ALTCHOICE

## 2018-08-29 NOTE — MR AVS SNAPSHOT
[address] [facility] [address] [place] [address] 
[phone] Patient: Cate Montilla MRN:  [id number] Visit Information Date & Time Provider Department Dept. Phone Encounter #  
 8/29/2018  1:40 PM Patrick Wright, [address] [facility] Internal Medicine [phone] [id number] Follow-up Instructions Return if symptoms worsen or fail to improve. Your Appointments 9/18/2018 10:35 AM  
ROUTINE CARE with Patrick Wright MD  
[facility] Internal Medicine Davida Berkowitz) Appt Note: f/u 6m  
 [address] [facility] [address]  
[phone] [id number] [facility] [place] [address] [place] [address]  
  
    
 11/13/2018 10:20 AM  
ESTABLISHED PATIENT with Jeremy Snyder MD  
[facility] (Davida Berkowitz) Appt Note: 3 to 4 month f/u  
 [address] [place] [address]  
[address] [facility] [phone] [id number] Upcoming Health Maintenance Date Due  
 EYE EXAM RETINAL OR DILATED Q1 2/9/2018 FOOT EXAM Q1 4/25/2018 MEDICARE YEARLY EXAM 8/1/2018 Influenza Age 5 to Adult 8/1/2018 HEMOGLOBIN A1C Q6M 9/8/2018 GLAUCOMA SCREENING Q2Y 2/9/2019 BREAST CANCER SCRN MAMMOGRAM 2/28/2019 LIPID PANEL Q1 3/8/2019 MICROALBUMIN Q1 7/17/2019 DTaP/Tdap/Td series (2 - Td) 8/17/2023 COLONOSCOPY 6/27/2026 Allergies as of 8/29/2018  Review Complete On: 8/29/2018 By: Patrick Wright MD  
  
 Severity Noted Reaction Type Reactions Adhesive  09/08/2009    Itching Hydrocodone  07/19/2010    Itching Lorazepam  04/02/2015    Other (comments) Other Medication  09/08/2009    Rash Tide detergent Percocet [Oxycodone-acetaminophen]  09/08/2009    Itching Sudafed [Pseudoephedrine Hcl]  10/17/2010   Side Effect Other (comments) Vertigo Wellbutrin [Bupropion Hcl]  08/31/2011    Nausea and Vomiting Zithromax [Azithromycin]  06/22/2011    Vertigo Zyrtec [Cetirizine]  10/14/2009    Nausea Only Current Immunizations  Reviewed on 8/15/2017 Name Date Influenza High Dose Vaccine PF 8/14/2017, 8/29/2013 Influenza Vaccine 8/8/2016, 10/1/2015, 9/15/2014 Influenza Vaccine Whole 9/25/2012 Pneumococcal Conjugate (PCV-13) 3/17/2015 Pneumococcal Polysaccharide (PPSV-23) 4/23/2016 TDAP Vaccine 7/3/2012 Tdap 8/17/2013 Varicella Virus Vaccine Live 9/1/2007 ZZZ-RETIRED (DO NOT USE) Pneumococcal Vaccine (Unspecified Type) 12/1/2009 Not reviewed this visit You Were Diagnosed With   
  
 Codes Comments Lumbar disc disease with radiculopathy    -  Primary ICD-10-CM: M51.16 
ICD-9-CM: 722.10 Vitals BP Pulse Temp Resp Height(growth percentile) Weight(growth percentile) 138/88 81 97.6 °F (36.4 °C) (Oral) 16 5' 6\" (1.676 m) 141 lb (64 kg) SpO2 BMI OB Status Smoking Status 98% 22.76 kg/m2 Hysterectomy Never Smoker Vitals History BMI and BSA Data Body Mass Index Body Surface Area  
 22.76 kg/m 2 1.73 m 2 Preferred Pharmacy Pharmacy Name Phone [facility] [address] [place] [address] [phone] Your Updated Medication List  
  
   
This list is accurate as of 8/29/18  2:11 PM.  Always use your most recent med list.  
  
  
  
  
 acetaminophen 325 mg tablet Commonly known as:  TYLENOL Take 325 mg by mouth every four (4) hours as needed for Pain. atorvastatin 20 mg tablet Commonly known as:  LIPITOR  
TAKE 1 TABLET BY MOUTH EVERY EVENING  
  
 carbidopa-levodopa  mg per tablet Commonly known as:  SINEMET Take 1 Tab by mouth three (3) times daily. CORICIDIN PO Take  by mouth as needed. fludrocortisone 0.1 mg tablet Commonly known as:  FLORINEF  
TAKE 1 TABLET BY MOUTH TWICE DAILY  
  
 midodrine 5 mg tablet Commonly known as:  Rembert Mass Take 1 Tab by mouth three (3) times daily. ondansetron 4 mg disintegrating tablet Commonly known as:  ZOFRAN ODT Take 1 Tab by mouth every eight (8) hours as needed for Nausea. PARoxetine 30 mg tablet Commonly known as:  PAXIL Take 30 mg by mouth daily. Belkis Horton NP/ Dr Bjorn Suresh  
  
 potassium chloride 20 mEq tablet Commonly known as:  K-DUR, KLOR-CON  
TAKE 1 TABLET BY MOUTH DAILY predniSONE 10 mg tablet Commonly known as:  Gonzalo Fountain See attached PROBIOTIC PO Take  by mouth daily. pyridostigmine 60 mg tablet Commonly known as:  MESTINON  
TAKE 1 TABLET BY MOUTH THREE TIMES DAILY QUEtiapine 25 mg tablet Commonly known as:  SEROquel Take 25 mg by mouth nightly as needed. Take 1-2 tabs qhs prn Belkis Horton NP/Dr Woodson  
  
 sodium chloride 1 gram tablet Take 1 g by mouth two (2) times a day. Prescriptions Sent to Pharmacy Refills  
 predniSONE (DELTASONE) 10 mg tablet 0 Sig: See attached Class: Normal  
 Pharmacy: [email or web address] [address] [place] [address] Lora Paty [address] Ph #: [phone] Follow-up Instructions Return if symptoms worsen or fail to improve. Patient Instructions Sciatica: Care Instructions Your Care Instructions Sciatica (say \"evn-MP-oc-kuh\") is an irritation of one of the sciatic nerves, which come from the spinal cord in the lower back. The sciatic nerves and their branches extend down through the buttock to the foot. Sciatica can develop when an injured disc in the back presses against a spinal nerve root. Its main symptom is pain, numbness, or weakness that is often worse in the leg or foot than in the back. Sciatica often will improve and go away with time. Early treatment usually includes medicines and exercises to relieve pain. Follow-up care is a key part of your treatment and safety. Be sure to make and go to all appointments, and call your doctor if you are having problems. It's also a good idea to know your test results and keep a list of the medicines you take. How can you care for yourself at home? · Take pain medicines exactly as directed. ¨ If the doctor gave you a prescription medicine for pain, take it as prescribed. ¨ If you are not taking a prescription pain medicine, ask your doctor if you can take an over-the-counter medicine. · Use heat or ice to relieve pain. ¨ To apply heat, put a warm water bottle, heating pad set on low, or warm cloth on your back. Do not go to sleep with a heating pad on your skin. ¨ To use ice, put ice or a cold pack on the area for 10 to 20 minutes at a time. Put a thin cloth between the ice and your skin. · Avoid sitting if possible, unless it feels better than standing. · Alternate lying down with short walks. Increase your walking distance as you are able to without making your symptoms worse. · Do not do anything that makes your symptoms worse. When should you call for help? Call 911 anytime you think you may need emergency care. For example, call if: 
  · You are unable to move a leg at all.  
[facility] your doctor now or seek immediate medical care if: 
  · You have new or worse symptoms in your legs or buttocks. Symptoms may include: ¨ Numbness or tingling. ¨ Weakness. ¨ Pain.  
  · You lose bladder or bowel control.  
 Watch closely for changes in your health, and be sure to contact your doctor if: 
  · You are not getting better as expected. Where can you learn more? Go to http://ash-jeronimo.info/. Enter 145-309-3726 in the search box to learn more about \"Sciatica: Care Instructions. \" Current as of: November 29, 2017 Content Version: 11.7 © 8026-0470 [employer].  Care instructions adapted under license by [address] (which disclaims liability or warranty for this information). If you have questions about a medical condition or this instruction, always ask your healthcare professional. [address] any warranty or liability for your use of this information. Introducing [facility] & HEALTH SERVICES! Dear Rob Dockery: Thank you for requesting a KustomNote account. Our records indicate that you already have an active KustomNote account. You can access your account anytime at [email or web address] Did you know that you can access your hospital and ER discharge instructions at any time in KustomNote? You can also review all of your test results from your hospital stay or ER visit. Additional Information If you have questions, please visit the Frequently Asked Questions section of the KustomNote website at [email or web address] Remember, KustomNote is NOT to be used for urgent needs. For medical emergencies, dial 911. Now available from your iPhone and Android! Please provide this summary of care documentation to your next provider. Your primary care clinician is listed as RYLEE RASHID. If you have any questions after today's visit, please call [phone].

## 2018-08-29 NOTE — PROGRESS NOTES
HISTORY OF PRESENT ILLNESS  Bereket Johnson is a 75 y.o. female. Back Pain    The history is provided by the patient. This is a chronic problem. The current episode started yesterday (increased). The problem has not changed since onset. The problem occurs constantly. Associated with: possibly overdoing it at pool therapy. The pain is present in the lower back and left side. The quality of the pain is described as aching. The pain radiates to the left thigh, left knee and left foot. The pain is at a severity of 4/10. The symptoms are aggravated by bending, twisting and certain positions. Associated symptoms include leg pain. Pertinent negatives include no fever, no numbness, no paresthesias, no paresis, no tingling and no weakness. Treatments tried: tylenol ES. The treatment provided mild relief. The patient's surgical history includes spinal fusion. Review of Systems   Constitutional: Negative for fever. Musculoskeletal: Positive for back pain. Neurological: Negative for tingling, weakness, numbness and paresthesias. Physical Exam   Constitutional: No distress. Neurological: She has normal strength. No sensory deficit. She exhibits normal muscle tone. Reflex Scores:       Patellar reflexes are 2+ on the right side and 2+ on the left side. Negative bilateral SLR   Nursing note and vitals reviewed. ASSESSMENT and PLAN  Diagnoses and all orders for this visit:    1.  Lumbar disc disease with radiculopathy  -     predniSONE (DELTASONE) 10 mg tablet; See attached  - If persists, return to Dr Delilah Fraga as recent MRI did show significant lumbar spine changes

## 2018-08-29 NOTE — PATIENT INSTRUCTIONS
Sciatica: Care Instructions  Your Care Instructions    Sciatica (say \"ehw-RS-aw-kuh\") is an irritation of one of the sciatic nerves, which come from the spinal cord in the lower back. The sciatic nerves and their branches extend down through the buttock to the foot. Sciatica can develop when an injured disc in the back presses against a spinal nerve root. Its main symptom is pain, numbness, or weakness that is often worse in the leg or foot than in the back. Sciatica often will improve and go away with time. Early treatment usually includes medicines and exercises to relieve pain. Follow-up care is a key part of your treatment and safety. Be sure to make and go to all appointments, and call your doctor if you are having problems. It's also a good idea to know your test results and keep a list of the medicines you take. How can you care for yourself at home? · Take pain medicines exactly as directed. ¨ If the doctor gave you a prescription medicine for pain, take it as prescribed. ¨ If you are not taking a prescription pain medicine, ask your doctor if you can take an over-the-counter medicine. · Use heat or ice to relieve pain. ¨ To apply heat, put a warm water bottle, heating pad set on low, or warm cloth on your back. Do not go to sleep with a heating pad on your skin. ¨ To use ice, put ice or a cold pack on the area for 10 to 20 minutes at a time. Put a thin cloth between the ice and your skin. · Avoid sitting if possible, unless it feels better than standing. · Alternate lying down with short walks. Increase your walking distance as you are able to without making your symptoms worse. · Do not do anything that makes your symptoms worse. When should you call for help? Call 911 anytime you think you may need emergency care.  For example, call if:    · You are unable to move a leg at all.   [facility] your doctor now or seek immediate medical care if:    · You have new or worse symptoms in your legs or buttocks. Symptoms may include:  ¨ Numbness or tingling. ¨ Weakness. ¨ Pain.     · You lose bladder or bowel control.    Watch closely for changes in your health, and be sure to contact your doctor if:    · You are not getting better as expected. Where can you learn more? Go to http://ash-jeronimo.info/. Enter [phone] in the search box to learn more about \"Sciatica: Care Instructions. \"  Current as of: November 29, 2017  Content Version: 11.7  © 3098-6217 GetSnippy. Care instructions adapted under license by [employer] (which disclaims liability or warranty for this information). If you have questions about a medical condition or this instruction, always ask your healthcare professional. [address] any warranty or liability for your use of this information.

## 2018-09-05 ENCOUNTER — TELEPHONE (OUTPATIENT)
Dept: CARDIOLOGY CLINIC | Age: 73
End: 2018-09-05

## 2018-09-05 NOTE — TELEPHONE ENCOUNTER
Verified patient with two types of identifiers. States that she has been laying around all day and went to check her BP and it was 178/107. She states that she was sitting when she took the BP and that she feels fine. Advised her that she has orthostatic hypotension and that her BP may be high when she is sitting or laying around. Advised her to check her BP standing and call with any other issues. Patient verbalizes understanding. And will call with any questions or concerns.

## 2018-09-18 ENCOUNTER — OFFICE VISIT (OUTPATIENT)
Dept: INTERNAL MEDICINE CLINIC | Age: 73
End: 2018-09-18

## 2018-09-18 VITALS
HEIGHT: 66 IN | OXYGEN SATURATION: 97 % | BODY MASS INDEX: 23.14 KG/M2 | DIASTOLIC BLOOD PRESSURE: 88 MMHG | HEART RATE: 75 BPM | TEMPERATURE: 97.9 F | RESPIRATION RATE: 16 BRPM | WEIGHT: 144 LBS | SYSTOLIC BLOOD PRESSURE: 142 MMHG

## 2018-09-18 DIAGNOSIS — I95.1 ORTHOSTATIC HYPOTENSION: ICD-10-CM

## 2018-09-18 DIAGNOSIS — F31.78 BIPOLAR DISORDER, IN FULL REMISSION, MOST RECENT EPISODE MIXED (HCC): ICD-10-CM

## 2018-09-18 DIAGNOSIS — M79.7 FIBROMYALGIA: ICD-10-CM

## 2018-09-18 DIAGNOSIS — M51.9 LUMBAR DISC DISEASE: ICD-10-CM

## 2018-09-18 DIAGNOSIS — E78.2 MIXED HYPERLIPIDEMIA: ICD-10-CM

## 2018-09-18 DIAGNOSIS — E11.21 TYPE 2 DIABETES WITH NEPHROPATHY (HCC): Primary | ICD-10-CM

## 2018-09-18 NOTE — PROGRESS NOTES
HISTORY OF PRESENT ILLNESS  Daisha Hassan is a 75 y.o. female. HPI Brody Sal was seen today for follow up of diabetes, hyperlipidemia and other concerns. 1. Diabetes, hyperlipidemia. Due for routine labs. 2. Orthostatic hypotension. Due for cardiology follow up in one month. Her sitting blood pressure is actually a shade high. We will await cardiology input. 3. Lumbar disc disease. Back pain is 5/10. It improved with Prednisone and is now worsening. She plans on following up with Dr. Amada Muniz. 4. Fibromyalgia. She is up to date with rheumatology follow up.  5. Bipolar disorder. Stable. She is up to date with psychiatry follow up. Current Outpatient Prescriptions   Medication Sig    midodrine (PROAMITINE) 5 mg tablet Take 1 Tab by mouth three (3) times daily.  ondansetron (ZOFRAN ODT) 4 mg disintegrating tablet Take 1 Tab by mouth every eight (8) hours as needed for Nausea.  sodium chloride 1 gram tablet Take 1 g by mouth two (2) times a day.  pyridostigmine (MESTINON) 60 mg tablet TAKE 1 TABLET BY MOUTH THREE TIMES DAILY    fludrocortisone (FLORINEF) 0.1 mg tablet TAKE 1 TABLET BY MOUTH TWICE DAILY    potassium chloride (K-DUR, KLOR-CON) 20 mEq tablet TAKE 1 TABLET BY MOUTH DAILY    LACTOBACILLUS ACIDOPHILUS (PROBIOTIC PO) Take  by mouth daily.  ACETAMINOPHEN/CHLORPHENIRAMINE (CORICIDIN PO) Take  by mouth as needed.  carbidopa-levodopa (SINEMET)  mg per tablet Take 1 Tab by mouth three (3) times daily.  atorvastatin (LIPITOR) 20 mg tablet TAKE 1 TABLET BY MOUTH EVERY EVENING    PARoxetine (PAXIL) 30 mg tablet Take 30 mg by mouth daily. Tiffani Guerrier NP/ Dr Berkley Worthington acetaminophen (TYLENOL) 325 mg tablet Take 325 mg by mouth every four (4) hours as needed for Pain.  QUEtiapine (SEROQUEL) 25 mg tablet Take 25 mg by mouth nightly as needed. Take 1-2 tabs qhs prn Tiffani Guerrier NP/Dr Ofe Rodriguez     No current facility-administered medications for this visit.           Review of Systems Constitutional: Negative for weight loss. Respiratory: Negative. Cardiovascular: Negative for chest pain, palpitations, leg swelling and PND. Musculoskeletal: Positive for back pain. Negative for myalgias. Neurological: Negative for dizziness and focal weakness. Physical Exam   Constitutional: She appears well-nourished. Neck: Carotid bruit is not present. Cardiovascular: Normal rate and regular rhythm. Exam reveals no gallop and no friction rub. No murmur heard. Pulmonary/Chest: Effort normal and breath sounds normal. No respiratory distress. Musculoskeletal: She exhibits no edema. Nursing note and vitals reviewed. ASSESSMENT and PLAN  Diagnoses and all orders for this visit:    1. Type 2 diabetes with nephropathy (HCC)  -     HEMOGLOBIN A1C WITH EAG    2. Bipolar disorder, in full remission, most recent episode [facility])- See psychiatrist as directed     3. Orthostatic hypotension- See cardiologist as directed. 4. Mixed hyperlipidemia  -     LIPID PANEL    5. Lumbar disc disease- Proceed with plan as discussed , See orthopedic surgeon as directed     6.  Fibromyalgia- See rheumatologist as directed

## 2018-09-18 NOTE — MR AVS SNAPSHOT
[address] [facility] [address] [place] [address] 
[phone] Patient: Corrinne Peabody MRN:  [id number] Visit Information Date & Time Provider Department Dept. Phone Encounter #  
 9/18/2018 10:35 AM Lobito Robison MD [facility] Internal Medicine [phone] [id number] Follow-up Instructions Return in about 6 months (around 3/18/2019). Your Appointments 11/13/2018 10:20 AM  
ESTABLISHED PATIENT with Patric Baumgarten, MD  
[facility] ([facility]) Appt Note: 3 to 4 month f/u  
 [address] [place] [address]  
[address] [address] Upcoming Health Maintenance Date Due  
 EYE EXAM RETINAL OR DILATED Q1 2/9/2018 FOOT EXAM Q1 4/25/2018 MEDICARE YEARLY EXAM 8/1/2018 Influenza Age 5 to Adult 8/1/2018 HEMOGLOBIN A1C Q6M 9/8/2018 GLAUCOMA SCREENING Q2Y 2/9/2019 BREAST CANCER SCRN MAMMOGRAM 2/28/2019 LIPID PANEL Q1 3/8/2019 MICROALBUMIN Q1 7/17/2019 DTaP/Tdap/Td series (2 - Td) 8/17/2023 COLONOSCOPY 6/27/2026 Allergies as of 9/18/2018  Review Complete On: 9/18/2018 By: Lobito Robison MD  
  
 Severity Noted Reaction Type Reactions Adhesive  09/08/2009    Itching Hydrocodone  07/19/2010    Itching Lorazepam  04/02/2015    Other (comments) Other Medication  09/08/2009    Rash Tide detergent Percocet [Oxycodone-acetaminophen]  09/08/2009    Itching Sudafed [Pseudoephedrine Hcl]  10/17/2010   Side Effect Other (comments) Vertigo Wellbutrin [Bupropion Hcl]  08/31/2011    Nausea and Vomiting Zithromax [Azithromycin]  06/22/2011    Vertigo Zyrtec [Cetirizine]  10/14/2009    Nausea Only Current Immunizations  Reviewed on 8/15/2017 Name Date Influenza High Dose Vaccine PF 8/14/2017, 8/29/2013 Influenza Vaccine 8/8/2016, 10/1/2015, 9/15/2014 Influenza Vaccine Whole 9/25/2012 Pneumococcal Conjugate (PCV-13) 3/17/2015 Pneumococcal Polysaccharide (PPSV-23) 4/23/2016 TDAP Vaccine 7/3/2012 Tdap 8/17/2013 Varicella Virus Vaccine Live 9/1/2007 ZZZ-RETIRED (DO NOT USE) Pneumococcal Vaccine (Unspecified Type) 12/1/2009 Not reviewed this visit You Were Diagnosed With   
  
 Codes Comments Type 2 diabetes with nephropathy (HCC)    -  Primary ICD-10-CM: E11.21 
ICD-9-CM: 250.40, 583.81 Bipolar disorder, in full remission, most recent episode mixed [facility])     ICD-10-CM: F31.78 
ICD-9-CM: 296.66 Orthostatic hypotension     ICD-10-CM: I95.1 ICD-9-CM: 458.0 Mixed hyperlipidemia     ICD-10-CM: E78.2 ICD-9-CM: 272.2 Lumbar disc disease     ICD-10-CM: M51.9 ICD-9-CM: 722.93 Fibromyalgia     ICD-10-CM: M79.7 ICD-9-CM: 729.1 Vitals BP Pulse Temp Resp Height(growth percentile) Weight(growth percentile) 142/88 75 97.9 °F (36.6 °C) 16 5' 6\" (1.676 m) 144 lb (65.3 kg) SpO2 BMI OB Status Smoking Status 97% 23.24 kg/m2 Hysterectomy Never Smoker Vitals History BMI and BSA Data Body Mass Index Body Surface Area  
 23.24 kg/m 2 1.74 m 2 Preferred Pharmacy Pharmacy Name Phone [facility] [address] [place] [address] [phone] Your Updated Medication List  
  
   
This list is accurate as of 9/18/18 11:36 AM.  Always use your most recent med list.  
  
  
  
  
 acetaminophen 325 mg tablet Commonly known as:  TYLENOL Take 325 mg by mouth every four (4) hours as needed for Pain. atorvastatin 20 mg tablet Commonly known as:  LIPITOR  
TAKE 1 TABLET BY MOUTH EVERY EVENING  
  
 carbidopa-levodopa  mg per tablet Commonly known as:  SINEMET Take 1 Tab by mouth three (3) times daily. CORICIDIN PO Take  by mouth as needed. fludrocortisone 0.1 mg tablet Commonly known as:  FLORINEF  
TAKE 1 TABLET BY MOUTH TWICE DAILY  
  
 midodrine 5 mg tablet Commonly known as:  Saldaña Bene Take 1 Tab by mouth three (3) times daily. ondansetron 4 mg disintegrating tablet Commonly known as:  ZOFRAN ODT Take 1 Tab by mouth every eight (8) hours as needed for Nausea. PARoxetine 30 mg tablet Commonly known as:  PAXIL Take 30 mg by mouth daily. Starr Amor NP/ Dr Stefany Valdez  
  
 potassium chloride 20 mEq tablet Commonly known as:  K-DUR, KLOR-CON  
TAKE 1 TABLET BY MOUTH DAILY PROBIOTIC PO Take  by mouth daily. pyridostigmine 60 mg tablet Commonly known as:  MESTINON  
TAKE 1 TABLET BY MOUTH THREE TIMES DAILY QUEtiapine 25 mg tablet Commonly known as:  SEROquel Take 25 mg by mouth nightly as needed. Take 1-2 tabs qhs prn Starr Amor NP/Dr Woodson  
  
 sodium chloride 1 gram tablet Take 1 g by mouth two (2) times a day. We Performed the Following HEMOGLOBIN A1C WITH EAG [49527 CPT(R)] LIPID PANEL [59289 CPT(R)] Follow-up Instructions Return in about 6 months (around 3/18/2019). Introducing [facility]! Dear Ricardo Helm: Thank you for requesting a [employer] account. Our records indicate that you already have an active ClickDiagnostics account. You can access your account anytime at [email or web address] (formerly [email or web address]). [email or web address] (formerly Ascentium) Did you know that you can access your hospital and ER discharge instructions at any time in ClickDiagnostics? You can also review all of your test results from your hospital stay or ER visit. Additional Information If you have questions, please visit the Frequently Asked Questions section of the ClickDiagnostics website at [email or web address] (formerly [email or web address]). [email or web address] (formerly Ascentium)/. Remember, ClickDiagnostics is NOT to be used for urgent needs. For medical emergencies, dial 911. Now available from your iPhone and Android! Please provide this summary of care documentation to your next provider. Your primary care clinician is listed as RYLEE RASHID. If you have any questions after today's visit, please call [phone].

## 2018-09-19 ENCOUNTER — TELEPHONE (OUTPATIENT)
Dept: CARDIOLOGY CLINIC | Age: 73
End: 2018-09-19

## 2018-09-19 NOTE — TELEPHONE ENCOUNTER
Patient is due to have a back procedure 10/31 and she needs a cardiac clearance so she is asking to r/s her November appt to something before the procedure   Phone: [phone]

## 2018-09-19 NOTE — TELEPHONE ENCOUNTER
Verified patient with two types of identifiers. She is having back surgery on her sciatic nerve she is unsure of what the procedure is called. She is not sure who to send the info to either. She will call back with the information and let us know.

## 2018-10-16 ENCOUNTER — OFFICE VISIT (OUTPATIENT)
Dept: INTERNAL MEDICINE CLINIC | Age: 73
End: 2018-10-16

## 2018-10-16 ENCOUNTER — TELEPHONE (OUTPATIENT)
Dept: CARDIOLOGY CLINIC | Age: 73
End: 2018-10-16

## 2018-10-16 VITALS
HEART RATE: 68 BPM | WEIGHT: 143.6 LBS | HEIGHT: 66 IN | SYSTOLIC BLOOD PRESSURE: 136 MMHG | DIASTOLIC BLOOD PRESSURE: 72 MMHG | TEMPERATURE: 98.7 F | RESPIRATION RATE: 16 BRPM | OXYGEN SATURATION: 99 % | BODY MASS INDEX: 23.08 KG/M2

## 2018-10-16 DIAGNOSIS — E11.21 TYPE 2 DIABETES WITH NEPHROPATHY (HCC): Primary | ICD-10-CM

## 2018-10-16 DIAGNOSIS — E78.2 MIXED HYPERLIPIDEMIA: ICD-10-CM

## 2018-10-16 DIAGNOSIS — M51.9 LUMBAR DISC DISEASE: ICD-10-CM

## 2018-10-16 DIAGNOSIS — F31.78 BIPOLAR DISORDER, IN FULL REMISSION, MOST RECENT EPISODE MIXED (HCC): ICD-10-CM

## 2018-10-16 DIAGNOSIS — M79.7 FIBROMYALGIA: ICD-10-CM

## 2018-10-16 DIAGNOSIS — I26.99 OTHER ACUTE PULMONARY EMBOLISM WITHOUT ACUTE COR PULMONALE (HCC): ICD-10-CM

## 2018-10-16 DIAGNOSIS — Z01.818 PRE-OP TESTING: ICD-10-CM

## 2018-10-16 DIAGNOSIS — I95.1 ORTHOSTATIC HYPOTENSION: ICD-10-CM

## 2018-10-16 RX ORDER — ONDANSETRON 4 MG/1
4 TABLET, FILM COATED ORAL
COMMUNITY
End: 2019-03-13

## 2018-10-16 NOTE — PROGRESS NOTES
HISTORY OF PRESENT ILLNESS  Jenkins Gottron is a 75 y.o. female. LIDIA Goldstein is seen today for follow up of diabetes and other medical problems. She also needs medical clearance prior to upcoming back surgery. This is tentatively scheduled for 10/31/18. 1. Diabetes, hyperlipidemia. Due for routine labs. 2. Orthostatic hypotension. She has followed up with cardiology. Dr. Walker De Paz has apparently given approval. She will require close monitoring of blood pressure post operatively. 3. Bipolar disorder, fibromyalgia. Clinically stable. She is up to date with specialist follow up. 4. Lumbar disc disease. She has 8/10 back pain. She has failed conservative measures. She sees  of ortho. Surgery is scheduled for October 31. Assessment: She is at moderate risk for complications based on history of pulmonary embolism and orthostatic hypotension. I feel with appropriate management, however, these risks are acceptable. Recommendations:  1. Consult Dr. Walker De Paz or his associates if there are difficulties with low blood pressure post operatively. Otherwise, follow his preoperative directions as well. 2. EKG is obtained per protocol and attached to the requested preop forms. 3. Hospitalist internal medicine consultation for assistance with management for blood sugar and overall medical management. 4. Follow up with specialists as directed and as scheduled. 5. Call if I may help with any postoperative medical problems or questions. 6. She will follow up with me as scheduled. This was an extended visit of high complexity.      Past Medical History:   Diagnosis Date    Bipolar disorder ([facility]     Nazmy    Chronic pain     BACK PAIN    Diabetes ([facility]     BORDERLINE TX WITH DIET AND EXERCISE    DJD (degenerative joint disease)     Fibromyalgia     Genital herpes     GERD (gastroesophageal reflux disease)     Hypercholesterolemia     IBS (irritable bowel syndrome)     Lumbar disc disease stimulation-Honza    Migraine     Nausea & vomiting     Other ill-defined conditions(799.89)     SEASONAL allergies    Other ill-defined conditions(799.89)     dysphagia has had esophagus dilated    Paget's disease     Pulmonary embolism (HCC)     RLS (restless legs syndrome)     Spinal stenosis      Past Surgical History:   Procedure Laterality Date    ENDOSCOPY, COLON, DIAGNOSTIC      12, due 15    HX APPENDECTOMY      HX BACK SURGERY  9/17/2013    back surgery - total of 3 as of 12/20/2013    HX BREAST BIOPSY Right years ago    negative    HX CHOLECYSTECTOMY      HX HEENT      T & A    HX HYSTERECTOMY      total for fibroid tumors    HX ORTHOPAEDIC      lumbar laminectomy then fusion/neurostimulator implanted - inactive now but still in    HX ORTHOPAEDIC      REMOVAL OF NEUROSTIMULATOR    HX OTHER SURGICAL      EGD x 2 with dilation/colonoscopy - no polyps per pt    TILT TABLE EVALUATION  8/2/2016          Current Outpatient Medications   Medication Sig    ondansetron hcl (ZOFRAN) 4 mg tablet Take 4 mg by mouth every eight (8) hours as needed for Nausea.  midodrine (PROAMITINE) 5 mg tablet Take 1 Tab by mouth three (3) times daily.  sodium chloride 1 gram tablet Take 1 g by mouth two (2) times a day.  pyridostigmine (MESTINON) 60 mg tablet TAKE 1 TABLET BY MOUTH THREE TIMES DAILY    fludrocortisone (FLORINEF) 0.1 mg tablet TAKE 1 TABLET BY MOUTH TWICE DAILY    potassium chloride (K-DUR, KLOR-CON) 20 mEq tablet TAKE 1 TABLET BY MOUTH DAILY    LACTOBACILLUS ACIDOPHILUS (PROBIOTIC PO) Take  by mouth daily.  carbidopa-levodopa (SINEMET)  mg per tablet Take 1 Tab by mouth three (3) times daily.  atorvastatin (LIPITOR) 20 mg tablet TAKE 1 TABLET BY MOUTH EVERY EVENING    PARoxetine (PAXIL) 30 mg tablet Take 30 mg by mouth daily. Urszula Cox NP/ Dr Perla Joseph acetaminophen (TYLENOL) 325 mg tablet Take 325 mg by mouth every four (4) hours as needed for Pain.     QUEtiapine (SEROQUEL) 25 mg tablet Take 25 mg by mouth nightly as needed. Take 1-2 tabs qhs prn Acey Lumberton NP/Dr Robert Magdaleno     No current facility-administered medications for this visit. Allergies   Allergen Reactions    Adhesive Itching    Hydrocodone Itching    Lorazepam Other (comments)    Other Medication Rash     Tide detergent    Percocet [Oxycodone-Acetaminophen] Itching    Sudafed [Pseudoephedrine Hcl] Other (comments)     Vertigo    Wellbutrin [Bupropion Hcl] Nausea and Vomiting    Zithromax [Azithromycin] Vertigo    Zyrtec [Cetirizine] Nausea Only     Social History     Socioeconomic History    Marital status: SINGLE     Spouse name: Not on file    Number of children: Not on file    Years of education: Not on file    Highest education level: Not on file   Social Needs    Financial resource strain: Not on file    Food insecurity - worry: Not on file    Food insecurity - inability: Not on file   [facility] needs - medical: Not on file   [facility] needs - non-medical: Not on file   Occupational History    Occupation: volunteer     Employer: RETIRED     Comment: [employer]   Tobacco Use    Smoking status: Never Smoker    Smokeless tobacco: Never Used   Substance and Sexual Activity    Alcohol use: No     Comment: 1 per month    Drug use: No    Sexual activity: Not on file   Other Topics Concern    Not on file   Social History Narrative    Not on file     Family History   Problem Relation Age of Onset    Colon Cancer Mother     Cancer Mother 68        colon    Heart Disease Father     Heart Disease Maternal Grandmother     Heart Disease Maternal Grandfather     Heart Disease Other          Review of Systems   Constitutional: Negative for weight loss. Respiratory: Negative. Cardiovascular: Negative for chest pain, palpitations, leg swelling and PND. Musculoskeletal: Positive for back pain and joint pain. Negative for myalgias. Neurological: Positive for dizziness.  Negative for focal weakness. Endo/Heme/Allergies: Does not bruise/bleed easily. Psychiatric/Behavioral: The patient is nervous/anxious. All other systems reviewed and are negative. Physical Exam   Constitutional: She is oriented to person, place, and time. She appears well-developed and well-nourished. No distress. HENT:   Head: Normocephalic and atraumatic. Right Ear: External ear normal.   Left Ear: Tympanic membrane, external ear and ear canal normal.   Ears:    Eyes: EOM are normal. Pupils are equal, round, and reactive to light. Right eye exhibits no discharge. Left eye exhibits no discharge. Neck: Normal range of motion. Neck supple. Carotid bruit is not present. No thyromegaly present. Cardiovascular: Normal rate, regular rhythm, normal heart sounds and intact distal pulses. Exam reveals no gallop and no friction rub. No murmur heard. Pulmonary/Chest: Effort normal and breath sounds normal. No respiratory distress. She has no wheezes. She has no rales. Abdominal: Soft. Bowel sounds are normal. She exhibits no distension and no mass. There is no tenderness. There is no rebound and no guarding. Musculoskeletal: She exhibits no edema. Full ortho/neuro per Dr Clementine Rendon   Lymphadenopathy:     She has no cervical adenopathy. Neurological: She is alert and oriented to person, place, and time. Skin: Skin is warm and dry. No rash noted. Psychiatric: She has a normal mood and affect. Her behavior is normal.   Nursing note and vitals reviewed. ASSESSMENT and PLAN  Diagnoses and all orders for this visit:    1. Type 2 diabetes with nephropathy (HCC)  -     METABOLIC PANEL, BASIC  -     URINALYSIS W/ RFLX MICROSCOPIC  -     HEMOGLOBIN A1C WITH EAG    2. Pre-op testing  -     AMB POC EKG ROUTINE W/ 12 LEADS, INTER & REP  -     PROTHROMBIN TIME + INR    3. Bipolar disorder, in full remission, most recent episode mixed [facility])- See psychiatrist as directed     4.  Orthostatic hypotension  - CBC WITH AUTOMATED DIFF    5. Mixed hyperlipidemia- Check lipid panel and appropriate studies to rule out med side effects in 4 months. High risk med management. 6. Lumbar disc disease- See orthopedic surgeon as directed     7. Fibromyalgia- See rheumatologist as directed     8.  Other acute pulmonary embolism without acute cor pulmonale ([facility] [address]- routine post op prophylaxis

## 2018-10-16 NOTE — MR AVS SNAPSHOT
[address] [facility] [address] [place] 
[phone] Patient: George Godwin MRN:  [id number] Visit Information Date & Time Provider Department Dept. Phone Encounter #  
 10/16/2018  4:35 PM Beth Ortiz, [facility] Internal Medicine [phone] [id number] Follow-up Instructions Return in about 6 months (around 4/16/2019). Your Appointments 11/13/2018 10:20 AM  
ESTABLISHED PATIENT with Galen Solano MD  
[facility] ([address]) Appt Note: 3 to 4 month f/u  
 [address] [place] [address]  
[address] [facility] [address]  
  
    
 3/21/2019 11:00 AM  
ROUTINE CARE with Beth Ortiz MD  
[address] [place] [address] Internal Medicine [address] [facility]) Appt Note: 6 month f/u  
 [address] [place] [address]  
[address] [place], [place] [id number] [place] [address] [place] [address] Upcoming Health Maintenance Date Due Shingrix Vaccine Age 50> (1 of 2) 2/8/1994 EYE EXAM RETINAL OR DILATED Q1 2/9/2018 FOOT EXAM Q1 4/25/2018 MEDICARE YEARLY EXAM 8/1/2018 HEMOGLOBIN A1C Q6M 9/8/2018 GLAUCOMA SCREENING Q2Y 2/9/2019 BREAST CANCER SCRN MAMMOGRAM 2/28/2019 LIPID PANEL Q1 3/8/2019 MICROALBUMIN Q1 7/17/2019 DTaP/Tdap/Td series (2 - Td) 8/17/2023 COLONOSCOPY 6/27/2026 Allergies as of 10/16/2018  Review Complete On: 10/16/2018 By: Beth Ortiz MD  
  
 Severity Noted Reaction Type Reactions Adhesive  09/08/2009    Itching Hydrocodone  07/19/2010    Itching Lorazepam  04/02/2015    Other (comments) Other Medication  09/08/2009    Rash Tide detergent Percocet [Oxycodone-acetaminophen]  09/08/2009    Itching Sudafed [Pseudoephedrine Hcl]  10/17/2010   Side Effect Other (comments) Vertigo Wellbutrin [Bupropion Hcl]  08/31/2011    Nausea and Vomiting Zithromax [Azithromycin]  06/22/2011    Vertigo Zyrtec [Cetirizine]  10/14/2009    Nausea Only Current Immunizations  Reviewed on 8/15/2017 Name Date Influenza High Dose Vaccine PF 9/1/2018, 8/14/2017, 8/29/2013 Influenza Vaccine 8/8/2016, 10/1/2015, 9/15/2014 Influenza Vaccine Whole 9/25/2012 Pneumococcal Conjugate (PCV-13) 3/17/2015 Pneumococcal Polysaccharide (PPSV-23) 4/23/2016 TDAP Vaccine 7/3/2012 Tdap 8/17/2013 Varicella Virus Vaccine Live 9/1/2007 ZZZ-RETIRED (DO NOT USE) Pneumococcal Vaccine (Unspecified Type) 12/1/2009 Not reviewed this visit You Were Diagnosed With   
  
 Codes Comments Type 2 diabetes with nephropathy (HCC)    -  Primary ICD-10-CM: E11.21 
ICD-9-CM: 250.40, 583.81 Pre-op testing     ICD-10-CM: N92.406 ICD-9-CM: V72.84 Bipolar disorder, in full remission, most recent episode mixed [facility])     ICD-10-CM: F31.78 
ICD-9-CM: 296.66 Orthostatic hypotension     ICD-10-CM: I95.1 ICD-9-CM: 458.0 Mixed hyperlipidemia     ICD-10-CM: E78.2 ICD-9-CM: 272.2 Lumbar disc disease     ICD-10-CM: M51.9 ICD-9-CM: 722.93 Fibromyalgia     ICD-10-CM: M79.7 ICD-9-CM: 729.1 Other acute pulmonary embolism without acute cor pulmonale (HCC)     ICD-10-CM: I26.99 
ICD-9-CM: 415.19 Vitals BP Pulse Temp Resp Height(growth percentile) Weight(growth percentile) 136/72 68 98.7 °F (37.1 °C) (Oral) 16 5' 6\" (1.676 m) 143 lb 9.6 oz (65.1 kg) SpO2 BMI OB Status Smoking Status 99% 23.18 kg/m2 Hysterectomy Never Smoker Vitals History BMI and BSA Data Body Mass Index Body Surface Area  
 23.18 kg/m 2 1.74 m 2 Preferred Pharmacy Pharmacy Name Phone [facility] [address] [place] [address], [address] [place] [address] [phone] Your Updated Medication List  
  
   
 This list is accurate as of 10/16/18  5:25 PM.  Always use your most recent med list.  
  
  
  
  
 acetaminophen 325 mg tablet Commonly known as:  TYLENOL Take 325 mg by mouth every four (4) hours as needed for Pain. atorvastatin 20 mg tablet Commonly known as:  LIPITOR  
TAKE 1 TABLET BY MOUTH EVERY EVENING  
  
 carbidopa-levodopa  mg per tablet Commonly known as:  SINEMET Take 1 Tab by mouth three (3) times daily. fludrocortisone 0.1 mg tablet Commonly known as:  FLORINEF  
TAKE 1 TABLET BY MOUTH TWICE DAILY  
  
 midodrine 5 mg tablet Commonly known as:  Vallery Cooler Take 1 Tab by mouth three (3) times daily. ondansetron hcl 4 mg tablet Commonly known as:  Lis Dress Take 4 mg by mouth every eight (8) hours as needed for Nausea. PARoxetine 30 mg tablet Commonly known as:  PAXIL Take 30 mg by mouth daily. Jerzy Cook NP/ Dr Wiley Paris  
  
 potassium chloride 20 mEq tablet Commonly known as:  K-DUR, KLOR-CON  
TAKE 1 TABLET BY MOUTH DAILY PROBIOTIC PO Take  by mouth daily. pyridostigmine 60 mg tablet Commonly known as:  MESTINON  
TAKE 1 TABLET BY MOUTH THREE TIMES DAILY QUEtiapine 25 mg tablet Commonly known as:  SEROquel Take 25 mg by mouth nightly as needed. Take 1-2 tabs qhs prn Jerzy Cook NP/Dr Woodson  
  
 sodium chloride 1 gram tablet Take 1 g by mouth two (2) times a day. We Performed the Following AMB POC EKG ROUTINE W/ 12 LEADS, INTER & REP [10281 CPT(R)] CBC WITH AUTOMATED DIFF [67966 CPT(R)] HEMOGLOBIN A1C WITH EAG [56466 CPT(R)] METABOLIC PANEL, BASIC [33152 CPT(R)] PROTHROMBIN TIME + INR [00451 CPT(R)] URINALYSIS W/ RFLX MICROSCOPIC [45899 CPT(R)] Follow-up Instructions Return in about 6 months (around 4/16/2019). Introducing [facility] & HEALTH [facility]! Dear Sherry Ruano: Thank you for requesting a [employer] account.   Our records indicate that you already have an active [employer] account. You can access your account anytime at [email or web address] Did you know that you can access your hospital and ER discharge instructions at any time in [employer]? You can also review all of your test results from your hospital stay or ER visit. Additional Information If you have questions, please visit the Frequently Asked Questions section of the [employer] website at [email or web address] Remember, [employer] is NOT to be used for urgent needs. For medical emergencies, dial 911. Now available from your iPhone and Android! Please provide this summary of care documentation to your next provider. Your primary care clinician is listed as RYLEE RASHID. If you have any questions after today's visit, please call [phone].

## 2018-10-16 NOTE — TELEPHONE ENCOUNTER
Patient had nuclear stress test in January 2018, was negative for ischemia, LVEF approx 70%. She has had no recent signs of angina or heart failure. No further cardiac evaluation needed prior to surgery; she is low risk for upcoming procedure.

## 2018-10-16 NOTE — TELEPHONE ENCOUNTER
Received forms for H&P for upcoming procedure on 10/31/18. She states that she is taking the form to her PCP today for the H&P update so she will  forms. She states that she needs clearance for spinal surgery on LL2-3 and T10-S1. Dr Neo Curtis is doing the procedure and will clearance faxed to [phone]. Can call [phone] for questions. Will check with MD/NP.

## 2018-10-17 ENCOUNTER — HOSPITAL ENCOUNTER (OUTPATIENT)
Dept: LAB | Age: 73
Discharge: HOME | End: 2018-10-17
Payer: MEDICARE

## 2018-10-17 PROCEDURE — 80048 BASIC METABOLIC PNL TOTAL CA: CPT

## 2018-10-17 PROCEDURE — 36415 COLL VENOUS BLD VENIPUNCTURE: CPT

## 2018-10-17 PROCEDURE — 81003 URINALYSIS AUTO W/O SCOPE: CPT

## 2018-10-17 PROCEDURE — 85025 COMPLETE CBC W/AUTO DIFF WBC: CPT

## 2018-10-17 PROCEDURE — 83036 HEMOGLOBIN GLYCOSYLATED A1C: CPT

## 2018-10-17 PROCEDURE — 85610 PROTHROMBIN TIME: CPT

## 2018-10-18 LAB
APPEARANCE UR: CLEAR
BASOPHILS # BLD AUTO: 0.1 X10E3/UL (ref 0–0.2)
BASOPHILS NFR BLD AUTO: 1 %
BILIRUB UR QL STRIP: NEGATIVE
BUN SERPL-MCNC: 12 MG/DL (ref 8–27)
BUN/CREAT SERPL: 16 (ref 12–28)
CALCIUM SERPL-MCNC: 9.5 MG/DL (ref 8.7–10.3)
CHLORIDE SERPL-SCNC: 102 MMOL/L (ref 96–106)
CO2 SERPL-SCNC: 26 MMOL/L (ref 20–29)
COLOR UR: YELLOW
CREAT SERPL-MCNC: 0.73 MG/DL (ref 0.57–1)
EOSINOPHIL # BLD AUTO: 0.3 X10E3/UL (ref 0–0.4)
EOSINOPHIL NFR BLD AUTO: 3 %
ERYTHROCYTE [DISTWIDTH] IN BLOOD BY AUTOMATED COUNT: 13.6 % (ref 12.3–15.4)
EST. AVERAGE GLUCOSE BLD GHB EST-MCNC: 137 MG/DL
GLUCOSE SERPL-MCNC: 108 MG/DL (ref 65–99)
GLUCOSE UR QL: NEGATIVE
HBA1C MFR BLD: 6.4 % (ref 4.8–5.6)
HCT VFR BLD AUTO: 42.4 % (ref 34–46.6)
HGB BLD-MCNC: 14.2 G/DL (ref 11.1–15.9)
HGB UR QL STRIP: NEGATIVE
IMM GRANULOCYTES # BLD: 0 X10E3/UL (ref 0–0.1)
IMM GRANULOCYTES NFR BLD: 0 %
INR PPP: 1.1 (ref 0.8–1.2)
KETONES UR QL STRIP: NEGATIVE
LEUKOCYTE ESTERASE UR QL STRIP: NEGATIVE
LYMPHOCYTES # BLD AUTO: 2.9 X10E3/UL (ref 0.7–3.1)
LYMPHOCYTES NFR BLD AUTO: 34 %
MCH RBC QN AUTO: 31.3 PG (ref 26.6–33)
MCHC RBC AUTO-ENTMCNC: 33.5 G/DL (ref 31.5–35.7)
MCV RBC AUTO: 94 FL (ref 79–97)
MICRO URNS: NORMAL
MONOCYTES # BLD AUTO: 0.6 X10E3/UL (ref 0.1–0.9)
MONOCYTES NFR BLD AUTO: 7 %
NEUTROPHILS # BLD AUTO: 4.6 X10E3/UL (ref 1.4–7)
NEUTROPHILS NFR BLD AUTO: 55 %
NITRITE UR QL STRIP: NEGATIVE
PH UR STRIP: 7.5 [PH] (ref 5–7.5)
PLATELET # BLD AUTO: 311 X10E3/UL (ref 150–379)
POTASSIUM SERPL-SCNC: 4.1 MMOL/L (ref 3.5–5.2)
PROT UR QL STRIP: NEGATIVE
PROTHROMBIN TIME: 10.9 SEC (ref 9.1–12)
RBC # BLD AUTO: 4.53 X10E6/UL (ref 3.77–5.28)
SODIUM SERPL-SCNC: 143 MMOL/L (ref 134–144)
SP GR UR: 1.02 (ref 1–1.03)
UROBILINOGEN UR STRIP-MCNC: 0.2 MG/DL (ref 0.2–1)
WBC # BLD AUTO: 8.4 X10E3/UL (ref 3.4–10.8)

## 2018-10-23 ENCOUNTER — HOSPITAL ENCOUNTER (OUTPATIENT)
Dept: LAB | Age: 73
Discharge: HOME | End: 2018-10-23
Payer: MEDICARE

## 2018-10-23 ENCOUNTER — OFFICE VISIT (OUTPATIENT)
Dept: INTERNAL MEDICINE CLINIC | Age: 73
End: 2018-10-23

## 2018-10-23 VITALS
DIASTOLIC BLOOD PRESSURE: 82 MMHG | HEART RATE: 64 BPM | BODY MASS INDEX: 22.82 KG/M2 | TEMPERATURE: 97.6 F | WEIGHT: 142 LBS | HEIGHT: 66 IN | RESPIRATION RATE: 18 BRPM | OXYGEN SATURATION: 98 % | SYSTOLIC BLOOD PRESSURE: 132 MMHG

## 2018-10-23 DIAGNOSIS — R10.13 EPIGASTRIC ABDOMINAL PAIN: Primary | ICD-10-CM

## 2018-10-23 PROCEDURE — 83690 ASSAY OF LIPASE: CPT

## 2018-10-23 PROCEDURE — 82150 ASSAY OF AMYLASE: CPT

## 2018-10-23 PROCEDURE — 36415 COLL VENOUS BLD VENIPUNCTURE: CPT

## 2018-10-23 PROCEDURE — 80076 HEPATIC FUNCTION PANEL: CPT

## 2018-10-23 RX ORDER — OMEPRAZOLE 40 MG/1
40 CAPSULE, DELAYED RELEASE ORAL DAILY
Qty: 90 CAP | Refills: 0 | Status: SHIPPED | OUTPATIENT
Start: 2018-10-23 | End: 2019-01-24

## 2018-10-23 NOTE — PROGRESS NOTES
Nydia Mcmahan is a 75 y.o. female who was seen in clinic today (10/23/2018). Assessment & Plan:   Diagnoses and all orders for this visit:    1. Epigastric abdominal pain- this is a new problem, sounds more chronic then acute but has not mentioned in any previous notes, differential dx reviewed with the patient, exact etiology is unclear at this time. Will start w/ checking labs. Will start on PPI. Reviewed the role of imaging if no changes. CT abd/pelvis from 5/28/28- normal.  Red flags were reviewed with the patient to RTC or notify me. See AVS.   -     HEPATIC FUNCTION PANEL  -     AMYLASE  -     LIPASE  -     omeprazole (PRILOSEC) 40 mg capsule; Take 1 Cap by mouth daily. Follow-up Disposition:  Return if symptoms worsen or fail to improve. Subjective:   Arian Faust was seen today for Nausea and Chest Pain    Abdominal Pain  Nydia Mcmahan is here to talk about abdominal pain. This is a new problem and symptoms began 2 months ago and have worsened. Pain is epigastric and wraps around both sides to her back. It is described as aching. She also reports the following symptoms: anorexia, belching, diarrhea, flatus and nausea. She symptoms are aggravated by nothing. She reports after taking a nap she feels better. She has tried none. She is on Tylenol TID for pain. She is not taking any NSAID's. She was seen by PCP on 10/16/18, no medication changes done. She reports no recent travel, no new medications, and not diet changes. She had surgery scheduled for her back for 10/31 but she called and postponed to 2019 due to these concerns. She did not d/w her PCP. Past history includes: IBS and s/p cholecystectomy. Stress test on 1/24/18- no ischemia, EF > 70%.   CT abd/pelvis (5/28/18)- normal.        Brief Labs:     Lab Results   Component Value Date/Time    Sodium 143 10/17/2018 12:54 PM    Potassium 4.1 10/17/2018 12:54 PM    Creatinine 0.73 10/17/2018 12:54 PM    Creatinine, External 0.68 12/28/2017    Cholesterol, total 133 03/08/2018 09:25 AM    HDL Cholesterol 54 03/08/2018 09:25 AM    LDL, calculated 48 03/08/2018 09:25 AM    Triglyceride 157 03/08/2018 09:25 AM    Hemoglobin A1c 6.4 10/17/2018 12:54 PM    TSH 1.250 09/05/2017 12:00 AM          Prior to Admission medications    Medication Sig Start Date End Date Taking? Authorizing Provider   ondansetron hcl (ZOFRAN) 4 mg tablet Take 4 mg by mouth every eight (8) hours as needed for Nausea. Yes Provider, Historical   midodrine (PROAMITINE) 5 mg tablet Take 1 Tab by mouth three (3) times daily. 8/15/18  Yes Francine DIANA NP   sodium chloride 1 gram tablet Take 1 g by mouth two (2) times a day. Yes Provider, Historical   pyridostigmine (MESTINON) 60 mg tablet TAKE 1 TABLET BY MOUTH THREE TIMES DAILY 5/23/18  Yes Durga Palafox MD   fludrocortisone (FLORINEF) 0.1 mg tablet TAKE 1 TABLET BY MOUTH TWICE DAILY 5/1/18  Yes Durga Palafox MD   potassium chloride (K-DUR, KLOR-CON) 20 mEq tablet TAKE 1 TABLET BY MOUTH DAILY 4/18/18  Yes Eva Mcdaniel MD   carbidopa-levodopa (SINEMET)  mg per tablet Take 1 Tab by mouth three (3) times daily. Yes Provider, Historical   atorvastatin (LIPITOR) 20 mg tablet TAKE 1 TABLET BY MOUTH EVERY EVENING 7/28/17  Yes Eva Mcdaniel MD   PARoxetine (PAXIL) 30 mg tablet Take 30 mg by mouth daily. Tung Mason NP/ Dr Maira Lynn   Yes Provider, Historical   acetaminophen (TYLENOL) 325 mg tablet Take 325 mg by mouth every four (4) hours as needed for Pain. Yes Provider, Historical   QUEtiapine (SEROQUEL) 25 mg tablet Take 25 mg by mouth nightly as needed. Take 1-2 tabs qhs prn Tung Mason NP/Dr Maira Lynn   Yes Provider, Historical   LACTOBACILLUS ACIDOPHILUS (PROBIOTIC PO) Take  by mouth daily.     Provider, Historical          Allergies   Allergen Reactions    Adhesive Itching    Hydrocodone Itching    Lorazepam Other (comments)    Other Medication Rash     Tide detergent    Percocet [Oxycodone-Acetaminophen] Itching    Sudafed [Pseudoephedrine Hcl] Other (comments)     Vertigo    Wellbutrin [Bupropion Hcl] Nausea and Vomiting    Zithromax [Azithromycin] Vertigo    Zyrtec [Cetirizine] Nausea Only           Review of Systems   Constitutional: Negative for malaise/fatigue and weight loss. Respiratory: Negative for cough and shortness of breath. Cardiovascular: Positive for chest pain. Negative for palpitations and leg swelling. Gastrointestinal: Positive for abdominal pain, diarrhea and nausea. Negative for blood in stool, constipation, heartburn, melena and vomiting. Genitourinary: Negative for frequency. Musculoskeletal: Positive for back pain. Negative for joint pain, myalgias and neck pain. Skin: Negative for rash. Neurological: Negative for tingling, sensory change, focal weakness and headaches. Psychiatric/Behavioral: Negative for depression. The patient is not nervous/anxious and does not have insomnia. Objective:   Physical Exam   Cardiovascular: Regular rhythm and normal heart sounds. No murmur heard. Pulmonary/Chest: Effort normal and breath sounds normal. She has no wheezes. She has no rales. Abdominal: Bowel sounds are normal. She exhibits distension (dull to percussion, R sided). She exhibits no mass. There is no hepatosplenomegaly. There is tenderness in the right upper quadrant, epigastric area and left upper quadrant. There is no rigidity, no rebound, no guarding and negative Dutton's sign. Visit Vitals  /82   Pulse 64   Temp 97.6 °F (36.4 °C) (Oral)   Resp 18   Ht 5' 6\" (1.676 m)   Wt 142 lb (64.4 kg)   SpO2 98%   BMI 22.92 kg/m²         Disclaimer:  Advised her to call back or return to office if symptoms worsen/change/persist.  Discussed expected course/resolution/complications of diagnosis in detail with patient. Medication risks/benefits/costs/interactions/alternatives discussed with patient.   She was given an after visit summary which includes diagnoses, current medications, & vitals. She expressed understanding with the diagnosis and plan. Aspects of this note may have been generated using voice recognition software. Despite editing, there may be some syntax errors.        Jaspreet Hoffman MD

## 2018-10-23 NOTE — PATIENT INSTRUCTIONS
Gastroesophageal Reflux Disease (GERD): Care Instructions  Your Care Instructions    Gastroesophageal reflux disease (GERD) is the backward flow of stomach acid into the esophagus. The esophagus is the tube that leads from your throat to your stomach. A one-way valve prevents the stomach acid from moving up into this tube. When you have GERD, this valve does not close tightly enough. If you have mild GERD symptoms including heartburn, you may be able to control the problem with antacids or over-the-counter medicine. Changing your diet, losing weight, and making other lifestyle changes can also help reduce symptoms. Follow-up care is a key part of your treatment and safety. Be sure to make and go to all appointments, and call your doctor if you are having problems. It's also a good idea to know your test results and keep a list of the medicines you take. How can you care for yourself at home? · Take your medicines exactly as prescribed. Call your doctor if you think you are having a problem with your medicine. · Your doctor may recommend over-the-counter medicine. For mild or occasional indigestion, antacids, such as Tums, Gaviscon, Mylanta, or Maalox, may help. Your doctor also may recommend over-the-counter acid reducers, such as Pepcid AC, Tagamet HB, Zantac 75, or Prilosec. Read and follow all instructions on the label. If you use these medicines often, talk with your doctor. · Change your eating habits. ? It's best to eat several small meals instead of two or three large meals. ? After you eat, wait 2 to 3 hours before you lie down. ? Chocolate, mint, and alcohol can make GERD worse. ? Spicy foods, foods that have a lot of acid (like tomatoes and oranges), and coffee can make GERD symptoms worse in some people. If your symptoms are worse after you eat a certain food, you may want to stop eating that food to see if your symptoms get better.   · Do not smoke or chew tobacco. Smoking can make GERD worse. If you need help quitting, talk to your doctor about stop-smoking programs and medicines. These can increase your chances of quitting for good. · If you have GERD symptoms at night, raise the head of your bed 6 to 8 inches by putting the frame on blocks or placing a foam wedge under the head of your mattress. (Adding extra pillows does not work.)  · Do not wear tight clothing around your middle. · Lose weight if you need to. Losing just 5 to 10 pounds can help. When should you call for help? Call your doctor now or seek immediate medical care if:    · You have new or different belly pain.     · Your stools are black and tarlike or have streaks of blood.    Watch closely for changes in your health, and be sure to contact your doctor if:    · Your symptoms have not improved after 2 days.     · Food seems to catch in your throat or chest.   Where can you learn more? Go to http://ashCoinjeronimo.info/. Enter M448 in the search box to learn more about \"Gastroesophageal Reflux Disease (GERD): Care Instructions. \"  Current as of: March 28, 2018  Content Version: 11.8  © 4873-2330 [employer]. Care instructions adapted under license by [employer] (which disclaims liability or warranty for this information). If you have questions about a medical condition or this instruction, always ask your healthcare professional. [address] any warranty or liability for your use of this information. Gastritis: Care Instructions  Your Care Instructions    Gastritis is a sore and upset stomach. It happens when something irritates the stomach lining. Many things can cause it. These include an infection such as the flu or something you ate or drank. Medicines or a sore on the lining of the stomach (ulcer) also can cause it. Your belly may bloat and ache. You may belch, vomit, and feel sick to your stomach.   You should be able to relieve the problem by taking medicine. And it may help to change your diet. If gastritis lasts, your doctor may prescribe medicine. Follow-up care is a key part of your treatment and safety. Be sure to make and go to all appointments, and call your doctor if you are having problems. It's also a good idea to know your test results and keep a list of the medicines you take. How can you care for yourself at home? · If your doctor prescribed antibiotics, take them as directed. Do not stop taking them just because you feel better. You need to take the full course of antibiotics. · Be safe with medicines. If your doctor prescribed medicine to decrease stomach acid, take it as directed. Call your doctor if you think you are having a problem with your medicine. · Do not take any other medicine, including over-the-counter pain relievers, without talking to your doctor first.  · If your doctor recommends over-the-counter medicine to reduce stomach acid, such as Pepcid AC, Prilosec, Tagamet HB, or Zantac 75, follow the directions on the label. · Drink plenty of fluids (enough so that your urine is light yellow or clear like water) to prevent dehydration. Choose water and other caffeine-free clear liquids. If you have kidney, heart, or liver disease and have to limit fluids, talk with your doctor before you increase the amount of fluids you drink. · Limit how much alcohol you drink. · Avoid coffee, tea, cola drinks, chocolate, and other foods with caffeine. They increase stomach acid. When should you call for help? Call 911 anytime you think you may need emergency care.  For example, call if:    · You vomit blood or what looks like coffee grounds.     · You pass maroon or very bloody stools.    Call your doctor now or seek immediate medical care if:    · You start breathing fast and have not produced urine in the last 8 hours.     · You cannot keep fluids down.    Watch closely for changes in your health, and be sure to contact your doctor if:    · You do not get better as expected. Where can you learn more? Go to http://ash-jeronimo.info/. Enter [id number] in the search box to learn more about \"Gastritis: Care Instructions. \"  Current as of: March 28, 2018  Content Version: 11.8  © 7465-1729 [employer]. Care instructions adapted under license by [employer] (which disclaims liability or warranty for this information). If you have questions about a medical condition or this instruction, always ask your healthcare professional. [address] any warranty or liability for your use of this information.

## 2018-10-24 ENCOUNTER — DOCUMENTATION ONLY (OUTPATIENT)
Dept: INTERNAL MEDICINE CLINIC | Age: 73
End: 2018-10-24

## 2018-10-24 LAB
ALBUMIN SERPL-MCNC: 4.4 G/DL (ref 3.5–4.8)
ALP SERPL-CCNC: 72 IU/L (ref 39–117)
ALT SERPL-CCNC: 4 IU/L (ref 0–32)
AMYLASE SERPL-CCNC: 63 U/L (ref 31–124)
AST SERPL-CCNC: 18 IU/L (ref 0–40)
BILIRUB DIRECT SERPL-MCNC: 0.15 MG/DL (ref 0–0.4)
BILIRUB SERPL-MCNC: 0.6 MG/DL (ref 0–1.2)
LIPASE SERPL-CCNC: 25 U/L (ref 14–85)
PROT SERPL-MCNC: 6.9 G/DL (ref 6–8.5)

## 2018-10-26 RX ORDER — ATORVASTATIN CALCIUM 20 MG/1
TABLET, FILM COATED ORAL
Qty: 90 TAB | Refills: 1 | Status: SHIPPED | OUTPATIENT
Start: 2018-10-26 | End: 2019-04-21 | Stop reason: SDUPTHER

## 2018-10-30 RX ORDER — FLUDROCORTISONE ACETATE 0.1 MG/1
TABLET ORAL
Qty: 180 TAB | Refills: 1 | Status: SHIPPED | OUTPATIENT
Start: 2018-10-30 | End: 2019-05-01 | Stop reason: SDUPTHER

## 2018-10-30 NOTE — TELEPHONE ENCOUNTER
Request for florinef 0.1mg twice a day. Last office visit 7/17/18, next office visit 11/13/18. Refills per verbal order from Dr. Oneida Mcghee.

## 2018-11-05 ENCOUNTER — TELEPHONE (OUTPATIENT)
Dept: INTERNAL MEDICINE CLINIC | Age: 73
End: 2018-11-05

## 2018-11-05 NOTE — TELEPHONE ENCOUNTER
Advised pt MD feels if she is not in acute distress she should see gastroenterologist as directed. He wouldn't start any new treatment plan with upcoming specialist appt. She will keep appt tomorrow for now.

## 2018-11-05 NOTE — TELEPHONE ENCOUNTER
Spoke with pt - states she saw Dr Dorantes Friend several weeks ago for abdominal pain, nausea, vomiting and diarrhea. States started on Omeprazole 40 mg. Has not helped. Continues with same symptoms. Wants to know what she can do to feel better? Scheduled her tomorrow 11/6/18 at 9:20 am for further evaluation.  She states she does have appt on 11/9/18 with her GI MD. elisa forward to MD.

## 2018-11-05 NOTE — TELEPHONE ENCOUNTER
Pati Salinas (Self) [phone]      Pt is requesting a call back regarding one of her meds. She says she is having a problem with it.

## 2018-11-07 ENCOUNTER — HOSPITAL ENCOUNTER (OUTPATIENT)
Dept: PULMONOLOGY | Age: 73
Discharge: HOME | End: 2018-11-07
Attending: PHYSICIAN ASSISTANT
Payer: MEDICARE

## 2018-11-07 DIAGNOSIS — R06.02 SHORTNESS OF BREATH: ICD-10-CM

## 2018-11-07 PROCEDURE — 94010 BREATHING CAPACITY TEST: CPT

## 2018-11-07 PROCEDURE — 94729 DIFFUSING CAPACITY: CPT

## 2018-11-07 PROCEDURE — 94726 PLETHYSMOGRAPHY LUNG VOLUMES: CPT

## 2018-11-09 NOTE — PROCEDURES
[address]   PULMONARY FUNCTION    bOed Hurley  MR#: [id number]  : 1945  ACCOUNT #: [de-identified]   DATE OF SERVICE: 2018    REFERRING PHYSICIAN:  Rodolfo Mojica     INDICATION:  Shortness of breath. Pulmonary function performed 2018 and reviewed. Vital capacity is 2.7 liters, 91% predicted. FEV1 is 2.06 liters, 99% predicted with a normal FEV1/FVC ratio at 76%. Lung volume reveals a normal total lung capacity of 4.13 liters, 81% predicted. Residual volume is 1.77 liters, 83% predicted. DLCO is mildly reduced at 68% predicted at 12.05 mL/min/mmHg, partially correcting to 77% for alveolar ventilation. IMPRESSION:  Isolated mild gas exchange abnormality which partially corrects for alveolar ventilation without otherwise normal pulmonary mechanics and lung volumes. Clinical correlation advised for consideration of possible pulmonary vascular etiologies of finding.       MD EMILIANO Potter / LONI  D: 2018 15:10     T: 2018 20:27  JOB #: [id number]

## 2018-11-13 ENCOUNTER — OFFICE VISIT (OUTPATIENT)
Dept: CARDIOLOGY CLINIC | Age: 73
End: 2018-11-13

## 2018-11-13 VITALS
BODY MASS INDEX: 22.98 KG/M2 | HEART RATE: 84 BPM | WEIGHT: 143 LBS | DIASTOLIC BLOOD PRESSURE: 68 MMHG | SYSTOLIC BLOOD PRESSURE: 118 MMHG | HEIGHT: 66 IN

## 2018-11-13 DIAGNOSIS — R42 DIZZINESS: ICD-10-CM

## 2018-11-13 DIAGNOSIS — R07.9 CHEST PAIN AT REST: ICD-10-CM

## 2018-11-13 DIAGNOSIS — I95.1 ORTHOSTATIC HYPOTENSION: Primary | ICD-10-CM

## 2018-11-13 DIAGNOSIS — M88.9 PAGET'S DISEASE OF BONE: ICD-10-CM

## 2018-11-13 NOTE — PROGRESS NOTES
Cardiac Electrophysiology Office Note     Subjective:      George Godwin is a 75 y.o. female patient who is seen for 3 month follow up, has long-standing history of dizziness & near syncope. Etiology on previous tilt noted to be orthostatic hypotension    She ran out of Northera 3-4 days ago, states has had nausea & some dizziness since, but has been able to do some shopping despite this. It cost too much so she cannot pay   Company did not give free any more    Continues to use rolling walker. Continues pyridostigmine 60 mg po tid & florinef, midodrine  She is using compression stockings bought from [employer]. Denies syncope. Subsequent Lexiscan/cardiolite stress test on 01/24/18 was essentially normal, with LVEF 74% & no ischemia noted. Chest CTA (02/01/2018) showed no abnormalities that would explain chest pain. She has arthritis and fibromyalgia   She is getting colonoscopy 12/3/2018 due to ongoing diarrhea    Previous:  Dr Em Michaels 8/26/16: CTA at [facility] 5/7/16 small PE RLL  6/9/16 CTA [facility] normal CTA no PE  Hypercoagulable work up and d dimer performed. No longer taking Xarelto. She had a tilt table test 8/2/16. Tilt table test revealed the patient had orthostatic hypotension response with sudden change in upright position and resolved quickly when she became supine  She had been on midodrine 5 mg tid, but discontinued this when starting Northera. Patient was seen by neurologist in hospital and not clear she has autonomic dysfunction and Parkinson's disease.         Patient Active Problem List    Diagnosis Date Noted    Type 2 diabetes with nephropathy ([facility] [address] 07/26/2018    Syncope 05/28/2018    Spinal stenosis     Near syncope 08/02/2016    Resting tremor 07/29/2016    Orthostatic hypotension 07/05/2016    Advance directive on file 05/24/2016    Mixed hyperlipidemia 02/02/2016    Tubulovillous adenoma 08/21/2012    Abnormal mammogram 06/04/2012    Encounter for long-term (current) use of other medications 11/30/2011    Rupertoertojenna 09/12/2011    Allergic rhinitis, cause unspecified 01/26/2011    Other diseases of nasal cavity and sinuses(478.19) 07/19/2010    IBS (irritable bowel syndrome)     RLS (restless legs syndrome)     Bipolar disorder (HCC)     Fibromyalgia     DJD (degenerative joint disease)     Lumbar disc disease     Paget's disease of bone     Migraine     Genital herpes      Current Outpatient Medications   Medication Sig Dispense Refill    fludrocortisone (FLORINEF) 0.1 mg tablet TAKE 1 TABLET BY MOUTH TWICE DAILY 180 Tab 1    atorvastatin (LIPITOR) 20 mg tablet TAKE 1 TABLET BY MOUTH EVERY EVENING 90 Tab 1    omeprazole (PRILOSEC) 40 mg capsule Take 1 Cap by mouth daily. 90 Cap 0    ondansetron hcl (ZOFRAN) 4 mg tablet Take 4 mg by mouth every eight (8) hours as needed for Nausea.  midodrine (PROAMITINE) 5 mg tablet Take 1 Tab by mouth three (3) times daily. 270 Tab 1    sodium chloride 1 gram tablet Take 1 g by mouth two (2) times a day.  pyridostigmine (MESTINON) 60 mg tablet TAKE 1 TABLET BY MOUTH THREE TIMES DAILY 90 Tab 5    potassium chloride (K-DUR, KLOR-CON) 20 mEq tablet TAKE 1 TABLET BY MOUTH DAILY 90 Tab 3    LACTOBACILLUS ACIDOPHILUS (PROBIOTIC PO) Take  by mouth daily.  carbidopa-levodopa (SINEMET)  mg per tablet Take 1 Tab by mouth three (3) times daily.  PARoxetine (PAXIL) 30 mg tablet Take 30 mg by mouth daily. Cassy Saenz NP/ Dr Edger Landau acetaminophen (TYLENOL) 325 mg tablet Take 325 mg by mouth every four (4) hours as needed for Pain.  QUEtiapine (SEROQUEL) 25 mg tablet Take 25 mg by mouth nightly as needed.  Take 1-2 tabs qhs prn Cassy Saenz NP/Dr Linares Mike       Allergies   Allergen Reactions    Adhesive Itching    Hydrocodone Itching    Lorazepam Other (comments)    Other Medication Rash     Tide detergent    Percocet [Oxycodone-Acetaminophen] Itching    Sudafed [Pseudoephedrine Hcl] Other (comments)     Vertigo    Wellbutrin [Bupropion Hcl] Nausea and Vomiting    Zithromax [Azithromycin] Vertigo    Zyrtec [Cetirizine] Nausea Only     Past Medical History:   Diagnosis Date    Bipolar disorder (HCC)     Nazmy    Chronic pain     BACK PAIN    Diabetes (HCC)     BORDERLINE TX WITH DIET AND EXERCISE    DJD (degenerative joint disease)     Fibromyalgia     Genital herpes     GERD (gastroesophageal reflux disease)     Hypercholesterolemia     IBS (irritable bowel syndrome)     Lumbar disc disease     stimulation-Honza    Migraine     Nausea & vomiting     Other ill-defined conditions(799.89)     SEASONAL allergies    Other ill-defined conditions(799.89)     dysphagia has had esophagus dilated    Paget's disease     Pulmonary embolism (HCC)     RLS (restless legs syndrome)     Spinal stenosis      Past Surgical History:   Procedure Laterality Date    ENDOSCOPY, COLON, DIAGNOSTIC      12, due 13    HX APPENDECTOMY      HX BACK SURGERY  9/17/2013    back surgery - total of 3 as of 12/20/2013    HX BREAST BIOPSY Right years ago    negative    HX CHOLECYSTECTOMY      HX HEENT      T & A    HX HYSTERECTOMY      total for fibroid tumors    HX ORTHOPAEDIC      lumbar laminectomy then fusion/neurostimulator implanted - inactive now but still in    HX ORTHOPAEDIC      REMOVAL OF NEUROSTIMULATOR    HX OTHER SURGICAL      EGD x 2 with dilation/colonoscopy - no polyps per pt    TILT TABLE EVALUATION  8/2/2016          Family History   Problem Relation Age of Onset    Colon Cancer Mother     Cancer Mother 68        colon    Heart Disease Father     Heart Disease Maternal Grandmother     Heart Disease Maternal Grandfather     Heart Disease Other      Social History     Tobacco Use    Smoking status: Never Smoker    Smokeless tobacco: Never Used   Substance Use Topics    Alcohol use: No     Comment: 1 per month        Review of Systems:   Constitutional: Negative for fever, chills, weight loss . + intermittent dizziness  HEENT: Negative for nosebleeds, vision changes. Respiratory: Negative for cough, hemoptysis, sputum production, and wheezing. Cardiovascular: no palpitations, orthopnea, claudication, leg swelling, syncope, and PND. Gastrointestinal:  no vomiting, blood in stool and melena.  + intermittent nausea and diarrhea  Genitourinary: Negative for dysuria, and hematuria. Musculoskeletal: + for myalgias, sciatica   Skin: Negative for rash. Heme: Does not bleed or bruise easily. Neurological: Negative for speech change and focal weakness     Objective:     Visit Vitals  /68   Pulse 84   Ht 5' 6\" (1.676 m)   Wt 143 lb (64.9 kg)   BMI 23.08 kg/m²      Physical Exam:   Constitutional: well-developed and well-nourished. No distress. Head: Normocephalic and atraumatic. Eyes: Pupils are equal, round  Neck: supple. No JVD present. Cardiovascular: Normal rate, regular rhythm. Exam reveals no gallop and no friction rub. No murmur heard. Pulmonary/Chest: Effort normal and breath sounds normal. No wheezes. Abdominal: Soft, distended  Musculoskeletal: no edema. compression stockings on  Neurological: Alert,oriented. resting tremor in BUE  Skin: Skin is warm and dry  Psychiatric: Normal mood and affect. Behavior is normal. Judgment and thought content normal.      Assessment/Plan:       ICD-10-CM ICD-9-CM    1. Orthostatic hypotension I95.1 458.0    2. Dizziness R42 780.4    3. Chest pain at rest R07.9 786.50    4. Paget's disease of bone M88.9 731.0    Unfortunately, she no longer has patient assistance for Northera. She is doing relatively well despite discontinuing it. She is doing better with midodrine, florinef & pyridostigmine. She needs to get colonoscopy next   RTC 6 months    Call in the interim for new/worsening signs/symptoms. Thank you for involving me in this patient's care and please call with further concerns or questions.       Ashly Coppola M.D.  Electrophysiology/Cardiology  [facility] [address] [place]  [address], [address] [place] [address] [address] [place] [address]  [phone]

## 2018-11-27 RX ORDER — PYRIDOSTIGMINE BROMIDE 60 MG/1
TABLET ORAL
Qty: 90 TAB | Refills: 5 | Status: SHIPPED | OUTPATIENT
Start: 2018-11-27 | End: 2019-06-20 | Stop reason: SDUPTHER

## 2018-11-27 NOTE — TELEPHONE ENCOUNTER
Request for Mestinon 60 mg TID. Last office visit 11/13/18, next office visit 5/14/19. Refills per verbal order from Dr. Obed Peña.

## 2018-12-03 ENCOUNTER — ANESTHESIA EVENT (OUTPATIENT)
Dept: ENDOSCOPY | Age: 73
End: 2018-12-03
Payer: MEDICARE

## 2018-12-03 ENCOUNTER — ANESTHESIA (OUTPATIENT)
Dept: ENDOSCOPY | Age: 73
End: 2018-12-03
Payer: MEDICARE

## 2018-12-03 ENCOUNTER — HOSPITAL ENCOUNTER (OUTPATIENT)
Age: 73
Setting detail: OUTPATIENT SURGERY
Discharge: HOME | End: 2018-12-03
Attending: SPECIALIST | Admitting: SPECIALIST
Payer: MEDICARE

## 2018-12-03 VITALS
DIASTOLIC BLOOD PRESSURE: 85 MMHG | WEIGHT: 136.38 LBS | OXYGEN SATURATION: 99 % | RESPIRATION RATE: 12 BRPM | HEIGHT: 66 IN | TEMPERATURE: 97.7 F | BODY MASS INDEX: 21.92 KG/M2 | HEART RATE: 68 BPM | SYSTOLIC BLOOD PRESSURE: 129 MMHG

## 2018-12-03 PROCEDURE — 74011250636 HC RX REV CODE- 250/636

## 2018-12-03 PROCEDURE — 76040000019: Performed by: SPECIALIST

## 2018-12-03 PROCEDURE — 74011250636 HC RX REV CODE- 250/636: Performed by: SPECIALIST

## 2018-12-03 PROCEDURE — 76060000031 HC ANESTHESIA FIRST 0.5 HR: Performed by: SPECIALIST

## 2018-12-03 RX ORDER — LORAZEPAM 2 MG/ML
2 INJECTION INTRAMUSCULAR AS NEEDED
Status: DISCONTINUED | OUTPATIENT
Start: 2018-12-03 | End: 2018-12-03 | Stop reason: HOSPADM

## 2018-12-03 RX ORDER — SODIUM CHLORIDE 9 MG/ML
INJECTION, SOLUTION INTRAVENOUS
Status: DISCONTINUED | OUTPATIENT
Start: 2018-12-03 | End: 2018-12-03 | Stop reason: HOSPADM

## 2018-12-03 RX ORDER — SODIUM CHLORIDE 0.9 % (FLUSH) 0.9 %
5-10 SYRINGE (ML) INJECTION EVERY 8 HOURS
Status: DISCONTINUED | OUTPATIENT
Start: 2018-12-03 | End: 2018-12-03 | Stop reason: HOSPADM

## 2018-12-03 RX ORDER — SODIUM CHLORIDE 0.9 % (FLUSH) 0.9 %
5-10 SYRINGE (ML) INJECTION AS NEEDED
Status: DISCONTINUED | OUTPATIENT
Start: 2018-12-03 | End: 2018-12-03 | Stop reason: HOSPADM

## 2018-12-03 RX ORDER — MIDAZOLAM HYDROCHLORIDE 1 MG/ML
.25-1 INJECTION, SOLUTION INTRAMUSCULAR; INTRAVENOUS
Status: DISCONTINUED | OUTPATIENT
Start: 2018-12-03 | End: 2018-12-03 | Stop reason: HOSPADM

## 2018-12-03 RX ORDER — SODIUM CHLORIDE 9 MG/ML
50 INJECTION, SOLUTION INTRAVENOUS CONTINUOUS
Status: DISCONTINUED | OUTPATIENT
Start: 2018-12-03 | End: 2018-12-03 | Stop reason: HOSPADM

## 2018-12-03 RX ORDER — DEXTROMETHORPHAN/PSEUDOEPHED 2.5-7.5/.8
1.2 DROPS ORAL
Status: DISCONTINUED | OUTPATIENT
Start: 2018-12-03 | End: 2018-12-03 | Stop reason: HOSPADM

## 2018-12-03 RX ORDER — PROPOFOL 10 MG/ML
INJECTION, EMULSION INTRAVENOUS AS NEEDED
Status: DISCONTINUED | OUTPATIENT
Start: 2018-12-03 | End: 2018-12-03 | Stop reason: HOSPADM

## 2018-12-03 RX ORDER — NALOXONE HYDROCHLORIDE 0.4 MG/ML
0.4 INJECTION, SOLUTION INTRAMUSCULAR; INTRAVENOUS; SUBCUTANEOUS
Status: DISCONTINUED | OUTPATIENT
Start: 2018-12-03 | End: 2018-12-03 | Stop reason: HOSPADM

## 2018-12-03 RX ORDER — ATROPINE SULFATE 0.1 MG/ML
0.5 INJECTION INTRAVENOUS
Status: DISCONTINUED | OUTPATIENT
Start: 2018-12-03 | End: 2018-12-03 | Stop reason: HOSPADM

## 2018-12-03 RX ORDER — FLUMAZENIL 0.1 MG/ML
0.2 INJECTION INTRAVENOUS
Status: DISCONTINUED | OUTPATIENT
Start: 2018-12-03 | End: 2018-12-03 | Stop reason: HOSPADM

## 2018-12-03 RX ORDER — FENTANYL CITRATE 50 UG/ML
200 INJECTION, SOLUTION INTRAMUSCULAR; INTRAVENOUS
Status: DISCONTINUED | OUTPATIENT
Start: 2018-12-03 | End: 2018-12-03 | Stop reason: HOSPADM

## 2018-12-03 RX ORDER — LIDOCAINE HYDROCHLORIDE 20 MG/ML
INJECTION, SOLUTION EPIDURAL; INFILTRATION; INTRACAUDAL; PERINEURAL AS NEEDED
Status: DISCONTINUED | OUTPATIENT
Start: 2018-12-03 | End: 2018-12-03 | Stop reason: HOSPADM

## 2018-12-03 RX ORDER — EPINEPHRINE 0.1 MG/ML
1 INJECTION INTRACARDIAC; INTRAVENOUS
Status: DISCONTINUED | OUTPATIENT
Start: 2018-12-03 | End: 2018-12-03 | Stop reason: HOSPADM

## 2018-12-03 RX ADMIN — PROPOFOL 50 MG: 10 INJECTION, EMULSION INTRAVENOUS at 08:43

## 2018-12-03 RX ADMIN — PROPOFOL 50 MG: 10 INJECTION, EMULSION INTRAVENOUS at 08:44

## 2018-12-03 RX ADMIN — PROPOFOL 50 MG: 10 INJECTION, EMULSION INTRAVENOUS at 08:42

## 2018-12-03 RX ADMIN — SODIUM CHLORIDE: 9 INJECTION, SOLUTION INTRAVENOUS at 08:36

## 2018-12-03 RX ADMIN — LIDOCAINE HYDROCHLORIDE 50 MG: 20 INJECTION, SOLUTION EPIDURAL; INFILTRATION; INTRACAUDAL; PERINEURAL at 08:42

## 2018-12-03 NOTE — ROUTINE PROCESS
[facility] 1945 
[id number] Situation: 
Verbal report received from: Taylor 
Procedure: Procedure(s): ESOPHAGOGASTRODUODENOSCOPY (EGD) ESOPHAGEAL DILATION Background: 
 
Preoperative diagnosis: GERD AND HISTORY OF SCHATZKI'S RING Postoperative diagnosis: schatzkis ring :  Dr. Ross Mcguire Assistant(s): Endoscopy RN-1: Melissa Phillips RN Endoscopy RN-2: Tanvi Garza RN Specimens: * No specimens in log * H. Pylori  no Assessment: 
Intra-procedure medications Anesthesia gave intra-procedure sedation and medications, see anesthesia flow sheet yes Intravenous fluids: NS@ Julia Bebeto Vital signs stable yes Abdominal assessment: round and soft  Yes Recommendation: 
Discharge patient per MD order yes . Return to floor na Family or Friend fam 
Permission to share finding with family or friend yes

## 2018-12-03 NOTE — H&P
Pre-endoscopy H and P The patient was seen and examined. The airway was assessed and documented. The problem list, past medical history, and medications were reviewed. Patient Active Problem List  
Diagnosis Code  IBS (irritable bowel syndrome) K58.9  RLS (restless legs syndrome) G25.81  [facility] F31.9  Fibromyalgia M79.7  DJD (degenerative joint disease) M19.90  Lumbar disc disease M51.9  Paget's disease of bone M88.9  Migraine 346  Genital herpes A60.00  
 Other diseases of nasal cavity and sinuses(478.19) J34.89  
 Allergic rhinitis, cause unspecified J30.9  Hammertoe M20.40  Encounter for long-term (current) use of other medications Z79.899  Abnormal mammogram R92.8  Tubulovillous adenoma D36.9  Mixed hyperlipidemia E78.2  Advance directive on file N95.0  Orthostatic hypotension I95.1  Resting tremor R25.9  Near syncope R55  Spinal stenosis M48.00  Syncope R55  Type 2 diabetes with nephropathy (HCC) E11.21 Social History Socioeconomic History  Marital status: SINGLE Spouse name: Not on file  Number of children: Not on file  Years of education: Not on file  Highest education level: Not on file Social Needs  Financial resource strain: Not on file  Food insecurity - worry: Not on file  Food insecurity - inability: Not on file  Transportation needs - medical: Not on file  Transportation needs - non-medical: Not on file Occupational History  Occupation: volunteer Employer: RETIRED Comment: [employer] Tobacco Use  Smoking status: Never Smoker  Smokeless tobacco: Never Used Substance and Sexual Activity  Alcohol use: No  
  Comment: 1 per month  Drug use: No  
 Sexual activity: Not on file Other Topics Concern  Not on file Social History Narrative  Not on file Past Medical History:  
Diagnosis Date  Bipolar disorder ([address] [employer]  Chronic pain BACK PAIN  
 Diabetes ([facility] [address] BORDERLINE TX WITH DIET AND EXERCISE  DJD (degenerative joint disease)  Fibromyalgia  Genital herpes  GERD (gastroesophageal reflux disease)  Hypercholesterolemia  IBS (irritable bowel syndrome)  Lumbar disc disease   
 stimulation-Honza  Migraine  Nausea & vomiting  Other ill-defined conditions(799.89) SEASONAL allergies  Other ill-defined conditions(799.89) dysphagia has had esophagus dilated  Paget's disease  Pulmonary embolism ([facility] [address]  RLS (restless legs syndrome)  Spinal stenosis The patient has a family history of na Prior to Admission Medications Prescriptions Last Dose Informant Patient Reported? Taking? LACTOBACILLUS ACIDOPHILUS (PROBIOTIC PO) 12/2/2018 at Unknown time  Yes Yes Sig: Take  by mouth daily. PARoxetine (PAXIL) 30 mg tablet 12/2/2018 at Unknown time  Yes Yes Sig: Take 30 mg by mouth daily. Bandar Gomez NP/ Dr Cynda Sicard QUEtiapine (SEROQUEL) 25 mg tablet 12/2/2018 at Unknown time  Yes Yes Sig: Take 25 mg by mouth nightly as needed. Take 1-2 tabs qhs prn Bandar Gomez NP/Dr Cynda Sicard  
acetaminophen (TYLENOL) 325 mg tablet 12/2/2018 at Unknown time  Yes Yes Sig: Take 325 mg by mouth every four (4) hours as needed for Pain. atorvastatin (LIPITOR) 20 mg tablet 12/1/2018  No No  
Sig: TAKE 1 TABLET BY MOUTH EVERY EVENING  
carbidopa-levodopa (SINEMET)  mg per tablet 12/2/2018 at Unknown time  Yes Yes Sig: Take 1 Tab by mouth three (3) times daily. fludrocortisone (FLORINEF) 0.1 mg tablet 12/2/2018 at Unknown time  No Yes Sig: TAKE 1 TABLET BY MOUTH TWICE DAILY  
midodrine (PROAMITINE) 5 mg tablet 12/2/2018 at Unknown time  No Yes Sig: Take 1 Tab by mouth three (3) times daily. omeprazole (PRILOSEC) 40 mg capsule 12/2/2018 at Unknown time  No Yes Sig: Take 1 Cap by mouth daily. ondansetron hcl (ZOFRAN) 4 mg tablet 12/2/2018 at Unknown time  Yes Yes Sig: Take 4 mg by mouth every eight (8) hours as needed for Nausea. potassium chloride (K-DUR, KLOR-CON) 20 mEq tablet 12/2/2018 at Unknown time  No Yes Sig: TAKE 1 TABLET BY MOUTH DAILY pyridostigmine (MESTINON) 60 mg tablet 12/2/2018 at Unknown time  No Yes Sig: TAKE 1 TABLET BY MOUTH THREE TIMES DAILY  
sodium chloride 1 gram tablet 12/2/2018 at Unknown time  Yes Yes Sig: Take 1 g by mouth two (2) times a day. Facility-Administered Medications: None The review of systems is:  negative for shortness of breath or chest pain The heart, lungs and mental status were satisfactory for the administration of MAC sedation and for the procedure. Mallampati score: See Anesthesia. I discussed with the patient the objectives, risks, consequences and alternatives to the procedure. Plan: Endoscopic procedure with MAC sedation. Germain Curiel MD 
12/3/2018 8:35 AM

## 2018-12-03 NOTE — PROCEDURES
EGD Procedure Note    Indications:  Chest pain, dysphagia   Referring Physician: Mallory Jesus MD   Anesthesia/Sedation:MAC  Endoscopist:  Dr. Josi Black  Assistant:  Endoscopy RN-1: Fabiola Mckeon RN  Endoscopy RN-2: Trev Vargas RN    Preoperative diagnosis: GERD AND HISTORY OF SCHATZKI'S RING    Postoperative diagnosis: schatzkis ring      Procedure in Detail:  Informed consent was obtained for the procedure, including sedation. Risks of perforation, hemorrhage, adverse drug reaction, and aspiration were discussed. The patient was placed in the left lateral decubitus position. Based on the pre-procedure assessment, including review of the patient's medical history, medications, allergies, and review of systems, she had been deemed to be an appropriate candidate for moderate sedation; she was therefore sedated with the medications listed above. The patient was monitored continuously with ECG tracing, pulse oximetry, blood pressure monitoring, and direct observations. An Olympus video endoscope was inserted into the mouth and advanced under direct vision to into the esophagus, then stomach and duodenum. A careful inspection was made as the gastroscope was withdrawn, including a retroflexed view of the proximal stomach; findings and interventions are described below. Findings:   [facility]normal - [facility] [facility]  Stomach: normal   Duodenum/jejunum: normal    Therapies:  See above    Specimens: none           EBL: None    Complications:   None; patient tolerated the procedure well. Recommendations:  -Acid suppression with a proton pump inhibitor. , -Follow up with me., Follow up with your primary physician as some of your chest pain may be musculoskeletal    Chastity Norman MD

## 2018-12-03 NOTE — ANESTHESIA POSTPROCEDURE EVALUATION
Procedure(s): ESOPHAGOGASTRODUODENOSCOPY (EGD) ESOPHAGEAL DILATION. Anesthesia Post Evaluation Comments: Post-Anesthesia Evaluation and Assessment I have evaluated the patient and they are ready for PACU discharge. Patient: Janell Ennis MRN: [id number]  SSN: [id number] YOB: 1945  Age: 75 y.o. Sex: female Cardiovascular Function/Vital Signs /79   Pulse 67   Temp 36.5 °C (97.7 °F)   Resp 16   Ht 5' 6\" (1.676 m)   Wt 61.9 kg (136 lb 6 oz)   SpO2 95%   Breastfeeding? No   BMI 22.01 kg/m² Patient is status post MAC anesthesia for Procedure(s): ESOPHAGOGASTRODUODENOSCOPY (EGD) ESOPHAGEAL DILATION. Nausea/Vomiting: None Postoperative hydration reviewed and adequate. Pain: 
Pain Scale 1: Numeric (0 - 10) (12/03/18 0820) Pain Intensity 1: 4 (12/03/18 0820) Managed Neurological Status: At baseline Mental Status, Level of Consciousness: Alert and  oriented to person, place, and time Pulmonary Status:  
O2 Device: Nasal cannula (12/03/18 0854) Adequate oxygenation and airway patent Complications related to anesthesia: None Post-anesthesia assessment completed. No concerns Signed By: Joann Crocker MD  
 December 3, 2018 Visit Vitals /79 Pulse 67 Temp 36.5 °C (97.7 °F) Resp 16 Ht 5' 6\" (1.676 m) Wt 61.9 kg (136 lb 6 oz) SpO2 95% Breastfeeding? No  
BMI 22.01 kg/m²

## 2018-12-03 NOTE — DISCHARGE INSTRUCTIONS
Kasandra Aguero  [id number]  1945    EGD DISCHARGE INSTRUCTIONS  Discomfort:  Sore throat- throat lozenges or warm salt water gargle  redness at IV site- apply warm compress to area; if redness or soreness persist- contact your physician  Gaseous discomfort- walking, belching will help relieve any discomfort  You may not operate a vehicle for 12 hours  You may not engage in an occupation involving machinery or appliances for rest of today. You may not drink alcoholic beverages for at least 12 hours  Avoid making any critical decisions for at least 24 hour  DIET  You may resume your regular diet - however -  remember your colon is empty and a heavy meal will produce gas. Avoid these foods:  vegetables, fried / greasy foods, carbonated drinks  ACTIVITY  You may resume your normal daily activities. Spend the remainder of the day resting -  avoid any strenuous activity. CALL M.D. ANY SIGN OF   Increasing pain, nausea, vomiting  Abdominal distension (swelling)  New increased bleeding (oral or rectal)  Fever (chills)  Pain in chest area  Bloody discharge from nose or mouth  Shortness of breath    You may take any Advil, Aspirin, Ibuprofen, Motrin, Aleve, or Goodys or Tylenol as needed for pain. Follow-up Instructions:   Call Dr. Shannan Munson office to make appointment  Office telephone for problems or questions [phone]  -Acid suppression with a proton pump inhibitor. , -Follow up with me., Follow up with your primary physician as some of your chest pain may be musculoskeletal           Esophageal Dilation: What to Expect at [address] [place] [address]  After you have esophageal dilation, you will stay at the hospital or surgery center for 1 to 2 hours. This will allow the medicine to wear off. You will be able to go home after your doctor or nurse checks to make sure you are not having any problems. This care sheet gives you a general idea about how long it will take for you to recover.  But each person recovers at a different pace. Follow the steps below to get better as quickly as possible. How can you care for yourself at home? Activity    · Rest as much as you need to after you go home.     · You should be able to go back to your usual activities the day after the procedure. Diet    · Follow your doctor's directions for eating after the procedure.     · Drink plenty of fluids (unless your doctor has told you not to). Medicines    · Your doctor will tell you if and when you can restart your medicines. He or she will also give you instructions about taking any new medicines.     · If you take blood thinners, such as warfarin (Coumadin), clopidogrel (Plavix), or aspirin, be sure to talk to your doctor. He or she will tell you if and when to start taking those medicines again. Make sure that you understand exactly what your doctor wants you to do.     · If you have a sore throat the day after the procedure, use an over-the-counter spray to numb your throat. Sucking on throat lozenges and gargling with warm salt water may also help relieve your symptoms. Follow-up care is a key part of your treatment and safety. Be sure to make and go to all appointments, and call your doctor if you are having problems. It's also a good idea to know your test results and keep a list of the medicines you take. When should you call for help? Call 911 anytime you think you may need emergency care.  For example, call if:    · You passed out (lost consciousness).     · You have trouble breathing.     · Your stools are maroon or very bloody    Call your doctor now or seek immediate medical care if:    · You have new or worse belly pain.     · You have a fever.     · You have new or more blood in your stools.     · You are sick to your stomach and cannot drink fluids.     · You cannot pass stools or gas.     · You have pain that does not get better after you take pain medicine.    Watch closely for changes in your health, and be sure to contact your doctor if:    · Your throat still hurts after a day or two.     · You do not get better as expected. Where can you learn more? Go to http://ash-jeronimo.info/. Enter P403 in the search box to learn more about \"Esophageal Dilation: What to Expect at Home. \"  Current as of: March 28, 2018  Content Version: 11.8  © 8169-2580 [employer]. Care instructions adapted under license by [employer] (which disclaims liability or warranty for this information). If you have questions about a medical condition or this instruction, always ask your healthcare professional. [place] [address] any warranty or liability for your use of this information.

## 2018-12-18 ENCOUNTER — TELEPHONE (OUTPATIENT)
Dept: CARDIOLOGY CLINIC | Age: 73
End: 2018-12-18

## 2018-12-18 NOTE — TELEPHONE ENCOUNTER
Verified patient with two types of identifiers. Patient states she got a call from a company stating they were sending her a leg and back brace based on her MDs orders. Notified patient that Dr. Nam Hoffman did not order a back and leg brace for patient. Patient states she knows and figured out it was a scam. Patient was just giving us an [place]. Patient verbalized understanding and will call with any other questions.

## 2018-12-18 NOTE — TELEPHONE ENCOUNTER
Pt is calling in regards to a leg and back brace that she will be receiving and she wanted to speak to Dr. Karli Roche in regards to this. Phone# [phone]  Thanks.

## 2019-01-07 ENCOUNTER — OFFICE VISIT (OUTPATIENT)
Dept: INTERNAL MEDICINE CLINIC | Age: 74
End: 2019-01-07

## 2019-01-07 ENCOUNTER — HOSPITAL ENCOUNTER (OUTPATIENT)
Dept: GENERAL RADIOLOGY | Age: 74
Discharge: HOME | End: 2019-01-07
Payer: MEDICARE

## 2019-01-07 VITALS
DIASTOLIC BLOOD PRESSURE: 66 MMHG | TEMPERATURE: 97.8 F | HEART RATE: 84 BPM | OXYGEN SATURATION: 98 % | RESPIRATION RATE: 16 BRPM | HEIGHT: 66 IN | BODY MASS INDEX: 22.82 KG/M2 | SYSTOLIC BLOOD PRESSURE: 112 MMHG | WEIGHT: 142 LBS

## 2019-01-07 DIAGNOSIS — M25.511 ACUTE PAIN OF RIGHT SHOULDER: ICD-10-CM

## 2019-01-07 DIAGNOSIS — M54.6 ACUTE MIDLINE THORACIC BACK PAIN: ICD-10-CM

## 2019-01-07 DIAGNOSIS — M54.6 ACUTE MIDLINE THORACIC BACK PAIN: Primary | ICD-10-CM

## 2019-01-07 PROCEDURE — 73030 X-RAY EXAM OF SHOULDER: CPT

## 2019-01-07 PROCEDURE — 72072 X-RAY EXAM THORAC SPINE 3VWS: CPT

## 2019-01-07 NOTE — PATIENT INSTRUCTIONS
Back Pain: Care Instructions  Your Care Instructions    Back pain has many possible causes. It is often related to problems with muscles and ligaments of the back. It may also be related to problems with the nerves, discs, or bones of the back. Moving, lifting, standing, sitting, or sleeping in an awkward way can strain the back. Sometimes you don't notice the injury until later. Arthritis is another common cause of back pain. Although it may hurt a lot, back pain usually improves on its own within several weeks. Most people recover in 12 weeks or less. Using good home treatment and being careful not to stress your back can help you feel better sooner. Follow-up care is a key part of your treatment and safety. Be sure to make and go to all appointments, and call your doctor if you are having problems. It's also a good idea to know your test results and keep a list of the medicines you take. How can you care for yourself at home? · Sit or lie in positions that are most comfortable and reduce your pain. Try one of these positions when you lie down:  ? Lie on your back with your knees bent and supported by large pillows. ? Lie on the floor with your legs on the seat of a sofa or chair. ? Lie on your side with your knees and hips bent and a pillow between your legs. ? Lie on your stomach if it does not make pain worse. · Do not sit up in bed, and avoid soft couches and twisted positions. Bed rest can help relieve pain at first, but it delays healing. Avoid bed rest after the first day of back pain. · Change positions every 30 minutes. If you must sit for long periods of time, take breaks from sitting. Get up and walk around, or lie in a comfortable position. · Try using a heating pad on a low or medium setting for 15 to 20 minutes every 2 or 3 hours. Try a warm shower in place of one session with the heating pad. · You can also try an ice pack for 10 to 15 minutes every 2 to 3 hours.  Put a thin cloth between the ice pack and your skin. · Take pain medicines exactly as directed. ? If the doctor gave you a prescription medicine for pain, take it as prescribed. ? If you are not taking a prescription pain medicine, ask your doctor if you can take an over-the-counter medicine. · Take short walks several times a day. You can start with 5 to 10 minutes, 3 or 4 times a day, and work up to longer walks. Walk on level surfaces and avoid hills and stairs until your back is better. · Return to work and other activities as soon as you can. Continued rest without activity is usually not good for your back. · To prevent future back pain, do exercises to stretch and strengthen your back and stomach. Learn how to use good posture, safe lifting techniques, and proper body mechanics. When should you call for help? Call your doctor now or seek immediate medical care if:    · You have new or worsening numbness in your legs.     · You have new or worsening weakness in your legs. (This could make it hard to stand up.)     · You lose control of your bladder or bowels.    Watch closely for changes in your health, and be sure to contact your doctor if:    · You have a fever, lose weight, or don't feel well.     · You do not get better as expected. Where can you learn more? Go to http://ash-jeronimo.info/. Enter V287 in the search box to learn more about \"Back Pain: Care Instructions. \"  Current as of: November 29, 2017  Content Version: 11.8  © 9761-0098 [employer]. Care instructions adapted under license by [employer] (which disclaims liability or warranty for this information). If you have questions about a medical condition or this instruction, always ask your healthcare professional. [place] [address] any warranty or liability for your use of this information. Back Pain: Care Instructions  Your Care Instructions    Back pain has many possible causes.  It is often related to problems with muscles and ligaments of the back. It may also be related to problems with the nerves, discs, or bones of the back. Moving, lifting, standing, sitting, or sleeping in an awkward way can strain the back. Sometimes you don't notice the injury until later. Arthritis is another common cause of back pain. Although it may hurt a lot, back pain usually improves on its own within several weeks. Most people recover in 12 weeks or less. Using good home treatment and being careful not to stress your back can help you feel better sooner. Follow-up care is a key part of your treatment and safety. Be sure to make and go to all appointments, and call your doctor if you are having problems. It's also a good idea to know your test results and keep a list of the medicines you take. How can you care for yourself at home? · Sit or lie in positions that are most comfortable and reduce your pain. Try one of these positions when you lie down:  ? Lie on your back with your knees bent and supported by large pillows. ? Lie on the floor with your legs on the seat of a sofa or chair. ? Lie on your side with your knees and hips bent and a pillow between your legs. ? Lie on your stomach if it does not make pain worse. · Do not sit up in bed, and avoid soft couches and twisted positions. Bed rest can help relieve pain at first, but it delays healing. Avoid bed rest after the first day of back pain. · Change positions every 30 minutes. If you must sit for long periods of time, take breaks from sitting. Get up and walk around, or lie in a comfortable position. · Try using a heating pad on a low or medium setting for 15 to 20 minutes every 2 or 3 hours. Try a warm shower in place of one session with the heating pad. · You can also try an ice pack for 10 to 15 minutes every 2 to 3 hours. Put a thin cloth between the ice pack and your skin. · Take pain medicines exactly as directed.   ? If the doctor gave you a prescription medicine for pain, take it as prescribed. ? If you are not taking a prescription pain medicine, ask your doctor if you can take an over-the-counter medicine. · Take short walks several times a day. You can start with 5 to 10 minutes, 3 or 4 times a day, and work up to longer walks. Walk on level surfaces and avoid hills and stairs until your back is better. · Return to work and other activities as soon as you can. Continued rest without activity is usually not good for your back. · To prevent future back pain, do exercises to stretch and strengthen your back and stomach. Learn how to use good posture, safe lifting techniques, and proper body mechanics. When should you call for help? Call your doctor now or seek immediate medical care if:    · You have new or worsening numbness in your legs.     · You have new or worsening weakness in your legs. (This could make it hard to stand up.)     · You lose control of your bladder or bowels.    Watch closely for changes in your health, and be sure to contact your doctor if:    · You have a fever, lose weight, or don't feel well.     · You do not get better as expected. Where can you learn more? Go to http://ash-jeronimo.info/. Enter P240 in the search box to learn more about \"Back Pain: Care Instructions. \"  Current as of: November 29, 2017  Content Version: 11.8  © 5951-8802 [employer]. Care instructions adapted under license by [employer] (which disclaims liability or warranty for this information). If you have questions about a medical condition or this instruction, always ask your healthcare professional. [place] [address] any warranty or liability for your use of this information.

## 2019-01-07 NOTE — PROGRESS NOTES
HISTORY OF PRESENT ILLNESS  Joycelyn Tijerina is a 75 y.o. female. Fall   The history is provided by the patient. Incident onset: 7 d. The fall occurred from a bed. She fell from a height of 1 - 2 ft. She landed on carpet. There was no blood loss. The point of impact was the right shoulder (also back). Pain location: as above. The pain is moderate. Pertinent negatives include no numbness, no nausea, no vomiting, no loss of consciousness and no tingling. The risk factors include being elderly and recurrent falls. The symptoms are aggravated by activity and sitting. She has tried acetaminophen and heat for the symptoms. The treatment provided mild relief. Shoulder Pain    The history is provided by the patient. The incident occurred more than 2 days ago. The incident occurred at home. The injury mechanism was a fall. The right shoulder is affected. The pain is moderate. The pain has been fluctuating since onset. The pain does not radiate. She has other injuries. Pertinent negatives include no numbness and no tingling. Back Pain    The history is provided by the patient. This is a new problem. The current episode started more than 2 days ago. The problem has not changed since onset. The problem occurs constantly. The pain is associated with a fall. The pain is present in the middle back. The quality of the pain is described as aching. Radiates to:  bilateral costal margins. The pain is moderate. The symptoms are aggravated by bending, twisting and certain positions. Pertinent negatives include no numbness and no tingling. She has tried analgesics and heat for the symptoms. The treatment provided mild relief. The patient's surgical history includes laminectomy. Review of Systems   Gastrointestinal: Negative for nausea and vomiting. Musculoskeletal: Positive for back pain. Neurological: Negative for tingling, loss of consciousness and numbness. Physical Exam   Constitutional: No distress. Cardiovascular: Normal rate and regular rhythm. Exam reveals no gallop and no friction rub. No murmur heard. Pulmonary/Chest: Effort normal and breath sounds normal.   Musculoskeletal:        Right shoulder: Normal. She exhibits normal range of motion, no deformity and normal strength. Thoracic back: She exhibits tenderness and bony tenderness. She exhibits normal range of motion, no swelling, no deformity and no spasm. Back:    Neurological: She is alert. Skin: Skin is warm and dry. Psychiatric: She has a normal mood and affect. Her behavior is normal.   Nursing note and vitals reviewed. ASSESSMENT and PLAN  Diagnoses and all orders for this visit:    1. Acute midline thoracic back pain  -     XR SPINE THORAC 3 V; Future  - Continue current regimen of prescription and / or OTC medications . If persists > 1 to 2 wks, consider PT vs See orthopedic surgeon as directed     2.  Acute pain of right shoulder  -     XR SHOULDER RT AP/LAT MIN 2 V; Future  - Proceed with plan as discussed

## 2019-01-08 NOTE — PROGRESS NOTES
Advised pt her xray shows arthritis only - both locations - no fracture.  Proceed with plan as discussed with MD.

## 2019-01-11 ENCOUNTER — TELEPHONE (OUTPATIENT)
Dept: INTERNAL MEDICINE CLINIC | Age: 74
End: 2019-01-11

## 2019-01-11 NOTE — TELEPHONE ENCOUNTER
Kenisha Melvin (Self) [phone] (H)     Pt says pain no better from 1.7.19 OV- same burning pain as before. Pt has tried Tylenol with no relief.   Please advise

## 2019-01-11 NOTE — TELEPHONE ENCOUNTER
Spoke with pt states she continues with back pain that goes around her rib cage. Pain level 5/10.  Will forward to MD.

## 2019-01-14 ENCOUNTER — TELEPHONE (OUTPATIENT)
Dept: INTERNAL MEDICINE CLINIC | Age: 74
End: 2019-01-14

## 2019-01-14 NOTE — TELEPHONE ENCOUNTER
Advised pt we spoke back on 1/8/19 in regards to her x-ray results. She states she forgot what I told her. Read results to her.

## 2019-01-14 NOTE — TELEPHONE ENCOUNTER
Advised pt MD recommends she add OTC lidocaine patch to painful areas as directed. She should set up PT at [facility] and we can send in order for her. She states her neurologist has her going to PT now 2x a week - pool therapy. Asked her if she would like to try something different? She stated no she would stick with what her neurologist wants her to do for now.  Will forward to MD.

## 2019-01-15 NOTE — TELEPHONE ENCOUNTER
Pt called to see if MD sent in patches to pharmacy. Advised pt MD recommended she add OTC lidocaine patch to painful areas as directed. She did not need prescription.

## 2019-01-21 ENCOUNTER — HOSPITAL ENCOUNTER (EMERGENCY)
Age: 74
Discharge: HOME | End: 2019-01-21
Attending: EMERGENCY MEDICINE
Payer: MEDICARE

## 2019-01-21 ENCOUNTER — TELEPHONE (OUTPATIENT)
Dept: INTERNAL MEDICINE CLINIC | Age: 74
End: 2019-01-21

## 2019-01-21 ENCOUNTER — APPOINTMENT (OUTPATIENT)
Dept: GENERAL RADIOLOGY | Age: 74
End: 2019-01-21
Attending: EMERGENCY MEDICINE
Payer: MEDICARE

## 2019-01-21 ENCOUNTER — HOSPITAL ENCOUNTER (EMERGENCY)
Age: 74
Discharge: STILL ADMITTED OR ADMINISTRATIVE | End: 2019-01-21
Attending: EMERGENCY MEDICINE

## 2019-01-21 VITALS
HEIGHT: 64 IN | SYSTOLIC BLOOD PRESSURE: 168 MMHG | OXYGEN SATURATION: 99 % | WEIGHT: 139 LBS | HEART RATE: 76 BPM | DIASTOLIC BLOOD PRESSURE: 91 MMHG | BODY MASS INDEX: 23.73 KG/M2 | RESPIRATION RATE: 18 BRPM | TEMPERATURE: 98.1 F

## 2019-01-21 VITALS — HEART RATE: 68 BPM | OXYGEN SATURATION: 99 %

## 2019-01-21 DIAGNOSIS — M94.0 COSTOCHONDRITIS: Primary | ICD-10-CM

## 2019-01-21 LAB
ALBUMIN SERPL-MCNC: 3.9 G/DL (ref 3.5–5)
ALBUMIN/GLOB SERPL: 1.2 {RATIO} (ref 1.1–2.2)
ALP SERPL-CCNC: 68 U/L (ref 45–117)
ALT SERPL-CCNC: 17 U/L (ref 12–78)
ANION GAP SERPL CALC-SCNC: 6 MMOL/L (ref 5–15)
AST SERPL-CCNC: 18 U/L (ref 15–37)
ATRIAL RATE: 63 BPM
BASOPHILS # BLD: 0.1 K/UL (ref 0–0.1)
BASOPHILS NFR BLD: 1 % (ref 0–1)
BILIRUB SERPL-MCNC: 0.4 MG/DL (ref 0.2–1)
BUN SERPL-MCNC: 12 MG/DL (ref 6–20)
BUN/CREAT SERPL: 16 (ref 12–20)
CALCIUM SERPL-MCNC: 8.6 MG/DL (ref 8.5–10.1)
CALCULATED P AXIS, ECG09: 43 DEGREES
CALCULATED R AXIS, ECG10: 11 DEGREES
CALCULATED T AXIS, ECG11: 110 DEGREES
CHLORIDE SERPL-SCNC: 111 MMOL/L (ref 97–108)
CO2 SERPL-SCNC: 27 MMOL/L (ref 21–32)
COMMENT, HOLDF: NORMAL
CREAT SERPL-MCNC: 0.74 MG/DL (ref 0.55–1.02)
D DIMER PPP FEU-MCNC: 0.37 MG/L FEU (ref 0–0.65)
DIAGNOSIS, 93000: NORMAL
DIFFERENTIAL METHOD BLD: NORMAL
EOSINOPHIL # BLD: 0.4 K/UL (ref 0–0.4)
EOSINOPHIL NFR BLD: 5 % (ref 0–7)
ERYTHROCYTE [DISTWIDTH] IN BLOOD BY AUTOMATED COUNT: 12.4 % (ref 11.5–14.5)
GLOBULIN SER CALC-MCNC: 3.2 G/DL (ref 2–4)
GLUCOSE SERPL-MCNC: 98 MG/DL (ref 65–100)
HCT VFR BLD AUTO: 42.1 % (ref 35–47)
HGB BLD-MCNC: 13.6 G/DL (ref 11.5–16)
IMM GRANULOCYTES # BLD AUTO: 0 K/UL (ref 0–0.04)
IMM GRANULOCYTES NFR BLD AUTO: 0 % (ref 0–0.5)
LYMPHOCYTES # BLD: 3.4 K/UL (ref 0.8–3.5)
LYMPHOCYTES NFR BLD: 41 % (ref 12–49)
MCH RBC QN AUTO: 31.6 PG (ref 26–34)
MCHC RBC AUTO-ENTMCNC: 32.3 G/DL (ref 30–36.5)
MCV RBC AUTO: 97.9 FL (ref 80–99)
MONOCYTES # BLD: 0.5 K/UL (ref 0–1)
MONOCYTES NFR BLD: 6 % (ref 5–13)
NEUTS SEG # BLD: 3.8 K/UL (ref 1.8–8)
NEUTS SEG NFR BLD: 47 % (ref 32–75)
NRBC # BLD: 0 K/UL (ref 0–0.01)
NRBC BLD-RTO: 0 PER 100 WBC
P-R INTERVAL, ECG05: 176 MS
PLATELET # BLD AUTO: 240 K/UL (ref 150–400)
PMV BLD AUTO: 10.3 FL (ref 8.9–12.9)
POTASSIUM SERPL-SCNC: 3.4 MMOL/L (ref 3.5–5.1)
PROT SERPL-MCNC: 7.1 G/DL (ref 6.4–8.2)
Q-T INTERVAL, ECG07: 432 MS
QRS DURATION, ECG06: 82 MS
QTC CALCULATION (BEZET), ECG08: 442 MS
RBC # BLD AUTO: 4.3 M/UL (ref 3.8–5.2)
SAMPLES BEING HELD,HOLD: NORMAL
SODIUM SERPL-SCNC: 144 MMOL/L (ref 136–145)
TROPONIN I SERPL-MCNC: <0.05 NG/ML
VENTRICULAR RATE, ECG03: 63 BPM
WBC # BLD AUTO: 8.2 K/UL (ref 3.6–11)

## 2019-01-21 PROCEDURE — 80053 COMPREHEN METABOLIC PANEL: CPT

## 2019-01-21 PROCEDURE — 75810000275 HC EMERGENCY DEPT VISIT NO LEVEL OF CARE

## 2019-01-21 PROCEDURE — 99284 EMERGENCY DEPT VISIT MOD MDM: CPT

## 2019-01-21 PROCEDURE — 84484 ASSAY OF TROPONIN QUANT: CPT

## 2019-01-21 PROCEDURE — 71046 X-RAY EXAM CHEST 2 VIEWS: CPT

## 2019-01-21 PROCEDURE — 85379 FIBRIN DEGRADATION QUANT: CPT

## 2019-01-21 PROCEDURE — 85025 COMPLETE CBC W/AUTO DIFF WBC: CPT

## 2019-01-21 PROCEDURE — 93005 ELECTROCARDIOGRAM TRACING: CPT

## 2019-01-21 RX ORDER — PREDNISONE 10 MG/1
TABLET ORAL
Qty: 21 TAB | Refills: 0 | Status: SHIPPED | OUTPATIENT
Start: 2019-01-21 | End: 2019-01-24

## 2019-01-21 RX ORDER — BENZONATATE 100 MG/1
200 CAPSULE ORAL
Qty: 30 CAP | Refills: 0 | Status: SHIPPED | OUTPATIENT
Start: 2019-01-21 | End: 2019-01-24

## 2019-01-21 RX ORDER — ALBUTEROL SULFATE 90 UG/1
2 AEROSOL, METERED RESPIRATORY (INHALATION)
Qty: 1 INHALER | Refills: 1 | Status: SHIPPED | OUTPATIENT
Start: 2019-01-21 | End: 2019-01-24

## 2019-01-21 RX ORDER — INDOMETHACIN 25 MG/1
25 CAPSULE ORAL EVERY 8 HOURS
Qty: 30 CAP | Refills: 0 | Status: SHIPPED | OUTPATIENT
Start: 2019-01-21 | End: 2019-01-24

## 2019-01-21 RX ORDER — ALBUTEROL SULFATE 1.25 MG/3ML
1.25 SOLUTION RESPIRATORY (INHALATION)
Qty: 25 EACH | Refills: 0 | Status: SHIPPED | OUTPATIENT
Start: 2019-01-21 | End: 2019-01-24

## 2019-01-21 NOTE — ED PROVIDER NOTES
HPI  
 
No past medical history on file. No past surgical history on file. No family history on file. Social History Socioeconomic History  Marital status: Not on file Spouse name: Not on file  Number of children: Not on file  Years of education: Not on file  Highest education level: Not on file Social Needs  Financial resource strain: Not on file  Food insecurity - worry: Not on file  Food insecurity - inability: Not on file  Transportation needs - medical: Not on file  Transportation needs - non-medical: Not on file Occupational History  Not on file Tobacco Use  Smoking status: Not on file Substance and Sexual Activity  Alcohol use: Not on file  Drug use: Not on file  Sexual activity: Not on file Other Topics Concern  Not on file Social History Narrative  Not on file ALLERGIES: Patient has no allergy information on record. Review of Systems Vitals:  
 01/21/19 1038 Pulse: 68 SpO2: 99% Physical Exam  
 
MDM Procedures

## 2019-01-21 NOTE — ED TRIAGE NOTES
Triage Note; Patient is coming in with chest pain x 1 month. Patient has been seen at PCP and GI office for the pain.

## 2019-01-21 NOTE — ED PROVIDER NOTES
The history is provided by the patient and medical records. No  was used. Chest Pain (Angina) This is a chronic problem. The current episode started more than 1 week ago (over one month). The problem has not changed since onset. The problem occurs constantly. Associated with: nothing makes better or worse. The pain is present in the epigastric region. The pain is at a severity of 7/10. The pain is moderate. The quality of the pain is described as pressure-like and dull. Radiates to: around to back on both sides. Exacerbated by: nothing. Associated symptoms include abdominal pain, back pain, near-syncope and shortness of breath. Pertinent negatives include no cough, no diaphoresis, no dizziness, no fever, no malaise/fatigue, no nausea, no numbness, no vomiting and no weakness. She has tried OTC pain medications for the symptoms. The treatment provided mild relief. Risk factors include family history. No risk factors for CADHer past medical history is significant for PE. Past Medical History:  
Diagnosis Date  Bipolar disorder ([address] Medtronic  Chronic pain BACK PAIN  
 Diabetes ([address] BORDERLINE TX WITH DIET AND EXERCISE  DJD (degenerative joint disease)  Fibromyalgia  Genital herpes  GERD (gastroesophageal reflux disease)  Hypercholesterolemia  IBS (irritable bowel syndrome)  Lumbar disc disease   
 stimulation-Honza  Migraine  Nausea & vomiting  Other ill-defined conditions(799.89) SEASONAL allergies  Other ill-defined conditions(799.89) dysphagia has had esophagus dilated  Paget's disease  Pulmonary embolism ([address]  RLS (restless legs syndrome)  Spinal stenosis Past Surgical History:  
Procedure Laterality Date  COLONOSCOPY N/A 6/27/2016 COLONOSCOPY performed by Toby Smith MD at [facility] ENDOSCOPY  ENDOSCOPY, COLON, DIAGNOSTIC    
 12, due 15  
 HX APPENDECTOMY  HX BACK SURGERY  9/17/2013  
 back surgery - total of 3 as of 12/20/2013  HX BREAST BIOPSY Right years ago  
 negative  HX CHOLECYSTECTOMY  HX HEENT    
 T & A  
 HX HYSTERECTOMY    
 total for fibroid tumors  HX ORTHOPAEDIC    
 lumbar laminectomy then fusion/neurostimulator implanted - inactive now but still in  
 HX ORTHOPAEDIC    
 REMOVAL OF NEUROSTIMULATOR  HX OTHER SURGICAL    
 EGD x 2 with dilation/colonoscopy - no polyps per pt  TILT TABLE EVALUATION  8/2/2016 Family History:  
Problem Relation Age of Onset  Colon Cancer Mother  Cancer Mother 68  
     colon  Heart Disease Father  Heart Disease Maternal Grandmother  Heart Disease Maternal Grandfather  Heart Disease Other Social History Socioeconomic History  Marital status: SINGLE Spouse name: Not on file  Number of children: Not on file  Years of education: Not on file  Highest education level: Not on file Social Needs  Financial resource strain: Not on file  Food insecurity - worry: Not on file  Food insecurity - inability: Not on file  Transportation needs - medical: Not on file  Transportation needs - non-medical: Not on file Occupational History  Occupation: volunteer Employer: RETIRED Comment: [employer] Tobacco Use  Smoking status: Never Smoker  Smokeless tobacco: Never Used Substance and Sexual Activity  Alcohol use: No  
  Comment: 1 per month  Drug use: No  
 Sexual activity: Not on file Other Topics Concern  Not on file Social History Narrative  Not on file ALLERGIES: Adhesive; Hydrocodone; Lorazepam; Other medication; Percocet [oxycodone-acetaminophen]; Sudafed [pseudoephedrine hcl]; Wellbutrin [bupropion hcl]; Zithromax [azithromycin]; and Zyrtec [cetirizine] Review of Systems Constitutional: Negative for diaphoresis, fever and malaise/fatigue. Respiratory: Positive for shortness of breath. Negative for cough. Cardiovascular: Positive for chest pain and near-syncope. Gastrointestinal: Positive for abdominal pain. Negative for nausea and vomiting. Musculoskeletal: Positive for back pain. Neurological: Negative for dizziness, weakness and numbness. All other systems reviewed and are negative. Vitals:  
 01/21/19 1055 Pulse: 68 SpO2: 99% Physical Exam  
Constitutional: She is oriented to person, place, and time. She appears well-developed and well-nourished. No distress. HENT:  
Head: Normocephalic and atraumatic. Right Ear: External ear normal.  
Left Ear: External ear normal.  
Nose: Nose normal.  
Mouth/Throat: Oropharynx is clear and moist.  
Eyes: Conjunctivae and EOM are normal. Pupils are equal, round, and reactive to light. No scleral icterus. Neck: Normal range of motion. Neck supple. No JVD present. No tracheal deviation present. No thyromegaly present. Cardiovascular: Normal rate, regular rhythm and normal pulses. Exam reveals no gallop and no friction rub. Murmur heard. Systolic murmur is present with a grade of 2/6. Pulmonary/Chest: Effort normal and breath sounds normal. No respiratory distress. She has no wheezes. She has no rales. She exhibits no tenderness. + chest wall tenderness along left lower costal margins Abdominal: Soft. Bowel sounds are normal. She exhibits no distension and no mass. There is no tenderness. There is no rebound and no guarding. Musculoskeletal: Normal range of motion. She exhibits no edema or tenderness. Right lower leg: She exhibits no tenderness and no edema. Left lower leg: She exhibits no tenderness and no edema. Lymphadenopathy:  
  She has no cervical adenopathy. Neurological: She is alert and oriented to person, place, and time. She has normal strength. She displays no atrophy and no tremor.  No cranial nerve deficit. She exhibits normal muscle tone. Coordination and gait normal.  
Skin: Skin is warm and dry. No rash noted. She is not diaphoretic. No erythema. Psychiatric: She has a normal mood and affect. Her behavior is normal. Judgment and thought content normal.  
Nursing note and vitals reviewed. MDM 
CP for over a month with costochondrial component. No CAD hx, doubt cardiac in nature, doubt aneursym. Consider PE. She has a hx of orthostatic hypotension thus her complaint of near syncope, uses a walker. POC will be usual labs, cxr, trop I , ddimer Procedures Ekg:  nsr 63, nonspecific ST changes.

## 2019-01-21 NOTE — DISCHARGE INSTRUCTIONS

## 2019-01-21 NOTE — ED NOTES
Pt left ambulatory with discharge instructions and a prescription on her own to wait in the waiting room to wait on her daughter.

## 2019-01-21 NOTE — ED TRIAGE NOTES
Patient presents from home via EMS with complaints of chest pain that has been ongoing for the last week. Patient was seen by primary care provider last week for this issue. Patient reports the pain gets better when she goes swimming. Patient sent to triage

## 2019-01-22 NOTE — TELEPHONE ENCOUNTER
Spoke with pt - states she was seen in ER for chest and back pain. EKG was fine. Given Indocin for pain and dc'd home. States she feels better today. Has fuv with MD on 1/24/19.  Will forward to MD.

## 2019-01-24 ENCOUNTER — OFFICE VISIT (OUTPATIENT)
Dept: INTERNAL MEDICINE CLINIC | Age: 74
End: 2019-01-24

## 2019-01-24 VITALS
HEART RATE: 80 BPM | WEIGHT: 144 LBS | BODY MASS INDEX: 24.59 KG/M2 | OXYGEN SATURATION: 97 % | SYSTOLIC BLOOD PRESSURE: 122 MMHG | RESPIRATION RATE: 19 BRPM | DIASTOLIC BLOOD PRESSURE: 76 MMHG | HEIGHT: 64 IN | TEMPERATURE: 97.8 F

## 2019-01-24 DIAGNOSIS — R10.13 EPIGASTRIC ABDOMINAL PAIN: ICD-10-CM

## 2019-01-24 RX ORDER — SUCRALFATE 1 G/1
1 TABLET ORAL 4 TIMES DAILY
Qty: 180 TAB | Refills: 3 | Status: SHIPPED | OUTPATIENT
Start: 2019-01-24 | End: 2019-01-24 | Stop reason: SDUPTHER

## 2019-01-24 RX ORDER — OMEPRAZOLE 40 MG/1
40 CAPSULE, DELAYED RELEASE ORAL 2 TIMES DAILY
Qty: 1 CAP | Refills: 0
Start: 2019-01-24 | End: 2019-02-21

## 2019-01-24 RX ORDER — SUCRALFATE 1 G/1
TABLET ORAL
Qty: 360 TAB | Refills: 3 | Status: SHIPPED | OUTPATIENT
Start: 2019-01-24 | End: 2020-01-15

## 2019-01-24 NOTE — PATIENT INSTRUCTIONS

## 2019-01-24 NOTE — PROGRESS NOTES
HISTORY OF PRESENT ILLNESS  Melvin Damian is a 75 y.o. female. HPI Subjective:  Margret Mack is seen today accompanied by her daughter, Gina Duvall, for evaluation of chest and abdominal pain. She has been having pain for several weeks now. Initially it sounded musculoskeletal after her fall, but it is now taking on more of the pattern that she has suffered with in the past, primarily epigastric. She went to the emergency room because of persistent pain earlier this week. She was diagnosed with costochondritis and GERD. She was prescribed Indomethacin, along with prednisone and inhalers. The latter three medications were not filled. She has absolutely no wheezing on exam today so she should not fill the prescription for prednisone. Further, I think Indomethacin is likely contraindicated, so she will discontinue this. She has a longstanding history of GERD-like symptoms and has been worked up extensively by Dr. Germaine Martinez. Her last endoscopy was about six weeks ago and showed no abnormal findings. I reviewed her ER workup, which had a normal chest xray, EKG and labs. Plan:  1. We decided to increase her Prilosec to b.i.d., as well as adding Carafate. 2. If her symptoms persist we will consider a return to Dr. Germaine Martinez. Review of Systems   Cardiovascular: Positive for chest pain. Gastrointestinal: Positive for heartburn. Musculoskeletal: Positive for back pain. Psychiatric/Behavioral: The patient is nervous/anxious. Physical Exam   Constitutional: No distress. Cardiovascular: Normal rate and regular rhythm. Exam reveals no gallop and no friction rub. No murmur heard. Pulmonary/Chest: Effort normal and breath sounds normal.   Abdominal: Bowel sounds are normal. There is no hepatosplenomegaly. There is tenderness in the epigastric area. There is no rigidity, no rebound and no guarding. Nursing note and vitals reviewed.       ASSESSMENT and PLAN  Diagnoses and all orders for this visit: 1. Epigastric abdominal pain  -     omeprazole (PRILOSEC) 40 mg capsule; Take 1 Cap by mouth two (2) times a day.  Sucralfate bid in addition  -     REFERRAL TO GASTROENTEROLOGY if persists    Plan was reviewed with patient and family, understanding expressed

## 2019-02-01 ENCOUNTER — TELEPHONE (OUTPATIENT)
Dept: CARDIOLOGY CLINIC | Age: 74
End: 2019-02-01

## 2019-02-01 NOTE — TELEPHONE ENCOUNTER
Spoke with patient. Two patient indentifiers verified. Patient stated that she was told to take this and that it is a salt tab. The med that the patient is talking about is a multivitamin. Pt was informed Dr. Ahmet Aquino was not the on who recommended her take this med and she needs to talk with her Primary care. Pt also stated he BP has been fine. Pt verbalized understanding and denies any further questions at this time.

## 2019-02-01 NOTE — TELEPHONE ENCOUNTER
Pt called to discuss new otc medication she started named thermo tab that she believes is causing memory loss.   Phone # [phone]  Thanks

## 2019-02-05 ENCOUNTER — APPOINTMENT (OUTPATIENT)
Dept: CT IMAGING | Age: 74
End: 2019-02-05
Attending: EMERGENCY MEDICINE
Payer: MEDICARE

## 2019-02-05 ENCOUNTER — APPOINTMENT (OUTPATIENT)
Dept: GENERAL RADIOLOGY | Age: 74
End: 2019-02-05
Attending: EMERGENCY MEDICINE
Payer: MEDICARE

## 2019-02-05 ENCOUNTER — HOSPITAL ENCOUNTER (EMERGENCY)
Age: 74
Discharge: HOME | End: 2019-02-05
Attending: EMERGENCY MEDICINE
Payer: MEDICARE

## 2019-02-05 ENCOUNTER — TELEPHONE (OUTPATIENT)
Dept: INTERNAL MEDICINE CLINIC | Age: 74
End: 2019-02-05

## 2019-02-05 VITALS
HEART RATE: 66 BPM | WEIGHT: 144 LBS | HEIGHT: 66 IN | DIASTOLIC BLOOD PRESSURE: 91 MMHG | BODY MASS INDEX: 23.14 KG/M2 | OXYGEN SATURATION: 97 % | TEMPERATURE: 98.6 F | SYSTOLIC BLOOD PRESSURE: 158 MMHG | RESPIRATION RATE: 26 BRPM

## 2019-02-05 DIAGNOSIS — R55 SYNCOPE AND COLLAPSE: Primary | ICD-10-CM

## 2019-02-05 DIAGNOSIS — I95.1 ORTHOSTATIC SYNCOPE: ICD-10-CM

## 2019-02-05 LAB
ALBUMIN SERPL-MCNC: 3.6 G/DL (ref 3.5–5)
ALBUMIN/GLOB SERPL: 1 {RATIO} (ref 1.1–2.2)
ALP SERPL-CCNC: 66 U/L (ref 45–117)
ALT SERPL-CCNC: 8 U/L (ref 12–78)
ANION GAP SERPL CALC-SCNC: 7 MMOL/L (ref 5–15)
APPEARANCE UR: CLEAR
AST SERPL-CCNC: 22 U/L (ref 15–37)
ATRIAL RATE: 64 BPM
BACTERIA URNS QL MICRO: NEGATIVE /HPF
BASOPHILS # BLD: 0.1 K/UL (ref 0–0.1)
BASOPHILS NFR BLD: 1 % (ref 0–1)
BILIRUB SERPL-MCNC: 0.6 MG/DL (ref 0.2–1)
BILIRUB UR QL: NEGATIVE
BUN SERPL-MCNC: 14 MG/DL (ref 6–20)
BUN/CREAT SERPL: 15 (ref 12–20)
CALCIUM SERPL-MCNC: 9.2 MG/DL (ref 8.5–10.1)
CALCULATED P AXIS, ECG09: -31 DEGREES
CALCULATED R AXIS, ECG10: 5 DEGREES
CALCULATED T AXIS, ECG11: 79 DEGREES
CHLORIDE SERPL-SCNC: 105 MMOL/L (ref 97–108)
CO2 SERPL-SCNC: 29 MMOL/L (ref 21–32)
COLOR UR: NORMAL
COMMENT, HOLDF: NORMAL
CREAT SERPL-MCNC: 0.95 MG/DL (ref 0.55–1.02)
DIAGNOSIS, 93000: NORMAL
DIFFERENTIAL METHOD BLD: NORMAL
EOSINOPHIL # BLD: 0.3 K/UL (ref 0–0.4)
EOSINOPHIL NFR BLD: 4 % (ref 0–7)
EPITH CASTS URNS QL MICRO: NORMAL /LPF
ERYTHROCYTE [DISTWIDTH] IN BLOOD BY AUTOMATED COUNT: 12.6 % (ref 11.5–14.5)
GLOBULIN SER CALC-MCNC: 3.5 G/DL (ref 2–4)
GLUCOSE SERPL-MCNC: 136 MG/DL (ref 65–100)
GLUCOSE UR STRIP.AUTO-MCNC: NEGATIVE MG/DL
HCT VFR BLD AUTO: 39.1 % (ref 35–47)
HGB BLD-MCNC: 12.6 G/DL (ref 11.5–16)
HGB UR QL STRIP: NEGATIVE
HYALINE CASTS URNS QL MICRO: NORMAL /LPF (ref 0–5)
IMM GRANULOCYTES # BLD AUTO: 0 K/UL (ref 0–0.04)
IMM GRANULOCYTES NFR BLD AUTO: 0 % (ref 0–0.5)
KETONES UR QL STRIP.AUTO: NEGATIVE MG/DL
LEUKOCYTE ESTERASE UR QL STRIP.AUTO: NEGATIVE
LYMPHOCYTES # BLD: 2.3 K/UL (ref 0.8–3.5)
LYMPHOCYTES NFR BLD: 25 % (ref 12–49)
MCH RBC QN AUTO: 31.6 PG (ref 26–34)
MCHC RBC AUTO-ENTMCNC: 32.2 G/DL (ref 30–36.5)
MCV RBC AUTO: 98 FL (ref 80–99)
MONOCYTES # BLD: 0.7 K/UL (ref 0–1)
MONOCYTES NFR BLD: 8 % (ref 5–13)
NEUTS SEG # BLD: 5.8 K/UL (ref 1.8–8)
NEUTS SEG NFR BLD: 63 % (ref 32–75)
NITRITE UR QL STRIP.AUTO: NEGATIVE
NRBC # BLD: 0 K/UL (ref 0–0.01)
NRBC BLD-RTO: 0 PER 100 WBC
P-R INTERVAL, ECG05: 158 MS
PH UR STRIP: 7.5 [PH] (ref 5–8)
PLATELET # BLD AUTO: 289 K/UL (ref 150–400)
PMV BLD AUTO: 10.1 FL (ref 8.9–12.9)
POTASSIUM SERPL-SCNC: 3.6 MMOL/L (ref 3.5–5.1)
PROT SERPL-MCNC: 7.1 G/DL (ref 6.4–8.2)
PROT UR STRIP-MCNC: NEGATIVE MG/DL
Q-T INTERVAL, ECG07: 466 MS
QRS DURATION, ECG06: 80 MS
QTC CALCULATION (BEZET), ECG08: 480 MS
RBC # BLD AUTO: 3.99 M/UL (ref 3.8–5.2)
RBC #/AREA URNS HPF: NORMAL /HPF (ref 0–5)
SAMPLES BEING HELD,HOLD: NORMAL
SODIUM SERPL-SCNC: 141 MMOL/L (ref 136–145)
SP GR UR REFRACTOMETRY: 1.01 (ref 1–1.03)
TROPONIN I SERPL-MCNC: <0.05 NG/ML
UR CULT HOLD, URHOLD: NORMAL
UROBILINOGEN UR QL STRIP.AUTO: 0.2 EU/DL (ref 0.2–1)
VENTRICULAR RATE, ECG03: 64 BPM
WBC # BLD AUTO: 9.1 K/UL (ref 3.6–11)
WBC URNS QL MICRO: NORMAL /HPF (ref 0–4)

## 2019-02-05 PROCEDURE — 74011250636 HC RX REV CODE- 250/636: Performed by: EMERGENCY MEDICINE

## 2019-02-05 PROCEDURE — 84484 ASSAY OF TROPONIN QUANT: CPT

## 2019-02-05 PROCEDURE — 96360 HYDRATION IV INFUSION INIT: CPT

## 2019-02-05 PROCEDURE — 81001 URINALYSIS AUTO W/SCOPE: CPT

## 2019-02-05 PROCEDURE — 93005 ELECTROCARDIOGRAM TRACING: CPT

## 2019-02-05 PROCEDURE — 70450 CT HEAD/BRAIN W/O DYE: CPT

## 2019-02-05 PROCEDURE — 80053 COMPREHEN METABOLIC PANEL: CPT

## 2019-02-05 PROCEDURE — 99285 EMERGENCY DEPT VISIT HI MDM: CPT

## 2019-02-05 PROCEDURE — 71046 X-RAY EXAM CHEST 2 VIEWS: CPT

## 2019-02-05 PROCEDURE — 85025 COMPLETE CBC W/AUTO DIFF WBC: CPT

## 2019-02-05 PROCEDURE — 36415 COLL VENOUS BLD VENIPUNCTURE: CPT

## 2019-02-05 RX ADMIN — SODIUM CHLORIDE 1000 ML: 900 INJECTION, SOLUTION INTRAVENOUS at 13:04

## 2019-02-05 NOTE — ED NOTES
Discharge instructions given to patient, all questions answered and patient verbalized understanding. Patient to ED Lobby via wheelchair.

## 2019-02-05 NOTE — TELEPHONE ENCOUNTER
Shon from [facility] called and explained patient in office today to see Dr Brent Cleveland. While in office patient had 2 falls, one in bathroom and hit her head. She stated patient has osteo-arthritis. Advised to have patient go to ER for evaluation. Shon will contact daughter to transport. Office staff verbalized understanding.

## 2019-02-05 NOTE — DISCHARGE INSTRUCTIONS
Patient Education        Fainting: Care Instructions  Your Care Instructions    When you faint, or pass out, you lose consciousness for a short time. A brief drop in blood flow to the brain often causes it. When you fall or lie down, more blood flows to your brain and you regain consciousness. Emotional stress, pain, or overheating--especially if you have been standing--can make you faint. In these cases, fainting is usually not serious. But fainting can be a sign of a more serious problem. Your doctor may want you to have more tests to rule out other causes. The treatment you need depends on the reason why you fainted. The doctor has checked you carefully, but problems can develop later. If you notice any problems or new symptoms, get medical treatment right away. Follow-up care is a key part of your treatment and safety. Be sure to make and go to all appointments, and call your doctor if you are having problems. It's also a good idea to know your test results and keep a list of the medicines you take. How can you care for yourself at home? · Drink plenty of fluids to prevent dehydration. If you have kidney, heart, or liver disease and have to limit fluids, talk with your doctor before you increase your fluid intake. When should you call for help? Call 911 anytime you think you may need emergency care. For example, call if:    · You have symptoms of a heart problem. These may include:  ? Chest pain or pressure. ? Severe trouble breathing. ? A fast or irregular heartbeat. ? Lightheadedness or sudden weakness. ? Coughing up pink, foamy mucus. ? Passing out. After you call 911, the  may tell you to chew 1 adult-strength or 2 to 4 low-dose aspirin. Wait for an ambulance. Do not try to drive yourself.     · You have symptoms of a stroke. These may include:  ? Sudden numbness, tingling, weakness, or loss of movement in your face, arm, or leg, especially on only one side of your body.   ? Sudden vision changes. ? Sudden trouble speaking. ? Sudden confusion or trouble understanding simple statements. ? Sudden problems with walking or balance. ? A sudden, severe headache that is different from past headaches.     · You passed out (lost consciousness) again.    Watch closely for changes in your health, and be sure to contact your doctor if:    · You do not get better as expected. Where can you learn more? Go to http://ash-jeronimo.info/. Enter F880 in the search box to learn more about \"Fainting: Care Instructions. \"  Current as of: September 23, 2018  Content Version: 11.9  © 5081-2513 [employer]. Care instructions adapted under license by [employer] (which disclaims liability or warranty for this information). If you have questions about a medical condition or this instruction, always ask your healthcare professional. [address] any warranty or liability for your use of this information.

## 2019-02-05 NOTE — ED NOTES
2:06 PM 
Change of shift. Care of patient taken over from Dr. Eli Shaikh MD by Dr. Arland Gilford, DO; H&P reviewed, bedside handoff complete. Awaiting UA. UA neg. Discussed previous w/u and notes identical to previous. Pt Declines admission. rec'd drinking plenty of fluids and close f/u with her PCP. Return precautions were given for worsening or concerns.

## 2019-02-05 NOTE — ED PROVIDER NOTES
75 y.o. female with past medical history significant for IBS, bipolar disorder, chronic pain, diabetes, and PE who presents from GI office via EMS with chief complaint of syncope. Patient reports a h/o multiple syncopal events, has been worked up by cardiology and neurology, who contributed her sx to orthostatic hypotension. Today, the patient was at her GI's office when she had a syncopal event in the bathroom, fell down, striking the right side of her head. Patient was helped up, however, had a second syncopal event and subsequent fall. The office called EMS for transport here. Patient notes she has been ambulating with a walker since these episodes started, however, left her walker at home this morning. Patient reports chronic chest pains that she has had evaluated multiple times and is not cardiac related, has been told it is arthritis. Patient states recently, she has had decreased appetite and generalized weakness. Patient denies any SOB or HA. Pt denies any OAC use. There are no other acute medical concerns at this time. Social hx: Nonsmoker; Rare EtOH use PCP: Jacqueline Rolon MD 
Cardiologist: Moise Slade MD 
 
Note written by Yinka Cleveland, as dictated by Socorro Mace MD 11:53 AM 
 
 
The history is provided by the patient, medical records and the EMS personnel. No  was used. Past Medical History:  
Diagnosis Date  Bipolar disorder ([address] [employer]tronic  Chronic pain BACK PAIN  
 Diabetes ([facility] [address] BORDERLINE TX WITH DIET AND EXERCISE  DJD (degenerative joint disease)  Fibromyalgia  Genital herpes  GERD (gastroesophageal reflux disease)  Hypercholesterolemia  IBS (irritable bowel syndrome)  Lumbar disc disease   
 stimulation-Honza  Migraine  Nausea & vomiting  Other ill-defined conditions(799.89) SEASONAL allergies  Other ill-defined conditions(799.89) dysphagia has had esophagus dilated  Paget's disease  Pulmonary embolism [address] [address]  RLS (restless legs syndrome)  Spinal stenosis Past Surgical History:  
Procedure Laterality Date  COLONOSCOPY N/A 6/27/2016 COL[facility]OSCOPY performed by Danny Burns MD at [facility] ENDOSCOPY  ENDOSCOPY, COLON, DIAGNOSTIC    
 12, due 15  
 HX APPENDECTOMY  HX BACK SURGERY  9/17/2013  
 back surgery - total of 3 as of 12/20/2013  HX BREAST BIOPSY Right years ago  
 negative  HX CHOLECYSTECTOMY  HX HEENT    
 T & A  
 HX HYSTERECTOMY    
 total for fibroid tumors  HX ORTHOPAEDIC    
 lumbar laminectomy then fusion/neurostimulator implanted - inactive now but still in  
 HX ORTHOPAEDIC    
 REMOVAL OF NEUROSTIMULATOR  HX OTHER SURGICAL    
 EGD x 2 with dilation/colonoscopy - no polyps per pt  TILT TABLE EVALUATION  8/2/2016 Family History:  
Problem Relation Age of Onset  Colon Cancer Mother  Cancer Mother 68  
     colon  Heart Disease Father  Heart Disease Maternal Grandmother  Heart Disease Maternal Grandfather  Heart Disease Other Social History Socioeconomic History  Marital status: SINGLE Spouse name: Not on file  Number of children: Not on file  Years of education: Not on file  Highest education level: Not on file Social Needs  Financial resource strain: Not on file  Food insecurity - worry: Not on file  Food insecurity - inability: Not on file  Transportation needs - medical: Not on file  Transportation needs - non-medical: Not on file Occupational History  Occupation: volunteer Employer: RETIRED Comment: [employer] Tobacco Use  Smoking status: Never Smoker  Smokeless tobacco: Never Used Substance and Sexual Activity  Alcohol use: No  
  Comment: 1 per month  Drug use: No  
 Sexual activity: Not on file Other Topics Concern  Not on file Social History Narrative  Not on file ALLERGIES: Adhesive; Hydrocodone; Lorazepam; Other medication; Percocet [oxycodone-acetaminophen]; Sudafed [pseudoephedrine hcl]; Wellbutrin [bupropion hcl]; Zithromax [azithromycin]; and Zyrtec [cetirizine] Review of Systems Constitutional: Positive for appetite change. Negative for activity change, chills and fever. HENT: Negative for nosebleeds, sore throat, trouble swallowing and voice change. Eyes: Negative for visual disturbance. Respiratory: Negative for shortness of breath. Cardiovascular: Positive for chest pain (Chronic). Negative for palpitations. Gastrointestinal: Negative for abdominal pain, constipation, diarrhea and nausea. Genitourinary: Negative for difficulty urinating, dysuria, hematuria and urgency. Musculoskeletal: Negative for back pain, neck pain and neck stiffness. Skin: Negative for color change. Allergic/Immunologic: Negative for immunocompromised state. Neurological: Positive for syncope and weakness (Generalized). Negative for dizziness, seizures, light-headedness, numbness and headaches. Psychiatric/Behavioral: Negative for behavioral problems, confusion, hallucinations, self-injury and suicidal ideas. All other systems reviewed and are negative. Vitals:  
 02/05/19 1145 BP: 144/62 Pulse: 93 Resp: 16 Temp: 98.5 °F (36.9 °C) SpO2: 97% Weight: 65.3 kg (144 lb) Height: 5' 6\" (1.676 m) Physical Exam  
Constitutional: She is oriented to person, place, and time. She appears well-developed and well-nourished. No distress. HENT:  
Head: Normocephalic and atraumatic. Eyes: Pupils are equal, round, and reactive to light. Neck: Normal range of motion. Neck supple. Cardiovascular: Normal rate, regular rhythm and normal heart sounds. Exam reveals no gallop and no friction rub. No murmur heard. Pulmonary/Chest: Effort normal and breath sounds normal. No respiratory distress. She has no wheezes. Abdominal: Soft. Bowel sounds are normal. She exhibits no distension. There is no tenderness. There is no rebound and no guarding. Musculoskeletal: Normal range of motion. Neurological: She is alert and oriented to person, place, and time. Skin: Skin is warm. No rash noted. She is not diaphoretic. Psychiatric: She has a normal mood and affect. Her behavior is normal. Judgment and thought content normal.  
Nursing note and vitals reviewed. Note written by Yinka Simental, as dictated by Juanjo Hall MD 11:53 AM 
 
MDM This is a 50-year-old female with past medical history, review of systems, physical exam as above, presenting with complaints of syncope. Per report, she was at her gastroenterologist office, for ongoing workup of chest pain, syncope, she began to feel lightheaded after standing up, syncopized, striking her head on the floor, right side. Patient presents awake and alert, without complaints, denies headache, denies the use of anticoagulants. She states she is undergone workup for syncope and chest pain, by cardiology, as well as gastroenterology. She does endorse chest pain, states it is chronic in nature, noted to have clear breath sounds, soft nontender abdomen, regular rate and rhythm without murmurs gallops or rubs. Point to obtain CMP, CBC chem EKG chest x-ray, cardiac enzymes. We will monitor during her workup, and make a disposition based on the patient's diagnostics and response to therapy. Procedures ED EKG interpretation: 
Rhythm: normal sinus rhythm; and regular . Rate (approx.): 64 bpm; Inverted T waves anteriorly. Note written by Yinka Simental, as dictated by Juanjo Hall MD 1:37 PM 
  
BESIDE SIGN OUT: 
2:16 PM 
Discussed pt's hx, disposition, and available diagnostic and imaging results with Dr. Bharti Hutson at bedside with the patient. Reviewed care plans. Both providers and patient are in agreement with care plan.  Dr. Gibson Villareal is transferring care of the pt to Dr. Apolonia Schultz at this time.

## 2019-02-05 NOTE — ED TRIAGE NOTES
Pt arrive with syncope ep[isode x 2 this morning. + LOC, + hit R side of head, denies taking blood thinners. Pt reports this happens frequently related to her orthostatic hypotension.

## 2019-02-08 RX ORDER — MIDODRINE HYDROCHLORIDE 5 MG/1
TABLET ORAL
Qty: 270 TAB | Refills: 1 | Status: SHIPPED | OUTPATIENT
Start: 2019-02-08 | End: 2019-02-11 | Stop reason: SDUPTHER

## 2019-02-08 NOTE — TELEPHONE ENCOUNTER
Requested Prescriptions     Signed Prescriptions Disp Refills    midodrine (PROAMITINE) 5 mg tablet 270 Tab 1     Sig: TAKE 1 TABLET BY MOUTH THREE TIMES DAILY     Authorizing Provider: HORTENCIA THEODORE     Ordering User: Andres Moise   Date Time Provider [facility]   3/21/2019 11:00 AM Caprice Mcdaniel MD [id number] [facility]   5/14/2019 11:40 AM Catina Lombardi  [address] [place]

## 2019-02-11 ENCOUNTER — TELEPHONE (OUTPATIENT)
Dept: CARDIOLOGY CLINIC | Age: 74
End: 2019-02-11

## 2019-02-11 RX ORDER — MIDODRINE HYDROCHLORIDE 5 MG/1
TABLET ORAL
Qty: 270 TAB | Refills: 0 | Status: SHIPPED | OUTPATIENT
Start: 2019-02-11 | End: 2019-02-11 | Stop reason: SDUPTHER

## 2019-02-11 RX ORDER — MIDODRINE HYDROCHLORIDE 5 MG/1
TABLET ORAL
Qty: 90 TAB | Refills: 5 | Status: SHIPPED | OUTPATIENT
Start: 2019-02-11 | End: 2019-05-31

## 2019-02-11 NOTE — TELEPHONE ENCOUNTER
Patient stated she needs to speak with you regarding her BP medication being too expensive ($400) as well as her being unable to wear her compression socks and her legs hurting pretty bad.  She also stated she went to the ER again for fainting   Phone: [phone]

## 2019-02-11 NOTE — TELEPHONE ENCOUNTER
Called and spoke with patient. Pt stated that Midodrine 5 mg is going to be $400 plus and she can no afford it. Pt stated that she has passed out twice and was sent to emergency room because of that. Pt stated that she can not wear support hose and her legs are killing her. She is wearing compression socks.

## 2019-02-11 NOTE — TELEPHONE ENCOUNTER
Pt insurance covers the medication. Got on [facility] Com and found that patient could received 90 tablets a month supply for 54.00 instead of $400. New Rx was sent in to [facility] where it is cheaper. Pt was called and informed and denies any further questions at this time.

## 2019-02-21 ENCOUNTER — APPOINTMENT (OUTPATIENT)
Dept: CT IMAGING | Age: 74
End: 2019-02-21
Attending: NURSE PRACTITIONER
Payer: MEDICARE

## 2019-02-21 ENCOUNTER — HOSPITAL ENCOUNTER (OUTPATIENT)
Age: 74
Setting detail: OBSERVATION
Discharge: HOME | End: 2019-02-23
Attending: EMERGENCY MEDICINE | Admitting: HOSPITALIST
Payer: MEDICARE

## 2019-02-21 DIAGNOSIS — K92.2 ACUTE LOWER GI BLEEDING: Primary | ICD-10-CM

## 2019-02-21 LAB
ABO + RH BLD: NORMAL
ALBUMIN SERPL-MCNC: 3.8 G/DL (ref 3.5–5)
ALBUMIN/GLOB SERPL: 1.2 {RATIO} (ref 1.1–2.2)
ALP SERPL-CCNC: 75 U/L (ref 45–117)
ALT SERPL-CCNC: 19 U/L (ref 12–78)
ANION GAP SERPL CALC-SCNC: 5 MMOL/L (ref 5–15)
APTT PPP: 23.9 SEC (ref 22.1–32)
AST SERPL-CCNC: 17 U/L (ref 15–37)
BASOPHILS # BLD: 0.1 K/UL (ref 0–0.1)
BASOPHILS NFR BLD: 1 % (ref 0–1)
BILIRUB SERPL-MCNC: 0.6 MG/DL (ref 0.2–1)
BLOOD GROUP ANTIBODIES SERPL: NORMAL
BUN SERPL-MCNC: 15 MG/DL (ref 6–20)
BUN/CREAT SERPL: 19 (ref 12–20)
CALCIUM SERPL-MCNC: 8.9 MG/DL (ref 8.5–10.1)
CHLORIDE SERPL-SCNC: 107 MMOL/L (ref 97–108)
CO2 SERPL-SCNC: 31 MMOL/L (ref 21–32)
COMMENT, HOLDF: NORMAL
CREAT SERPL-MCNC: 0.78 MG/DL (ref 0.55–1.02)
DIFFERENTIAL METHOD BLD: NORMAL
EOSINOPHIL # BLD: 0.4 K/UL (ref 0–0.4)
EOSINOPHIL NFR BLD: 5 % (ref 0–7)
ERYTHROCYTE [DISTWIDTH] IN BLOOD BY AUTOMATED COUNT: 12.6 % (ref 11.5–14.5)
FERRITIN SERPL-MCNC: 62 NG/ML (ref 8–252)
FOLATE SERPL-MCNC: 14 NG/ML (ref 5–21)
GLOBULIN SER CALC-MCNC: 3.3 G/DL (ref 2–4)
GLUCOSE SERPL-MCNC: 99 MG/DL (ref 65–100)
HCT VFR BLD AUTO: 40.2 % (ref 35–47)
HCT VFR BLD AUTO: 45.3 % (ref 35–47)
HGB BLD-MCNC: 13.1 G/DL (ref 11.5–16)
HGB BLD-MCNC: 14.2 G/DL (ref 11.5–16)
IMM GRANULOCYTES # BLD AUTO: 0 K/UL (ref 0–0.04)
IMM GRANULOCYTES NFR BLD AUTO: 0 % (ref 0–0.5)
INR PPP: 1 (ref 0.9–1.1)
IRON SATN MFR SERPL: 20 % (ref 20–50)
IRON SERPL-MCNC: 71 UG/DL (ref 35–150)
LACTATE SERPL-SCNC: 1.1 MMOL/L (ref 0.4–2)
LYMPHOCYTES # BLD: 3.5 K/UL (ref 0.8–3.5)
LYMPHOCYTES NFR BLD: 44 % (ref 12–49)
MCH RBC QN AUTO: 31.5 PG (ref 26–34)
MCHC RBC AUTO-ENTMCNC: 32.6 G/DL (ref 30–36.5)
MCV RBC AUTO: 96.6 FL (ref 80–99)
MONOCYTES # BLD: 0.6 K/UL (ref 0–1)
MONOCYTES NFR BLD: 7 % (ref 5–13)
NEUTS SEG # BLD: 3.5 K/UL (ref 1.8–8)
NEUTS SEG NFR BLD: 44 % (ref 32–75)
NRBC # BLD: 0 K/UL (ref 0–0.01)
NRBC BLD-RTO: 0 PER 100 WBC
PLATELET # BLD AUTO: 282 K/UL (ref 150–400)
PMV BLD AUTO: 9.8 FL (ref 8.9–12.9)
POTASSIUM SERPL-SCNC: 3.3 MMOL/L (ref 3.5–5.1)
PROT SERPL-MCNC: 7.1 G/DL (ref 6.4–8.2)
PROTHROMBIN TIME: 10.2 SEC (ref 9–11.1)
RBC # BLD AUTO: 4.16 M/UL (ref 3.8–5.2)
SAMPLES BEING HELD,HOLD: NORMAL
SODIUM SERPL-SCNC: 143 MMOL/L (ref 136–145)
SPECIMEN EXP DATE BLD: NORMAL
THERAPEUTIC RANGE,PTTT: NORMAL SECS (ref 58–77)
TIBC SERPL-MCNC: 354 UG/DL (ref 250–450)
TSH SERPL DL<=0.05 MIU/L-ACNC: 1.87 UIU/ML (ref 0.36–3.74)
VIT B12 SERPL-MCNC: 265 PG/ML (ref 193–986)
WBC # BLD AUTO: 8 K/UL (ref 3.6–11)

## 2019-02-21 PROCEDURE — 83540 ASSAY OF IRON: CPT

## 2019-02-21 PROCEDURE — 99218 HC RM OBSERVATION: CPT

## 2019-02-21 PROCEDURE — C9113 INJ PANTOPRAZOLE SODIUM, VIA: HCPCS | Performed by: HOSPITALIST

## 2019-02-21 PROCEDURE — 82607 VITAMIN B-12: CPT

## 2019-02-21 PROCEDURE — 85018 HEMOGLOBIN: CPT

## 2019-02-21 PROCEDURE — 74011000250 HC RX REV CODE- 250: Performed by: NURSE PRACTITIONER

## 2019-02-21 PROCEDURE — 82746 ASSAY OF FOLIC ACID SERUM: CPT

## 2019-02-21 PROCEDURE — 74011250636 HC RX REV CODE- 250/636: Performed by: EMERGENCY MEDICINE

## 2019-02-21 PROCEDURE — 74011000250 HC RX REV CODE- 250: Performed by: HOSPITALIST

## 2019-02-21 PROCEDURE — 85730 THROMBOPLASTIN TIME PARTIAL: CPT

## 2019-02-21 PROCEDURE — 85610 PROTHROMBIN TIME: CPT

## 2019-02-21 PROCEDURE — 84443 ASSAY THYROID STIM HORMONE: CPT

## 2019-02-21 PROCEDURE — 85025 COMPLETE CBC W/AUTO DIFF WBC: CPT

## 2019-02-21 PROCEDURE — 74011636320 HC RX REV CODE- 636/320: Performed by: RADIOLOGY

## 2019-02-21 PROCEDURE — 74011000258 HC RX REV CODE- 258: Performed by: RADIOLOGY

## 2019-02-21 PROCEDURE — 83605 ASSAY OF LACTIC ACID: CPT

## 2019-02-21 PROCEDURE — 80053 COMPREHEN METABOLIC PANEL: CPT

## 2019-02-21 PROCEDURE — 82728 ASSAY OF FERRITIN: CPT

## 2019-02-21 PROCEDURE — 74011250636 HC RX REV CODE- 250/636: Performed by: HOSPITALIST

## 2019-02-21 PROCEDURE — 86900 BLOOD TYPING SEROLOGIC ABO: CPT

## 2019-02-21 PROCEDURE — 36415 COLL VENOUS BLD VENIPUNCTURE: CPT

## 2019-02-21 PROCEDURE — 74011250637 HC RX REV CODE- 250/637: Performed by: HOSPITALIST

## 2019-02-21 PROCEDURE — 74178 CT ABD&PLV WO CNTR FLWD CNTR: CPT

## 2019-02-21 PROCEDURE — 99285 EMERGENCY DEPT VISIT HI MDM: CPT

## 2019-02-21 RX ORDER — PAROXETINE HYDROCHLORIDE 20 MG/1
30 TABLET, FILM COATED ORAL DAILY
Status: DISCONTINUED | OUTPATIENT
Start: 2019-02-21 | End: 2019-02-23 | Stop reason: HOSPADM

## 2019-02-21 RX ORDER — ONDANSETRON 4 MG/1
4 TABLET, ORALLY DISINTEGRATING ORAL
Status: DISCONTINUED | OUTPATIENT
Start: 2019-02-21 | End: 2019-02-23 | Stop reason: HOSPADM

## 2019-02-21 RX ORDER — SODIUM CHLORIDE 0.9 % (FLUSH) 0.9 %
10 SYRINGE (ML) INJECTION
Status: COMPLETED | OUTPATIENT
Start: 2019-02-21 | End: 2019-02-21

## 2019-02-21 RX ORDER — INDOMETHACIN 25 MG/1
25 CAPSULE ORAL 3 TIMES DAILY
COMMUNITY
End: 2019-02-23

## 2019-02-21 RX ORDER — PYRIDOSTIGMINE BROMIDE 60 MG/1
60 TABLET ORAL EVERY 8 HOURS
Status: DISCONTINUED | OUTPATIENT
Start: 2019-02-21 | End: 2019-02-23 | Stop reason: HOSPADM

## 2019-02-21 RX ORDER — ONDANSETRON 2 MG/ML
4 INJECTION INTRAMUSCULAR; INTRAVENOUS
Status: DISCONTINUED | OUTPATIENT
Start: 2019-02-21 | End: 2019-02-23 | Stop reason: HOSPADM

## 2019-02-21 RX ORDER — CARBIDOPA AND LEVODOPA 25; 100 MG/1; MG/1
1 TABLET ORAL 3 TIMES DAILY
Status: DISCONTINUED | OUTPATIENT
Start: 2019-02-21 | End: 2019-02-23 | Stop reason: HOSPADM

## 2019-02-21 RX ORDER — OMEPRAZOLE 40 MG/1
40 CAPSULE, DELAYED RELEASE ORAL 2 TIMES DAILY
COMMUNITY
End: 2019-11-27 | Stop reason: ALTCHOICE

## 2019-02-21 RX ORDER — MIDODRINE HYDROCHLORIDE 5 MG/1
5 TABLET ORAL
Status: DISCONTINUED | OUTPATIENT
Start: 2019-02-21 | End: 2019-02-23 | Stop reason: HOSPADM

## 2019-02-21 RX ORDER — FLUDROCORTISONE ACETATE 0.1 MG/1
100 TABLET ORAL 2 TIMES DAILY
Status: DISCONTINUED | OUTPATIENT
Start: 2019-02-21 | End: 2019-02-23 | Stop reason: HOSPADM

## 2019-02-21 RX ORDER — SODIUM CHLORIDE 0.9 % (FLUSH) 0.9 %
5-40 SYRINGE (ML) INJECTION AS NEEDED
Status: DISCONTINUED | OUTPATIENT
Start: 2019-02-21 | End: 2019-02-23 | Stop reason: HOSPADM

## 2019-02-21 RX ORDER — POTASSIUM CHLORIDE AND SODIUM CHLORIDE 900; 300 MG/100ML; MG/100ML
INJECTION, SOLUTION INTRAVENOUS CONTINUOUS
Status: DISCONTINUED | OUTPATIENT
Start: 2019-02-21 | End: 2019-02-23 | Stop reason: HOSPADM

## 2019-02-21 RX ORDER — SODIUM CHLORIDE 0.9 % (FLUSH) 0.9 %
5-40 SYRINGE (ML) INJECTION EVERY 8 HOURS
Status: DISCONTINUED | OUTPATIENT
Start: 2019-02-21 | End: 2019-02-23 | Stop reason: HOSPADM

## 2019-02-21 RX ADMIN — SODIUM CHLORIDE AND POTASSIUM CHLORIDE: 9; 2.98 INJECTION, SOLUTION INTRAVENOUS at 22:34

## 2019-02-21 RX ADMIN — Medication 10 ML: at 16:35

## 2019-02-21 RX ADMIN — SODIUM CHLORIDE 1000 ML: 900 INJECTION, SOLUTION INTRAVENOUS at 08:55

## 2019-02-21 RX ADMIN — IOPAMIDOL 100 ML: 755 INJECTION, SOLUTION INTRAVENOUS at 11:23

## 2019-02-21 RX ADMIN — PYRIDOSTIGMINE BROMIDE 60 MG: 60 TABLET ORAL at 21:08

## 2019-02-21 RX ADMIN — SODIUM CHLORIDE AND POTASSIUM CHLORIDE: 9; 2.98 INJECTION, SOLUTION INTRAVENOUS at 12:05

## 2019-02-21 RX ADMIN — SODIUM CHLORIDE 40 MG: 9 INJECTION, SOLUTION INTRAMUSCULAR; INTRAVENOUS; SUBCUTANEOUS at 16:33

## 2019-02-21 RX ADMIN — CARBIDOPA AND LEVODOPA 1 TABLET: 25; 100 TABLET ORAL at 16:34

## 2019-02-21 RX ADMIN — SODIUM CHLORIDE 40 MG: 9 INJECTION, SOLUTION INTRAMUSCULAR; INTRAVENOUS; SUBCUTANEOUS at 21:07

## 2019-02-21 RX ADMIN — PAROXETINE HYDROCHLORIDE 30 MG: 20 TABLET, FILM COATED ORAL at 16:34

## 2019-02-21 RX ADMIN — PYRIDOSTIGMINE BROMIDE 60 MG: 60 TABLET ORAL at 19:07

## 2019-02-21 RX ADMIN — SODIUM CHLORIDE 100 ML: 900 INJECTION, SOLUTION INTRAVENOUS at 11:23

## 2019-02-21 RX ADMIN — Medication 10 ML: at 11:23

## 2019-02-21 RX ADMIN — CARBIDOPA AND LEVODOPA 1 TABLET: 25; 100 TABLET ORAL at 21:08

## 2019-02-21 RX ADMIN — POLYETHYLENE GLYCOL-3350 AND ELECTROLYTES 4000 ML: 236; 6.74; 5.86; 2.97; 22.74 POWDER, FOR SOLUTION ORAL at 16:33

## 2019-02-21 RX ADMIN — Medication 10 ML: at 21:14

## 2019-02-21 NOTE — CONSULTS
[address] [facility]  [address] [facility], [address]  [phone]                     GI CONSULTATION NOTE  Martha Cote AGACNP-BC  Work Cell: [phone]      NAME:  Kell Morrissey   :   1945   MRN:   [id number]       Referring Provider: Dr. Alva Delaney    Consult Date: 2019     Chief Complaint: Rectal bleeding    History of Present Illness:  Patient is a 75 y.o. who is seen in consultation at the request of Dr. Alva Delaney for GI bleed. Ms. Naz Dominguez has a PMH as detailed below. She presented to the ER with rectal bleeding. Onset of this was yesterday evening. She initially thought it was vaginal bleeding as she went to use the restroom and had a \"large gush of blood come out. \" Blood is bright red in color. Amount is large. She went to bed and upon awakening this AM had another episode. There was no stool with blood. Last BM was 3-4 days ago. She reports associated bloating and lower abdominal discomfort. Denies any fever, chills, nausea or vomiting. Hgb stable. Rectal exam in ER with blood. She denies any NSAID or anticoagulant use. She has previous history of GI bleed in 2016 at which time work-up including colonoscopy revealed hemorrhoids and diverticulosis. Bleeding was suspected to be secondary to hemorrhoids vs diverticular disease. She was seen by Colorectal surgery at this time but no surgical intervention was completed. She does have FH of colon cancer in mother.        PMH:  Past Medical History:   Diagnosis Date    Bipolar disorder ([address]     Nazmy    Chronic pain     BACK PAIN    Diabetes ([facility] [address]     BORDERLINE TX WITH DIET AND EXERCISE    DJD (degenerative joint disease)     Fibromyalgia     Genital herpes     GERD (gastroesophageal reflux disease)     Hypercholesterolemia     IBS (irritable bowel syndrome)     Lumbar disc disease     stimulation-Honza    Migraine     Nausea & vomiting     Other ill-defined conditions(859.58)     SEASONAL allergies    Other ill-defined conditions(799.89)     dysphagia has had esophagus dilated    Paget's disease     Pulmonary embolism (HCC)     RLS (restless legs syndrome)     Spinal stenosis        PSH:  Past Surgical History:   Procedure Laterality Date    COLONOSCOPY N/A 6/27/2016    COLONOSCOPY performed by Wilhelm Mcardle, MD at [facility] ENDOSCOPY    ENDOSCOPY, COLON, DIAGNOSTIC      12, due 13    HX APPENDECTOMY      HX BACK SURGERY  9/17/2013    back surgery - total of 3 as of 12/20/2013    HX BREAST BIOPSY Right years ago    negative    HX CHOLECYSTECTOMY      HX HEENT      T & A    HX HYSTERECTOMY      total for fibroid tumors    HX ORTHOPAEDIC      lumbar laminectomy then fusion/neurostimulator implanted - inactive now but still in    HX ORTHOPAEDIC      REMOVAL OF NEUROSTIMULATOR    HX OTHER SURGICAL      EGD x 2 with dilation/colonoscopy - no polyps per pt    TILT TABLE EVALUATION  8/2/2016            Allergies: Allergies   Allergen Reactions    Adhesive Itching    Hydrocodone Itching    Lorazepam Other (comments)    Other Medication Rash     Tide detergent    Percocet [Oxycodone-Acetaminophen] Itching    Sudafed [Pseudoephedrine Hcl] Other (comments)     Vertigo    Wellbutrin [Bupropion Hcl] Nausea and Vomiting    Zithromax [Azithromycin] Vertigo    Zyrtec [Cetirizine] Nausea Only       Home Medications:  Prior to Admission Medications   Prescriptions Last Dose Informant Patient Reported? Taking? LACTOBACILLUS ACIDOPHILUS (PROBIOTIC PO) 2/20/2019 at Unknown time  Yes Yes   Sig: Take  by mouth daily. PARoxetine (PAXIL) 30 mg tablet 2/20/2019 at Unknown time  Yes Yes   Sig: Take 30 mg by mouth daily. Bri Sanchez NP/ Dr Daniel Almanza   QUEtiapine (SEROQUEL) 25 mg tablet   Yes No   Sig: Take 25-50 mg by mouth nightly as needed. Take 1-2 tabs qhs prn Bri Sanchez NP/Dr Daniel Almanza   acetaminophen (TYLENOL) 325 mg tablet   Yes No   Sig: Take 325 mg by mouth every four (4) hours as needed for Pain.    atorvastatin (LIPITOR) 20 mg tablet 2/20/2019 at Unknown time  No Yes   Sig: TAKE 1 TABLET BY MOUTH EVERY EVENING   carbidopa-levodopa (SINEMET)  mg per tablet 2/20/2019 at Unknown time  Yes Yes   Sig: Take 1 Tab by mouth three (3) times daily. fludrocortisone (FLORINEF) 0.1 mg tablet 2/20/2019 at Unknown time  No Yes   Sig: TAKE 1 TABLET BY MOUTH TWICE DAILY   indomethacin (INDOCIN) 25 mg capsule Unknown at Unknown time  Yes No   Sig: Take 25 mg by mouth three (3) times daily. midodrine (PROAMITINE) 5 mg tablet 2/20/2019 at Unknown time  No Yes   Sig: TAKE 1 TABLET BY MOUTH THREE TIMES DAILY   omeprazole (PRILOSEC) 40 mg capsule 2/20/2019 at Unknown time  Yes Yes   Sig: Take 40 mg by mouth daily. ondansetron hcl (ZOFRAN) 4 mg tablet 2/20/2019 at Unknown time  Yes Yes   Sig: Take 4 mg by mouth every eight (8) hours as needed for Nausea. potassium chloride (K-DUR, KLOR-CON) 20 mEq tablet 2/20/2019 at Unknown time  No Yes   Sig: TAKE 1 TABLET BY MOUTH DAILY   pyridostigmine (MESTINON) 60 mg tablet 2/20/2019 at Unknown time  No Yes   Sig: TAKE 1 TABLET BY MOUTH THREE TIMES DAILY   sod.chlorid/potassium chloride (BUFFERED SALT PO)   Yes No   Sig: Take  by mouth.   sucralfate (CARAFATE) 1 gram tablet   No No   Sig: TAKE 1 TABLET BY MOUTH FOUR TIMES DAILY      Facility-Administered Medications: None       Hospital Medications:  No current facility-administered medications for this encounter. Current Outpatient Medications   Medication Sig    omeprazole (PRILOSEC) 40 mg capsule Take 40 mg by mouth daily.     midodrine (PROAMITINE) 5 mg tablet TAKE 1 TABLET BY MOUTH THREE TIMES DAILY    pyridostigmine (MESTINON) 60 mg tablet TAKE 1 TABLET BY MOUTH THREE TIMES DAILY    fludrocortisone (FLORINEF) 0.1 mg tablet TAKE 1 TABLET BY MOUTH TWICE DAILY    atorvastatin (LIPITOR) 20 mg tablet TAKE 1 TABLET BY MOUTH EVERY EVENING    ondansetron hcl (ZOFRAN) 4 mg tablet Take 4 mg by mouth every eight (8) hours as needed for Nausea.  potassium chloride (K-DUR, KLOR-CON) 20 mEq tablet TAKE 1 TABLET BY MOUTH DAILY    LACTOBACILLUS ACIDOPHILUS (PROBIOTIC PO) Take  by mouth daily.  carbidopa-levodopa (SINEMET)  mg per tablet Take 1 Tab by mouth three (3) times daily.  PARoxetine (PAXIL) 30 mg tablet Take 30 mg by mouth daily. Jarome Manner NP/ Dr Douglas Reyes    indomethacin (INDOCIN) 25 mg capsule Take 25 mg by mouth three (3) times daily.  sod. chlorid/potassium chloride (BUFFERED SALT PO) Take  by mouth.  sucralfate (CARAFATE) 1 gram tablet TAKE 1 TABLET BY MOUTH FOUR TIMES DAILY    acetaminophen (TYLENOL) 325 mg tablet Take 325 mg by mouth every four (4) hours as needed for Pain.  QUEtiapine (SEROQUEL) 25 mg tablet Take 25-50 mg by mouth nightly as needed.  Take 1-2 tabs qhs prn Jarome Manner NP/Dr Douglas Reyes       Social History:  Social History     Tobacco Use    Smoking status: Never Smoker    Smokeless tobacco: Never Used   Substance Use Topics    Alcohol use: No     Comment: 1 per month       Family History:  Family History   Problem Relation Age of Onset    Colon Cancer Mother     Cancer Mother 68        colon    Heart Disease Father     Heart Disease Maternal Grandmother     Heart Disease Maternal Grandfather     Heart Disease Other        Review of Systems:    Constitutional: negative fever, negative chills, negative weight loss  Eyes:   negative visual changes  ENT:   negative sore throat, tongue or lip swelling  Respiratory:  negative cough, negative dyspnea  Cards:  negative for chest pain, palpitations, lower extremity edema  GI:   See HPI  :  negative for frequency, dysuria  Integument:  negative for rash and pruritus  Heme:  negative for easy bruising and gum/nose bleeding  Musculoskel: negative for myalgias, back pain and muscle weakness  Neuro: negative for headaches, dizziness, vertigo  Psych:  negative for feelings of anxiety, depression      Objective:     Patient Vitals for the past 8 hrs: BP Temp Pulse Resp SpO2 Height   02/21/19 0716 (!) 193/95 98.3 °F (36.8 °C) 68 16 100 % 5' 6\" (1.676 m)     No intake/output data recorded. No intake/output data recorded. PHYSICAL EXAM:  General: WD, WN. Alert, cooperative, no acute distress.    HEENT: NC, Atraumatic. PERRLA, EOMI. Anicteric sclerae. Lungs:  CTA Bilaterally. No Wheezing/Rhonchi/Rales. Heart:  Regular rate and rhythm, No murmur, No Rubs, No Gallops  Abdomen: Soft, non-distended, mild epigastric/LLQ tenderness.  +Bowel sounds, no HSM  Extremities: No c/c/e  Neurologic:  Alert and oriented X 3. No acute neurological distress. Psych:   Good insight. Not anxious nor agitated. Data Review     Recent Labs     02/21/19  0722   WBC 8.0   HGB 13.1   HCT 40.2        Recent Labs     02/21/19  0722      K 3.3*      CO2 31   BUN 15   CREA 0.78   GLU 99   CA 8.9     Recent Labs     02/21/19  0722   SGOT 17   AP 75   TP 7.1   ALB 3.8   GLOB 3.3     Recent Labs     02/21/19  0722   INR 1.0   PTP 10.2   APTT 23.9        Imaging studies reviewed      Assessment:   1. GI bleed - with BRBPR and abdominal pain. Differentials include hemorrhoids, diverticulosis, ischemia, polyps, neoplasia, etc.   2. History of PE  3.  Bipolar disorder    Patient Active Problem List   Diagnosis Code    IBS (irritable bowel syndrome) K58.9    RLS (restless legs syndrome) G25.81    Bipolar disorder (HCC) F31.9    Fibromyalgia M79.7    DJD (degenerative joint disease) M19.90    Lumbar disc disease M51.9    Paget's disease of bone M88.9    Migraine 346    Genital herpes A60.00    Other diseases of nasal cavity and sinuses(478.19) J34.89    Allergic rhinitis, cause unspecified J30.9    Hammertoe M20.40    Encounter for long-term (current) use of other medications Z79.899    Abnormal mammogram R92.8    Tubulovillous adenoma D36.9    Mixed hyperlipidemia E78.2    Advance directive on file Z78.9    Orthostatic hypotension I95.1    Resting tremor R25.9    Near syncope R55    Spinal stenosis M48.00    Syncope R55    Type 2 diabetes with nephropathy ([facility] [address] E11.21              Plan:   -Admit under hospitalist   -Supportive management per primary team  -PPI  -Avoid NSAIDs  -CTA abd/pelvis w/ contrast  -Consider colonoscopy depending upon results of the above  -Further recommendations to follow  -Monitor H&H  -Discussed with Dr. Nick Brown  -Will follow along with you  -Thank you kindly for allowing us to participate in the care of this patient

## 2019-02-21 NOTE — PROGRESS NOTES
Reason for Admission:   Vaginal bleeding RRAT Score:      
          
Resources/supports as identified by patient/family:   daughter Top Challenges facing patient (as identified by patient/family and CM): Finances/Medication cost?    None expressed Transportation?  drives Support system or lack thereof?  daughter Living arrangements? Lives Independently alone Self-care/ADLs/Cognition? Independent Current Advanced Directive/Advance Care Plan: On file Plan for utilizing home health:    None Likelihood of readmission: High based on RRAT Transition of Care Plan:     Patient is a 75 yr old  female who presented to ER via EMS with complaint of vaginal bleeding. H significant for IBS, Bipolar disorder, Fibromyalgia, DJD, Migraine, Hypercholesterolemia, Diabetes, GERD, PE, and Spinal Stenosis. Upon interview patient stated she lives in independent living in her long term community. Lives and drives. Patient voiced no concerns around discharge. CM to follow but no needs anticipated. Care Management Interventions PCP Verified by CM: Yes Discharge Durable Medical Equipment: No 
Physical Therapy Consult: No 
Occupational Therapy Consult: No 
Speech Therapy Consult: No 
Current Support Network: Lives Alone Confirm Follow Up Transport: Family Plan discussed with Pt/Family/Caregiver:  Yes

## 2019-02-21 NOTE — ED TRIAGE NOTES
Arrived from home, reporting bright red vaginal bleed that started around 0630 this morning. Patient reports that she noticed some spotting yesterday. Denies any pain Hx total hysterectomy. Denies blood thinner use or trauma.

## 2019-02-21 NOTE — PROGRESS NOTES
Spoke with Dr. Mark Coyle regarding pt's low bp. Told to give Midorine and Florineff if pt's systolic bp less than 646 after 3 readings taken 10 minutes apart.

## 2019-02-21 NOTE — PROGRESS NOTES
Problem: Falls - Risk of 
Goal: *Absence of Falls Document Diana Ascencio Fall Risk and appropriate interventions in the flowsheet. Outcome: Progressing Towards Goal 
Fall Risk Interventions: 
Mobility Interventions: Patient to call before getting OOB Medication Interventions: Patient to call before getting OOB Elimination Interventions: Call light in reach

## 2019-02-21 NOTE — PROGRESS NOTES
Admission Medication Reconciliation: 
 
Information obtained from:  patient, chart review, insurance claim information Comments/Recommendations: All medications/allergies have been reviewed and updated; last medication administration times reviewed and recorded. The patient was a poor historian and could not recall or recognize names of medications. Medications, doses, and frequencies were confirmed using insurance claim information. Per insurance claim information, the patient had a prescription filled for indomethacin on 2/5/19 for a 7 day supply; it is unclear if the patient is still taking medications as she did not recognize this medication. Per insurance claim information, patient omeprazole dose reduced from 40 mg BID to 40 mg daily. Changes made to Prior to Admission (PTA) Medication List: ? Medications Added:  
- indomethacin ? Medications Changed:  
- omeprazole changed from 40 mg BID to 40 mg daily 
- quetiapine changed from 25 mg QHS PRN to 25-50 mg QHS PRN  
? Medications Removed:  
- None Significant PMH/Disease States:  
Past Medical History:  
Diagnosis Date  Bipolar disorder ([facility] [address] [employer]  Chronic pain BACK PAIN  
 Diabetes ([facility] [address] BORDERLINE TX WITH DIET AND EXERCISE  DJD (degenerative joint disease)  Fibromyalgia  Genital herpes  GERD (gastroesophageal reflux disease)  Hypercholesterolemia  IBS (irritable bowel syndrome)  Lumbar disc disease   
 stimulation-Honza  Migraine  Nausea & vomiting  Other ill-defined conditions(799.89) SEASONAL allergies  Other ill-defined conditions(799.89) dysphagia has had esophagus dilated  Paget's disease  Pulmonary embolism ([facility] [address]  RLS (restless legs syndrome)  Spinal stenosis Chief Complaint for this Admission: Chief Complaint Patient presents with  Vaginal Bleeding Allergies:  Adhesive; Hydrocodone; Lorazepam; Other medication; Percocet [oxycodone-acetaminophen]; Sudafed [pseudoephedrine hcl]; Wellbutrin [bupropion hcl]; Zithromax [azithromycin]; and Zyrtec [cetirizine] Prior to Admission Medications:  
Prior to Admission Medications Prescriptions Last Dose Informant Patient Reported? Taking? LACTOBACILLUS ACIDOPHILUS (PROBIOTIC PO) 2/20/2019 at Unknown time  Yes Yes Sig: Take  by mouth daily. PARoxetine (PAXIL) 30 mg tablet 2/20/2019 at Unknown time  Yes Yes Sig: Take 30 mg by mouth daily. Cindy Negron NP/ Dr Alverto Nair QUEtiapine (SEROQUEL) 25 mg tablet   Yes No  
Sig: Take 25-50 mg by mouth nightly as needed. Take 1-2 tabs qhs prn Cindy Negron NP/Dr Alverto Nair  
acetaminophen (TYLENOL) 325 mg tablet   Yes No  
Sig: Take 325 mg by mouth every four (4) hours as needed for Pain. atorvastatin (LIPITOR) 20 mg tablet 2/20/2019 at Unknown time  No Yes Sig: TAKE 1 TABLET BY MOUTH EVERY EVENING  
carbidopa-levodopa (SINEMET)  mg per tablet 2/20/2019 at Unknown time  Yes Yes Sig: Take 1 Tab by mouth three (3) times daily. fludrocortisone (FLORINEF) 0.1 mg tablet 2/20/2019 at Unknown time  No Yes Sig: TAKE 1 TABLET BY MOUTH TWICE DAILY  
indomethacin (INDOCIN) 25 mg capsule Unknown at Unknown time  Yes No  
Sig: Take 25 mg by mouth three (3) times daily. midodrine (PROAMITINE) 5 mg tablet 2/20/2019 at Unknown time  No Yes Sig: TAKE 1 TABLET BY MOUTH THREE TIMES DAILY  
omeprazole (PRILOSEC) 40 mg capsule 2/20/2019 at Unknown time  Yes Yes Sig: Take 40 mg by mouth daily. ondansetron hcl (ZOFRAN) 4 mg tablet 2/20/2019 at Unknown time  Yes Yes Sig: Take 4 mg by mouth every eight (8) hours as needed for Nausea. potassium chloride (K-DUR, KLOR-CON) 20 mEq tablet 2/20/2019 at Unknown time  No Yes Sig: TAKE 1 TABLET BY MOUTH DAILY pyridostigmine (MESTINON) 60 mg tablet 2/20/2019 at Unknown time  No Yes Sig: TAKE 1 TABLET BY MOUTH THREE TIMES DAILY sod.chlorid/potassium chloride (BUFFERED SALT PO)   Yes No  
Sig: Take  by mouth.  
sucralfate (CARAFATE) 1 gram tablet   No No  
Sig: TAKE 1 TABLET BY MOUTH FOUR TIMES DAILY Facility-Administered Medications: None Thank you for allowing pharmacy to participate in the coordination of this patient's care. If you have any other questions, please contact the medication reconciliation pharmacist at [phone]. Kylah Johnson Pharm. D., BCPS

## 2019-02-21 NOTE — ED NOTES
Gave bedside report regarding, SBAR, MAR, and plan of care to [facility], RN. Transfered care of patient to RN.

## 2019-02-21 NOTE — PROGRESS NOTES
Primary Nurse Mynor Small RN and Mulu Garrett RN performed a dual skin assessment on this patient Impairment noted- see wound doc flow sheet. Abrasion on left hip size of 1/2 dime open to air. Bedside and Verbal shift change report given to Mulu Garrett RN  (oncoming nurse) by Jeff Laughlin RN  (offgoing nurse). Report included the following information SBAR.

## 2019-02-21 NOTE — PROGRESS NOTES
TRANSFER - IN REPORT: 
 
Verbal report received from BECK Her (name) on Yulissa Reyes  being received from ED (unit) for routine progression of care Report consisted of patients Situation, Background, Assessment and  
Recommendations(SBAR). Information from the following report(s) SBAR was reviewed with the receiving nurse. Opportunity for questions and clarification was provided. Assessment completed upon patients arrival to unit and care assumed.

## 2019-02-21 NOTE — ROUTINE PROCESS
TRANSFER - OUT REPORT: 
 
Verbal report given to Luis Eduardo Rivera RN (name) on [facility]  being transferred to [facility](unit) for routine progression of care Report consisted of patients Situation, Background, Assessment and  
Recommendations(SBAR). Information from the following report(s) SBAR and Kardex was reviewed with the receiving nurse. Lines:  
Peripheral IV 02/21/19 Left Antecubital (Active) Site Assessment Clean, dry, & intact 2/21/2019  7:20 AM  
Phlebitis Assessment 0 2/21/2019  7:20 AM  
Infiltration Assessment 0 2/21/2019  7:20 AM  
Dressing Status Clean, dry, & intact 2/21/2019  7:20 AM  
  
 
Opportunity for questions and clarification was provided. Patient transported with: 
 [employer]

## 2019-02-21 NOTE — ED NOTES
Pelvic exam and rectal exam completed at pt's bedside by Dr Conner Roche with positive stool specimen noted.

## 2019-02-21 NOTE — ED PROVIDER NOTES
75 y.o. female with past medical history significant for IBS, Bipolar disorder, Fibromyalgia, DJD, Migraine, Hypercholesterolemia, Diabetes, GERD, PE, and Spinal Stenosis who presents via EMS from home with chief complaint of vaginal bleeding. Patient states at 0630 this morning she had onset of vaginal bleeding. Patient states she urinated after which she had \"bright red blood\" fill the toilet bowl. Patient presents to [facility] ED with no current pain or discomfort, and upon examination patient's blood pressure 193/95. Per triage note, patient noted some vaginal spotting yesterday. Patient reports accompanying nausea which onset \"awhile ago\", and constipation as she notes she has not had a bowel movement for the past few days. Patient reports history of total hysterectomy. Patient denies taking a blood thinner daily. Patient denies history of kidney stones, ulcerative colitis, chron's disease, or hemorrhoids. Pt denies fever, chills, cough, congestion, shortness of breath, chest pain, abdominal pain, vomiting, diarrhea, hematuria, difficulty with urination or dysuria. There are no other acute medical concerns at this time. PCP: Leonides Tejeda MD 
 
Note written by Yinka Edwards, as dictated by Tiana Charles MD 7:39 AM 
 
 
 
The history is provided by the patient and the EMS personnel. Past Medical History:  
Diagnosis Date  Bipolar disorder ([address] [employer]  Chronic pain BACK PAIN  
 Diabetes ([facility] [address] BORDERLINE TX WITH DIET AND EXERCISE  DJD (degenerative joint disease)  Fibromyalgia  Genital herpes  GERD (gastroesophageal reflux disease)  Hypercholesterolemia  IBS (irritable bowel syndrome)  Lumbar disc disease   
 stimulation-Honza  Migraine  Nausea & vomiting  Other ill-defined conditions(799.89) SEASONAL allergies  Other ill-defined conditions(799.89) dysphagia has had esophagus dilated  Paget's disease  Pulmonary embolism ([facility] [address]  RLS (restless legs syndrome)  Spinal stenosis Past Surgical History:  
Procedure Laterality Date  COLONOSCOPY N/A 6/27/2016 COLONOSCOPY performed by Bruce Hay MD at [facility] ENDOSCOPY  ENDOSCOPY, COLON, DIAGNOSTIC    
 12, due 15  
 HX APPENDECTOMY  HX BACK SURGERY  9/17/2013  
 back surgery - total of 3 as of 12/20/2013  HX BREAST BIOPSY Right years ago  
 negative  HX CHOLECYSTECTOMY  HX HEENT    
 T & A  
 HX HYSTERECTOMY    
 total for fibroid tumors  HX ORTHOPAEDIC    
 lumbar laminectomy then fusion/neurostimulator implanted - inactive now but still in  
 HX ORTHOPAEDIC    
 REMOVAL OF NEUROSTIMULATOR  HX OTHER SURGICAL    
 EGD x 2 with dilation/colonoscopy - no polyps per pt  TILT TABLE EVALUATION  8/2/2016 Family History:  
Problem Relation Age of Onset  Colon Cancer Mother  Cancer Mother 68  
     colon  Heart Disease Father  Heart Disease Maternal Grandmother  Heart Disease Maternal Grandfather  Heart Disease Other Social History Socioeconomic History  Marital status: SINGLE Spouse name: Not on file  Number of children: Not on file  Years of education: Not on file  Highest education level: Not on file Social Needs  Financial resource strain: Not on file  Food insecurity - worry: Not on file  Food insecurity - inability: Not on file  Transportation needs - medical: Not on file  Transportation needs - non-medical: Not on file Occupational History  Occupation: volunteer Employer: RETIRED Comment: [employer] Tobacco Use  Smoking status: Never Smoker  Smokeless tobacco: Never Used Substance and Sexual Activity  Alcohol use: No  
  Comment: 1 per month  Drug use: No  
 Sexual activity: Not on file Other Topics Concern  Not on file Social History Narrative  Not on file ALLERGIES: Adhesive; Hydrocodone; Lorazepam; Other medication; Percocet [oxycodone-acetaminophen]; Sudafed [pseudoephedrine hcl]; Wellbutrin [bupropion hcl]; Zithromax [azithromycin]; and Zyrtec [cetirizine] Review of Systems Constitutional: Negative for chills, diaphoresis and fever. HENT: Negative for congestion, postnasal drip, rhinorrhea and sore throat. Eyes: Negative for photophobia, discharge, redness and visual disturbance. Respiratory: Negative for cough, chest tightness, shortness of breath and wheezing. Cardiovascular: Negative for chest pain, palpitations and leg swelling. Gastrointestinal: Positive for constipation and nausea. Negative for abdominal distention, abdominal pain, blood in stool, diarrhea and vomiting. Genitourinary: Positive for vaginal bleeding. Negative for difficulty urinating, dysuria, frequency, hematuria and urgency. Musculoskeletal: Negative for arthralgias, back pain, joint swelling and myalgias. Skin: Negative for color change and rash. Neurological: Negative for dizziness, speech difficulty, weakness, light-headedness, numbness and headaches. Psychiatric/Behavioral: Negative for confusion. The patient is not nervous/anxious. All other systems reviewed and are negative. Vitals:  
 02/21/19 2907 BP: (!) 193/95 Pulse: 68 Resp: 16 Temp: 98.3 °F (36.8 °C) SpO2: 100% Height: 5' 6\" (1.676 m) Physical Exam  
Constitutional: She is oriented to person, place, and time. She appears well-developed and well-nourished. No distress. HENT:  
Head: Normocephalic and atraumatic. Right Ear: External ear normal.  
Left Ear: External ear normal.  
Nose: Nose normal.  
Mouth/Throat: Oropharynx is clear and moist.  
Eyes: Conjunctivae and EOM are normal. Pupils are equal, round, and reactive to light. No scleral icterus. Neck: Normal range of motion. Neck supple. No JVD present. No tracheal deviation present. No thyromegaly present. Cardiovascular: Normal rate, regular rhythm and normal heart sounds. Exam reveals no gallop and no friction rub. No murmur heard. Pulmonary/Chest: Effort normal and breath sounds normal. No respiratory distress. She has no wheezes. She has no rales. She exhibits no tenderness. Abdominal: Soft. Bowel sounds are normal. She exhibits no distension and no mass. There is no tenderness. There is no rebound and no guarding. Genitourinary:  
Genitourinary Comments: Pelvic exam performed: No blood in vaginal vault, red blood on digital exam   
Musculoskeletal: Normal range of motion. She exhibits no edema or tenderness. Lymphadenopathy:  
  She has no cervical adenopathy. Neurological: She is alert and oriented to person, place, and time. She has normal strength. She displays no atrophy and no tremor. No cranial nerve deficit. She exhibits normal muscle tone. Coordination and gait normal.  
Skin: Skin is warm and dry. No rash noted. She is not diaphoretic. No erythema. Psychiatric: She has a normal mood and affect. Her behavior is normal. Judgment and thought content normal.  
Nursing note and vitals reviewed. Note written by Yinka Cano, as dictated by Manuel Michaud MD 7:39 AM 
  
 
MDM Number of Diagnoses or Management Options Diagnosis management comments: Deanna Good Impression 13-year-old female arriving to the emergency department with complaints of bright red blood in the toilet which he feels is coming from the vaginal area. She was not having a bowel movement when she noticed the blood in the toilet. She was passing her urine. She has no pain consistent with a urinary tract infection or kidney stone. She does have a hysterectomy. No history of similar in the past. No anticoagulant medication. Plan of care will be examination to determine the source of the blood and will proceed with a workup from there. Procedures CONSULT NOTE: 
 8: 40 AM Chaparrita Pham MD spoke with Dr. Bethel Nair for Gastroenterology. Discussed available diagnostic tests and clinical findings. Dr. Queen Glass will send NP to evaluate patient. 9:47 AM NP evaluated patient, recommends admission to hospitalist. 
 
Kelsey Dunn for Admission 9:48 AM 
 
ED Room Number: TX37/86 Patient Name and age:  Domonique Distance 75 y.o.  female Working Diagnosis: 1. Acute lower GI bleeding Readmission: no 
Isolation Requirements:  no 
Recommended Level of Care:  med/surg Code Status:  Full Other:  GI has consulted CONSULT NOTE: 
9:48 AM Chaparrita Pham MD communicated with Dr. Moustapha Quinones, Consult for Hospitalist via [facility] Text. Discussed available diagnostic tests and clinical findings. Dr. Moustapha Quinones will admit patient.

## 2019-02-22 ENCOUNTER — ANESTHESIA (OUTPATIENT)
Dept: ENDOSCOPY | Age: 74
End: 2019-02-22
Payer: MEDICARE

## 2019-02-22 ENCOUNTER — ANESTHESIA EVENT (OUTPATIENT)
Dept: ENDOSCOPY | Age: 74
End: 2019-02-22
Payer: MEDICARE

## 2019-02-22 PROBLEM — K92.2 GI BLEED: Status: RESOLVED | Noted: 2019-02-21 | Resolved: 2019-02-22

## 2019-02-22 LAB
HCT VFR BLD AUTO: 36.6 % (ref 35–47)
HGB BLD-MCNC: 12 G/DL (ref 11.5–16)

## 2019-02-22 PROCEDURE — 74011250636 HC RX REV CODE- 250/636

## 2019-02-22 PROCEDURE — 99218 HC RM OBSERVATION: CPT

## 2019-02-22 PROCEDURE — 74011250636 HC RX REV CODE- 250/636: Performed by: HOSPITALIST

## 2019-02-22 PROCEDURE — C9113 INJ PANTOPRAZOLE SODIUM, VIA: HCPCS | Performed by: HOSPITALIST

## 2019-02-22 PROCEDURE — 74011250637 HC RX REV CODE- 250/637: Performed by: HOSPITALIST

## 2019-02-22 PROCEDURE — 74011000250 HC RX REV CODE- 250: Performed by: HOSPITALIST

## 2019-02-22 PROCEDURE — 77030027957 HC TBNG IRR ENDOGTR BUSS -B: Performed by: SPECIALIST

## 2019-02-22 PROCEDURE — 85018 HEMOGLOBIN: CPT

## 2019-02-22 PROCEDURE — 36415 COLL VENOUS BLD VENIPUNCTURE: CPT

## 2019-02-22 PROCEDURE — 76040000019: Performed by: SPECIALIST

## 2019-02-22 PROCEDURE — 76060000031 HC ANESTHESIA FIRST 0.5 HR: Performed by: SPECIALIST

## 2019-02-22 PROCEDURE — 74011250637 HC RX REV CODE- 250/637: Performed by: SPECIALIST

## 2019-02-22 RX ORDER — SODIUM CHLORIDE 9 MG/ML
50 INJECTION, SOLUTION INTRAVENOUS CONTINUOUS
Status: DISPENSED | OUTPATIENT
Start: 2019-02-22 | End: 2019-02-22

## 2019-02-22 RX ORDER — EPINEPHRINE 0.1 MG/ML
1 INJECTION INTRACARDIAC; INTRAVENOUS
Status: DISCONTINUED | OUTPATIENT
Start: 2019-02-22 | End: 2019-02-22 | Stop reason: HOSPADM

## 2019-02-22 RX ORDER — SODIUM CHLORIDE 0.9 % (FLUSH) 0.9 %
5-40 SYRINGE (ML) INJECTION AS NEEDED
Status: DISCONTINUED | OUTPATIENT
Start: 2019-02-22 | End: 2019-02-23 | Stop reason: HOSPADM

## 2019-02-22 RX ORDER — DOCUSATE SODIUM 100 MG/1
100 CAPSULE, LIQUID FILLED ORAL 2 TIMES DAILY
Qty: 60 CAP | Refills: 2 | Status: SHIPPED | OUTPATIENT
Start: 2019-02-22 | End: 2019-02-22 | Stop reason: SDUPTHER

## 2019-02-22 RX ORDER — LIDOCAINE HYDROCHLORIDE 20 MG/ML
INJECTION, SOLUTION EPIDURAL; INFILTRATION; INTRACAUDAL; PERINEURAL AS NEEDED
Status: DISCONTINUED | OUTPATIENT
Start: 2019-02-22 | End: 2019-02-22 | Stop reason: HOSPADM

## 2019-02-22 RX ORDER — LORAZEPAM 2 MG/ML
2 INJECTION INTRAMUSCULAR AS NEEDED
Status: DISCONTINUED | OUTPATIENT
Start: 2019-02-22 | End: 2019-02-22 | Stop reason: HOSPADM

## 2019-02-22 RX ORDER — HYDROCORTISONE 25 MG/G
CREAM TOPICAL 4 TIMES DAILY
Status: DISCONTINUED | OUTPATIENT
Start: 2019-02-22 | End: 2019-02-23 | Stop reason: HOSPADM

## 2019-02-22 RX ORDER — FLUMAZENIL 0.1 MG/ML
0.2 INJECTION INTRAVENOUS
Status: DISCONTINUED | OUTPATIENT
Start: 2019-02-22 | End: 2019-02-22 | Stop reason: HOSPADM

## 2019-02-22 RX ORDER — SODIUM CHLORIDE 0.9 % (FLUSH) 0.9 %
5-40 SYRINGE (ML) INJECTION EVERY 8 HOURS
Status: DISCONTINUED | OUTPATIENT
Start: 2019-02-22 | End: 2019-02-23 | Stop reason: HOSPADM

## 2019-02-22 RX ORDER — ATROPINE SULFATE 0.1 MG/ML
0.5 INJECTION INTRAVENOUS
Status: DISCONTINUED | OUTPATIENT
Start: 2019-02-22 | End: 2019-02-22 | Stop reason: HOSPADM

## 2019-02-22 RX ORDER — SODIUM CHLORIDE 9 MG/ML
INJECTION, SOLUTION INTRAVENOUS
Status: DISCONTINUED | OUTPATIENT
Start: 2019-02-22 | End: 2019-02-22 | Stop reason: HOSPADM

## 2019-02-22 RX ORDER — QUETIAPINE FUMARATE 25 MG/1
25-50 TABLET, FILM COATED ORAL
Status: DISCONTINUED | OUTPATIENT
Start: 2019-02-22 | End: 2019-02-23 | Stop reason: HOSPADM

## 2019-02-22 RX ORDER — MIDAZOLAM HYDROCHLORIDE 1 MG/ML
.25-1 INJECTION, SOLUTION INTRAMUSCULAR; INTRAVENOUS
Status: DISCONTINUED | OUTPATIENT
Start: 2019-02-22 | End: 2019-02-22 | Stop reason: HOSPADM

## 2019-02-22 RX ORDER — NALOXONE HYDROCHLORIDE 0.4 MG/ML
0.4 INJECTION, SOLUTION INTRAMUSCULAR; INTRAVENOUS; SUBCUTANEOUS
Status: DISCONTINUED | OUTPATIENT
Start: 2019-02-22 | End: 2019-02-22 | Stop reason: HOSPADM

## 2019-02-22 RX ORDER — DOCUSATE SODIUM 100 MG/1
100 CAPSULE, LIQUID FILLED ORAL 2 TIMES DAILY
Qty: 60 CAP | Refills: 2 | Status: SHIPPED | OUTPATIENT
Start: 2019-02-22 | End: 2019-05-23

## 2019-02-22 RX ORDER — FENTANYL CITRATE 50 UG/ML
200 INJECTION, SOLUTION INTRAMUSCULAR; INTRAVENOUS
Status: DISCONTINUED | OUTPATIENT
Start: 2019-02-22 | End: 2019-02-22 | Stop reason: HOSPADM

## 2019-02-22 RX ORDER — HYDROCORTISONE 25 MG/G
CREAM TOPICAL 4 TIMES DAILY
Qty: 30 G | Refills: 1 | Status: SHIPPED | OUTPATIENT
Start: 2019-02-22 | End: 2019-07-14

## 2019-02-22 RX ORDER — PROPOFOL 10 MG/ML
INJECTION, EMULSION INTRAVENOUS AS NEEDED
Status: DISCONTINUED | OUTPATIENT
Start: 2019-02-22 | End: 2019-02-22 | Stop reason: HOSPADM

## 2019-02-22 RX ORDER — DEXTROMETHORPHAN/PSEUDOEPHED 2.5-7.5/.8
1.2 DROPS ORAL
Status: DISCONTINUED | OUTPATIENT
Start: 2019-02-22 | End: 2019-02-22 | Stop reason: HOSPADM

## 2019-02-22 RX ADMIN — CARBIDOPA AND LEVODOPA 1 TABLET: 25; 100 TABLET ORAL at 22:17

## 2019-02-22 RX ADMIN — PROPOFOL 50 MG: 10 INJECTION, EMULSION INTRAVENOUS at 13:09

## 2019-02-22 RX ADMIN — PROPOFOL 100 MG: 10 INJECTION, EMULSION INTRAVENOUS at 12:56

## 2019-02-22 RX ADMIN — SODIUM CHLORIDE AND POTASSIUM CHLORIDE: 9; 2.98 INJECTION, SOLUTION INTRAVENOUS at 08:47

## 2019-02-22 RX ADMIN — SODIUM CHLORIDE 40 MG: 9 INJECTION, SOLUTION INTRAMUSCULAR; INTRAVENOUS; SUBCUTANEOUS at 08:34

## 2019-02-22 RX ADMIN — PAROXETINE HYDROCHLORIDE 30 MG: 20 TABLET, FILM COATED ORAL at 08:40

## 2019-02-22 RX ADMIN — PROPOFOL 50 MG: 10 INJECTION, EMULSION INTRAVENOUS at 13:06

## 2019-02-22 RX ADMIN — PROPOFOL 50 MG: 10 INJECTION, EMULSION INTRAVENOUS at 12:59

## 2019-02-22 RX ADMIN — SODIUM CHLORIDE AND POTASSIUM CHLORIDE: 9; 2.98 INJECTION, SOLUTION INTRAVENOUS at 22:17

## 2019-02-22 RX ADMIN — PROPOFOL 50 MG: 10 INJECTION, EMULSION INTRAVENOUS at 13:12

## 2019-02-22 RX ADMIN — FLUDROCORTISONE ACETATE 100 MCG: 0.1 TABLET ORAL at 17:35

## 2019-02-22 RX ADMIN — LIDOCAINE HYDROCHLORIDE 40 MG: 20 INJECTION, SOLUTION EPIDURAL; INFILTRATION; INTRACAUDAL; PERINEURAL at 12:54

## 2019-02-22 RX ADMIN — PYRIDOSTIGMINE BROMIDE 60 MG: 60 TABLET ORAL at 22:16

## 2019-02-22 RX ADMIN — QUETIAPINE FUMARATE 25 MG: 25 TABLET ORAL at 22:16

## 2019-02-22 RX ADMIN — PYRIDOSTIGMINE BROMIDE 60 MG: 60 TABLET ORAL at 14:38

## 2019-02-22 RX ADMIN — Medication 10 ML: at 14:34

## 2019-02-22 RX ADMIN — SODIUM CHLORIDE: 9 INJECTION, SOLUTION INTRAVENOUS at 12:54

## 2019-02-22 RX ADMIN — CARBIDOPA AND LEVODOPA 1 TABLET: 25; 100 TABLET ORAL at 15:46

## 2019-02-22 RX ADMIN — MIDODRINE HYDROCHLORIDE 5 MG: 5 TABLET ORAL at 17:35

## 2019-02-22 RX ADMIN — CARBIDOPA AND LEVODOPA 1 TABLET: 25; 100 TABLET ORAL at 08:34

## 2019-02-22 RX ADMIN — FLUDROCORTISONE ACETATE 100 MCG: 0.1 TABLET ORAL at 08:40

## 2019-02-22 RX ADMIN — HYDROCORTISONE 2.5%: 25 CREAM TOPICAL at 22:18

## 2019-02-22 RX ADMIN — HYDROCORTISONE 2.5%: 25 CREAM TOPICAL at 19:48

## 2019-02-22 RX ADMIN — Medication 10 ML: at 06:00

## 2019-02-22 RX ADMIN — PROPOFOL 50 MG: 10 INJECTION, EMULSION INTRAVENOUS at 13:04

## 2019-02-22 RX ADMIN — PROPOFOL 50 MG: 10 INJECTION, EMULSION INTRAVENOUS at 13:01

## 2019-02-22 RX ADMIN — SODIUM CHLORIDE 40 MG: 9 INJECTION, SOLUTION INTRAMUSCULAR; INTRAVENOUS; SUBCUTANEOUS at 22:17

## 2019-02-22 NOTE — ROUTINE PROCESS
Christiano Metz 1945 
[id number] Situation: 
Verbal report received from: Wellington Toribio RN Procedure: Procedure(s): 
COLONOSCOPY Background: 
 
Preoperative diagnosis: Anemia Postoperative diagnosis: 1. rectal prolapse 2. diverticulosis 3. internal hemorrhoids :  Dr. Daniel Del Angel Assistant(s): Endoscopy Technician-1: Art Toure Endoscopy RN-1: Lupe Escoto RN Specimens: * No specimens in log * H. Pylori  no Assessment: 
Intra-procedure medications Anesthesia gave intra-procedure sedation and medications, see anesthesia flow sheet yes Intravenous fluids: NS@ Kermitt Apgar Vital signs stable Abdominal assessment: round and soft Recommendation: 
Discharge patient per MD order. Return to floor Permission to share finding with family or friend n/a

## 2019-02-22 NOTE — DISCHARGE SUMMARY
Detail Level: Simple Discharge Summary       PATIENT ID: Gabriella Poole  MRN: [id number]   YOB: 1945    DATE OF ADMISSION: 2/21/2019  7:11 AM    DATE OF DISCHARGE: 2/22/19  PRIMARY CARE PROVIDER: Zena Beckwith MD     ATTENDING PHYSICIAN: Vaibhav Bedolla  DISCHARGING PROVIDER: Humberto Alcantar MD    To contact this individual call [phone] and ask the  to page. If unavailable ask to be transferred the Adult Hospitalist Department. CONSULTATIONS: IP CONSULT TO GASTROENTEROLOGY  IP CONSULT TO HOSPITALIST    PROCEDURES/SURGERIES: Procedure(s):  COLONOSCOPY    ADMITTING 01 [facility] COURSE:   This is a 21-year-old female who has a past medical history of inflammatory bowel disease, bipolar, fibromyalgia, migraine, hyperlipidemia, diabetes, GERD, and pulmonary embolism, presented when she initially noted blood in the commode      Assessment & Plan:      1. Lower gastrointestinal bleeding   - most probably hemorrhoidal s/p Colonoscopy  -  Avoid antiplatelets, intravenous fluid support, serial hemoglobin. - CT of the abdomen with contrast is negative for evidence of bleeding.    - Intravenous Protonix will be continued.         DISCHARGE DIAGNOSES / PLAN:      1. F/u with Dr. Piedad Castro ( colon and rectal surgery )     ADDITIONAL CARE RECOMMENDATIONS:     PENDING TEST RESULTS:   At the time of discharge the following test results are still pending:     FOLLOW UP APPOINTMENTS:    Follow-up Information     Follow up With Specialties Details Why Contact Info    Zena Beckwith MD Internal Medicine In 1 week  [address] [place] [address]      Kalin English MD Colon and Rectal Surgery In 2 weeks  [address] [place] [address]  [phone]               DIET: Regular Diet and Cardiac Diet    ACTIVITY: Activity as tolerated    WOUND CARE:     EQUIPMENT needed:       DISCHARGE MEDICATIONS:  Current Discharge Medication List      START taking these medications    Details   hydrocortisone (ANUSOL-HC) 2.5 % rectal cream Insert  into rectum four (4) times daily. Qty: 30 g, Refills: 1      docusate sodium (COLACE) 100 mg capsule Take 1 Cap by mouth two (2) times a day for 90 days. Qty: 60 Cap, Refills: 2         CONTINUE these medications which have NOT CHANGED    Details   omeprazole (PRILOSEC) 40 mg capsule Take 40 mg by mouth daily. midodrine (PROAMITINE) 5 mg tablet TAKE 1 TABLET BY MOUTH THREE TIMES DAILY  Qty: 90 Tab, Refills: 5    Comments: Please use good Rx discount for patient      pyridostigmine (MESTINON) 60 mg tablet TAKE 1 TABLET BY MOUTH THREE TIMES DAILY  Qty: 90 Tab, Refills: 5      fludrocortisone (FLORINEF) 0.1 mg tablet TAKE 1 TABLET BY MOUTH TWICE DAILY  Qty: 180 Tab, Refills: 1      atorvastatin (LIPITOR) 20 mg tablet TAKE 1 TABLET BY MOUTH EVERY EVENING  Qty: 90 Tab, Refills: 1      ondansetron hcl (ZOFRAN) 4 mg tablet Take 4 mg by mouth every eight (8) hours as needed for Nausea. potassium chloride (K-DUR, KLOR-CON) 20 mEq tablet TAKE 1 TABLET BY MOUTH DAILY  Qty: 90 Tab, Refills: 3    Comments: **Patient requests 90 days supply**      LACTOBACILLUS ACIDOPHILUS (PROBIOTIC PO) Take  by mouth daily. carbidopa-levodopa (SINEMET)  mg per tablet Take 1 Tab by mouth three (3) times daily. PARoxetine (PAXIL) 30 mg tablet Take 30 mg by mouth daily. Sarai Hoffman NP/ Dr Lo Arellano      sod. chlorid/potassium chloride (BUFFERED SALT PO) Take  by mouth.      sucralfate (CARAFATE) 1 gram tablet TAKE 1 TABLET BY MOUTH FOUR TIMES DAILY  Qty: 360 Tab, Refills: 3    Comments: **Patient requests 90 days supply**  Associated Diagnoses: Epigastric abdominal pain      acetaminophen (TYLENOL) 325 mg tablet Take 325 mg by mouth every four (4) hours as needed for Pain. QUEtiapine (SEROQUEL) 25 mg tablet Take 25-50 mg by mouth nightly as needed.  Take 1-2 tabs qhs prn Sarai Hoffman NP/Dr Lo Arellano         STOP taking these medications       indomethacin (INDOCIN) 25 mg capsule Comments:   Reason for Stopping:                 NOTIFY YOUR PHYSICIAN FOR ANY OF THE FOLLOWING:   Fever over 101 degrees for 24 hours. Chest pain, shortness of breath, fever, chills, nausea, vomiting, diarrhea, change in mentation, falling, weakness, bleeding. Severe pain or pain not relieved by medications. Or, any other signs or symptoms that you may have questions about. DISPOSITION:   x Home With:   OT  PT  HH  RN       Long term SNF/Inpatient Rehab    Independent/assisted living    Hospice    Other:       PATIENT CONDITION AT DISCHARGE:     Functional status    Poor     Deconditioned    x Independent      Cognition   x  Lucid     Forgetful     Dementia      Catheters/lines (plus indication)    Jc     PICC     PEG    x None      Code status    x Full code     DNR      PHYSICAL EXAMINATION AT DISCHARGE:   Refer to Progress Note      CHRONIC MEDICAL DIAGNOSES:  Problem List as of 2/22/2019 Date Reviewed: 2/22/2019          Codes Class Noted - Resolved    Type 2 diabetes with nephropathy ([facility] [address] ICD-10-CM: E11.21  ICD-9-CM: 250.40, 583.81  7/26/2018 - Present        Syncope ICD-10-CM: R55  ICD-9-CM: 780.2  5/28/2018 - Present        Spinal stenosis ICD-10-CM: M48.00  ICD-9-CM: 724.00  Unknown - Present        Near syncope ICD-10-CM: R55  ICD-9-CM: 780.2  8/2/2016 - Present    Overview Signed 8/2/2016  4:12 PM by Osmany Resendiz MD     Tilt test 8/2/2016: normal supine BP but abrupt drop in SBP consistent with orthostatic hypotension  Midodrine 5 mg tid has not improved.   Add mestinon 60 mg tid               Resting tremor ICD-10-CM: R25.9  ICD-9-CM: 781.0  7/29/2016 - Present        Orthostatic hypotension ICD-10-CM: I95.1  ICD-9-CM: 458.0  7/5/2016 - Present        Advance directive on file ICD-10-CM: Z78.9  ICD-9-CM: V49.89  5/24/2016 - Present        Mixed hyperlipidemia ICD-10-CM: E78.2  ICD-9-CM: 272.2  2/2/2016 - Present        Tubulovillous adenoma ICD-10-CM: D36.9  ICD-9-CM: 229.9  8/21/2012 - Present        Abnormal mammogram ICD-10-CM: R92.8  ICD-9-CM: 793.80  6/4/2012 - Present        Encounter for long-term (current) use of other medications ICD-10-CM: Z79.899  ICD-9-CM: V58.69  11/30/2011 - Present        Hammertoe ICD-10-CM: M20.40  ICD-9-CM: 735.4  9/12/2011 - Present        Allergic rhinitis, cause unspecified ICD-10-CM: J30.9  ICD-9-CM: 477.9  1/26/2011 - Present        Other diseases of nasal cavity and sinuses(478.19) ICD-10-CM: J34.89  ICD-9-CM: 478.19  7/19/2010 - Present        IBS (irritable bowel syndrome) ICD-10-CM: K58.9  ICD-9-CM: 564.1  Unknown - Present        RLS (restless legs syndrome) ICD-10-CM: G25.81  ICD-9-CM: 333.94  Unknown - Present        Bipolar disorder (HCC) ICD-10-CM: F31.9  ICD-9-CM: 296.80  Unknown - Present        Fibromyalgia ICD-10-CM: M79.7  ICD-9-CM: 729.1  Unknown - Present        DJD (degenerative joint disease) ICD-10-CM: M19.90  ICD-9-CM: 715.90  Unknown - Present        Lumbar disc disease ICD-10-CM: M51.9  ICD-9-CM: 722.93  Unknown - Present        Paget's disease of bone ICD-10-CM: M88.9  ICD-9-CM: 731.0  Unknown - Present        Migraine ICD-10-CM: 346  ICD-9-CM: 430  Unknown - Present        Genital herpes ICD-10-CM: A60.00  ICD-9-CM: 054.10  Unknown - Present        RESOLVED: GI bleed ICD-10-CM: K92.2  ICD-9-CM: 578.9  2/21/2019 - 2/22/2019        RESOLVED: Rectal bleeding ICD-10-CM: K62.5  ICD-9-CM: 569.3  6/25/2016 - 6/27/2016        RESOLVED: Rectal bleed ICD-10-CM: K62.5  ICD-9-CM: 569.3  6/25/2016 - 6/27/2016        RESOLVED: Pulmonary embolism ([facility] [address] ICD-10-CM: I26.99  ICD-9-CM: 415.19  Unknown - 9/18/2018    Overview Addendum 9/15/2016  8:39 AM by [facility]  [facility] at [facility] 5/7/16 small PE RLL    6/9/16 CTA [facility] normal CTA no PE    Hypercoagulable work up negative with th exception of low factor II at 72%  D dimer 0.22  Xarelto d/c'd 9/13/16             RESOLVED: Diabetes mellitus type 2, controlled, without complications ([facility] WOP-53-LG: E11.9  ICD-9-CM: 250.00  10/1/2015 - 9/18/2018        RESOLVED: DM w/o complication type II ([facility] ICD-10-CM: E11.9  ICD-9-CM: 250.00  11/30/2011 - 10/1/2015        RESOLVED: Hypercholesterolemia ICD-10-CM: E78.00  ICD-9-CM: 272.0  Unknown - 10/1/2015        RESOLVED: Diabetes ([facility] ICD-10-CM: E11.9  ICD-9-CM: 250.00  Unknown - 11/30/2011              Greater than 30  minutes were spent with the patient on counseling and coordination of care    Signed:   Bruce Pham MD  2/22/2019  2:54 PM

## 2019-02-22 NOTE — PROGRESS NOTES
Hospitalist Progress Note Rodrigo Zavala MD 
Answering service: [phone] OR [phone] from in house phone Date of Service:  2019 NAME:  Christiano Metz :  1945 MRN:  [id number] Admission Summary: This is a 57-year-old female who has a past medical history of inflammatory bowel disease, bipolar, fibromyalgia, migraine, hyperlipidemia, diabetes, GERD, and pulmonary embolism, presented when she initially noted blood in the commode Interval history / Subjective:  
hgb stable for colonoscopy per GI Assessment & Plan: 1. Lower gastrointestinal bleeding  
- most probably hemorrhoidal versus diverticulosis. NPO  
- Colonoscopy per GI 
-  Avoid antiplatelets, intravenous fluid support, serial hemoglobin. - CT of the abdomen with contrast is negative for evidence of bleeding.   
- Intravenous Protonix will be continued. Code status: Full DVT prophylaxis: SCD Care Plan discussed with: Patient/Family and Nurse Disposition: TBD Hospital Problems  Date Reviewed: 2019 Codes Class Noted POA  
 GI bleed ICD-10-CM: K92.2 ICD-9-CM: 578.9  2019 Unknown Review of Systems: A comprehensive review of systems was negative. Vital Signs:  
 Last 24hrs VS reviewed since prior progress note. Most recent are: 
Visit Vitals /83 (BP 1 Location: Right arm, BP Patient Position: At rest) Pulse 75 Temp 98.3 °F (36.8 °C) Resp 16 Ht 5' 6\" (1.676 m) SpO2 99% BMI 23.24 kg/m² Intake/Output Summary (Last 24 hours) at 2019 3477 Last data filed at 2019 0165 Gross per 24 hour Intake 1001.67 ml Output 1600 ml Net -598.33 ml Physical Examination:  
 
 
     
Constitutional:  No acute distress, cooperative, pleasant   
ENT:  Oral mucous moist, oropharynx benign. Neck supple, Resp:  CTA bilaterally. No wheezing/rhonchi/rales.  No accessory muscle use CV:  Regular rhythm, normal rate, no murmurs, gallops, rubs GI:  Soft, non distended, non tender. normoactive bowel sounds, no hepatosplenomegaly Musculoskeletal:  No edema, warm, 2+ pulses throughout Neurologic:  Moves all extremities. AAOx3, CN II-XII reviewed Psych:  Good insight, Not anxious nor agitated. Data Review:  
 I personally reviewed  Image and labs Labs:  
 
Recent Labs  
  02/22/19 
0425 02/21/19 
1639 02/21/19 
9361 WBC  --   --  8.0 HGB 12.0 14.2 13.1 HCT 36.6 45.3 40.2 PLT  --   --  282 Recent Labs  
  02/21/19 
6586   
K 3.3*  
 CO2 31 BUN 15  
CREA 0.78 GLU 99  
CA 8.9 Recent Labs  
  02/21/19 
9130 SGOT 17 ALT 19 AP 75 TBILI 0.6 TP 7.1 ALB 3.8 GLOB 3.3 Recent Labs  
  02/21/19 
6424 INR 1.0 PTP 10.2 APTT 23.9 Recent Labs  
  02/21/19 
7471 TIBC 354 PSAT 20 FERR 62 Lab Results Component Value Date/Time Folate 14.0 02/21/2019 07:22 AM  
  
No results for input(s): PH, PCO2, PO2 in the last 72 hours. No results for input(s): CPK, CKNDX, TROIQ in the last 72 hours. No lab exists for component: CPKMB Lab Results Component Value Date/Time Cholesterol, total 133 03/08/2018 09:25 AM  
 HDL Cholesterol 54 03/08/2018 09:25 AM  
 LDL, calculated 48 03/08/2018 09:25 AM  
 Triglyceride 157 (H) 03/08/2018 09:25 AM  
 CHOL/HDL Ratio 3.3 07/23/2010 08:37 AM  
 
Lab Results Component Value Date/Time Glucose (POC) 101 (H) 01/13/2017 04:14 PM  
 Glucose (POC) 96 07/29/2016 12:21 PM  
 Glucose (POC) 98 07/29/2016 06:45 AM  
 Glucose (POC) 126 (H) 07/28/2016 09:44 PM  
 Glucose (POC) 84 07/28/2016 07:15 PM  
 
Lab Results Component Value Date/Time  Color YELLOW/STRAW 02/05/2019 02:27 PM  
 Appearance CLEAR 02/05/2019 02:27 PM  
 Specific gravity 1.011 02/05/2019 02:27 PM  
 pH (UA) 7.5 02/05/2019 02:27 PM  
 Protein NEGATIVE  02/05/2019 02:27 PM  
 Glucose NEGATIVE  02/05/2019 02:27 PM  
 Ketone NEGATIVE  02/05/2019 02:27 PM  
 Bilirubin NEGATIVE  02/05/2019 02:27 PM  
 Urobilinogen 0.2 02/05/2019 02:27 PM  
 Nitrites NEGATIVE  02/05/2019 02:27 PM  
 Leukocyte Esterase NEGATIVE  02/05/2019 02:27 PM  
 Epithelial cells FEW 02/05/2019 02:27 PM  
 Bacteria NEGATIVE  02/05/2019 02:27 PM  
 WBC 0-4 02/05/2019 02:27 PM  
 RBC 0-5 02/05/2019 02:27 PM  
 
 
 
Medications Reviewed:  
 
Current Facility-Administered Medications Medication Dose Route Frequency  QUEtiapine (SEROquel) tablet 25-50 mg  25-50 mg Oral QHS PRN  
 sodium chloride (NS) flush 5-40 mL  5-40 mL IntraVENous Q8H  
 sodium chloride (NS) flush 5-40 mL  5-40 mL IntraVENous PRN  
 ondansetron (ZOFRAN ODT) tablet 4 mg  4 mg Oral Q4H PRN  
 ondansetron (ZOFRAN) injection 4 mg  4 mg IntraVENous Q4H PRN  
 0.9% sodium chloride with KCl 40 mEq/L infusion   IntraVENous CONTINUOUS  
 carbidopa-levodopa (SINEMET)  mg per tablet 1 Tab  1 Tab Oral TID  PARoxetine (PAXIL) tablet 30 mg  30 mg Oral DAILY  pyridostigmine (MESTINON) tablet 60 mg  60 mg Oral Q8H  
 pantoprazole (PROTONIX) 40 mg in sodium chloride 0.9% 10 mL injection  40 mg IntraVENous Q12H  
 fludrocortisone (FLORINEF) tablet 100 mcg  100 mcg Oral BID  midodrine (PROAMITINE) tablet 5 mg  5 mg Oral TID WITH MEALS  
 
______________________________________________________________________ EXPECTED LENGTH OF STAY: - - - 
ACTUAL LENGTH OF STAY:          0 Jazmín Breaux MD

## 2019-02-22 NOTE — DISCHARGE INSTRUCTIONS
Discharge Instructions       PATIENT ID: Yulissa Reyes  MRN: [id number]   YOB: 1945    DATE OF ADMISSION: 2/21/2019  7:11 AM    DATE OF DISCHARGE: 2/22/2019    PRIMARY CARE PROVIDER: Peewee Flores MD     ATTENDING PHYSICIAN: Seng Lopez MD  DISCHARGING PROVIDER: Mayda Peter MD    To contact this individual call [phone] and ask the  to page. If unavailable ask to be transferred the Adult Hospitalist Department. DISCHARGE DIAGNOSES Hemorrhoidal bleeding    CONSULTATIONS: IP CONSULT TO GASTROENTEROLOGY  IP CONSULT TO HOSPITALIST    PROCEDURES/SURGERIES: Procedure(s):  COLONOSCOPY    PENDING TEST RESULTS:   At the time of discharge the following test results are still pending:     FOLLOW UP APPOINTMENTS:   Follow-up Information     Follow up With Specialties Details Why Contact Info    Peewee Flores MD Internal Medicine In 1 week  [address] [address]      Jorge Luis Blank MD Colon and Rectal Surgery In 2 weeks  [address]  [phone]             ADDITIONAL CARE RECOMMENDATIONS:     DIET: Regular Diet    ACTIVITY: Activity as tolerated    WOUND CARE:     EQUIPMENT needed:       DISCHARGE MEDICATIONS:   See Medication Reconciliation Form    · It is important that you take the medication exactly as they are prescribed. · Keep your medication in the bottles provided by the pharmacist and keep a list of the medication names, dosages, and times to be taken in your wallet. · Do not take other medications without consulting your doctor. NOTIFY YOUR PHYSICIAN FOR ANY OF THE FOLLOWING:   Fever over 101 degrees for 24 hours. Chest pain, shortness of breath, fever, chills, nausea, vomiting, diarrhea, change in mentation, falling, weakness, bleeding. Severe pain or pain not relieved by medications.   Or, any other signs or symptoms that you may have questions about.      DISPOSITION:   x Home With:   OT  PT  HH  RN       SNF/Inpatient Rehab/LTAC    Independent/assisted living    Hospice    Other:     CDMP Checked:   Yes x     PROBLEM LIST Updated:  Yes x       Signed:   Radha Friedman MD  2/22/2019  2:53 PM

## 2019-02-22 NOTE — ROUTINE PROCESS
TRANSFER - OUT REPORT: 
 
Verbal report given to faisal(name) on Isabella Wall  being transferred to [facility](unit) for routine progression of care Report consisted of patients Situation, Background, Assessment and  
Recommendations(SBAR). Information from the following report(s) Procedure Summary was reviewed with the receiving nurse. Lines:  
Peripheral IV 02/21/19 Left Antecubital (Active) Site Assessment Clean, dry, & intact 2/22/2019 10:39 AM  
Phlebitis Assessment 0 2/22/2019 10:39 AM  
Infiltration Assessment 0 2/22/2019 10:39 AM  
Dressing Status Clean, dry, & intact 2/22/2019 10:39 AM  
Dressing Type Transparent 2/22/2019 10:39 AM  
Hub Color/Line Status Infusing 2/22/2019 10:39 AM  
Action Taken Open ports on tubing capped 2/22/2019 10:39 AM  
Alcohol Cap Used Yes 2/22/2019 10:39 AM  
  
 
Opportunity for questions and clarification was provided. Patient transported with: 
 [employer]

## 2019-02-22 NOTE — PROGRESS NOTES

## 2019-02-22 NOTE — PROGRESS NOTES
[address] 
[address] [facility] [address] [place], [address] 
[phone] GI PROGRESS Irwin Boston, AGACNP-BC Work Cell: [phone] NAME:   Homar Sage :    1945 MRN:    [id number] Assessment/Plan 1. GI bleed - with BRBPR and abdominal pain. Differentials include hemorrhoids, diverticulosis, ischemia, polyps, neoplasia, etc. CTA (-). Hgb relatively stable. - NPO 
- Supportive management per primary team 
- Plan for colonoscopy today - Further recommendations to follow - Monitor H&h&, transfuse as needed 2. History of PE 
3. Bipolar disorder Patient Active Problem List  
Diagnosis Code  IBS (irritable bowel syndrome) K58.9  RLS (restless legs syndrome) G25.81  Bipolar disorder ([facility] [address] F31.9  Fibromyalgia M79.7  DJD (degenerative joint disease) M19.90  Lumbar disc disease M51.9  Paget's disease of bone M88.9  Migraine 346  Genital herpes A60.00  
 Other diseases of nasal cavity and sinuses(478.19) J34.89  
 Allergic rhinitis, cause unspecified J30.9  Hammertoe M20.40  Encounter for long-term (current) use of other medications Z79.899  Abnormal mammogram R92.8  Tubulovillous adenoma D36.9  Mixed hyperlipidemia E78.2  Advance directive on file E70.4  Orthostatic hypotension I95.1  Resting tremor R25.9  Near syncope R55  Spinal stenosis M48.00  Syncope R55  Type 2 diabetes with nephropathy (HCC) E11.21  
 GI bleed K92.2 Subjective:  
 
Feeling better. Reports some bleeding with bowel prep overnight. Denies any abdominal pain. Objective: VITALS:  
Last 24hrs VS reviewed since prior hospitalist progress note. Most recent are: 
Visit Vitals /83 (BP 1 Location: Right arm, BP Patient Position: At rest) Pulse 75 Temp 98.3 °F (36.8 °C) Resp 16 Ht 5' 6\" (1.676 m) SpO2 99% BMI 23.24 kg/m² Intake/Output Summary (Last 24 hours) at 2019 6901 Last data filed at 2/22/2019 9698 Gross per 24 hour Intake 1001.67 ml Output 1600 ml Net -598.33 ml PHYSICAL EXAM: 
General   well developed, alert, in no acute distress EENT  Normocephalic, Atraumatic, PERRLA, EOMI, sclera clear Respiratory   Clear To Auscultation bilaterally - no wheezes, rales, rhonchi, or crackles Cardiology  Regular Rate and Rythmn  - no murmurs, rubs or gallops Abdominal  Soft, non-tender, non-distended, positive bowel sounds, no hepatosplenomegaly, no palpable mass Neurological  No focal neurological deficits noted Psychological  Oriented x 3. Normal affect. Lab Data Recent Results (from the past 12 hour(s)) HGB & HCT Collection Time: 02/22/19  4:25 AM  
Result Value Ref Range HGB 12.0 11.5 - 16.0 g/dL HCT 36.6 35.0 - 47.0 % Medications: Reviewed PMH/SH reviewed - no change compared to H&P Mid-Level Provider: Elder Fajardo NP Date/Time:  2/22/2019

## 2019-02-22 NOTE — PROGRESS NOTES
Bedside and Verbal shift change report given to Calvert Nyhan, RN (oncoming nurse) by Nahomi Thakkar RN (offgoing nurse).  Report included the following information SBAR, Kardex, Intake/Output, MAR and Recent Results.

## 2019-02-22 NOTE — ANESTHESIA POSTPROCEDURE EVALUATION
Post-Anesthesia Evaluation and Assessment Patient: Kamaljit Willis MRN: [id number]  SSN: [id number] YOB: 1945  Age: 75 y.o. Sex: female I have evaluated the patient and they are stable and ready for discharge from the PACU. Cardiovascular Function/Vital Signs Visit Vitals BP (!) 196/99 Pulse 70 Temp 36.9 °C (98.4 °F) Resp 18 Ht 5' 6\" (1.676 m) SpO2 97% BMI 23.24 kg/m² Patient is status post MAC anesthesia for Procedure(s): 
COLONOSCOPY. Nausea/Vomiting: None Postoperative hydration reviewed and adequate. Pain: 
Pain Scale 1: Numeric (0 - 10) (02/22/19 1337) Pain Intensity 1: 0 (02/22/19 1337) Managed Neurological Status: At baseline Mental Status, Level of Consciousness: Alert and  oriented to person, place, and time Pulmonary Status:  
O2 Device: Room air (02/22/19 1337) Adequate oxygenation and airway patent Complications related to anesthesia: None Post-anesthesia assessment completed. No concerns Signed By: Lorenza Melvin MD   
 February 22, 2019 Procedure(s): 
COLONOSCOPY. 
 
<BSHSIANPOST> Visit Vitals BP (!) 196/99 Pulse 70 Temp 36.9 °C (98.4 °F) Resp 18 Ht 5' 6\" (1.676 m) SpO2 97% BMI 23.24 kg/m²

## 2019-02-22 NOTE — PROCEDURES
Colonoscopy Procedure Note    Indications:   Lower GI bleeding - recurrent , Hx of diverticulosis and hemorrhoids  Referring Physician: Wilhelmenia Mortimer, MD   Anesthesia/Sedation:MAC  Endoscopist:  Dr. Nathan Jack  Assistant:  Endoscopy Technician-1: Vipul Mtz  Endoscopy RN-1: Ciro Gongora RN  Surgical Assistant: None  Implants: None    Preoperative diagnosis: Anemia    Postoperative diagnosis: 1. rectal prolapse  2. diverticulosis  3. internal hemorrhoids      Procedure in Detail:  Informed consent was obtained for the procedure, including sedation. Risks of perforation, hemorrhage, adverse drug reaction, and aspiration were discussed. The patient was placed in the left lateral decubitus position. Based on the pre-procedure assessment, including review of the patient's medical history, medications, allergies, and review of systems, she had been deemed to be an appropriate candidate for moderate sedation; she was therefore sedated with the medications listed above. The patient was monitored continuously with ECG tracing, pulse oximetry, blood pressure monitoring, and direct observations. A rectal examination was performed. The [id number] was inserted into the rectum and advanced under direct vision to the cecum, which was identified by the ileocecal valve and appendiceal orifice. The quality of the colonic preparation was good. A careful inspection was made as the colonoscope was withdrawn, including a retroflexed view of the rectum; findings and interventions are described below. Findings:   Rectum: large prolapsed internal hemorrhoids  Sigmoid: mild diverticulosis; Descending Colon: normal  Transverse Colon: normal  Ascending Colon: normal  Cecum: normal  Terminal Ileum: not intubated    Specimens:     none    EBL: None    Complications: None; patient tolerated the procedure well.       Recommendations:      - Anusol BID cream and outpatient follow up  Marcelo Agarwal    Signed By: Matteo Anthony MD                        February 22, 2019

## 2019-02-22 NOTE — PROGRESS NOTES
Problem: Falls - Risk of 
Goal: *Absence of Falls Document Luisa Cardenas Fall Risk and appropriate interventions in the flowsheet. Outcome: Progressing Towards Goal 
Fall Risk Interventions: 
Mobility Interventions: Patient to call before getting OOB Medication Interventions: Patient to call before getting OOB Elimination Interventions: Call light in reach

## 2019-02-22 NOTE — H&P
[address] HISTORY AND PHYSICAL Name:  Glenda Charles 
MR#:  [id number] :  1945 ACCOUNT #:  [de-identified] ADMIT DATE:  2019 CHIEF COMPLAINT:  Rectal bleeding. HISTORY OF PRESENT ILLNESS:  This is a 58-year-old female who has a past medical history of inflammatory bowel disease, bipolar, fibromyalgia, migraine, hyperlipidemia, diabetes, GERD, and pulmonary embolism, presented when she initially noted blood in the commode. She also has vaginal bleeding. Of note, she had hysterectomy. In the ED, on exam it was confirmed she rather has rectal bleeding. The patient denied nausea, vomiting, abdominal pain or diarrhea. She denied dysuria, urgency or frequency. Denied taking any blood thinners or antiplatelets. Denied similar history in the past, but she has extensive GI history. She follows with Dr. Orlando Huntley. ED workup with complete blood count, hemoglobin was normal.  Serum chemistry showed mild hypokalemia with potassium of 3.3. A CAT scan of the abdomen and pelvis was negative. No evidence of GI bleed. The patient is requesting for admission for monitoring and evaluation. Gastroenterologist team already came on board and they are planning for a colonoscopy. The patient was put on Protonix. PAST MEDICAL HISTORY:  Discussed in the HPI. MEDICATIONS:  Home medications are listed in the PTA. ALLERGIES:  ADHESIVE, HYDROCODONE, LORAZEPAM, PERCOCET, SUDAFED, WELLBUTRIN, ZITHROMAX, ZYRTEC. SOCIAL HISTORY:  Single. Never smoked. No alcohol or drugs. FAMILY HISTORY:  Her mother has colon cancer. Dad has heart disease and grandparents have heart disease as well. REVIEW OF SYSTEMS:  Positive for rectal bleeding. The rest of the review of systems is negative. PHYSICAL EXAMINATION: 
GENERAL:  On examination, the patient is alert and oriented, comfortable.  
VITAL SIGNS:  Most recent vital signs, temperature of 97.6, blood pressure of 176/84, respirations of 16, and oxygen saturation of 99%. HEENT:  No pallor or jaundice. LUNGS:  Clear. CARDIOVASCULAR:  S1, S2 are heard. No gallop or murmur. ABDOMEN:  Soft, nontender. RECTAL:  Deferred per the patient. EXTREMITIES:  No edema. CENTRAL NERVOUS SYSTEM:  Alert and oriented x4. ASSESSMENT AND PLAN:  Lower gastrointestinal bleeding most probably hemorrhoidal versus diverticulosis. Clear liquid diet for now. Avoid antiplatelets, intravenous fluid support, serial hemoglobin. Gastrointestinal consulted, planning for colonoscopy. CT of the abdomen with contrast is negative for evidence of bleeding. Intravenous Protonix will be continued. If she develops profuse bleeding, may send for urgent CT angiogram.  Her home medications will be continued. Prophylaxis:  Gastrointestinal, she is on Protonix. Deep venous thrombosis, sequential compression device. No heparin product due to gastrointestinal bleed. Code status is full. The patient's prior to admission level of function is ambulatory independently. Jose J Coronado MD 
 
 
[id number] 
D:  02/21/2019 21:24 
T:  02/21/2019 23:24 
JOB #:  [id number]

## 2019-02-22 NOTE — PROGRESS NOTES
Bedside shift change report given to Gary Howard (oncoming nurse) by Rodo Lyn (offgoing nurse). Report included the following information SBAR, Kardex and MAR.

## 2019-02-22 NOTE — PROGRESS NOTES
Yulissa Reyes 1945 
[id number] Situation: 
 
Scheduled Procedure: Procedure(s): 
COLONOSCOPY Verbal report received from: BECK malone 
Preoperative diagnosis: Anemia Background: 
 
Procedure: Procedure(s): 
COLONOSCOPY Physician performing procedure; Dr. Nick WALTON QUESTIONS FLOOR TO ENDO RN 
 
NPO Status/Last PO Intake: 2/21/19 Pregnancy Test:No If yes, result: none Is the patient taking Blood Thinners: NO If yes, list:  and last taken Is the patient diabetic:no If yes, what was the last BS:    Time taken? Anything given? no          
Does the patient have a Pacemaker/Defibrillator in place?: no  
Does the patient need antibiotics before/during/after procedure: no If the patient is having a colon, How much prep was drank? 80 What were the Colon prep results? watery Does the patient have SCD in place:no Is patient on CONTACT precautions:no If yes, what kind of CONTACT precautions:  
 
Assessment: 
Are the vital signs stable prior to patient coming to ENDO? Yes - hypertensive Is the patient alert/oriented and able to sign consent for the procedures:yes How does the patient's abdomen feel prior to coming to ENDO? round and soft yes Does the patient have a patient IV in place? yes Recommendation: 
Family or Friend present no Permission to share finding with Family or Friend yes and n/a

## 2019-02-22 NOTE — ANESTHESIA PREPROCEDURE EVALUATION
Anesthetic History PONV Review of Systems / Medical History Patient summary reviewed, nursing notes reviewed and pertinent labs reviewed Pulmonary Within defined limits Neuro/Psych Within defined limits Cardiovascular Exercise tolerance: >4 METS Comments: Orthostatic hypotension GI/Hepatic/Renal 
  
GERD Endo/Other Diabetes Arthritis Other Findings Physical Exam 
 
Airway Mallampati: I 
TM Distance: > 6 cm Neck ROM: normal range of motion Mouth opening: Normal 
 
 Cardiovascular Rhythm: regular Rate: normal 
 
 
 
 Dental 
No notable dental hx Pulmonary Breath sounds clear to auscultation Abdominal 
 
 
 
 Other Findings Anesthetic Plan ASA: 3 Anesthesia type: MAC Induction: Intravenous Anesthetic plan and risks discussed with: Patient

## 2019-02-23 VITALS
OXYGEN SATURATION: 95 % | SYSTOLIC BLOOD PRESSURE: 170 MMHG | HEART RATE: 68 BPM | BODY MASS INDEX: 23.24 KG/M2 | TEMPERATURE: 98.4 F | HEIGHT: 66 IN | RESPIRATION RATE: 16 BRPM | DIASTOLIC BLOOD PRESSURE: 91 MMHG

## 2019-02-23 PROCEDURE — 99218 HC RM OBSERVATION: CPT

## 2019-02-23 PROCEDURE — 74011000250 HC RX REV CODE- 250: Performed by: HOSPITALIST

## 2019-02-23 PROCEDURE — C9113 INJ PANTOPRAZOLE SODIUM, VIA: HCPCS | Performed by: HOSPITALIST

## 2019-02-23 PROCEDURE — 74011250636 HC RX REV CODE- 250/636: Performed by: HOSPITALIST

## 2019-02-23 PROCEDURE — 74011250637 HC RX REV CODE- 250/637: Performed by: HOSPITALIST

## 2019-02-23 RX ADMIN — CARBIDOPA AND LEVODOPA 1 TABLET: 25; 100 TABLET ORAL at 09:18

## 2019-02-23 RX ADMIN — HYDROCORTISONE 2.5%: 25 CREAM TOPICAL at 09:19

## 2019-02-23 RX ADMIN — SODIUM CHLORIDE 40 MG: 9 INJECTION, SOLUTION INTRAMUSCULAR; INTRAVENOUS; SUBCUTANEOUS at 09:18

## 2019-02-23 RX ADMIN — FLUDROCORTISONE ACETATE 100 MCG: 0.1 TABLET ORAL at 09:18

## 2019-02-23 RX ADMIN — PYRIDOSTIGMINE BROMIDE 60 MG: 60 TABLET ORAL at 06:44

## 2019-02-23 RX ADMIN — Medication 10 ML: at 06:46

## 2019-02-23 RX ADMIN — PAROXETINE HYDROCHLORIDE 30 MG: 20 TABLET, FILM COATED ORAL at 09:18

## 2019-02-23 NOTE — ROUTINE PROCESS
Bedside and Verbal shift change report given to Stewart Delgado RN (oncoming nurse) by Keily Schafer RN (offgoing nurse). Report included the following information SBAR, Kardex, Intake/Output, MAR and Recent Results.

## 2019-02-23 NOTE — PROGRESS NOTES
Bedside shift change report given to Karma Correa (oncoming nurse) by [facility] (offgoing nurse). Report included the following information SBAR.

## 2019-02-23 NOTE — PROGRESS NOTES
Patient discharged home. Discharge instructions and prescriptions given and explained to pt and pt's daughter at bedside. Both verbalized understanding. Peripheral IV discontinued, tip intact, pt tolerated well. Pt off unit via wheelchair. Pt's daughter to transport pt home.

## 2019-02-23 NOTE — PROGRESS NOTES
Bedside shift change report given to BECK Flowers (oncoming nurse) by Mickey Wells RN (offgoing nurse). Report included the following information SBAR, Kardex, MAR and Recent Results.

## 2019-02-24 ENCOUNTER — APPOINTMENT (OUTPATIENT)
Dept: GENERAL RADIOLOGY | Age: 74
End: 2019-02-24
Attending: EMERGENCY MEDICINE
Payer: MEDICARE

## 2019-02-24 ENCOUNTER — HOSPITAL ENCOUNTER (EMERGENCY)
Age: 74
Discharge: HOME | End: 2019-02-24
Attending: EMERGENCY MEDICINE
Payer: MEDICARE

## 2019-02-24 ENCOUNTER — APPOINTMENT (OUTPATIENT)
Dept: CT IMAGING | Age: 74
End: 2019-02-24
Attending: EMERGENCY MEDICINE
Payer: MEDICARE

## 2019-02-24 VITALS
SYSTOLIC BLOOD PRESSURE: 171 MMHG | OXYGEN SATURATION: 98 % | BODY MASS INDEX: 23.14 KG/M2 | RESPIRATION RATE: 16 BRPM | TEMPERATURE: 97.6 F | HEIGHT: 66 IN | WEIGHT: 144 LBS | DIASTOLIC BLOOD PRESSURE: 79 MMHG | HEART RATE: 64 BPM

## 2019-02-24 DIAGNOSIS — S01.01XA LACERATION OF SCALP, INITIAL ENCOUNTER: ICD-10-CM
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
## 2019-02-24 PROCEDURE — 74011250637 HC RX REV CODE- 250/637: Performed by: EMERGENCY MEDICINE

## 2019-02-24 PROCEDURE — 70450 CT HEAD/BRAIN W/O DYE: CPT

## 2019-02-24 PROCEDURE — 73030 X-RAY EXAM OF SHOULDER: CPT

## 2019-02-24 PROCEDURE — 99284 EMERGENCY DEPT VISIT MOD MDM: CPT

## 2019-02-24 RX ORDER — ACETAMINOPHEN 325 MG/1
650 TABLET ORAL
Status: COMPLETED | OUTPATIENT
Start: 2019-02-24 | End: 2019-02-24

## 2019-02-24 RX ADMIN — ACETAMINOPHEN 650 MG: 325 TABLET ORAL at 06:52

## 2019-02-24 NOTE — ED TRIAGE NOTES
.pt arrives from home via ems with complaints of rolling out of bed and hitting her head on a bedside table. Pt has a 1/2 inch head lac behind right ear w/ pain at a 7/10. Pt is a/ox4 reports some dizziness. Pt denies any other complaints at this time.

## 2019-02-24 NOTE — ED NOTES
Verbal shift change report given to [address] [place] [address] (oncoming nurse) by Linda Jenkins (offgoing nurse). Report included the following information SBAR and ED Summary. XR remains in process at this time. CT has resulted.

## 2019-02-24 NOTE — DISCHARGE INSTRUCTIONS
Patient Education        Preventing Falls: Care Instructions  Your Care Instructions    Getting around your home safely can be a challenge if you have injuries or health problems that make it easy for you to fall. Loose rugs and furniture in walkways are among the dangers for many older people who have problems walking or who have poor eyesight. People who have conditions such as arthritis, osteoporosis, or dementia also have to be careful not to fall. You can make your home safer with a few simple measures. Follow-up care is a key part of your treatment and safety. Be sure to make and go to all appointments, and call your doctor if you are having problems. It's also a good idea to know your test results and keep a list of the medicines you take. How can you care for yourself at home? Taking care of yourself  · You may get dizzy if you do not drink enough water. To prevent dehydration, drink plenty of fluids, enough so that your urine is light yellow or clear like water. Choose water and other caffeine-free clear liquids. If you have kidney, heart, or liver disease and have to limit fluids, talk with your doctor before you increase the amount of fluids you drink. · Exercise regularly to improve your strength, muscle tone, and balance. Walk if you can. Swimming may be a good choice if you cannot walk easily. · Have your vision and hearing checked each year or any time you notice a change. If you have trouble seeing and hearing, you might not be able to avoid objects and could lose your balance. · Know the side effects of the medicines you take. Ask your doctor or pharmacist whether the medicines you take can affect your balance. Sleeping pills or sedatives can affect your balance. · Limit the amount of alcohol you drink. Alcohol can impair your balance and other senses. · Ask your doctor whether calluses or corns on your feet need to be removed.  If you wear loose-fitting shoes because of calluses or corns, you can lose your balance and fall. · Talk to your doctor if you have numbness in your feet. Preventing falls at home  · Remove raised doorway thresholds, throw rugs, and clutter. Repair loose carpet or raised areas in the floor. · Move furniture and electrical cords to keep them out of walking paths. · Use nonskid floor wax, and wipe up spills right away, especially on ceramic tile floors. · If you use a walker or cane, put rubber tips on it. If you use crutches, clean the bottoms of them regularly with an abrasive pad, such as steel wool. · Keep your house well lit, especially [facility], and outside walkways. Use night-lights in areas such as hallways and bathrooms. Add extra light switches or use remote switches (such as switches that go on or off when you clap your hands) to make it easier to turn lights on if you have to get up during the night. · Install sturdy handrails on stairways. · Move items in your cabinets so that the things you use a lot are on the lower shelves (about waist level). · Keep a cordless phone and a flashlight with new batteries by your bed. If possible, put a phone in each of the main rooms of your house, or carry a cell phone in case you fall and cannot reach a phone. Or, you can wear a device around your neck or wrist. You push a button that sends a signal for help. · Wear low-heeled shoes that fit well and give your feet good support. Use footwear with nonskid soles. Check the heels and soles of your shoes for wear. Repair or replace worn heels or soles. · Do not wear socks without shoes on wood floors. · Walk on the grass when the sidewalks are slippery. If you live in an area that gets snow and ice in the winter, sprinkle salt on slippery steps and sidewalks. Preventing falls in the bath  · Install grab bars and nonskid mats inside and outside your shower or tub and near the toilet and sinks. · Use shower chairs and bath benches.   · Use a hand-held shower head that will allow you to sit while showering. · Get into a tub or shower by putting the weaker leg in first. Get out of a tub or shower with your strong side first.  · Repair loose toilet seats and consider installing a raised toilet seat to make getting on and off the toilet easier. · Keep your bathroom door unlocked while you are in the shower. Where can you learn more? Go to http://ash-jeronimo.info/. Enter 0476 [id number] in the search box to learn more about \"Preventing Falls: Care Instructions. \"  Current as of: March 16, 2018  Content Version: 11.8  © 0721-1739 [employer]. Care instructions adapted under license by [employer] (which disclaims liability or warranty for this information). If you have questions about a medical condition or this instruction, always ask your healthcare professional. [place] [address] any warranty or liability for your use of this information. Patient Education        Cuts: Care Instructions  Your Care Instructions  A cut can happen anywhere on your body. Stitches, staples, skin adhesives, or pieces of tape called Steri-Strips are sometimes used to keep the edges of a cut together and help it heal. Steri-Strips can be used by themselves or with stitches or staples. Sometimes cuts are left open. If the cut went deep and through the skin, the doctor may have closed the cut in two layers. A deeper layer of stitches brings the deep part of the cut together. These stitches will dissolve and don't need to be removed. The upper layer closure, which could be stitches, staples, Steri-Strips, or adhesive, is what you see on the cut. A cut is often covered by a bandage. The doctor has checked you carefully, but problems can develop later. If you notice any problems or new symptoms, get medical treatment right away. Follow-up care is a key part of your treatment and safety.  Be sure to make and go to all appointments, and call your doctor if you are having problems. It's also a good idea to know your test results and keep a list of the medicines you take. How can you care for yourself at home? If a cut is open or closed  · Prop up the sore area on a pillow anytime you sit or lie down during the next 3 days. Try to keep it above the level of your heart. This will help reduce swelling. · Keep the cut dry for the first 24 to 48 hours. After this, you can shower if your doctor okays it. Pat the cut dry. · Don't soak the cut, such as in a bathtub. Your doctor will tell you when it's safe to get the cut wet. · After the first 24 to 48 hours, clean the cut with soap and water 2 times a day unless your doctor gives you different instructions. ? Don't use hydrogen peroxide or alcohol, which can slow healing. ? You may cover the cut with a thin layer of petroleum jelly and a nonstick bandage. ? If the doctor put a bandage over the cut, put on a new bandage after cleaning the cut or if the bandage gets wet or dirty. · Avoid any activity that could cause your cut to reopen. · Be safe with medicines. Read and follow all instructions on the label. ? If the doctor gave you a prescription medicine for pain, take it as prescribed. ? If you are not taking a prescription pain medicine, ask your doctor if you can take an over-the-counter medicine. If the cut is closed with stitches, staples, or Steri-Strips  · Follow the above instructions for open or closed cuts. · Do not remove the stitches or staples on your own. Your doctor will tell you when to come back to have the stitches or staples removed. · Leave Steri-Strips on until they fall off. If the cut is closed with a skin adhesive  · Follow the above instructions for open or closed cuts. · Leave the skin adhesive on your skin until it falls off on its own. This may take 5 to 10 days. · Do not scratch, rub, or pick at the adhesive.   · Do not put the sticky part of a bandage directly on the adhesive. · Do not put any kind of ointment, cream, or lotion over the area. This can make the adhesive fall off too soon. Do not use hydrogen peroxide or alcohol, which can slow healing. When should you call for help? Call your doctor now or seek immediate medical care if:    · You have new pain, or your pain gets worse.     · The skin near the cut is cold or pale or changes color.     · You have tingling, weakness, or numbness near the cut.     · The cut starts to bleed, and blood soaks through the bandage. Oozing small amounts of blood is normal.     · You have trouble moving the area near the cut.     · You have symptoms of infection, such as:  ? Increased pain, swelling, warmth, or redness around the cut.  ? Red streaks leading from the cut.  ? Pus draining from the cut.  ? A fever.    Watch closely for changes in your health, and be sure to contact your doctor if:    · The cut reopens.     · You do not get better as expected. Where can you learn more? Go to http://ash-jeronimo.info/. Enter M735 in the search box to learn more about \"Cuts: Care Instructions. \"  Current as of: September 23, 2018  Content Version: 11.9  © 4746-9005 [employer]. Care instructions adapted under license by [employer] (which disclaims liability or warranty for this information). If you have questions about a medical condition or this instruction, always ask your healthcare professional. [place] [address] any warranty or liability for your use of this information.

## 2019-02-24 NOTE — ED PROVIDER NOTES
75 y.o. female with past medical history significant for IBS, Bipolar disorder, Fibromyalgia, Migraine, Hypercholesterolemia, Diabetes, GERD, PE, Spinal stenosis who presents via EMS with chief complaint of evaluation after GLF. Patient states prior to arrival she was asleep and accidentally rolled out of her bed and onto the floor approximately ~2 feet. Patient suspects hitting her head on the night table, as she had immediate onset of a small laceration behind the right ear with bleeding. Patient endorses head trauma and states following the fall she could not ambulate for ~35 minutes due to being \"stunned\", but denies LOC. Patient presents to [facility] ED with head pain at the site of the small laceration behind the right ear which she rated as 7/10 in severity, and upon examination the laceration is not actively bleeding. Patient denies taking blood thinners. Pt denies fever, chills, cough, congestion, shortness of breath, chest pain, abdominal pain, nausea, vomiting, diarrhea, difficulty with urination or dysuria. There are no other acute medical concerns at this time. PCP: Wilhelmenia Mortimer, MD 
 
Note written by Yinka Barclay, as dictated by Flavia Chaney MD 6:27 AM 
 
 
 
The history is provided by the patient and the EMS personnel. Past Medical History:  
Diagnosis Date  Bipolar disorder ([address] [employer]  Chronic pain BACK PAIN  
 Diabetes ([address] BORDERLINE TX WITH DIET AND EXERCISE  DJD (degenerative joint disease)  Fibromyalgia  Genital herpes  GERD (gastroesophageal reflux disease)  Hypercholesterolemia  IBS (irritable bowel syndrome)  Lumbar disc disease   
 stimulation-Honza  Migraine  Nausea & vomiting  Other ill-defined conditions(799.89) SEASONAL allergies  Other ill-defined conditions(799.89) dysphagia has had esophagus dilated  Paget's disease  Pulmonary embolism ([address]  RLS (restless legs syndrome)  Spinal stenosis Past Surgical History:  
Procedure Laterality Date  COLONOSCOPY N/A 6/27/2016 COLONOSCOPY performed by Deneen Anedrson MD at [facility] ENDOSCOPY  COLONOSCOPY N/A 2/22/2019 COLONOSCOPY performed by Charu Villegas MD at [facility] ENDOSCOPY  ENDOSCOPY, COLON, DIAGNOSTIC    
 12, due 15  
 HX APPENDECTOMY  HX BACK SURGERY  9/17/2013  
 back surgery - total of 3 as of 12/20/2013  HX BREAST BIOPSY Right years ago  
 negative  HX CHOLECYSTECTOMY  HX HEENT    
 T & A  
 HX HYSTERECTOMY    
 total for fibroid tumors  HX ORTHOPAEDIC    
 lumbar laminectomy then fusion/neurostimulator implanted - inactive now but still in  
 HX ORTHOPAEDIC    
 REMOVAL OF NEUROSTIMULATOR  HX OTHER SURGICAL    
 EGD x 2 with dilation/colonoscopy - no polyps per pt  TILT TABLE EVALUATION  8/2/2016 Family History:  
Problem Relation Age of Onset  Colon Cancer Mother  Cancer Mother 68  
     colon  Heart Disease Father  Heart Disease Maternal Grandmother  Heart Disease Maternal Grandfather  Heart Disease Other Social History Socioeconomic History  Marital status: SINGLE Spouse name: Not on file  Number of children: Not on file  Years of education: Not on file  Highest education level: Not on file Social Needs  Financial resource strain: Not on file  Food insecurity - worry: Not on file  Food insecurity - inability: Not on file  Transportation needs - medical: Not on file  Transportation needs - non-medical: Not on file Occupational History  Occupation: volunteer Employer: RETIRED Comment: [employer] Tobacco Use  Smoking status: Never Smoker  Smokeless tobacco: Never Used Substance and Sexual Activity  Alcohol use: No  
  Comment: 1 per month  Drug use: No  
 Sexual activity: Not on file Other Topics Concern  Not on file Social History Narrative  Not on file ALLERGIES: Adhesive; Hydrocodone; Lorazepam; Other medication; Percocet [oxycodone-acetaminophen]; Sudafed [pseudoephedrine hcl]; Wellbutrin [bupropion hcl]; Zithromax [azithromycin]; and Zyrtec [cetirizine] Review of Systems Constitutional: Negative for chills and fever. HENT: Negative for congestion and facial swelling. Eyes: Negative for visual disturbance. Respiratory: Negative for cough, chest tightness and shortness of breath. Cardiovascular: Negative for chest pain. Gastrointestinal: Negative for abdominal pain, diarrhea, nausea and vomiting. Genitourinary: Negative for difficulty urinating and dysuria. Musculoskeletal: Negative for arthralgias. Skin: Positive for wound. Negative for rash. Neurological: Negative for dizziness and headaches. Hematological: Negative for adenopathy. Psychiatric/Behavioral: Negative for suicidal ideas. All other systems reviewed and are negative. There were no vitals filed for this visit. Physical Exam  
Constitutional: She is oriented to person, place, and time. She appears well-developed and well-nourished. No distress. HENT:  
Head: Normocephalic and atraumatic. Mouth/Throat: Oropharynx is clear and moist.  
Eyes: Pupils are equal, round, and reactive to light. No scleral icterus. Neck: Normal range of motion. Neck supple. No thyromegaly present. Cardiovascular: Normal rate, regular rhythm, normal heart sounds and intact distal pulses. No murmur heard. Pulmonary/Chest: Effort normal and breath sounds normal. No respiratory distress. Abdominal: Soft. Bowel sounds are normal. She exhibits no distension. There is no tenderness. Musculoskeletal: Normal range of motion. She exhibits no edema. Neurological: She is alert and oriented to person, place, and time. Skin: Skin is warm and dry. No rash noted. She is not diaphoretic. <1 cm laceration behind right ear, no active bleeding Nursing note and vitals reviewed. Note written by Yinka Avina, as dictated by Bertha Cui MD 6:26 AM 
  
 
MDM Number of Diagnoses or Management Options Fall, initial encounter:  
Laceration of scalp, initial encounter:  
Diagnosis management comments: HCT and xray neg. No need for repair of lac. STable for discharge home. Procedures

## 2019-02-25 ENCOUNTER — PATIENT OUTREACH (OUTPATIENT)
Dept: INTERNAL MEDICINE CLINIC | Age: 74
End: 2019-02-25

## 2019-02-25 NOTE — PROGRESS NOTES
Hospital Discharge Follow-Up Date/Time:  2019 11:40 AM 
 
Patient was admitted to [facility] on 19 and discharged on 19 for GIB. The physician discharge summary was available at the time of outreach. Patient was contacted within one business days of discharge. Top Challenges reviewed with the provider Component Latest Ref Rng & Units 2019  
 
      4:25 AM  4:39 PM  7:22 AM  
HGB 
    11.5 - 16.0 g/dL 12.0 14.2 13.1 Consider repeat CBC Method of communication with provider :chart routing Inpatient RRAT score: 26 Was this a readmission? no  
Patient stated reason for the readmission: NA 
 
Nurse Navigator (NN) contacted the patient by telephone to perform post hospital discharge assessment. Verified name and  with patient as identifiers. Provided introduction to self, and explanation of the Nurse Navigator role. Reviewed discharge instructions and red flags with patient who verbalized understanding. Patient given an opportunity to ask questions and does not have any further questions or concerns at this time. The patient agrees to contact the PCP office for questions related to their healthcare. NN provided contact information for future reference. Disease Specific:   N/A Summary of patient's top problems: 
1. GIB-abd pain; CTA neg;   colonoscopy revealed rectal prolapse, mild diverticulosis- recommended Anusol BID cream and o/p f/u GI 
2. ED visit 19 s/p GLF- rolled out of bed with small laceration behind right ear Home Health orders at discharge: none Home Health company: NA 
Date of initial visit: NA 
 
Durable Medical Equipment ordered/company: NA 
Durable Medical Equipment received: NA Barriers to care? None identified Advance Care Planning:  
Does patient have an Advance Directive:  reviewed and current Medication(s):  
New Medications at Discharge: Anusol, Docusate sodium Changed Medications at Discharge: NA 
Discontinued Medications at Discharge: NA Medication reconciliation was performed with patient, who verbalizes understanding of administration of home medications. There were no barriers to obtaining medications identified at this time. Referral to Pharm D needed: no  
 
Current Outpatient Medications Medication Sig  
 hydrocortisone (ANUSOL-HC) 2.5 % rectal cream Insert  into rectum four (4) times daily.  docusate sodium (COLACE) 100 mg capsule Take 1 Cap by mouth two (2) times a day for 90 days.  omeprazole (PRILOSEC) 40 mg capsule Take 40 mg by mouth daily.  midodrine (PROAMITINE) 5 mg tablet TAKE 1 TABLET BY MOUTH THREE TIMES DAILY  sod. chlorid/potassium chloride (BUFFERED SALT PO) Take  by mouth.  sucralfate (CARAFATE) 1 gram tablet TAKE 1 TABLET BY MOUTH FOUR TIMES DAILY  pyridostigmine (MESTINON) 60 mg tablet TAKE 1 TABLET BY MOUTH THREE TIMES DAILY  fludrocortisone (FLORINEF) 0.1 mg tablet TAKE 1 TABLET BY MOUTH TWICE DAILY  atorvastatin (LIPITOR) 20 mg tablet TAKE 1 TABLET BY MOUTH EVERY EVENING  
 ondansetron hcl (ZOFRAN) 4 mg tablet Take 4 mg by mouth every eight (8) hours as needed for Nausea.  potassium chloride (K-DUR, KLOR-CON) 20 mEq tablet TAKE 1 TABLET BY MOUTH DAILY  LACTOBACILLUS ACIDOPHILUS (PROBIOTIC PO) Take  by mouth daily.  carbidopa-levodopa (SINEMET)  mg per tablet Take 1 Tab by mouth three (3) times daily.  PARoxetine (PAXIL) 30 mg tablet Take 30 mg by mouth daily. Memo Blackburn NP/ Dr Celeste Butt  acetaminophen (TYLENOL) 325 mg tablet Take 325 mg by mouth every four (4) hours as needed for Pain.  QUEtiapine (SEROQUEL) 25 mg tablet Take 25-50 mg by mouth nightly as needed. Take 1-2 tabs qhs prn Memo Blackburn NP/Dr Celeste Butt No current facility-administered medications for this visit. There are no discontinued medications. [facility] follow up appointment(s):  
Future Appointments Date Time Provider Reji Smith 2/28/2019 11:30 AM Jhoana Mcdaniel MD [facility] RACHEL CAMARENA  
3/21/2019 11:00 AM Jhoana Mcdaniel MD [id number] [facility]  
5/14/2019 11:40 AM Nani Cerda  [address] [facility] follow up appointment(s): Someone from Dr Marylen Noun called back and said they will contact patient to schedule 2 week f/u [facility]:  n/a  
 
 
Goals  Prevent complications post hospitalization. 02/25/19 Patient will not have any GIB x 30 days · Reports BM today- no blood noted · Tolerating diet · Denies dizziness, headache, chest pain, sob · Reports \"knot\" to right side of head s/p rolling off bed · Reports total body \"soreness\" · Has PCP appointment 2/28 Jimmie Chang

## 2019-02-27 ENCOUNTER — PATIENT OUTREACH (OUTPATIENT)
Dept: INTERNAL MEDICINE CLINIC | Age: 74
End: 2019-02-27

## 2019-03-06 ENCOUNTER — OFFICE VISIT (OUTPATIENT)
Dept: INTERNAL MEDICINE CLINIC | Age: 74
End: 2019-03-06

## 2019-03-06 VITALS
TEMPERATURE: 97.8 F | DIASTOLIC BLOOD PRESSURE: 80 MMHG | HEIGHT: 66 IN | BODY MASS INDEX: 22.47 KG/M2 | SYSTOLIC BLOOD PRESSURE: 124 MMHG | HEART RATE: 84 BPM | OXYGEN SATURATION: 94 % | RESPIRATION RATE: 16 BRPM | WEIGHT: 139.8 LBS

## 2019-03-06 DIAGNOSIS — E11.21 TYPE 2 DIABETES WITH NEPHROPATHY (HCC): ICD-10-CM

## 2019-03-06 DIAGNOSIS — K62.5 GASTROINTESTINAL HEMORRHAGE ASSOCIATED WITH ANORECTAL SOURCE: Primary | ICD-10-CM

## 2019-03-06 DIAGNOSIS — I95.1 ORTHOSTATIC HYPOTENSION: ICD-10-CM

## 2019-03-06 DIAGNOSIS — F31.78 BIPOLAR DISORDER, IN FULL REMISSION, MOST RECENT EPISODE MIXED (HCC): ICD-10-CM

## 2019-03-06 DIAGNOSIS — Z79.899 POLYPHARMACY: ICD-10-CM

## 2019-03-06 DIAGNOSIS — M25.511 ACUTE PAIN OF RIGHT SHOULDER: ICD-10-CM

## 2019-03-06 NOTE — PROGRESS NOTES
Yulissa Reyes is a 75 y.o. female    Chief Complaint   Patient presents with   [facility] Follow Up     Pt is here for a hospital f/u.        1. Have you been to the ER, urgent care clinic since your last visit? Hospitalized since your last visit? Yes at [facility] on 02 March 2019.    2. Have you seen or consulted any other health care providers outside of the [address] [place] [address] since your last visit? Include any pap smears or colon screening.   No     Health Maintenance Due   Topic Date Due    Shingrix Vaccine Age 49> (1 of 2) 02/08/1994    FOOT EXAM Q1  04/25/2018    MEDICARE YEARLY EXAM  08/01/2018    GLAUCOMA SCREENING Q2Y  02/09/2019    EYE EXAM RETINAL OR DILATED  02/09/2019    BREAST CANCER SCRN MAMMOGRAM  02/28/2019    LIPID PANEL Q1  03/08/2019       Visit Vitals  /80 (BP 1 Location: Left arm, BP Patient Position: Sitting)   Pulse 84   Temp 97.8 °F (36.6 °C) (Oral)   Resp 16   Ht 5' 6\" (1.676 m)   Wt 139 lb 12.8 oz (63.4 kg)   SpO2 94%   BMI 22.56 kg/m²

## 2019-03-06 NOTE — PROGRESS NOTES
HISTORY OF PRESENT ILLNESS  Iliana Burgess is a 75 y.o. female. HPI Subjective:  Madelaine Riedel is seen today for a hospital follow up after a GI bleed and other concerns. She is accompanied by her daughter. She was admitted to [facility] [place] on 02/21/19 and discharged on 02/22. Nurse navigator contact was within two business days and the office visit is today, the 6th. This represents a transition of care visit. I reviewed the available electronic records, as well as the nurse navigator notes. 1. GI bleed, resolved, likely hemorrhoidal.  It seemed like a significant volume, however, and given her multiple other problems, admission was required. She has follow up with Dr. Adarsh Stearns of GI medicine on a regular basis. 2. Fall. She has had two falls, as she does have some unsteadiness. 3. Orthostatic hypotension, appears to have stabilized. 4. Bipolar disorder. She is on medication treatment for her psychiatrist.    Review of Systems:  Notable for 8/10 right shoulder pain. She has occasional confusion with memory loss. Medications:  Reviewed. She definitely has polypharmacy. Her daughter is concerned about this. I would like her to attend specialist visits with Madelaine Riedel to see if some of the medications can be toned down as she certainly may be having some medication side effects. She also needs to ask her daughter to manage the medications in terms of filling pill boxes, etc.  They agree to a consultation with our Doctor of Pharmacy, Carlos Escalona, to review her medications in full. They know to bring the bottles in with them. Name is provided and they will schedule on their own. Current Outpatient Medications   Medication Sig    hydrocortisone (ANUSOL-HC) 2.5 % rectal cream Insert  into rectum four (4) times daily.  docusate sodium (COLACE) 100 mg capsule Take 1 Cap by mouth two (2) times a day for 90 days.  omeprazole (PRILOSEC) 40 mg capsule Take 40 mg by mouth daily.     midodrine (PROAMITINE) 5 mg tablet TAKE 1 TABLET BY MOUTH THREE TIMES DAILY    sod. chlorid/potassium chloride (BUFFERED SALT PO) Take  by mouth.  sucralfate (CARAFATE) 1 gram tablet TAKE 1 TABLET BY MOUTH FOUR TIMES DAILY    pyridostigmine (MESTINON) 60 mg tablet TAKE 1 TABLET BY MOUTH THREE TIMES DAILY    fludrocortisone (FLORINEF) 0.1 mg tablet TAKE 1 TABLET BY MOUTH TWICE DAILY    atorvastatin (LIPITOR) 20 mg tablet TAKE 1 TABLET BY MOUTH EVERY EVENING    ondansetron hcl (ZOFRAN) 4 mg tablet Take 4 mg by mouth every eight (8) hours as needed for Nausea.  potassium chloride (K-DUR, KLOR-CON) 20 mEq tablet TAKE 1 TABLET BY MOUTH DAILY    carbidopa-levodopa (SINEMET)  mg per tablet Take 1 Tab by mouth three (3) times daily.  PARoxetine (PAXIL) 30 mg tablet Take 30 mg by mouth daily. Rocio Love NP/ Dr Darvin Hunter acetaminophen (TYLENOL) 325 mg tablet Take 325 mg by mouth every four (4) hours as needed for Pain.  QUEtiapine (SEROQUEL) 25 mg tablet Take 25-50 mg by mouth nightly as needed. Take 1-2 tabs qhs prn Rocio Love NP/Dr Tavo Mercedes     No current facility-administered medications for this visit. Review of Systems   Gastrointestinal: Positive for blood in stool. Musculoskeletal: Positive for joint pain. Psychiatric/Behavioral: Positive for memory loss. The patient is nervous/anxious. Physical Exam   Constitutional: No distress. Cardiovascular: Normal rate and regular rhythm. Exam reveals no gallop and no friction rub. No murmur heard. Pulmonary/Chest: Effort normal and breath sounds normal.   Abdominal: Soft. She exhibits no distension. There is no tenderness. There is no rebound. Musculoskeletal:        Right shoulder: She exhibits decreased range of motion and tenderness. She exhibits no deformity and no spasm. Neurological: She is alert. Skin: Skin is warm and dry. Psychiatric: She has a normal mood and affect.  Her behavior is normal.   Nursing note and vitals reviewed. ASSESSMENT and PLAN  Diagnoses and all orders for this visit:    1. Gastrointestinal hemorrhage associated with anorectal source- Call with recurrence , . See gastroenterologist as directed     2. Acute pain of right shoulder  -     REFERRAL TO ORTHOPEDIC SURGERY    3. Polypharmacy  -     REFERRAL TO PHARMACIST    4. Type 2 diabetes with nephropathy ([facility] [address]- Follow off treatment     5. Bipolar disorder, in full remission, most recent episode mixed [facility])- See psychiatrist as directed     6. Orthostatic hypotension- See cardiologist as directed.      Plan was reviewed with patient and family, understanding expressed

## 2019-03-13 ENCOUNTER — OFFICE VISIT (OUTPATIENT)
Dept: INTERNAL MEDICINE CLINIC | Age: 74
End: 2019-03-13

## 2019-03-13 ENCOUNTER — PATIENT OUTREACH (OUTPATIENT)
Dept: INTERNAL MEDICINE CLINIC | Age: 74
End: 2019-03-13

## 2019-03-13 DIAGNOSIS — Z71.89 ENCOUNTER FOR MEDICATION REVIEW AND COUNSELING: Primary | ICD-10-CM

## 2019-03-13 NOTE — PROGRESS NOTES
NN met with patient and daughter in office today to introduce self  Patient here to go over medications with Jane Cox PharmD  Patient voiced concerns with near syncopal episodes  Most recently this past Monday. BP taken 79 systolic  Patient did not seek medical attention. Jane Cox recommended patient to follow up with cardiologist and discuss near syncopal episodes  Patient/daughter voiced understanding    Goals      Prevent complications post hospitalization.       02/25/19  Patient will not have any GIB x 30 days  · Reports BM today- no blood noted  · Tolerating diet  · Denies dizziness, headache, chest pain, sob  · Reports \"knot\" to right side of head s/p rolling off bed  · Reports total body \"soreness\"  · Has PCP appointment 2/28  .  02/27/19  · YULISSA appointment changed to 3/6/19  · Patient aware  · Pt reports she has f/u appt with Surgeon scheduled 3/4  Mynor Mcallister RN    03/13/19  · Patient reports multiple BMs (diarrhea)  · Alena reviewed medications, refer to encounter

## 2019-03-13 NOTE — PATIENT INSTRUCTIONS
1)  Start keeping track of when your episodes happen to look for patterns    2)  Talk to your neurologist, cardiologist and mental health providers regarding the medications you are taking to see if they feel like they are contributing to your dizziness. The medications that you are taking that do have the possibility of these adverse effects include: paroxetine, quetiapine, and carbidopa/levodopa.

## 2019-03-13 NOTE — Clinical Note
Thank you for the consult. Three meds that could cause dizziness/sedation/hypotension are prescribed by specialist.  Asked them to discuss with them to see if MD feels like they are contributing.

## 2019-03-13 NOTE — PROGRESS NOTES
Pharmacy Note:  Medication Review    S/O:  Adina De Paz is a 75 y.o. female who was seen today, along with her daughter, to review her medications. They want to make sure that the patient's medication are not interacting or causing any adverse effects. Patient states that she has just been feeling very weak and has \"passing out spells\" that are increasing in frequency. She can't determine any specific pattern, although she does take medications for orthostatic hypotension. She states these can happens after she eats, other times when she has been sitting for a while, but not always. She describes an episode last Wednesday when she was at [facility] [facility] eating lunch, and when she got home and took her blood pressure it was 72/39. She also states that this morning she had to sit on her rollator chair because she felt dizzy in the morning after eating breakfast.  Patient denied feeling dizzy or week in the office. Patient states she doesn't know when her dizziness/\"passing out spells\" are going to happen so she doesn't like to go places. She has not discussed this change in symptoms with her specialists. She also states that she naps almost daily after lunch. She is unsure if this is due to her medications, but states that she doesn't always sleep well at night. Seroquel helps but sometimes needs to take 2 as directed by her mental health providers. Patient brought in her medications. During the interview the following was noted: Ondansetron:  Not taking   Docusate: thinks it is starting to cause diarrhea. About 30-60 hr after taking patient will have a lose BM which started about 3 days ago. They intend on decreasing to daily  Fludricortisone: Patient takes at breakfast and lunch  Seroquel: Usually only takes 1 at bedltime which helps her to sleep. Some times she will take 2 but denies it makes her washed out the next morning or overly sedated at night.    Paroxetine:  Unsure if it is helping her anymore, she has been on it for a while    Patient's daughter has starting filling the pillbox after she noticed that some of patient's medications were still in her pillbox when she would look at it. She is interested in anything that would help her mom to take her medications easier.       Vitals  Wt Readings from Last 3 Encounters:   03/06/19 139 lb 12.8 oz (63.4 kg)   02/24/19 144 lb (65.3 kg)   02/05/19 144 lb (65.3 kg)     Temp Readings from Last 3 Encounters:   03/06/19 97.8 °F (36.6 °C) (Oral)   02/24/19 97.6 °F (36.4 °C)   02/23/19 98.4 °F (36.9 °C)     BP Readings from Last 3 Encounters:   03/06/19 124/80   02/24/19 171/79   02/23/19 (!) 170/91     Pulse Readings from Last 3 Encounters:   03/06/19 84   02/24/19 64   02/23/19 68       Past Medical History:   Diagnosis Date    Bipolar disorder (HCC)     Nazmy    Chronic pain     BACK PAIN    Diabetes ([facility] [address]     BORDERLINE TX WITH DIET AND EXERCISE    DJD (degenerative joint disease)     Fibromyalgia     Genital herpes     GERD (gastroesophageal reflux disease)     Hypercholesterolemia     IBS (irritable bowel syndrome)     Lumbar disc disease     stimulation-Honza    Migraine     Nausea & vomiting     Other ill-defined conditions(799.89)     SEASONAL allergies    Other ill-defined conditions(799.89)     dysphagia has had esophagus dilated    Paget's disease     Pulmonary embolism (HCC)     RLS (restless legs syndrome)     Spinal stenosis        Allergies   Allergen Reactions    Adhesive Itching    Hydrocodone Itching    Lorazepam Other (comments)    Other Medication Rash     Tide detergent    Percocet [Oxycodone-Acetaminophen] Itching    Sudafed [Pseudoephedrine Hcl] Other (comments)     Vertigo    Wellbutrin [Bupropion Hcl] Nausea and Vomiting    Zithromax [Azithromycin] Vertigo    Zyrtec [Cetirizine] Nausea Only       Current Outpatient Medications   Medication Sig    docusate sodium (COLACE) 100 mg capsule Take 1 Cap by mouth two (2) times a day for 90 days.  omeprazole (PRILOSEC) 40 mg capsule Take 40 mg by mouth two (2) times a day.  midodrine (PROAMITINE) 5 mg tablet TAKE 1 TABLET BY MOUTH THREE TIMES DAILY    sod. chlorid/potassium chloride (BUFFERED SALT PO) Take 1 Tab by mouth two (2) times a day. Breakfast and lunch    sucralfate (CARAFATE) 1 gram tablet TAKE 1 TABLET BY MOUTH FOUR TIMES DAILY    pyridostigmine (MESTINON) 60 mg tablet TAKE 1 TABLET BY MOUTH THREE TIMES DAILY    fludrocortisone (FLORINEF) 0.1 mg tablet TAKE 1 TABLET BY MOUTH TWICE DAILY    atorvastatin (LIPITOR) 20 mg tablet TAKE 1 TABLET BY MOUTH EVERY EVENING    potassium chloride (K-DUR, KLOR-CON) 20 mEq tablet TAKE 1 TABLET BY MOUTH DAILY    carbidopa-levodopa (SINEMET)  mg per tablet Take 1 Tab by mouth three (3) times daily.  PARoxetine (PAXIL) 30 mg tablet Take 30 mg by mouth daily. Gwen Zhu NP/ Dr Laura Graves acetaminophen (TYLENOL) 325 mg tablet Take 325 mg by mouth every four (4) hours as needed for Pain.  QUEtiapine (SEROQUEL) 25 mg tablet Take 25-50 mg by mouth nightly as needed. Take 1-2 tabs qhs prn Gwen Zhu NP/Dr Laura Graves hydrocortisone (ANUSOL-HC) 2.5 % rectal cream Insert  into rectum four (4) times daily. No current facility-administered medications for this visit. Medications Discontinued During This Encounter   Medication Reason    ondansetron hcl (ZOFRAN) 4 mg tablet Not A Current Medication     A/P:   1. Medication review:  Reviewed with patient and daughter indications and common adverse effects for each of her medications. Discussed how a couple of her medications are associated with causing dizziness, sedation and orthostatic hypotension. It is hard to say if these medications are causing her dizzy/passing out spells, but they might be contributing. These include: carbodopa/levodopa, seroquel, and paroxetine.   Even though she has been on some of these medications for a while (seroquel, paroxetine), her body may not be reacting the same way to them compared to when she was younger and/or now that her daughter is helping with her pill box she may be taking them on a more regular basis. I recommended that she discuss these medications with her neurologist and mental health providers as they are prescribing these medications to see if they feel they could be contributing to her spells. I also recommended talking to her mental health provider about the paroxetine since patient doesn't feel like it is helping anymore and it can have anticholinergic effects that can worsen hypotension and dizziness. Patient will also be contacting Dr. Paulie Blanchard about her hypotensive episode last week. Reminded patient to make sure she stays hydrated during the day. Discussed patient and daughter looking into [facility] as a potential way to help patient keep her medications straight. Website given to daughter. Patient and daughter verbalized understanding of the above.     Notifications of recommendations will be sent to Dr. Kayode Rice MD for review    Thank you for the consult,  SUPRIYA AlanizD BCACP    Time Spent:  90 min

## 2019-03-27 ENCOUNTER — PATIENT OUTREACH (OUTPATIENT)
Dept: INTERNAL MEDICINE CLINIC | Age: 74
End: 2019-03-27

## 2019-03-27 NOTE — PROGRESS NOTES
Patient has graduated from the Transitions of Care Coordination  program on 3/27/19. Patient's symptoms are stable at this time. Patient/family has the ability to self-manage. Care management goals have been completed at this time. No further nurse navigator follow up scheduled. Goals Addressed                 This Visit's Progress     COMPLETED: Prevent complications post hospitalization. 02/25/19  Patient will not have any GIB x 30 days  · Reports BM today- no blood noted  · Tolerating diet  · Denies dizziness, headache, chest pain, sob  · Reports \"knot\" to right side of head s/p rolling off bed  · Reports total body \"soreness\"  · Has PCP appointment 2/28  .  02/27/19  · [address] appointment changed to 3/6/19  · Patient aware  · Pt reports she has f/u appt with Surgeon scheduled 3/4  Kelly Roblero RN    03/13/19  · Patient reports multiple BMs (diarrhea)  · Veronica Marina reviewed medications, refer to encounter    03/27/19  · Reports she follow up with neurology, MRI brain scheduled 4/24/19  · Denies any falls  · Reports HH SN has been extended 6 weeks            Pt has nurse navigator's contact information for any further questions, concerns, or needs.   Patients upcoming visits:    Future Appointments   Date Time Provider Reji Smith   5/14/2019 11:40 AM Sheeba Rodriguez  [address]

## 2019-04-12 RX ORDER — POTASSIUM CHLORIDE 20 MEQ/1
TABLET, EXTENDED RELEASE ORAL
Qty: 90 TAB | Refills: 0 | Status: SHIPPED | OUTPATIENT
Start: 2019-04-12 | End: 2019-04-18

## 2019-04-18 ENCOUNTER — APPOINTMENT (OUTPATIENT)
Dept: CT IMAGING | Age: 74
End: 2019-04-18
Attending: EMERGENCY MEDICINE
Payer: MEDICARE

## 2019-04-18 ENCOUNTER — TELEPHONE (OUTPATIENT)
Dept: INTERNAL MEDICINE CLINIC | Age: 74
End: 2019-04-18

## 2019-04-18 ENCOUNTER — HOSPITAL ENCOUNTER (EMERGENCY)
Age: 74
Discharge: HOME | End: 2019-04-18
Attending: EMERGENCY MEDICINE
Payer: MEDICARE

## 2019-04-18 ENCOUNTER — APPOINTMENT (OUTPATIENT)
Dept: GENERAL RADIOLOGY | Age: 74
End: 2019-04-18
Attending: EMERGENCY MEDICINE
Payer: MEDICARE

## 2019-04-18 VITALS
TEMPERATURE: 98.7 F | HEART RATE: 76 BPM | SYSTOLIC BLOOD PRESSURE: 129 MMHG | DIASTOLIC BLOOD PRESSURE: 79 MMHG | RESPIRATION RATE: 15 BRPM | OXYGEN SATURATION: 99 %

## 2019-04-18 DIAGNOSIS — K52.9 COLITIS, ACUTE: Primary | ICD-10-CM

## 2019-04-18 LAB
ALBUMIN SERPL-MCNC: 3.8 G/DL (ref 3.5–5)
ALBUMIN/GLOB SERPL: 1.2 {RATIO} (ref 1.1–2.2)
ALP SERPL-CCNC: 69 U/L (ref 45–117)
ALT SERPL-CCNC: 9 U/L (ref 12–78)
ANION GAP SERPL CALC-SCNC: 10 MMOL/L (ref 5–15)
APPEARANCE UR: CLEAR
AST SERPL-CCNC: 19 U/L (ref 15–37)
BACTERIA URNS QL MICRO: NEGATIVE /HPF
BILIRUB SERPL-MCNC: 0.6 MG/DL (ref 0.2–1)
BILIRUB UR QL: NEGATIVE
BUN SERPL-MCNC: 11 MG/DL (ref 6–20)
BUN/CREAT SERPL: 12 (ref 12–20)
CALCIUM SERPL-MCNC: 9.6 MG/DL (ref 8.5–10.1)
CHLORIDE SERPL-SCNC: 105 MMOL/L (ref 97–108)
CK SERPL-CCNC: 82 U/L (ref 26–192)
CO2 SERPL-SCNC: 28 MMOL/L (ref 21–32)
COLOR UR: ABNORMAL
COMMENT, HOLDF: NORMAL
CREAT SERPL-MCNC: 0.9 MG/DL (ref 0.55–1.02)
EPITH CASTS URNS QL MICRO: ABNORMAL /LPF
ERYTHROCYTE [DISTWIDTH] IN BLOOD BY AUTOMATED COUNT: 12.5 % (ref 11.5–14.5)
GLOBULIN SER CALC-MCNC: 3.3 G/DL (ref 2–4)
GLUCOSE SERPL-MCNC: 142 MG/DL (ref 65–100)
GLUCOSE UR STRIP.AUTO-MCNC: NEGATIVE MG/DL
HCT VFR BLD AUTO: 42.5 % (ref 35–47)
HGB BLD-MCNC: 13.7 G/DL (ref 11.5–16)
HGB UR QL STRIP: NEGATIVE
HYALINE CASTS URNS QL MICRO: ABNORMAL /LPF (ref 0–5)
KETONES UR QL STRIP.AUTO: NEGATIVE MG/DL
LEUKOCYTE ESTERASE UR QL STRIP.AUTO: ABNORMAL
MCH RBC QN AUTO: 30.3 PG (ref 26–34)
MCHC RBC AUTO-ENTMCNC: 32.2 G/DL (ref 30–36.5)
MCV RBC AUTO: 94 FL (ref 80–99)
NITRITE UR QL STRIP.AUTO: NEGATIVE
NRBC # BLD: 0 K/UL (ref 0–0.01)
NRBC BLD-RTO: 0 PER 100 WBC
PH UR STRIP: 8 [PH] (ref 5–8)
PLATELET # BLD AUTO: 299 K/UL (ref 150–400)
PMV BLD AUTO: 9.8 FL (ref 8.9–12.9)
POTASSIUM SERPL-SCNC: 2.8 MMOL/L (ref 3.5–5.1)
PROT SERPL-MCNC: 7.1 G/DL (ref 6.4–8.2)
PROT UR STRIP-MCNC: NEGATIVE MG/DL
RBC # BLD AUTO: 4.52 M/UL (ref 3.8–5.2)
RBC #/AREA URNS HPF: ABNORMAL /HPF (ref 0–5)
SAMPLES BEING HELD,HOLD: NORMAL
SODIUM SERPL-SCNC: 143 MMOL/L (ref 136–145)
SP GR UR REFRACTOMETRY: 1.01 (ref 1–1.03)
TROPONIN I SERPL-MCNC: <0.05 NG/ML
UA: UC IF INDICATED,UAUC: ABNORMAL
UROBILINOGEN UR QL STRIP.AUTO: 0.2 EU/DL (ref 0.2–1)
WBC # BLD AUTO: 8.9 K/UL (ref 3.6–11)
WBC URNS QL MICRO: ABNORMAL /HPF (ref 0–4)

## 2019-04-18 PROCEDURE — 82550 ASSAY OF CK (CPK): CPT

## 2019-04-18 PROCEDURE — 74011250636 HC RX REV CODE- 250/636: Performed by: EMERGENCY MEDICINE

## 2019-04-18 PROCEDURE — 96361 HYDRATE IV INFUSION ADD-ON: CPT

## 2019-04-18 PROCEDURE — 74177 CT ABD & PELVIS W/CONTRAST: CPT

## 2019-04-18 PROCEDURE — 99284 EMERGENCY DEPT VISIT MOD MDM: CPT

## 2019-04-18 PROCEDURE — 80053 COMPREHEN METABOLIC PANEL: CPT

## 2019-04-18 PROCEDURE — 84484 ASSAY OF TROPONIN QUANT: CPT

## 2019-04-18 PROCEDURE — 96374 THER/PROPH/DIAG INJ IV PUSH: CPT

## 2019-04-18 PROCEDURE — 71046 X-RAY EXAM CHEST 2 VIEWS: CPT

## 2019-04-18 PROCEDURE — 74011636320 HC RX REV CODE- 636/320: Performed by: RADIOLOGY

## 2019-04-18 PROCEDURE — 36415 COLL VENOUS BLD VENIPUNCTURE: CPT

## 2019-04-18 PROCEDURE — 93005 ELECTROCARDIOGRAM TRACING: CPT

## 2019-04-18 PROCEDURE — 81001 URINALYSIS AUTO W/SCOPE: CPT

## 2019-04-18 PROCEDURE — 85027 COMPLETE CBC AUTOMATED: CPT

## 2019-04-18 PROCEDURE — 74011000258 HC RX REV CODE- 258: Performed by: RADIOLOGY

## 2019-04-18 PROCEDURE — 74011250637 HC RX REV CODE- 250/637: Performed by: EMERGENCY MEDICINE

## 2019-04-18 RX ORDER — POTASSIUM CHLORIDE 750 MG/1
40 TABLET, FILM COATED, EXTENDED RELEASE ORAL
Status: COMPLETED | OUTPATIENT
Start: 2019-04-18 | End: 2019-04-18

## 2019-04-18 RX ORDER — AMOXICILLIN AND CLAVULANATE POTASSIUM 875; 125 MG/1; MG/1
1 TABLET, FILM COATED ORAL 3 TIMES DAILY
Qty: 30 TAB | Refills: 0 | Status: SHIPPED | OUTPATIENT
Start: 2019-04-18 | End: 2019-04-28

## 2019-04-18 RX ORDER — POTASSIUM CHLORIDE 1500 MG/1
20 TABLET, FILM COATED, EXTENDED RELEASE ORAL 2 TIMES DAILY
Qty: 30 TAB | Refills: 0 | Status: SHIPPED | OUTPATIENT
Start: 2019-04-18 | End: 2019-05-03

## 2019-04-18 RX ORDER — AMOXICILLIN AND CLAVULANATE POTASSIUM 875; 125 MG/1; MG/1
1 TABLET, FILM COATED ORAL
Status: COMPLETED | OUTPATIENT
Start: 2019-04-18 | End: 2019-04-18

## 2019-04-18 RX ORDER — ONDANSETRON 2 MG/ML
4 INJECTION INTRAMUSCULAR; INTRAVENOUS
Status: COMPLETED | OUTPATIENT
Start: 2019-04-18 | End: 2019-04-18

## 2019-04-18 RX ORDER — SODIUM CHLORIDE 0.9 % (FLUSH) 0.9 %
10 SYRINGE (ML) INJECTION
Status: COMPLETED | OUTPATIENT
Start: 2019-04-18 | End: 2019-04-18

## 2019-04-18 RX ADMIN — AMOXICILLIN AND CLAVULANATE POTASSIUM 1 TABLET: 875; 125 TABLET, FILM COATED ORAL at 18:17

## 2019-04-18 RX ADMIN — SODIUM CHLORIDE 100 ML: 900 INJECTION, SOLUTION INTRAVENOUS at 16:23

## 2019-04-18 RX ADMIN — POTASSIUM CHLORIDE 40 MEQ: 750 TABLET, EXTENDED RELEASE ORAL at 15:14

## 2019-04-18 RX ADMIN — ONDANSETRON 4 MG: 2 INJECTION INTRAMUSCULAR; INTRAVENOUS at 14:32

## 2019-04-18 RX ADMIN — SODIUM CHLORIDE 1000 ML: 900 INJECTION, SOLUTION INTRAVENOUS at 14:32

## 2019-04-18 RX ADMIN — Medication 10 ML: at 16:23

## 2019-04-18 RX ADMIN — IOPAMIDOL 100 ML: 755 INJECTION, SOLUTION INTRAVENOUS at 16:23

## 2019-04-18 NOTE — ED PROVIDER NOTES
75 y.o. female with past medical history significant for IBS, RLS, bipolar disorder, fibromyalgia, lumbar disc disease, Paget's disease, DJD, migraine, hypercholesterolemia, N/V, borderline DM, GERD, PE, and spinal stenosis who presents from home with chief complaint of abdominal pain. Pt reports epigastric abdominal pain radiating to her back since she ate lunch yesterday. She states she felt nauseous after her pain initially began. Pt notes she often has syncope in hot temperatures; she walked outside yesterday after her sx began, but she \"almost passed out\" at that time. Pt also c/o \"dark brown\" diarrhea, SOB, lightheadedness, and decreased appetite. She notes she has been drinking \"a little\" water and Gatorade. She notes she had a colonoscopy 2 months ago. In addition, Pt c/o gradually worsening chronic tremors for several months while her medications are adjusted. There are no other acute medical concerns at this time. Old Chart Review: Pt has hx of diverticulosis and GI bleed. Social Hx: denies smoking PCP: Marbella Rizvi MD 
 
Note written by Yinka Garcia, as dictated by Amarjit Gonzalez MD 2:21 PM 
 
 
  
 
Past Medical History:  
Diagnosis Date  Bipolar disorder ([address] [employer]  Chronic pain BACK PAIN  
 Diabetes ([address] BORDERLINE TX WITH DIET AND EXERCISE  DJD (degenerative joint disease)  Fibromyalgia  Genital herpes  GERD (gastroesophageal reflux disease)  Hypercholesterolemia  IBS (irritable bowel syndrome)  Lumbar disc disease   
 stimulation-Honza  Migraine  Nausea & vomiting  Other ill-defined conditions(799.89) SEASONAL allergies  Other ill-defined conditions(799.89) dysphagia has had esophagus dilated  Paget's disease  Pulmonary embolism ([address]  RLS (restless legs syndrome)  Spinal stenosis Past Surgical History:  
Procedure Laterality Date  COLONOSCOPY N/A 6/27/2016 COLONOSCOPY performed by Henok Perdomo MD at [facility] ENDOSCOPY  COLONOSCOPY N/A 2/22/2019 COLONOSCOPY performed by Stefano Mayers MD at [facility] ENDOSCOPY  ENDOSCOPY, COLON, DIAGNOSTIC    
 12, due 15  
 HX APPENDECTOMY  HX BACK SURGERY  9/17/2013  
 back surgery - total of 3 as of 12/20/2013  HX BREAST BIOPSY Right years ago  
 negative  HX CHOLECYSTECTOMY  HX HEENT    
 T & A  
 HX HYSTERECTOMY    
 total for fibroid tumors  HX ORTHOPAEDIC    
 lumbar laminectomy then fusion/neurostimulator implanted - inactive now but still in  
 HX ORTHOPAEDIC    
 REMOVAL OF NEUROSTIMULATOR  HX OTHER SURGICAL    
 EGD x 2 with dilation/colonoscopy - no polyps per pt  TILT TABLE EVALUATION  8/2/2016 Family History:  
Problem Relation Age of Onset  Colon Cancer Mother  Cancer Mother 68  
     colon  Heart Disease Father  Heart Disease Maternal Grandmother  Heart Disease Maternal Grandfather  Heart Disease Other Social History Socioeconomic History  Marital status: SINGLE Spouse name: Not on file  Number of children: Not on file  Years of education: Not on file  Highest education level: Not on file Occupational History  Occupation: volunteer Employer: RETIRED Comment: [employer] Social Needs  Financial resource strain: Not on file  Food insecurity:  
  Worry: Not on file Inability: Not on file  Transportation needs:  
  Medical: Not on file Non-medical: Not on file Tobacco Use  Smoking status: Never Smoker  Smokeless tobacco: Never Used Substance and Sexual Activity  Alcohol use: No  
  Comment: 1 per month  Drug use: No  
 Sexual activity: Not on file Lifestyle  Physical activity:  
  Days per week: Not on file Minutes per session: Not on file  Stress: Not on file Relationships  Social connections:  
  Talks on phone: Not on file Gets together: Not on file Attends [employer] service: Not on file Active member of club or organization: Not on file Attends meetings of clubs or organizations: Not on file Relationship status: Not on file  Intimate partner violence:  
  Fear of current or ex partner: Not on file Emotionally abused: Not on file Physically abused: Not on file Forced sexual activity: Not on file Other Topics Concern  Not on file Social History Narrative  Not on file ALLERGIES: Adhesive; Hydrocodone; Lorazepam; Other medication; Percocet [oxycodone-acetaminophen]; Sudafed [pseudoephedrine hcl]; Wellbutrin [bupropion hcl]; Zithromax [azithromycin]; and Zyrtec [cetirizine] Review of Systems Constitutional: Positive for appetite change. Negative for chills and fever. Respiratory: Positive for shortness of breath. Cardiovascular: Negative for chest pain. Gastrointestinal: Positive for abdominal pain, diarrhea and nausea. Negative for constipation and vomiting. Musculoskeletal: Positive for back pain. Neurological: Positive for light-headedness. Negative for dizziness. All other systems reviewed and are negative. Vitals:  
 04/18/19 1327 04/18/19 1426 BP:  138/84 Pulse: 88 88 Resp:  16 Temp:  98.4 °F (36.9 °C) SpO2: 97% 99% Physical Exam  
Constitutional: She is oriented to person, place, and time. She appears well-developed and well-nourished. HENT:  
Head: Normocephalic and atraumatic. Eyes: Pupils are equal, round, and reactive to light. Neck: Normal range of motion. Neck supple. Cardiovascular: Normal rate and regular rhythm. Pulmonary/Chest: Effort normal and breath sounds normal.  
Abdominal: Soft. She exhibits no distension. There is tenderness (LUQ, LLQ). Neurological: She is alert and oriented to person, place, and time. Skin: Skin is warm and dry. Capillary refill takes less than 2 seconds. Psychiatric: She has a normal mood and affect. Her behavior is normal.  
Nursing note and vitals reviewed. Note written by Yinka Montanez, as dictated by Roma Nieves MD 2:21 PM 
 
MDM Number of Diagnoses or Management Options Colitis, acute:  
Diagnosis management comments: Pt presents with abdominal pain and diarrhea found to have colitis. The patient is resting comfortably and feels better, is alert and in no distress. The repeat examination is unremarkable and benign; in particular, there is no discomfort at McBurney's point. The history, exam, diagnostic testing, and current condition do not suggest acute appendicitis, bowel obstruction, incarcerated hernia, acute cholecystitis, bowel perforation, major gastrointestinal bleeding, severe diverticulitis, sepsis, or other significant pathology to warrant further testing, continued ED treatment, admission, or surgical evaluation at this point. The vital signs have been stable and are within normal limits at this time. The patient does not have uncontrollable pain, intractable vomiting, or other significant symptoms. The patient's condition is stable and appropriate for discharge. The patient will pursue further outpatient evaluation with the primary care physician or other designated or consulting physician as indicated in the discharge instructions. Procedures PROGRESS NOTE: 
3:02 PM 
CMP shows potassium of 2.8. Potassium repleted in the ED. PROGRESS NOTE: 
5:15 PM 
Pt has acute colitis. Roma Nieves MD plans to place Pt on Augmentin and continue potassium supplementation. The patient's results have been reviewed with them and/or available family.  Patient and/or family verbally conveyed their understanding and agreement of the patient's signs, symptoms, diagnosis, treatment and prognosis and additionally agree to follow up as recommended in the discharge instructions or to return to the Emergency Room should their condition change prior to their follow-up appointment. The patient/family verbally agrees with the care-plan and verbally conveys that all of their questions have been answered. The discharge instructions have also been provided to the patient and/or family with some educational information regarding the patient's diagnosis as well a list of reasons why the patient would want to return to the ER prior to their follow-up appointment, should their condition change.

## 2019-04-18 NOTE — ED NOTES
1540 Report received and care assumed from Charlene Juárez. Pt resting comfortably on stretcher with family member at bedside. No complaints at this time. Updated on plan of care. Will continue to monitor. 1559 Pt transported to CT via stretcher.

## 2019-04-18 NOTE — DISCHARGE INSTRUCTIONS
Patient Education     Colitis: Care Instructions  Your Care Instructions  Colitis is the medical term for swelling (inflammation) of the intestine. It can be caused by different things, such as an infection or loss of blood flow in the intestine. Other causes are problems like Crohn's disease or ulcerative colitis. Symptoms may include fever, diarrhea that may be bloody, or belly pain. Sometimes symptoms go away without treatment. But you may need treatment or more tests, such as blood tests or a stool test. Or you may need imaging tests like a CT scan or a colonoscopy. In some cases, the doctor may want to test a sample of tissue from the intestine. This test is called a biopsy. The doctor has checked you carefully, but problems can develop later. If you notice any problems or new symptoms, get medical treatment right away. Follow-up care is a key part of your treatment and safety. Be sure to make and go to all appointments, and call your doctor if you are having problems. It's also a good idea to know your test results and keep a list of the medicines you take. How can you care for yourself at home? · Rest until you feel better. · Your doctor may recommend that you eat bland foods. These include rice, dry toast or crackers, bananas, and applesauce. · To prevent dehydration, drink plenty of fluids. Choose water and other caffeine-free clear liquids until you feel better. If you have kidney, heart, or liver disease and have to limit fluids, talk with your doctor before you increase the amount of fluids you drink. · Be safe with medicines. Take your medicines exactly as prescribed. Call your doctor if you think you are having a problem with your medicine. You will get more details on the specific medicines your doctor prescribes. When should you call for help? Call 911 anytime you think you may need emergency care. For example, call if:  · You passed out (lost consciousness).   · You vomit blood or what looks like coffee grounds. · Your stools are maroon or very bloody. Call your doctor now or seek immediate medical care if:  · You have new or worse pain. · You have a new or higher fever. · You have new or worse symptoms. · You cannot keep fluids or medicines down. Watch closely for changes in your health, and be sure to contact your doctor if:  · You do not get better as expected. Where can you learn more? Go to [employer]  Enter [id number] in the search box to learn more about \"Colitis: Care Instructions. \"   © 7410-4483 [employer]wise, [employer]orporated. Care instructions adapted under license by [facility] (which disclaims liability or warranty for this information). This care instruction is for use with your licensed healthcare professional. If you have questions about a medical condition or this instruction, always ask your healthcare professional. [address] any warranty or liability for your use of this information.   Content Version: 88.8.810856; Current as of: November 20, 2015

## 2019-04-18 NOTE — ED TRIAGE NOTES
Pt was referred to the ED by Dr Zulma Shepard for CP radiating through to her back, SOB and diarrhea since noon yesterday.

## 2019-04-18 NOTE — ED NOTES
Pt given discharge instructions, patient education, prescriptions and follow-up information by provider. Pt states understanding - all questions answered. Pt discharged to home in private vehicle, ambulatory. Pt A&Ox4, RA, with pain controlled.

## 2019-04-19 LAB
ATRIAL RATE: 81 BPM
CALCULATED P AXIS, ECG09: -28 DEGREES
CALCULATED R AXIS, ECG10: 14 DEGREES
CALCULATED T AXIS, ECG11: 75 DEGREES
DIAGNOSIS, 93000: NORMAL
P-R INTERVAL, ECG05: 138 MS
Q-T INTERVAL, ECG07: 440 MS
QRS DURATION, ECG06: 80 MS
QTC CALCULATION (BEZET), ECG08: 511 MS
VENTRICULAR RATE, ECG03: 81 BPM

## 2019-04-21 RX ORDER — ATORVASTATIN CALCIUM 20 MG/1
TABLET, FILM COATED ORAL
Qty: 90 TAB | Refills: 0 | Status: SHIPPED | OUTPATIENT
Start: 2019-04-21 | End: 2019-07-19 | Stop reason: SDUPTHER

## 2019-04-22 ENCOUNTER — OFFICE VISIT (OUTPATIENT)
Dept: INTERNAL MEDICINE CLINIC | Age: 74
End: 2019-04-22

## 2019-04-22 VITALS
RESPIRATION RATE: 18 BRPM | HEART RATE: 70 BPM | WEIGHT: 137.38 LBS | OXYGEN SATURATION: 97 % | HEIGHT: 66 IN | DIASTOLIC BLOOD PRESSURE: 76 MMHG | SYSTOLIC BLOOD PRESSURE: 110 MMHG | BODY MASS INDEX: 22.08 KG/M2 | TEMPERATURE: 98 F

## 2019-04-22 DIAGNOSIS — I95.1 ORTHOSTATIC HYPOTENSION: Primary | ICD-10-CM

## 2019-04-22 DIAGNOSIS — R07.89 ATYPICAL CHEST PAIN: ICD-10-CM

## 2019-04-22 DIAGNOSIS — K52.9 COLITIS: ICD-10-CM

## 2019-04-22 DIAGNOSIS — E87.6 HYPOKALEMIA: ICD-10-CM

## 2019-04-22 NOTE — PROGRESS NOTES
HISTORY OF PRESENT ILLNESS  Juan Rivers is a 75 y.o. female. HPI Subjective:  Jody Chaudhari is seen today for an emergency room follow up for chest pains and diarrhea with black stools. I reviewed the available emergency room records. 1. Colitis. This is a new diagnosis. She was tender in the left side of the abdomen. CT scan confirmed the diagnosis. She was discharged on Augmentin and is feeling better from this problem. 2. Chest pains. This is chronic, appears to be GI related. She saw Dr. Neelima Odonnell. Endoscopy back in December revealed a stricture, which was dilated. She had a normal stress test in February, 2018 and I do not believe this is cardiac related. She really has no exertional symptoms per se. I will review this with her cardiologist, Dr. Jeanne Valero.  3. Hypokalemia. Will recheck potassium. She is on supplementation for 15 days per the ER. Past Medical History:   Diagnosis Date    Bipolar disorder ([facility] [address]     Nazmy    Chronic pain     BACK PAIN    Diabetes ([facility] [address]     BORDERLINE TX WITH DIET AND EXERCISE    DJD (degenerative joint disease)     Fibromyalgia     Genital herpes     GERD (gastroesophageal reflux disease)     Hypercholesterolemia     IBS (irritable bowel syndrome)     Lumbar disc disease     stimulation-Honza    Migraine     Nausea & vomiting     Other ill-defined conditions(799.89)     SEASONAL allergies    Other ill-defined conditions(799.89)     dysphagia has had esophagus dilated    Paget's disease     Pulmonary embolism (HCC)     RLS (restless legs syndrome)     Spinal stenosis          Review of Systems   Cardiovascular: Positive for chest pain. Gastrointestinal: Positive for abdominal pain and diarrhea. Neurological: Positive for dizziness. Physical Exam   Constitutional: No distress. Cardiovascular: Normal rate and regular rhythm. Exam reveals no gallop and no friction rub. No murmur heard.   Pulmonary/Chest: Effort normal and breath sounds normal. Abdominal: Soft. She exhibits no distension. There is tenderness in the epigastric area. There is no rigidity, no guarding and no CVA tenderness. Nursing note and vitals reviewed. ASSESSMENT and PLAN  Diagnoses and all orders for this visit:    1. Orthostatic hypotension- Continue current regimen of prescription and / or OTC medications     2. Colitis- Continue current regimen of prescription and / or OTC medications     3. Atypical chest pain- See gastroenterologist as directed , See cardiologist as directed.      4. Hypokalemia  -     POTASSIUM

## 2019-04-23 ENCOUNTER — TELEPHONE (OUTPATIENT)
Dept: INTERNAL MEDICINE CLINIC | Age: 74
End: 2019-04-23

## 2019-04-23 ENCOUNTER — HOSPITAL ENCOUNTER (OUTPATIENT)
Dept: LAB | Age: 74
Discharge: HOME | End: 2019-04-23
Payer: MEDICARE

## 2019-04-23 PROCEDURE — 84132 ASSAY OF SERUM POTASSIUM: CPT

## 2019-04-23 PROCEDURE — 36415 COLL VENOUS BLD VENIPUNCTURE: CPT

## 2019-04-23 NOTE — TELEPHONE ENCOUNTER
Pt. Notified Dr Adam Williamson will call her with an appt. Pt. States she had 2 more stools and is just burning. Reminded to get Kt drawn and requested she update us tomorrow.

## 2019-04-24 ENCOUNTER — TELEPHONE (OUTPATIENT)
Dept: INTERNAL MEDICINE CLINIC | Age: 74
End: 2019-04-24

## 2019-04-24 LAB — POTASSIUM SERPL-SCNC: 4.4 MMOL/L (ref 3.5–5.2)

## 2019-04-25 NOTE — TELEPHONE ENCOUNTER
Left detailed message we spoke earlier yesterday about her potassium. Advised pt MD said to complete the 15 day course of potassium. No need to refill as normalized. If she had further questions to call me back.

## 2019-05-01 RX ORDER — MIDODRINE HYDROCHLORIDE 5 MG/1
TABLET ORAL
Qty: 270 TAB | Refills: 1 | Status: SHIPPED | OUTPATIENT
Start: 2019-05-01 | End: 2019-05-31

## 2019-05-01 RX ORDER — FLUDROCORTISONE ACETATE 0.1 MG/1
TABLET ORAL
Qty: 180 TAB | Refills: 1 | Status: SHIPPED | OUTPATIENT
Start: 2019-05-01 | End: 2019-10-27 | Stop reason: SDUPTHER

## 2019-05-01 NOTE — TELEPHONE ENCOUNTER
Request for Florinef 0.1 mg BID. Last office visit 11/13/18, next office visit 5/14/19. Refills per verbal order from Dr. Verito Alexis.

## 2019-05-01 NOTE — TELEPHONE ENCOUNTER
Request for Midodrine 5 mg TID. Last office visit 11/13/18, next office visit 5/14/19. Refills per verbal order from Dr. Nydia Byrd.

## 2019-05-02 ENCOUNTER — TELEPHONE (OUTPATIENT)
Dept: INTERNAL MEDICINE CLINIC | Age: 74
End: 2019-05-02

## 2019-05-02 NOTE — TELEPHONE ENCOUNTER
Has question about acid reflux symptoms; and wants to discuss test results she had with Dr. Lis Mcmahan

## 2019-05-02 NOTE — TELEPHONE ENCOUNTER
Spoke with pt - states she's been experiencing burning sensation mid sternum to back. Feels like it's on fire. Been like this for several months. Waiting for her daughter to bring her omeprazole to help symptoms. Denies any cardiac symptoms - says she knows what to look for. Advised her if symptoms worsens to go to ER. Will forward to MD.     States her stool test all negative that Dr Alvin Hooper ordered.

## 2019-05-03 ENCOUNTER — TELEPHONE (OUTPATIENT)
Dept: INTERNAL MEDICINE CLINIC | Age: 74
End: 2019-05-03

## 2019-05-03 NOTE — TELEPHONE ENCOUNTER
Advised pt MD recommends she follow up with Dr Adi Hurley regarding her symptoms. Offered to call and schedule appt for her. Called Dr Gutierrez's office - spoke with Emili Prior requesting an appt for pt today. She states I needed to speak with his nurse Karlyne Kanner. She would give her message to call me back.

## 2019-05-03 NOTE — TELEPHONE ENCOUNTER
Sanford Ordonez (Self) [phone] (H)     Pt says that dr Herve Brunson told her that if she was not getting any better to go to the er. So will be going to [facility]'s this afternoon.

## 2019-05-03 NOTE — TELEPHONE ENCOUNTER
Called pt back - spoke with pt's daughter Radu Canela - wanted to see if she spoke with Dr Gutierrez's office about appt? Yes - she has appt next week 5/7 at 8:45 am. Advised if symptoms worsens prior to appt to go to Er for further evaluation.  Will forward to MD.

## 2019-05-14 ENCOUNTER — OFFICE VISIT (OUTPATIENT)
Dept: CARDIOLOGY CLINIC | Age: 74
End: 2019-05-14

## 2019-05-14 VITALS
DIASTOLIC BLOOD PRESSURE: 88 MMHG | SYSTOLIC BLOOD PRESSURE: 138 MMHG | HEIGHT: 66 IN | HEART RATE: 70 BPM | BODY MASS INDEX: 21.05 KG/M2 | WEIGHT: 131 LBS

## 2019-05-14 DIAGNOSIS — R42 DIZZINESS: ICD-10-CM

## 2019-05-14 DIAGNOSIS — R55 SYNCOPE AND COLLAPSE: ICD-10-CM

## 2019-05-14 DIAGNOSIS — I95.1 ORTHOSTATIC HYPOTENSION: Primary | ICD-10-CM

## 2019-05-14 RX ORDER — DIPHENOXYLATE HYDROCHLORIDE AND ATROPINE SULFATE 2.5; .025 MG/1; MG/1
TABLET ORAL
COMMUNITY
End: 2019-05-31

## 2019-05-14 RX ORDER — POTASSIUM CHLORIDE 20 MEQ/1
TABLET, EXTENDED RELEASE ORAL DAILY
COMMUNITY
End: 2019-07-09 | Stop reason: SDUPTHER

## 2019-05-14 RX ORDER — DROXIDOPA 100 MG/1
100 CAPSULE ORAL 2 TIMES DAILY
Qty: 90 CAP | Refills: 0 | Status: SHIPPED | COMMUNITY
Start: 2019-05-14 | End: 2019-05-31 | Stop reason: SDUPTHER

## 2019-05-14 RX ORDER — ONDANSETRON 4 MG/1
4 TABLET, ORALLY DISINTEGRATING ORAL
COMMUNITY
End: 2019-05-31

## 2019-05-14 NOTE — PROGRESS NOTES
Cardiac Electrophysiology Office Note     Subjective:      Eloy Mayo is a 75 y.o. female patient who is seen for 3 month follow up, has long-standing history of dizziness & near syncope. Etiology on previous tilt noted to be orthostatic hypotension    She ran out of Northera 3-4 days ago, states has had nausea & some dizziness since, but has been able to do some shopping despite this. It cost too much so she cannot pay   Company did not give free any more    She is here with her daughter-in-law Lukas Meyer. Continues to use rolling walker. Continues pyridostigmine 60 mg po tid & florinef, midodrine  She is using compression stockings bought from [address] [place] [address]. Denies syncope. Subsequent Lexiscan/cardiolite stress test on 01/24/18 was essentially normal, with LVEF 74% & no ischemia noted. Chest CTA (02/01/2018) showed no abnormalities that would explain chest pain. She has arthritis and fibromyalgia   She is getting colonoscopy 12/3/2018 due to ongoing diarrhea    Previous:  Dr Vince Mast 8/26/16: CT[facility] at [facility] 5/7/16 small PE RLL  6/9/16  [facility] no PE  Hypercoagulable work up and d dimer performed. No longer taking Xarelto. She had a tilt table test 8/2/16. Tilt table test revealed the patient had orthostatic hypotension response with sudden change in upright position and resolved quickly when she became supine  She had been on midodrine 5 mg tid, but discontinued this when starting Northera. Patient was seen by neurologist in hospital and not clear she has autonomic dysfunction and Parkinson's disease.         Patient Active Problem List    Diagnosis Date Noted    Type 2 diabetes with nephropathy ([facility] [address] 07/26/2018    Syncope 05/28/2018    Spinal stenosis     Near syncope 08/02/2016    Resting tremor 07/29/2016    Orthostatic hypotension 07/05/2016    Advance directive on file 05/24/2016    Mixed hyperlipidemia 02/02/2016    Tubulovillous adenoma 08/21/2012    Abnormal mammogram 06/04/2012    Encounter for long-term (current) use of other medications 11/30/2011    Hammertoe 09/12/2011    Allergic rhinitis, cause unspecified 01/26/2011    Other diseases of nasal cavity and sinuses(478.19) 07/19/2010    IBS (irritable bowel syndrome)     RLS (restless legs syndrome)     Bipolar disorder (HCC)     Fibromyalgia     DJD (degenerative joint disease)     Lumbar disc disease     Paget's disease of bone     Migraine     Genital herpes      Current Outpatient Medications   Medication Sig Dispense Refill    diphenoxylate-atropine (LOMOTIL) 2.5-0.025 mg per tablet Take  by mouth four (4) times daily as needed for Diarrhea.  ondansetron (ZOFRAN ODT) 4 mg disintegrating tablet Take 4 mg by mouth every eight (8) hours as needed for Nausea.  potassium chloride (K-DUR, KLOR-CON) 20 mEq tablet Take  by mouth daily.  lactobacillus combination no.4 (PROBIOTIC) 3 billion cell cap Take  by mouth.  droxidopa (NORTHERA) 100 mg cap capsule Take 1 Cap by mouth two (2) times a day. 90 Cap 0    fludrocortisone (FLORINEF) 0.1 mg tablet TAKE 1 TABLET BY MOUTH TWICE DAILY 180 Tab 1    atorvastatin (LIPITOR) 20 mg tablet TAKE 1 TABLET BY MOUTH EVERY EVENING 90 Tab 0    omeprazole (PRILOSEC) 40 mg capsule Take 40 mg by mouth two (2) times a day.  midodrine (PROAMITINE) 5 mg tablet TAKE 1 TABLET BY MOUTH THREE TIMES DAILY 90 Tab 5    sucralfate (CARAFATE) 1 gram tablet TAKE 1 TABLET BY MOUTH FOUR TIMES DAILY 360 Tab 3    pyridostigmine (MESTINON) 60 mg tablet TAKE 1 TABLET BY MOUTH THREE TIMES DAILY 90 Tab 5    carbidopa-levodopa (SINEMET)  mg per tablet Take 1 Tab by mouth three (3) times daily.  PARoxetine (PAXIL) 30 mg tablet Take 30 mg by mouth daily. Félix Beard NP/ Dr Reny Zaidi acetaminophen (TYLENOL) 325 mg tablet Take 325 mg by mouth every four (4) hours as needed for Pain.       QUEtiapine (SEROQUEL) 25 mg tablet Take 25-50 mg by mouth nightly as needed. Take 1-2 tabs qhs prn [facility] [place]Dr Asia Salazar      midodrine (PROAMITINE) 5 mg tablet TAKE 1 TABLET BY MOUTH THREE TIMES DAILY 270 Tab 1    hydrocortisone (ANUSOL-HC) 2.5 % rectal cream Insert  into rectum four (4) times daily. 30 g 1    docusate sodium (COLACE) 100 mg capsule Take 1 Cap by mouth two (2) times a day for 90 days.  60 Cap 2     Allergies   Allergen Reactions    Adhesive Itching    Hydrocodone Itching    Lorazepam Other (comments)    Other Medication Rash     Tide detergent    Percocet [Oxycodone-Acetaminophen] Itching    Sudafed [Pseudoephedrine Hcl] Other (comments)     Vertigo    Wellbutrin [Bupropion Hcl] Nausea and Vomiting    Zithromax [Azithromycin] Vertigo    Zyrtec [Cetirizine] Nausea Only     Past Medical History:   Diagnosis Date    Bipolar disorder (HCC)     Nazmy    Chronic pain     BACK PAIN    Diabetes ([address]     BORDERLINE TX WITH DIET AND EXERCISE    DJD (degenerative joint disease)     Fibromyalgia     Genital herpes     GERD (gastroesophageal reflux disease)     Hypercholesterolemia     IBS (irritable bowel syndrome)     Lumbar disc disease     stimulation-Honza    Migraine     Nausea & vomiting     Other ill-defined conditions(799.89)     SEASONAL allergies    Other ill-defined conditions(799.89)     dysphagia has had esophagus dilated    Paget's disease     Pulmonary embolism (HCC)     RLS (restless legs syndrome)     Spinal stenosis      Past Surgical History:   Procedure Laterality Date    COLONOSCOPY N/A 6/27/2016    COLONOSCOPY performed by Sanjay Carbone MD at [facility] ENDOSCOPY    COLONOSCOPY N/A 2/22/2019    COLONOSCOPY performed by Josi Crouch MD at [facility] ENDOSCOPY    ENDOSCOPY, COLON, DIAGNOSTIC      12, due 15    HX APPENDECTOMY      HX BACK SURGERY  9/17/2013    back surgery - total of 3 as of 12/20/2013    HX BREAST BIOPSY Right years ago    negative    HX CHOLECYSTECTOMY      HX HEENT      T & A    HX HYSTERECTOMY total for fibroid tumors    HX ORTHOPAEDIC      lumbar laminectomy then fusion/neurostimulator implanted - inactive now but still in    HX ORTHOPAEDIC      REMOVAL OF NEUROSTIMULATOR    HX OTHER SURGICAL      EGD x 2 with dilation/colonoscopy - no polyps per pt    TILT TABLE EVALUATION  8/2/2016          Family History   Problem Relation Age of Onset    Colon Cancer Mother     Cancer Mother 68        colon    Heart Disease Father     Heart Disease Maternal Grandmother     Heart Disease Maternal Grandfather     Heart Disease Other      Social History     Tobacco Use    Smoking status: Never Smoker    Smokeless tobacco: Never Used   Substance Use Topics    Alcohol use: No     Comment: 1 per month        Review of Systems:   Constitutional: Negative for fever, chills, weight loss . + intermittent dizziness  HEENT: Negative for nosebleeds, vision changes. Respiratory: Negative for cough, hemoptysis, sputum production, and wheezing. Cardiovascular: no palpitations, orthopnea, claudication, leg swelling, and PND. Gastrointestinal:  no vomiting, blood in stool and melena. Genitourinary: Negative for dysuria, and hematuria. Musculoskeletal: + for myalgias, sciatica   Skin: Negative for rash. Heme: Does not bleed or bruise easily. Neurological: Negative for speech change and focal weakness     Objective:     Visit Vitals  /88   Pulse 70   Ht 5' 6\" (1.676 m)   Wt 131 lb (59.4 kg)   BMI 21.14 kg/m²      Physical Exam:   Constitutional: well-developed and well-nourished. No distress. Head: Normocephalic and atraumatic. Eyes: Pupils are equal, round  Neck: supple. No JVD present. Cardiovascular: Normal rate, regular rhythm. Exam reveals no gallop and no friction rub. No murmur heard. Pulmonary/Chest: Effort normal and breath sounds normal. No wheezes. Abdominal: Soft, distended  Musculoskeletal: no edema. compression stockings on  Neurological: Alert,oriented.  resting tremor   Skin: Skin is warm and dry  Psychiatric: Normal mood and affect. Behavior is normal. Judgment and thought content normal.      Assessment/Plan:       ICD-10-CM ICD-9-CM    1. Orthostatic hypotension I95.1 458.0 droxidopa (NORTHERA) 100 mg cap capsule   2. Dizziness R42 780.4 droxidopa (NORTHERA) 100 mg cap capsule   3. Syncope and collapse R55 780.2 droxidopa (NORTHERA) 100 mg cap capsule   She does not do well with the Mestinon, midodrine, and Florinef again. Therefore, I have requested the company that makes Neel Alcocer to give her some samples. Her daughter will help her with filling out the drug assistance form. I will stop the midodrine first because that tends to overshoot supine systolic blood pressure. After that, she needs to come back 2 weeks, and her daughter-in-law thinks between her and her daughter, they can drive her to the office to up-titrite the Northera and get her off the other medication over time. Future Appointments   Date Time Provider Reji Smith   5/31/2019  8:20 AM Kameron Tao,  [address]       Thank you for involving me in this patient's care and please call with further concerns or questions. George Corcoran M.D.   Electrophysiology/Cardiology  [facility] [place]  [address], [place] [address], [address]  [address]  [phone]

## 2019-05-14 NOTE — PROGRESS NOTES
Chief Complaint   Patient presents with    Hypotension     Verified patient with two types of identifiers. Verified medications with the patient. Verified medications with the patient.

## 2019-05-28 ENCOUNTER — TELEPHONE (OUTPATIENT)
Dept: CARDIOLOGY CLINIC | Age: 74
End: 2019-05-28

## 2019-05-28 NOTE — TELEPHONE ENCOUNTER
Called Pt. Two patient identifiers confirmed. Pt started taking the Northera and already has an appointment for 5/31/2019.

## 2019-05-28 NOTE — TELEPHONE ENCOUNTER
Patient stated she started a new medication today and she was to follow up with dr miguel in 12 days     Phone: [phone]

## 2019-05-31 ENCOUNTER — OFFICE VISIT (OUTPATIENT)
Dept: CARDIOLOGY CLINIC | Age: 74
End: 2019-05-31

## 2019-05-31 ENCOUNTER — TELEPHONE (OUTPATIENT)
Dept: CARDIOLOGY CLINIC | Age: 74
End: 2019-05-31

## 2019-05-31 VITALS
HEART RATE: 80 BPM | BODY MASS INDEX: 20.73 KG/M2 | HEIGHT: 66 IN | SYSTOLIC BLOOD PRESSURE: 116 MMHG | DIASTOLIC BLOOD PRESSURE: 72 MMHG | WEIGHT: 129 LBS

## 2019-05-31 DIAGNOSIS — R42 DIZZINESS: ICD-10-CM

## 2019-05-31 DIAGNOSIS — I95.1 ORTHOSTATIC HYPOTENSION: Primary | ICD-10-CM

## 2019-05-31 DIAGNOSIS — I10 SUPINE HYPERTENSION: ICD-10-CM

## 2019-05-31 DIAGNOSIS — R55 SYNCOPE AND COLLAPSE: ICD-10-CM

## 2019-05-31 RX ORDER — DROXIDOPA 100 MG/1
200 CAPSULE ORAL 2 TIMES DAILY
Qty: 90 CAP | Refills: 0
Start: 2019-05-31 | End: 2019-06-19 | Stop reason: SDUPTHER

## 2019-05-31 RX ORDER — PROCHLORPERAZINE MALEATE 5 MG
5 TABLET ORAL
COMMUNITY
End: 2019-07-14

## 2019-05-31 RX ORDER — DROXIDOPA 100 MG/1
100 CAPSULE ORAL 2 TIMES DAILY
Qty: 90 CAP | Refills: 0 | Status: SHIPPED | COMMUNITY
Start: 2019-05-31 | End: 2019-05-31

## 2019-05-31 NOTE — TELEPHONE ENCOUNTER
Patient requested more information regarding financial assistance for Northera at office visit with NP today. Got in contact with [facility] representative and was give the number [phone] for their support center. Verified patient with two types of identifiers. Gave patient support center number. Patient verbalized understanding and will call with any other questions.

## 2019-05-31 NOTE — PROGRESS NOTES
Cardiac Electrophysiology Office Note     Subjective:      Kimberley Buchanan is a 75 y.o. female patient who is seen for follow up after starting [place], has long-standing history of dizziness & near syncope. Etiology on previous tilt noted to be orthostatic hypotension. She was last seen by Dr. Yenny Escobedo on 05/14/2019, was not doing well on her prescribed regimen of Mestinon, midodrine, & Florinef. She had done well on Northera before, but lost patient assistance & had to discontinue. Northera restarted at 100 mg po bid. She has had supine hypertension on midodrine, & Dr. Yenny Escobedo discontinued this prior to discontinuing other medications. Since starting Northera, states lightheadedness/dizziness is improved, has not had near syncope. BP today 116/72, had some lower & higher readings at home. SBP 140s supine this morning. Continues with nausea, but this is improving over the past couple days. Continues to use rolling walker. She uses compression stockings from [facility], but states these are somewhat large on her now. Previous:  Lexiscan cardiolite stress (01/24/2018): LVEF 74%, no ischemia. Chest CT (02/01/2018): No abnormalities that would cause chest pain. Tilt test with orthostatic hypotension 08/02/2016. Small PE RLL 05/07/2016 on CTA at [facility]. Repeat CTA at [facility] in 06/2016 normal, no PE. Hypercoagulable eval, d-dimer performed. No longer on Xarelto. Seen by neuro in hospital, not clear she has autonomic dysfunction & Parkinson's disease. OA, fibromyalgia, bowel problems.      Patient Active Problem List    Diagnosis Date Noted    Type 2 diabetes with nephropathy ([facility] [address] 07/26/2018    Syncope 05/28/2018    Spinal stenosis     Near syncope 08/02/2016    Resting tremor 07/29/2016    Orthostatic hypotension 07/05/2016    Advance directive on file 05/24/2016    Mixed hyperlipidemia 02/02/2016    Tubulovillous adenoma 08/21/2012    Abnormal mammogram 06/04/2012    Encounter for long-term (current) use of other medications 11/30/2011    Luisito 09/12/2011    Allergic rhinitis, cause unspecified 01/26/2011    Other diseases of nasal cavity and sinuses(478.19) 07/19/2010    IBS (irritable bowel syndrome)     RLS (restless legs syndrome)     Bipolar disorder (HCC)     Fibromyalgia     DJD (degenerative joint disease)     Lumbar disc disease     Paget's disease of bone     Migraine     Genital herpes      Current Outpatient Medications   Medication Sig Dispense Refill    prochlorperazine (COMPAZINE) 5 mg tablet Take 5 mg by mouth every six (6) hours as needed for Nausea.  potassium chloride (K-DUR, KLOR-CON) 20 mEq tablet Take  by mouth daily.  lactobacillus combination no.4 (PROBIOTIC) 3 billion cell cap Take  by mouth.  droxidopa (NORTHERA) 100 mg cap capsule Take 1 Cap by mouth two (2) times a day. 90 Cap 0    fludrocortisone (FLORINEF) 0.1 mg tablet TAKE 1 TABLET BY MOUTH TWICE DAILY 180 Tab 1    atorvastatin (LIPITOR) 20 mg tablet TAKE 1 TABLET BY MOUTH EVERY EVENING 90 Tab 0    hydrocortisone (ANUSOL-HC) 2.5 % rectal cream Insert  into rectum four (4) times daily. 30 g 1    omeprazole (PRILOSEC) 40 mg capsule Take 40 mg by mouth two (2) times a day.  sucralfate (CARAFATE) 1 gram tablet TAKE 1 TABLET BY MOUTH FOUR TIMES DAILY 360 Tab 3    pyridostigmine (MESTINON) 60 mg tablet TAKE 1 TABLET BY MOUTH THREE TIMES DAILY 90 Tab 5    carbidopa-levodopa (SINEMET)  mg per tablet Take 1 Tab by mouth three (3) times daily.  PARoxetine (PAXIL) 30 mg tablet Take 30 mg by mouth daily. Kayla Hua NP/ Dr Katie Negrete acetaminophen (TYLENOL) 325 mg tablet Take 325 mg by mouth every four (4) hours as needed for Pain.  QUEtiapine (SEROQUEL) 25 mg tablet Take 25-50 mg by mouth nightly as needed.  Take 1-2 tabs qhs prn Kayla Hua NP/Dr Robert Ying       Allergies   Allergen Reactions    Adhesive Itching    Hydrocodone Itching    Lorazepam Other (comments)    Other Medication Rash     Tide detergent    Percocet [Oxycodone-Acetaminophen] Itching    Sudafed [Pseudoephedrine Hcl] Other (comments)     Vertigo    Wellbutrin [Bupropion Hcl] Nausea and Vomiting    Zithromax [Azithromycin] Vertigo    Zyrtec [Cetirizine] Nausea Only     Past Medical History:   Diagnosis Date    Bipolar disorder (HCC)     Nazmy    Chronic pain     BACK PAIN    Diabetes ([address]     BORDERLINE TX WITH DIET AND EXERCISE    DJD (degenerative joint disease)     Fibromyalgia     Genital herpes     GERD (gastroesophageal reflux disease)     Hypercholesterolemia     IBS (irritable bowel syndrome)     Lumbar disc disease     stimulation-Honza    Migraine     Nausea & vomiting     Other ill-defined conditions(799.89)     SEASONAL allergies    Other ill-defined conditions(799.89)     dysphagia has had esophagus dilated    Paget's disease     Pulmonary embolism (HCC)     RLS (restless legs syndrome)     Spinal stenosis      Past Surgical History:   Procedure Laterality Date    COLONOSCOPY N/A 6/27/2016    COL[facility] Magalys [facility]ote [facility] at [facility]COPY    COLONOSCOPY N/A 2/22/2019    COLONOSCOPY performed by Michael Lobo MD at [facility] ENDOSCOPY    ENDOSCOPY, COLON, DIAGNOSTIC      12, due 13    HX APPENDECTOMY      HX BACK SURGERY  9/17/2013    back surgery - total of 3 as of 12/20/2013    HX BREAST BIOPSY Right years ago    negative    HX CHOLECYSTECTOMY      HX HEENT      T & A    HX HYSTERECTOMY      total for fibroid tumors    HX ORTHOPAEDIC      lumbar laminectomy then fusion/neurostimulator implanted - inactive now but still in    HX ORTHOPAEDIC      REMOVAL OF NEUROSTIMULATOR    HX OTHER SURGICAL      EGD x 2 with dilation/colonoscopy - no polyps per pt    TILT TABLE EVALUATION  8/2/2016          Family History   Problem Relation Age of Onset    Colon Cancer Mother     Cancer Mother 68        colon    Heart Disease Father     Heart Disease Maternal Grandmother     Heart Disease Maternal Grandfather     Heart Disease Other      Social History     Tobacco Use    Smoking status: Never Smoker    Smokeless tobacco: Never Used   Substance Use Topics    Alcohol use: No     Comment: 1 per month        Review of Systems:   Constitutional: Negative for fever, chills, weight loss . + intermittent dizziness  HEENT: Negative for nosebleeds, vision changes. Respiratory: Negative for cough, hemoptysis, sputum production, and wheezing. Cardiovascular: no palpitations, orthopnea, claudication, leg swelling, and PND. Gastrointestinal:  no vomiting, blood in stool and melena. + intermittent nausea  Genitourinary: Negative for dysuria, and hematuria. Musculoskeletal: + for myalgias, sciatica   Skin: Negative for rash. Heme: Does not bleed or bruise easily. Neurological: Negative for speech change and focal weakness     Objective:     Visit Vitals  /72   Pulse 80   Ht 5' 6\" (1.676 m)   Wt 129 lb (58.5 kg)   BMI 20.82 kg/m²      Physical Exam:   Constitutional: well-developed and well-nourished. No distress. Head: Normocephalic and atraumatic. Eyes: Pupils are equal, round  Neck: supple. No JVD present. Cardiovascular: Normal rate, regular rhythm. Exam reveals no gallop and no friction rub. No murmur heard. Pulmonary/Chest: Effort normal and breath sounds normal. No wheezes. Abdominal: Soft, hyperactive bowel sounds. Musculoskeletal: no edema. compression stockings on  Neurological: Alert,oriented. resting tremor   Skin: Skin is warm and dry  Psychiatric: Normal mood and affect. Behavior is normal. Judgment and thought content normal.      Assessment/Plan:       ICD-10-CM ICD-9-CM    1. Orthostatic hypotension I95.1 458.0    2. Dizziness R42 780.4    3. Supine hypertension I10 401.9       Ms. Andria Avila has had orthostatic hypotension with supine hypertension.   Symptoms were no longer well controlled on midodrine, Florinef, & Mestinon. Since Northera 100 mg po bid was started earlier this month, lightheadedness has improved somewhat, has had no near syncope. Nausea continues to be a problem, but this is improving. /72 sitting this morning, states has had some lower & higher readings at home; SBP 140s supine this morning. For now, continue Mestinon & Florinef. Will increase Northera to 200 mg po twice daily (morning, afternoon). She has samples on hand, will continue to pursue patient assistance. She will continue to monitor symptoms & BP. Follow up with Dr. Alyssa Cuellar approx 4 weeks for further titration of medications. Her daughter-in-law will provide her transportation, states this timeframe works for her. Future Appointments   Date Time Provider Reji Smith   7/9/2019  8:00 AM Jori Seay  [address]       Thank you for involving me in this patient's care and please call with further concerns or questions.     TAMY Woodson  [facility]  05/31/19

## 2019-05-31 NOTE — PROGRESS NOTES
Verified patient with two types of identifiers. Verified medications with the patient. Verified patient's pharmacy     Per Bianca Mclaughlin, NP discontinued all medications not taken.

## 2019-06-07 ENCOUNTER — TELEPHONE (OUTPATIENT)
Dept: CARDIOLOGY CLINIC | Age: 74
End: 2019-06-07

## 2019-06-07 NOTE — TELEPHONE ENCOUNTER
Called Pt. Two patient identifiers confirmed. Pt stated after eating breakfast had a BM and started feeling weird took B/P was 85/39 took it a few more two more times after that 195/104 and 134/89. Pt Stated about an hour later was feeling better took B/P 3 more times and was 135/82. Home health care is currently there now and will call back later in the day if things get worse.

## 2019-06-07 NOTE — TELEPHONE ENCOUNTER
Patient states that her bp is fluctuating from 84/39 to 195/104 to 134/89 and would like a call at your earliest convenience. Please advise.     Phone #: [phone]  Thanks

## 2019-06-19 ENCOUNTER — TELEPHONE (OUTPATIENT)
Dept: CARDIOLOGY CLINIC | Age: 74
End: 2019-06-19

## 2019-06-19 DIAGNOSIS — R55 SYNCOPE AND COLLAPSE: ICD-10-CM

## 2019-06-19 DIAGNOSIS — I95.1 ORTHOSTATIC HYPOTENSION: ICD-10-CM

## 2019-06-19 DIAGNOSIS — R42 DIZZINESS: ICD-10-CM

## 2019-06-19 RX ORDER — DROXIDOPA 100 MG/1
200 CAPSULE ORAL 2 TIMES DAILY
Qty: 90 CAP | Refills: 0 | Status: SHIPPED | COMMUNITY
Start: 2019-06-19 | End: 2019-07-25

## 2019-06-19 NOTE — TELEPHONE ENCOUNTER
Patient stated that she may not be able to make it today to sign the paperwork she spoke with you about. If she is unable to find a ride today, she will try to come Monday, 6/24/19. Please advise.      Phone #: [phone]  Thanks

## 2019-06-19 NOTE — TELEPHONE ENCOUNTER
Patient called back stating she will be able to come up today around 3:00 pm. Will have patient complete Northera form.

## 2019-06-19 NOTE — TELEPHONE ENCOUNTER
Verified patient with two types of identifiers. Patient wanted to know if we had submitted paper work for her Neel Alcocer yet. Notified patient that we left off that she was going to call the support center. Patient states she can remember if her daughter did that or not. Notified patient that I will get an application ready for her if she wants to come by and complete. Patient requesting samples. VO per Dr. Denys Garsia samples given. Patient verbalized understanding and will call with any other questions.

## 2019-06-19 NOTE — TELEPHONE ENCOUNTER
Patient states she would like to talk to someone in regards to her medication.      Phone: [phone]

## 2019-06-21 RX ORDER — PYRIDOSTIGMINE BROMIDE 60 MG/1
TABLET ORAL
Qty: 90 TAB | Refills: 5 | Status: SHIPPED | OUTPATIENT
Start: 2019-06-21 | End: 2019-12-10 | Stop reason: SDUPTHER

## 2019-06-21 NOTE — TELEPHONE ENCOUNTER
Patient is calling in regards to the attached medication. She is completely out of medication. Pharmacy confirmed.       Phone: [phone]

## 2019-06-21 NOTE — TELEPHONE ENCOUNTER
Request for Mestinon 60 mg TID. Last office visit 5/31/19, next office visit 7/9. Refills per verbal order from Dr. Adrian Weeks.

## 2019-06-24 ENCOUNTER — TELEPHONE (OUTPATIENT)
Dept: CARDIOLOGY CLINIC | Age: 74
End: 2019-06-24

## 2019-06-24 NOTE — TELEPHONE ENCOUNTER
Received fax from [employer] stating that medication needed a prior auth. PA completed reference number [id number]. Should have response within 72 hours.

## 2019-06-25 NOTE — TELEPHONE ENCOUNTER
Received denial from Harry Diaz. Started appeal process.  Appeal decision can take up to 7 business days

## 2019-06-25 NOTE — TELEPHONE ENCOUNTER
Received fax from [employer] notifying PA request for [employer] is incomplete. Su requesting more information. Patient also sees Dr. Collette Monarch with [facility]. Call Dr. Jenni Lovett office requesting last office note. Office states they will need a signed authorization to release information form. Called patient and notified. Patient states that she will go by his office tomorrow to sign form for last office note to be sent to office. Patient verbalized understanding and will call with any other questions.

## 2019-07-01 NOTE — TELEPHONE ENCOUNTER
Received fax from [facility] stating that they have approved our redetermination request for Northera 200 mg from 6/24/19-6/24/20. Delvin Garcia who states that her Co pay will still be $934. [facility], suggested having patient call [employer] to see if she qualifies for any foundation. Verified patient with two types of identifiers and notified of above conversation. Gave patient support line number [phone]. Patient to call us back with outcome. Patient verbalized understanding and will call with any other questions.

## 2019-07-02 ENCOUNTER — DOCUMENTATION ONLY (OUTPATIENT)
Dept: CARDIOLOGY CLINIC | Age: 74
End: 2019-07-02

## 2019-07-02 ENCOUNTER — TELEPHONE (OUTPATIENT)
Dept: CARDIOLOGY CLINIC | Age: 74
End: 2019-07-02

## 2019-07-02 NOTE — TELEPHONE ENCOUNTER
Patient's daughter would like to speak to you regarding patient's diagnosis that is requiring the Northera medication.    Phone: [phone]

## 2019-07-03 NOTE — TELEPHONE ENCOUNTER
Verified patient with two types of identifiers. Spoke with patient's daughter who states that she has called around to a lot of foundations to help with the [employer] cost. Notified Rosario that Dr. Jasmine Post office note states orthostatic hypotension; however, patient's Neurologist last office note states \"Autonomic failure\" and that next step would be [place], Suggested Rosario call Dr. Soto Field office because they may have more luck for what the foundations are looking for for coverage. Patient's daughter verbalized understanding and will call with any other questions.

## 2019-07-09 ENCOUNTER — OFFICE VISIT (OUTPATIENT)
Dept: CARDIOLOGY CLINIC | Age: 74
End: 2019-07-09

## 2019-07-09 VITALS
HEART RATE: 68 BPM | WEIGHT: 135.2 LBS | HEIGHT: 66 IN | DIASTOLIC BLOOD PRESSURE: 62 MMHG | SYSTOLIC BLOOD PRESSURE: 110 MMHG | RESPIRATION RATE: 16 BRPM | OXYGEN SATURATION: 95 % | BODY MASS INDEX: 21.73 KG/M2

## 2019-07-09 DIAGNOSIS — R42 DIZZINESS: ICD-10-CM

## 2019-07-09 DIAGNOSIS — G25.2 RESTING TREMOR: ICD-10-CM

## 2019-07-09 DIAGNOSIS — I95.1 ORTHOSTATIC HYPOTENSION: Primary | ICD-10-CM

## 2019-07-09 RX ORDER — POTASSIUM CHLORIDE 20 MEQ/1
20 TABLET, EXTENDED RELEASE ORAL DAILY
Qty: 90 TAB | Refills: 1 | Status: SHIPPED | OUTPATIENT
Start: 2019-07-09 | End: 2020-01-15

## 2019-07-09 RX ORDER — POTASSIUM CHLORIDE 20 MEQ/1
TABLET, EXTENDED RELEASE ORAL
Qty: 90 TAB | Refills: 0 | OUTPATIENT
Start: 2019-07-09

## 2019-07-09 NOTE — PROGRESS NOTES
Cardiac Electrophysiology Office Note     Subjective:      Tiffanie Hicks is a 75 y.o. female patient who is seen for 3 month follow up, has long-standing history of dizziness & near syncope. Etiology on previous tilt noted to be orthostatic hypotension    She need to get diagnosis of Parkinson's disease  Her daughter said tremor is worse and she is on sinemet  northera has not been approved and she gets samples  Her daughter called the drug assistance foundation and said only if she gets Parkinson's disease dx from Dr Beto Calvin whom she will see tomorrow, she will have to pay 1000 $ a month  Daughter-in-law Melissa Patel. Continues to use rolling walker. Continues pyridostigmine 60 mg po tid & florinef Northera 200 mg bid  She is using compression stockings bought from [employer]. Denies syncope since last 6 weeks. Subsequent Lexiscan/cardiolite stress test on 01/24/18 was essentially normal, with LVEF 74% & no ischemia noted. Chest CTA (02/01/2018) showed no abnormalities that would explain chest pain. She has arthritis and fibromyalgia   She is getting colonoscopy 12/3/2018 due to ongoing diarrhea    Previous:  Dr Hieu López 8/26/16: CTA at [facility] 5/7/16 small PE RLL  6/9/16 CTA [facility] normal CTA no PE  Hypercoagulable work up and d dimer performed. No longer taking Xarelto. She had a tilt table test 8/2/16. Tilt table test revealed the patient had orthostatic hypotension response with sudden change in upright position and resolved quickly when she became supine  She had been on midodrine 5 mg tid, but discontinued this when starting Northera. Patient was seen by neurologist in hospital and not clear she has autonomic dysfunction and Parkinson's disease.         Patient Active Problem List    Diagnosis Date Noted    Type 2 diabetes with nephropathy ([facility] [address] 07/26/2018    Syncope 05/28/2018    Spinal stenosis     Near syncope 08/02/2016    Resting tremor 07/29/2016    Orthostatic hypotension 07/05/2016  Advance directive on file 05/24/2016    Mixed hyperlipidemia 02/02/2016    Tubulovillous adenoma 08/21/2012    Abnormal mammogram 06/04/2012    Encounter for long-term (current) use of other medications 11/30/2011    Hammertoe 09/12/2011    Allergic rhinitis, cause unspecified 01/26/2011    Other diseases of nasal cavity and sinuses(478.19) 07/19/2010    IBS (irritable bowel syndrome)     RLS (restless legs syndrome)     Bipolar disorder (HCC)     Fibromyalgia     DJD (degenerative joint disease)     Lumbar disc disease     Paget's disease of bone     Migraine     Genital herpes      Current Outpatient Medications   Medication Sig Dispense Refill    pyridostigmine (MESTINON) 60 mg tablet TAKE 1 TABLET BY MOUTH THREE TIMES DAILY 90 Tab 5    droxidopa (NORTHERA) 100 mg cap capsule Take 2 Caps by mouth two (2) times a day. 90 Cap 0    prochlorperazine (COMPAZINE) 5 mg tablet Take 5 mg by mouth every six (6) hours as needed for Nausea.  potassium chloride (K-DUR, KLOR-CON) 20 mEq tablet Take  by mouth daily.  lactobacillus combination no.4 (PROBIOTIC) 3 billion cell cap Take  by mouth.  fludrocortisone (FLORINEF) 0.1 mg tablet TAKE 1 TABLET BY MOUTH TWICE DAILY 180 Tab 1    atorvastatin (LIPITOR) 20 mg tablet TAKE 1 TABLET BY MOUTH EVERY EVENING 90 Tab 0    hydrocortisone (ANUSOL-HC) 2.5 % rectal cream Insert  into rectum four (4) times daily. 30 g 1    omeprazole (PRILOSEC) 40 mg capsule Take 40 mg by mouth two (2) times a day.  sucralfate (CARAFATE) 1 gram tablet TAKE 1 TABLET BY MOUTH FOUR TIMES DAILY 360 Tab 3    carbidopa-levodopa (SINEMET)  mg per tablet Take 1 Tab by mouth three (3) times daily.  PARoxetine (PAXIL) 30 mg tablet Take 30 mg by mouth daily. Shree Elliott NP/ Dr Dion Bruner acetaminophen (TYLENOL) 325 mg tablet Take 325 mg by mouth every four (4) hours as needed for Pain.       QUEtiapine (SEROQUEL) 25 mg tablet Take 25-50 mg by mouth nightly as needed.  Take 1-2 tabs qhs prn Julio Gardner NP/Dr Akiko Cuba       Allergies   Allergen Reactions    Adhesive Itching    Hydrocodone Itching    Lorazepam Other (comments)    Other Medication Rash     Tide detergent    Percocet [Oxycodone-Acetaminophen] Itching    Sudafed [Pseudoephedrine Hcl] Other (comments)     Vertigo    Wellbutrin [Bupropion Hcl] Nausea and Vomiting    Zithromax [Azithromycin] Vertigo    Zyrtec [Cetirizine] Nausea Only     Past Medical History:   Diagnosis Date    Bipolar disorder (HCC)     Nazmy    Chronic pain     BACK PAIN    Diabetes ([address]     BORDERLINE TX WITH DIET AND EXERCISE    DJD (degenerative joint disease)     Fibromyalgia     Genital herpes     GERD (gastroesophageal reflux disease)     Hypercholesterolemia     IBS (irritable bowel syndrome)     Lumbar disc disease     stimulation-Honza    Migraine     Nausea & vomiting     Other ill-defined conditions(799.89)     SEASONAL allergies    Other ill-defined conditions(799.89)     dysphagia has had esophagus dilated    Paget's disease     Pulmonary embolism (HCC)     RLS (restless legs syndrome)     Spinal stenosis      Past Surgical History:   Procedure Laterality Date    COLONOSCOPY N/A 6/27/2016    COL[facility] at [facility] ENDOSCOPY    COLONOSCOPY N/A 2/22/2019    COLONOSCOPY performed by Keith Salazar MD at [facility]. [address], COLON, DIAGNOSTIC      12, due 13    HX APPENDECTOMY      HX BACK SURGERY  9/17/2013    back surgery - total of 3 as of 12/20/2013    HX BREAST BIOPSY Right years ago    negative    HX CHOLECYSTECTOMY      HX HEENT      T & A    HX HYSTERECTOMY      total for fibroid tumors    HX ORTHOPAEDIC      lumbar laminectomy then fusion/neurostimulator implanted - inactive now but still in    HX ORTHOPAEDIC      REMOVAL OF NEUROSTIMULATOR    HX OTHER SURGICAL      EGD x 2 with dilation/colonoscopy - no polyps per pt    TILT TABLE EVALUATION 8/2/2016          Family History   Problem Relation Age of Onset    Colon Cancer Mother     Cancer Mother 68        colon    Heart Disease Father     Heart Disease Maternal Grandmother     Heart Disease Maternal Grandfather     Heart Disease Other      Social History     Tobacco Use    Smoking status: Never Smoker    Smokeless tobacco: Never Used   Substance Use Topics    Alcohol use: No     Comment: 1 per month        Review of Systems:   Constitutional: Negative for fever, chills, weight loss . + intermittent dizziness and falls  HEENT: Negative for nosebleeds, vision changes. Respiratory: Negative for cough, hemoptysis, sputum production, and wheezing. Cardiovascular: no palpitations, orthopnea, claudication, leg swelling, and PND. Gastrointestinal:  no vomiting, blood in stool and melena. Genitourinary: Negative for dysuria, and hematuria. Musculoskeletal: + for myalgias, sciatica   Skin: Negative for rash. Heme: Does not bleed or bruise easily. Neurological: Negative for speech change and focal weakness     Objective:     Visit Vitals  /62 (BP 1 Location: Right arm, BP Patient Position: Sitting)   Pulse 68   Resp 16   Ht 5' 6\" (1.676 m)   Wt 135 lb 3.2 oz (61.3 kg)   SpO2 95%   BMI 21.82 kg/m²      Physical Exam:   Constitutional: well-developed and well-nourished. No distress. Head: Normocephalic and atraumatic. Eyes: Pupils are equal, round  Neck: supple. No JVD present. Cardiovascular: Normal rate, regular rhythm. Exam reveals no gallop and no friction rub. No murmur heard. Pulmonary/Chest: Effort normal and breath sounds normal. No wheezes. Abdominal: Soft, distended  Musculoskeletal: no edema. compression stockings on  Neurological: Alert,oriented. resting tremor +  Skin: Skin is warm and dry  Psychiatric: Normal mood and affect. Behavior is normal. Judgment and thought content normal.      Assessment/Plan:       ICD-10-CM ICD-9-CM    1.  Orthostatic hypotension I95.1 458.0    2. Dizziness R42 780.4    3. Resting tremor R25.9 781.0    She did not do well with the Mestinon, midodrine, and Florinef again. Therefore, I have requested the company that makes [employer] to give her some samples. Her daughter will help her with filling out the drug assistance form. She sees neurologist tomorrow  Continue with 200 mg bid northera until she can get assistance and titrate up   She cannot afford it right now  See Torsten Ayala NP 3 months then 6 months with me      Thank you for involving me in this patient's care and please call with further concerns or questions. Sia Samano M.D.   Electrophysiology/Cardiology  [facility]  [facility] [address], [facility] [address] [place] [address]  [phone]

## 2019-07-09 NOTE — PROGRESS NOTES
Chief Complaint   Patient presents with    Hypotension    Follow-up     1. Have you been to the ER, urgent care clinic since your last visit? Hospitalized since your last visit? No    2. Have you seen or consulted any other health care providers outside of the [address] [place] [address] since your last visit? Include any pap smears or colon screening. No    3) Do you have an Advance Directive on file?  yes

## 2019-07-09 NOTE — PATIENT INSTRUCTIONS
You will need to follow up in clinic with Mynor Ortiz NP in 3 months. You will need to follow up in clinic with Dr. Lisbet Raygoza in 6 months.

## 2019-07-10 ENCOUNTER — TELEPHONE (OUTPATIENT)
Dept: CARDIOLOGY CLINIC | Age: 74
End: 2019-07-10

## 2019-07-10 NOTE — TELEPHONE ENCOUNTER
Verified patient with two types of identifiers. Patient states that she was officially diagnosed with Parkinson's disease and her daughter will submit information from Neurologist office to be approved by foundations to help pay for [employer]. Patient verbalized understanding and will call with any other questions.

## 2019-07-10 NOTE — TELEPHONE ENCOUNTER
Pt called stating that she just came from Dr. Tan Side office and she has Parkinson's disease and another issue. She stated her daughter Shiva Back has the paperwork and will handle it.   Phone #[phone]  Thanks

## 2019-07-14 ENCOUNTER — HOSPITAL ENCOUNTER (EMERGENCY)
Age: 74
Discharge: HOME | End: 2019-07-14
Attending: STUDENT IN AN ORGANIZED HEALTH CARE EDUCATION/TRAINING PROGRAM
Payer: MEDICARE

## 2019-07-14 VITALS
DIASTOLIC BLOOD PRESSURE: 84 MMHG | HEIGHT: 66 IN | HEART RATE: 68 BPM | WEIGHT: 140.65 LBS | OXYGEN SATURATION: 99 % | SYSTOLIC BLOOD PRESSURE: 175 MMHG | BODY MASS INDEX: 22.6 KG/M2 | RESPIRATION RATE: 16 BRPM | TEMPERATURE: 98.5 F

## 2019-07-14 DIAGNOSIS — K21.00 GASTROESOPHAGEAL REFLUX DISEASE WITH ESOPHAGITIS: Primary | ICD-10-CM

## 2019-07-14 LAB
ALBUMIN SERPL-MCNC: 3.5 G/DL (ref 3.5–5)
ALBUMIN/GLOB SERPL: 1.2 {RATIO} (ref 1.1–2.2)
ALP SERPL-CCNC: 72 U/L (ref 45–117)
ALT SERPL-CCNC: 24 U/L (ref 12–78)
ANION GAP SERPL CALC-SCNC: 4 MMOL/L (ref 5–15)
AST SERPL-CCNC: 16 U/L (ref 15–37)
BASOPHILS # BLD: 0.1 K/UL (ref 0–0.1)
BASOPHILS NFR BLD: 1 % (ref 0–1)
BILIRUB SERPL-MCNC: 0.2 MG/DL (ref 0.2–1)
BUN SERPL-MCNC: 16 MG/DL (ref 6–20)
BUN/CREAT SERPL: 22 (ref 12–20)
CALCIUM SERPL-MCNC: 8.4 MG/DL (ref 8.5–10.1)
CHLORIDE SERPL-SCNC: 105 MMOL/L (ref 97–108)
CO2 SERPL-SCNC: 35 MMOL/L (ref 21–32)
COMMENT, HOLDF: NORMAL
CREAT SERPL-MCNC: 0.74 MG/DL (ref 0.55–1.02)
DIFFERENTIAL METHOD BLD: NORMAL
EOSINOPHIL # BLD: 0.3 K/UL (ref 0–0.4)
EOSINOPHIL NFR BLD: 5 % (ref 0–7)
ERYTHROCYTE [DISTWIDTH] IN BLOOD BY AUTOMATED COUNT: 13.2 % (ref 11.5–14.5)
GLOBULIN SER CALC-MCNC: 2.9 G/DL (ref 2–4)
GLUCOSE SERPL-MCNC: 109 MG/DL (ref 65–100)
HCT VFR BLD AUTO: 38.1 % (ref 35–47)
HGB BLD-MCNC: 12.4 G/DL (ref 11.5–16)
IMM GRANULOCYTES # BLD AUTO: 0 K/UL (ref 0–0.04)
IMM GRANULOCYTES NFR BLD AUTO: 0 % (ref 0–0.5)
LIPASE SERPL-CCNC: 201 U/L (ref 73–393)
LYMPHOCYTES # BLD: 2.6 K/UL (ref 0.8–3.5)
LYMPHOCYTES NFR BLD: 35 % (ref 12–49)
MCH RBC QN AUTO: 30.1 PG (ref 26–34)
MCHC RBC AUTO-ENTMCNC: 32.5 G/DL (ref 30–36.5)
MCV RBC AUTO: 92.5 FL (ref 80–99)
MONOCYTES # BLD: 0.5 K/UL (ref 0–1)
MONOCYTES NFR BLD: 7 % (ref 5–13)
NEUTS SEG # BLD: 3.9 K/UL (ref 1.8–8)
NEUTS SEG NFR BLD: 52 % (ref 32–75)
NRBC # BLD: 0 K/UL (ref 0–0.01)
NRBC BLD-RTO: 0 PER 100 WBC
PLATELET # BLD AUTO: 274 K/UL (ref 150–400)
PMV BLD AUTO: 9.5 FL (ref 8.9–12.9)
POTASSIUM SERPL-SCNC: 3.3 MMOL/L (ref 3.5–5.1)
PROT SERPL-MCNC: 6.4 G/DL (ref 6.4–8.2)
RBC # BLD AUTO: 4.12 M/UL (ref 3.8–5.2)
SAMPLES BEING HELD,HOLD: NORMAL
SODIUM SERPL-SCNC: 144 MMOL/L (ref 136–145)
TROPONIN I SERPL-MCNC: <0.05 NG/ML
WBC # BLD AUTO: 7.4 K/UL (ref 3.6–11)

## 2019-07-14 PROCEDURE — 93005 ELECTROCARDIOGRAM TRACING: CPT

## 2019-07-14 PROCEDURE — C9113 INJ PANTOPRAZOLE SODIUM, VIA: HCPCS | Performed by: STUDENT IN AN ORGANIZED HEALTH CARE EDUCATION/TRAINING PROGRAM

## 2019-07-14 PROCEDURE — 74011250637 HC RX REV CODE- 250/637: Performed by: STUDENT IN AN ORGANIZED HEALTH CARE EDUCATION/TRAINING PROGRAM

## 2019-07-14 PROCEDURE — 85025 COMPLETE CBC W/AUTO DIFF WBC: CPT

## 2019-07-14 PROCEDURE — 74011000250 HC RX REV CODE- 250: Performed by: STUDENT IN AN ORGANIZED HEALTH CARE EDUCATION/TRAINING PROGRAM

## 2019-07-14 PROCEDURE — 36415 COLL VENOUS BLD VENIPUNCTURE: CPT

## 2019-07-14 PROCEDURE — 83690 ASSAY OF LIPASE: CPT

## 2019-07-14 PROCEDURE — 74011250636 HC RX REV CODE- 250/636: Performed by: STUDENT IN AN ORGANIZED HEALTH CARE EDUCATION/TRAINING PROGRAM

## 2019-07-14 PROCEDURE — 99285 EMERGENCY DEPT VISIT HI MDM: CPT

## 2019-07-14 PROCEDURE — 96375 TX/PRO/DX INJ NEW DRUG ADDON: CPT

## 2019-07-14 PROCEDURE — 96374 THER/PROPH/DIAG INJ IV PUSH: CPT

## 2019-07-14 PROCEDURE — 80053 COMPREHEN METABOLIC PANEL: CPT

## 2019-07-14 PROCEDURE — 84484 ASSAY OF TROPONIN QUANT: CPT

## 2019-07-14 RX ORDER — SUCRALFATE 1 G/10ML
1 SUSPENSION ORAL 4 TIMES DAILY
Qty: 400 ML | Refills: 0 | Status: SHIPPED | OUTPATIENT
Start: 2019-07-14 | End: 2019-07-24

## 2019-07-14 RX ORDER — DONEPEZIL HYDROCHLORIDE 5 MG/1
5 TABLET, FILM COATED ORAL
COMMUNITY
End: 2019-12-11

## 2019-07-14 RX ADMIN — FAMOTIDINE 20 MG: 10 INJECTION, SOLUTION INTRAVENOUS at 12:10

## 2019-07-14 RX ADMIN — LIDOCAINE HYDROCHLORIDE 40 ML: 20 SOLUTION ORAL; TOPICAL at 12:13

## 2019-07-14 RX ADMIN — SODIUM CHLORIDE 40 MG: 9 INJECTION, SOLUTION INTRAMUSCULAR; INTRAVENOUS; SUBCUTANEOUS at 12:12

## 2019-07-14 NOTE — ED PROVIDER NOTES
25-year-old woman with history of gastroesophageal reflux disease, with esophagitis and plan for EGD at the end of the month with her gastroenterologist presenting with worsening burning substernal and epigastric pain, worse than usual since last evening. She denies any emesis, radiation outside the thorax, fever, cough, hematemesis. Was not able to take her medications today, states that her appetite is very diminished. She has been tolerating p.o. however. Chest Pain    Associated symptoms include abdominal pain and nausea. Pertinent negatives include no back pain, no fever, no headaches and no shortness of breath.         Past Medical History:   Diagnosis Date    Bipolar disorder ([address]     Nazmy    Chronic pain     BACK PAIN    Diabetes (HCC)     BORDERLINE TX WITH DIET AND EXERCISE    DJD (degenerative joint disease)     Fibromyalgia     Genital herpes     GERD (gastroesophageal reflux disease)     Hypercholesterolemia     IBS (irritable bowel syndrome)     Lumbar disc disease     stimulation-Honza    Migraine     Nausea & vomiting     Other ill-defined conditions(799.89)     SEASONAL allergies    Other ill-defined conditions(799.89)     dysphagia has had esophagus dilated    Paget's disease     Parkinson disease ([facility]     Pulmonary embolism (HCC)     RLS (restless legs syndrome)     Spinal stenosis        Past Surgical History:   Procedure Laterality Date    COLONOSCOPY N/A 6/27/2016    COLONOSCOPY performed by Rossy Sanchez MD at [facility] ENDOSCOPY    COLONOSCOPY N/A 2/22/2019    COLONOSCOPY performed by Pa Salcedo MD at [facility]. [address], COLON, DIAGNOSTIC      12, due 13    HX APPENDECTOMY      HX BACK SURGERY  9/17/2013    back surgery - total of 3 as of 12/20/2013    HX BREAST BIOPSY Right years ago    negative    HX CHOLECYSTECTOMY      HX HEENT      T & A    HX HYSTERECTOMY      total for fibroid tumors    HX ORTHOPAEDIC      lumbar laminectomy then fusion/neurostimulator implanted - inactive now but still in    HX ORTHOPAEDIC      REMOVAL OF NEUROSTIMULATOR    HX OTHER SURGICAL      EGD x 2 with dilation/colonoscopy - no polyps per pt    TILT TABLE EVALUATION  8/2/2016              Family History:   Problem Relation Age of Onset    Colon Cancer Mother     Cancer Mother 68        colon    Heart Disease Father     Heart Disease Maternal Grandmother     Heart Disease Maternal Grandfather     Heart Disease Other        Social History     Socioeconomic History    Marital status: SINGLE     Spouse name: Not on file    Number of children: Not on file    Years of education: Not on file    Highest education level: Not on file   Occupational History    Occupation: volunteer     Employer: RETIRED     Comment: [employer]   Social Needs    Financial resource strain: Not on file    Food insecurity:     Worry: Not on file     Inability: Not on file    Transportation needs:     Medical: Not on file     Non-medical: Not on file   Tobacco Use    Smoking status: Never Smoker    Smokeless tobacco: Never Used   Substance and Sexual Activity    Alcohol use: No     Comment: 1 per month    Drug use: No    Sexual activity: Not on file   Lifestyle    Physical activity:     Days per week: Not on file     Minutes per session: Not on file    Stress: Not on file   Relationships    Social connections:     Talks on phone: Not on file     Gets together: Not on file     Attends Religion service: Not on file     Active member of club or organization: Not on file     Attends meetings of clubs or organizations: Not on file     Relationship status: Not on file    Intimate partner violence:     Fear of current or ex partner: Not on file     Emotionally abused: Not on file     Physically abused: Not on file     Forced sexual activity: Not on file   Other Topics Concern    Not on file   Social History Narrative    Not on file         ALLERGIES: Adhesive; Hydrocodone; Lorazepam; Other medication; Percocet [oxycodone-acetaminophen]; Sudafed [pseudoephedrine hcl]; Wellbutrin [bupropion hcl]; Zithromax [azithromycin]; and Zyrtec [cetirizine]    Review of Systems   Constitutional: Negative for chills and fever. Eyes: Negative for photophobia. Respiratory: Negative for shortness of breath. Cardiovascular: Positive for chest pain. Gastrointestinal: Positive for abdominal pain and nausea. Genitourinary: Negative for dysuria. Musculoskeletal: Negative for back pain. Neurological: Negative for light-headedness and headaches. Psychiatric/Behavioral: Negative for confusion. All other systems reviewed and are negative. Vitals:    07/14/19 1300 07/14/19 1334 07/14/19 1400 07/14/19 1430   BP: (!) 179/100 177/87 (!) 185/94 175/84   Pulse: 68 65 62 68   Resp: 19 18 16 16   Temp:   98.5 °F (36.9 °C)    SpO2: 99% 98% 98% 99%   Weight:       Height:                Physical Exam   Constitutional: She is oriented to person, place, and time. She appears well-developed and well-nourished. No distress. HENT:   Head: Normocephalic and atraumatic. Mouth/Throat: No oropharyngeal exudate. Eyes: Pupils are equal, round, and reactive to light. Cardiovascular: Normal rate and intact distal pulses. Pulmonary/Chest: Effort normal. She exhibits no tenderness. Abdominal: Soft. She exhibits no distension. There is tenderness (luq). Musculoskeletal: She exhibits no deformity. Neurological: She is alert and oriented to person, place, and time. Skin: Skin is warm and dry. Capillary refill takes less than 2 seconds. Psychiatric: She has a normal mood and affect. Her behavior is normal.   Nursing note and vitals reviewed.        MDM  Number of Diagnoses or Management Options  Gastroesophageal reflux disease with esophagitis:   Diagnosis management comments: 48-year-old woman presenting with chest pain associated with epigastric discomfort, burning in nature,  low suspicion for ACS, PE, aortic dissection. Dramatic improvement with GI cocktail. Given a prescription for Carafate.   Advised to take this medication before or after her medications by a few hours, will continue her other gi  medication regimen and follow-up with her gastroenterologist.         Procedures

## 2019-07-14 NOTE — ED TRIAGE NOTES
Triage Note: Patient arrives by EMS for epigastric pain that radiates to the back for several months. Patient is being seen by Dr. Suma Lucio for acid reflux and is scheduled for an endoscopy. Patient also reports SOB and nausea.

## 2019-07-14 NOTE — ED NOTES
Patient's ride has arrived. Patient verbalizes understanding of discharge instructions. Patient wheeled to private vehicle by EMT in no acute distress at discharge.

## 2019-07-15 LAB
ATRIAL RATE: 72 BPM
CALCULATED P AXIS, ECG09: -20 DEGREES
CALCULATED R AXIS, ECG10: 16 DEGREES
CALCULATED T AXIS, ECG11: 61 DEGREES
DIAGNOSIS, 93000: NORMAL
P-R INTERVAL, ECG05: 152 MS
Q-T INTERVAL, ECG07: 452 MS
QRS DURATION, ECG06: 82 MS
QTC CALCULATION (BEZET), ECG08: 494 MS
VENTRICULAR RATE, ECG03: 72 BPM

## 2019-07-16 ENCOUNTER — TELEPHONE (OUTPATIENT)
Dept: CARDIOLOGY CLINIC | Age: 74
End: 2019-07-16

## 2019-07-16 NOTE — TELEPHONE ENCOUNTER
Verified patient with two types of identifiers. Spoke with [facility] pharmacy representative and notified her that medication had been approved back on 7/1/19. Representative states that she will have [employer]. Representative verbalized understanding and will call with any other questions.

## 2019-07-16 NOTE — TELEPHONE ENCOUNTER
Zane Bagley called called from [facility] pharmacy stating the prescription they have for Northera is 100mg 4 capsules a day but she said the insurance denied it and stated it should be 3 a day. She is asking for this to be corrected and sent back.   Phone #[phone]  Fax #[phone]  Thanks

## 2019-07-17 ENCOUNTER — TELEPHONE (OUTPATIENT)
Dept: CARDIOLOGY CLINIC | Age: 74
End: 2019-07-17

## 2019-07-17 NOTE — TELEPHONE ENCOUNTER
Received fax from [facility] that patient has been approved to receive Northera at no charge. Called and spoke with Uli Naidu representative who states that MD just needs to fill out new prescription of medication is to be titrated. Will have MD complete prescription and fax when complete.

## 2019-07-18 ENCOUNTER — TELEPHONE (OUTPATIENT)
Dept: CARDIOLOGY CLINIC | Age: 74
End: 2019-07-18

## 2019-07-18 NOTE — TELEPHONE ENCOUNTER
Faxed Northera prescription to [facility] at fax number [phone]. Fax confirmation received. Verified patient with two types of identifiers. Called and notified patient of titration of medication. Patient requested I call daughter to discuss. Called Rosario and discussed titration. And scheduled 1 month follow. Patient verbalized understanding and will call with any other questions.

## 2019-07-18 NOTE — TELEPHONE ENCOUNTER
Pharmacy called stating that the patient's insurance will only cover a quantity of 60 for a 30 day supply for Northera. Please advise. Phone #: [phone]  Opt. 2 then opt.  4

## 2019-07-18 NOTE — TELEPHONE ENCOUNTER
Verified patient with two types of identifiers. Spoke with representative at [facility] [place] who states that they just need an updated prescription for authorization. Faxed titrated prescription to [facility] at fax number [phone]. Fax confirmation received.

## 2019-07-19 RX ORDER — ATORVASTATIN CALCIUM 20 MG/1
TABLET, FILM COATED ORAL
Qty: 90 TAB | Refills: 0 | Status: SHIPPED | OUTPATIENT
Start: 2019-07-19 | End: 2019-10-17 | Stop reason: SDUPTHER

## 2019-07-22 ENCOUNTER — OFFICE VISIT (OUTPATIENT)
Dept: INTERNAL MEDICINE CLINIC | Age: 74
End: 2019-07-22

## 2019-07-22 ENCOUNTER — HOSPITAL ENCOUNTER (OUTPATIENT)
Dept: LAB | Age: 74
Discharge: HOME | End: 2019-07-22
Payer: MEDICARE

## 2019-07-22 VITALS
WEIGHT: 138 LBS | OXYGEN SATURATION: 99 % | HEIGHT: 66 IN | SYSTOLIC BLOOD PRESSURE: 128 MMHG | TEMPERATURE: 97.8 F | DIASTOLIC BLOOD PRESSURE: 80 MMHG | RESPIRATION RATE: 16 BRPM | BODY MASS INDEX: 22.18 KG/M2 | HEART RATE: 74 BPM

## 2019-07-22 DIAGNOSIS — I73.9 PVD (PERIPHERAL VASCULAR DISEASE) (HCC): ICD-10-CM

## 2019-07-22 DIAGNOSIS — I26.99 OTHER ACUTE PULMONARY EMBOLISM WITHOUT ACUTE COR PULMONALE (HCC): ICD-10-CM

## 2019-07-22 DIAGNOSIS — E78.2 MIXED HYPERLIPIDEMIA: ICD-10-CM

## 2019-07-22 DIAGNOSIS — E11.21 TYPE 2 DIABETES WITH NEPHROPATHY (HCC): ICD-10-CM

## 2019-07-22 DIAGNOSIS — E53.8 B12 DEFICIENCY: ICD-10-CM

## 2019-07-22 DIAGNOSIS — I95.1 ORTHOSTATIC HYPOTENSION: Primary | ICD-10-CM

## 2019-07-22 DIAGNOSIS — F31.78 BIPOLAR DISORDER, IN FULL REMISSION, MOST RECENT EPISODE MIXED (HCC): ICD-10-CM

## 2019-07-22 PROCEDURE — 82607 VITAMIN B-12: CPT

## 2019-07-22 PROCEDURE — 84132 ASSAY OF SERUM POTASSIUM: CPT

## 2019-07-22 PROCEDURE — 83036 HEMOGLOBIN GLYCOSYLATED A1C: CPT

## 2019-07-22 PROCEDURE — 80061 LIPID PANEL: CPT

## 2019-07-22 NOTE — PROGRESS NOTES
HISTORY OF PRESENT ILLNESS  Philipp Mccullough is a 75 y.o. female. HPI Subjective:  Stephane San is seen today accompanied by her daughter for follow up of diabetes and other multiple medical problems. 1. Orthostatic low blood pressure. She is up to date with neurology follow up. Dr. Vincent Ng of neurology is now following her. He also follows her Parkinson's-like syndrome and she is on medication for this. 2. Diabetes, hyperlipidemia. She is due for routine labs. 3. Upper abdominal/chest pain. Endoscopy is pending. It does not appear she had labs done as mentioned in Dr. Julián Mendoza note. Review of Systems:  Notable for hand and foot paresthesia that tends to wax and wane. Will check for B12 deficiency. Past Medical History:  Notable for above as she is having tremors and low blood pressure. Further neurologic evaluation is pending as well. Current Outpatient Medications   Medication Sig    atorvastatin (LIPITOR) 20 mg tablet TAKE 1 TABLET BY MOUTH EVERY EVENING    donepezil (ARICEPT) 5 mg tablet Take 5 mg by mouth nightly.  potassium chloride (K-DUR, KLOR-CON) 20 mEq tablet Take 1 Tab by mouth daily.  pyridostigmine (MESTINON) 60 mg tablet TAKE 1 TABLET BY MOUTH THREE TIMES DAILY    lactobacillus combination no.4 (PROBIOTIC) 3 billion cell cap Take  by mouth.  fludrocortisone (FLORINEF) 0.1 mg tablet TAKE 1 TABLET BY MOUTH TWICE DAILY    omeprazole (PRILOSEC) 40 mg capsule Take 40 mg by mouth two (2) times a day.  sucralfate (CARAFATE) 1 gram tablet TAKE 1 TABLET BY MOUTH FOUR TIMES DAILY    carbidopa-levodopa (SINEMET)  mg per tablet Take 1 Tab by mouth three (3) times daily.  PARoxetine (PAXIL) 30 mg tablet Take 30 mg by mouth daily. Luis Enrique Ngo NP/ Dr Sergey Courtney acetaminophen (TYLENOL) 325 mg tablet Take 325 mg by mouth every four (4) hours as needed for Pain.  QUEtiapine (SEROQUEL) 25 mg tablet Take 25-50 mg by mouth nightly as needed.  Take 1-2 tabs qhs prn Luis Enrique Ngo NP/Dr Ashley Seth droxidopa (NORTHERA) 300 mg cap Take 600 mg by mouth two (2) times a day. No current facility-administered medications for this visit. Review of Systems   Constitutional: Negative for weight loss. Respiratory: Negative. Cardiovascular: Negative for palpitations, leg swelling and PND. Gastrointestinal: Positive for heartburn. Musculoskeletal: Negative for myalgias. Neurological: Positive for tingling, tremors and sensory change. Negative for focal weakness. Physical Exam   Constitutional: She appears well-nourished. Neck: Carotid bruit is not present. Cardiovascular: Normal rate and regular rhythm. Exam reveals no gallop and no friction rub. No murmur heard. Pulses:       Dorsalis pedis pulses are 0 on the right side, and 0 on the left side. Posterior tibial pulses are 0 on the right side, and 0 on the left side. Pulmonary/Chest: Effort normal and breath sounds normal. No respiratory distress. Musculoskeletal: She exhibits no edema. Nursing note and vitals reviewed. ASSESSMENT and PLAN  Diagnoses and all orders for this visit:    1. Orthostatic hypotension  -     POTASSIUM    2. Type 2 diabetes with nephropathy (HCC)  -     HEMOGLOBIN A1C WITH EAG    3. Bipolar disorder, in full remission, most recent episode [facility])- See psychiatrist as directed     4. Mixed hyperlipidemia  -     LIPID PANEL    5. Other acute pulmonary embolism without acute cor pulmonale ([facility] [address]- Follow off treatment     6. PVD (peripheral vascular disease) ([facility])  -     DUPLEX LOW EXT ARTERIES WITH NARINDER; Future    7.  B12 deficiency  -     VITAMIN B12    Plan was reviewed with patient and family, understanding expressed

## 2019-07-23 LAB
CHOLEST SERPL-MCNC: 136 MG/DL (ref 100–199)
EST. AVERAGE GLUCOSE BLD GHB EST-MCNC: 131 MG/DL
HBA1C MFR BLD: 6.2 % (ref 4.8–5.6)
HDLC SERPL-MCNC: 69 MG/DL
LDLC SERPL CALC-MCNC: 55 MG/DL (ref 0–99)
POTASSIUM SERPL-SCNC: 4.3 MMOL/L (ref 3.5–5.2)
TRIGL SERPL-MCNC: 60 MG/DL (ref 0–149)
VIT B12 SERPL-MCNC: 355 PG/ML (ref 232–1245)
VLDLC SERPL CALC-MCNC: 12 MG/DL (ref 5–40)

## 2019-07-25 ENCOUNTER — TELEPHONE (OUTPATIENT)
Dept: CARDIOLOGY CLINIC | Age: 74
End: 2019-07-25

## 2019-07-25 DIAGNOSIS — R42 DIZZINESS: ICD-10-CM

## 2019-07-25 DIAGNOSIS — I95.1 ORTHOSTATIC HYPOTENSION: ICD-10-CM

## 2019-07-25 DIAGNOSIS — R55 SYNCOPE AND COLLAPSE: ICD-10-CM

## 2019-07-25 RX ORDER — DROXIDOPA 300 MG/1
600 CAPSULE ORAL 2 TIMES DAILY
Qty: 360 CAP | Refills: 3 | Status: SHIPPED | OUTPATIENT
Start: 2019-07-25 | End: 2020-08-14 | Stop reason: SDUPTHER

## 2019-07-25 NOTE — TELEPHONE ENCOUNTER
Faxed maintenance prescription  to [facility] at fax number [phone]. Fax confirmation received.

## 2019-07-25 NOTE — TELEPHONE ENCOUNTER
[facility] is calling to clarify a prescription that was sent to them for this patient.    Please call back at [phone]

## 2019-07-25 NOTE — TELEPHONE ENCOUNTER
Faxed Northera Prescription  to [facility] at fax number [phone]. Fax confirmation received.

## 2019-07-31 ENCOUNTER — ANESTHESIA EVENT (OUTPATIENT)
Dept: ENDOSCOPY | Age: 74
End: 2019-07-31
Payer: MEDICARE

## 2019-07-31 ENCOUNTER — HOSPITAL ENCOUNTER (OUTPATIENT)
Age: 74
Setting detail: OUTPATIENT SURGERY
Discharge: HOME | End: 2019-07-31
Attending: SPECIALIST | Admitting: SPECIALIST
Payer: MEDICARE

## 2019-07-31 ENCOUNTER — ANESTHESIA (OUTPATIENT)
Dept: ENDOSCOPY | Age: 74
End: 2019-07-31
Payer: MEDICARE

## 2019-07-31 VITALS
SYSTOLIC BLOOD PRESSURE: 148 MMHG | DIASTOLIC BLOOD PRESSURE: 84 MMHG | HEIGHT: 66 IN | WEIGHT: 135 LBS | HEART RATE: 68 BPM | TEMPERATURE: 97.9 F | RESPIRATION RATE: 15 BRPM | OXYGEN SATURATION: 98 % | BODY MASS INDEX: 21.69 KG/M2

## 2019-07-31 PROCEDURE — 74011250636 HC RX REV CODE- 250/636

## 2019-07-31 PROCEDURE — 77030021593 HC FCPS BIOP ENDOSC BSC -A: Performed by: SPECIALIST

## 2019-07-31 PROCEDURE — 76060000031 HC ANESTHESIA FIRST 0.5 HR: Performed by: SPECIALIST

## 2019-07-31 PROCEDURE — 88305 TISSUE EXAM BY PATHOLOGIST: CPT

## 2019-07-31 PROCEDURE — 76040000019: Performed by: SPECIALIST

## 2019-07-31 RX ORDER — MIDAZOLAM HYDROCHLORIDE 1 MG/ML
.25-1 INJECTION, SOLUTION INTRAMUSCULAR; INTRAVENOUS
Status: CANCELLED | OUTPATIENT
Start: 2019-07-31 | End: 2019-07-31

## 2019-07-31 RX ORDER — SODIUM CHLORIDE 0.9 % (FLUSH) 0.9 %
5-40 SYRINGE (ML) INJECTION EVERY 8 HOURS
Status: CANCELLED | OUTPATIENT
Start: 2019-07-31

## 2019-07-31 RX ORDER — LORAZEPAM 2 MG/ML
2 INJECTION INTRAMUSCULAR AS NEEDED
Status: CANCELLED | OUTPATIENT
Start: 2019-07-31 | End: 2019-07-31

## 2019-07-31 RX ORDER — EPINEPHRINE 0.1 MG/ML
1 INJECTION INTRACARDIAC; INTRAVENOUS
Status: CANCELLED | OUTPATIENT
Start: 2019-07-31 | End: 2019-08-01

## 2019-07-31 RX ORDER — LIDOCAINE HYDROCHLORIDE 20 MG/ML
INJECTION, SOLUTION EPIDURAL; INFILTRATION; INTRACAUDAL; PERINEURAL AS NEEDED
Status: DISCONTINUED | OUTPATIENT
Start: 2019-07-31 | End: 2019-07-31 | Stop reason: HOSPADM

## 2019-07-31 RX ORDER — PROPOFOL 10 MG/ML
INJECTION, EMULSION INTRAVENOUS AS NEEDED
Status: DISCONTINUED | OUTPATIENT
Start: 2019-07-31 | End: 2019-07-31 | Stop reason: HOSPADM

## 2019-07-31 RX ORDER — FENTANYL CITRATE 50 UG/ML
12.5-5 INJECTION, SOLUTION INTRAMUSCULAR; INTRAVENOUS
Status: CANCELLED | OUTPATIENT
Start: 2019-07-31 | End: 2019-07-31

## 2019-07-31 RX ORDER — FLUMAZENIL 0.1 MG/ML
0.2 INJECTION INTRAVENOUS
Status: CANCELLED | OUTPATIENT
Start: 2019-07-31 | End: 2019-07-31

## 2019-07-31 RX ORDER — NALOXONE HYDROCHLORIDE 0.4 MG/ML
0.4 INJECTION, SOLUTION INTRAMUSCULAR; INTRAVENOUS; SUBCUTANEOUS
Status: CANCELLED | OUTPATIENT
Start: 2019-07-31 | End: 2019-07-31

## 2019-07-31 RX ORDER — SODIUM CHLORIDE 0.9 % (FLUSH) 0.9 %
5-40 SYRINGE (ML) INJECTION AS NEEDED
Status: CANCELLED | OUTPATIENT
Start: 2019-07-31

## 2019-07-31 RX ORDER — SODIUM CHLORIDE 9 MG/ML
50 INJECTION, SOLUTION INTRAVENOUS CONTINUOUS
Status: CANCELLED | OUTPATIENT
Start: 2019-07-31 | End: 2019-07-31

## 2019-07-31 RX ORDER — ATROPINE SULFATE 0.1 MG/ML
0.5 INJECTION INTRAVENOUS
Status: CANCELLED | OUTPATIENT
Start: 2019-07-31 | End: 2019-08-01

## 2019-07-31 RX ORDER — SODIUM CHLORIDE 9 MG/ML
INJECTION, SOLUTION INTRAVENOUS
Status: DISCONTINUED | OUTPATIENT
Start: 2019-07-31 | End: 2019-07-31 | Stop reason: HOSPADM

## 2019-07-31 RX ORDER — DEXTROMETHORPHAN/PSEUDOEPHED 2.5-7.5/.8
1.2 DROPS ORAL
Status: CANCELLED | OUTPATIENT
Start: 2019-07-31

## 2019-07-31 RX ADMIN — LIDOCAINE HYDROCHLORIDE 40 MG: 20 INJECTION, SOLUTION EPIDURAL; INFILTRATION; INTRACAUDAL; PERINEURAL at 07:36

## 2019-07-31 RX ADMIN — PROPOFOL 50 MG: 10 INJECTION, EMULSION INTRAVENOUS at 07:37

## 2019-07-31 RX ADMIN — PROPOFOL 50 MG: 10 INJECTION, EMULSION INTRAVENOUS at 07:40

## 2019-07-31 RX ADMIN — SODIUM CHLORIDE: 9 INJECTION, SOLUTION INTRAVENOUS at 07:37

## 2019-07-31 RX ADMIN — PROPOFOL 50 MG: 10 INJECTION, EMULSION INTRAVENOUS at 07:39

## 2019-07-31 NOTE — PROGRESS NOTES

## 2019-07-31 NOTE — ROUTINE PROCESS
[facility] in [place] 1945 
[id number] Situation: 
Verbal report received Joe Daily RN Procedure: Procedure(s): ESOPHAGOGASTRODUODENOSCOPY (EGD) ESOPHAGOGASTRODUODENAL (EGD) BIOPSY Background: 
 
Preoperative diagnosis: GERD, NAUSEA Postoperative diagnosis: 1. Endosopic Negative GERD :  Dr. Chelsea Moreno Assistant(s): Endoscopy Technician-1: Gustabo Alba Endoscopy RN-1: Yordy Naidu RN Specimens:  
ID Type Source Tests Collected by Time Destination 1 : Lower Esophagus Biopsies Preservative   Alcides Kaminski MD 7/31/2019 9311 Pathology 2 : Mid Esophagus Biopsies Preservative   Alcides Kaminski MD 7/31/2019 8278 Pathology 3 : Upper Esophagus Biopsies Preservative   Alicia Gutierrez MD 7/31/2019 4567 Pathology H. Pylori  no Assessment: 
Intra-procedure medications Anesthesia gave intra-procedure sedation and medications, see anesthesia flow sheet yes Intravenous fluids: NS@ Lethaniel Haagensen Vital signs stable Abdominal assessment: round and soft Recommendation: 
Discharge patient per MD order Family or Friend Pt daughter Permission to share finding with family or friend yes

## 2019-07-31 NOTE — ANESTHESIA POSTPROCEDURE EVALUATION
Post-Anesthesia Evaluation and Assessment    Patient: Zheng Rhodes MRN: [id number]  SSN: [id number]    YOB: 1945  Age: 75 y.o. Sex: female      I have evaluated the patient and they are stable and ready for discharge from the PACU. Cardiovascular Function/Vital Signs  Visit Vitals  /84   Pulse 64   Temp 36.6 °C (97.9 °F)   Resp 14   Ht 5' 6\" (1.676 m)   Wt 61.2 kg (135 lb)   SpO2 98%   BMI 21.79 kg/m²       Patient is status post MAC anesthesia for Procedure(s):  ESOPHAGOGASTRODUODENOSCOPY (EGD)  ESOPHAGOGASTRODUODENAL (EGD) BIOPSY. Nausea/Vomiting: None    Postoperative hydration reviewed and adequate. Pain:  Pain Scale 1: Numeric (0 - 10) (07/31/19 0805)  Pain Intensity 1: 0 (07/31/19 0800)   Managed    Neurological Status: At baseline    Mental Status, Level of Consciousness: Alert and  oriented to person, place, and time    Pulmonary Status:   O2 Device: Room air (07/31/19 0805)   Adequate oxygenation and airway patent    Complications related to anesthesia: None    Post-anesthesia assessment completed. No concerns    Signed By: Abundio Amaral MD     July 31, 2019              Procedure(s):  ESOPHAGOGASTRODUODENOSCOPY (EGD)  ESOPHAGOGASTRODUODENAL (EGD) BIOPSY. MAC    <BSHSIANPOST>    Vitals Value Taken Time   /77 7/31/2019  8:09 AM   Temp 36.6 °C (97.9 °F) 7/31/2019  7:55 AM   Pulse 69 7/31/2019  8:14 AM   Resp 17 7/31/2019  8:14 AM   SpO2 95 % 7/31/2019  8:14 AM   Vitals shown include unvalidated device data.

## 2019-07-31 NOTE — ANESTHESIA PREPROCEDURE EVALUATION
Relevant Problems   No relevant active problems       Anesthetic History   No history of anesthetic complications            Review of Systems / Medical History  Patient summary reviewed, nursing notes reviewed and pertinent labs reviewed    Pulmonary  Within defined limits                 Neuro/Psych   Within defined limits           Cardiovascular  Within defined limits                     GI/Hepatic/Renal     GERD: well controlled           Endo/Other    Diabetes: well controlled, type 2         Other Findings              Physical Exam    Airway  Mallampati: II  TM Distance: > 6 cm  Neck ROM: normal range of motion   Mouth opening: Normal     Cardiovascular  Regular rate and rhythm,  S1 and S2 normal,  no murmur, click, rub, or gallop             Dental  No notable dental hx       Pulmonary  Breath sounds clear to auscultation               Abdominal  GI exam deferred       Other Findings            Anesthetic Plan    ASA: 3  Anesthesia type: MAC            Anesthetic plan and risks discussed with: Patient

## 2019-07-31 NOTE — DISCHARGE INSTRUCTIONS
Dewayne Yuen  [id number]  1945    EGD DISCHARGE INSTRUCTIONS  Discomfort:  Sore throat- throat lozenges or warm salt water gargle  redness at IV site- apply warm compress to area; if redness or soreness persist- contact your physician  Gaseous discomfort- walking, belching will help relieve any discomfort  You may not operate a vehicle for 12 hours  You may not engage in an occupation involving machinery or appliances for rest of today. You may not drink alcoholic beverages for at least 12 hours  Avoid making any critical decisions for at least 24 hour  DIET  You may resume your regular diet - however -  remember your colon is empty and a heavy meal will produce gas. Avoid these foods:  vegetables, fried / greasy foods, carbonated drinks  ACTIVITY  You may resume your normal daily activities. Spend the remainder of the day resting -  avoid any strenuous activity. CALL M.D. ANY SIGN OF   Increasing pain, nausea, vomiting  Abdominal distension (swelling)  New increased bleeding (oral or rectal)  Fever (chills)  Pain in chest area  Bloody discharge from nose or mouth  Shortness of breath    You may not take any Advil, Aspirin, Ibuprofen, Motrin, Aleve, or Goodys unless MD recommended, ONLY  Tylenol as needed for pain. Follow-up Instructions:   Call Dr. Cullen Arce office to make appointment  Office telephone for problems or questions [phone]  Results of procedure / biopsy in 10-14 days   -Continue acid suppression. , -Follow up with me., -No NSAIDS, -check esophagogram and UGI if normal then possible CT of chest  -make sure she is taking omeprazole 40 mg 2 x a day until she sees me in the office, stop Carafate pills but continue liquid Carafate

## 2019-08-04 ENCOUNTER — HOSPITAL ENCOUNTER (EMERGENCY)
Age: 74
Discharge: HOME | End: 2019-08-04
Attending: EMERGENCY MEDICINE
Payer: MEDICARE

## 2019-08-04 ENCOUNTER — APPOINTMENT (OUTPATIENT)
Dept: GENERAL RADIOLOGY | Age: 74
End: 2019-08-04
Attending: EMERGENCY MEDICINE
Payer: MEDICARE

## 2019-08-04 VITALS
DIASTOLIC BLOOD PRESSURE: 83 MMHG | HEART RATE: 65 BPM | OXYGEN SATURATION: 100 % | RESPIRATION RATE: 12 BRPM | SYSTOLIC BLOOD PRESSURE: 169 MMHG

## 2019-08-04 DIAGNOSIS — R07.89 CHEST WALL PAIN: ICD-10-CM

## 2019-08-04 DIAGNOSIS — K21.9 GASTROESOPHAGEAL REFLUX DISEASE, ESOPHAGITIS PRESENCE NOT SPECIFIED: Primary | ICD-10-CM

## 2019-08-04 LAB
ALBUMIN SERPL-MCNC: 3.6 G/DL (ref 3.5–5)
ALBUMIN/GLOB SERPL: 1.2 {RATIO} (ref 1.1–2.2)
ALP SERPL-CCNC: 72 U/L (ref 45–117)
ALT SERPL-CCNC: 23 U/L (ref 12–78)
ANION GAP SERPL CALC-SCNC: 5 MMOL/L (ref 5–15)
AST SERPL-CCNC: 18 U/L (ref 15–37)
ATRIAL RATE: 70 BPM
BASOPHILS # BLD: 0.1 K/UL (ref 0–0.1)
BASOPHILS NFR BLD: 1 % (ref 0–1)
BILIRUB SERPL-MCNC: 0.4 MG/DL (ref 0.2–1)
BUN SERPL-MCNC: 9 MG/DL (ref 6–20)
BUN/CREAT SERPL: 12 (ref 12–20)
CALCIUM SERPL-MCNC: 9.1 MG/DL (ref 8.5–10.1)
CALCULATED R AXIS, ECG10: 4 DEGREES
CALCULATED T AXIS, ECG11: 70 DEGREES
CHLORIDE SERPL-SCNC: 107 MMOL/L (ref 97–108)
CO2 SERPL-SCNC: 32 MMOL/L (ref 21–32)
COMMENT, HOLDF: NORMAL
CREAT SERPL-MCNC: 0.75 MG/DL (ref 0.55–1.02)
DIAGNOSIS, 93000: NORMAL
DIFFERENTIAL METHOD BLD: NORMAL
EOSINOPHIL # BLD: 0.3 K/UL (ref 0–0.4)
EOSINOPHIL NFR BLD: 3 % (ref 0–7)
ERYTHROCYTE [DISTWIDTH] IN BLOOD BY AUTOMATED COUNT: 12.9 % (ref 11.5–14.5)
GLOBULIN SER CALC-MCNC: 3 G/DL (ref 2–4)
GLUCOSE SERPL-MCNC: 91 MG/DL (ref 65–100)
HCT VFR BLD AUTO: 38.3 % (ref 35–47)
HGB BLD-MCNC: 12.4 G/DL (ref 11.5–16)
IMM GRANULOCYTES # BLD AUTO: 0 K/UL (ref 0–0.04)
IMM GRANULOCYTES NFR BLD AUTO: 0 % (ref 0–0.5)
LIPASE SERPL-CCNC: 126 U/L (ref 73–393)
LYMPHOCYTES # BLD: 2.8 K/UL (ref 0.8–3.5)
LYMPHOCYTES NFR BLD: 31 % (ref 12–49)
MCH RBC QN AUTO: 30.8 PG (ref 26–34)
MCHC RBC AUTO-ENTMCNC: 32.4 G/DL (ref 30–36.5)
MCV RBC AUTO: 95 FL (ref 80–99)
MONOCYTES # BLD: 0.5 K/UL (ref 0–1)
MONOCYTES NFR BLD: 6 % (ref 5–13)
NEUTS SEG # BLD: 5.2 K/UL (ref 1.8–8)
NEUTS SEG NFR BLD: 59 % (ref 32–75)
NRBC # BLD: 0 K/UL (ref 0–0.01)
NRBC BLD-RTO: 0 PER 100 WBC
P-R INTERVAL, ECG05: 168 MS
PLATELET # BLD AUTO: 267 K/UL (ref 150–400)
PMV BLD AUTO: 9.4 FL (ref 8.9–12.9)
POTASSIUM SERPL-SCNC: 3.2 MMOL/L (ref 3.5–5.1)
PROT SERPL-MCNC: 6.6 G/DL (ref 6.4–8.2)
Q-T INTERVAL, ECG07: 586 MS
QRS DURATION, ECG06: 86 MS
QTC CALCULATION (BEZET), ECG08: 632 MS
RBC # BLD AUTO: 4.03 M/UL (ref 3.8–5.2)
SAMPLES BEING HELD,HOLD: NORMAL
SODIUM SERPL-SCNC: 144 MMOL/L (ref 136–145)
TROPONIN I SERPL-MCNC: <0.05 NG/ML
VENTRICULAR RATE, ECG03: 70 BPM
WBC # BLD AUTO: 8.9 K/UL (ref 3.6–11)

## 2019-08-04 PROCEDURE — 80053 COMPREHEN METABOLIC PANEL: CPT

## 2019-08-04 PROCEDURE — 84484 ASSAY OF TROPONIN QUANT: CPT

## 2019-08-04 PROCEDURE — 85025 COMPLETE CBC W/AUTO DIFF WBC: CPT

## 2019-08-04 PROCEDURE — 83690 ASSAY OF LIPASE: CPT

## 2019-08-04 PROCEDURE — 99285 EMERGENCY DEPT VISIT HI MDM: CPT

## 2019-08-04 PROCEDURE — 74011000250 HC RX REV CODE- 250: Performed by: EMERGENCY MEDICINE

## 2019-08-04 PROCEDURE — 36415 COLL VENOUS BLD VENIPUNCTURE: CPT

## 2019-08-04 PROCEDURE — 71046 X-RAY EXAM CHEST 2 VIEWS: CPT

## 2019-08-04 PROCEDURE — 74011250637 HC RX REV CODE- 250/637: Performed by: EMERGENCY MEDICINE

## 2019-08-04 PROCEDURE — 93005 ELECTROCARDIOGRAM TRACING: CPT

## 2019-08-04 RX ORDER — RANITIDINE 150 MG/1
150 TABLET, FILM COATED ORAL
Qty: 30 TAB | Refills: 0 | Status: SHIPPED | OUTPATIENT
Start: 2019-08-04 | End: 2019-11-27 | Stop reason: RX

## 2019-08-04 RX ORDER — LIDOCAINE 50 MG/G
PATCH TOPICAL
Qty: 20 EACH | Refills: 0 | Status: SHIPPED | OUTPATIENT
Start: 2019-08-04 | End: 2019-11-23

## 2019-08-04 RX ORDER — LIDOCAINE 50 MG/G
1 PATCH TOPICAL EVERY 24 HOURS
Status: DISCONTINUED | OUTPATIENT
Start: 2019-08-04 | End: 2019-08-04 | Stop reason: HOSPADM

## 2019-08-04 RX ADMIN — LIDOCAINE HYDROCHLORIDE 40 ML: 20 SOLUTION ORAL; TOPICAL at 11:20

## 2019-08-04 NOTE — ED PROVIDER NOTES
58-year-old female with a history of GERD, IBS, fibromyalgia, Parkinson's, HTN, HLD, with history of recent EGD on Wednesday of last week stating that she has not heard the results of her imaging yet, presents to the ED noting burning but predominantly mid chest and right sided pain that is been ongoing x1 week. She rates as 8 out of 10 in severity, states that it has been constant but waxes and wanes intermittently. Denies any nausea or vomiting, diarrhea, abdominal pain, blood in stool, shortness of breath, palpitations, lightheadedness, syncope, pleurisy. She does state that she has been working with a physical therapist regularly doing upper extremity and chest exercises, and does note that her symptoms seem to be reproducible with palpation of her right pectoralis musculature. Denies any trauma to the area. No rashes on her skin. She is status post cholecystectomy, appendectomy. She states that her symptoms have been improved transiently with intermittent dosing of Carafate, as prescribed by her GI physician, Dr. Nydia Juárez. No history of CAD.            Past Medical History:   Diagnosis Date    Bipolar disorder ([address]     Nazmy    Chronic pain     BACK PAIN    Diabetes ([address]     BORDERLINE TX WITH DIET AND EXERCISE    DJD (degenerative joint disease)     Fibromyalgia     Genital herpes     GERD (gastroesophageal reflux disease)     Hypercholesterolemia     IBS (irritable bowel syndrome)     Ill-defined condition     fibromyligia    Lumbar disc disease     stimulation-Honza    Migraine     Nausea & vomiting     Other ill-defined conditions(799.89)     SEASONAL allergies    Other ill-defined conditions(799.89)     dysphagia has had esophagus dilated    Paget's disease     Parkinson disease ([address]     Pulmonary embolism (HCC)     RLS (restless legs syndrome)     Spinal stenosis        Past Surgical History:   Procedure Laterality Date    COLONOSCOPY N/A 6/27/2016    COLONOSCOPY performed by Clara Torrez MD at [facility] ENDOSCOPY    COLONOSCOPY N/A 2/22/2019    COLONOSCOPY performed by Dara Mujica MD at [facility] ENDOSCOPY    ENDOSCOPY, COLON, DIAGNOSTIC      12, due 13    HX APPENDECTOMY      HX BACK SURGERY  9/17/2013    back surgery - total of 3 as of 12/20/2013    HX BREAST BIOPSY Right years ago    negative    HX CHOLECYSTECTOMY      HX HEENT      T & A    HX HYSTERECTOMY      total for fibroid tumors    HX ORTHOPAEDIC      lumbar laminectomy then fusion/neurostimulator implanted - inactive now but still in    HX ORTHOPAEDIC      REMOVAL OF NEUROSTIMULATOR    HX OTHER SURGICAL      EGD x 2 with dilation/colonoscopy - no polyps per pt    TILT TABLE EVALUATION  8/2/2016              Family History:   Problem Relation Age of Onset    Colon Cancer Mother     Cancer Mother 68        colon    Heart Disease Father     Heart Disease Maternal Grandmother     Heart Disease Maternal Grandfather     Heart Disease Other        Social History     Socioeconomic History    Marital status: SINGLE     Spouse name: Not on file    Number of children: Not on file    Years of education: Not on file    Highest education level: Not on file   Occupational History    Occupation: volunteer     Employer: RETIRED     Comment: [employer]   Social Needs    Financial resource strain: Not on file    Food insecurity:     Worry: Not on file     Inability: Not on file    Transportation needs:     Medical: Not on file     Non-medical: Not on file   Tobacco Use    Smoking status: Never Smoker    Smokeless tobacco: Never Used   Substance and Sexual Activity    Alcohol use: No     Comment: 1 per month    Drug use: No    Sexual activity: Not on file   Lifestyle    Physical activity:     Days per week: Not on file     Minutes per session: Not on file    Stress: Not on file   Relationships    Social connections:     Talks on phone: Not on file     Gets together: Not on file     Attends Anabaptist service: Not on file     Active member of club or organization: Not on file     Attends meetings of clubs or organizations: Not on file     Relationship status: Not on file    Intimate partner violence:     Fear of current or ex partner: Not on file     Emotionally abused: Not on file     Physically abused: Not on file     Forced sexual activity: Not on file   Other Topics Concern    Not on file   Social History Narrative    Not on file         ALLERGIES: Adhesive; Hydrocodone; Lorazepam; Other medication; Percocet [oxycodone-acetaminophen]; Sudafed [pseudoephedrine hcl]; Wellbutrin [bupropion hcl]; Zithromax [azithromycin]; and Zyrtec [cetirizine]    Review of Systems   Constitutional: Negative for activity change, appetite change, chills and fever. HENT: Negative for congestion, rhinorrhea, sinus pressure, sneezing and sore throat. Eyes: Negative for photophobia and visual disturbance. Respiratory: Negative for cough, chest tightness and shortness of breath. Cardiovascular: Positive for chest pain. Gastrointestinal: Negative for abdominal pain, blood in stool, constipation, diarrhea, nausea and vomiting. Genitourinary: Negative for difficulty urinating, dysuria, flank pain, frequency, hematuria, menstrual problem, urgency, vaginal bleeding and vaginal discharge. Musculoskeletal: Negative for arthralgias, back pain, myalgias and neck pain. Skin: Negative for rash and wound. Neurological: Negative for syncope, weakness, numbness and headaches. Psychiatric/Behavioral: Negative for self-injury and suicidal ideas. All other systems reviewed and are negative. Vitals:    08/04/19 1110   BP: (!) 160/95   Pulse: 81   Resp: 12   SpO2: 100%            Physical Exam   Constitutional: She is oriented to person, place, and time. She appears well-developed and well-nourished. No distress. HENT:   Head: Normocephalic and atraumatic.    Nose: Nose normal.   Mouth/Throat: Oropharynx is clear and moist.   Eyes: Pupils are equal, round, and reactive to light. Conjunctivae and EOM are normal.   Neck: Neck supple. Cardiovascular: Normal rate, regular rhythm, normal heart sounds and intact distal pulses. Pulmonary/Chest: Effort normal and breath sounds normal. She exhibits tenderness (Right lower pectoralis). Abdominal: Soft. She exhibits no distension. There is no tenderness. Musculoskeletal: She exhibits no edema or tenderness. Neurological: She is alert and oriented to person, place, and time. She has normal strength. No cranial nerve deficit or sensory deficit. Coordination normal. GCS eye subscore is 4. GCS verbal subscore is 5. GCS motor subscore is 6. Skin: Skin is warm and dry. She is not diaphoretic. Nursing note and vitals reviewed. MDM    75 y.o. female presents with 1 week of burning mid and right sided chest pain. Reproducible on exam. Pain described also sounds very GI associated, awaiting results of her recent EGD. CXR viewed by myself and read by radiology showing no acute abnormalities. Labs returned showing no significant abnormalities. Neg troponin and lipase. She was given GI cocktail in the ED and had significant improvement in her sx. Given reproducibility will rx lidoderm patches and H2 blocker for further symptomatic relief. Rec'd PCP and GI f/u. Return precautions were given for worsening or concerns. Procedures    1113 EKG shows normal sinus rhythm with a rate of 70 bpm with nonspecific T wave flattening diffusely but no acute ST elevation or depression.

## 2019-08-04 NOTE — ED TRIAGE NOTES
Pt arrives with EMS for c/o right sided chest burning radiating to mid chest. Has not taken home medications today. Pt had endoscopy done this past Wednesday.  Endoscopy was done d/t ongoing burning

## 2019-08-04 NOTE — H&P
Pre-endoscopy H and P    The patient was seen and examined. The airway was assessed and documented. The problem list, past medical history, and medications were reviewed.      Patient Active Problem List   Diagnosis Code    IBS (irritable bowel syndrome) K58.9    RLS (restless legs syndrome) G25.81    Bipolar disorder (HCC) F31.9    Fibromyalgia M79.7    DJD (degenerative joint disease) M19.90    Lumbar disc disease M51.9    Paget's disease of bone M88.9    Migraine 346    Genital herpes A60.00    Other diseases of nasal cavity and sinuses(478.19) J34.89    Allergic rhinitis, cause unspecified J30.9    Hammertoe M20.40    Encounter for long-term (current) use of other medications Z79.899    Abnormal mammogram R92.8    Tubulovillous adenoma D36.9    Mixed hyperlipidemia E78.2    Advance directive on file Z78.9    Orthostatic hypotension I95.1    Resting tremor R25.9    Near syncope R55    Spinal stenosis M48.00    Syncope R55    Type 2 diabetes with nephropathy ([facility]) E11.21     Social History     Socioeconomic History    Marital status: SINGLE     Spouse name: Not on file    Number of children: Not on file    Years of education: Not on file    Highest education level: Not on file   Occupational History    Occupation: volunteer     Employer: RETIRED     Comment: [employer]   Social Needs    Financial resource strain: Not on file    Food insecurity:     Worry: Not on file     Inability: Not on file    Transportation needs:     Medical: Not on file     Non-medical: Not on file   Tobacco Use    Smoking status: Never Smoker    Smokeless tobacco: Never Used   Substance and Sexual Activity    Alcohol use: No     Comment: 1 per month    Drug use: No    Sexual activity: Not on file   Lifestyle    Physical activity:     Days per week: Not on file     Minutes per session: Not on file    Stress: Not on file   Relationships    Social connections:     Talks on phone: Not on file     Gets together: Not on file     Attends Confucianism service: Not on file     Active member of club or organization: Not on file     Attends meetings of clubs or organizations: Not on file     Relationship status: Not on file    Intimate partner violence:     Fear of current or ex partner: Not on file     Emotionally abused: Not on file     Physically abused: Not on file     Forced sexual activity: Not on file   Other Topics Concern    Not on file   Social History Narrative    Not on file     Past Medical History:   Diagnosis Date    B[facility] disorder ([facility]     Nazmy    Chronic pain     BACK PAIN    Diabetes ([facility]     BORDERLINE TX WITH DIET AND EXERCISE    DJD (degenerative joint disease)     Fibromyalgia     Genital herpes     GERD (gastroesophageal reflux disease)     Hypercholesterolemia     IBS (irritable bowel syndrome)     Ill-defined condition     fibromyligia    Lumbar disc disease     stimulation-Honza    Migraine     Nausea & vomiting     Other ill-defined conditions(799.89)     SEASONAL allergies    Other ill-defined conditions(799.89)     dysphagia has had esophagus dilated    Paget's disease     Parkinson disease ([facility]     Pulmonary embolism (HCC)     RLS (restless legs syndrome)     Spinal stenosis      The patient has a family history of na    Prior to Admission Medications   Prescriptions Last Dose Informant Patient Reported? Taking? PARoxetine (PAXIL) 30 mg tablet 7/30/2019 at Unknown time  Yes Yes   Sig: Take 30 mg by mouth daily. May Ibarra NP/ Dr Meredith More   QUEtiapine (SEROQUEL) 25 mg tablet 7/30/2019 at Unknown time  Yes Yes   Sig: Take 25-50 mg by mouth nightly as needed. Take 1-2 tabs qhs prn May Ibarra NP/Dr Meredith More   acetaminophen (TYLENOL) 325 mg tablet 7/29/2019 at Unknown time  Yes Yes   Sig: Take 325 mg by mouth every four (4) hours as needed for Pain.    atorvastatin (LIPITOR) 20 mg tablet 7/30/2019 at Unknown time  No Yes   Sig: TAKE 1 TABLET BY MOUTH EVERY EVENING carbidopa-levodopa (SINEMET)  mg per tablet 7/30/2019 at Unknown time  Yes Yes   Sig: Take 1 Tab by mouth three (3) times daily. donepezil (ARICEPT) 5 mg tablet 7/30/2019 at Unknown time  Yes Yes   Sig: Take 5 mg by mouth nightly. droxidopa (NORTHERA) 300 mg cap 7/30/2019 at Unknown time  No Yes   Sig: Take 600 mg by mouth two (2) times a day. fludrocortisone (FLORINEF) 0.1 mg tablet 7/30/2019 at Unknown time  No Yes   Sig: TAKE 1 TABLET BY MOUTH TWICE DAILY   lactobacillus combination no.4 (PROBIOTIC) 3 billion cell cap 7/30/2019 at Unknown time  Yes Yes   Sig: Take  by mouth. omeprazole (PRILOSEC) 40 mg capsule 7/30/2019 at Unknown time  Yes Yes   Sig: Take 40 mg by mouth two (2) times a day. potassium chloride (K-DUR, KLOR-CON) 20 mEq tablet 7/30/2019 at Unknown time  No Yes   Sig: Take 1 Tab by mouth daily. pyridostigmine (MESTINON) 60 mg tablet 7/30/2019 at Unknown time  No Yes   Sig: TAKE 1 TABLET BY MOUTH THREE TIMES DAILY   sucralfate (CARAFATE) 1 gram tablet 7/30/2019 at Unknown time  No Yes   Sig: TAKE 1 TABLET BY MOUTH FOUR TIMES DAILY      Facility-Administered Medications: None         The review of systems is:  negative for shortness of breath or chest pain      The heart, lungs and mental status were satisfactory for the administration of MAC sedation and for the procedure. Mallampati score: See Anesthesia. I discussed with the patient the objectives, risks, consequences and alternatives to the procedure. Plan: Endoscopic procedure with MAC sedation.     Indra Arango MD  8/4/2019  7:01 PM

## 2019-08-11 ENCOUNTER — HOSPITAL ENCOUNTER (EMERGENCY)
Age: 74
Discharge: HOME | End: 2019-08-11
Attending: EMERGENCY MEDICINE | Admitting: EMERGENCY MEDICINE
Payer: MEDICARE

## 2019-08-11 VITALS
TEMPERATURE: 98 F | HEIGHT: 66 IN | WEIGHT: 135 LBS | OXYGEN SATURATION: 98 % | RESPIRATION RATE: 16 BRPM | SYSTOLIC BLOOD PRESSURE: 105 MMHG | HEART RATE: 90 BPM | DIASTOLIC BLOOD PRESSURE: 54 MMHG | BODY MASS INDEX: 21.69 KG/M2

## 2019-08-11 DIAGNOSIS — R10.13 ABDOMINAL PAIN, EPIGASTRIC: Primary | ICD-10-CM

## 2019-08-11 LAB
ALBUMIN SERPL-MCNC: 3.5 G/DL (ref 3.5–5)
ALBUMIN/GLOB SERPL: 1.1 {RATIO} (ref 1.1–2.2)
ALP SERPL-CCNC: 77 U/L (ref 45–117)
ALT SERPL-CCNC: 10 U/L (ref 12–78)
ANION GAP SERPL CALC-SCNC: 5 MMOL/L (ref 5–15)
AST SERPL-CCNC: 21 U/L (ref 15–37)
BASOPHILS # BLD: 0.1 K/UL (ref 0–0.1)
BASOPHILS NFR BLD: 1 % (ref 0–1)
BILIRUB SERPL-MCNC: 0.5 MG/DL (ref 0.2–1)
BUN SERPL-MCNC: 16 MG/DL (ref 6–20)
BUN/CREAT SERPL: 19 (ref 12–20)
CALCIUM SERPL-MCNC: 9.2 MG/DL (ref 8.5–10.1)
CHLORIDE SERPL-SCNC: 107 MMOL/L (ref 97–108)
CO2 SERPL-SCNC: 30 MMOL/L (ref 21–32)
COMMENT, HOLDF: NORMAL
CREAT SERPL-MCNC: 0.86 MG/DL (ref 0.55–1.02)
DIFFERENTIAL METHOD BLD: NORMAL
EOSINOPHIL # BLD: 0.3 K/UL (ref 0–0.4)
EOSINOPHIL NFR BLD: 3 % (ref 0–7)
ERYTHROCYTE [DISTWIDTH] IN BLOOD BY AUTOMATED COUNT: 13 % (ref 11.5–14.5)
GLOBULIN SER CALC-MCNC: 3.3 G/DL (ref 2–4)
GLUCOSE SERPL-MCNC: 136 MG/DL (ref 65–100)
HCT VFR BLD AUTO: 40.7 % (ref 35–47)
HGB BLD-MCNC: 13.2 G/DL (ref 11.5–16)
IMM GRANULOCYTES # BLD AUTO: 0 K/UL (ref 0–0.04)
IMM GRANULOCYTES NFR BLD AUTO: 0 % (ref 0–0.5)
LIPASE SERPL-CCNC: 104 U/L (ref 73–393)
LYMPHOCYTES # BLD: 2.9 K/UL (ref 0.8–3.5)
LYMPHOCYTES NFR BLD: 33 % (ref 12–49)
MCH RBC QN AUTO: 30.8 PG (ref 26–34)
MCHC RBC AUTO-ENTMCNC: 32.4 G/DL (ref 30–36.5)
MCV RBC AUTO: 95.1 FL (ref 80–99)
MONOCYTES # BLD: 0.6 K/UL (ref 0–1)
MONOCYTES NFR BLD: 7 % (ref 5–13)
NEUTS SEG # BLD: 5 K/UL (ref 1.8–8)
NEUTS SEG NFR BLD: 56 % (ref 32–75)
NRBC # BLD: 0 K/UL (ref 0–0.01)
NRBC BLD-RTO: 0 PER 100 WBC
PLATELET # BLD AUTO: 278 K/UL (ref 150–400)
PMV BLD AUTO: 10.2 FL (ref 8.9–12.9)
POTASSIUM SERPL-SCNC: 3.5 MMOL/L (ref 3.5–5.1)
PROT SERPL-MCNC: 6.8 G/DL (ref 6.4–8.2)
RBC # BLD AUTO: 4.28 M/UL (ref 3.8–5.2)
SAMPLES BEING HELD,HOLD: NORMAL
SODIUM SERPL-SCNC: 142 MMOL/L (ref 136–145)
WBC # BLD AUTO: 8.8 K/UL (ref 3.6–11)

## 2019-08-11 PROCEDURE — C9113 INJ PANTOPRAZOLE SODIUM, VIA: HCPCS | Performed by: EMERGENCY MEDICINE

## 2019-08-11 PROCEDURE — 96374 THER/PROPH/DIAG INJ IV PUSH: CPT

## 2019-08-11 PROCEDURE — 99284 EMERGENCY DEPT VISIT MOD MDM: CPT

## 2019-08-11 PROCEDURE — 85025 COMPLETE CBC W/AUTO DIFF WBC: CPT

## 2019-08-11 PROCEDURE — 74011000250 HC RX REV CODE- 250: Performed by: EMERGENCY MEDICINE

## 2019-08-11 PROCEDURE — 93005 ELECTROCARDIOGRAM TRACING: CPT

## 2019-08-11 PROCEDURE — 36415 COLL VENOUS BLD VENIPUNCTURE: CPT

## 2019-08-11 PROCEDURE — 74011250636 HC RX REV CODE- 250/636: Performed by: EMERGENCY MEDICINE

## 2019-08-11 PROCEDURE — 83690 ASSAY OF LIPASE: CPT

## 2019-08-11 PROCEDURE — 80053 COMPREHEN METABOLIC PANEL: CPT

## 2019-08-11 PROCEDURE — 96375 TX/PRO/DX INJ NEW DRUG ADDON: CPT

## 2019-08-11 RX ORDER — ONDANSETRON 2 MG/ML
4 INJECTION INTRAMUSCULAR; INTRAVENOUS
Status: COMPLETED | OUTPATIENT
Start: 2019-08-11 | End: 2019-08-11

## 2019-08-11 RX ADMIN — ONDANSETRON 4 MG: 2 INJECTION INTRAMUSCULAR; INTRAVENOUS at 17:30

## 2019-08-11 RX ADMIN — SODIUM CHLORIDE 40 MG: 9 INJECTION INTRAMUSCULAR; INTRAVENOUS; SUBCUTANEOUS at 17:30

## 2019-08-11 NOTE — ED PROVIDER NOTES
HPI patient is a 76-year-old white female who returns to the ER complaining of epigastric area pain. She was evaluated here on August 4, 2019 and diagnosed with GERD/esophagitis. Her gastrointestinal doctor () did an endoscopy procedure on July 31, 2019 is awaiting biopsy results. She denies any fever cold symptoms cough, shortness of breath or chest pain. She denies any difficulty swallowing or difficulty breathing. She reports finishing a bottle of Carafate without relief as. She has had her liquid protein drink prior to arrival and is tolerating well. She denies any vomiting or diarrhea.   Past Medical History:   Diagnosis Date    Bipolar disorder ([address]     Nazmy    Chronic pain     BACK PAIN    Diabetes (HCC)     BORDERLINE TX WITH DIET AND EXERCISE    DJD (degenerative joint disease)     Fibromyalgia     Genital herpes     GERD (gastroesophageal reflux disease)     Hypercholesterolemia     IBS (irritable bowel syndrome)     Ill-defined condition     fibromyligia    Lumbar disc disease     stimulation-Honza    Migraine     Nausea & vomiting     Other ill-defined conditions(799.89)     SEASONAL allergies    Other ill-defined conditions(799.89)     dysphagia has had esophagus dilated    Paget's disease     Parkinson disease [address]     Pulmonary embolism (HCC)     RLS (restless legs syndrome)     Spinal stenosis        Past Surgical History:   Procedure Laterality Date    COLONOSCOPY N/A 6/27/2016    COLONOSCOPY performed by Indra Arango MD at [facility] ENDOSCOPY    COLONOSCOPY N/A 2/22/2019    COLONOSCOPY performed by Anisha Rodriguez MD at [facility] ENDOSCOPY    ENDOSCOPY, COLON, DIAGNOSTIC      12, due 13    HX APPENDECTOMY      HX BACK SURGERY  9/17/2013    back surgery - total of 3 as of 12/20/2013    HX BREAST BIOPSY Right years ago    negative    HX CHOLECYSTECTOMY      HX HEENT      T & A    HX HYSTERECTOMY      total for fibroid tumors    HX ORTHOPAEDIC lumbar laminectomy then fusion/neurostimulator implanted - inactive now but still in    HX ORTHOPAEDIC      REMOVAL OF NEUROSTIMULATOR    HX OTHER SURGICAL      EGD x 2 with dilation/colonoscopy - no polyps per pt    TILT TABLE EVALUATION  8/2/2016              Family History:   Problem Relation Age of Onset    Colon Cancer Mother     Cancer Mother 68        colon    Heart Disease Father     Heart Disease Maternal Grandmother     Heart Disease Maternal Grandfather     Heart Disease Other        Social History     Socioeconomic History    Marital status: SINGLE     Spouse name: Not on file    Number of children: Not on file    Years of education: Not on file    Highest education level: Not on file   Occupational History    Occupation: volunteer     Employer: RETIRED     Comment: [employer]   Social Needs    Financial resource strain: Not on file    Food insecurity:     Worry: Not on file     Inability: Not on file    Transportation needs:     Medical: Not on file     Non-medical: Not on file   Tobacco Use    Smoking status: Never Smoker    Smokeless tobacco: Never Used   Substance and Sexual Activity    Alcohol use: No     Comment: 1 per month    Drug use: No    Sexual activity: Not on file   Lifestyle    Physical activity:     Days per week: Not on file     Minutes per session: Not on file    Stress: Not on file   Relationships    Social connections:     Talks on phone: Not on file     Gets together: Not on file     Attends Sabianist service: Not on file     Active member of club or organization: Not on file     Attends meetings of clubs or organizations: Not on file     Relationship status: Not on file    Intimate partner violence:     Fear of current or ex partner: Not on file     Emotionally abused: Not on file     Physically abused: Not on file     Forced sexual activity: Not on file   Other Topics Concern    Not on file   Social History Narrative    Not on file         ALLERGIES: Adhesive; Hydrocodone; Lorazepam; Other medication; Percocet [oxycodone-acetaminophen]; Sudafed [pseudoephedrine hcl]; Wellbutrin [bupropion hcl]; Zithromax [azithromycin]; and Zyrtec [cetirizine]    Review of Systems   Constitutional: Negative for activity change, appetite change, fever and unexpected weight change. HENT: Negative for sore throat and trouble swallowing. Gastrointestinal: Positive for abdominal pain. Negative for constipation, diarrhea, nausea and vomiting. All other systems reviewed and are negative. Vitals:    08/11/19 1536 08/11/19 1647   BP: 118/74 102/61   Pulse: 74 92   Resp:  16   Temp:  98 °F (36.7 °C)   SpO2: 95% 96%   Weight:  61.2 kg (135 lb)   Height:  5' 6\" (1.676 m)            Physical Exam   Constitutional: She is oriented to person, place, and time. She appears well-developed and well-nourished. Elderly white female; non smoker; lives alone   HENT:   Right Ear: External ear normal.   Left Ear: External ear normal.   Mouth/Throat: Oropharynx is clear and moist.   Neck: Normal range of motion. Neck supple. Cardiovascular: Normal rate and regular rhythm. Pulmonary/Chest: Effort normal and breath sounds normal.   Abdominal: Soft. Bowel sounds are normal. She exhibits no distension. There is tenderness. Reports epigastric area tenderness   Musculoskeletal: Normal range of motion. Walks steady with a rollator   Lymphadenopathy:     She has no cervical adenopathy. Neurological: She is alert and oriented to person, place, and time. Skin: Skin is warm and dry. No rash noted. Psychiatric: She has a normal mood and affect. Her behavior is normal.   Nursing note and vitals reviewed. MDM       Procedures      Patient has been reexamined and reports that she feels much better; pain-free. Encourage close follow-up with her gastroenterologist tomorrow. Discussed plan of care with Dr. Madelaine Stoddard. Patient's results and plan of care have been reviewed with her.   Patient has verbally conveyed her understanding and agreement of her signs, symptoms, diagnosis, treatment and prognosis and additionally agrees to follow up as recommended or return to the Emergency Room should her condition change prior to follow-up. Discharge instructions have also been provided to the patient with some educational information regarding her diagnosis as well a list of reasons why she would want to return to the ER prior to her follow-up appointment should her condition change. Thao Perry NP

## 2019-08-11 NOTE — DISCHARGE INSTRUCTIONS
Patient Education        Indigestion (Dyspepsia or Heartburn): Care Instructions  Your Care Instructions  Sometimes it can be hard to pinpoint the cause of indigestion. (It is also called dyspepsia or heartburn.) Most cases of an upset stomach with bloating, burning, burping, and nausea are minor and go away within several hours. Home treatment and over-the-counter medicine often are able to control symptoms. But if you take medicine to relieve your indigestion without making diet and lifestyle changes, your symptoms are likely to return again and again. If you get indigestion often, it may be a sign of a more serious medical problem. Be sure to follow up with your doctor, who may want to do tests to be sure of the cause of your indigestion. Follow-up care is a key part of your treatment and safety. Be sure to make and go to all appointments, and call your doctor if you are having problems. It's also a good idea to know your test results and keep a list of the medicines you take. How can you care for yourself at home? · Your doctor may recommend over-the-counter medicine. For mild or occasional indigestion, antacids such as Gaviscon, Mylanta, Maalox, or Tums, may help. Be safe with medicines. Be careful when you take over-the-counter antacid medicines. Many of these medicines have aspirin in them. Read the label to make sure that you are not taking more than the recommended dose. Too much aspirin can be harmful. · Your doctor also may recommend over-the-counter acid reducers, such as Pepcid AC, Tagamet HB, Zantac 75, or Prilosec. Read and follow all instructions on the label. If you use these medicines often, talk with your doctor. · Change your eating habits. ? It's best to eat several small meals instead of two or three large meals. ? After you eat, wait 2 to 3 hours before you lie down. ? Chocolate, mint, and alcohol can make GERD worse. ?  Spicy foods, foods that have a lot of acid (like tomatoes and oranges), and coffee can make GERD symptoms worse in some people. If your symptoms are worse after you eat a certain food, you may want to stop eating that food to see if your symptoms get better. · Do not smoke or chew tobacco. Smoking can make GERD worse. If you need help quitting, talk to your doctor about stop-smoking programs and medicines. These can increase your chances of quitting for good. · If you have GERD symptoms at night, raise the head of your bed 6 to 8 inches. You can do this by putting the frame on blocks or placing a foam wedge under the head of your mattress. (Adding extra pillows does not work.)  · Do not wear tight clothing around your middle. · Lose weight if you need to. Losing just 5 to 10 pounds can help. · Do not take anti-inflammatory medicines, such as aspirin, ibuprofen (Advil, Motrin), or naproxen (Aleve). These can irritate the stomach. If you need a pain medicine, try acetaminophen (Tylenol), which does not cause stomach upset. When should you call for help? Call your doctor now or seek immediate medical care if:    · You have new or worse belly pain.     · You are vomiting.    Watch closely for changes in your health, and be sure to contact your doctor if:    · You have new or worse symptoms of indigestion.     · You have trouble or pain swallowing.     · You are losing weight.     · You do not get better as expected. Where can you learn more? Go to http://ash-jeronimo.info/. Enter I974 in the search box to learn more about \"Indigestion (Dyspepsia or Heartburn): Care Instructions. \"  Current as of: November 7, 2018  Content Version: 12.1  © 6050-8756 [employer]. Care instructions adapted under license by [employer] (which disclaims liability or warranty for this information).  If you have questions about a medical condition or this instruction, always ask your healthcare professional. Adriana Quiñonez any warranty or liability for your use of this information.

## 2019-08-12 LAB
ATRIAL RATE: 78 BPM
CALCULATED R AXIS, ECG10: 6 DEGREES
CALCULATED T AXIS, ECG11: 123 DEGREES
DIAGNOSIS, 93000: NORMAL
P-R INTERVAL, ECG05: 144 MS
Q-T INTERVAL, ECG07: 406 MS
QRS DURATION, ECG06: 80 MS
QTC CALCULATION (BEZET), ECG08: 462 MS
VENTRICULAR RATE, ECG03: 78 BPM

## 2019-08-19 ENCOUNTER — HOSPITAL ENCOUNTER (OUTPATIENT)
Dept: GENERAL RADIOLOGY | Age: 74
Discharge: HOME | End: 2019-08-19
Attending: SPECIALIST
Payer: MEDICARE

## 2019-08-19 DIAGNOSIS — R07.9 CHEST PAIN: ICD-10-CM

## 2019-08-19 PROCEDURE — 74241 XR UPPER GI W KUB/ BA SWALLOW: CPT

## 2019-08-20 ENCOUNTER — OFFICE VISIT (OUTPATIENT)
Dept: CARDIOLOGY CLINIC | Age: 74
End: 2019-08-20

## 2019-08-20 VITALS
HEIGHT: 66 IN | OXYGEN SATURATION: 97 % | HEART RATE: 73 BPM | DIASTOLIC BLOOD PRESSURE: 80 MMHG | RESPIRATION RATE: 18 BRPM | BODY MASS INDEX: 21.53 KG/M2 | SYSTOLIC BLOOD PRESSURE: 120 MMHG | WEIGHT: 134 LBS

## 2019-08-20 DIAGNOSIS — G25.2 RESTING TREMOR: ICD-10-CM

## 2019-08-20 DIAGNOSIS — I95.1 PARKINSONIAN SYNDROME ASSOCIATED WITH SYMPTOMATIC ORTHOSTATIC HYPOTENSION (HCC): ICD-10-CM

## 2019-08-20 DIAGNOSIS — I95.1 ORTHOSTATIC HYPOTENSION: Primary | ICD-10-CM

## 2019-08-20 DIAGNOSIS — G20 PARKINSONIAN SYNDROME ASSOCIATED WITH SYMPTOMATIC ORTHOSTATIC HYPOTENSION (HCC): ICD-10-CM

## 2019-08-20 NOTE — PROGRESS NOTES
Cardiac Electrophysiology Office Note     Subjective:      Trista Garber is a 75 y.o. female patient who is seen for follow up, has long-standing history of dizziness & near syncope. Etiology on previous tilt noted to be orthostatic hypotension    She diagnosis of Parkinson's disease and tolerated sinemet  Her daughter said tremor is better   northera has been approved and she gets samples 600 mg bid  She is taking 6 capsules bid    Her daughter called the drug assistance foundation otherwise she will have to pay 1000 $ a month  Daughter-in-law Lindsay Kilgore. Continues to use rolling walker. Continues pyridostigmine 60 mg po tid & florinef  As well  These combo helps her maintain BP  She is using compression stockings bought from [employer]. No more syncope or dizziness      Subsequent Lexiscan/cardiolite stress test on 01/24/18 was essentially normal, with LVEF 74% & no ischemia noted. Chest CTA (02/01/2018) showed no abnormalities that would explain chest pain. She has arthritis and fibromyalgia   She is getting colonoscopy 12/3/2018 due to ongoing diarrhea    Previous:  Dr Damaris Guillemro 8/26/16: CTA at [facility] 5/7/16 small PE RLL  6/9/16 CTA [facility] normal CTA no PE  Hypercoagulable work up and d dimer performed. No longer taking Xarelto. She had a tilt table test 8/2/16. Tilt table test revealed the patient had orthostatic hypotension response with sudden change in upright position and resolved quickly when she became supine  She had been on midodrine 5 mg tid, but discontinued this when starting Northera. Patient was seen by neurologist in hospital and not clear she has autonomic dysfunction and Parkinson's disease.         Patient Active Problem List    Diagnosis Date Noted    Type 2 diabetes with nephropathy ([facility] [address] 07/26/2018    Syncope 05/28/2018    Spinal stenosis     Near syncope 08/02/2016    Resting tremor 07/29/2016    Orthostatic hypotension 07/05/2016    Advance directive on file 05/24/2016    Mixed hyperlipidemia 02/02/2016    Tubulovillous adenoma 08/21/2012    Abnormal mammogram 06/04/2012    Encounter for long-term (current) use of other medications 11/30/2011    Rupertoertoe 09/12/2011    Allergic rhinitis, cause unspecified 01/26/2011    Other diseases of nasal cavity and sinuses(478.19) 07/19/2010    IBS (irritable bowel syndrome)     RLS (restless legs syndrome)     Bipolar disorder (HCC)     Fibromyalgia     DJD (degenerative joint disease)     Lumbar disc disease     Paget's disease of bone     Migraine     Genital herpes      Current Outpatient Medications   Medication Sig Dispense Refill    lidocaine (LIDODERM) 5 % Apply patch to the affected area for 12 hours a day and remove for 12 hours a day. 20 Each 0    raNITIdine (ZANTAC) 150 mg tablet Take 1 Tab by mouth two (2) times daily as needed for Indigestion (acid reflux). 30 Tab 0    droxidopa (NORTHERA) 300 mg cap Take 600 mg by mouth two (2) times a day. 360 Cap 3    atorvastatin (LIPITOR) 20 mg tablet TAKE 1 TABLET BY MOUTH EVERY EVENING 90 Tab 0    donepezil (ARICEPT) 5 mg tablet Take 5 mg by mouth nightly.  potassium chloride (K-DUR, KLOR-CON) 20 mEq tablet Take 1 Tab by mouth daily. 90 Tab 1    pyridostigmine (MESTINON) 60 mg tablet TAKE 1 TABLET BY MOUTH THREE TIMES DAILY 90 Tab 5    lactobacillus combination no.4 (PROBIOTIC) 3 billion cell cap Take  by mouth.  fludrocortisone (FLORINEF) 0.1 mg tablet TAKE 1 TABLET BY MOUTH TWICE DAILY 180 Tab 1    omeprazole (PRILOSEC) 40 mg capsule Take 40 mg by mouth two (2) times a day.  sucralfate (CARAFATE) 1 gram tablet TAKE 1 TABLET BY MOUTH FOUR TIMES DAILY 360 Tab 3    carbidopa-levodopa (SINEMET)  mg per tablet Take 1 Tab by mouth three (3) times daily.  PARoxetine (PAXIL) 30 mg tablet Take 30 mg by mouth daily.  Cynthia Tirado NP/ Dr Keily Salguero acetaminophen (TYLENOL) 325 mg tablet Take 325 mg by mouth every four (4) hours as needed for Pain.  QUEtiapine (SEROQUEL) 25 mg tablet Take 25-50 mg by mouth nightly as needed.  Take 1-2 tabs qhs prn Neisha Estes NP/Dr Annie Shaw       Allergies   Allergen Reactions    Adhesive Itching    Hydrocodone Itching    Lorazepam Other (comments)    Other Medication Rash     Tide detergent    Percocet [Oxycodone-Acetaminophen] Itching    Sudafed [Pseudoephedrine Hcl] Other (comments)     Vertigo    Wellbutrin [Bupropion Hcl] Nausea and Vomiting    Zithromax [Azithromycin] Vertigo    Zyrtec [Cetirizine] Nausea Only     Past Medical History:   Diagnosis Date    Bipolar disorder (HCC)     Nazmy    Chronic pain     BACK PAIN    Diabetes ([facility] [address]     BORDERLINE TX WITH DIET AND EXERCISE    DJD (degenerative joint disease)     Fibromyalgia     Genital herpes     GERD (gastroesophageal reflux disease)     Hypercholesterolemia     IBS (irritable bowel syndrome)     Ill-defined condition     fibromyligia    Lumbar disc disease     stimulation-Honza    Migraine     Nausea & vomiting     Other ill-defined conditions(799.89)     SEASONAL allergies    Other ill-defined conditions(799.89)     dysphagia has had esophagus dilated    Paget's disease     Parkinson disease ([facility] [address]     Pulmonary embolism (HCC)     RLS (restless legs syndrome)     Spinal stenosis      Past Surgical History:   Procedure Laterality Date    COLONOSCOPY N/A 6/27/2016    COL[facility] at [facility] ENDOSCOPY    COLONOSCOPY N/A 2/22/2019    COLONOSCOPY performed by Dara Mujica MD at [facility] [address], COLON, DIAGNOSTIC      12, due 15    HX APPENDECTOMY      HX BACK SURGERY  9/17/2013    back surgery - total of 3 as of 12/20/2013    HX BREAST BIOPSY Right years ago    negative    HX CHOLECYSTECTOMY      HX HEENT      T & A    HX HYSTERECTOMY      total for fibroid tumors    HX ORTHOPAEDIC      lumbar laminectomy then fusion/neurostimulator implanted - inactive now but still in    HX ORTHOPAEDIC      REMOVAL OF NEUROSTIMULATOR    HX OTHER SURGICAL      EGD x 2 with dilation/colonoscopy - no polyps per pt    TILT TABLE EVALUATION  8/2/2016          Family History   Problem Relation Age of Onset    Colon Cancer Mother     Cancer Mother 68        colon    Heart Disease Father     Heart Disease Maternal Grandmother     Heart Disease Maternal Grandfather     Heart Disease Other      Social History     Tobacco Use    Smoking status: Never Smoker    Smokeless tobacco: Never Used   Substance Use Topics    Alcohol use: No     Comment: 1 per month      Review of Systems:   Constitutional: Negative for fever, chills, weight loss . + intermittent dizziness and falls  HEENT: Negative for nosebleeds, vision changes. Respiratory: Negative for cough, hemoptysis, sputum production, and wheezing. Cardiovascular: no palpitations, orthopnea, claudication, leg swelling, and PND. Gastrointestinal:  no vomiting, blood in stool and melena. Genitourinary: Negative for dysuria, and hematuria. Musculoskeletal: + for myalgias, sciatica   Skin: Negative for rash. Heme: Does not bleed or bruise easily. Neurological: Negative for speech change and focal weakness     Objective:     Visit Vitals  /80 (BP 1 Location: Right arm, BP Patient Position: Sitting)   Pulse 73   Resp 18   Ht 5' 6\" (1.676 m)   Wt 134 lb (60.8 kg)   SpO2 97%   BMI 21.63 kg/m²      Physical Exam:   Constitutional: well-developed and well-nourished. No distress. Head: Normocephalic and atraumatic. Eyes: Pupils are equal, round  Neck: supple. No JVD present. Cardiovascular: Normal rate, regular rhythm. Exam reveals no gallop and no friction rub. No murmur heard. Pulmonary/Chest: Effort normal and breath sounds normal. No wheezes. Abdominal: Soft, distended  Musculoskeletal: no edema. compression stockings on  Neurological: Alert, oriented.  resting tremor absent  Skin: Skin is warm and dry  Psychiatric: Normal mood and affect. Behavior is normal. Judgment and thought content normal.      Assessment/Plan:       ICD-10-CM ICD-9-CM    1. Orthostatic hypotension I95.1 458.0    2. Resting tremor R25.9 781.0    3. Parkinsonian syndrome associated with symptomatic orthostatic hypotension (HCC) G90.3 333.0    She did well with the Mestinon, Northera, and Florinef. Ideally she is off florinef and Mestinon to avoid supine hypertension but she is not having that problem now and likes to continue with this combination  Therefore, I have requested the company that makes [employer] to give her some samples 300 mg 2 capsules bid. Her daughter will help her with filling out the drug assistance form. She sees neurologist    She wants to come back 6 months with me    Future Appointments   Date Time Provider Reji Smith   11/21/2019  8:05 AM Marla Giles MD [id number] [facility]       Thank you for involving me in this patient's care and please call with further concerns or questions. Nav Blanchard M.D.   Electrophysiology/Cardiology  [facility] [place]  [facility], [facility] [address] [place] [address]  [phone]

## 2019-08-23 ENCOUNTER — HOSPITAL ENCOUNTER (EMERGENCY)
Age: 74
Discharge: HOME | End: 2019-08-23
Attending: EMERGENCY MEDICINE
Payer: MEDICARE

## 2019-08-23 VITALS
RESPIRATION RATE: 16 BRPM | BODY MASS INDEX: 21.26 KG/M2 | SYSTOLIC BLOOD PRESSURE: 159 MMHG | DIASTOLIC BLOOD PRESSURE: 91 MMHG | OXYGEN SATURATION: 96 % | TEMPERATURE: 97.5 F | HEART RATE: 67 BPM | HEIGHT: 66 IN | WEIGHT: 132.28 LBS

## 2019-08-23 DIAGNOSIS — R07.9 CHEST PAIN, UNSPECIFIED TYPE: Primary | ICD-10-CM

## 2019-08-23 LAB
ALBUMIN SERPL-MCNC: 3.6 G/DL (ref 3.5–5)
ALBUMIN/GLOB SERPL: 1.1 {RATIO} (ref 1.1–2.2)
ALP SERPL-CCNC: 75 U/L (ref 45–117)
ALT SERPL-CCNC: 13 U/L (ref 12–78)
ANION GAP SERPL CALC-SCNC: 11 MMOL/L (ref 5–15)
AST SERPL-CCNC: 23 U/L (ref 15–37)
ATRIAL RATE: 72 BPM
BASOPHILS # BLD: 0 K/UL (ref 0–0.1)
BASOPHILS NFR BLD: 1 % (ref 0–1)
BILIRUB SERPL-MCNC: 0.7 MG/DL (ref 0.2–1)
BUN SERPL-MCNC: 14 MG/DL (ref 6–20)
BUN/CREAT SERPL: 17 (ref 12–20)
CALCIUM SERPL-MCNC: 8.8 MG/DL (ref 8.5–10.1)
CALCULATED P AXIS, ECG09: -19 DEGREES
CALCULATED R AXIS, ECG10: 17 DEGREES
CALCULATED T AXIS, ECG11: 92 DEGREES
CHLORIDE SERPL-SCNC: 104 MMOL/L (ref 97–108)
CO2 SERPL-SCNC: 30 MMOL/L (ref 21–32)
COMMENT, HOLDF: NORMAL
CREAT SERPL-MCNC: 0.81 MG/DL (ref 0.55–1.02)
DIAGNOSIS, 93000: NORMAL
DIFFERENTIAL METHOD BLD: NORMAL
EOSINOPHIL # BLD: 0.2 K/UL (ref 0–0.4)
EOSINOPHIL NFR BLD: 3 % (ref 0–7)
ERYTHROCYTE [DISTWIDTH] IN BLOOD BY AUTOMATED COUNT: 12.9 % (ref 11.5–14.5)
GLOBULIN SER CALC-MCNC: 3.2 G/DL (ref 2–4)
GLUCOSE SERPL-MCNC: 132 MG/DL (ref 65–100)
HCT VFR BLD AUTO: 39.2 % (ref 35–47)
HGB BLD-MCNC: 13.2 G/DL (ref 11.5–16)
IMM GRANULOCYTES # BLD AUTO: 0 K/UL (ref 0–0.04)
IMM GRANULOCYTES NFR BLD AUTO: 0 % (ref 0–0.5)
LYMPHOCYTES # BLD: 2.7 K/UL (ref 0.8–3.5)
LYMPHOCYTES NFR BLD: 39 % (ref 12–49)
MCH RBC QN AUTO: 30.8 PG (ref 26–34)
MCHC RBC AUTO-ENTMCNC: 33.7 G/DL (ref 30–36.5)
MCV RBC AUTO: 91.6 FL (ref 80–99)
MONOCYTES # BLD: 0.4 K/UL (ref 0–1)
MONOCYTES NFR BLD: 6 % (ref 5–13)
NEUTS SEG # BLD: 3.5 K/UL (ref 1.8–8)
NEUTS SEG NFR BLD: 51 % (ref 32–75)
NRBC # BLD: 0 K/UL (ref 0–0.01)
NRBC BLD-RTO: 0 PER 100 WBC
P-R INTERVAL, ECG05: 152 MS
PLATELET # BLD AUTO: 281 K/UL (ref 150–400)
PMV BLD AUTO: 9.4 FL (ref 8.9–12.9)
POTASSIUM SERPL-SCNC: 2.9 MMOL/L (ref 3.5–5.1)
PROT SERPL-MCNC: 6.8 G/DL (ref 6.4–8.2)
Q-T INTERVAL, ECG07: 520 MS
QRS DURATION, ECG06: 82 MS
QTC CALCULATION (BEZET), ECG08: 569 MS
RBC # BLD AUTO: 4.28 M/UL (ref 3.8–5.2)
SAMPLES BEING HELD,HOLD: NORMAL
SODIUM SERPL-SCNC: 145 MMOL/L (ref 136–145)
TROPONIN I SERPL-MCNC: <0.05 NG/ML
VENTRICULAR RATE, ECG03: 72 BPM
WBC # BLD AUTO: 7 K/UL (ref 3.6–11)

## 2019-08-23 PROCEDURE — 84484 ASSAY OF TROPONIN QUANT: CPT

## 2019-08-23 PROCEDURE — 80053 COMPREHEN METABOLIC PANEL: CPT

## 2019-08-23 PROCEDURE — 74011250637 HC RX REV CODE- 250/637: Performed by: EMERGENCY MEDICINE

## 2019-08-23 PROCEDURE — 99284 EMERGENCY DEPT VISIT MOD MDM: CPT

## 2019-08-23 PROCEDURE — 85025 COMPLETE CBC W/AUTO DIFF WBC: CPT

## 2019-08-23 PROCEDURE — 36415 COLL VENOUS BLD VENIPUNCTURE: CPT

## 2019-08-23 PROCEDURE — 93005 ELECTROCARDIOGRAM TRACING: CPT

## 2019-08-23 PROCEDURE — 74011000250 HC RX REV CODE- 250: Performed by: EMERGENCY MEDICINE

## 2019-08-23 RX ORDER — LIDOCAINE HYDROCHLORIDE 20 MG/ML
15 SOLUTION OROPHARYNGEAL AS NEEDED
Qty: 1 BOTTLE | Refills: 0 | Status: SHIPPED | OUTPATIENT
Start: 2019-08-23 | End: 2019-11-23

## 2019-08-23 RX ORDER — LIDOCAINE HYDROCHLORIDE 20 MG/ML
15 SOLUTION OROPHARYNGEAL AS NEEDED
Qty: 1 BOTTLE | Refills: 0 | Status: SHIPPED | OUTPATIENT
Start: 2019-08-23 | End: 2019-08-23

## 2019-08-23 RX ADMIN — LIDOCAINE HYDROCHLORIDE 40 ML: 20 SOLUTION ORAL; TOPICAL at 14:32

## 2019-08-23 NOTE — ED PROVIDER NOTES
The history is provided by the patient and a relative. No  was used. Chest Pain    This is a chronic problem. The current episode started more than 1 week ago. The problem has been gradually worsening. The problem occurs constantly. The pain is associated with normal activity. The pain is present in the substernal region. The pain is at a severity of 5/10. The pain is moderate. The quality of the pain is described as burning. The pain radiates to the mid back. Associated symptoms include abdominal pain, back pain, dizziness, nausea, near-syncope and weakness. Pertinent negatives include no claudication, no cough, no diaphoresis, no exertional chest pressure, no fever, no headaches, no hemoptysis, no irregular heartbeat, no leg pain, no lower extremity edema, no malaise/fatigue, no numbness, no orthopnea, no palpitations, no PND, no shortness of breath, no sputum production and no vomiting. She has tried antacids for the symptoms. The treatment provided no relief.         Past Medical History:   Diagnosis Date    Bipolar disorder ([address]     Nazmy    Chronic pain     BACK PAIN    Diabetes ([address]     BORDERLINE TX WITH DIET AND EXERCISE    DJD (degenerative joint disease)     Fibromyalgia     Genital herpes     GERD (gastroesophageal reflux disease)     Hypercholesterolemia     IBS (irritable bowel syndrome)     Ill-defined condition     fibromyligia    Lumbar disc disease     stimulation-Honza    Migraine     Nausea & vomiting     Other ill-defined conditions(799.89)     SEASONAL allergies    Other ill-defined conditions(799.89)     dysphagia has had esophagus dilated    Paget's disease     Parkinson disease ([address]     Pulmonary embolism (HCC)     RLS (restless legs syndrome)     Spinal stenosis        Past Surgical History:   Procedure Laterality Date    COLONOSCOPY N/A 6/27/2016    [facility] END[facility]COPY    COLONOSCOPY N/A 2/22/2019 COLONOSCOPY performed by [facility] Coe [facility] at [facility] ENDOSCOPY    ENDOSCOPY, COLON, DIAGNOSTIC      12, due 13    HX APPENDECTOMY      HX BACK SURGERY  9/17/2013    back surgery - total of 3 as of 12/20/2013    HX BREAST BIOPSY Right years ago    negative    HX CHOLECYSTECTOMY      HX HEENT      T & A    HX HYSTERECTOMY      total for fibroid tumors    HX ORTHOPAEDIC      lumbar laminectomy then fusion/neurostimulator implanted - inactive now but still in    HX ORTHOPAEDIC      REMOVAL OF NEUROSTIMULATOR    HX OTHER SURGICAL      EGD x 2 with dilation/colonoscopy - no polyps per pt    TILT TABLE EVALUATION  8/2/2016              Family History:   Problem Relation Age of Onset    Colon Cancer Mother     Cancer Mother 68        colon    Heart Disease Father     Heart Disease Maternal Grandmother     Heart Disease Maternal Grandfather     Heart Disease Other        Social History     Socioeconomic History    Marital status: SINGLE     Spouse name: Not on file    Number of children: Not on file    Years of education: Not on file    Highest education level: Not on file   Occupational History    Occupation: volunteer     Employer: RETIRED     Comment: [employer]   Social Needs    Financial resource strain: Not on file    Food insecurity:     Worry: Not on file     Inability: Not on file    Transportation needs:     Medical: Not on file     Non-medical: Not on file   Tobacco Use    Smoking status: Never Smoker    Smokeless tobacco: Never Used   Substance and Sexual Activity    Alcohol use: No     Comment: 1 per month    Drug use: No    Sexual activity: Not on file   Lifestyle    Physical activity:     Days per week: Not on file     Minutes per session: Not on file    Stress: Not on file   Relationships    Social connections:     Talks on phone: Not on file     Gets together: Not on file     Attends [employer] service: Not on file     Active member of club or organization: Not on file     Attends meetings of clubs or organizations: Not on file     Relationship status: Not on file    Intimate partner violence:     Fear of current or ex partner: Not on file     Emotionally abused: Not on file     Physically abused: Not on file     Forced sexual activity: Not on file   Other Topics Concern    Not on file   Social History Narrative    Not on file         ALLERGIES: Adhesive; Hydrocodone; Lorazepam; Other medication; Percocet [oxycodone-acetaminophen]; Sudafed [pseudoephedrine hcl]; Wellbutrin [bupropion hcl]; Zithromax [azithromycin]; and Zyrtec [cetirizine]    Review of Systems   Constitutional: Negative for activity change, chills, diaphoresis, fever and malaise/fatigue. HENT: Negative for nosebleeds, sore throat, trouble swallowing and voice change. Eyes: Negative for visual disturbance. Respiratory: Negative for cough, hemoptysis, sputum production and shortness of breath. Cardiovascular: Positive for chest pain and near-syncope. Negative for palpitations, orthopnea, claudication and PND. Gastrointestinal: Positive for abdominal pain and nausea. Negative for constipation, diarrhea and vomiting. Genitourinary: Negative for difficulty urinating, dysuria, hematuria and urgency. Musculoskeletal: Positive for back pain. Negative for neck pain and neck stiffness. Skin: Negative for color change. Allergic/Immunologic: Negative for immunocompromised state. Neurological: Positive for dizziness and weakness. Negative for seizures, syncope, light-headedness, numbness and headaches. Psychiatric/Behavioral: Negative for behavioral problems, confusion, hallucinations, self-injury and suicidal ideas.        Vitals:    08/23/19 1416 08/23/19 1430 08/23/19 1500   BP: (!) 156/91 (!) 151/92 (!) 159/91   Pulse: 77 72 67   Resp: 16 18 16   Temp: 97.5 °F (36.4 °C)     SpO2: 94% 93% 96%   Weight: 60 kg (132 lb 4.4 oz)     Height: 5' 6\" (1.676 m)              Physical Exam   Constitutional: She appears well-developed and well-nourished. No distress. HENT:   Head: Atraumatic. Eyes: EOM are normal.   Neck: No tracheal deviation present. Cardiovascular:   Warm and well perfused   Pulmonary/Chest: Effort normal. No respiratory distress. Musculoskeletal: Normal range of motion. Neurological: She is alert. Coordination normal.   Skin: Skin is warm and dry. She is not diaphoretic. Psychiatric: She has a normal mood and affect. Her behavior is normal. Judgment and thought content normal.   Nursing note and vitals reviewed. MDM     This is a 50-year-old female with past medical history, review of systems, physical exam as above, presenting with complaints of chronic epigastric pain, radiating to the back. Patient states pain has been going on for \"months\". She also endorses ongoing episodes of syncope, ground-level falls. She has been evaluated by her cardiologist, as well as her gastroenterologist, with recent esophagram indicating distal esophageal stenosis. I discussed these findings with the patient, and that cardiac evaluation GI evaluation, and advanced imaging have yet to reveal another diagnosis. She states she has been trying to get touch with her gastroenterologist today. She endorses taking PPI, H2 blocker, Carafate, with worsening symptoms. Physical exam is remarkable for a well-appearing elderly female, in no acute distress, with mild epigastric tenderness to palpation, noted to be afebrile without tachycardia or hypotension. I discussed with the patient appropriate follow-up for her discomfort is with her gastroenterologist yet we will attempt to evaluate for emergent conditions here in the emergency department as well as provide treatment for her symptoms. We will reevaluate, and make a disposition.     Procedures

## 2019-08-23 NOTE — ED TRIAGE NOTES
Pt c/o chest pain with nausea, dizziness and sob for the past couple days.  Pt had a syncopable yesterday

## 2019-08-23 NOTE — DISCHARGE INSTRUCTIONS

## 2019-09-10 ENCOUNTER — HOSPITAL ENCOUNTER (OUTPATIENT)
Dept: CT IMAGING | Age: 74
Discharge: HOME | End: 2019-09-10
Attending: SPECIALIST
Payer: MEDICARE

## 2019-09-10 DIAGNOSIS — F03.90 DEMENTIA (HCC): ICD-10-CM

## 2019-09-10 DIAGNOSIS — R07.9 CHEST PAIN: ICD-10-CM

## 2019-09-10 DIAGNOSIS — K21.9 GERD (GASTROESOPHAGEAL REFLUX DISEASE): ICD-10-CM

## 2019-09-10 PROCEDURE — 74011636320 HC RX REV CODE- 636/320: Performed by: RADIOLOGY

## 2019-09-10 PROCEDURE — 71260 CT THORAX DX C+: CPT

## 2019-09-10 PROCEDURE — 74011000258 HC RX REV CODE- 258: Performed by: RADIOLOGY

## 2019-09-10 RX ORDER — SODIUM CHLORIDE 0.9 % (FLUSH) 0.9 %
10 SYRINGE (ML) INJECTION
Status: COMPLETED | OUTPATIENT
Start: 2019-09-10 | End: 2019-09-10

## 2019-09-10 RX ADMIN — IOPAMIDOL 100 ML: 612 INJECTION, SOLUTION INTRAVENOUS at 10:05

## 2019-09-10 RX ADMIN — Medication 10 ML: at 10:05

## 2019-09-10 RX ADMIN — SODIUM CHLORIDE 100 ML: 900 INJECTION, SOLUTION INTRAVENOUS at 10:05

## 2019-09-16 ENCOUNTER — APPOINTMENT (OUTPATIENT)
Dept: ULTRASOUND IMAGING | Age: 74
End: 2019-09-16
Attending: EMERGENCY MEDICINE
Payer: MEDICARE

## 2019-09-16 ENCOUNTER — HOSPITAL ENCOUNTER (EMERGENCY)
Age: 74
Discharge: HOME | End: 2019-09-16
Attending: EMERGENCY MEDICINE
Payer: MEDICARE

## 2019-09-16 VITALS
OXYGEN SATURATION: 100 % | RESPIRATION RATE: 16 BRPM | TEMPERATURE: 98.5 F | SYSTOLIC BLOOD PRESSURE: 149 MMHG | HEART RATE: 70 BPM | DIASTOLIC BLOOD PRESSURE: 92 MMHG

## 2019-09-16 DIAGNOSIS — R10.13 EPIGASTRIC PAIN: Primary | ICD-10-CM

## 2019-09-16 LAB
ALBUMIN SERPL-MCNC: 3.9 G/DL (ref 3.5–5)
ALBUMIN/GLOB SERPL: 1.3 {RATIO} (ref 1.1–2.2)
ALP SERPL-CCNC: 73 U/L (ref 45–117)
ALT SERPL-CCNC: 9 U/L (ref 12–78)
ANION GAP SERPL CALC-SCNC: 6 MMOL/L (ref 5–15)
AST SERPL-CCNC: 14 U/L (ref 15–37)
ATRIAL RATE: 71 BPM
BASOPHILS # BLD: 0.1 K/UL (ref 0–0.1)
BASOPHILS NFR BLD: 1 % (ref 0–1)
BILIRUB SERPL-MCNC: 0.5 MG/DL (ref 0.2–1)
BUN SERPL-MCNC: 12 MG/DL (ref 6–20)
BUN/CREAT SERPL: 15 (ref 12–20)
CALCIUM SERPL-MCNC: 9.5 MG/DL (ref 8.5–10.1)
CALCULATED P AXIS, ECG09: -20 DEGREES
CALCULATED R AXIS, ECG10: 7 DEGREES
CALCULATED T AXIS, ECG11: 86 DEGREES
CHLORIDE SERPL-SCNC: 106 MMOL/L (ref 97–108)
CO2 SERPL-SCNC: 31 MMOL/L (ref 21–32)
COMMENT, HOLDF: NORMAL
CREAT SERPL-MCNC: 0.81 MG/DL (ref 0.55–1.02)
DIAGNOSIS, 93000: NORMAL
DIFFERENTIAL METHOD BLD: NORMAL
EOSINOPHIL # BLD: 0.2 K/UL (ref 0–0.4)
EOSINOPHIL NFR BLD: 3 % (ref 0–7)
ERYTHROCYTE [DISTWIDTH] IN BLOOD BY AUTOMATED COUNT: 13.2 % (ref 11.5–14.5)
GLOBULIN SER CALC-MCNC: 3.1 G/DL (ref 2–4)
GLUCOSE SERPL-MCNC: 109 MG/DL (ref 65–100)
HCT VFR BLD AUTO: 43.1 % (ref 35–47)
HGB BLD-MCNC: 14 G/DL (ref 11.5–16)
IMM GRANULOCYTES # BLD AUTO: 0 K/UL (ref 0–0.04)
IMM GRANULOCYTES NFR BLD AUTO: 0 % (ref 0–0.5)
LIPASE SERPL-CCNC: 126 U/L (ref 73–393)
LYMPHOCYTES # BLD: 2.3 K/UL (ref 0.8–3.5)
LYMPHOCYTES NFR BLD: 31 % (ref 12–49)
MAGNESIUM SERPL-MCNC: 2.2 MG/DL (ref 1.6–2.4)
MCH RBC QN AUTO: 30.8 PG (ref 26–34)
MCHC RBC AUTO-ENTMCNC: 32.5 G/DL (ref 30–36.5)
MCV RBC AUTO: 94.7 FL (ref 80–99)
MONOCYTES # BLD: 0.5 K/UL (ref 0–1)
MONOCYTES NFR BLD: 6 % (ref 5–13)
NEUTS SEG # BLD: 4.5 K/UL (ref 1.8–8)
NEUTS SEG NFR BLD: 59 % (ref 32–75)
NRBC # BLD: 0 K/UL (ref 0–0.01)
NRBC BLD-RTO: 0 PER 100 WBC
P-R INTERVAL, ECG05: 144 MS
PLATELET # BLD AUTO: 305 K/UL (ref 150–400)
PMV BLD AUTO: 9.6 FL (ref 8.9–12.9)
POTASSIUM SERPL-SCNC: 3.6 MMOL/L (ref 3.5–5.1)
PROT SERPL-MCNC: 7 G/DL (ref 6.4–8.2)
Q-T INTERVAL, ECG07: 432 MS
QRS DURATION, ECG06: 76 MS
QTC CALCULATION (BEZET), ECG08: 469 MS
RBC # BLD AUTO: 4.55 M/UL (ref 3.8–5.2)
SAMPLES BEING HELD,HOLD: NORMAL
SODIUM SERPL-SCNC: 143 MMOL/L (ref 136–145)
TROPONIN I SERPL-MCNC: <0.05 NG/ML
VENTRICULAR RATE, ECG03: 71 BPM
WBC # BLD AUTO: 7.6 K/UL (ref 3.6–11)

## 2019-09-16 PROCEDURE — 74011250637 HC RX REV CODE- 250/637: Performed by: EMERGENCY MEDICINE

## 2019-09-16 PROCEDURE — 36415 COLL VENOUS BLD VENIPUNCTURE: CPT

## 2019-09-16 PROCEDURE — 76705 ECHO EXAM OF ABDOMEN: CPT

## 2019-09-16 PROCEDURE — 96374 THER/PROPH/DIAG INJ IV PUSH: CPT

## 2019-09-16 PROCEDURE — 74011250636 HC RX REV CODE- 250/636: Performed by: EMERGENCY MEDICINE

## 2019-09-16 PROCEDURE — 80053 COMPREHEN METABOLIC PANEL: CPT

## 2019-09-16 PROCEDURE — 85025 COMPLETE CBC W/AUTO DIFF WBC: CPT

## 2019-09-16 PROCEDURE — 84484 ASSAY OF TROPONIN QUANT: CPT

## 2019-09-16 PROCEDURE — 83690 ASSAY OF LIPASE: CPT

## 2019-09-16 PROCEDURE — 99284 EMERGENCY DEPT VISIT MOD MDM: CPT

## 2019-09-16 PROCEDURE — 83735 ASSAY OF MAGNESIUM: CPT

## 2019-09-16 PROCEDURE — 93005 ELECTROCARDIOGRAM TRACING: CPT

## 2019-09-16 PROCEDURE — 74011000250 HC RX REV CODE- 250: Performed by: EMERGENCY MEDICINE

## 2019-09-16 RX ADMIN — FAMOTIDINE 20 MG: 10 INJECTION, SOLUTION INTRAVENOUS at 14:10

## 2019-09-16 RX ADMIN — LIDOCAINE HYDROCHLORIDE 40 ML: 20 SOLUTION ORAL; TOPICAL at 14:10

## 2019-09-16 NOTE — DISCHARGE INSTRUCTIONS
Patient Education        Abdominal Pain: Care Instructions  Your Care Instructions    Abdominal pain has many possible causes. Some aren't serious and get better on their own in a few days. Others need more testing and treatment. If your pain continues or gets worse, you need to be rechecked and may need more tests to find out what is wrong. You may need surgery to correct the problem. Don't ignore new symptoms, such as fever, nausea and vomiting, urination problems, pain that gets worse, and dizziness. These may be signs of a more serious problem. Your doctor may have recommended a follow-up visit in the next 8 to 12 hours. If you are not getting better, you may need more tests or treatment. The doctor has checked you carefully, but problems can develop later. If you notice any problems or new symptoms, get medical treatment right away. Follow-up care is a key part of your treatment and safety. Be sure to make and go to all appointments, and call your doctor if you are having problems. It's also a good idea to know your test results and keep a list of the medicines you take. How can you care for yourself at home? · Rest until you feel better. · To prevent dehydration, drink plenty of fluids, enough so that your urine is light yellow or clear like water. Choose water and other caffeine-free clear liquids until you feel better. If you have kidney, heart, or liver disease and have to limit fluids, talk with your doctor before you increase the amount of fluids you drink. · If your stomach is upset, eat mild foods, such as rice, dry toast or crackers, bananas, and applesauce. Try eating several small meals instead of two or three large ones. · Wait until 48 hours after all symptoms have gone away before you have spicy foods, alcohol, and drinks that contain caffeine. · Do not eat foods that are high in fat. · Avoid anti-inflammatory medicines such as aspirin, ibuprofen (Advil, Motrin), and naproxen (Aleve). These can cause stomach upset. Talk to your doctor if you take daily aspirin for another health problem. When should you call for help? Call 911 anytime you think you may need emergency care. For example, call if:    · You passed out (lost consciousness).     · You pass maroon or very bloody stools.     · You vomit blood or what looks like coffee grounds.     · You have new, severe belly pain.    Call your doctor now or seek immediate medical care if:    · Your pain gets worse, especially if it becomes focused in one area of your belly.     · You have a new or higher fever.     · Your stools are black and look like tar, or they have streaks of blood.     · You have unexpected vaginal bleeding.     · You have symptoms of a urinary tract infection. These may include:  ? Pain when you urinate. ? Urinating more often than usual.  ? Blood in your urine.     · You are dizzy or lightheaded, or you feel like you may faint.    Watch closely for changes in your health, and be sure to contact your doctor if:    · You are not getting better after 1 day (24 hours). Where can you learn more? Go to http://ash-jeronimo.info/. Enter I247 in the search box to learn more about \"Abdominal Pain: Care Instructions. \"  Current as of: September 23, 2018  Content Version: 12.1  © 8421-0855 [employer]. Care instructions adapted under license by [employer] (which disclaims liability or warranty for this information). If you have questions about a medical condition or this instruction, always ask your healthcare professional. [place] [address] any warranty or liability for your use of this information. Patient Education        Indigestion (Dyspepsia or Heartburn): Care Instructions  Your Care Instructions  Sometimes it can be hard to pinpoint the cause of indigestion.  (It is also called dyspepsia or heartburn.) Most cases of an upset stomach with bloating, burning, burping, and nausea are minor and go away within several hours. Home treatment and over-the-counter medicine often are able to control symptoms. But if you take medicine to relieve your indigestion without making diet and lifestyle changes, your symptoms are likely to return again and again. If you get indigestion often, it may be a sign of a more serious medical problem. Be sure to follow up with your doctor, who may want to do tests to be sure of the cause of your indigestion. Follow-up care is a key part of your treatment and safety. Be sure to make and go to all appointments, and call your doctor if you are having problems. It's also a good idea to know your test results and keep a list of the medicines you take. How can you care for yourself at home? · Your doctor may recommend over-the-counter medicine. For mild or occasional indigestion, antacids such as Gaviscon, Mylanta, Maalox, or Tums, may help. Be safe with medicines. Be careful when you take over-the-counter antacid medicines. Many of these medicines have aspirin in them. Read the label to make sure that you are not taking more than the recommended dose. Too much aspirin can be harmful. · Your doctor also may recommend over-the-counter acid reducers, such as Pepcid AC, Tagamet HB, Zantac 75, or Prilosec. Read and follow all instructions on the label. If you use these medicines often, talk with your doctor. · Change your eating habits. ? It's best to eat several small meals instead of two or three large meals. ? After you eat, wait 2 to 3 hours before you lie down. ? Chocolate, mint, and alcohol can make GERD worse. ? Spicy foods, foods that have a lot of acid (like tomatoes and oranges), and coffee can make GERD symptoms worse in some people. If your symptoms are worse after you eat a certain food, you may want to stop eating that food to see if your symptoms get better. · Do not smoke or chew tobacco. Smoking can make GERD worse.  If you need help quitting, talk to your doctor about stop-smoking programs and medicines. These can increase your chances of quitting for good. · If you have GERD symptoms at night, raise the head of your bed 6 to 8 inches. You can do this by putting the frame on blocks or placing a foam wedge under the head of your mattress. (Adding extra pillows does not work.)  · Do not wear tight clothing around your middle. · Lose weight if you need to. Losing just 5 to 10 pounds can help. · Do not take anti-inflammatory medicines, such as aspirin, ibuprofen (Advil, Motrin), or naproxen (Aleve). These can irritate the stomach. If you need a pain medicine, try acetaminophen (Tylenol), which does not cause stomach upset. When should you call for help? Call your doctor now or seek immediate medical care if:    · You have new or worse belly pain.     · You are vomiting.    Watch closely for changes in your health, and be sure to contact your doctor if:    · You have new or worse symptoms of indigestion.     · You have trouble or pain swallowing.     · You are losing weight.     · You do not get better as expected. Where can you learn more? Go to http://ash-jeronimo.info/. Enter R580 in the search box to learn more about \"Indigestion (Dyspepsia or Heartburn): Care Instructions. \"  Current as of: November 7, 2018  Content Version: 12.1  © 1335-8425 [employer]. Care instructions adapted under license by [employer] (which disclaims liability or warranty for this information). If you have questions about a medical condition or this instruction, always ask your healthcare professional. [address] any warranty or liability for your use of this information.

## 2019-09-16 NOTE — ED TRIAGE NOTES
Pt arrives via EMS from home for c/o \"burning chest and back been going on for a while now. \" Pt reports GI doctor Deisy Osborn been changing my meds and nothing is helping. \" +nausea, diarrhea. Denies vomiting.

## 2019-09-16 NOTE — ED NOTES
Pt given discharge instructions, patient education and follow-up information by provider. Pt states understanding - all questions answered. Pt discharged to home in daughter's private vehicle, ambulatory. Pt A&Ox4, RA, with pain controlled.

## 2019-09-16 NOTE — ED PROVIDER NOTES
HPI     Pt ward  75 y.o. F with PMH of GERD presenting to ED with c/o epigastric abdominal pain, chest and back pain. She has the same pain for months and been seen by cardiology and GI. She has had EGD as well as CT chest and is scheduled for NM emptying study this week. She describes her pain as a burning pain that radiates to her back. It is constant. She is having nausea and diarrhea also. No vomiting. She did not eat this AM and says her pain does not come after eating. No dyspnea. She has been compliant with all meds started by GI and has follow up apt later this week. She says there is no change in pain but she  Is tired of the pain. No other complaints at this time.         Past Medical History:   Diagnosis Date    Bipolar disorder ([address]     Nazmy    Chronic pain     BACK PAIN    Diabetes (HCC)     BORDERLINE TX WITH DIET AND EXERCISE    DJD (degenerative joint disease)     Fibromyalgia     Genital herpes     GERD (gastroesophageal reflux disease)     Hypercholesterolemia     IBS (irritable bowel syndrome)     Ill-defined condition     fibromyligia    Lumbar disc disease     stimulation-Honza    Migraine     Nausea & vomiting     Other ill-defined conditions(799.89)     SEASONAL allergies    Other ill-defined conditions(799.89)     dysphagia has had esophagus dilated    Paget's disease     Parkinson disease ([address]     Pulmonary embolism (HCC)     RLS (restless legs syndrome)     Spinal stenosis        Past Surgical History:   Procedure Laterality Date    COLONOSCOPY N/A 6/27/2016    COLONOSCOPY performed [facility] at [facility] ENDOSCOPY    COLONOSCOPY N/A 2/22/2019    COLONOSCOPY performed by [facility]y [facility] MD at [facility], COLON, DIAGNOSTIC      12, due 13    HX APPENDECTOMY      HX BACK SURGERY  9/17/2013    back surgery - total of 3 as of 12/20/2013    HX BREAST BIOPSY Right years ago    negative    HX CHOLECYSTECTOMY      HX HEENT      T & A    HX HYSTERECTOMY      total for fibroid tumors    HX ORTHOPAEDIC      lumbar laminectomy then fusion/neurostimulator implanted - inactive now but still in    HX ORTHOPAEDIC      REMOVAL OF NEUROSTIMULATOR    HX OTHER SURGICAL      EGD x 2 with dilation/colonoscopy - no polyps per pt    TILT TABLE EVALUATION  8/2/2016              Family History:   Problem Relation Age of Onset    Colon Cancer Mother     Cancer Mother 68        colon    Heart Disease Father     Heart Disease Maternal Grandmother     Heart Disease Maternal Grandfather     Heart Disease Other        Social History     Socioeconomic History    Marital status: SINGLE     Spouse name: Not on file    Number of children: Not on file    Years of education: Not on file    Highest education level: Not on file   Occupational History    Occupation: volunteer     Employer: RETIRED     Comment: [employer]   Social Needs    Financial resource strain: Not on file    Food insecurity:     Worry: Not on file     Inability: Not on file    Transportation needs:     Medical: Not on file     Non-medical: Not on file   Tobacco Use    Smoking status: Never Smoker    Smokeless tobacco: Never Used   Substance and Sexual Activity    Alcohol use: No     Comment: 1 per month    Drug use: No    Sexual activity: Not on file   Lifestyle    Physical activity:     Days per week: Not on file     Minutes per session: Not on file    Stress: Not on file   Relationships    Social connections:     Talks on phone: Not on file     Gets together: Not on file     Attends Christianity service: Not on file     Active member of club or organization: Not on file     Attends meetings of clubs or organizations: Not on file     Relationship status: Not on file    Intimate partner violence:     Fear of current or ex partner: Not on file     Emotionally abused: Not on file     Physically abused: Not on file     Forced sexual activity: Not on file   Other Topics Concern    Not on file   Social History Narrative    Not on file         ALLERGIES: Adhesive; Hydrocodone; Lorazepam; Other medication; Percocet [oxycodone-acetaminophen]; Sudafed [pseudoephedrine hcl]; Wellbutrin [bupropion hcl]; Zithromax [azithromycin]; and Zyrtec [cetirizine]    Review of Systems   Constitutional: Negative for chills, diaphoresis and fever. HENT: Negative for congestion and trouble swallowing. Eyes: Negative for photophobia and visual disturbance. Respiratory: Negative for cough, chest tightness and shortness of breath. Cardiovascular: Positive for chest pain. Negative for palpitations and leg swelling. Gastrointestinal: Positive for abdominal pain, diarrhea and nausea. Negative for vomiting. Genitourinary: Negative for difficulty urinating, dysuria, flank pain and frequency. Musculoskeletal: Positive for back pain. Negative for myalgias. Skin: Negative for rash and wound. Neurological: Negative for dizziness, weakness, light-headedness and headaches. Hematological: Negative for adenopathy. Does not bruise/bleed easily. Psychiatric/Behavioral: Negative for agitation and confusion. All other systems reviewed and are negative. Vitals:    09/16/19 1252   Pulse: 72   SpO2: 95%          Visit Vitals  /77 (BP 1 Location: Left arm, BP Patient Position: Sitting)   Pulse 72   Temp 98.6 °F (37 °C)   Resp 17   SpO2 100%         Physical Exam   Constitutional: She is oriented to person, place, and time. She appears well-developed and well-nourished. No distress. HENT:   Head: Normocephalic. Mouth/Throat: Oropharynx is clear and moist.   Eyes: Pupils are equal, round, and reactive to light. Conjunctivae and EOM are normal.   Neck: Normal range of motion. Neck supple. No JVD present. Cardiovascular: Normal rate, regular rhythm, normal heart sounds and intact distal pulses. Pulmonary/Chest: Effort normal and breath sounds normal.   Abdominal: Soft.  Bowel sounds are normal. She exhibits no distension. There is no tenderness. Musculoskeletal: Normal range of motion. She exhibits no edema, tenderness or deformity. Lymphadenopathy:     She has no cervical adenopathy. Neurological: She is alert and oriented to person, place, and time. No cranial nerve deficit or sensory deficit. Skin: Skin is warm and dry. Capillary refill takes less than 2 seconds. No rash noted. She is not diaphoretic. No erythema. Psychiatric: She has a normal mood and affect. Nursing note and vitals reviewed. MDM       Procedures    ED EKG interpretation:  Rhythm: normal sinus rhythm; and regular . Rate (approx.): 71; Axis: normal; P wave: normal; QRS interval: normal ; ST/T wave: nonspecific T wave abnormality in anterior and lateral leads. Nonspecific T wave abnormality also noted in 8/2019; iEKG documented by Siri Alvarado MD, scribe, as interpreted by Julio C Funk MD, ED MD.    4:03 PM  Pain improved to 5/10 after meds. I have discussed labs and US. She has follow up this week as well as further testing this week ordered by GI. Return precautions given. 4:04 PM  Patient's results have been reviewed with them. Patient and/or family have verbally conveyed their understanding and agreement of the patient's signs, symptoms, diagnosis, treatment and prognosis and additionally agree to follow up as recommended or return to the Emergency Room should their condition change prior to follow-up. Discharge instructions have also been provided to the patient with some educational information regarding their diagnosis as well a list of reasons why they would want to return to the ER prior to their follow-up appointment should their condition change.     Siri Alvarado MD

## 2019-09-18 ENCOUNTER — HOSPITAL ENCOUNTER (EMERGENCY)
Age: 74
Discharge: HOME | End: 2019-09-18
Attending: EMERGENCY MEDICINE | Admitting: EMERGENCY MEDICINE
Payer: MEDICARE

## 2019-09-18 VITALS
DIASTOLIC BLOOD PRESSURE: 79 MMHG | SYSTOLIC BLOOD PRESSURE: 160 MMHG | OXYGEN SATURATION: 96 % | RESPIRATION RATE: 16 BRPM | HEART RATE: 65 BPM | TEMPERATURE: 97.6 F

## 2019-09-18 DIAGNOSIS — R10.13 ABDOMINAL PAIN, EPIGASTRIC: Primary | ICD-10-CM

## 2019-09-18 LAB
ATRIAL RATE: 64 BPM
CALCULATED P AXIS, ECG09: 6 DEGREES
CALCULATED R AXIS, ECG10: 15 DEGREES
CALCULATED T AXIS, ECG11: 73 DEGREES
DIAGNOSIS, 93000: NORMAL
P-R INTERVAL, ECG05: 150 MS
Q-T INTERVAL, ECG07: 460 MS
QRS DURATION, ECG06: 78 MS
QTC CALCULATION (BEZET), ECG08: 474 MS
VENTRICULAR RATE, ECG03: 64 BPM

## 2019-09-18 PROCEDURE — 99284 EMERGENCY DEPT VISIT MOD MDM: CPT

## 2019-09-18 PROCEDURE — 74011000250 HC RX REV CODE- 250: Performed by: EMERGENCY MEDICINE

## 2019-09-18 PROCEDURE — 93005 ELECTROCARDIOGRAM TRACING: CPT

## 2019-09-18 PROCEDURE — 74011250637 HC RX REV CODE- 250/637: Performed by: EMERGENCY MEDICINE

## 2019-09-18 RX ADMIN — LIDOCAINE HYDROCHLORIDE 40 ML: 20 SOLUTION ORAL; TOPICAL at 10:34

## 2019-09-18 NOTE — ED NOTES
Bedside and Verbal shift change report given to Avi RN (oncoming nurse) by [address] (offgoing nurse). Report included the following information SBAR, Kardex, ED Summary, [facility] and Recent Results.

## 2019-09-18 NOTE — DISCHARGE INSTRUCTIONS

## 2019-09-18 NOTE — ED TRIAGE NOTES
Patient presents from home with complaints of epigastric pain for the last 5 moths. Patient states she was seen here yesterday for the same issue and sent home. Patient states the pain has remained the same and has not changed.  Patient states she has an \"outpateint scan\" scheduled for tomorrow at 9 am

## 2019-09-18 NOTE — ED NOTES
Discharge instructions given to patient by MD and nurse. Pt has been given counseling on medication use and verbalizes understanding. IV d/c. Pt wheeled off of unit in no signs of distress.

## 2019-09-18 NOTE — ED PROVIDER NOTES
75 y.o. female with past medical history significant for IBS, RLS, bipolar disorder, fibromyalgia, DM, GERD, PE, and parkinson disease who presents from home via EMS with chief complaint of abdominal pain. Pt complains of constant epigastric pain described as a burning sensation that radiates into her chest and back with associated nausea for the past 5 months. Pt has been seen by Cardiology, and GI for this issue. Pt states she has a NM emptying study tomorrow at 9:00 AM with Dr. Joel Fountain GI. Pt states she was seen in the ED on 9/16 where she had a negative ultrasound and given a GI cocktail with relief. Pt states since discharge, the pain has returned and remains unchanged. Pt denies change in pain with eating. Pt denies hx of ulcers. Pt denies shortness of breath, or vomiting. There are no other acute medical concerns at this time. Chart Review: EGD on 7/31 significant for esophageal findings of normal - Bx from lower, mid and upper; somewta prominent bluish fold above GEJ - no hx of cirrhosis or varices. CT chest on 09/10 significant for Somewhat patulous appearing esophagus again shown with air-fluid levels. Mild gastric distention again noted. Patient has been seen in the ED for this complaint 6 times in the past 2 months. Social hx: Never Smoker. Denies EtOH use. PCP: Fidelina Blanco MD    Note written by Meredith Sylvester. Kristine Diamond, as dictated by Prasanth Winter MD 10:19 AM      The history is provided by the patient and medical records.         Past Medical History:   Diagnosis Date    Bipolar disorder ([facility] [address] [facility]     Nazmy    Chronic pain     BACK PAIN    Diabetes ([facility]     BORDERLINE TX WITH DIET AND EXERCISE    DJD (degenerative joint disease)     Fibromyalgia     Genital herpes     GERD (gastroesophageal reflux disease)     Hypercholesterolemia     IBS (irritable bowel syndrome)     Ill-defined condition     fibromyligia    Lumbar disc disease     stimulation-Honza    Migraine  Nausea & vomiting     Other ill-defined conditions(799.89)     SEASONAL allergies    Other ill-defined conditions(799.89)     dysphagia has had esophagus dilated    Paget's disease     Parkinson disease (HCC)     Pulmonary embolism (HCC)     RLS (restless legs syndrome)     Spinal stenosis        Past Surgical History:   Procedure Laterality Date    COLONOSCOPY N/A 6/27/2016    COLONOSCOPY performed by Rekha Juarez MD at [address] [place] [address] ENDOSCOPY    COLONOSCOPY N/A 2/22/2019    COLONOSCOPY performed by Bethel Plaza MD at [address] [place] [address] ENDOSCOPY    ENDOSCOPY, COLON, DIAGNOSTIC      12, due 13    HX APPENDECTOMY      HX BACK SURGERY  9/17/2013    back surgery - total of 3 as of 12/20/2013    HX BREAST BIOPSY Right years ago    negative    HX CHOLECYSTECTOMY      HX HEENT      T & A    HX HYSTERECTOMY      total for fibroid tumors    HX ORTHOPAEDIC      lumbar laminectomy then fusion/neurostimulator implanted - inactive now but still in    HX ORTHOPAEDIC      REMOVAL OF NEUROSTIMULATOR    HX OTHER SURGICAL      EGD x 2 with dilation/colonoscopy - no polyps per pt    TILT TABLE EVALUATION  8/2/2016              Family History:   Problem Relation Age of Onset    Colon Cancer Mother     Cancer Mother 68        colon    Heart Disease Father     Heart Disease Maternal Grandmother     Heart Disease Maternal Grandfather     Heart Disease Other        Social History     Socioeconomic History    Marital status: SINGLE     Spouse name: Not on file    Number of children: Not on file    Years of education: Not on file    Highest education level: Not on file   Occupational History    Occupation: volunteer     Employer: RETIRED     Comment: [employer]   Social Needs    Financial resource strain: Not on file    Food insecurity:     Worry: Not on file     Inability: Not on file    Transportation needs:     Medical: Not on file     Non-medical: Not on file   Tobacco Use    Smoking status: Never Smoker    Smokeless tobacco: Never Used   Substance and Sexual Activity    Alcohol use: No     Comment: 1 per month    Drug use: No    Sexual activity: Not on file   Lifestyle    Physical activity:     Days per week: Not on file     Minutes per session: Not on file    Stress: Not on file   Relationships    Social connections:     Talks on phone: Not on file     Gets together: Not on file     Attends Episcopal service: Not on file     Active member of club or organization: Not on file     Attends meetings of clubs or organizations: Not on file     Relationship status: Not on file    Intimate partner violence:     Fear of current or ex partner: Not on file     Emotionally abused: Not on file     Physically abused: Not on file     Forced sexual activity: Not on file   Other Topics Concern    Not on file   Social History Narrative    Not on file         ALLERGIES: Adhesive; Hydrocodone; Lorazepam; Other medication; Percocet [oxycodone-acetaminophen]; Sudafed [pseudoephedrine hcl]; Wellbutrin [bupropion hcl]; Zithromax [azithromycin]; and Zyrtec [cetirizine]    Review of Systems   Constitutional: Negative for chills, diaphoresis and fever. HENT: Negative for congestion, postnasal drip, rhinorrhea and sore throat. Eyes: Negative for photophobia, discharge, redness and visual disturbance. Respiratory: Negative for cough, chest tightness, shortness of breath and wheezing. Cardiovascular: Positive for chest pain. Negative for palpitations and leg swelling. Gastrointestinal: Positive for abdominal pain and nausea. Negative for abdominal distention, blood in stool, constipation, diarrhea and vomiting. Genitourinary: Negative for difficulty urinating, dysuria, frequency, hematuria and urgency. Musculoskeletal: Positive for back pain. Negative for arthralgias, joint swelling and myalgias. Skin: Negative for color change and rash.    Neurological: Negative for dizziness, speech difficulty, weakness, light-headedness, numbness and headaches. Psychiatric/Behavioral: Negative for confusion. The patient is not nervous/anxious. All other systems reviewed and are negative. Vitals:    09/18/19 0951 09/18/19 0952   BP: (!) 157/93    Pulse: 65    Resp: 16    Temp: 97.8 °F (36.6 °C)    SpO2: 94% 97%            Physical Exam   Constitutional: She is oriented to person, place, and time. She appears well-developed and well-nourished. No distress. HENT:   Head: Normocephalic and atraumatic. Right Ear: External ear normal.   Left Ear: External ear normal.   Nose: Nose normal.   Mouth/Throat: Oropharynx is clear and moist.   Eyes: Pupils are equal, round, and reactive to light. Conjunctivae and EOM are normal. No scleral icterus. Neck: Normal range of motion. Neck supple. No JVD present. No tracheal deviation present. No thyromegaly present. Cardiovascular: Normal rate, regular rhythm and normal heart sounds. Exam reveals no gallop and no friction rub. No murmur heard. Pulmonary/Chest: Effort normal and breath sounds normal. No respiratory distress. She has no wheezes. She has no rales. She exhibits no tenderness. Abdominal: Soft. Bowel sounds are normal. She exhibits no distension and no mass. There is no tenderness. There is no rebound and no guarding. Musculoskeletal: Normal range of motion. She exhibits no edema or tenderness. Lymphadenopathy:     She has no cervical adenopathy. Neurological: She is alert and oriented to person, place, and time. She has normal strength. She displays no atrophy and no tremor. No cranial nerve deficit. She exhibits normal muscle tone. Coordination and gait normal.   Skin: Skin is warm and dry. No rash noted. She is not diaphoretic. No erythema. Psychiatric: She has a normal mood and affect. Her behavior is normal. Judgment and thought content normal.   Nursing note and vitals reviewed. Note written by Tony Grullon.  Cristobal Suárez, as dictated by Jyoti Felton MD JOEY 10:20 AM      [facility]  Number of Diagnoses or Management Options  Diagnosis management comments: Oswaldo Line:  69-year-old female presenting to the emergency department with the same pain she has had for at least 1 to 2 months which is in epigastric pain radiating across the upper diaphragm on the left. She seems unable to correlate any processes that may contribute to the pain, she has been seen by both GI and cardiology with extensive work-up, had CT scans and an EGD which apparently are all been unrevealing. She was seen in the emergency department yesterday and is now scheduled for a gastric emptying study tomorrow. She called her GI specialist office this morning but did not wait for a return phone call and came to the emergency department. She has not used her Viscous Xylocaine. I reviewed her work-up from yesterday including past work-ups, her electric cardiogram here is within normal limits. I see no reason to pursue any further laboratory work-up or any other diagnostic work-up at this time. Will provide a dose of Viscous Xylocaine and discharged home. Procedures    ED EKG interpretation:  Rhythm: normal sinus rhythm; and regular . Rate (approx.): 64; Axis: normal; ST/T wave: normal.  Note written by Ai Vickers, as dictated by Jose Hodges MD 9:53 AM      11:35 AM  Patient's results have been reviewed with them. Patient and/or family have verbally conveyed their understanding and agreement of the patient's signs, symptoms, diagnosis, treatment and prognosis and additionally agree to follow up as recommended or return to the Emergency Room should their condition change prior to follow-up. Discharge instructions have also been provided to the patient with some educational information regarding their diagnosis as well a list of reasons why they would want to return to the ER prior to their follow-up appointment should their condition change.

## 2019-09-19 ENCOUNTER — HOSPITAL ENCOUNTER (OUTPATIENT)
Dept: NUCLEAR MEDICINE | Age: 74
Discharge: HOME | End: 2019-09-19
Attending: SPECIALIST
Payer: MEDICARE

## 2019-09-19 DIAGNOSIS — R07.9 CHEST PAIN: ICD-10-CM

## 2019-09-19 DIAGNOSIS — F03.90 DEMENTIA (HCC): ICD-10-CM

## 2019-09-19 DIAGNOSIS — K21.9 GERD (GASTROESOPHAGEAL REFLUX DISEASE): ICD-10-CM

## 2019-09-19 PROCEDURE — 78264 GASTRIC EMPTYING IMG STUDY: CPT

## 2019-09-26 ENCOUNTER — OFFICE VISIT (OUTPATIENT)
Dept: INTERNAL MEDICINE CLINIC | Age: 74
End: 2019-09-26

## 2019-09-26 VITALS
HEART RATE: 74 BPM | SYSTOLIC BLOOD PRESSURE: 137 MMHG | BODY MASS INDEX: 21.15 KG/M2 | RESPIRATION RATE: 16 BRPM | OXYGEN SATURATION: 98 % | TEMPERATURE: 98 F | WEIGHT: 131.6 LBS | DIASTOLIC BLOOD PRESSURE: 87 MMHG | HEIGHT: 66 IN

## 2019-09-26 DIAGNOSIS — G62.9 NEUROPATHY: Primary | ICD-10-CM

## 2019-09-26 PROBLEM — E11.40 TYPE 2 DIABETES MELLITUS WITH DIABETIC NEUROPATHY (HCC): Status: ACTIVE | Noted: 2019-09-26

## 2019-09-26 RX ORDER — GABAPENTIN 100 MG/1
100 CAPSULE ORAL
Qty: 30 CAP | Refills: 0 | Status: SHIPPED | OUTPATIENT
Start: 2019-09-26 | End: 2019-10-11 | Stop reason: SDUPTHER

## 2019-09-26 NOTE — PROGRESS NOTES
Pt here today with complaints burning sensation in bilateral legs and feet and vaginal area x4-5 months - just recently has worsened. [facility] yesterday. She had told them that Gabapentin helped her in past with this pain - he could not prescribe this for her due to it was controlled med. She needed to see her PCP.

## 2019-09-26 NOTE — PROGRESS NOTES
HISTORY OF PRESENT ILLNESS  Fani Dumas is a 75 y.o. female. HPI Subjective:  Rodolfo Oneal is seen today for evaluation of worsening neuropathy. She is accompanied by her daughter's partner, Roni Mcintosh. She has a longstanding history of neuropathy. She has been followed closely by neurology. She has been on Gabapentin in the past, but off the medication for two years. Due to her worsening symptoms over the past four to five months, she would like to restart this. She rates 6/10 bilateral lower extremity pain of a burning quality. Upon chart review, I cannot find any contraindication to the medication or any problem with it documented in the past.    We did  regarding potential medication side effects with sedation and I have asked Roni Mcintosh to speak with her daughter, [address], about helping to monitor for signs of side effects. Review of Systems   Gastrointestinal: Positive for heartburn. Musculoskeletal: Positive for myalgias. Neurological: Positive for tingling and sensory change. Physical Exam   Constitutional: No distress. Cardiovascular: Normal rate and regular rhythm. Exam reveals no gallop and no friction rub. No murmur heard. Pulmonary/Chest: Effort normal and breath sounds normal.   Musculoskeletal: She exhibits no edema. Nursing note and vitals reviewed. ASSESSMENT and PLAN  Diagnoses and all orders for this visit:    1. Neuropathy  -     gabapentin (NEURONTIN) 100 mg capsule; Take 1 Cap by mouth nightly.  Max Daily Amount: 100 mg.  - See neurologist as directed     Plan was reviewed with patient and family, understanding expressed

## 2019-10-03 ENCOUNTER — TELEPHONE (OUTPATIENT)
Dept: INTERNAL MEDICINE CLINIC | Age: 74
End: 2019-10-03

## 2019-10-03 NOTE — TELEPHONE ENCOUNTER
Spoke with pt advised her MD gave her #30 back on 9/26/19 of this medication. She should not need refill now. She states MD told her he would \"bump up dosage\" for her if she needed it. Advised her message says Gabapentin is working. She said yes but not a lot so wants more.  Will forward to MD.

## 2019-10-04 ENCOUNTER — OFFICE VISIT (OUTPATIENT)
Dept: INTERNAL MEDICINE CLINIC | Age: 74
End: 2019-10-04

## 2019-10-04 ENCOUNTER — HOSPITAL ENCOUNTER (OUTPATIENT)
Dept: LAB | Age: 74
Discharge: HOME | End: 2019-10-04
Payer: MEDICARE

## 2019-10-04 VITALS
HEIGHT: 66 IN | DIASTOLIC BLOOD PRESSURE: 84 MMHG | WEIGHT: 137.8 LBS | OXYGEN SATURATION: 99 % | SYSTOLIC BLOOD PRESSURE: 128 MMHG | RESPIRATION RATE: 20 BRPM | HEART RATE: 71 BPM | TEMPERATURE: 97.9 F | BODY MASS INDEX: 22.14 KG/M2

## 2019-10-04 DIAGNOSIS — N94.9 VAGINAL BURNING: Primary | ICD-10-CM

## 2019-10-04 DIAGNOSIS — G62.9 NEUROPATHY: ICD-10-CM

## 2019-10-04 DIAGNOSIS — T14.8XXA EXCORIATION: ICD-10-CM

## 2019-10-04 LAB
BILIRUB UR QL STRIP: NEGATIVE
GLUCOSE UR-MCNC: NEGATIVE MG/DL
KETONES P FAST UR STRIP-MCNC: NEGATIVE MG/DL
PH UR STRIP: 7 [PH] (ref 4.6–8)
PROT UR QL STRIP: NEGATIVE
SP GR UR STRIP: 1.01 (ref 1–1.03)
UA UROBILINOGEN AMB POC: NORMAL (ref 0.2–1)
URINALYSIS CLARITY POC: CLEAR
URINALYSIS COLOR POC: YELLOW
URINE BLOOD POC: NEGATIVE
URINE LEUKOCYTES POC: NEGATIVE
URINE NITRITES POC: NEGATIVE

## 2019-10-04 PROCEDURE — 87480 CANDIDA DNA DIR PROBE: CPT

## 2019-10-04 NOTE — PROGRESS NOTES
1. Have you been to the ER, urgent care clinic since your last visit? Hospitalized since your last visit? No    2. Have you seen or consulted any other health care providers outside of the [address] [place] [address] since your last visit? Include any pap smears or colon screening.  No

## 2019-10-04 NOTE — PROGRESS NOTES
HISTORY OF PRESENT ILLNESS  Jack Dinh is a 75 y.o. female. She is accompanied by her daughter, Jennifer Whitten. Patient reports severe vaginal burning, worse when urine touches the area. Denies dysuria or increased urinary frequency. She states it has been going on over the past month, but worse today. She wears a diaper all the time. Denies vaginal discharge. She was recently seen for neuropathy. She has history of spinal stenosis and back surgery. She was started on gabapentin nightly, which has helped, but she feels like it wears off. She requested a higher dose. The neuropathy of her lower extremities is a chronic issue for her. HPI    Review of Systems   Genitourinary:        Vaginal burning   Neurological:        Neuropathy       Physical Exam   Constitutional: She is oriented to person, place, and time. She appears well-developed and well-nourished. Genitourinary: There is erythema (excoriation and erythema noted to inner labia and vaginal wall; minimal milky vaginal discharge noted) and tenderness in the vagina. Musculoskeletal: She exhibits no edema. Bilateral low back pain; pain radiating down both posterior legs   Neurological: She is alert and oriented to person, place, and time. Psychiatric: She has a normal mood and affect. Results for orders placed or performed in visit on 10/04/19   AMB POC URINALYSIS DIP STICK AUTO W/O MICRO   Result Value Ref Range    Color (UA POC) Yellow     Clarity (UA POC) Clear     Glucose (UA POC) Negative Negative    Bilirubin (UA POC) Negative Negative    Ketones (UA POC) Negative Negative    Specific gravity (UA POC) 1.015 1.001 - 1.035    Blood (UA POC) Negative Negative    pH (UA POC) 7.0 4.6 - 8.0    Protein (UA POC) Negative Negative    Urobilinogen (UA POC) 0.2 mg/dL 0.2 - 1    Nitrites (UA POC) Negative Negative    Leukocyte esterase (UA POC) Negative Negative       ASSESSMENT and PLAN    ICD-10-CM ICD-9-CM    1.  Vaginal burning N94.9 625.8 AMB POC URINALYSIS DIP STICK AUTO W/O MICRO      NUSWAB VAGINITIS PLUS (VG+)   2. Neuropathy G62.9 355.9    3. Excoriation T14. 8XXA 919.8      Orders Placed This Encounter    NUSWAB VAGINITIS PLUS (VG+)    AMB POC URINALYSIS DIP STICK AUTO W/O MICRO   increase gabapentin to 100 mg twice daily  Try to leave vaginal area open to air at night  nuswab sent

## 2019-10-06 LAB
A VAGINAE DNA VAG QL NAA+PROBE: NORMAL SCORE
BVAB2 DNA VAG QL NAA+PROBE: NORMAL SCORE
C ALBICANS DNA VAG QL NAA+PROBE: NEGATIVE
C GLABRATA DNA VAG QL NAA+PROBE: NEGATIVE
C TRACH RRNA SPEC QL NAA+PROBE: NEGATIVE
MEGA1 DNA VAG QL NAA+PROBE: NORMAL SCORE
N GONORRHOEA RRNA SPEC QL NAA+PROBE: NEGATIVE
T VAGINALIS RRNA SPEC QL NAA+PROBE: NEGATIVE

## 2019-10-07 NOTE — TELEPHONE ENCOUNTER
Spoke with Jennifer Whitten daughter, she states patient has no side effects. Would like to keep current dosing for another week and touch base next Monday.

## 2019-10-08 ENCOUNTER — HOSPITAL ENCOUNTER (OUTPATIENT)
Age: 74
Setting detail: OUTPATIENT SURGERY
Discharge: HOME | End: 2019-10-08
Attending: SPECIALIST | Admitting: SPECIALIST
Payer: MEDICARE

## 2019-10-08 VITALS
SYSTOLIC BLOOD PRESSURE: 147 MMHG | DIASTOLIC BLOOD PRESSURE: 67 MMHG | HEART RATE: 68 BPM | OXYGEN SATURATION: 99 % | RESPIRATION RATE: 18 BRPM

## 2019-10-08 PROCEDURE — 74011000250 HC RX REV CODE- 250: Performed by: SPECIALIST

## 2019-10-08 PROCEDURE — 76040000007: Performed by: SPECIALIST

## 2019-10-08 PROCEDURE — 77030007009 HC CATH PH VRSFLX ALPN -C: Performed by: SPECIALIST

## 2019-10-08 RX ORDER — LIDOCAINE HYDROCHLORIDE 20 MG/ML
JELLY TOPICAL ONCE
Status: COMPLETED | OUTPATIENT
Start: 2019-10-08 | End: 2019-10-08

## 2019-10-08 RX ORDER — LIDOCAINE HYDROCHLORIDE 20 MG/ML
JELLY TOPICAL ONCE
Status: DISCONTINUED | OUTPATIENT
Start: 2019-10-08 | End: 2019-10-08 | Stop reason: HOSPADM

## 2019-10-08 RX ADMIN — LIDOCAINE HYDROCHLORIDE 5 ML: 20 JELLY TOPICAL at 10:11

## 2019-10-08 NOTE — DISCHARGE INSTRUCTIONS
Joslyn Murillo  [id number]  1945      MANOMETRY DISCHARGE INSTRUCTION    You may resume your regular diet as tolerated. You may resume your normal daily activities. If you develop a sore throat- throat lozenges or warm salt water gargles will help. Call your Physician if you have any complications or questions. Joslyn Murillo  [id number]  1945    24 HOUR PH MONITORING DISCHARGE INSTRUCTIONS    Please return to [facility] Endoscopy department at 10:45 tomorrow with your completed diary. ACTIVITY:  Avoid any activity that may get the data recorder wet. You may resume your normal daily activities. DIET:  You may resume your normal diet, HOWEVER avoid peanut butter and carbonated beverages for the next 24 hours. MEDICATIONS:  You may resume your normal medications with the exception of antacids    PLEASE CALL T[facility]ka  AT  [phone] IF YOU HAVE QUESTIONS, CONCERNS, OR TECHNICAL DIFFICULTIES DURING YOUR TEST. [employer] Activation    Thank you for requesting access to [employer]. Please follow the instructions below to securely access and download your online medical record. [employer] allows you to send messages to your doctor, view your test results, renew your prescriptions, schedule appointments, and more. How Do I Sign Up? 1. In your internet browser, go to [email or web address]  2. Click on the First Time User? Click Here link in the Sign In box. You will be redirect to the New Member Sign Up page. 3. Enter your [employer] Access Code exactly as it appears below. You will not need to use this code after youve completed the sign-up process. If you do not sign up before the expiration date, you must request a new code. [employer] Access Code: Activation code not generated  Current [employer] Status: Active (This is the date your [employer] access code will )    4.  Enter the last four digits of your Social Security Number (xxxx) and Date of Birth (mm/dd/yyyy) as indicated and click Submit. You will be taken to the next sign-up page. 5. Create a imbookin (Pogby) ID. This will be your imbookin (Pogby) login ID and cannot be changed, so think of one that is secure and easy to remember. 6. Create a imbookin (Pog[employer]) password. You can change your password at any time. 7. Enter your Password Reset Question and Answer. This can be used at a later time if you forget your password. 8. Enter your e-mail address. You will receive e-mail notification when new information is available in [address]. 9. Click Sign Up. You can now view and download portions of your medical record. 10. Click the Download Summary menu link to download a portable copy of your medical information. Additional Information    If you have questions, please visit the Frequently Asked Questions section of the imbookin (Pogby) website at [email or web address] Remember, imbookin (Pogby) is NOT to be used for urgent needs. For medical emergencies, dial 911.

## 2019-10-08 NOTE — PROGRESS NOTES
5cc viscous lidocaine inhaled into right nare per MD orders. Probe inserted into  right nare without difficulty. Pt tolerated procedure well. PH inserted into right 5 cms proximal to the LES without difficulty. Pt tolerated well. Data recorder activated and recording. Pt given diary and instructions for use of recorder as well as contact information for assistance as needed.

## 2019-10-09 NOTE — PROGRESS NOTES
PH probe removed from right at [id number] without difficulty. Pt tolerated removal well. Pt denies any complaints with regard to procedure. Pt discharged home in stable condition.

## 2019-10-10 ENCOUNTER — TELEPHONE (OUTPATIENT)
Dept: INTERNAL MEDICINE CLINIC | Age: 74
End: 2019-10-10

## 2019-10-10 NOTE — TELEPHONE ENCOUNTER
After hours call:  Patient reports burning in the rectal and perineal area. She was seen for this by Cecil Barrera NP, on 10/4. The perineal area was excoriated. She was advised to keep the area uncovered and open to air as much as possible. Nu-swab revealed no evidence of yeast.     Also c/o burning discomfort in chest from sternum through and around the chest.  Has been constant. She has not eaten today because the chicken noodle soup she ate last night \"went straight through\". She had upper endoscopy on 10/8.     Plan: Cool compresses to perineal area   Desitin cream prn   Encouraged her to eat light diet and stay hydrated   Call the office tomorrow for continued symptoms

## 2019-10-11 ENCOUNTER — TELEPHONE (OUTPATIENT)
Dept: INTERNAL MEDICINE CLINIC | Age: 74
End: 2019-10-11

## 2019-10-11 ENCOUNTER — OFFICE VISIT (OUTPATIENT)
Dept: INTERNAL MEDICINE CLINIC | Age: 74
End: 2019-10-11

## 2019-10-11 VITALS
HEIGHT: 66 IN | OXYGEN SATURATION: 96 % | WEIGHT: 131 LBS | DIASTOLIC BLOOD PRESSURE: 90 MMHG | SYSTOLIC BLOOD PRESSURE: 140 MMHG | HEART RATE: 65 BPM | RESPIRATION RATE: 19 BRPM | TEMPERATURE: 97.5 F | BODY MASS INDEX: 21.05 KG/M2

## 2019-10-11 DIAGNOSIS — N94.9 VAGINAL BURNING: Primary | ICD-10-CM

## 2019-10-11 DIAGNOSIS — G62.9 NEUROPATHY: ICD-10-CM

## 2019-10-11 DIAGNOSIS — F31.0 BIPOLAR AFFECTIVE DISORDER, CURRENT EPISODE HYPOMANIC (HCC): ICD-10-CM

## 2019-10-11 RX ORDER — GABAPENTIN 100 MG/1
100 CAPSULE ORAL 2 TIMES DAILY
Qty: 60 CAP | Refills: 1 | OUTPATIENT
Start: 2019-10-11 | End: 2019-11-27 | Stop reason: ALTCHOICE

## 2019-10-11 NOTE — PROGRESS NOTES
HISTORY OF PRESENT ILLNESS  John Chu is a 75 y.o. female. HPI Subjective:  Tolbert Canavan is seen today unaccompanied by family for evaluation of ongoing problems with sensory disturbance. She has previously described a burning neuropathy type symptom in her lower extremities. She also has chronic chest pain that has not responded to GI treatments and has not yielded an abnormality on extensive workup. More recently she has developed vaginal and anal burning, which is extremely distressing to her. I saw her two weeks ago and restarted Gabapentin. She seemed to be doing better with this, in fact called for a higher dose. She then saw our nurse practitioner, Brigida Pollard, with the below the belt symptoms and Gabapentin was increased to b.i.d. Some other recommendations were made and yeast cultures were negative. She apparently tried to reach Dr. Geoffrey Javed, her neurologist, about her \"neuropathy symptoms\" and she was given the message to discontinue Gabapentin, which she did. Reviewed with her I think this was a miscommunication and I would like her to continue Gabapentin. I reviewed the case extensively with our nurse manager, Ginette Richmond, who then relayed a message to [place] daughter, Adam Jeronimo, regarding my concerns. See documentation of Carolina's note. In the end, we are going to try a topical medication that was found on the [employer] website. I have also asked her to restart Gabapentin. Lastly, they need to get back in with psychiatry as I believe her mental illness is deteriorating. She is very anxious and may be having a bipolar flare. It is hard to say if the physical symptoms are triggering this or visa versa. Her daughter, Adam Jeronimo, expressed understanding and thanks. Will follow closely. Review of Systems   Cardiovascular: Positive for chest pain. Gastrointestinal: Positive for heartburn. Neurological: Positive for tingling and sensory change. Psychiatric/Behavioral: Positive for depression.  The patient is nervous/anxious. Physical Exam   Constitutional: No distress. Genitourinary:       There is no rash or lesion on the right labia. There is no rash or lesion on the left labia. Genitourinary Comments: Exam performed with Darwin Granados   Neurological: She is alert. Psychiatric:   Fidgety , not agitated or bizaare   Nursing note and vitals reviewed. ASSESSMENT and PLAN  Diagnoses and all orders for this visit:    1. Vaginal burning- Proceed with plan as discussed     2. Neuropathy- Proceed with plan as discussed     3.  Bipolar affective disorder, current episode hypomanic [facility])- See psychiatrist as directed     Plan was reviewed with patient and family, understanding expressed

## 2019-10-17 RX ORDER — ATORVASTATIN CALCIUM 20 MG/1
TABLET, FILM COATED ORAL
Qty: 90 TAB | Refills: 0 | Status: SHIPPED | OUTPATIENT
Start: 2019-10-17 | End: 2020-01-15

## 2019-10-18 ENCOUNTER — TELEPHONE (OUTPATIENT)
Dept: INTERNAL MEDICINE CLINIC | Age: 74
End: 2019-10-18

## 2019-10-18 NOTE — TELEPHONE ENCOUNTER
[facility] #[id number] - [facility], [address] AT [facility] [address] (Pharmacy) [phone]     Pharm calling regarding script for gabapentin.   They says that dr Jolie Beasley raised her garbapentin rx from 100mg to 300mg and she needs about 15 more of this med to get her through until her new script

## 2019-10-18 NOTE — TELEPHONE ENCOUNTER
Spoke with Pharmacist and verified Pt. Is only taking gabapentin 100 mg twice a day, the 300mg was an error on their part, everything is fine Pt. Did not get wrong dosing. Stuart Farah

## 2019-10-21 ENCOUNTER — PATIENT OUTREACH (OUTPATIENT)
Dept: INTERNAL MEDICINE CLINIC | Age: 74
End: 2019-10-21

## 2019-10-21 NOTE — PROGRESS NOTES
Ambulatory Care Management Note      Date/Time:  10/21/2019 2:52 PM    This patient was received as a referral from [address] [place] [address] reviewed with the provider   Patient continues to complain of vaginal burning which she states is constant. She has seen Dr. Jonathan Baca and started an increase in the gabapentin to TID 10/20/19. Dr. Jonathan Baca also ordered 2 MRI's which patient has not scheduled at this outreach. Encouraged patient to give increased dose of gabapentin a try and if no relief consider scheduling an appointment with gynecologist. Patient will discuss with daughter and consider. Ambulatory  contacted patient for discussion and case managements of vaginal burning   Summary of patients top problems:   1. Vaginal Burning. Ongoing complaint. Patient LOV 10/11/19 vaginal burning, neuropathy, and bipolar affective disorder, hypomanic. PCP recommended need to get back in with psychiatry for anxiousness and possible bipolar flare. 2. Bipolar disorder. Patient reports she sees Shawanda Grimm, T[facility] @ [facility]. Encouraged FU. 3. Neuropathy. Followed by Dr. Jonathan Baca. Started on increased dose of gabapentin yesterday, 10/20/19  Patient's challenges to self management identified:   functional cognitive ability, ineffective coping, lack of knowledge about disease      Medication Management:  good adherence    Advance Care Planning:   Does patient have an Advance Directive:  reviewed and current    [employer], Referrals, and Durable Medical Equipment: uses walker for ambulation and has shower chair.  Seen by Michelle Recio 1 time weekly for PT.     PCP/Specialist follow up:   Future Appointments   Date Time Provider Reji Smith   11/21/2019  8:05 AM Milena Mcdaniel MD [facility] RACHEL Escamilla   2/18/2020  8:40 AM Young Pickard  [address]          Goals        Patient Stated     relief of physical symptoms and attend follow ups (pt-stated)      Skills and education necessary to properly manage symptoms of vaginal burning and importance of following up with providers  History: LOV 10/11/19 diagnosis of vaginal burning, neuropathy and hypomanic  Current level of understanding: Patient appears to be unaware of cause of burning sensation. States: \"May be fibromyalgia\" is mentioned only once and not brought up again. More focused on symptoms. Education provided on alternatives such as exam by gynecologist and giving time for adjusted dosage of gabapentin to work. Desired Outcome: Patient will be free of vaginal burning within 1 month; will consider and discuss gynecologist FU with daughter by next outreach, and take medications as directed  Plan: Follow patient for the next 30 days with weekly outreaches to provide education and support. Re-evaluate at the end of 30 days to determine if patient needs additional Care Management. Encourage NP advise of trying to leave vaginal area open to air at night (less adult brief contact time with skin), follow up with specialist and give increased dose of gabapentin time to work. Sri Harris RN             Patient verbalized understanding of all information discussed. Patient has this Nurse Navigators contact information for any further questions, concerns, or needs.

## 2019-10-22 NOTE — PROCEDURES
[address]  PROCEDURE NOTE    Name:  Jessica Johns  MR#:  [id number]  :  1945  ACCOUNT #:  [de-identified]  DATE OF SERVICE:  10/08/2019    PREPROCEDURE DIAGNOSES:  1. Burning chest pain. 2.  Abnormal CT abdomen. POSTPROCEDURE DIAGNOSES:  1. Type 2 achalasia. 2.  90% of impedance boluses failed to clear completely. PROCEDURES PLANNED:  1. Esophageal manometry. 2.  Impedance esophageal manometry. SURGEON:  Ash Tse MD    ASSISTANT:  Deanna Lopez RN    ANESTHESIA:  Topical.    ESTIMATED BLOOD LOSS:  None. SPECIMENS REMOVED:  None. COMPLICATIONS:  None. IMPLANTS:  None. DESCRIPTION OF PROCEDURE:  High-resolution esophageal manometry was performed by the nursing staff with subsequent interpretation by Dr. Steven Saenz. The lower esophageal sphincter is at 41 cm and for a distance of 3.6 cm distally. Lower esophageal sphincter pressures are normal at rest:  Respiratory minimum 14.9, respiratory mean 20.9. Residual pressure after swallowing is markedly elevated 48.6 (less than 15). Wet swallows are then administered. By standard criteria 9 are simultaneous, 1 is failed. The amplitude velocity high-resolution scoring could not be obtained due to simultaneous contractions. [place] scoring is as follows:  Distal latency not applicable. Percent failed 100%, panesophageal pressurization 100. All other [place] score is zero. Impedance manometry is performed with a flavored electrolyte solution. Nine of 10 impedance boluses failed to clear completely. COMMENT:  Combination of EGJ outflow obstruction, simultaneous contractions and panesophageal pressurization is diagnostic of subtype 2 achalasia. Thomas Bailey MD      [id number]  D:  10/22/2019 7:29  T:  10/22/2019 12:58  JOB #:  [id number]  CC:   MD Chapo García MD Creig Hands, MD

## 2019-10-23 NOTE — OP NOTES
[address]  OPERATIVE REPORT    Name:  Pearle Mcardle  MR#:  [id number]  :  1945  ACCOUNT #:  [de-identified]  DATE OF SERVICE:  10/08/2019      DATE OF OPERATION:  10/08/2019. CATHETER PLACED:  10/08/2019. CATHETER REMOVED:  10/09/2019. PREOPERATIVE DIAGNOSIS:  Chest pain . POSTOPERATIVE DIAGNOSES:  1.  No acid reflux. 2.  Edmond-DeMeester score 0.2, normal less than 14.72.  3.  40 episodes of weakly acidic reflux, normal up to 26. No episodes of acid reflux and no nonacid reflux. 4.  25 of the weakly acidic reflux events happened supine and 15 happened upright. 5.  Symptom associated probability for heartburn and weakly acidic reflux was 89.2 (not significant). Symptom index for heartburn and weakly acidic reflux was 20 (not significant). PROCEDURE PLANNED AND PERFORMED:  24-hour pH impedance. SURGEON:  Kate Salgado MD.    ASSISTANT:  Enrique Julio RN.    ANESTHESIA:  Topical.    COMPLICATIONS:  None. SPECIMENS REMOVED:  None. IMPLANTS:  None. ESTIMATED BLOOD LOSS:  None. DESCRIPTION OF PROCEDURE:  A 24-hour pH impedance was performed by the nursing staff with subsequent interpretation by Dr. Tiny Slater. The probe was placed on 10/08/2019 and the patient was monitored for 24 hours. There was no acid reflux during this 24 hours. There were 3 hours and 3 minutes of upright monitoring, no acid reflux occurred. There were 20 hours and 57 minutes of supine monitoring, no acid reflux occurred. Edmond-DeMeester score 0.2, normal less than 14.72. Total reflux events were scored. There were no acid reflux events, up to 55 is normal.  There were 40 weakly acidic reflux events, up to 26 is normal.  There were no nonacid reflux events. Symptom associated probability and symptom index are as above. In summary, there were excess numbers of weakly acidic reflux events and no acid reflux events.   Recommend correlation with manometry and clinical findings. Sofi Connolly MD      [id number]  D:  10/22/2019 16:48  T:  10/23/2019 2:06  JOB #:  [id number]  CC:   Larue Maya, MD Caren Haskell, MD Marylen Christen, MD

## 2019-10-28 RX ORDER — FLUDROCORTISONE ACETATE 0.1 MG/1
TABLET ORAL
Qty: 180 TAB | Refills: 1 | Status: SHIPPED | OUTPATIENT
Start: 2019-10-28 | End: 2020-01-20

## 2019-10-28 NOTE — TELEPHONE ENCOUNTER
Request for Florinef 0.1 mg BID. Last office visit 8/20/19, next office visit 2/18/20. Refills per verbal order from Dr. Jazmyn Carlos.

## 2019-11-15 ENCOUNTER — PATIENT OUTREACH (OUTPATIENT)
Dept: INTERNAL MEDICINE CLINIC | Age: 74
End: 2019-11-15

## 2019-11-15 NOTE — PROGRESS NOTES
Goals        Patient Stated     relief of physical symptoms and attend follow ups (pt-stated)      11/15/19 Patient VM full. Contacted patient's daughter, Adrien Waters. CHARMAINE in chart. Daughter reports:  - Patient is same. Continues with burning in chest and Neuro has MRI scheduled for next week. Thought that complaints may be connected to back issue.   - ACM offered to schedule appointment with PCP. Daughter declined. They will set something up if they think it is necessary after results of MRI are known. - contact information supplied. ACM will FU with patient after MRI  Charu Fisher RN  Ambulatory Care Manager    10/21/19   Skills and education necessary to properly manage symptoms of vaginal burning and importance of following up with providers  History: LOV 10/11/19 diagnosis of vaginal burning, neuropathy and hypomanic  Current level of understanding: Patient appears to be unaware of cause of burning sensation. States: \"May be fibromyalgia\" is mentioned only once and not brought up again. More focused on symptoms. Education provided on alternatives such as exam by gynecologist and giving time for adjusted dosage of gabapentin to work. Desired Outcome: Patient will be free of vaginal burning within 1 month; will consider and discuss gynecologist FU with daughter by next outreach, and take medications as directed  Plan: Follow patient for the next 30 days with weekly outreaches to provide education and support. Re-evaluate at the end of 30 days to determine if patient needs additional Care Management. Encourage NP advise of trying to leave vaginal area open to air at night (less adult brief contact time with skin), follow up with specialist and give increased dose of gabapentin time to work.    Charu Fisher RN

## 2019-11-23 ENCOUNTER — HOSPITAL ENCOUNTER (EMERGENCY)
Age: 74
Discharge: HOME | End: 2019-11-23
Attending: EMERGENCY MEDICINE
Payer: MEDICARE

## 2019-11-23 VITALS
DIASTOLIC BLOOD PRESSURE: 81 MMHG | RESPIRATION RATE: 12 BRPM | WEIGHT: 138.89 LBS | HEIGHT: 63 IN | OXYGEN SATURATION: 98 % | BODY MASS INDEX: 24.61 KG/M2 | TEMPERATURE: 97.4 F | SYSTOLIC BLOOD PRESSURE: 159 MMHG | HEART RATE: 68 BPM

## 2019-11-23 DIAGNOSIS — N30.01 ACUTE CYSTITIS WITH HEMATURIA: Primary | ICD-10-CM

## 2019-11-23 LAB
APPEARANCE UR: ABNORMAL
BACTERIA URNS QL MICRO: ABNORMAL /HPF
BILIRUB UR QL: NEGATIVE
CLUE CELLS VAG QL WET PREP: NORMAL
COLOR UR: ABNORMAL
EPITH CASTS URNS QL MICRO: ABNORMAL /LPF
GLUCOSE UR STRIP.AUTO-MCNC: NEGATIVE MG/DL
HGB UR QL STRIP: ABNORMAL
KETONES UR QL STRIP.AUTO: NEGATIVE MG/DL
KOH PREP SPEC: NORMAL
LEUKOCYTE ESTERASE UR QL STRIP.AUTO: ABNORMAL
NITRITE UR QL STRIP.AUTO: NEGATIVE
PH UR STRIP: 7.5 [PH] (ref 5–8)
PROT UR STRIP-MCNC: NEGATIVE MG/DL
RBC #/AREA URNS HPF: ABNORMAL /HPF (ref 0–5)
SERVICE CMNT-IMP: NORMAL
SP GR UR REFRACTOMETRY: 1.01 (ref 1–1.03)
T VAGINALIS VAG QL WET PREP: NORMAL
UA: UC IF INDICATED,UAUC: ABNORMAL
UROBILINOGEN UR QL STRIP.AUTO: 0.2 EU/DL (ref 0.2–1)
WBC URNS QL MICRO: ABNORMAL /HPF (ref 0–4)

## 2019-11-23 PROCEDURE — 87210 SMEAR WET MOUNT SALINE/INK: CPT

## 2019-11-23 PROCEDURE — 87186 SC STD MICRODIL/AGAR DIL: CPT

## 2019-11-23 PROCEDURE — 74011250637 HC RX REV CODE- 250/637: Performed by: EMERGENCY MEDICINE

## 2019-11-23 PROCEDURE — 87491 CHLMYD TRACH DNA AMP PROBE: CPT

## 2019-11-23 PROCEDURE — 87077 CULTURE AEROBIC IDENTIFY: CPT

## 2019-11-23 PROCEDURE — 87086 URINE CULTURE/COLONY COUNT: CPT

## 2019-11-23 PROCEDURE — 99283 EMERGENCY DEPT VISIT LOW MDM: CPT

## 2019-11-23 PROCEDURE — 81001 URINALYSIS AUTO W/SCOPE: CPT

## 2019-11-23 RX ORDER — CEPHALEXIN 250 MG/1
500 CAPSULE ORAL
Status: COMPLETED | OUTPATIENT
Start: 2019-11-23 | End: 2019-11-23

## 2019-11-23 RX ORDER — CEPHALEXIN 500 MG/1
500 CAPSULE ORAL 3 TIMES DAILY
Qty: 20 CAP | Refills: 0 | Status: SHIPPED | OUTPATIENT
Start: 2019-11-23 | End: 2019-11-27 | Stop reason: ALTCHOICE

## 2019-11-23 RX ADMIN — CEPHALEXIN 500 MG: 250 CAPSULE ORAL at 12:17

## 2019-11-23 NOTE — ED PROVIDER NOTES
HPI   67-year-old female with a history of fibromyalgia, bipolar disorder, IBS, Parkinson's, neuropathy with recent evaluation by her primary care doctor a few times over the last 2 months for evaluation of vaginal burning sensation  the symptoms were previously evaluated and had negative work-up for infectious or fungal etiology. reportedly her previous evaluations by her primary care provider as well as by her OB/GYN who she saw on 11/13 feel that her symptoms may be associated with neuropathy and possibly lichen sclerosis. She has been prescribed a cream which she has been using from her OB/GYN to no avail of her symptoms. She states that her gabapentin dosing has been gradually increasing to treat her symptoms to no avail so the decision was made to switch the patient to Lyrica which she states she plans to start on Monday. She notes a burning sensation when she urinates but also when not urinating. She denies any blood in her urine, fever, chills, nausea, vomiting, diarrhea, blood in her stool, vaginal bleeding, vaginal discharge. States that this feels identical to her previous episodes.     Past Medical History:   Diagnosis Date    Bipolar disorder ([address]     Nazmy    Chronic pain     BACK PAIN    Diabetes ([address]     BORDERLINE TX WITH DIET AND EXERCISE    DJD (degenerative joint disease)     Fibromyalgia     Genital herpes     GERD (gastroesophageal reflux disease)     Hypercholesterolemia     IBS (irritable bowel syndrome)     Ill-defined condition     fibromyligia    Lumbar disc disease     stimulation-Honza    Migraine     Nausea & vomiting     Other ill-defined conditions(799.89)     SEASONAL allergies    Other ill-defined conditions(799.89)     dysphagia has had esophagus dilated    Paget's disease     Parkinson disease ([address]     Pulmonary embolism (HCC)     RLS (restless legs syndrome)     Spinal stenosis        Past Surgical History:   Procedure Laterality Date    COLONOSCOPY N/A 6/27/2016    COLONOSCOPY performed by Domingo [facility]aguirre MD at [facility] ENDOSCOPY    COLONOSCOPY N/A 2/22/2019    COLONOSCOPY performed by Deep Conley MD at [facility] ENDOSCOPY    ENDOSCOPY, COLON, DIAGNOSTIC      12, due 13    HX APPENDECTOMY      HX BACK SURGERY  9/17/2013    back surgery - total of 3 as of 12/20/2013    HX BREAST BIOPSY Right years ago    negative    HX CHOLECYSTECTOMY      HX HEENT      T & A    HX HYSTERECTOMY      total for fibroid tumors    HX ORTHOPAEDIC      lumbar laminectomy then fusion/neurostimulator implanted - inactive now but still in    HX ORTHOPAEDIC      REMOVAL OF NEUROSTIMULATOR    HX OTHER SURGICAL      EGD x 2 with dilation/colonoscopy - no polyps per pt    TILT TABLE EVALUATION  8/2/2016              Family History:   Problem Relation Age of Onset    Colon Cancer Mother     Cancer Mother 68        colon    Heart Disease Father     Heart Disease Maternal Grandmother     Heart Disease Maternal Grandfather     Heart Disease Other        Social History     Socioeconomic History    Marital status: SINGLE     Spouse name: Not on file    Number of children: Not on file    Years of education: Not on file    Highest education level: Not on file   Occupational History    Occupation: volunteer     Employer: RETIRED     Comment: [employer]   Social Needs    Financial resource strain: Not on file    Food insecurity:     Worry: Not on file     Inability: Not on file    Transportation needs:     Medical: Not on file     Non-medical: Not on file   Tobacco Use    Smoking status: Never Smoker    Smokeless tobacco: Never Used   Substance and Sexual Activity    Alcohol use: No     Comment: 1 per month    Drug use: No    Sexual activity: Not on file   Lifestyle    Physical activity:     Days per week: Not on file     Minutes per session: Not on file    Stress: Not on file   Relationships    Social connections:     Talks on phone: Not on file     Gets together: Not on file     Attends Religion service: Not on file     Active member of club or organization: Not on file     Attends meetings of clubs or organizations: Not on file     Relationship status: Not on file    Intimate partner violence:     Fear of current or ex partner: Not on file     Emotionally abused: Not on file     Physically abused: Not on file     Forced sexual activity: Not on file   Other Topics Concern    Not on file   Social History Narrative    Not on file         ALLERGIES: Adhesive; Hydrocodone; Lorazepam; Other medication; Percocet [oxycodone-acetaminophen]; Sudafed [pseudoephedrine hcl]; Wellbutrin [bupropion hcl]; Zithromax [azithromycin]; and Zyrtec [cetirizine]    Review of Systems   Constitutional: Negative for activity change, appetite change, chills and fever. HENT: Negative for congestion, rhinorrhea, sinus pressure, sneezing and sore throat. Eyes: Negative for photophobia and visual disturbance. Respiratory: Negative for cough and shortness of breath. Cardiovascular: Negative for chest pain. Gastrointestinal: Negative for abdominal pain, blood in stool, constipation, diarrhea, nausea and vomiting. Genitourinary: Positive for dysuria and vaginal pain. Negative for difficulty urinating, flank pain, frequency, hematuria, menstrual problem, urgency, vaginal bleeding and vaginal discharge. Musculoskeletal: Negative for arthralgias, back pain, myalgias and neck pain. Skin: Negative for rash and wound. Neurological: Negative for syncope, weakness, numbness and headaches. Psychiatric/Behavioral: Negative for self-injury and suicidal ideas. All other systems reviewed and are negative. Vitals:    11/23/19 1059   BP: 159/81   Pulse: 68   Resp: 12   Temp: 97.4 °F (36.3 °C)   SpO2: 98%   Weight: 63 kg (138 lb 14.2 oz)   Height: 5' 3\" (1.6 m)            Physical Exam  Vitals signs and nursing note reviewed. Constitutional:       General: She is not in acute distress. Appearance: She is well-developed. She is not diaphoretic. HENT:      Head: Normocephalic and atraumatic. Nose: Nose normal.   Eyes:      Conjunctiva/sclera: Conjunctivae normal.      Pupils: Pupils are equal, round, and reactive to light. Neck:      Musculoskeletal: Neck supple. Cardiovascular:      Rate and Rhythm: Normal rate and regular rhythm. Heart sounds: Normal heart sounds. Pulmonary:      Effort: Pulmonary effort is normal.      Breath sounds: Normal breath sounds. Abdominal:      General: There is no distension. Palpations: Abdomen is soft. Tenderness: There is no tenderness. There is no right CVA tenderness or left CVA tenderness. Genitourinary:     Vagina: No vaginal discharge. Comments: Vulva tender with burning type sensation, without significant erythema, no evidence of abscess, no significant vaginal discharge, CMT, adnexal tenderness  Musculoskeletal:         General: No tenderness. Skin:     General: Skin is warm and dry. Neurological:      Mental Status: She is alert and oriented to person, place, and time. GCS: GCS eye subscore is 4. GCS verbal subscore is 5. GCS motor subscore is 6. Cranial Nerves: No cranial nerve deficit. Sensory: No sensory deficit. Coordination: Coordination normal.          MDM   79-year-old female with vaginal burning sensation. Exam as above without any significant discharge or evidence of infectious etiology. UA shows 10-20 WBCs with trace blood and 4+ bacteria, does have some epithelial contamination, but given symptoms concern for cystitis. Will send for urine culture and start course of Keflex in the ED and Rx same. Wet prep neg  KOH neg     She was recommended to follow-up with her primary care doctor in the next week or so and return precautions were given for worsening or concerns.   Procedures

## 2019-11-23 NOTE — ED NOTES
Pelvic exam performed by Dr. Jona Tsai. This nurse accompanied. Pt had a sheet draped over her for privacy during exam. Pt tolerated well. Cultures were obtained and sent to lab.

## 2019-11-23 NOTE — ED NOTES
Patient was discharged and given instructions by Dr. Larry Lam. Patient verbalized good understanding of all discharge instructions, prescriptions and f/u care. All questions answered. Pt in stable condition on discharge.

## 2019-11-23 NOTE — ED TRIAGE NOTES
Pt. Complains of vaginal burning \"on fire\". For the past couple days. Pt. States she's had this before and usually goes to the ER to get something for it. Pt. Denies any urinary sx. But states\"its hot\" when it comes out.

## 2019-11-25 LAB
BACTERIA SPEC CULT: ABNORMAL
C TRACH DNA SPEC QL NAA+PROBE: NEGATIVE
CC UR VC: ABNORMAL
N GONORRHOEA DNA SPEC QL NAA+PROBE: NEGATIVE
SAMPLE TYPE: NORMAL
SERVICE CMNT-IMP: ABNORMAL
SERVICE CMNT-IMP: NORMAL
SPECIMEN SOURCE: NORMAL

## 2019-11-27 ENCOUNTER — OFFICE VISIT (OUTPATIENT)
Dept: INTERNAL MEDICINE CLINIC | Age: 74
End: 2019-11-27

## 2019-11-27 ENCOUNTER — TELEPHONE (OUTPATIENT)
Dept: INTERNAL MEDICINE CLINIC | Age: 74
End: 2019-11-27

## 2019-11-27 VITALS
HEART RATE: 74 BPM | HEIGHT: 63 IN | RESPIRATION RATE: 16 BRPM | WEIGHT: 134.4 LBS | BODY MASS INDEX: 23.81 KG/M2 | OXYGEN SATURATION: 98 % | SYSTOLIC BLOOD PRESSURE: 106 MMHG | TEMPERATURE: 97.3 F | DIASTOLIC BLOOD PRESSURE: 61 MMHG

## 2019-11-27 DIAGNOSIS — G62.9 NEUROPATHY: ICD-10-CM

## 2019-11-27 DIAGNOSIS — F31.0 BIPOLAR AFFECTIVE DISORDER, CURRENT EPISODE HYPOMANIC (HCC): ICD-10-CM

## 2019-11-27 DIAGNOSIS — E11.21 TYPE 2 DIABETES WITH NEPHROPATHY (HCC): Primary | ICD-10-CM

## 2019-11-27 DIAGNOSIS — I95.1 ORTHOSTATIC HYPOTENSION: ICD-10-CM

## 2019-11-27 DIAGNOSIS — E78.2 MIXED HYPERLIPIDEMIA: ICD-10-CM

## 2019-11-27 DIAGNOSIS — N94.9 VAGINAL BURNING: ICD-10-CM

## 2019-11-27 LAB — HBA1C MFR BLD HPLC: 6.1 %

## 2019-11-27 RX ORDER — PREGABALIN 25 MG/1
25 CAPSULE ORAL 3 TIMES DAILY
COMMUNITY
End: 2019-12-11

## 2019-11-27 RX ORDER — NYSTATIN AND TRIAMCINOLONE ACETONIDE 100000; 1 [USP'U]/G; MG/G
OINTMENT TOPICAL 3 TIMES DAILY
COMMUNITY
End: 2020-02-26

## 2019-11-27 RX ORDER — FLUOXETINE HYDROCHLORIDE 40 MG/1
40 CAPSULE ORAL DAILY
Refills: 1 | COMMUNITY
Start: 2019-11-08 | End: 2020-03-08

## 2019-11-27 RX ORDER — DEXLANSOPRAZOLE 60 MG/1
1 CAPSULE, DELAYED RELEASE ORAL DAILY
Status: ON HOLD | COMMUNITY
End: 2020-06-03

## 2019-11-27 NOTE — PROGRESS NOTES
Pt here for her 4 month fuv. She states she is here to see MD for her vaginal burning. She states she saw Dr Quynh Martinez about 2 weeks ago - dx with infection. Rx antibiotic.

## 2019-11-27 NOTE — PROGRESS NOTES
HISTORY OF PRESENT ILLNESS  Crystal Ness is a 75 y.o. female. HPI Subjective:  Chun Garcia is seen today for follow up of hyperlipidemia, prediabetes and other medical concerns. She felt her visit today was for her chronic vaginal burning. She is unaccompanied and tells me she did not let her daughter, Adrien Waters, know that she had an appointment today. As a consequence of this, Chun Garcia is unsure about her medications, recent medical evaluations and quite a bit of her interval history. See below. 1. IFG, hyperlipidemia. Fingerstick A1c is checked and is stable. She is up to date with lipid check and other routine labs, both through our office and with recurrent ER visits. 2. Chronic neuropathy symptoms. She has had an EMG and MRI studies with her neurologist.  I have put a request in for these. 3. Bipolar disorder. She has seen her psychiatrist and there were some medication adjustments. These were not clear until we discussed the case with Adrien Waters via telephone. 4. Orthostatic hypotension, stable on current regimen. 5. Chronic chest pain. See above regarding MRI. Chun Garcia actually did not mention this symptom today. Review of Systems:  Notable for 10/10 vaginal burning. She has seen Dr. Haeligh Bui of GYN. He is apparently diagnosing lichen sclerosis. She had several specimens taken at an emergency room visit on 11/23, all of which returned normal.  We have arranged for her to see GYN ASAP today to proceed with treatment plans. She also had a UTI diagnosed at the emergency room and is on Keflex. Medications reviewed with her daughter. We ended up with a full reconciliation of her medications prior to her leaving today. 25 minutes spent in counseling and coordination of care regarding checking on her medications, multiple specialist visits and arranging for subsequent care. 45 minute visit overall. Greater than 50% of the visit was spent in counseling and coordination of care.   Isabelle Lundborg, LPN, assisted in this endeavor, including facilitating communications with [place] and the specialists. Current Outpatient Medications   Medication Sig    nystatin-triamcinolone (MYCOLOG) 100,000-0.1 unit/gram-% ointment Apply  to affected area three (3) times daily.  pregabalin (LYRICA) 25 mg capsule Take 25 mg by mouth three (3) times daily.  fludrocortisone (FLORINEF) 0.1 mg tablet TAKE 1 TABLET BY MOUTH TWICE DAILY    atorvastatin (LIPITOR) 20 mg tablet TAKE 1 TABLET BY MOUTH EVERY EVENING    droxidopa (NORTHERA) 300 mg cap Take 600 mg by mouth two (2) times a day.  donepezil (ARICEPT) 5 mg tablet Take 5 mg by mouth nightly.  potassium chloride (K-DUR, KLOR-CON) 20 mEq tablet Take 1 Tab by mouth daily.  pyridostigmine (MESTINON) 60 mg tablet TAKE 1 TABLET BY MOUTH THREE TIMES DAILY    sucralfate (CARAFATE) 1 gram tablet TAKE 1 TABLET BY MOUTH FOUR TIMES DAILY    carbidopa-levodopa (SINEMET)  mg per tablet Take 1 Tab by mouth three (3) times daily.  acetaminophen (TYLENOL) 325 mg tablet Take 325 mg by mouth every four (4) hours as needed for Pain.  QUEtiapine (SEROQUEL) 25 mg tablet Take 50 mg by mouth nightly. Take 2 tabs qhs prn Roseann Lombardi NP/Dr Nnamdi Truong dexlansoprazole [facility]) 60 mg CpDB capsule (delayed release) Take 1 Cap by mouth daily.  FLUoxetine (PROZAC) 40 mg capsule Take 40 mg by mouth daily. No current facility-administered medications for this visit. Review of Systems   Respiratory: Negative for shortness of breath. Musculoskeletal: Positive for back pain. Neurological: Positive for tingling, tremors and sensory change. Psychiatric/Behavioral: Positive for depression. Physical Exam  Vitals signs and nursing note reviewed. Constitutional:       General: She is not in acute distress. Cardiovascular:      Rate and Rhythm: Normal rate and regular rhythm. Heart sounds: No murmur. No friction rub. No gallop.     Pulmonary: Effort: Pulmonary effort is normal.      Breath sounds: Normal breath sounds. Neurological:      Mental Status: Mental status is at baseline. Psychiatric:      Comments: Having some difficulty with details of recent doctor's visits and med changes         ASSESSMENT and PLAN  Diagnoses and all orders for this visit:    1. Type 2 diabetes with nephropathy (HCC)  -     AMB POC HEMOGLOBIN A1C    2. Neuropathy- Continue current regimen of prescription and / or OTC medications     3. Bipolar affective disorder, current episode hypomanic [facility])- See psychiatrist as directed     4. Orthostatic hypotension- Continue current regimen of prescription and / or OTC medications , See specialists  as directed. 5. Mixed hyperlipidemia- Check lipid panel and appropriate studies to rule out med side effects in 3 months. High risk med management.      6. Vaginal burning- See gynecologist as discussed/directed     Plan was reviewed with patient and family, understanding expressed

## 2019-12-01 ENCOUNTER — HOSPITAL ENCOUNTER (EMERGENCY)
Age: 74
Discharge: HOME | End: 2019-12-01
Attending: EMERGENCY MEDICINE
Payer: MEDICARE

## 2019-12-01 VITALS
RESPIRATION RATE: 16 BRPM | WEIGHT: 140.65 LBS | BODY MASS INDEX: 24.92 KG/M2 | TEMPERATURE: 97.9 F | SYSTOLIC BLOOD PRESSURE: 149 MMHG | DIASTOLIC BLOOD PRESSURE: 65 MMHG | HEART RATE: 72 BPM | OXYGEN SATURATION: 98 % | HEIGHT: 63 IN

## 2019-12-01 DIAGNOSIS — N39.0 URINARY TRACT INFECTION WITHOUT HEMATURIA, SITE UNSPECIFIED: Primary | ICD-10-CM

## 2019-12-01 DIAGNOSIS — R10.2 VAGINAL PAIN: ICD-10-CM

## 2019-12-01 LAB
APPEARANCE UR: ABNORMAL
BACTERIA URNS QL MICRO: ABNORMAL /HPF
BILIRUB UR QL: NEGATIVE
COLOR UR: ABNORMAL
EPITH CASTS URNS QL MICRO: ABNORMAL /LPF
GLUCOSE UR STRIP.AUTO-MCNC: NEGATIVE MG/DL
HGB UR QL STRIP: NEGATIVE
HYALINE CASTS URNS QL MICRO: ABNORMAL /LPF (ref 0–5)
KETONES UR QL STRIP.AUTO: ABNORMAL MG/DL
LEUKOCYTE ESTERASE UR QL STRIP.AUTO: ABNORMAL
MUCOUS THREADS URNS QL MICRO: ABNORMAL /LPF
NITRITE UR QL STRIP.AUTO: NEGATIVE
PH UR STRIP: 7 [PH] (ref 5–8)
PROT UR STRIP-MCNC: NEGATIVE MG/DL
RBC #/AREA URNS HPF: ABNORMAL /HPF (ref 0–5)
SP GR UR REFRACTOMETRY: 1.02 (ref 1–1.03)
UA: UC IF INDICATED,UAUC: ABNORMAL
UROBILINOGEN UR QL STRIP.AUTO: 0.2 EU/DL (ref 0.2–1)
WBC URNS QL MICRO: ABNORMAL /HPF (ref 0–4)

## 2019-12-01 PROCEDURE — 87077 CULTURE AEROBIC IDENTIFY: CPT

## 2019-12-01 PROCEDURE — 87086 URINE CULTURE/COLONY COUNT: CPT

## 2019-12-01 PROCEDURE — 99283 EMERGENCY DEPT VISIT LOW MDM: CPT

## 2019-12-01 PROCEDURE — 87186 SC STD MICRODIL/AGAR DIL: CPT

## 2019-12-01 PROCEDURE — 81001 URINALYSIS AUTO W/SCOPE: CPT

## 2019-12-01 RX ORDER — NITROFURANTOIN 25; 75 MG/1; MG/1
100 CAPSULE ORAL 2 TIMES DAILY
Qty: 14 CAP | Refills: 0 | Status: SHIPPED | OUTPATIENT
Start: 2019-12-01 | End: 2019-12-08

## 2019-12-01 NOTE — DISCHARGE INSTRUCTIONS
Patient Education        Urinary Tract Infection in Women: Care Instructions  Your Care Instructions    A urinary tract infection, or UTI, is a general term for an infection anywhere between the kidneys and the urethra (where urine comes out). Most UTIs are bladder infections. They often cause pain or burning when you urinate. UTIs are caused by bacteria and can be cured with antibiotics. Be sure to complete your treatment so that the infection goes away. Follow-up care is a key part of your treatment and safety. Be sure to make and go to all appointments, and call your doctor if you are having problems. It's also a good idea to know your test results and keep a list of the medicines you take. How can you care for yourself at home? · Take your antibiotics as directed. Do not stop taking them just because you feel better. You need to take the full course of antibiotics. · Drink extra water and other fluids for the next day or two. This may help wash out the bacteria that are causing the infection. (If you have kidney, heart, or liver disease and have to limit fluids, talk with your doctor before you increase your fluid intake.)  · Avoid drinks that are carbonated or have caffeine. They can irritate the bladder. · Urinate often. Try to empty your bladder each time. · To relieve pain, take a hot bath or lay a heating pad set on low over your lower belly or genital area. Never go to sleep with a heating pad in place. To prevent UTIs  · Drink plenty of water each day. This helps you urinate often, which clears bacteria from your system. (If you have kidney, heart, or liver disease and have to limit fluids, talk with your doctor before you increase your fluid intake.)  · Urinate when you need to. · Urinate right after you have sex. · Change sanitary pads often. · Avoid douches, bubble baths, feminine hygiene sprays, and other feminine hygiene products that have deodorants.   · After going to the bathroom, wipe from front to back. When should you call for help? Call your doctor now or seek immediate medical care if:    · Symptoms such as fever, chills, nausea, or vomiting get worse or appear for the first time.     · You have new pain in your back just below your rib cage. This is called flank pain.     · There is new blood or pus in your urine.     · You have any problems with your antibiotic medicine.    Watch closely for changes in your health, and be sure to contact your doctor if:    · You are not getting better after taking an antibiotic for 2 days.     · Your symptoms go away but then come back. Where can you learn more? Go to http://ash-jeronimo.info/. Enter J973 in the search box to learn more about \"Urinary Tract Infection in Women: Care Instructions. \"  Current as of: December 19, 2018  Content Version: 12.2  © 9958-4409 [employer]. Care instructions adapted under license by [employer] (which disclaims liability or warranty for this information). If you have questions about a medical condition or this instruction, always ask your healthcare professional. [address] any warranty or liability for your use of this information. Patient Education        Pelvic Pain: Care Instructions  Your Care Instructions    Pelvic pain, or pain in the lower belly, can have many causes. Often pelvic pain is not serious and gets better in a few days. If your pain continues or gets worse, you may need tests and treatment. Tell your doctor about any new symptoms. These may be signs of a serious problem. Follow-up care is a key part of your treatment and safety. Be sure to make and go to all appointments, and call your doctor if you are having problems. It's also a good idea to know your test results and keep a list of the medicines you take. How can you care for yourself at home? · Rest until you feel better.  Lie down, and raise your legs by placing a pillow under your knees. · Drink plenty of fluids. You may find that small, frequent sips are easier on your stomach than if you drink a lot at once. Avoid drinks with carbonation or caffeine, such as soda pop, tea, or coffee. · Try eating several small meals instead of 2 or 3 large ones. Eat mild foods, such as rice, dry toast or crackers, bananas, and applesauce. Avoid fatty and spicy foods, other fruits, and alcohol until 48 hours after your symptoms have gone away. · Take an over-the-counter pain medicine, such as acetaminophen (Tylenol), ibuprofen (Advil, Motrin), or naproxen (Aleve). Read and follow all instructions on the label. · Do not take two or more pain medicines at the same time unless the doctor told you to. Many pain medicines have acetaminophen, which is Tylenol. Too much acetaminophen (Tylenol) can be harmful. · You can put a heating pad, a warm cloth, or moist heat on your belly to relieve pain. When should you call for help? Call your doctor now or seek immediate medical care if:    · You have a new or higher fever.     · You have unusual vaginal bleeding.     · You have new or worse belly or pelvic pain.     · You have vaginal discharge that has increased in amount or smells bad.    Watch closely for changes in your health, and be sure to contact your doctor if:    · You do not get better as expected. Where can you learn more? Go to http://ash-jeronimo.info/. Enter [phone] in the search box to learn more about \"Pelvic Pain: Care Instructions. \"  Current as of: February 19, 2019  Content Version: 12.2  © 1578-7907 [employer]. Care instructions adapted under license by [employer] (which disclaims liability or warranty for this information). If you have questions about a medical condition or this instruction, always ask your healthcare professional. [address] any warranty or liability for your use of this information.

## 2019-12-01 NOTE — ED TRIAGE NOTES
Pt. Returns to the ER for vaginal burning and has been seen several times for same. Pt. Dov Salazar to GYN  On 11/27/19 and RX terconazole and finished treatment last night. Pt. States it's never gotten better. Pt. GYN has an additional unknown medication she is to start taking to day for sx.

## 2019-12-01 NOTE — ED NOTES
Pt. Daughter at bedside and reviewed her medication and instructed her to fill Rx and follow up with GYN for any additional issues. Pt. And daughter verbalized understanding.

## 2019-12-02 NOTE — ED PROVIDER NOTES
The patient is a 41-year-old female with multiple chronic medical problems who presents to the ED with a complaint of persisting vaginal pain and burning that began approximately 2 weeks ago without any relief or discomfort or pain. She was seen by her gynecologist and placed on antifungal tampon applicator that she has been taking without any relief or discomfort. She was also seen and evaluated in the ED on November 23, 2019 and was diagnosed with UTI. She was placed on Keflex. She denies any further discomfort at this time.            Past Medical History:   Diagnosis Date    Bipolar disorder ([address]     Nazmy    Chronic pain     BACK PAIN    Diabetes (HCC)     BORDERLINE TX WITH DIET AND EXERCISE    DJD (degenerative joint disease)     Fibromyalgia     Genital herpes     GERD (gastroesophageal reflux disease)     Hypercholesterolemia     IBS (irritable bowel syndrome)     Ill-defined condition     fibromyligia    Lumbar disc disease     stimulation-Honza    Migraine     Nausea & vomiting     Other ill-defined conditions(799.89)     SEASONAL allergies    Other ill-defined conditions(799.89)     dysphagia has had esophagus dilated    Paget's disease     Parkinson disease ([address]     Pulmonary embolism (HCC)     RLS (restless legs syndrome)     Spinal stenosis        Past Surgical History:   Procedure Laterality Date    COLONOSCOPY N/A 6/27/2016    COLONOSCOPY performed by Ford [facility]son MD at [facility] ENDOSCOPY    COLONOSCOPY N/A 2/22/2019    COLONOSCOPY performed by Herve Mai MD at [facility]. [address], COLON, DIAGNOSTIC      12, due 13    HX APPENDECTOMY      HX BACK SURGERY  9/17/2013    back surgery - total of 3 as of 12/20/2013    HX BREAST BIOPSY Right years ago    negative    HX CHOLECYSTECTOMY      HX HEENT      T & A    HX HYSTERECTOMY      total for fibroid tumors    HX ORTHOPAEDIC      lumbar laminectomy then fusion/neurostimulator implanted - inactive now but still in    HX ORTHOPAEDIC      REMOVAL OF NEUROSTIMULATOR    HX OTHER SURGICAL      EGD x 2 with dilation/colonoscopy - no polyps per pt    TILT TABLE EVALUATION  8/2/2016              Family History:   Problem Relation Age of Onset    Colon Cancer Mother     Cancer Mother 68        colon    Heart Disease Father     Heart Disease Maternal Grandmother     Heart Disease Maternal Grandfather     Heart Disease Other        Social History     Socioeconomic History    Marital status: SINGLE     Spouse name: Not on file    Number of children: Not on file    Years of education: Not on file    Highest education level: Not on file   Occupational History    Occupation: volunteer     Employer: RETIRED     Comment: [employer]   Social Needs    Financial resource strain: Not on file    Food insecurity:     Worry: Not on file     Inability: Not on file    Transportation needs:     Medical: Not on file     Non-medical: Not on file   Tobacco Use    Smoking status: Never Smoker    Smokeless tobacco: Never Used   Substance and Sexual Activity    Alcohol use: No     Comment: 1 per month    Drug use: No    Sexual activity: Not on file   Lifestyle    Physical activity:     Days per week: Not on file     Minutes per session: Not on file    Stress: Not on file   Relationships    Social connections:     Talks on phone: Not on file     Gets together: Not on file     Attends Orthodoxy service: Not on file     Active member of club or organization: Not on file     Attends meetings of clubs or organizations: Not on file     Relationship status: Not on file    Intimate partner violence:     Fear of current or ex partner: Not on file     Emotionally abused: Not on file     Physically abused: Not on file     Forced sexual activity: Not on file   Other Topics Concern    Not on file   Social History Narrative    Not on file         ALLERGIES: Adhesive; Hydrocodone; Lorazepam; Other medication; Percocet [oxycodone-acetaminophen]; Sudafed [pseudoephedrine hcl]; Wellbutrin [bupropion hcl]; Zithromax [azithromycin]; and Zyrtec [cetirizine]    Review of Systems   All other systems reviewed and are negative. Vitals:    12/01/19 1238   BP: 149/65   Pulse: 72   Resp: 16   Temp: 97.9 °F (36.6 °C)   SpO2: 98%   Weight: 63.8 kg (140 lb 10.5 oz)   Height: 5' 3\" (1.6 m)            Physical Exam  Nursing note reviewed. Exam conducted with a chaperone present. CONSTITUTIONAL: Well-appearing; well-nourished; in no apparent distress  HEAD: Normocephalic; atraumatic  EYES: PERRL; EOM intact; conjunctiva and sclera are clear bilaterally. ENT: No rhinorrhea; normal pharynx with no tonsillar hypertrophy; mucous membranes pink/moist, no erythema, no exudate. NECK: Supple; non-tender; no cervical lymphadenopathy  CARD: Normal S1, S2; no murmurs, rubs, or gallops. Regular rate and rhythm. RESP: Normal respiratory effort; breath sounds clear and equal bilaterally; no wheezes, rhonchi, or rales. ABD: Normal bowel sounds; non-distended; non-tender; no palpable organomegaly, no masses, no bruits. Back Exam: Normal inspection; no vertebral point tenderness, no CVA tenderness. Normal range of motion. EXT: Normal ROM in all four extremities; non-tender to palpation; no swelling or deformity; distal pulses are normal, no edema. SKIN: Warm; dry; no rash. NEURO:Alert and oriented x 3, coherent, ANNITA-XII grossly intact, sensory and motor are non-focal.   EXAM:  External genitalia normal.  Pelvic exam: Labia minora and vaginal canal appeared dry and excoriated and irritated with erythema. No discharge.          MDM  Number of Diagnoses or Management Options  Urinary tract infection without hematuria, site unspecified:   Vaginal pain:   Diagnosis management comments: Assessment: Persistent vaginal pain and dysuria rule out UTI; possible yeast or skin irritation    Plan: Education, reassurance, symptomatic treatment/urinalysis/analgesia/ Monitor and Reevaluate. Amount and/or Complexity of Data Reviewed  Clinical lab tests: ordered and reviewed  Tests in the radiology section of CPT®: ordered and reviewed  Tests in the medicine section of CPT®: reviewed and ordered  Discussion of test results with the performing providers: yes  Decide to obtain previous medical records or to obtain history from someone other than the patient: yes  Obtain history from someone other than the patient: yes  Review and summarize past medical records: yes  Discuss the patient with other providers: yes  Independent visualization of images, tracings, or specimens: yes           Procedures      Progress Note:   Pt has been reexamined by Benitez Randall MD. Pt is feeling much better. Symptoms have improved. All available results have been reviewed with pt and any available family. Pt understands sx, dx, and tx in ED. Care plan has been outlined and questions have been answered. Pt is ready to go home. Will send home on vaginal pain and UTI instruction. Prescription of Macrobid and Epifoam. Outpatient referral with gynecologist in 2 to 3 days for reevaluation and further treatment as needed. Written by Benitez Randall MD,7:29 PM    .   .

## 2019-12-03 LAB
BACTERIA SPEC CULT: ABNORMAL
BACTERIA SPEC CULT: ABNORMAL
CC UR VC: ABNORMAL
SERVICE CMNT-IMP: ABNORMAL

## 2019-12-11 ENCOUNTER — HOSPITAL ENCOUNTER (OUTPATIENT)
Age: 74
Setting detail: OBSERVATION
Discharge: HOME HEALTH | End: 2019-12-13
Attending: EMERGENCY MEDICINE | Admitting: FAMILY MEDICINE
Payer: MEDICARE

## 2019-12-11 ENCOUNTER — APPOINTMENT (OUTPATIENT)
Dept: GENERAL RADIOLOGY | Age: 74
End: 2019-12-11
Attending: EMERGENCY MEDICINE
Payer: MEDICARE

## 2019-12-11 ENCOUNTER — APPOINTMENT (OUTPATIENT)
Dept: CT IMAGING | Age: 74
End: 2019-12-11
Attending: EMERGENCY MEDICINE
Payer: MEDICARE

## 2019-12-11 DIAGNOSIS — R07.9 CHEST PAIN, UNSPECIFIED TYPE: Primary | ICD-10-CM

## 2019-12-11 LAB
ALBUMIN SERPL-MCNC: 3.7 G/DL (ref 3.5–5)
ALBUMIN/GLOB SERPL: 1.1 {RATIO} (ref 1.1–2.2)
ALP SERPL-CCNC: 89 U/L (ref 45–117)
ALT SERPL-CCNC: 12 U/L (ref 12–78)
ANION GAP SERPL CALC-SCNC: 7 MMOL/L (ref 5–15)
AST SERPL-CCNC: 28 U/L (ref 15–37)
BASOPHILS # BLD: 0.1 K/UL (ref 0–0.1)
BASOPHILS NFR BLD: 1 % (ref 0–1)
BILIRUB SERPL-MCNC: 0.5 MG/DL (ref 0.2–1)
BNP SERPL-MCNC: 491 PG/ML (ref 0–450)
BUN SERPL-MCNC: 15 MG/DL (ref 6–20)
BUN/CREAT SERPL: 17 (ref 12–20)
CALCIUM SERPL-MCNC: 9.2 MG/DL (ref 8.5–10.1)
CHLORIDE SERPL-SCNC: 104 MMOL/L (ref 97–108)
CO2 SERPL-SCNC: 32 MMOL/L (ref 21–32)
COMMENT, HOLDF: NORMAL
CREAT SERPL-MCNC: 0.87 MG/DL (ref 0.55–1.02)
D DIMER PPP FEU-MCNC: 0.91 MG/L FEU (ref 0–0.65)
DIFFERENTIAL METHOD BLD: ABNORMAL
EOSINOPHIL # BLD: 0.3 K/UL (ref 0–0.4)
EOSINOPHIL NFR BLD: 4 % (ref 0–7)
ERYTHROCYTE [DISTWIDTH] IN BLOOD BY AUTOMATED COUNT: 13.1 % (ref 11.5–14.5)
GLOBULIN SER CALC-MCNC: 3.4 G/DL (ref 2–4)
GLUCOSE SERPL-MCNC: 119 MG/DL (ref 65–100)
HCT VFR BLD AUTO: 39.4 % (ref 35–47)
HGB BLD-MCNC: 13 G/DL (ref 11.5–16)
IMM GRANULOCYTES # BLD AUTO: 0 K/UL (ref 0–0.04)
IMM GRANULOCYTES NFR BLD AUTO: 1 % (ref 0–0.5)
LYMPHOCYTES # BLD: 2.4 K/UL (ref 0.8–3.5)
LYMPHOCYTES NFR BLD: 37 % (ref 12–49)
MAGNESIUM SERPL-MCNC: 1.9 MG/DL (ref 1.6–2.4)
MCH RBC QN AUTO: 31 PG (ref 26–34)
MCHC RBC AUTO-ENTMCNC: 33 G/DL (ref 30–36.5)
MCV RBC AUTO: 93.8 FL (ref 80–99)
MONOCYTES # BLD: 0.5 K/UL (ref 0–1)
MONOCYTES NFR BLD: 8 % (ref 5–13)
NEUTS SEG # BLD: 3.2 K/UL (ref 1.8–8)
NEUTS SEG NFR BLD: 49 % (ref 32–75)
NRBC # BLD: 0 K/UL (ref 0–0.01)
NRBC BLD-RTO: 0 PER 100 WBC
PLATELET # BLD AUTO: 266 K/UL (ref 150–400)
PMV BLD AUTO: 9.7 FL (ref 8.9–12.9)
POTASSIUM SERPL-SCNC: 3.7 MMOL/L (ref 3.5–5.1)
PROT SERPL-MCNC: 7.1 G/DL (ref 6.4–8.2)
RBC # BLD AUTO: 4.2 M/UL (ref 3.8–5.2)
SAMPLES BEING HELD,HOLD: NORMAL
SODIUM SERPL-SCNC: 143 MMOL/L (ref 136–145)
TROPONIN I SERPL-MCNC: <0.05 NG/ML
WBC # BLD AUTO: 6.4 K/UL (ref 3.6–11)

## 2019-12-11 PROCEDURE — 74011250636 HC RX REV CODE- 250/636: Performed by: EMERGENCY MEDICINE

## 2019-12-11 PROCEDURE — 85379 FIBRIN DEGRADATION QUANT: CPT

## 2019-12-11 PROCEDURE — 71045 X-RAY EXAM CHEST 1 VIEW: CPT

## 2019-12-11 PROCEDURE — 99218 HC RM OBSERVATION: CPT

## 2019-12-11 PROCEDURE — 74011000258 HC RX REV CODE- 258: Performed by: EMERGENCY MEDICINE

## 2019-12-11 PROCEDURE — 93005 ELECTROCARDIOGRAM TRACING: CPT

## 2019-12-11 PROCEDURE — 71275 CT ANGIOGRAPHY CHEST: CPT

## 2019-12-11 PROCEDURE — 74011636320 HC RX REV CODE- 636/320: Performed by: EMERGENCY MEDICINE

## 2019-12-11 PROCEDURE — 83735 ASSAY OF MAGNESIUM: CPT

## 2019-12-11 PROCEDURE — 74011250637 HC RX REV CODE- 250/637: Performed by: EMERGENCY MEDICINE

## 2019-12-11 PROCEDURE — 99285 EMERGENCY DEPT VISIT HI MDM: CPT

## 2019-12-11 PROCEDURE — 80053 COMPREHEN METABOLIC PANEL: CPT

## 2019-12-11 PROCEDURE — 83880 ASSAY OF NATRIURETIC PEPTIDE: CPT

## 2019-12-11 PROCEDURE — 85025 COMPLETE CBC W/AUTO DIFF WBC: CPT

## 2019-12-11 PROCEDURE — 84484 ASSAY OF TROPONIN QUANT: CPT

## 2019-12-11 PROCEDURE — 36415 COLL VENOUS BLD VENIPUNCTURE: CPT

## 2019-12-11 RX ORDER — FLUOXETINE HYDROCHLORIDE 20 MG/1
40 CAPSULE ORAL DAILY
Status: DISCONTINUED | OUTPATIENT
Start: 2019-12-12 | End: 2019-12-13 | Stop reason: HOSPADM

## 2019-12-11 RX ORDER — DONEPEZIL HYDROCHLORIDE 5 MG/1
5 TABLET, FILM COATED ORAL
COMMUNITY

## 2019-12-11 RX ORDER — PYRIDOSTIGMINE BROMIDE 60 MG/1
60 TABLET ORAL EVERY 8 HOURS
Status: DISCONTINUED | OUTPATIENT
Start: 2019-12-12 | End: 2019-12-13 | Stop reason: HOSPADM

## 2019-12-11 RX ORDER — ASPIRIN 325 MG
325 TABLET ORAL
Status: COMPLETED | OUTPATIENT
Start: 2019-12-11 | End: 2019-12-11

## 2019-12-11 RX ORDER — SODIUM CHLORIDE 0.9 % (FLUSH) 0.9 %
5-40 SYRINGE (ML) INJECTION EVERY 8 HOURS
Status: DISCONTINUED | OUTPATIENT
Start: 2019-12-12 | End: 2019-12-13 | Stop reason: HOSPADM

## 2019-12-11 RX ORDER — PYRIDOSTIGMINE BROMIDE 60 MG/1
TABLET ORAL
Qty: 90 TAB | Refills: 5 | Status: ON HOLD | OUTPATIENT
Start: 2019-12-11 | End: 2020-06-03

## 2019-12-11 RX ORDER — FLUDROCORTISONE ACETATE 0.1 MG/1
0.1 TABLET ORAL 2 TIMES DAILY
Status: DISCONTINUED | OUTPATIENT
Start: 2019-12-12 | End: 2019-12-13 | Stop reason: HOSPADM

## 2019-12-11 RX ORDER — GABAPENTIN 300 MG/1
300 CAPSULE ORAL 3 TIMES DAILY
Status: DISCONTINUED | OUTPATIENT
Start: 2019-12-12 | End: 2019-12-13 | Stop reason: HOSPADM

## 2019-12-11 RX ORDER — POTASSIUM CHLORIDE 750 MG/1
20 TABLET, FILM COATED, EXTENDED RELEASE ORAL DAILY
Status: DISCONTINUED | OUTPATIENT
Start: 2019-12-12 | End: 2019-12-13 | Stop reason: HOSPADM

## 2019-12-11 RX ORDER — SODIUM CHLORIDE 9 MG/ML
50 INJECTION, SOLUTION INTRAVENOUS
Status: COMPLETED | OUTPATIENT
Start: 2019-12-11 | End: 2019-12-11

## 2019-12-11 RX ORDER — SODIUM CHLORIDE 0.9 % (FLUSH) 0.9 %
5-40 SYRINGE (ML) INJECTION AS NEEDED
Status: DISCONTINUED | OUTPATIENT
Start: 2019-12-11 | End: 2019-12-13 | Stop reason: HOSPADM

## 2019-12-11 RX ORDER — SODIUM CHLORIDE 9 MG/ML
10 INJECTION INTRAMUSCULAR; INTRAVENOUS; SUBCUTANEOUS ONCE
Status: COMPLETED | OUTPATIENT
Start: 2019-12-11 | End: 2019-12-11

## 2019-12-11 RX ORDER — DEXLANSOPRAZOLE 60 MG/1
1 CAPSULE, DELAYED RELEASE ORAL DAILY
Status: DISCONTINUED | OUTPATIENT
Start: 2019-12-12 | End: 2019-12-11 | Stop reason: CLARIF

## 2019-12-11 RX ORDER — AMIODARONE HYDROCHLORIDE 150 MG/3ML
300 INJECTION, SOLUTION INTRAVENOUS ONCE
Status: DISCONTINUED | OUTPATIENT
Start: 2019-12-11 | End: 2019-12-11

## 2019-12-11 RX ORDER — ATORVASTATIN CALCIUM 20 MG/1
20 TABLET, FILM COATED ORAL
Status: DISCONTINUED | OUTPATIENT
Start: 2019-12-12 | End: 2019-12-13 | Stop reason: HOSPADM

## 2019-12-11 RX ORDER — GABAPENTIN 300 MG/1
300 CAPSULE ORAL 3 TIMES DAILY
COMMUNITY
End: 2019-12-19

## 2019-12-11 RX ORDER — NITROGLYCERIN 0.4 MG/1
0.4 TABLET SUBLINGUAL
Status: DISCONTINUED | OUTPATIENT
Start: 2019-12-11 | End: 2019-12-13 | Stop reason: HOSPADM

## 2019-12-11 RX ORDER — PANTOPRAZOLE SODIUM 40 MG/1
40 TABLET, DELAYED RELEASE ORAL DAILY
Status: DISCONTINUED | OUTPATIENT
Start: 2019-12-12 | End: 2019-12-13 | Stop reason: HOSPADM

## 2019-12-11 RX ORDER — SUCRALFATE 1 G/1
1 TABLET ORAL 4 TIMES DAILY
Status: DISCONTINUED | OUTPATIENT
Start: 2019-12-12 | End: 2019-12-13 | Stop reason: HOSPADM

## 2019-12-11 RX ORDER — CARBIDOPA AND LEVODOPA 25; 100 MG/1; MG/1
1 TABLET ORAL 3 TIMES DAILY
Status: DISCONTINUED | OUTPATIENT
Start: 2019-12-12 | End: 2019-12-13 | Stop reason: HOSPADM

## 2019-12-11 RX ORDER — ENOXAPARIN SODIUM 100 MG/ML
40 INJECTION SUBCUTANEOUS EVERY 24 HOURS
Status: DISCONTINUED | OUTPATIENT
Start: 2019-12-12 | End: 2019-12-13 | Stop reason: HOSPADM

## 2019-12-11 RX ORDER — QUETIAPINE FUMARATE 25 MG/1
50 TABLET, FILM COATED ORAL
Status: DISCONTINUED | OUTPATIENT
Start: 2019-12-12 | End: 2019-12-13 | Stop reason: HOSPADM

## 2019-12-11 RX ORDER — ACETAMINOPHEN 325 MG/1
650 TABLET ORAL
Status: DISCONTINUED | OUTPATIENT
Start: 2019-12-11 | End: 2019-12-13 | Stop reason: HOSPADM

## 2019-12-11 RX ORDER — DONEPEZIL HYDROCHLORIDE 5 MG/1
5 TABLET, FILM COATED ORAL
Status: DISCONTINUED | OUTPATIENT
Start: 2019-12-12 | End: 2019-12-13 | Stop reason: HOSPADM

## 2019-12-11 RX ADMIN — SODIUM CHLORIDE 1000 ML: 900 INJECTION, SOLUTION INTRAVENOUS at 17:21

## 2019-12-11 RX ADMIN — IOPAMIDOL 80 ML: 755 INJECTION, SOLUTION INTRAVENOUS at 18:33

## 2019-12-11 RX ADMIN — SODIUM CHLORIDE 10 ML: 9 INJECTION INTRAMUSCULAR; INTRAVENOUS; SUBCUTANEOUS at 18:33

## 2019-12-11 RX ADMIN — NITROGLYCERIN 1 INCH: 20 OINTMENT TOPICAL at 17:20

## 2019-12-11 RX ADMIN — SODIUM CHLORIDE 50 ML: 900 INJECTION, SOLUTION INTRAVENOUS at 18:34

## 2019-12-11 RX ADMIN — ASPIRIN 325 MG ORAL TABLET 325 MG: 325 PILL ORAL at 17:20

## 2019-12-11 NOTE — ED NOTES
Pt arrives to ed with complaints of intermittent chest pain x 3 days. Pt denies any SOB/Palpitations/nausea. Pt states she called the last doctor she saw to see if symptoms were related to her visit. No other information provided by patient. Pt denies any cardiac hx or cardiology. No medications PTA.

## 2019-12-11 NOTE — CONSULTS
[address] [facility] M.D., AC.  [phone]        NAME: Shonda Pino   :  1945   MRN:  [id number]  Date/Time:  20196:04 PM      ________________________________________________________________________   Assessment/Plan  Patient with history of Parkinson disease and orthostatic hypotension as proven by a tilt table test presents to the emergency room with recurrence of fatigue tiredness chest pain back pain. Cardiology also call for the occurrence of potential ventricular tachycardia. She is not very good historian. She continues to have occasional chest pain which migrates from the back to the left side of the chest the right side of her chest and at times is reproducible at times it is not. To be noted the patient was on Northera Mestinon and Florinef for her orthostatic hypotension likely associated to her Parkinson. 1.  Ventricular tachycardia: This is artifact. QRS is a noticeable by a reviewing the monitor strips. The patient was completely asymptomatic during this long episode ventricular tachycardia. I was able to reproduce the motion artifact by moving the patient. No additional intervention at this time. To be noted though that the electrocardiogram reveals a slight prolongation of the QT interval therefore this will need to be followed. Her magnesium potassium were normal.    2.  Chest pain: It does have a typical and atypical features. Her electrocardiogram shows a normal sinus rhythm with T wave abnormality anterior leads  which are slightly more pronounced compared to previous EKG of 2018 but not significant enough to be true ischemic pattern in my opinion. First troponin is normal.    Admit rule out MI. Oksana Martinez if she rules out in am    Continue to follow-up serial troponin. D-dimer slightly better completely concur in obtaining chest CT rule out PE at this time.     3. Orthostatic hypotension: Currently well controlled on current medications. 4.  Parkinson's disease: Well-controlled on the current medication and closely followed by neurology. No diagnosis found. Subjective:weak back pain cp   no sob or palpitations   CHIEF COMPLAINT:  \"cp\"  Pertinent items are noted in HPI. HISTORY OF PRESENT ILLNESS:     Richard Lange is a 75 y.o. female whom presents with complaint noted above. We were asked to evaluation for the above problems.    Young Chu  Past Medical History:   Diagnosis Date    Bipolar disorder ([address]     Nazmy    Chronic pain     BACK PAIN    Diabetes ([address]     BORDERLINE TX WITH DIET AND EXERCISE    DJD (degenerative joint disease)     Fibromyalgia     Genital herpes     GERD (gastroesophageal reflux disease)     Hypercholesterolemia     IBS (irritable bowel syndrome)     Ill-defined condition     fibromyligia    Lumbar disc disease     stimulation-Honza    Migraine     Nausea & vomiting     Other ill-defined conditions(799.89)     SEASONAL allergies    Other ill-defined conditions(799.89)     dysphagia has had esophagus dilated    Paget's disease     Parkinson disease ([address]     Pulmonary embolism (HCC)     RLS (restless legs syndrome)     Spinal stenosis       Past Surgical History:   Procedure Laterality Date    COLONOSCOPY N/A 6/27/2016    COLONOS[facility] performed by Vanderbilt Kayser, MD at [facility]COPY    COLONOSCOPY N/A 2/22/2019    COLONOSCOPY performed by Waleska Lopez MD at [facility]. [address], COLON, DIAGNOSTIC      12, due 13    HX APPENDECTOMY      HX BACK SURGERY  9/17/2013    back surgery - total of 3 as of 12/20/2013    HX BREAST BIOPSY Right years ago    negative    HX CHOLECYSTECTOMY      HX HEENT      T & A    HX HYSTERECTOMY      total for fibroid tumors    HX ORTHOPAEDIC      lumbar laminectomy then fusion/neurostimulator implanted - inactive now but still in    HX ORTHOPAEDIC      REMOVAL OF NEUROSTIMULATOR    HX OTHER SURGICAL      EGD x 2 with dilation/colonoscopy - no polyps per pt    TILT TABLE EVALUATION  8/2/2016          Social History     Tobacco Use    Smoking status: Never Smoker    Smokeless tobacco: Never Used   Substance Use Topics    Alcohol use: No     Comment: 1 per month      Family History   Problem Relation Age of Onset    Colon Cancer Mother     Cancer Mother 68        colon    Heart Disease Father     Heart Disease Maternal Grandmother     Heart Disease Maternal Grandfather     Heart Disease Other       Allergies   Allergen Reactions    Adhesive Itching    Hydrocodone Itching    Lorazepam Other (comments)    Other Medication Rash     Tide detergent    Percocet [Oxycodone-Acetaminophen] Itching    Sudafed [Pseudoephedrine Hcl] Other (comments)     Vertigo    Wellbutrin [Bupropion Hcl] Nausea and Vomiting    Zithromax [Azithromycin] Vertigo    Zyrtec [Cetirizine] Nausea Only           Current Facility-Administered Medications   Medication Dose Route Frequency    sodium chloride 0.9 % bolus infusion 1,000 mL  1,000 mL IntraVENous ONCE    0.9% sodium chloride infusion 50 mL  50 mL IntraVENous RAD ONCE    iopamidol (ISOVUE-370) 76 % injection 100 mL  100 mL IntraVENous ONCE    0.9% sodium chloride injection 10 mL  10 mL IntraVENous ONCE     Current Outpatient Medications   Medication Sig    pyridostigmine (MESTINON) 60 mg tablet TAKE 1 TABLET BY MOUTH THREE TIMES DAILY    hydrocortisone-pramoxine (EPIFOAM) 1-1 % topical foam Apply  to affected area three (3) times daily.  nystatin-triamcinolone (MYCOLOG) 100,000-0.1 unit/gram-% ointment Apply  to affected area three (3) times daily.  dexlansoprazole (DEXILANT) 60 mg CpDB capsule (delayed release) Take 1 Cap by mouth daily.  FLUoxetine (PROZAC) 40 mg capsule Take 40 mg by mouth daily.  pregabalin (LYRICA) 25 mg capsule Take 25 mg by mouth three (3) times daily.     fludrocortisone (FLORINEF) 0.1 mg tablet TAKE 1 TABLET BY MOUTH TWICE DAILY    atorvastatin (LIPITOR) 20 mg tablet TAKE 1 TABLET BY MOUTH EVERY EVENING    droxidopa (NORTHERA) 300 mg cap Take 600 mg by mouth two (2) times a day.  potassium chloride (K-DUR, KLOR-CON) 20 mEq tablet Take 1 Tab by mouth daily.  sucralfate (CARAFATE) 1 gram tablet TAKE 1 TABLET BY MOUTH FOUR TIMES DAILY    carbidopa-levodopa (SINEMET)  mg per tablet Take 1 Tab by mouth three (3) times daily.  acetaminophen (TYLENOL) 325 mg tablet Take 325 mg by mouth every four (4) hours as needed for Pain.  QUEtiapine (SEROQUEL) 25 mg tablet Take 50 mg by mouth nightly. Take 2 tabs qhs prn Agustín Godinez NP/Dr Angeline Murray            Prior to Admission medications    Medication Sig Start Date End Date Taking? Authorizing Provider   pyridostigmine (MESTINON) 60 mg tablet TAKE 1 TABLET BY MOUTH THREE TIMES DAILY 12/11/19   Julien Salgado MD   hydrocortisone-pramoxine (EPIFOAM) 1-1 % topical foam Apply  to affected area three (3) times daily. 12/1/19   Anitha Avila MD   nystatin-triamcinolone [facility]) 100,000-0.1 unit/gram-% ointment Apply  to affected area three (3) times daily. Provider, Historical   dexlansoprazole (DEXILANT) 60 mg CpDB capsule (delayed release) Take 1 Cap by mouth daily. Chilango Key MD   FLUoxetine (PROZAC) 40 mg capsule Take 40 mg by mouth daily. 11/8/19   Owen Feliciano RN   pregabalin (LYRICA) 25 mg capsule Take 25 mg by mouth three (3) times daily. Lexii Partida MD   fludrocortisone (FLORINEF) 0.1 mg tablet TAKE 1 TABLET BY MOUTH TWICE DAILY 10/28/19   Julien Salgado MD   atorvastatin (LIPITOR) 20 mg tablet TAKE 1 TABLET BY MOUTH EVERY EVENING 10/17/19   Reilly Mcdaniel MD   droxidopa (NORTHERA) 300 mg cap Take 600 mg by mouth two (2) times a day. 7/25/19   Julien Salgado MD   potassium chloride (K-DUR, KLOR-CON) 20 mEq tablet Take 1 Tab by mouth daily.  7/9/19   Reilly Mcdaniel MD   sucralfate (CARAFATE) 1 gram tablet TAKE 1 TABLET BY MOUTH FOUR TIMES DAILY 1/24/19   Nurys Mcdaniel MD   carbidopa-levodopa (SINEMET)  mg per tablet Take 1 Tab by mouth three (3) times daily. Provider, Historical   acetaminophen (TYLENOL) 325 mg tablet Take 325 mg by mouth every four (4) hours as needed for Pain. Provider, Historical   QUEtiapine (SEROQUEL) 25 mg tablet Take 50 mg by mouth nightly. Take 2 tabs qhs prn Love Razo NP/ [facility]    Provider, Historical           EKG: normal sinus rhythm, nonspecific ST and T waves changes. XR Results (most recent):  Results from Hospital Encounter encounter on 12/11/19   XR CHEST PORT    Narrative INDICATION: . chest pain  Additional history: Tachycardia prior to arrival  COMPARISON: Previous chest xray, 8/4/2019. LIMITATIONS: Portable technique. Gerldine Prost FINDINGS: Single frontal view of the chest.   .  Lines/tubes/surgical: Cardiac monitor leads overly the patient. Heart/mediastinum: Unremarkable. Lungs/pleura:  No focal consolidation or mass. No visualized pleural effusion or  pneumothorax. Additional Comments: Possible hiatal hernia. .    Impression IMPRESSION:  1. No radiographic evidence of acute cardiopulmonary disease. Objective:   VITALS:      Patient Vitals for the past 12 hrs:   Temp Pulse Resp BP SpO2   12/11/19 1745  60 16 146/80 99 %   12/11/19 1730  63 18 147/78 99 %   12/11/19 1720  66  151/83    12/11/19 1715  64 16 151/83 96 %   12/11/19 1702  64 16  94 %   12/11/19 1700  64 16 (!) 141/91 96 %   12/11/19 1657 97.5 °F (36.4 °C)  18     12/11/19 1652  67 19 146/76 96 %   12/11/19 1638  (!) 148 25  97 %   12/11/19 1637    158/85 96 %      Visit Vitals  /80   Pulse 60   Temp 97.5 °F (36.4 °C)   Resp 16   SpO2 99%     Extended / Orthostatic Vitals:       Wt Readings from Last 3 Encounters:   12/01/19 63.8 kg (140 lb 10.5 oz)   11/27/19 61 kg (134 lb 6.4 oz)   11/23/19 63 kg (138 lb 14.2 oz)       No intake or output data in the 24 hours ending 12/11/19 1804       Neck: no JVD  Heart: regular rate and rhythm  Lungs: clear to auscultation bilaterally  Abdomen: soft, non-tender. Bowel sounds normal. No masses,  no organomegaly  Extremities: no edema          LAB DATA REVIEWED:      All Cardiac Markers in the last 24 hours:    Lab Results   Component Value Date/Time    TROIQ <0.05 12/11/2019 04:49 PM    BNPNT 491 (H) 12/11/2019 04:49 PM         Recent Results (from the past 24 hour(s))   SAMPLES BEING HELD    Collection Time: 12/11/19  4:49 PM   Result Value Ref Range    SAMPLES BEING HELD 1RED 1BLUE     COMMENT        Add-on orders for these samples will be processed based on acceptable specimen integrity and analyte stability, which may vary by analyte. CBC WITH AUTOMATED DIFF    Collection Time: 12/11/19  4:49 PM   Result Value Ref Range    WBC 6.4 3.6 - 11.0 K/uL    RBC 4.20 3.80 - 5.20 M/uL    HGB 13.0 11.5 - 16.0 g/dL    HCT 39.4 35.0 - 47.0 %    MCV 93.8 80.0 - 99.0 FL    MCH 31.0 26.0 - 34.0 PG    MCHC 33.0 30.0 - 36.5 g/dL    RDW 13.1 11.5 - 14.5 %    PLATELET 309 925 - 883 K/uL    MPV 9.7 8.9 - 12.9 FL    NRBC 0.0 0  WBC    ABSOLUTE NRBC 0.00 0.00 - 0.01 K/uL    NEUTROPHILS 49 32 - 75 %    LYMPHOCYTES 37 12 - 49 %    MONOCYTES 8 5 - 13 %    EOSINOPHILS 4 0 - 7 %    BASOPHILS 1 0 - 1 %    IMMATURE GRANULOCYTES 1 (H) 0.0 - 0.5 %    ABS. NEUTROPHILS 3.2 1.8 - 8.0 K/UL    ABS. LYMPHOCYTES 2.4 0.8 - 3.5 K/UL    ABS. MONOCYTES 0.5 0.0 - 1.0 K/UL    ABS. EOSINOPHILS 0.3 0.0 - 0.4 K/UL    ABS. BASOPHILS 0.1 0.0 - 0.1 K/UL    ABS. IMM.  GRANS. 0.0 0.00 - 0.04 K/UL    DF AUTOMATED     METABOLIC PANEL, COMPREHENSIVE    Collection Time: 12/11/19  4:49 PM   Result Value Ref Range    Sodium 143 136 - 145 mmol/L    Potassium 3.7 3.5 - 5.1 mmol/L    Chloride 104 97 - 108 mmol/L    CO2 32 21 - 32 mmol/L    Anion gap 7 5 - 15 mmol/L    Glucose 119 (H) 65 - 100 mg/dL    BUN 15 6 - 20 MG/DL    Creatinine 0.87 0.55 - 1.02 MG/DL BUN/Creatinine ratio 17 12 - 20      GFR est AA >60 >60 ml/min/1.73m2    GFR est non-AA >60 >60 ml/min/1.73m2    Calcium 9.2 8.5 - 10.1 MG/DL    Bilirubin, total 0.5 0.2 - 1.0 MG/DL    ALT (SGPT) 12 12 - 78 U/L    AST (SGOT) 28 15 - 37 U/L    Alk. phosphatase 89 45 - 117 U/L    Protein, total 7.1 6.4 - 8.2 g/dL    Albumin 3.7 3.5 - 5.0 g/dL    Globulin 3.4 2.0 - 4.0 g/dL    A-G Ratio 1.1 1.1 - 2.2     TROPONIN I    Collection Time: 12/11/19  4:49 PM   Result Value Ref Range    Troponin-I, Qt. <0.05 <0.05 ng/mL   NT-PRO BNP    Collection Time: 12/11/19  4:49 PM   Result Value Ref Range    NT pro- (H) 0 - 450 PG/ML   MAGNESIUM    Collection Time: 12/11/19  4:49 PM   Result Value Ref Range    Magnesium 1.9 1.6 - 2.4 mg/dL   D DIMER    Collection Time: 12/11/19  4:49 PM   Result Value Ref Range    D-dimer 0.91 (H) 0.00 - 0.65 mg/L FEU   EKG, 12 LEAD, INITIAL    Collection Time: 12/11/19  4:50 PM   Result Value Ref Range    Ventricular Rate 69 BPM    Atrial Rate 69 BPM    P-R Interval 162 ms    QRS Duration 80 ms    Q-T Interval 446 ms    QTC Calculation (Bezet) 477 ms    Calculated P Axis 3 degrees    Calculated R Axis 6 degrees    Calculated T Axis 81 degrees    Diagnosis       Normal sinus rhythm  T wave abnormality, consider anterior ischemia  Prolonged QT  Abnormal ECG  When compared with ECG of 18-SEP-2019 09:53,  T wave inversion now evident in Anterior leads         Procedures: see electronic medical records for all procedures/Xrays and details which were not copied into this note but were reviewed prior to creation of Plan. Please note that this dictation was completed with [employer], the [employer] voice recognition software. Quite often unanticipated grammatical, syntax, homophones, and other interpretive errors are inadvertently transcribed by the computer software. Please disregard these errors.   Please excuse any errors that have escaped final proofreading.      ________________________________________________________________________  Total time spent with patient: 30 minutes     Critical Care Provided     Minutes non procedure based    Care Plan discussed with:  Patient x   Family    RN x   Care Manager    Consultant/Specialist:     ______________________________________________________________________      Signed By: Elroy Argueta MD     December 11, 2019

## 2019-12-11 NOTE — ED PROVIDER NOTES
HPI     Pt is a 75 y.o. F here for chest pain. She is a poor historian and says she doesn't have PMH but then says her daughter brings her medications over weekly. In addition, she says she has never seen a cardiologist, but per EMR,  Rx via telephone by Dr. John Tsai, cardiology on yesterday. She has her next apt on 2/2020. It appears the medication refill was for mestinon. Unsure if pt with myasthenia gravis. Per EMR she has hx of DM, bipolar, neuropathy, HLD, GERD. Pt says she does not remember what she was doing when CP occurred today but it started in right chest and has spread to left and goes to her back. No shortness of breath. No N/V. No diaphoresis or cough or other complaints. No other known complaints at this time.       Past Medical History:   Diagnosis Date    B[address] disorder ([facility]     Nazmy    Chronic pain     BACK PAIN    Diabetes (HCC)     BORDERLINE TX WITH DIET AND EXERCISE    DJD (degenerative joint disease)     Fibromyalgia     Genital herpes     GERD (gastroesophageal reflux disease)     Hypercholesterolemia     IBS (irritable bowel syndrome)     Ill-defined condition     fibromyligia    Lumbar disc disease     stimulation-Honza    Migraine     Nausea & vomiting     Other ill-defined conditions(799.89)     SEASONAL allergies    Other ill-defined conditions(799.89)     dysphagia has had esophagus dilated    Paget's disease     Parkinson disease ([facility]     Pulmonary embolism (HCC)     RLS (restless legs syndrome)     Spinal stenosis        Past Surgical History:   Procedure Laterality Date    COLONOSCOPY N/A 6/27/2016    COLON[facility] performed [facility] Kam [facility]res MD at [facility] ENDOSCOPY    COLONOSCOPY N/A 2/22/2019    COLONOSCOPY performed by Ritu Benavides MD at [facility]. [address], COLON, DIAGNOSTIC      12, due 13    HX APPENDECTOMY      HX BACK SURGERY  9/17/2013    back surgery - total of 3 as of 12/20/2013    HX BREAST BIOPSY Right years ago    negative  HX CHOLECYSTECTOMY      HX HEENT      T & A    HX HYSTERECTOMY      total for fibroid tumors    HX ORTHOPAEDIC      lumbar laminectomy then fusion/neurostimulator implanted - inactive now but still in    HX ORTHOPAEDIC      REMOVAL OF NEUROSTIMULATOR    HX OTHER SURGICAL      EGD x 2 with dilation/colonoscopy - no polyps per pt    TILT TABLE EVALUATION  8/2/2016              Family History:   Problem Relation Age of Onset    Colon Cancer Mother     Cancer Mother 68        colon    Heart Disease Father     Heart Disease Maternal Grandmother     Heart Disease Maternal Grandfather     Heart Disease Other        Social History     Socioeconomic History    Marital status: SINGLE     Spouse name: Not on file    Number of children: Not on file    Years of education: Not on file    Highest education level: Not on file   Occupational History    Occupation: volunteer     Employer: RETIRED     Comment: [employer]   Social Needs    Financial resource strain: Not on file    Food insecurity:     Worry: Not on file     Inability: Not on file    Transportation needs:     Medical: Not on file     Non-medical: Not on file   Tobacco Use    Smoking status: Never Smoker    Smokeless tobacco: Never Used   Substance and Sexual Activity    Alcohol use: No     Comment: 1 per month    Drug use: No    Sexual activity: Not on file   Lifestyle    Physical activity:     Days per week: Not on file     Minutes per session: Not on file    Stress: Not on file   Relationships    Social connections:     Talks on phone: Not on file     Gets together: Not on file     Attends Confucianism service: Not on file     Active member of club or organization: Not on file     Attends meetings of clubs or organizations: Not on file     Relationship status: Not on file    Intimate partner violence:     Fear of current or ex partner: Not on file     Emotionally abused: Not on file     Physically abused: Not on file     Forced sexual activity: Not on file   Other Topics Concern    Not on file   Social History Narrative    Not on file         ALLERGIES: Adhesive; Hydrocodone; Lorazepam; Other medication; Percocet [oxycodone-acetaminophen]; Sudafed [pseudoephedrine hcl]; Wellbutrin [bupropion hcl]; Zithromax [azithromycin]; and Zyrtec [cetirizine]    Review of Systems   Constitutional: Negative for chills, diaphoresis and fever. HENT: Negative for congestion and trouble swallowing. Eyes: Negative for photophobia and visual disturbance. Respiratory: Positive for chest tightness. Negative for cough and shortness of breath. Cardiovascular: Negative for chest pain, palpitations and leg swelling. Gastrointestinal: Negative for abdominal pain, diarrhea, nausea and vomiting. Genitourinary: Negative for difficulty urinating, dysuria, flank pain and frequency. Musculoskeletal: Negative for back pain and myalgias. Skin: Negative for rash and wound. Neurological: Negative for dizziness, weakness, light-headedness and headaches. Hematological: Negative for adenopathy. Does not bruise/bleed easily. Psychiatric/Behavioral: Negative for agitation and confusion. All other systems reviewed and are negative. Visit Vitals  /80   Pulse 60   Temp 97.5 °F (36.4 °C)   Resp 16   SpO2 99%         Physical Exam  Vitals signs and nursing note reviewed. Constitutional:       General: She is not in acute distress. Appearance: She is well-developed. She is not diaphoretic. HENT:      Head: Normocephalic. Eyes:      Conjunctiva/sclera: Conjunctivae normal.      Pupils: Pupils are equal, round, and reactive to light. Neck:      Musculoskeletal: Normal range of motion and neck supple. Vascular: No JVD. Cardiovascular:      Rate and Rhythm: Normal rate and regular rhythm. Heart sounds: Normal heart sounds. Pulmonary:      Effort: Pulmonary effort is normal.      Breath sounds: Normal breath sounds.    Abdominal:      General: Bowel sounds are normal. There is no distension. Palpations: Abdomen is soft. Tenderness: There is no tenderness. Musculoskeletal: Normal range of motion. General: No tenderness or deformity. Lymphadenopathy:      Cervical: No cervical adenopathy. Skin:     General: Skin is warm and dry. Capillary Refill: Capillary refill takes less than 2 seconds. Findings: No erythema or rash. Neurological:      Mental Status: She is alert and oriented to person, place, and time. Cranial Nerves: No cranial nerve deficit. Sensory: No sensory deficit. MDM       Procedures    16:38  Cardiac monitor showing Vtach with HR in 254. Pt alert and pulse. However, seconds later she improves and HR is normal with EKG showing junctional rhythm as documented below and then NSR. ED EKG interpretation: 16:44  Rhythm: accelerated junctional rhythm; and regular . Rate (approx.): 67; ; P wave: unable to identify P waves; QRS interval: normal ; ST/T wave: non-specific T wave abnormalities. EKG documented by Mai Welsh MD, as interpreted by Darlene Hunter MD, ED MD.    ED EKG interpretation:  Rhythm: normal sinus rhythm; and regular . Rate (approx.): 69; Axis: normal; P wave: normal; QRS interval: normal ; ST/T wave: T wave inverted in anterior leads and prolonged QT. EKG documented by Mai Welsh MD, scribe, as interpreted by Darlene Hunter MD, ED MD.      CONSULT NOTE:  5:27 PM Darlene Hunter MD spoke with Dr. Daniella Grier, Consult for Cardiology. Discussed available diagnostic tests and clinical findings. Dr. Daniella Grier will come see the pt. Due to rhythm strip will go ahead and start amiodarone gtt. 5:54 PM  Dr. Daniella Grier here to see pt. He thinks it is artifact and not Vtach. Thus amiodarone cancelled. He has seen and examined pt and thinks stress tomorrow. He agrees with CTA 2/2 to elevated D-dimer.     Hospitalist Alli Serve for Admission  7:06 PM to Dr. Luba Sam    ED Room Number: SER04/04  Patient Name and age:  Denzel Vaughan 75 y.o.  female  Working Diagnosis:   1. Chest pain, unspecified type      Readmission: no  Isolation Requirements:  no  Recommended Level of Care:  telemetry  Code Status:  Full Code  Department:[facility] - [phone]  Other:  Pt here with CP. Called Dr. Ty Barrios 2/2 to monitor showing Vtach. He came to evaluate and thinks artifact thus amiodarone cancelled. He recommends admit r/o MI and stress tomorrow. Pt accepted.     Zahraa Stanford MD

## 2019-12-11 NOTE — ED NOTES
Pt ambulatory to restroom at this time. Pt denies any assistance. Gait steady and upright. Pt assessment unchanged.

## 2019-12-11 NOTE — TELEPHONE ENCOUNTER
Request for Mestinon 60 mg TID. Last office visit 8/20/19, next office visit 2/18/20. Refills per verbal order from Dr. Cristine Tay.

## 2019-12-11 NOTE — ED NOTES
Pt resting on stretcher. Denies any needs needs or questions at this time. Pain still present. Respirs easy, even and unlabored. Speaking in clear complete sentences. Remains on ccm and sp02 on room air.

## 2019-12-12 ENCOUNTER — APPOINTMENT (OUTPATIENT)
Dept: NON INVASIVE DIAGNOSTICS | Age: 74
End: 2019-12-12
Attending: STUDENT IN AN ORGANIZED HEALTH CARE EDUCATION/TRAINING PROGRAM
Payer: MEDICARE

## 2019-12-12 ENCOUNTER — APPOINTMENT (OUTPATIENT)
Dept: CT IMAGING | Age: 74
End: 2019-12-12
Attending: HOSPITALIST
Payer: MEDICARE

## 2019-12-12 ENCOUNTER — APPOINTMENT (OUTPATIENT)
Dept: NUCLEAR MEDICINE | Age: 74
End: 2019-12-12
Attending: STUDENT IN AN ORGANIZED HEALTH CARE EDUCATION/TRAINING PROGRAM
Payer: MEDICARE

## 2019-12-12 ENCOUNTER — APPOINTMENT (OUTPATIENT)
Dept: ULTRASOUND IMAGING | Age: 74
End: 2019-12-12
Attending: HOSPITALIST
Payer: MEDICARE

## 2019-12-12 LAB
ANION GAP SERPL CALC-SCNC: 6 MMOL/L (ref 5–15)
APPEARANCE UR: ABNORMAL
ATRIAL RATE: 69 BPM
AV VELOCITY RATIO: 0.71
BACTERIA URNS QL MICRO: ABNORMAL /HPF
BILIRUB UR QL: NEGATIVE
BUN SERPL-MCNC: 12 MG/DL (ref 6–20)
BUN/CREAT SERPL: 18 (ref 12–20)
CALCIUM SERPL-MCNC: 8.6 MG/DL (ref 8.5–10.1)
CALCULATED P AXIS, ECG09: 3 DEGREES
CALCULATED R AXIS, ECG10: 6 DEGREES
CALCULATED T AXIS, ECG11: 81 DEGREES
CHLORIDE SERPL-SCNC: 111 MMOL/L (ref 97–108)
CO2 SERPL-SCNC: 28 MMOL/L (ref 21–32)
COLOR UR: ABNORMAL
CREAT SERPL-MCNC: 0.65 MG/DL (ref 0.55–1.02)
DIAGNOSIS, 93000: NORMAL
ECHO AO ROOT DIAM: 2.94 CM
ECHO AV AREA PEAK VELOCITY: 2.3 CM2
ECHO AV PEAK GRADIENT: 4.2 MMHG
ECHO AV PEAK VELOCITY: 102.22 CM/S
ECHO EST RA PRESSURE: 3 MMHG
ECHO LA AREA 4C: 15.5 CM2
ECHO LA MAJOR AXIS: 3.22 CM
ECHO LA TO AORTIC ROOT RATIO: 1.09
ECHO LA VOL 2C: 46.11 ML (ref 22–52)
ECHO LA VOL 4C: 35.99 ML (ref 22–52)
ECHO LA VOL BP: 42.74 ML (ref 22–52)
ECHO LA VOL/BSA BIPLANE: 25.96 ML/M2 (ref 16–28)
ECHO LA VOLUME INDEX A2C: 28 ML/M2 (ref 16–28)
ECHO LA VOLUME INDEX A4C: 21.86 ML/M2 (ref 16–28)
ECHO LV E' LATERAL VELOCITY: 3.59 CM/S
ECHO LV E' SEPTAL VELOCITY: 6.91 CM/S
ECHO LV INTERNAL DIMENSION DIASTOLIC: 5 CM (ref 3.9–5.3)
ECHO LV INTERNAL DIMENSION SYSTOLIC: 3.42 CM
ECHO LV IVSD: 0.69 CM (ref 0.6–0.9)
ECHO LV MASS 2D: 154.2 G (ref 67–162)
ECHO LV MASS INDEX 2D: 93.6 G/M2 (ref 43–95)
ECHO LV POSTERIOR WALL DIASTOLIC: 0.9 CM (ref 0.6–0.9)
ECHO LVOT DIAM: 2.03 CM
ECHO LVOT PEAK GRADIENT: 2.1 MMHG
ECHO LVOT PEAK VELOCITY: 72.11 CM/S
ECHO MV A VELOCITY: 85.18 CM/S
ECHO MV AREA PHT: 3.1 CM2
ECHO MV E DECELERATION TIME (DT): 241.8 MS
ECHO MV E VELOCITY: 68.62 CM/S
ECHO MV E/A RATIO: 0.81
ECHO MV E/E' LATERAL: 19.11
ECHO MV E/E' RATIO (AVERAGED): 14.52
ECHO MV E/E' SEPTAL: 9.93
ECHO MV PRESSURE HALF TIME (PHT): 70.1 MS
ECHO PULMONARY ARTERY SYSTOLIC PRESSURE (PASP): 30 MMHG
ECHO PV MAX VELOCITY: 80.16 CM/S
ECHO PV PEAK GRADIENT: 2.6 MMHG
ECHO RIGHT VENTRICULAR SYSTOLIC PRESSURE (RVSP): 23.3 MMHG
ECHO RV INTERNAL DIMENSION: 2.62 CM
ECHO RV TAPSE: 1.62 CM (ref 1.5–2)
ECHO TV REGURGITANT MAX VELOCITY: 225.16 CM/S
ECHO TV REGURGITANT PEAK GRADIENT: 20.3 MMHG
EPITH CASTS URNS QL MICRO: ABNORMAL /LPF
ERYTHROCYTE [DISTWIDTH] IN BLOOD BY AUTOMATED COUNT: 13.1 % (ref 11.5–14.5)
GLUCOSE SERPL-MCNC: 104 MG/DL (ref 65–100)
GLUCOSE UR STRIP.AUTO-MCNC: NEGATIVE MG/DL
HBV SURFACE AG SER QL: <0.1 INDEX
HBV SURFACE AG SER QL: NEGATIVE
HCT VFR BLD AUTO: 36 % (ref 35–47)
HCV AB SERPL QL IA: NONREACTIVE
HCV COMMENT,HCGAC: NORMAL
HGB BLD-MCNC: 11.7 G/DL (ref 11.5–16)
HGB UR QL STRIP: NEGATIVE
HIV1 P24 AG SERPL QL IA: NONREACTIVE
HIV1+2 AB SERPL QL IA: NONREACTIVE
KETONES UR QL STRIP.AUTO: NEGATIVE MG/DL
LEUKOCYTE ESTERASE UR QL STRIP.AUTO: NEGATIVE
LVFS 2D: 31.59 %
MCH RBC QN AUTO: 31.3 PG (ref 26–34)
MCHC RBC AUTO-ENTMCNC: 32.5 G/DL (ref 30–36.5)
MCV RBC AUTO: 96.3 FL (ref 80–99)
MV DEC SLOPE: 2.84
NITRITE UR QL STRIP.AUTO: NEGATIVE
NRBC # BLD: 0 K/UL (ref 0–0.01)
NRBC BLD-RTO: 0 PER 100 WBC
P-R INTERVAL, ECG05: 162 MS
PH UR STRIP: 7.5 [PH] (ref 5–8)
PLATELET # BLD AUTO: 221 K/UL (ref 150–400)
PMV BLD AUTO: 9.8 FL (ref 8.9–12.9)
POTASSIUM SERPL-SCNC: 3.6 MMOL/L (ref 3.5–5.1)
PROT UR STRIP-MCNC: NEGATIVE MG/DL
Q-T INTERVAL, ECG07: 446 MS
QRS DURATION, ECG06: 80 MS
QTC CALCULATION (BEZET), ECG08: 477 MS
RBC # BLD AUTO: 3.74 M/UL (ref 3.8–5.2)
RBC #/AREA URNS HPF: ABNORMAL /HPF (ref 0–5)
SODIUM SERPL-SCNC: 145 MMOL/L (ref 136–145)
SP GR UR REFRACTOMETRY: 1.02 (ref 1–1.03)
STRESS BASELINE DIAS BP: 93 MMHG
STRESS BASELINE HR: 75 BPM
STRESS BASELINE SYS BP: 165 MMHG
STRESS ESTIMATED WORKLOAD: 1 METS
STRESS EXERCISE DUR MIN: NORMAL
STRESS PEAK DIAS BP: 93 MMHG
STRESS PEAK SYS BP: 165 MMHG
STRESS PERCENT HR ACHIEVED: 57 %
STRESS POST PEAK HR: 82 BPM
STRESS RATE PRESSURE PRODUCT: NORMAL BPM*MMHG
STRESS ST DEPRESSION: 0 MM
STRESS ST ELEVATION: 0 MM
STRESS TARGET HR: 145 BPM
UA: UC IF INDICATED,UAUC: ABNORMAL
UROBILINOGEN UR QL STRIP.AUTO: 1 EU/DL (ref 0.2–1)
VENTRICULAR RATE, ECG03: 69 BPM
WBC # BLD AUTO: 7.3 K/UL (ref 3.6–11)
WBC URNS QL MICRO: ABNORMAL /HPF (ref 0–4)

## 2019-12-12 PROCEDURE — 78452 HT MUSCLE IMAGE SPECT MULT: CPT

## 2019-12-12 PROCEDURE — 76770 US EXAM ABDO BACK WALL COMP: CPT

## 2019-12-12 PROCEDURE — 36415 COLL VENOUS BLD VENIPUNCTURE: CPT

## 2019-12-12 PROCEDURE — 85027 COMPLETE CBC AUTOMATED: CPT

## 2019-12-12 PROCEDURE — 74176 CT ABD & PELVIS W/O CONTRAST: CPT

## 2019-12-12 PROCEDURE — A9500 TC99M SESTAMIBI: HCPCS

## 2019-12-12 PROCEDURE — 96365 THER/PROPH/DIAG IV INF INIT: CPT

## 2019-12-12 PROCEDURE — 93306 TTE W/DOPPLER COMPLETE: CPT

## 2019-12-12 PROCEDURE — 74011250636 HC RX REV CODE- 250/636: Performed by: SPECIALIST

## 2019-12-12 PROCEDURE — 74011250637 HC RX REV CODE- 250/637: Performed by: STUDENT IN AN ORGANIZED HEALTH CARE EDUCATION/TRAINING PROGRAM

## 2019-12-12 PROCEDURE — 74011250636 HC RX REV CODE- 250/636: Performed by: STUDENT IN AN ORGANIZED HEALTH CARE EDUCATION/TRAINING PROGRAM

## 2019-12-12 PROCEDURE — 99218 HC RM OBSERVATION: CPT

## 2019-12-12 PROCEDURE — 74011250636 HC RX REV CODE- 250/636: Performed by: HOSPITALIST

## 2019-12-12 PROCEDURE — 87186 SC STD MICRODIL/AGAR DIL: CPT

## 2019-12-12 PROCEDURE — 96372 THER/PROPH/DIAG INJ SC/IM: CPT

## 2019-12-12 PROCEDURE — 80048 BASIC METABOLIC PNL TOTAL CA: CPT

## 2019-12-12 PROCEDURE — 74011250637 HC RX REV CODE- 250/637: Performed by: HOSPITALIST

## 2019-12-12 PROCEDURE — 81001 URINALYSIS AUTO W/SCOPE: CPT

## 2019-12-12 PROCEDURE — 87086 URINE CULTURE/COLONY COUNT: CPT

## 2019-12-12 PROCEDURE — 87077 CULTURE AEROBIC IDENTIFY: CPT

## 2019-12-12 PROCEDURE — 74011000258 HC RX REV CODE- 258: Performed by: HOSPITALIST

## 2019-12-12 PROCEDURE — 96366 THER/PROPH/DIAG IV INF ADDON: CPT

## 2019-12-12 PROCEDURE — 94760 N-INVAS EAR/PLS OXIMETRY 1: CPT

## 2019-12-12 RX ORDER — CALCIUM CARBONATE 200(500)MG
200 TABLET,CHEWABLE ORAL
Status: DISCONTINUED | OUTPATIENT
Start: 2019-12-12 | End: 2019-12-13 | Stop reason: HOSPADM

## 2019-12-12 RX ORDER — CLINDAMYCIN PHOSPHATE 20 MG/G
1 CREAM VAGINAL EVERY EVENING
Status: DISCONTINUED | OUTPATIENT
Start: 2019-12-12 | End: 2019-12-13 | Stop reason: HOSPADM

## 2019-12-12 RX ORDER — CEFUROXIME AXETIL 250 MG/1
250 TABLET ORAL EVERY 12 HOURS
Status: DISCONTINUED | OUTPATIENT
Start: 2019-12-12 | End: 2019-12-12

## 2019-12-12 RX ORDER — CEFUROXIME AXETIL 250 MG/1
250 TABLET ORAL EVERY 12 HOURS
Status: DISCONTINUED | OUTPATIENT
Start: 2019-12-13 | End: 2019-12-13 | Stop reason: HOSPADM

## 2019-12-12 RX ORDER — SODIUM CHLORIDE 0.9 % (FLUSH) 0.9 %
20 SYRINGE (ML) INJECTION
Status: COMPLETED | OUTPATIENT
Start: 2019-12-12 | End: 2019-12-12

## 2019-12-12 RX ADMIN — DONEPEZIL HYDROCHLORIDE 5 MG: 5 TABLET, FILM COATED ORAL at 21:30

## 2019-12-12 RX ADMIN — FLUOXETINE 40 MG: 20 CAPSULE ORAL at 13:59

## 2019-12-12 RX ADMIN — PANTOPRAZOLE SODIUM 40 MG: 40 TABLET, DELAYED RELEASE ORAL at 13:59

## 2019-12-12 RX ADMIN — QUETIAPINE FUMARATE 50 MG: 25 TABLET ORAL at 00:36

## 2019-12-12 RX ADMIN — Medication 10 ML: at 21:30

## 2019-12-12 RX ADMIN — DONEPEZIL HYDROCHLORIDE 5 MG: 5 TABLET, FILM COATED ORAL at 00:34

## 2019-12-12 RX ADMIN — ATORVASTATIN CALCIUM 20 MG: 20 TABLET, FILM COATED ORAL at 00:34

## 2019-12-12 RX ADMIN — CALCIUM CARBONATE (ANTACID) CHEW TAB 500 MG 200 MG: 500 CHEW TAB at 13:59

## 2019-12-12 RX ADMIN — GABAPENTIN 300 MG: 300 CAPSULE ORAL at 17:06

## 2019-12-12 RX ADMIN — FLUDROCORTISONE ACETATE 0.1 MG: 0.1 TABLET ORAL at 14:07

## 2019-12-12 RX ADMIN — PYRIDOSTIGMINE BROMIDE 60 MG: 60 TABLET ORAL at 05:20

## 2019-12-12 RX ADMIN — CARBIDOPA AND LEVODOPA 1 TABLET: 25; 100 TABLET ORAL at 17:06

## 2019-12-12 RX ADMIN — Medication 10 ML: at 05:13

## 2019-12-12 RX ADMIN — CARBIDOPA AND LEVODOPA 1 TABLET: 25; 100 TABLET ORAL at 21:30

## 2019-12-12 RX ADMIN — QUETIAPINE FUMARATE 50 MG: 25 TABLET ORAL at 21:30

## 2019-12-12 RX ADMIN — Medication 10 ML: at 14:06

## 2019-12-12 RX ADMIN — GABAPENTIN 300 MG: 300 CAPSULE ORAL at 21:30

## 2019-12-12 RX ADMIN — ATORVASTATIN CALCIUM 20 MG: 20 TABLET, FILM COATED ORAL at 21:30

## 2019-12-12 RX ADMIN — SUCRALFATE 1 G: 1 TABLET ORAL at 17:00

## 2019-12-12 RX ADMIN — FLUDROCORTISONE ACETATE 0.1 MG: 0.1 TABLET ORAL at 17:06

## 2019-12-12 RX ADMIN — CARBIDOPA AND LEVODOPA 1 TABLET: 25; 100 TABLET ORAL at 00:35

## 2019-12-12 RX ADMIN — PYRIDOSTIGMINE BROMIDE 60 MG: 60 TABLET ORAL at 00:54

## 2019-12-12 RX ADMIN — SUCRALFATE 1 G: 1 TABLET ORAL at 13:59

## 2019-12-12 RX ADMIN — PYRIDOSTIGMINE BROMIDE 60 MG: 60 TABLET ORAL at 14:06

## 2019-12-12 RX ADMIN — CLINDAMYCIN PHOSPHATE 1 APPLICATOR: 20 CREAM VAGINAL at 17:08

## 2019-12-12 RX ADMIN — GABAPENTIN 300 MG: 300 CAPSULE ORAL at 00:33

## 2019-12-12 RX ADMIN — PYRIDOSTIGMINE BROMIDE 60 MG: 60 TABLET ORAL at 21:35

## 2019-12-12 RX ADMIN — CALCIUM CARBONATE (ANTACID) CHEW TAB 500 MG 200 MG: 500 CHEW TAB at 17:00

## 2019-12-12 RX ADMIN — ENOXAPARIN SODIUM 40 MG: 40 INJECTION SUBCUTANEOUS at 00:35

## 2019-12-12 RX ADMIN — Medication 20 ML: at 10:28

## 2019-12-12 RX ADMIN — CEFTRIAXONE 1 G: 1 INJECTION, POWDER, FOR SOLUTION INTRAMUSCULAR; INTRAVENOUS at 17:00

## 2019-12-12 RX ADMIN — REGADENOSON 0.4 MG: 0.08 INJECTION, SOLUTION INTRAVENOUS at 10:28

## 2019-12-12 RX ADMIN — POTASSIUM CHLORIDE 20 MEQ: 750 TABLET, FILM COATED, EXTENDED RELEASE ORAL at 13:59

## 2019-12-12 RX ADMIN — Medication 10 ML: at 00:37

## 2019-12-12 RX ADMIN — SUCRALFATE 1 G: 1 TABLET ORAL at 21:30

## 2019-12-12 NOTE — PROGRESS NOTES
Hospitalist Progress Note  Joan Perry MD  Answering service: [phone] OR [phone] from in house phone      Date of Service:  2019  NAME:  Mao Berrios                                                         :  1945                                               MRN:  [id number]    Admission summary   This is a 18-year-old female with past medical history significant for orthostatic hypotension, Parkinson's disease, GERD, hyperlipidemia, neuropathy, questionable dementia (she is on on Aricept), depression and bipolar disorder was admitted for chest pain. She initially presented to the [facility]. Subjective/interval history    Patient was examined early this morning. She said chest pain she presented for was in the lower sternal region and its resolved. She is rather complaining of right flank pain and burning vaginal pain. She says she uses vaginal suppository she was prescribed recently. She was reassessed in the afternoon after completion of stress test which is negative. UA is positive for UTI, right flank pain persisted. Renal ultrasound is negative. Would like to obtain CT abdomen pelvis to rule out pyelonephritis now in the setting of UTI. I have called her pharmacy and found out the vaginal cream she is using is clindamycin suppository, reordered. Assessment and plan    UTI with right flank pain and tenderness suspicious for pyelonephritis  -Start ceftriaxone  -Renal ultrasound is unremarkable  -Obtain CT abdomen pelvis    Acute chest pain, atypical.  Resolved  -CT angiogram negative for PE  -Cardiac enzymes, EKG and no stress tests are negative for MI    GERD  Stable  Continue home PPI     Hyperlipidemia  Continue home statin     Parkinson's disease  Continue carbidopa-levodopa     ?   Dementia, patient is alert oriented x4  Continue home donezepil     Bipolar/depression  No SI/HI  Continue home meds    History of orthostatic hypotension: Continue Florinef    Reordered clindamycin vaginal suppository    Code status : Full  VTE Prophylaxis: Lovenox  Care Plan discussed with: Patient, nurse  Disposition: Home tomorrow if CT is unremarkable        Review of Systems:  A comprehensive review of systems was negative except for that written in the HPI. PHYSICAL EXAM:      GENERAL:  Alert, oriented, cooperative, no apparent distress  HEENT:  Normocephalic, atraumatic, non icteric sclerae, non pallor conjuctivae, EOMs intact, PERRLA. NECK: Supple, trachea midline, no adenopathy, no thyromegally or tenderness, no carotid bruit and no JVD. LUNGS:   Vesicular breath sounds bilaterally, no added sounds. HEART:   S1 and S2 well heard,RRR,  no murmur, click, rub or gallop. No JVD. No edema   ABDOMEB:   Soft, non-tender. Normoactive bowel sounds. No masses,  No organomegaly. Right flank tenderness  EXTREMETIES:  Atraumatic, acyanotic. Visible tremor  PULSES: 2+ and symmetric all extremities. SKIN:  No rashes or lesions  NEUROLOGY: Alert and oriented to PPT, CNII-XII intact. Motor and sensory exam grossly intact.        Recent labs & imaging reviewed:    Problem List as of 12/12/2019 Date Reviewed: 11/27/2019          Codes Class Noted - Resolved    Chest pain ICD-10-CM: R07.9  ICD-9-CM: 786.50  12/11/2019 - Present        Type 2 diabetes mellitus with diabetic neuropathy ([facility] ICD-10-CM: E11.40  ICD-9-CM: 250.60, 357.2  9/26/2019 - Present        Type 2 diabetes with nephropathy ([facility] ICD-10-CM: E11.21  ICD-9-CM: 250.40, 583.81  7/26/2018 - Present        Syncope ICD-10-CM: R55  ICD-9-CM: 780.2  5/28/2018 - Present        Spinal stenosis ICD-10-CM: M48.00  ICD-9-CM: 724.00  Unknown - Present        Near syncope ICD-10-CM: R55  ICD-9-CM: 780.2  8/2/2016 - Present    Overview Signed 8/2/2016  4:12 PM by Octavio Hammans, MD     Tilt test 8/2/2016: normal supine BP but abrupt drop in SBP consistent with orthostatic hypotension  Midodrine 5 mg tid has not improved.   Add mestinon 60 mg tid               Resting tremor ICD-10-CM: R25.9  ICD-9-CM: 781.0  7/29/2016 - Present        Orthostatic hypotension ICD-10-CM: I95.1  ICD-9-CM: 458.0  7/5/2016 - Present        Advance directive on file ICD-10-CM: Z78.9  ICD-9-CM: V49.89  5/24/2016 - Present        Mixed hyperlipidemia ICD-10-CM: E78.2  ICD-9-CM: 272.2  2/2/2016 - Present        Tubulovillous adenoma ICD-10-CM: D36.9  ICD-9-CM: 229.9  8/21/2012 - Present        Abnormal mammogram ICD-10-CM: R92.8  ICD-9-CM: 793.80  6/4/2012 - Present        Encounter for long-term (current) use of other medications ICD-10-CM: Z79.899  ICD-9-CM: V58.69  11/30/2011 - Present        Hammertoe ICD-10-CM: M20.40  ICD-9-CM: 735.4  9/12/2011 - Present        Allergic rhinitis, cause unspecified ICD-10-CM: J30.9  ICD-9-CM: 477.9  1/26/2011 - Present        Other diseases of nasal cavity and sinuses(478.19) ICD-10-CM: J34.89  ICD-9-CM: 478.19  7/19/2010 - Present        IBS (irritable bowel syndrome) ICD-10-CM: K58.9  ICD-9-CM: 564.1  Unknown - Present        RLS (restless legs syndrome) ICD-10-CM: G25.81  ICD-9-CM: 333.94  Unknown - Present        Bipolar disorder (HCC) ICD-10-CM: F31.9  ICD-9-CM: 296.80  Unknown - Present        Fibromyalgia ICD-10-CM: M79.7  ICD-9-CM: 729.1  Unknown - Present        DJD (degenerative joint disease) ICD-10-CM: M19.90  ICD-9-CM: 715.90  Unknown - Present        Lumbar disc disease ICD-10-CM: M51.9  ICD-9-CM: 722.93  Unknown - Present        Paget's disease of bone ICD-10-CM: M88.9  ICD-9-CM: 731.0  Unknown - Present        Migraine ICD-10-CM: 346  ICD-9-CM: 346  Unknown - Present        Genital herpes ICD-10-CM: A60.00  ICD-9-CM: 054.10  Unknown - Present        RESOLVED: GI bleed ICD-10-CM: K92.2  ICD-9-CM: 578.9  2/21/2019 - 2/22/2019        RESOLVED: Rectal bleeding ICD-10-CM: K62.5  ICD-9-CM: 569.3  6/25/2016 - 6/27/2016        RESOLVED: Rectal bleed ICD-10-CM: K62.5  ICD-9-CM: 569.3 6/25/2016 - 6/27/2016        RESOLVED: Pulmonary embolism ([facility] ICD-10-CM: I26.99  ICD-9-CM: 415.19  Unknown - 9/18/2018    Overview Addendum 9/15/2016  8:39 AM by TAI Wilder Dr 8/26/16  [facility] PE RLL    6/9/16 CTA [facility] normal CTA no PE    Hypercoagulable work up negative with th exception of low factor II at 72%  D dimer 0.22  Xarelto d/c'd 9/13/16             RESOLVED: Diabetes mellitus type 2, controlled, without complications ([facility] [id number]: E11.9  ICD-9-CM: 250.00  10/1/2015 - 9/18/2018        RESOLVED: DM w/o complication type II ([facility] ICD-10-CM: E11.9  ICD-9-CM: 250.00  11/30/2011 - 10/1/2015        RESOLVED: Hypercholesterolemia ICD-10-CM: E78.00  ICD-9-CM: 272.0  Unknown - 10/1/2015        RESOLVED: Diabetes ([facility] ICD-10-CM: E11.9  ICD-9-CM: 250.00  Unknown - 11/30/2011                Joan Perry MD  Internal Medicine  Date of Service: 12/12/2019

## 2019-12-12 NOTE — ROUTINE PROCESS
TRANSFER - OUT REPORT: 
 
Verbal report given Jesus Andrade (name) on [facility]  being transferred to (unit) for routine progression of care Report consisted of patients Situation, Background, Assessment and  
Recommendations(SBAR). Information from the following report(s) SBAR was reviewed with the receiving nurse. Lines:  
Peripheral IV 12/11/19 Right Antecubital (Active) Site Assessment Clean, dry, & intact 12/11/2019  4:58 PM  
Dressing Status Clean, dry, & intact 12/11/2019  4:58 PM  
  
 
Opportunity for questions and clarification was provided. Patient transported with: 
 Monitor

## 2019-12-12 NOTE — PROGRESS NOTES
Bedside and Verbal shift change report given to Janene by [facility]. Report included the following information SBAR, Kardex, ED Summary, Procedure Summary, Intake/Output, MAR, Recent Results, Med Rec Status and Cardiac Rhythm Sinus Terry/NSR.

## 2019-12-12 NOTE — PROGRESS NOTES
0900  -  Transitions of Care (YULISSA):  Sony Salgado  is a 75 y.o. female that lives in [place]. Bedside assessment completed. Demographics and Primary Care Provider (PCP) Traci Burnett MD verified and correct. Patient has (3) grown children and (6) great grandchildren. 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 CM reached out to [employer] for contact information. CM will continue to follow discharge planning needs for continuum of care. Rani Anderson RN, CRM [phone]    Her reason for admission is chest discomfort. RRAT Score:   31 - High               Resources/supports as identified by patient/family:  (2) daughters and one son                 Top Challenges facing patient (as identified by patient/family and CM): She does NOT drive as of 6 months ago anymore. Finances/Medication cost?  N/A                  Transportation? Does not drive anymore. She has to arrange for transport and feels she bothers her kids too much. Support system or lack thereof? Sufficient                     Living arrangements? Lives by herself               Self-care/ADLs/Cognition? Yes          Current Advanced Directive/Advance Care Plan:  At home                          Plan for utilizing home health: None                    Transition of Care Plan: Home via daughter if available. If she isn't CM will arrange transportation. 0900 -  State Observation notice (OBS) provided in writing to patient and/or caregiver to family of Nila Gonzalez y.o. old/female  as well as verbal explanation of the policy. Patients who are in outpatient status also receive the Observation notice. Rani Anderson RN , CRM    Nila Gonzalez y.o. old/female  Room# 360/01                                                             [address]        Patient Guide to Observation & Outpatient Care    Thank you for choosing [facility]. Current regulatory requirements necessitate we inform you that [address] or OBSERVATION status receiving care in our facility. Outpatient services include Observation services. Following are some frequently asked questions and answers that will help you understand outpatient observation status and billing. What is outpatient observation? Observation services are hospital outpatient services that a physician orders to allow for testing and medical evaluation of your condition. Your physician will decide whether you require an inpatient stay, will require care in another setting, or will be discharged home. You will be admitted if you are expected to need two or more midnights of medically necessary hospital care. What kinds of conditions usually require observation care? Observation services are typically ordered for conditions that can be treated in less than two midnights after surgery, or when the cause for your symptoms has not been determined. Examples are nausea, vomiting, weakness, stomach pain, headache, kidney stones, fever, some breathing problems, and some types of chest pain. Does observation care count toward the three-day qualifying hospital stay needed for admission to a skilled nursing facility? No. Any of your time spent during an observation stay does not count toward Medicares three-day (consecutive) hospital stay rule. If your status changes from observation to inpatient, your three-day hospital day begins only from the time when you become an inpatient. How is an observation stay billed? An observation stay is billed under outpatient services (under Medicare this would be under Part B). What am I expected to pay for as an observation patient?   Since observation stays are billed as outpatient services, your insurance co-pays and deductibles, along with any additional costs, including medications will be based on the outpatient terms of your policies. What if I dont have health insurance? If you do not have health care coverage, [facility] has financial counselors available to help with payment planning. Ask to meet with a counselor from [employer]. What happens when I reach the end of my observation stay? At the end of your observation stay, your physician will decide whether to discharge you from the hospital or to admit you as an inpatient. What if my physician decides my condition requires acute inpatient care? Your physician must then write an order to convert your outpatient observation stay to a full inpatient admission. Can I be placed into outpatient observation after undergoing an outpatient surgical procedure? It is possible. For example, Medicare allows for a 4-6 hour recovery period. The intent of outpatient surgery is to have your surgery and be discharged the same day. However, if you experience a post-operative complication, then your physician may place you into observation to monitor you further. If I want to spend the night after my out- patient surgery, will Medicare cover this? No, Medicare will only pay if there is a medical condition that warrants postoperative monitoring. If you desire to stay over for patient/family convenience, you will be fully responsible for payment. I have more questions about my Observation Stay. Who can help me?  If you have any questions about your medical condition, please ask to speak with your care provider.  If you have questions about your transition home or services needed after discharge please ask to speak with a member of the [facility] care management department.  If you have financial or billing questions please contact a [place] [address] services representative at [phone] or your insurance carrier.     If you receive Medicare benefits, you can also call 1-800-MEDICARE (5-583.923.8323) for more information. TTY users should call 9-672.812.6740. Please sign and date here to show you received this notice and understand your status.     Signature of Patient or Guardian                       Date    __________________________________                          _____________________

## 2019-12-12 NOTE — PROGRESS NOTES
[facility] Progress Note     Subjective:      Kael Hilton is a 75 y.o. patient who is seen for evaluation/management of chest pain. She presented to the [place] ER on 12/11/2019 for intermittent lower right chest pain, later radiated to left lower chest & back, calls it \"rib pain\". Not worsened with inspiration. She denied any recent falls or syncope. No aggravating or alleviating factors. Question of VT on telemetry in ER, but found to be artifact. D-dimer elevated, but CTA chest negative for PE. Troponin negative. ECG with T wave inversion in anterior leads, no acute ST changes. She was transferred to [facility] for further evaluation. States pain now resolved. She denies SOB, PND, orthopnea, syncope, or edema. Echo & nuclear stress test pending today. NSR & sinus bradycardia (rates 50s-60s) noted on monitor. Ambulates without difficulty. BP stable, 139/62 early this morning. Previous:  Echo (05/28/2018): LVEF 55-60%, no RWMA. No valvular abnormality. Insignificant pericardial effusion and/or pericardial fat. Lexiscan cardiolite stress (01/24/2018): LVEF 74%, no ischemia. Orthostatic hypotension improved on Northera. Tilt table test (08/02/2016): Orthostatic hypotension with sudden change in upright position, resolved quickly when supine. Chest CTA (05/07/2016): Small PE RLL. Hypercoagulable eval negative. Xarelto for short time thereafter. Parkinson's disease, fibromyalgia.         Problem List  Date Reviewed: 11/27/2019          Codes Class Noted    Chest pain ICD-10-CM: R07.9  ICD-9-CM: 786.50  12/11/2019        Type 2 diabetes mellitus with diabetic neuropathy ([facility] [address] ICD-10-CM: E11.40  ICD-9-CM: 250.60, 357.2  9/26/2019        Type 2 diabetes with nephropathy ([facility] [address] ICD-10-CM: E11.21  ICD-9-CM: 250.40, 583.81  7/26/2018        Syncope ICD-10-CM: R55  ICD-9-CM: 780.2  5/28/2018        Spinal stenosis ICD-10-CM: M48.00  ICD-9-CM: 724.00 Unknown        Near syncope ICD-10-CM: R55  ICD-9-CM: 780.2  8/2/2016    Overview Signed 8/2/2016  4:12 PM by Marsha Gibson MD     Tilt test 8/2/2016: normal supine BP but abrupt drop in SBP consistent with orthostatic hypotension  Midodrine 5 mg tid has not improved.   Add mestinon 60 mg tid               Resting tremor ICD-10-CM: R25.9  ICD-9-CM: 781.0  7/29/2016        Orthostatic hypotension ICD-10-CM: I95.1  ICD-9-CM: 458.0  7/5/2016        Advance directive on file ICD-10-CM: Z78.9  ICD-9-CM: V49.89  5/24/2016        Mixed hyperlipidemia ICD-10-CM: E78.2  ICD-9-CM: 272.2  2/2/2016        Tubulovillous adenoma ICD-10-CM: D36.9  ICD-9-CM: 229.9  8/21/2012        Abnormal mammogram ICD-10-CM: R92.8  ICD-9-CM: 793.80  6/4/2012        Encounter for long-term (current) use of other medications ICD-10-CM: Z79.899  ICD-9-CM: V58.69  11/30/2011        [place] ICD-10-CM: M20.40  ICD-9-CM: 735.4  9/12/2011        Allergic rhinitis, cause unspecified ICD-10-CM: J30.9  ICD-9-CM: 477.9  1/26/2011        Other diseases of nasal cavity and sinuses(478.19) ICD-10-CM: J34.89  ICD-9-CM: 478.19  7/19/2010        IBS (irritable bowel syndrome) ICD-10-CM: K58.9  ICD-9-CM: 564.1  Unknown        RLS (restless legs syndrome) ICD-10-CM: G25.81  ICD-9-CM: 333.94  Unknown        Bipolar disorder (HCC) ICD-10-CM: F31.9  ICD-9-CM: 296.80  Unknown        Fibromyalgia ICD-10-CM: M79.7  ICD-9-CM: 729.1  Unknown        DJD (degenerative joint disease) ICD-10-CM: M19.90  ICD-9-CM: 715.90  Unknown        Lumbar disc disease ICD-10-CM: M51.9  ICD-9-CM: 722.93  Unknown        Paget's disease of bone ICD-10-CM: M88.9  ICD-9-CM: 731.0  Unknown        Migraine ICD-10-CM: 346  ICD-9-CM: 346  Unknown        Genital herpes ICD-10-CM: A60.00  ICD-9-CM: 054.10  Unknown              Current Facility-Administered Medications   Medication Dose Route Frequency Provider Last Rate Last Dose    regadenoson (LEXISCAN) injection 0.4 mg  0.4 mg IntraVENous RAD ONCE Sebastian Grissom MD        saline peripheral flush soln 20 mL  20 mL InterCATHeter RAD ONCE Vikram Sierra MD        acetaminophen (TYLENOL) tablet 650 mg  650 mg Oral Q4H PRN Alexy Singh MD        atorvastatin (LIPITOR) tablet 20 mg  20 mg Oral QHS Bryson Singh MD   20 mg at 12/12/19 0034    carbidopa-levodopa (SINEMET)  mg per tablet 1 Tab  1 Tab Oral TID Alexy Singh MD   1 Tab at 12/12/19 0035    donepezil (ARICEPT) tablet 5 mg  5 mg Oral QHS Alexy Singh MD   5 mg at 12/12/19 0034    . PHARMACY TO SUBSTITUTE PER PROTOCOL (Reordered from: droxidopa (NORTHERA) 300 mg cap)    Per Protocol Alexy Singh MD        fludrocortisone (FLORINEF) tablet 0.1 mg  0.1 mg Oral BID Alexy Singh MD        FLUoxetine (PROzac) capsule 40 mg  40 mg Oral DAILY Alexy Singh MD        gabapentin (NEURONTIN) capsule 300 mg  300 mg Oral TID Bryson Singh MD   300 mg at 12/12/19 0033    sucralfate (CARAFATE) tablet 1 g  1 g Oral QID Alexy Singh MD        QUEtiapine (SEROquel) tablet 50 mg  50 mg Oral QHS Bryson Singh MD   50 mg at 12/12/19 0036    pyridostigmine (MESTINON) tablet 60 mg  60 mg Oral Q8H Callie Singh MD   60 mg at 12/12/19 0520    potassium chloride SR (KLOR-CON 10) tablet 20 mEq  20 mEq Oral DAILY Alexy Singh MD        sodium chloride (NS) flush 5-40 mL  5-40 mL IntraVENous Q8H Callie Singh MD   10 mL at 12/12/19 0513    sodium chloride (NS) flush 5-40 mL  5-40 mL IntraVENous PRN Alexy Singh MD        nitroglycerin (NITROSTAT) tablet 0.4 mg  0.4 mg SubLINGual Q5MIN PRN Alexy Singh MD        enoxaparin (LOVENOX) injection 40 mg  40 mg SubCUTAneous Q24H Bryson Singh MD   40 mg at 12/12/19 0035    pantoprazole (PROTONIX) tablet 40 mg  40 mg Oral DAILY Alexy Singh MD         Allergies   Allergen Reactions    Adhesive Itching    Hydrocodone Itching    Lorazepam Other (comments)    Other Medication Rash     Tide detergent    Percocet [Oxycodone-Acetaminophen] Itching    Sudafed [Pseudoephedrine Hcl] Other (comments)     Vertigo    Wellbutrin [Bupropion Hcl] Nausea and Vomiting    Zithromax [Azithromycin] Vertigo    Zyrtec [Cetirizine] Nausea Only     Past Medical History:   Diagnosis Date    Bipolar disorder ([facility] [address]     Nazmy    Chronic pain     BACK PAIN    Diabetes ([facility] [address]     BORDERLINE TX WITH DIET AND EXERCISE    DJD (degenerative joint disease)     Fibromyalgia     Genital herpes     GERD (gastroesophageal reflux disease)     Hypercholesterolemia     IBS (irritable bowel syndrome)     Ill-defined condition     fibromyligia    Lumbar disc disease     stimulation-Honza    Migraine     Nausea & vomiting     Other ill-defined conditions(799.89)     SEASONAL allergies    Other ill-defined conditions(799.89)     dysphagia has had esophagus dilated    Paget's disease     Parkinson disease ([facility] [address]     Pulmonary embolism (HCC)     RLS (restless legs syndrome)     Spinal stenosis      Past Surgical History:   Procedure Laterality Date    COLONOSCOPY N/A 6/27/2016    COLONOSCOPY performed by Adolfo Ross MD at [facility] ENDOSCOPY    COLONOSCOPY N/A 2/22/2019    COLONOSCOPY performed by Felicitas Russell MD at [facility] ENDOSCOPY    ENDOSCOPY, COLON, DIAGNOSTIC      12, due 13    HX APPENDECTOMY      HX BACK SURGERY  9/17/2013    back surgery - total of 3 as of 12/20/2013    HX BREAST BIOPSY Right years ago    negative    HX CHOLECYSTECTOMY      HX HEENT      T & A    HX HYSTERECTOMY      total for fibroid tumors    HX ORTHOPAEDIC      lumbar laminectomy then fusion/neurostimulator implanted - inactive now but still in    HX ORTHOPAEDIC      REMOVAL OF NEUROSTIMULATOR    HX OTHER SURGICAL      EGD x 2 with dilation/colonoscopy - no polyps per pt    TILT TABLE EVALUATION  8/2/2016          Family History   Problem Relation Age of Onset    Colon Cancer Mother     Cancer Mother 68        colon    Heart Disease Father     Heart Disease Maternal Grandmother     Heart Disease Maternal Grandfather     Heart Disease Other      Social History     Tobacco Use    Smoking status: Never Smoker    Smokeless tobacco: Never Used   Substance Use Topics    Alcohol use: No     Comment: 1 per month        Review of Systems:   Constitutional: Negative for fever, chills, weight loss, malaise/fatigue. HEENT: Negative for nosebleeds, vision changes. Respiratory: Negative for cough, hemoptysis  Cardiovascular: + recent chest pain, palpitations, orthopnea, claudication, leg swelling, recent syncope, and PND. Gastrointestinal: Negative for nausea, vomiting, diarrhea, blood in stool and melena. Genitourinary: Negative for dysuria, and hematuria. Musculoskeletal: Negative for myalgias, arthralgia. Skin: Negative for rash. Heme: Does not bleed or bruise easily. Neurological: Negative for speech change and focal weakness. RUE tremor. Objective:     Visit Vitals  /84 (BP 1 Location: Left arm, BP Patient Position: At rest)   Pulse 65   Temp 98 °F (36.7 °C)   Resp 16   Wt 137 lb 5.6 oz (62.3 kg)   SpO2 96%   BMI 24.33 kg/m²      Physical Exam:   Constitutional: Well-developed and well-nourished. No respiratory distress. Head: Normocephalic and atraumatic. Eyes: Pupils are equal, round  ENT: Hearing grossly normal  Neck: Supple. No JVD present. Cardiovascular: Normal rate, regular rhythm. Exam reveals no gallop and no friction rub. No murmur heard. Pulmonary/Chest: Effort normal and breath sounds normal. No wheezes. Abdominal: Soft, no tenderness. Musculoskeletal: No edema. Neurological: Alert,oriented. RUE tremor. Skin: Skin is warm and dry. Psychiatric: Normal mood and affect. Behavior is normal. Judgment and thought content normal.      EKG (12/11/2019):  NSR 69. QTc 470 ms. Anterior lead T wave inversion.       Assessment/Plan:   Ms. Dom Somers was admitted after experiencing atypical chest pain, now resolved. Elevated D-dimer, negative chest CTA. Troponin negative. Echo & nuclear stress test pending today. Suspect noncardiac origin of pain. If stress test is negative, ok for discharge from cardiac standpoint. No medication changes at this time. Olamide Mohamud has been controlling orthostatic hypotension symptoms well. [address] [address] [facility] [place]  12/12/19       Addendum from EP attending:   I have seen, examined patient, and discussed with nurse practitioner, registered nurse, reviewed, updated note and agree with the assessment and plan    I have talked to her this am. She said the chest pain was from right to left along the rib case  It had resolved this am and she did not fall  Vital signs with some high BP  Exam shows regular rhythm    Chest wall without reproducible rib pain palpation  Assessment and Plan:  Atypical chest pain   Nuclear stress test negative for ischemia and LVEF 77%  May go home and follow up outpatient as usual  Future Appointments   Date Time Provider Reji Smith   2/18/2020  8:40 AM Huyen Lan  [address]         Thank you for involving me in this patient's care and please call with further concerns or questions. Alo Pollard M.D.   Electrophysiology/Cardiology  [facility] [place]  [address], [place] [address], [address]  [address]  [phone]

## 2019-12-12 NOTE — ED NOTES
Pt daughter called at this time and updated on poc. Verified home medications with daughter and updated in chart.

## 2019-12-12 NOTE — PROGRESS NOTES
TRANSFER - IN REPORT:    Verbal report received from Angela(name) on Jose Alcantar  being received from [place] [facility](unit) for routine progression of care      Report consisted of patients Situation, Background, Assessment and   Recommendations(SBAR). Information from the following report(s) SBAR and MAR was reviewed with the receiving nurse. Opportunity for questions and clarification was provided. Assessment completed upon patients arrival to unit and care assumed.

## 2019-12-12 NOTE — H&P
HISTORY AND PHYSICAL  Ann Mirza MD        PCP: Ramandeep Cortes MD        Chief Complaint:   Chest pain         History of present illness:   Patient is poor historian, information was supplemented by chart review  Patient is a 62-year-old female with medical history significant for GERD, IBS, Parkinson's disease, orthostatic hypotension, DJD, hyperlipidemia, neuropathy, ? Dementia (on donezepil) ,depression and bipolar disorder who presents to short-term emergency department with chief complaint of chest pain. Patient reports this earlier today she started to have onset of intermittent right chest pain which later moved to the left side in the back, with no relieving and aggravating factor. She denies any fever, chills, diaphoresis, nausea, vomiting, shortness of breath, palpitation, orthopnea, syncope or presyncope. At short [facility] emergency department, monitor strip was concerning for V. tach, placed on amio drip. Cardiology was on board, strip reviewed was considered to be artifact and drip was discontinued. D-dimer was found to be elevated, CTA of the chest obtained was unremarkable for PE. Carolann Katz Lab work-ups including troponin were unremarkable  EKG was unremarkable for acute ischemia. Per cardiology recommendation patient is being admitted to the telemetry unit to rule out ACS with serial cardiac enzyme and stress test tomorrow.        PMH/PSH:  Past Medical History:   Diagnosis Date    Bipolar disorder ([address]     Nazmy    Chronic pain     BACK PAIN    Diabetes ([address]     BORDERLINE TX WITH DIET AND EXERCISE    DJD (degenerative joint disease)     Fibromyalgia     Genital herpes     GERD (gastroesophageal reflux disease)     Hypercholesterolemia     IBS (irritable bowel syndrome)     Ill-defined condition     fibromyligia    Lumbar disc disease     stimulation-Honza    Migraine     Nausea & vomiting     Other ill-defined conditions(329.89)     SEASONAL allergies    Other ill-defined conditions(799.89)     dysphagia has had esophagus dilated    Paget's disease     Parkinson disease ([facility]     Pulmonary embolism (HCC)     RLS (restless legs syndrome)     Spinal stenosis      Past Surgical History:   Procedure Laterality Date    COLONOSCOPY N/A 6/27/2016    COLONOSCOPY performed by Rodrigo Byrne MD at [address] COLONOSCOPY N/A 2/22/2019    COLONOSCOPY performed by Prachi Long MD at [facility]. [address], COLON, DIAGNOSTIC      12, due 13    HX APPENDECTOMY      HX BACK SURGERY  9/17/2013    back surgery - total of 3 as of 12/20/2013    HX BREAST BIOPSY Right years ago    negative    HX CHOLECYSTECTOMY      HX HEENT      T & A    HX HYSTERECTOMY      total for fibroid tumors    HX ORTHOPAEDIC      lumbar laminectomy then fusion/neurostimulator implanted - inactive now but still in    HX ORTHOPAEDIC      REMOVAL OF NEUROSTIMULATOR    HX OTHER SURGICAL      EGD x 2 with dilation/colonoscopy - no polyps per pt    TILT TABLE EVALUATION  8/2/2016            Home meds:   Prior to Admission medications    Medication Sig Start Date End Date Taking? Authorizing Provider   pyridostigmine (MESTINON) 60 mg tablet TAKE 1 TABLET BY MOUTH THREE TIMES DAILY 12/11/19  Yes Isi Rogers MD   gabapentin (NEURONTIN) 300 mg capsule Take 300 mg by mouth three (3) times daily. 300mg with breakfast and lunch, 600mg at dinner   Yes Gordo, MD Bruce   donepezil (ARICEPT) 5 mg tablet Take 5 mg by mouth nightly. Yes Gordo, MD Bruce   dexlansoprazole (DEXILANT) 60 mg CpDB capsule (delayed release) Take 1 Cap by mouth daily. Yes Prachi Long MD   FLUoxetine (PROZAC) 40 mg capsule Take 40 mg by mouth daily.  11/8/19  Yes Faina Nina, RN   fludrocortisone (FLORINEF) 0.1 mg tablet TAKE 1 TABLET BY MOUTH TWICE DAILY 10/28/19  Yes Isi Rogers MD   atorvastatin (LIPITOR) 20 mg tablet TAKE 1 TABLET BY MOUTH EVERY EVENING 10/17/19  Yes Ruth Johnson MD droxidopa (NORTHERA) 300 mg cap Take 600 mg by mouth two (2) times a day. Patient taking differently: Take 1,200 mg by mouth two (2) times a day. 7/25/19  Yes Melissa Agarwal MD   potassium chloride (K-DUR, KLOR-CON) 20 mEq tablet Take 1 Tab by mouth daily. 7/9/19  Yes Schutrumpf, Elouise Schirmer, MD   sucralfate (CARAFATE) 1 gram tablet TAKE 1 TABLET BY MOUTH FOUR TIMES DAILY 1/24/19  Yes Schutrumpf, Elouise Schirmer, MD   carbidopa-levodopa (SINEMET)  mg per tablet Take 1 Tab by mouth three (3) times daily. Yes Provider, Historical   QUEtiapine (SEROQUEL) 25 mg tablet Take 50 mg by mouth nightly. Take 2 tabs qhs prn Faby Olivia NP/Dr Lindsay Hubbard   Yes Provider, Historical   nystatin-triamcinolone [facility]) 100,000-0.1 unit/gram-% ointment Apply  to affected area three (3) times daily. Provider, Historical   acetaminophen (TYLENOL) 325 mg tablet Take 325 mg by mouth every four (4) hours as needed for Pain. Provider, Historical       Allergies: Allergies   Allergen Reactions    Adhesive Itching    Hydrocodone Itching    Lorazepam Other (comments)    Other Medication Rash     Tide detergent    Percocet [Oxycodone-Acetaminophen] Itching    Sudafed [Pseudoephedrine Hcl] Other (comments)     Vertigo    Wellbutrin [Bupropion Hcl] Nausea and Vomiting    Zithromax [Azithromycin] Vertigo    Zyrtec [Cetirizine] Nausea Only       FH:  Family History   Problem Relation Age of Onset    Colon Cancer Mother     Cancer Mother 68        colon    Heart Disease Father     Heart Disease Maternal Grandmother     Heart Disease Maternal Grandfather     Heart Disease Other        SH:  Social History     Tobacco Use    Smoking status: Never Smoker    Smokeless tobacco: Never Used   Substance Use Topics    Alcohol use: No     Comment: 1 per month       Review of Systems   Constitutional: Negative for chills and fever. HENT: Negative for ear pain, hearing loss and tinnitus.     Eyes: Negative for blurred vision, double vision and photophobia. Respiratory: Negative for cough, hemoptysis and sputum production. Cardiovascular: Negative for palpitations, orthopnea and claudication. Gastrointestinal: Negative for heartburn and nausea. Genitourinary: Negative for dysuria and urgency. Musculoskeletal: Negative for myalgias and neck pain. Skin: Negative for itching and rash. Neurological: Negative for dizziness and headaches. Endo/Heme/Allergies: Negative for environmental allergies. Does not bruise/bleed easily. Psychiatric/Behavioral: Negative for depression and suicidal ideas. All other systems reviewed and are negative. PHYSICAL EXAM:  Visit Vitals  BP (!) 166/98   Pulse (!) 52   Temp 97.5 °F (36.4 °C)   Resp 13   Wt 62.4 kg (137 lb 9.1 oz)   SpO2 100%   BMI 24.37 kg/m²       General:          Alert, cooperative, no distress, appears stated age. HEENT:           Atraumatic, anicteric sclerae, pink conjunctivae                          No oral ulcers, mucosa moist, throat clear, dentition fair  Neck:               Supple, symmetrical,  thyroid: non tender  Lungs:             Clear to auscultation bilaterally. No Wheezing or Rhonchi. No rales. Chest wall:      No tenderness  No Accessory muscle use. Heart:              Regular  rhythm,  No  murmur   No edema  Abdomen:        Soft, non-tender. Not distended. Bowel sounds normal  Extremities:     No cyanosis. No clubbing,                            Skin turgor normal, Capillary refill normal, Radial dial pulse 2+  Skin:                Not pale. Not Jaundiced  No rashes   Psych:             Not anxious or agitated. Neurologic:     Alert and oriented to PPT, CNII-XII intact. Motor and sensory exam grossly intact.   Labs/Imaging:  Recent Results (from the past 24 hour(s))   SAMPLES BEING HELD    Collection Time: 12/11/19  4:49 PM   Result Value Ref Range    SAMPLES BEING HELD 1RED 1BLUE     COMMENT        Add-on orders for these samples will be processed based on acceptable specimen integrity and analyte stability, which may vary by analyte. CBC WITH AUTOMATED DIFF    Collection Time: 12/11/19  4:49 PM   Result Value Ref Range    WBC 6.4 3.6 - 11.0 K/uL    RBC 4.20 3.80 - 5.20 M/uL    HGB 13.0 11.5 - 16.0 g/dL    HCT 39.4 35.0 - 47.0 %    MCV 93.8 80.0 - 99.0 FL    MCH 31.0 26.0 - 34.0 PG    MCHC 33.0 30.0 - 36.5 g/dL    RDW 13.1 11.5 - 14.5 %    PLATELET 164 893 - 500 K/uL    MPV 9.7 8.9 - 12.9 FL    NRBC 0.0 0  WBC    ABSOLUTE NRBC 0.00 0.00 - 0.01 K/uL    NEUTROPHILS 49 32 - 75 %    LYMPHOCYTES 37 12 - 49 %    MONOCYTES 8 5 - 13 %    EOSINOPHILS 4 0 - 7 %    BASOPHILS 1 0 - 1 %    IMMATURE GRANULOCYTES 1 (H) 0.0 - 0.5 %    ABS. NEUTROPHILS 3.2 1.8 - 8.0 K/UL    ABS. LYMPHOCYTES 2.4 0.8 - 3.5 K/UL    ABS. MONOCYTES 0.5 0.0 - 1.0 K/UL    ABS. EOSINOPHILS 0.3 0.0 - 0.4 K/UL    ABS. BASOPHILS 0.1 0.0 - 0.1 K/UL    ABS. IMM. GRANS. 0.0 0.00 - 0.04 K/UL    DF AUTOMATED     METABOLIC PANEL, COMPREHENSIVE    Collection Time: 12/11/19  4:49 PM   Result Value Ref Range    Sodium 143 136 - 145 mmol/L    Potassium 3.7 3.5 - 5.1 mmol/L    Chloride 104 97 - 108 mmol/L    CO2 32 21 - 32 mmol/L    Anion gap 7 5 - 15 mmol/L    Glucose 119 (H) 65 - 100 mg/dL    BUN 15 6 - 20 MG/DL    Creatinine 0.87 0.55 - 1.02 MG/DL    BUN/Creatinine ratio 17 12 - 20      GFR est AA >60 >60 ml/min/1.73m2    GFR est non-AA >60 >60 ml/min/1.73m2    Calcium 9.2 8.5 - 10.1 MG/DL    Bilirubin, total 0.5 0.2 - 1.0 MG/DL    ALT (SGPT) 12 12 - 78 U/L    AST (SGOT) 28 15 - 37 U/L    Alk.  phosphatase 89 45 - 117 U/L    Protein, total 7.1 6.4 - 8.2 g/dL    Albumin 3.7 3.5 - 5.0 g/dL    Globulin 3.4 2.0 - 4.0 g/dL    A-G Ratio 1.1 1.1 - 2.2     TROPONIN I    Collection Time: 12/11/19  4:49 PM   Result Value Ref Range    Troponin-I, Qt. <0.05 <0.05 ng/mL   NT-PRO BNP    Collection Time: 12/11/19  4:49 PM   Result Value Ref Range    NT pro- (H) 0 - 450 PG/ML   MAGNESIUM    Collection Time: 12/11/19 4:49 PM   Result Value Ref Range    Magnesium 1.9 1.6 - 2.4 mg/dL   D DIMER    Collection Time: 12/11/19  4:49 PM   Result Value Ref Range    D-dimer 0.91 (H) 0.00 - 0.65 mg/L FEU   EKG, 12 LEAD, INITIAL    Collection Time: 12/11/19  4:50 PM   Result Value Ref Range    Ventricular Rate 69 BPM    Atrial Rate 69 BPM    P-R Interval 162 ms    QRS Duration 80 ms    Q-T Interval 446 ms    QTC Calculation (Bezet) 477 ms    Calculated P Axis 3 degrees    Calculated R Axis 6 degrees    Calculated T Axis 81 degrees    Diagnosis       Normal sinus rhythm  T wave abnormality, consider anterior ischemia  Prolonged QT  Abnormal ECG  When compared with ECG of 18-SEP-2019 09:53,  T wave inversion now evident in Anterior leads         Recent Labs     12/11/19  1649   WBC 6.4   HGB 13.0   HCT 39.4        Recent Labs     12/11/19  1649      K 3.7      CO2 32   BUN 15   CREA 0.87   *   CA 9.2   MG 1.9     Recent Labs     12/11/19  1649   SGOT 28   ALT 12   AP 89   TBILI 0.5   TP 7.1   ALB 3.7   GLOB 3.4       Recent Labs     12/11/19  1649   TROIQ <0.05       No results for input(s): INR, PTP, APTT, INREXT in the last 72 hours. No results for input(s): PH, PCO2, PO2 in the last 72 hours. CTA CHEST W OR W WO CONT  Narrative: EXAM: CT ANGIOGRAPHY CHEST   INDICATION: r/o PE.  here with chest pain and arrhythmia. COMPARISON: 9/10/2019. CONTRAST: 80 mL of Isovue-370. TECHNIQUE:   Precontrast  images were obtained to localize the volume for acquisition. Multislice helical CT arteriography was performed from the diaphragm to the  thoracic inlet during uneventful rapid bolus intravenous contrast  administration. Lung and soft tissue windows were generated. Coronal and  sagittal  reformatted images were also generated and 3D post processing  consisting of coronal maximum intensity projection images was performed.  CT dose  reduction was achieved through use of a standardized protocol tailored for this  examination and automatic exposure control for dose modulation. Adaptive  statistical iterative reconstruction (ASIR) was utilized for this examination. FINDINGS:  LOWER NECK: The visualized portions of the thyroid and structures of the lower  neck are within normal limits. LUNGS: The lungs are clear of mass, nodule, airspace disease or edema. PLEURA: There is no pleural effusion or pneumothorax. PULMONARY ARTERIES: The pulmonary arteries are well enhanced and no pulmonary  emboli are identified. AORTA: The aorta enhances normally without evidence of aneurysm or dissection. MEDIASTINUM: There is no mediastinal or hilar adenopathy or mass. BONES AND SOFT TISSUES: The bones and soft tissues of the chest wall are within  normal limits. UPPER ABDOMEN: There are clips in the gallbladder fossa and the gallbladder is  absent. Impression: IMPRESSION: Normal CT arteriogram of the chest. No pulmonary embolus. XR CHEST PORT  Narrative: INDICATION: . chest pain  Additional history: Tachycardia prior to arrival  COMPARISON: Previous chest xray, 8/4/2019. LIMITATIONS: Portable technique. [place] [facility] FINDINGS: Single frontal view of the chest.   .  Lines/tubes/surgical: Cardiac monitor leads overly the patient. Heart/mediastinum: Unremarkable. Lungs/pleura:  No focal consolidation or mass. No visualized pleural effusion or  pneumothorax. Additional Comments: Possible hiatal hernia. .  Impression: IMPRESSION:  1. No radiographic evidence of acute cardiopulmonary disease. Assessment & Plan:  ===================  Atypical chest pain  Initial troponin and EKG unremarkable for acute ischemia, trend serial troponin  Chest x-ray revealed no acute cardiopulmonary process  CTA unremarkable for PE  Cardiology on board, recommendation appreciated  Telemetry  Echo in a.m.   Stress test in a.m.  N.p.o.    GERD  Stable  Continue home PPI    Hyperlipidemia  Continue home statin    Parkinson's disease  Continue carbidopa-levodopa    ? Dementia  Continue home donezepil    Bipolar/depression  No SI/HI  Continue home meds    Other comorbidity, continue home meds, adjust as needed  Patient's Baseline: Ambulates with walking  DVT ppx: Lovenox   code status: Full  Disposition: TBD  Care plan discussed with patient/family nurse.                 Signed By: Merrill Sanchez MD     December 11, 2019

## 2019-12-13 ENCOUNTER — HOME HEALTH ADMISSION (OUTPATIENT)
Dept: HOME HEALTH SERVICES | Facility: HOME HEALTH | Age: 74
End: 2019-12-13
Payer: MEDICARE

## 2019-12-13 VITALS
RESPIRATION RATE: 16 BRPM | HEART RATE: 64 BPM | BODY MASS INDEX: 23.2 KG/M2 | OXYGEN SATURATION: 95 % | TEMPERATURE: 98.6 F | WEIGHT: 130.95 LBS | SYSTOLIC BLOOD PRESSURE: 153 MMHG | DIASTOLIC BLOOD PRESSURE: 81 MMHG | HEIGHT: 63 IN

## 2019-12-13 PROCEDURE — 96372 THER/PROPH/DIAG INJ SC/IM: CPT

## 2019-12-13 PROCEDURE — 74011250637 HC RX REV CODE- 250/637: Performed by: HOSPITALIST

## 2019-12-13 PROCEDURE — 99218 HC RM OBSERVATION: CPT

## 2019-12-13 PROCEDURE — 74011250636 HC RX REV CODE- 250/636: Performed by: STUDENT IN AN ORGANIZED HEALTH CARE EDUCATION/TRAINING PROGRAM

## 2019-12-13 PROCEDURE — 74011250637 HC RX REV CODE- 250/637: Performed by: STUDENT IN AN ORGANIZED HEALTH CARE EDUCATION/TRAINING PROGRAM

## 2019-12-13 RX ORDER — CEFUROXIME AXETIL 250 MG/1
250 TABLET ORAL EVERY 12 HOURS
Qty: 6 TAB | Refills: 0 | Status: SHIPPED | OUTPATIENT
Start: 2019-12-13 | End: 2019-12-16

## 2019-12-13 RX ADMIN — Medication 10 ML: at 08:47

## 2019-12-13 RX ADMIN — GABAPENTIN 300 MG: 300 CAPSULE ORAL at 08:44

## 2019-12-13 RX ADMIN — CARBIDOPA AND LEVODOPA 1 TABLET: 25; 100 TABLET ORAL at 08:44

## 2019-12-13 RX ADMIN — ENOXAPARIN SODIUM 40 MG: 40 INJECTION SUBCUTANEOUS at 01:21

## 2019-12-13 RX ADMIN — SUCRALFATE 1 G: 1 TABLET ORAL at 08:44

## 2019-12-13 RX ADMIN — PANTOPRAZOLE SODIUM 40 MG: 40 TABLET, DELAYED RELEASE ORAL at 08:44

## 2019-12-13 RX ADMIN — FLUDROCORTISONE ACETATE 0.1 MG: 0.1 TABLET ORAL at 08:50

## 2019-12-13 RX ADMIN — CALCIUM CARBONATE (ANTACID) CHEW TAB 500 MG 200 MG: 500 CHEW TAB at 08:44

## 2019-12-13 RX ADMIN — PYRIDOSTIGMINE BROMIDE 60 MG: 60 TABLET ORAL at 08:49

## 2019-12-13 RX ADMIN — FLUOXETINE 40 MG: 20 CAPSULE ORAL at 08:44

## 2019-12-13 RX ADMIN — ACETAMINOPHEN 650 MG: 325 TABLET ORAL at 08:44

## 2019-12-13 RX ADMIN — POTASSIUM CHLORIDE 20 MEQ: 750 TABLET, FILM COATED, EXTENDED RELEASE ORAL at 08:44

## 2019-12-13 NOTE — ROUTINE PROCESS
Bedside and Verbal shift change report given to Joaquin De La Fuente (oncoming nurse) by Melvin Snow (offgoing nurse).  Report included the following information SBAR, Kardex, Intake/Output, MAR, Recent Results, Med Rec Status and Cardiac Rhythm SR.

## 2019-12-13 NOTE — PROGRESS NOTES
0930 -  Orders entered to arrange for skilled home health and PT eval.  Michael Rhodes RN will touch base with patient. Patients chart reviewed and history noted. CM spoke with patient to introduce role and offer freedom of choice. No preference preferred. Demographic information verified as correct. Patient had no additional questions or concerns at this time. Referral created to [facility]) [phone]. CM will continue to follow and assist with YULISSA needs as they arise. Armen Dunham  MSN, BSCS, RN, CRM - [phone].    0900 -  Dr. Tracey Milton will put in Discharge order. CM scheduled [employer] transportation for patient  @ 10:00a to [facility]. [address]. Patient MUST be be down at ER Entrance at 9:50a. LYFT ride will NOT wait. Update to 3N Nurse - Oletta Rubinstein RN and Dr. Tracey Milton. CM will continue to follow and assist with YULISSA needs as they arise. Armen Dunham  MSN, [facility], RN, [address] [place] [address] Avenue - [phone].

## 2019-12-13 NOTE — DISCHARGE INSTRUCTIONS
Discharge Instructions       PATIENT ID: Jose Alcantar  MRN: [id number]   YOB: 1945    DATE OF ADMISSION: 12/11/2019  4:33 PM    DATE OF DISCHARGE: 12/13/2019    PRIMARY CARE PROVIDER: Emory Suárez MD     ATTENDING PHYSICIAN: Hyacinth Enriquez MD  DISCHARGING PROVIDER: Israel Mallory MD    To contact this individual call [phone] and ask the  to page. If unavailable ask to be transferred the Adult Hospitalist Department. DISCHARGE DIAGNOSES and CARE RECOMMENDATIONS:   Thank you Ms. De Santiago for letting us take care of you. You were admitted when you presented with chest pain. You were tested for blood clot and heart attack and the test is negative for both conditions. There are no other acute issues in your lungs or heart. He also complained of pain on the right flank, urine examination showed evidence of urinary tract infection and you are prescribed an antibiotic. Imaging study including ultrasound of the kidneys and CAT scan of the abdomen pelvis were quite unremarkable. We recommend he follow-up with your primary doctor within a week of hospital discharge.   Otherwise continue taking your medications as before which are unchanged        DIET: Regular Diet and Cardiac Diet      ACTIVITY: Activity as tolerated      SERVICES and EQUIPMENT needed: own    PENDING TEST RESULTS:   At the time of discharge the following test results are still pending:urine culture    FOLLOW UP APPOINTMENTS:   Follow-up Information     Follow up With Specialties Details Why Contact Info    Emory Suárez MD Internal Medicine   [address] [facility]  [address] [address] [address] [facility]:   IP CONSULT TO CARDIOLOGY  IP CONSULT TO HOSPITALIST    YOU HAD THE FOLLOWING PROCEDURES/SURGERIES  :   * No surgery found *              DISCHARGE MEDICATIONS:   See Medication Reconciliation Form    · It is important that you take the medication exactly as they are prescribed. · Keep your medication in the bottles provided by the pharmacist and keep a list of the medication names, dosages, and times to be taken in your wallet. · Do not take other medications without consulting your doctor. NOTIFY YOUR PHYSICIAN FOR ANY OF THE FOLLOWING:   Fever over 101 degrees for 24 hours. Chest pain, shortness of breath, fever, chills, nausea, vomiting, diarrhea, change in mentation, falling, weakness, bleeding. Severe pain or pain not relieved by medications. Or, any other signs or symptoms that you may have questions about. Services you need on discharge: Home health services           Signed:    Nam Reddy MD  12/13/2019

## 2019-12-13 NOTE — PROGRESS NOTES
DISCHARGE - Pt is pleased to be discharged today. Reviewed all discharge information including diet, meds, activity and follow-up appts. Pt states that daughter helps her with her medications. Gave prescription for antibiotics. Pt aware that [address] will be seeing her at home. IV access removed and tele-monitor off.

## 2019-12-13 NOTE — DISCHARGE SUMMARY
Discharge Summary       PATIENT ID: Denny Duong  MRN: [id number]   YOB: 1945    DATE OF ADMISSION: 12/11/2019  4:33 PM    DATE OF DISCHARGE: 12/13/2019  PRIMARY CARE PROVIDER: Kavitha Ceron MD     ATTENDING PHYSICIAN: Rosie Roman MD  DISCHARGING PROVIDER: Rosie Roman MD    To contact this individual call [phone] and ask the  to page. If unavailable ask to be transferred the Adult Hospitalist Department. CONSULTATIONS: IP CONSULT TO CARDIOLOGY  IP CONSULT TO HOSPITALIST    PROCEDURES/SURGERIES: * No surgery found *    ADMITTING [address] [place] [address] COURSE:      Admission summary   This is a 58-year-old female with past medical history significant for orthostatic hypotension, Parkinson's disease, GERD, hyperlipidemia, neuropathy, questionable dementia (she is on on Aricept), depression and bipolar disorder was admitted for chest pain. She [facility]. I have called her pharmacy and found out the vaginal cream she is using is clindamycin suppository, reordered. Hospital course   Ms. Evelin Lopez presented with chest pain. Cardiac negative for acute cardiac issues. CT angiogram ruled out PE and other acute pulmonary processes. UTI with right flank pain and tenderness suspicious for pyelonephritis  -Renal ultrasound and CT of the abdomen and pelvis were negative for pyelonephritis, she was given ceftriaxone. She is not septic/toxic. She is discharged on oral antibiotics. Urine culture is growing significant GNR. GERD  Stable  Continue home PPI     Hyperlipidemia  Continue home statin     Parkinson's disease  Continue carbidopa-levodopa     ?   Dementia, patient is alert oriented x4  Continue home donezepil     Bipolar/depression  No SI/HI  Continue home meds     History of orthostatic hypotension: Continue Florinef     Reordered clindamycin vaginal suppository  Home with home health services          Patient is discharged home in stable condition. DISCHARGE MEDICATIONS:  Current Discharge Medication List      START taking these medications    Details   cefUROXime (CEFTIN) 250 mg tablet Take 1 Tab by mouth every twelve (12) hours for 3 days. Qty: 6 Tab, Refills: 0         CONTINUE these medications which have NOT CHANGED    Details   pyridostigmine (MESTINON) 60 mg tablet TAKE 1 TABLET BY MOUTH THREE TIMES DAILY  Qty: 90 Tab, Refills: 5      gabapentin (NEURONTIN) 300 mg capsule Take 300 mg by mouth three (3) times daily. 300mg with breakfast and lunch, 600mg at dinner      donepezil (ARICEPT) 5 mg tablet Take 5 mg by mouth nightly. dexlansoprazole (DEXILANT) 60 mg CpDB capsule (delayed release) Take 1 Cap by mouth daily. FLUoxetine (PROZAC) 40 mg capsule Take 40 mg by mouth daily. Refills: 1      fludrocortisone (FLORINEF) 0.1 mg tablet TAKE 1 TABLET BY MOUTH TWICE DAILY  Qty: 180 Tab, Refills: 1      atorvastatin (LIPITOR) 20 mg tablet TAKE 1 TABLET BY MOUTH EVERY EVENING  Qty: 90 Tab, Refills: 0      droxidopa (NORTHERA) 300 mg cap Take 600 mg by mouth two (2) times a day. Qty: 360 Cap, Refills: 3      potassium chloride (K-DUR, KLOR-CON) 20 mEq tablet Take 1 Tab by mouth daily. Qty: 90 Tab, Refills: 1      sucralfate (CARAFATE) 1 gram tablet TAKE 1 TABLET BY MOUTH FOUR TIMES DAILY  Qty: 360 Tab, Refills: 3    Comments: **Patient requests 90 days supply**  Associated Diagnoses: Epigastric abdominal pain      carbidopa-levodopa (SINEMET)  mg per tablet Take 1 Tab by mouth three (3) times daily. QUEtiapine (SEROQUEL) 25 mg tablet Take 50 mg by mouth nightly. Take 2 tabs qhs prn Soco Obando NP/Dr Dionicio Sanders      nystatin-triamcinolone [facility]) 100,000-0.1 unit/gram-% ointment Apply  to affected area three (3) times daily. acetaminophen (TYLENOL) 325 mg tablet Take 325 mg by mouth every four (4) hours as needed for Pain.              PENDING TEST RESULTS:   At the time of discharge the following test results are still pending:  finalization of urine culture    FOLLOW UP APPOINTMENTS:    Follow-up Information     Follow up With Specialties Details Why Contact Info    Lata Salazar MD Internal Medicine   [address] [facility]  [address] [place]  [phone]             ADDITIONAL CARE RECOMMENDATIONS:     DIET: Regular Diet and Cardiac Diet  ACTIVITY: Activity as tolerated and PT/OT per [address]: N/A    EQUIPMENT needed: She owns          NOTIFY YOUR PHYSICIAN FOR ANY OF THE FOLLOWING:   Fever over 101 degrees for 24 hours. Chest pain, shortness of breath, fever, chills, nausea, vomiting, diarrhea, change in mentation, falling, weakness, bleeding. Severe pain or pain not relieved by medications. Or, any other signs or symptoms that you may have questions about. DISPOSITION:  x  Home With:   OT x PT x HH  RN       SNF(Name)    Inpatient Rehab(name)    Independent/assisted living(name)    Hospice    Other:       PATIENT CONDITION AT DISCHARGE:     Functional status        Poor     Deconditioned    x Independent    Cognition       x Lucid    Forgetful    Dementia   Catheter/Lines(indications)     Jc    PICC    PEG   x None   Code status     x Full    DNR       PHYSICAL EXAMINATION AT DISCHARGE:     Visit Vitals  /81 (BP 1 Location: Left arm, BP Patient Position: At rest)   Pulse 64   Temp 98.6 °F (37 °C)   Resp 16   Ht 5' 3\" (1.6 m)   Wt 59.4 kg (130 lb 15.3 oz)   SpO2 95%   BMI 23.20 kg/m²      O2 Device: Room air    Temp (24hrs), Av.2 °F (36.8 °C), Min:98 °F (36.7 °C), Max:98.6 °F (37 °C)    No intake/output data recorded. No intake/output data recorded. GENERAL:  Alert, oriented, cooperative, no apparent distress  HEENT:  Normocephalic, atraumatic, non icteric sclerae, non pallor conjuctivae, EOMs intact, PERRLA. NECK: Supple, trachea midline, no adenopathy, no thyromegally or tenderness, no carotid bruit and no JVD.   LUNGS:   Vesicular breath sounds bilaterally, no added sounds. HEART:   S1 and S2 well heard,RRR,  no murmur, click, rub or gallop. ABDOMEB:   Soft, non-tender. Normoactive bowel sounds. No masses,  No organomegaly. EXTREMETIES:  Atraumatic, acyanotic, no edema  PULSES: 2+ and symmetric all extremities. SKIN:  No rashes or lesions  NEUROLOGY: Alert and oriented to PPT, CNII-XII intact. Motor and sensory exam grossly intact. CHRONIC MEDICAL DIAGNOSES:  Problem List as of 12/13/2019 Date Reviewed: 11/27/2019          Codes Class Noted - Resolved    Chest pain ICD-10-CM: R07.9  ICD-9-CM: 786.50  12/11/2019 - Present        Type 2 diabetes mellitus with diabetic neuropathy ([facility] ICD-10-CM: E11.40  ICD-9-CM: 250.60, 357.2  9/26/2019 - Present        Type 2 diabetes with nephropathy ([facility] ICD-10-CM: E11.21  ICD-9-CM: 250.40, 583.81  7/26/2018 - Present        Syncope ICD-10-CM: R55  ICD-9-CM: 780.2  5/28/2018 - Present        Spinal stenosis ICD-10-CM: M48.00  ICD-9-CM: 724.00  Unknown - Present        Near syncope ICD-10-CM: R55  ICD-9-CM: 780.2  8/2/2016 - Present    Overview Signed 8/2/2016  4:12 PM by Damion Cohn MD     Tilt test 8/2/2016: normal supine BP but abrupt drop in SBP consistent with orthostatic hypotension  Midodrine 5 mg tid has not improved.   Add mestinon 60 mg tid               Resting tremor ICD-10-CM: R25.9  ICD-9-CM: 781.0  7/29/2016 - Present        Orthostatic hypotension ICD-10-CM: I95.1  ICD-9-CM: 458.0  7/5/2016 - Present        Advance directive on file ICD-10-CM: Z78.9  ICD-9-CM: V49.89  5/24/2016 - Present        Mixed hyperlipidemia ICD-10-CM: E78.2  ICD-9-CM: 272.2  2/2/2016 - Present        Tubulovillous adenoma ICD-10-CM: D36.9  ICD-9-CM: 229.9  8/21/2012 - Present        Abnormal mammogram ICD-10-CM: R92.8  ICD-9-CM: 793.80  6/4/2012 - Present        Encounter for long-term (current) use of other medications ICD-10-CM: Z79.899  ICD-9-CM: V58.69  11/30/2011 - Present        Luis[place] ICD-10-CM: M20.40  ICD-9-CM: 735.4  9/12/2011 - Present        Allergic rhinitis, cause unspecified ICD-10-CM: J30.9  ICD-9-CM: 477.9  1/26/2011 - Present        Other diseases of nasal cavity and sinuses(478.19) ICD-10-CM: J34.89  ICD-9-CM: 478.19  7/19/2010 - Present        IBS (irritable bowel syndrome) ICD-10-CM: K58.9  ICD-9-CM: 564.1  Unknown - Present        RLS (restless legs syndrome) ICD-10-CM: G25.81  ICD-9-CM: 333.94  Unknown - Present        Bipolar disorder (HCC) ICD-10-CM: F31.9  ICD-9-CM: 296.80  Unknown - Present        Fibromyalgia ICD-10-CM: M79.7  ICD-9-CM: 729.1  Unknown - Present        DJD (degenerative joint disease) ICD-10-CM: M19.90  ICD-9-CM: 715.90  Unknown - Present        Lumbar disc disease ICD-10-CM: M51.9  ICD-9-CM: 722.93  Unknown - Present        Paget's disease of bone ICD-10-CM: M88.9  ICD-9-CM: 731.0  Unknown - Present        Migraine ICD-10-CM: 346  ICD-9-CM: 261  Unknown - Present        Genital herpes ICD-10-CM: A60.00  ICD-9-CM: 054.10  Unknown - Present        RESOLVED: GI bleed ICD-10-CM: K92.2  ICD-9-CM: 578.9  2/21/2019 - 2/22/2019        RESOLVED: Rectal bleeding ICD-10-CM: K62.5  ICD-9-CM: 569.3  6/25/2016 - 6/27/2016        RESOLVED: Rectal bleed ICD-10-CM: K62.5  ICD-9-CM: 569.3  6/25/2016 - 6/27/2016        RESOLVED: Pulmonary embolism ([address] ICD-10-CM: I26.99  ICD-9-CM: 415.19  Unknown - 9/18/2018    Overview Addendum 9/15/2016  8:39 AM by TAI Steward Dr 8/26/16  [facility] 5/7/16 small PE RLL    6/9/16 CTA [facility] normal CTA no PE    Hypercoagulable work up negative with th exception of low factor II at 72%  D dimer 0.22  Xarelto d/c'd 9/13/16             RESOLVED: Diabetes mellitus type 2, controlled, without complications ([facility] CCB-87-SC: E11.9  ICD-9-CM: 250.00  10/1/2015 - 9/18/2018        RESOLVED: DM w/o complication type II ([facility] ICD-10-CM: E11.9  ICD-9-CM: 250.00  11/30/2011 - 10/1/2015        RESOLVED: Hypercholesterolemia ICD-10-CM: E78.00  ICD-9-CM: 272.0  Unknown - 10/1/2015        RESOLVED: Diabetes ([facility] ICD-10-CM: E11.9  ICD-9-CM: 250.00  Unknown - 11/30/2011                  Signed:    Kali Corrales MD  12/13/2019  9:20 AM

## 2019-12-14 LAB
BACTERIA SPEC CULT: ABNORMAL
CC UR VC: ABNORMAL
SERVICE CMNT-IMP: ABNORMAL

## 2019-12-15 ENCOUNTER — HOME CARE VISIT (OUTPATIENT)
Dept: SCHEDULING | Facility: HOME HEALTH | Age: 74
End: 2019-12-15
Payer: MEDICARE

## 2019-12-15 VITALS
HEART RATE: 64 BPM | SYSTOLIC BLOOD PRESSURE: 122 MMHG | DIASTOLIC BLOOD PRESSURE: 70 MMHG | TEMPERATURE: 97.6 F | OXYGEN SATURATION: 98 % | RESPIRATION RATE: 16 BRPM

## 2019-12-15 PROCEDURE — 3331090001 HH PPS REVENUE CREDIT

## 2019-12-15 PROCEDURE — 3331090002 HH PPS REVENUE DEBIT

## 2019-12-15 PROCEDURE — G0299 HHS/HOSPICE OF RN EA 15 MIN: HCPCS

## 2019-12-15 PROCEDURE — 400013 HH SOC

## 2019-12-16 ENCOUNTER — TELEPHONE (OUTPATIENT)
Dept: INTERNAL MEDICINE CLINIC | Age: 74
End: 2019-12-16

## 2019-12-16 ENCOUNTER — HOME CARE VISIT (OUTPATIENT)
Dept: HOME HEALTH SERVICES | Facility: HOME HEALTH | Age: 74
End: 2019-12-16
Payer: MEDICARE

## 2019-12-16 ENCOUNTER — PATIENT OUTREACH (OUTPATIENT)
Dept: CARDIOLOGY CLINIC | Age: 74
End: 2019-12-16

## 2019-12-16 PROCEDURE — 3331090001 HH PPS REVENUE CREDIT

## 2019-12-16 PROCEDURE — 3331090002 HH PPS REVENUE DEBIT

## 2019-12-16 NOTE — TELEPHONE ENCOUNTER
Juan F Alcaraz [phone],[facility]. 280 will start seeing the patient this week

## 2019-12-16 NOTE — PROGRESS NOTES
Hospital Discharge Follow-Up      Date/Time:  2019 12:50 PM    Patient was admitted to [facility] on 19 and discharged on 19 for chest pain. The physician discharge summary was available at the time of outreach. Patient was contacted within 2 business days of discharge. Top Challenges reviewed with the provider   All workup for cardiac and respiratory chest pain in hospital negative. Patient discharged home on Ceftin for UTI--cx positive for ecoli on 19    Advance Care Planning:   Does patient have an Advance Directive:  reviewed and current        Method of communication with provider :chart routing    Inpatient RRAT score: 32  Was this a readmission? no       Care Transition Nurse (CTN) contacted the patient by telephone to perform post hospital discharge assessment. Verified name and  with patient as identifiers. Provided introduction to self, and explanation of the CTN role. Patient is not certain if she received hospital discharge instructions---states her daughter may have them. CTN reviewed discharge instructions and red flags with patient who verbalized understanding. Patient given an opportunity to ask questions and does not have any further questions or concerns at this time. The patient agrees to contact the PCP office for questions related to their healthcare. CTN provided contact information for future reference.     Disease Specific:   Chest Pain, however is being treated for UTI    Patients top risk factors for readmission:  depression, functional cognitive ability, ineffective coping, lack of knowledge about disease, level of motivation, medical condition, medication management    Home Health orders at discharge: [facility] [address]: JERICHO RAPHAEL [facility]  Date of initial visit: 12/15/19    Durable Medical Equipment ordered at discharge: none      Medication(s):   New Medications at Discharge: Ceftin for 3 days  Changed Medications at Discharge: none  Discontinued Medications at Discharge: none    Medication reconciliation was not performed with patient, left message with patient's daughter who manages medications. There were no barriers to obtaining medications identified at this time. Referral to Pharm D needed: no     Current Outpatient Medications   Medication Sig    clindamycin (CLEOCIN) 2 % vaginal cream Insert 1 Applicator into vagina nightly.  cefUROXime (CEFTIN) 250 mg tablet Take 1 Tab by mouth every twelve (12) hours for 3 days.  pyridostigmine (MESTINON) 60 mg tablet TAKE 1 TABLET BY MOUTH THREE TIMES DAILY    gabapentin (NEURONTIN) 300 mg capsule Take 300 mg by mouth three (3) times daily. 300mg with breakfast and lunch, 600mg at dinner    donepezil (ARICEPT) 5 mg tablet Take 5 mg by mouth nightly.  nystatin-triamcinolone (MYCOLOG) 100,000-0.1 unit/gram-% ointment Apply  to affected area three (3) times daily.  dexlansoprazole (DEXILANT) 60 mg CpDB capsule (delayed release) Take 1 Cap by mouth daily.  FLUoxetine (PROZAC) 40 mg capsule Take 40 mg by mouth daily.  fludrocortisone (FLORINEF) 0.1 mg tablet TAKE 1 TABLET BY MOUTH TWICE DAILY    atorvastatin (LIPITOR) 20 mg tablet TAKE 1 TABLET BY MOUTH EVERY EVENING    droxidopa (NORTHERA) 300 mg cap Take 600 mg by mouth two (2) times a day. (Patient taking differently: Take 1,200 mg by mouth two (2) times a day.)    potassium chloride (K-DUR, KLOR-CON) 20 mEq tablet Take 1 Tab by mouth daily.  sucralfate (CARAFATE) 1 gram tablet TAKE 1 TABLET BY MOUTH FOUR TIMES DAILY    carbidopa-levodopa (SINEMET)  mg per tablet Take 1 Tab by mouth three (3) times daily.  acetaminophen (TYLENOL) 325 mg tablet Take 325 mg by mouth every four (4) hours as needed for Pain.  QUEtiapine (SEROQUEL) 25 mg tablet Take 50 mg by mouth nightly. Take 2 tabs qhs prn Antoine Brady NP/Dr Rosio Farah     No current facility-administered medications for this visit.         There are no discontinued medications. [facility] follow up appointment(s):   Future Appointments   Date Time Provider Reji Smith   12/17/2019 To Be Determined Hernandezdeneen Reyes [address]   12/19/2019 To Be Determined Mary Flynn LPN [facility] [place] [address]   12/24/2019 To Be Determined Mary Flynn LPN [address]   12/26/2019 To Be Determined Mary Flynn LPN [address]   12/31/2019 To Be Determined Mary Flynn LPN [address]   1/2/2020 To Be Determined Mary Flynn LPN [address]   1/7/2020 To Be Determined Mary Flynn LPN [address]   1/9/2020 To Be Determined Mary Flynn LPN [address]   1/15/2020 To Be Determined Mary Flynn LPN [address]   1/22/2020 To Be Determined Mary Flynn LPN [address]   1/29/2020 To Be Determined Saadia Avery RN [address]   2/18/2020  8:40 AM Freeman Luke  [address]      Non-[facility] follow up appointment(s): none at this time  [facility]:  information provided as a resource       Goals        Patient Stated     relief of physical symptoms and attend follow ups (pt-stated)      11/15/19 Patient VM full. Contacted patient's daughter, Dhruv Chang. CHARMAINE in chart. Daughter reports:  - Patient is same. Continues with burning in chest and Neuro has MRI scheduled for next week. Thought that complaints may be connected to back issue.   - ACM offered to schedule appointment with PCP. Daughter declined. They will set something up if they think it is necessary after results of MRI are known. - contact information supplied.  ACM will FU with patient after MRI  Kathy Godoy, BECK  Ambulatory Care Manager    10/21/19   Skills and education necessary to properly manage symptoms of vaginal burning and importance of following up with providers  History: LOV 10/11/19 diagnosis of vaginal burning, neuropathy and hypomanic  Current level of understanding: Patient appears to be unaware of cause of burning sensation. States: \"May be fibromyalgia\" is mentioned only once and not brought up again. More focused on symptoms. Education provided on alternatives such as exam by gynecologist and giving time for adjusted dosage of gabapentin to work. Desired Outcome: Patient will be free of vaginal burning within 1 month; will consider and discuss gynecologist FU with daughter by next outreach, and take medications as directed  Plan: Follow patient for the next 30 days with weekly outreaches to provide education and support. Re-evaluate at the end of 30 days to determine if patient needs additional Care Management. Encourage NP advise of trying to leave vaginal area open to air at night (less adult brief contact time with skin), follow up with specialist and give increased dose of gabapentin time to work. Itz Abbasi RN         Other     Prevent complications post hospitalization. 12/16/19  CTN unable to reach pt on phone, unable to LM as recording states Mailbox is full. Attempted to contact guardian, DEONDRE on VM requesting return call---mkrw    12/17/19  CTN was able to reach patient today to review discharge instructions and red flags. Appts:    PCP Dr Hill Ortega   ---Patient gave CTN permission to make appt. CTN scheduled appt for 12/19/19 at 9:30am. When CTN called patient back to give her information, she asked that I contact daughter as well. CTN LM on VM of pt's daughter providing detailed message regarding appt day/time. Asked daughter for return call to confirm. Patient states she has an appt this morning with Heme/Onc Dr Jose Raul Burleson at 10:00 to review results of tests. She can not tell CTN why she sees this MD other than to report it is for a blood disorder, not cancer. [facility] is scheduled to see pt today. Red Flags:    Pt reports that she is taking her abx as ordered and that her daughter takes care of her medications. Unable to perform med rec with pt.  As stated above, CTN LM for daughter to call.    Patient reports she continues to have pain in arm, side that she had when she presented to the hospital. Patient was admitted with CP --tests r/o cardiac and pulmonary sources. Patient to discuss with PCP this Thursday at appt. Reviewed s/sx of UTI and hygiene with patient to help prevent reinfection, pt verbalizes understanding.     CTN will follow up next week with pt for updates---varsha

## 2019-12-16 NOTE — TELEPHONE ENCOUNTER
Jluis Ovalles from [facility]. Patient had recent ER for chest pain. Cardiac testing neg. UTI-treatment. Vaginal yeast infection, pain follow by GYN. Fibromyalgia in feet, trouble with balance. Nursing ordered for med teaching, safety and sign and sx of UTI. Daughter keeps meds. PT ordered. This week 3x, followed by 2/week for 3 weeks, 3/week x 3 week.

## 2019-12-17 ENCOUNTER — HOME CARE VISIT (OUTPATIENT)
Dept: HOME HEALTH SERVICES | Facility: HOME HEALTH | Age: 74
End: 2019-12-17
Payer: MEDICARE

## 2019-12-17 ENCOUNTER — HOME CARE VISIT (OUTPATIENT)
Dept: SCHEDULING | Facility: HOME HEALTH | Age: 74
End: 2019-12-17
Payer: MEDICARE

## 2019-12-17 PROCEDURE — G0299 HHS/HOSPICE OF RN EA 15 MIN: HCPCS

## 2019-12-17 PROCEDURE — 3331090002 HH PPS REVENUE DEBIT

## 2019-12-17 PROCEDURE — 3331090001 HH PPS REVENUE CREDIT

## 2019-12-18 ENCOUNTER — PATIENT OUTREACH (OUTPATIENT)
Dept: CARDIOLOGY CLINIC | Age: 74
End: 2019-12-18

## 2019-12-18 ENCOUNTER — HOSPITAL ENCOUNTER (EMERGENCY)
Age: 74
Discharge: HOME | End: 2019-12-18
Attending: EMERGENCY MEDICINE
Payer: MEDICARE

## 2019-12-18 VITALS
HEART RATE: 60 BPM | OXYGEN SATURATION: 99 % | RESPIRATION RATE: 16 BRPM | SYSTOLIC BLOOD PRESSURE: 165 MMHG | DIASTOLIC BLOOD PRESSURE: 82 MMHG | TEMPERATURE: 97.9 F

## 2019-12-18 VITALS
RESPIRATION RATE: 16 BRPM | DIASTOLIC BLOOD PRESSURE: 73 MMHG | TEMPERATURE: 99.4 F | SYSTOLIC BLOOD PRESSURE: 145 MMHG | OXYGEN SATURATION: 97 % | HEART RATE: 67 BPM

## 2019-12-18 DIAGNOSIS — R30.0 DYSURIA: Primary | ICD-10-CM

## 2019-12-18 DIAGNOSIS — N94.9 VAGINAL BURNING: ICD-10-CM

## 2019-12-18 LAB
AMORPH CRY URNS QL MICRO: ABNORMAL
APPEARANCE UR: ABNORMAL
BACTERIA URNS QL MICRO: ABNORMAL /HPF
BILIRUB UR QL: NEGATIVE
COLOR UR: ABNORMAL
EPITH CASTS URNS QL MICRO: ABNORMAL /LPF
GLUCOSE UR STRIP.AUTO-MCNC: NEGATIVE MG/DL
HGB UR QL STRIP: NEGATIVE
HYALINE CASTS URNS QL MICRO: ABNORMAL /LPF (ref 0–5)
KETONES UR QL STRIP.AUTO: NEGATIVE MG/DL
LEUKOCYTE ESTERASE UR QL STRIP.AUTO: ABNORMAL
NITRITE UR QL STRIP.AUTO: NEGATIVE
PH UR STRIP: 7.5 [PH] (ref 5–8)
PROT UR STRIP-MCNC: NEGATIVE MG/DL
RBC #/AREA URNS HPF: ABNORMAL /HPF (ref 0–5)
SP GR UR REFRACTOMETRY: 1.01 (ref 1–1.03)
UR CULT HOLD, URHOLD: NORMAL
UROBILINOGEN UR QL STRIP.AUTO: 0.2 EU/DL (ref 0.2–1)
WBC URNS QL MICRO: ABNORMAL /HPF (ref 0–4)

## 2019-12-18 PROCEDURE — 87077 CULTURE AEROBIC IDENTIFY: CPT

## 2019-12-18 PROCEDURE — 87186 SC STD MICRODIL/AGAR DIL: CPT

## 2019-12-18 PROCEDURE — 99284 EMERGENCY DEPT VISIT MOD MDM: CPT

## 2019-12-18 PROCEDURE — 87086 URINE CULTURE/COLONY COUNT: CPT

## 2019-12-18 PROCEDURE — 3331090001 HH PPS REVENUE CREDIT

## 2019-12-18 PROCEDURE — 81001 URINALYSIS AUTO W/SCOPE: CPT

## 2019-12-18 PROCEDURE — 3331090002 HH PPS REVENUE DEBIT

## 2019-12-18 RX ORDER — CEPHALEXIN 500 MG/1
500 CAPSULE ORAL 3 TIMES DAILY
Qty: 21 CAP | Refills: 0 | Status: SHIPPED | OUTPATIENT
Start: 2019-12-18 | End: 2019-12-25

## 2019-12-18 NOTE — ED TRIAGE NOTES
Arrives via EMS from home for generalized abdominal pain and flank pain with dysuria. Denies fevers. +Poor appetite. Dx with UTI last week.

## 2019-12-18 NOTE — ED PROVIDER NOTES
75 y.o. female with past medical history significant for IBS, RLS, bipolar disorder, genital herpes, Paget's disease, DJD, migraine, hypercholesterolemia, diabetes, GERD, pulmonary embolism, Parkinson disease and fibromyligia who presents from home via personal vehicle with chief complaint of flank pain. Pt states she has had right sided flank pain for several weeks. Pt states she has continuous dysuria and a continuous burning in her urethra and vaginal area. Pt states she believes she has another UTI. There are no other acute medical concerns at this time. Old Chart Review: Pt was recently admitted to [facility] from 12/11/2019-12/13/2019 for CP. It was thought that the pt may of had nephritis but the pt's CT and ultrasound did not show evidence of this. On 12/12/2019 the pt had another urine culture that grew >100,000 colonies of E. Coli susceptible to all tested antibiotics. On 12/01/2019 pt had a urine culture that grew 8,000 colonies of E. Coli and mixed urogenital ryder. Pt had a wet prep on 11/23/2019 that showed absent clue cells, and had no yeast seen. Social hx: No tobacco use, No EtOH use    PCP: Madison Mcfarlane MD    Note written by Suze Ladd. Nellyri Elissa, as dictated by Lucia Rees MD 12:11 PM      The history is provided by the patient and medical records. No  was used.         Past Medical History:   Diagnosis Date    Bipolar disorder ([address]     Nazmy    Chronic pain     BACK PAIN    Diabetes ([address]     BORDERLINE TX WITH DIET AND EXERCISE    DJD (degenerative joint disease)     Fibromyalgia     Genital herpes     GERD (gastroesophageal reflux disease)     Hypercholesterolemia     IBS (irritable bowel syndrome)     Ill-defined condition     fibromyligia    Lumbar disc disease     stimulation-Honza    Migraine     Nausea & vomiting     Other ill-defined conditions(799.89)     SEASONAL allergies    Other ill-defined conditions(799.89)     dysphagia has had esophagus dilated    Paget's disease     Parkinson disease [facility]     Pulmonary embolism (HCC)     RLS (restless legs syndrome)     Spinal stenosis        Past Surgical History:   Procedure Laterality Date    COLONOSCOPY N/A 6/27/2016    COLONOSCOPY performed by Mikey Ayala MD at [facility]    COLONOSCOPY N/A 2/22/2019    COLONOSCOPY performed by Tammi Hall MD at [facility]    ENDOSCOPY, COLON, DIAGNOSTIC      12, due 13    HX APPENDECTOMY      HX BACK SURGERY  9/17/2013    back surgery - total of 3 as of 12/20/2013    HX BREAST BIOPSY Right years ago    negative    HX CHOLECYSTECTOMY      HX HEENT      T & A    HX HYSTERECTOMY      total for fibroid tumors    HX ORTHOPAEDIC      lumbar laminectomy then fusion/neurostimulator implanted - inactive now but still in    HX ORTHOPAEDIC      REMOVAL OF NEUROSTIMULATOR    HX OTHER SURGICAL      EGD x 2 with dilation/colonoscopy - no polyps per pt    TILT TABLE EVALUATION  8/2/2016              Family History:   Problem Relation Age of Onset    Colon Cancer Mother     Cancer Mother 68        colon    Heart Disease Father     Heart Disease Maternal Grandmother     Heart Disease Maternal Grandfather     Heart Disease Other        Social History     Socioeconomic History    Marital status:      Spouse name: Not on file    Number of children: Not on file    Years of education: Not on file    Highest education level: Not on file   Occupational History    Occupation: volunteer     Employer: RETIRED     Comment: [employer]   Social Needs    Financial resource strain: Not on file    Food insecurity:     Worry: Not on file     Inability: Not on file    Transportation needs:     Medical: Not on file     Non-medical: Not on file   Tobacco Use    Smoking status: Never Smoker    Smokeless tobacco: Never Used   Substance and Sexual Activity    Alcohol use: No     Comment: 1 per month    Drug use: No    Sexual activity: Not on file Lifestyle    Physical activity:     Days per week: Not on file     Minutes per session: Not on file    Stress: Not on file   Relationships    Social connections:     Talks on phone: Not on file     Gets together: Not on file     Attends Denominational service: Not on file     Active member of club or organization: Not on file     Attends meetings of clubs or organizations: Not on file     Relationship status: Not on file    Intimate partner violence:     Fear of current or ex partner: Not on file     Emotionally abused: Not on file     Physically abused: Not on file     Forced sexual activity: Not on file   Other Topics Concern    Not on file   Social History Narrative    Not on file         ALLERGIES: Adhesive; Hydrocodone; Lorazepam; Other medication; Percocet [oxycodone-acetaminophen]; Sudafed [pseudoephedrine hcl]; Wellbutrin [bupropion hcl]; Zithromax [azithromycin]; and Zyrtec [cetirizine]    Review of Systems   Constitutional: Negative for fever. Eyes: Negative for visual disturbance. Respiratory: Negative for cough, shortness of breath and wheezing. Cardiovascular: Negative for chest pain and leg swelling. Gastrointestinal: Negative for abdominal pain, diarrhea, nausea and vomiting. Genitourinary: Positive for dysuria and flank pain. Musculoskeletal: Negative for back pain and neck stiffness. Skin: Negative for rash. Neurological: Negative. Negative for syncope and headaches. Psychiatric/Behavioral: Negative for confusion. All other systems reviewed and are negative. Vitals:    12/18/19 1118   BP: 149/89   Pulse: 63   Resp: 18   Temp: 98 °F (36.7 °C)   SpO2: 100%            Physical Exam  Vitals signs and nursing note reviewed. Constitutional:       Appearance: She is well-developed. HENT:      Head: Normocephalic and atraumatic. Eyes:      Pupils: Pupils are equal, round, and reactive to light. Neck:      Musculoskeletal: Normal range of motion and neck supple. Cardiovascular:      Rate and Rhythm: Normal rate and regular rhythm. Heart sounds: Normal heart sounds. No murmur. No friction rub. No gallop. Pulmonary:      Effort: Pulmonary effort is normal. No respiratory distress. Breath sounds: No wheezing or rales. Abdominal:      Palpations: Abdomen is soft. Tenderness: There is no tenderness. There is no rebound. Musculoskeletal: Normal range of motion. General: No tenderness. Skin:     Findings: No erythema. Neurological:      Mental Status: She is alert. Cranial Nerves: No cranial nerve deficit. Comments: Motor; symmetric. Pt has an intention tremor. Psychiatric:         Behavior: Behavior normal.        Note written by Luis Eduardo Mackey, as dictated by Sylvester Jo MD 12:32 PM    MDM       Procedures

## 2019-12-18 NOTE — DISCHARGE INSTRUCTIONS
Patient Education        Painful Urination (Dysuria): Care Instructions  Your Care Instructions  Burning pain with urination (dysuria) is a common symptom of a urinary tract infection or other urinary problems. The bladder may become inflamed. This can cause pain when the bladder fills and empties. You may also feel pain if the tube that carries urine from the bladder to the outside of the body (urethra) gets irritated or infected. Sexually transmitted infections (STIs) also may cause pain when you urinate. Sometimes the pain can be caused by things other than an infection. The urethra can be irritated by soaps, perfumes, or foreign objects in the urethra. Kidney stones can cause pain when they pass through the urethra. The cause may be hard to find. You may need tests. Treatment for painful urination depends on the cause. Follow-up care is a key part of your treatment and safety. Be sure to make and go to all appointments, and call your doctor if you are having problems. It's also a good idea to know your test results and keep a list of the medicines you take. How can you care for yourself at home? · Drink extra water for the next day or two. This will help make the urine less concentrated. (If you have kidney, heart, or liver disease and have to limit fluids, talk with your doctor before you increase the amount of fluids you drink.)  · Avoid drinks that are carbonated or have caffeine. They can irritate the bladder. · Urinate often. Try to empty your bladder each time. For women:  · Urinate right after you have sex. · After going to the bathroom, wipe from front to back. · Avoid douches, bubble baths, and feminine hygiene sprays. And avoid other feminine hygiene products that have deodorants. When should you call for help? Call your doctor now or seek immediate medical care if:    · You have new symptoms, such as fever, nausea, or vomiting.     · You have new or worse symptoms of a urinary problem.  For example:  ? You have blood or pus in your urine. ? You have chills or body aches. ? It hurts worse to urinate. ? You have groin or belly pain. ? You have pain in your back just below your rib cage (the flank area).    Watch closely for changes in your health, and be sure to contact your doctor if you have any problems. Where can you learn more? Go to http://ash-jeronimo.info/. Enter K397 in the search box to learn more about \"Painful Urination (Dysuria): Care Instructions. \"  Current as of: December 19, 2018  Content Version: 12.2  © 9479-7566 [employer]. Care instructions adapted under license by [facility] (which disclaims liability or warranty for this information). If you have questions about a medical condition or this instruction, always ask your healthcare professional. [address] any warranty or liability for your use of this information.

## 2019-12-18 NOTE — PROGRESS NOTES
Goals Addressed                 This Visit's Progress     Prevent complications post hospitalization. 12/16/19  CTN unable to reach pt on phone, unable to LM as recording states Mailbox is full. Attempted to contact guardian, DEONDRE on VM requesting return call---rw    12/17/19  CTN was able to reach patient today to review discharge instructions and red flags. Appts:    PCP Dr Zee Florian   ---Patient gave CTN permission to make appt. CTN scheduled appt for 12/19/19 at 9:30am. When CTN called patient back to give her information, she asked that I contact daughter as well. CTN LM on VM of pt's daughter providing detailed message regarding appt day/time. Asked daughter for return call to confirm. Patient states she has an appt this morning with Heme/Onc Dr Jeanette Berman at 10:00 to review results of tests. She can not tell CTN why she sees this MD other than to report it is for a blood disorder, not cancer. [facility] is scheduled to see pt today. Red Flags:    Pt reports that she is taking her abx as ordered and that her daughter takes care of her medications. Unable to perform med rec with pt. As stated above, CTN LM for daughter to call. Per [facility] RN Note 12/15/19, she was unable to perform med rec as well due to med bottles not in pt's home. She did not get return call from daughter. CTN will notify PCP to see if med rec can be performed in office on Thursday if no return call received. Patient reports she continues to have pain in arm, side that she had when she presented to the hospital. Patient was admitted with CP --tests r/o cardiac and pulmonary sources. Patient to discuss with PCP this Thursday at appt. Reviewed s/sx of UTI and hygiene with patient to help prevent reinfection, pt verbalizes understanding.     CTN will follow up next week with pt for updates[facility]    12/18/19  CTN received SM back from Junior Nieto, PCP nurse stating that she will perform med rec with daughter when pt comes to appt. ---varsha

## 2019-12-19 ENCOUNTER — HOME CARE VISIT (OUTPATIENT)
Dept: SCHEDULING | Facility: HOME HEALTH | Age: 74
End: 2019-12-19
Payer: MEDICARE

## 2019-12-19 ENCOUNTER — OFFICE VISIT (OUTPATIENT)
Dept: INTERNAL MEDICINE CLINIC | Age: 74
End: 2019-12-19

## 2019-12-19 VITALS
TEMPERATURE: 97.3 F | BODY MASS INDEX: 23.5 KG/M2 | RESPIRATION RATE: 16 BRPM | WEIGHT: 132.6 LBS | HEIGHT: 63 IN | OXYGEN SATURATION: 100 % | SYSTOLIC BLOOD PRESSURE: 102 MMHG | DIASTOLIC BLOOD PRESSURE: 68 MMHG | HEART RATE: 71 BPM

## 2019-12-19 VITALS
SYSTOLIC BLOOD PRESSURE: 137 MMHG | TEMPERATURE: 98.6 F | HEART RATE: 62 BPM | DIASTOLIC BLOOD PRESSURE: 69 MMHG | RESPIRATION RATE: 16 BRPM | OXYGEN SATURATION: 99 %

## 2019-12-19 VITALS
DIASTOLIC BLOOD PRESSURE: 70 MMHG | TEMPERATURE: 98 F | OXYGEN SATURATION: 97 % | SYSTOLIC BLOOD PRESSURE: 129 MMHG | HEART RATE: 75 BPM | RESPIRATION RATE: 17 BRPM

## 2019-12-19 DIAGNOSIS — N94.9 VAGINAL BURNING: ICD-10-CM

## 2019-12-19 DIAGNOSIS — G62.9 NEUROPATHY: ICD-10-CM

## 2019-12-19 DIAGNOSIS — E11.21 TYPE 2 DIABETES WITH NEPHROPATHY (HCC): Primary | ICD-10-CM

## 2019-12-19 DIAGNOSIS — N39.0 RECURRENT UTI: ICD-10-CM

## 2019-12-19 DIAGNOSIS — F31.0 BIPOLAR AFFECTIVE DISORDER, CURRENT EPISODE HYPOMANIC (HCC): ICD-10-CM

## 2019-12-19 DIAGNOSIS — R07.9 CHEST PAIN, UNSPECIFIED TYPE: ICD-10-CM

## 2019-12-19 PROCEDURE — G0151 HHCP-SERV OF PT,EA 15 MIN: HCPCS

## 2019-12-19 PROCEDURE — 3331090002 HH PPS REVENUE DEBIT

## 2019-12-19 PROCEDURE — G0299 HHS/HOSPICE OF RN EA 15 MIN: HCPCS

## 2019-12-19 PROCEDURE — 3331090001 HH PPS REVENUE CREDIT

## 2019-12-19 RX ORDER — GABAPENTIN 300 MG/1
CAPSULE ORAL
Qty: 150 CAP | Refills: 5 | Status: SHIPPED | OUTPATIENT
Start: 2019-12-19

## 2019-12-19 NOTE — PROGRESS NOTES
HISTORY OF PRESENT ILLNESS  John Chu is a 75 y.o. female. HPI Subjective:  Tolbert Canavan is seen today accompanied by her daughter for hospital follow up for recent admission with chest pains. She was admitted on 12/11/19 and discharged on 12/13/19. Nurse navigator contact was within two business days and the visit is today, the 19th. This represents a transitional care visit. I reviewed the available electronic records, as well as nurse navigator note and discharge summary. 1. Chest pain. She has a longstanding history of noncardiac chest pain. She also had PE ruled out. It appears her pain is likely more musculoskeletal.  She also has a history of fibromyalgia. 2. UTI. She was discharged on Cefuroxime. The bacteria was sensitive to this. She went to the ER yesterday with recurrent symptoms and once again appears to have a UTI. Keflex was prescribed. I will check for the urine culture results to make sure this is an appropriate antibiotic. 3. Chronic vaginal pain, bipolar disorder, MGUS. She is up to date with specialists. Mention was made in the hematology note about a possible bone marrow biopsy. Medications:  Reviewed and she takes Tylenol for the pain. See below for full med list.    Social History:  Notable for this being a face to face visit for home health. I think skilled nursing is indicated, as well as PT, etc.    This was an extended visit of high complexity. Current Outpatient Medications   Medication Sig    gabapentin (NEURONTIN) 300 mg capsule 600mg with breakfast, 300 mg with  lunch, 600mg at dinner- new directions  Indications: Neuropathic Pain    cephALEXin (KEFLEX) 500 mg capsule Take 1 Cap by mouth three (3) times daily for 7 days.  clindamycin (CLEOCIN) 2 % vaginal cream Insert 1 Applicator into vagina nightly.  pyridostigmine (MESTINON) 60 mg tablet TAKE 1 TABLET BY MOUTH THREE TIMES DAILY    donepezil (ARICEPT) 5 mg tablet Take 5 mg by mouth nightly.     nystatin-triamcinolone (MYCOLOG) 100,000-0.1 unit/gram-% ointment Apply  to affected area three (3) times daily.  dexlansoprazole (DEXILANT) 60 mg CpDB capsule (delayed release) Take 1 Cap by mouth daily.  FLUoxetine (PROZAC) 40 mg capsule Take 40 mg by mouth daily.  fludrocortisone (FLORINEF) 0.1 mg tablet TAKE 1 TABLET BY MOUTH TWICE DAILY    atorvastatin (LIPITOR) 20 mg tablet TAKE 1 TABLET BY MOUTH EVERY EVENING    droxidopa (NORTHERA) 300 mg cap Take 600 mg by mouth two (2) times a day. (Patient taking differently: Take 1,200 mg by mouth two (2) times a day.)    potassium chloride (K-DUR, KLOR-CON) 20 mEq tablet Take 1 Tab by mouth daily.  sucralfate (CARAFATE) 1 gram tablet TAKE 1 TABLET BY MOUTH FOUR TIMES DAILY    carbidopa-levodopa (SINEMET)  mg per tablet Take 1 Tab by mouth three (3) times daily.  acetaminophen (TYLENOL) 325 mg tablet Take 325 mg by mouth every four (4) hours as needed for Pain.  QUEtiapine (SEROQUEL) 25 mg tablet Take 50 mg by mouth nightly. Take 2 tabs qhs prn Dago Romo NP/Dr Sumit Portillo     No current facility-administered medications for this visit. Review of Systems   Respiratory: Negative for shortness of breath. Cardiovascular: Negative for orthopnea and leg swelling. Gastrointestinal: Positive for heartburn. Genitourinary: Positive for frequency. Musculoskeletal: Positive for back pain. Psychiatric/Behavioral: Positive for depression. All other systems reviewed and are negative. Physical Exam  Vitals signs and nursing note reviewed. Constitutional:       General: She is not in acute distress. Cardiovascular:      Rate and Rhythm: Normal rate and regular rhythm. Heart sounds: No murmur. No friction rub. No gallop. Pulmonary:      Effort: Pulmonary effort is normal.      Breath sounds: Normal breath sounds. Musculoskeletal:      Right shoulder: She exhibits normal range of motion and no bony tenderness. Arms:    Neurological:      Mental Status: She is oriented to person, place, and time. Psychiatric:      Comments: Flat affect         ASSESSMENT and PLAN  Diagnoses and all orders for this visit:    1. Type 2 diabetes with nephropathy (HCC)  -     MICROALBUMIN, UR, RAND W/ MICROALB/CREAT RATIO    2. Chest pain, unspecified type- Follow off treatment     3. Bipolar affective disorder, current episode hypomanic [facility])- See psychiatrist as directed     4. Vaginal burning  -     gabapentin (NEURONTIN) 300 mg capsule; 600mg with breakfast, 300 mg with  lunch, 600mg at dinner- new directions  Indications: Neuropathic Pain  - See gynecologist as discussed/directed     5. Recurrent UTI  -     CULTURE, URINE  -     URINALYSIS W/ RFLX MICROSCOPIC    6.  Neuropathy  -     gabapentin (NEURONTIN) 300 mg capsule; 600mg with breakfast, 300 mg with  lunch, 600mg at dinner- new directions  Indications: Neuropathic Pain    Plan was reviewed with patient and family, understanding expressed

## 2019-12-19 NOTE — PROGRESS NOTES
Pt was admitted to [address] [place] [address] on 12/11/19 and discharged on 12/13/19 for chest pain.

## 2019-12-20 LAB
BACTERIA SPEC CULT: ABNORMAL
CC UR VC: ABNORMAL
SERVICE CMNT-IMP: ABNORMAL

## 2019-12-20 PROCEDURE — 3331090002 HH PPS REVENUE DEBIT

## 2019-12-20 PROCEDURE — 3331090001 HH PPS REVENUE CREDIT

## 2019-12-21 ENCOUNTER — TELEPHONE (OUTPATIENT)
Dept: INTERNAL MEDICINE CLINIC | Age: 74
End: 2019-12-21

## 2019-12-21 PROCEDURE — 3331090002 HH PPS REVENUE DEBIT

## 2019-12-21 PROCEDURE — 3331090001 HH PPS REVENUE CREDIT

## 2019-12-21 RX ORDER — NITROFURANTOIN 25; 75 MG/1; MG/1
100 CAPSULE ORAL 2 TIMES DAILY
Qty: 14 CAP | Refills: 0 | Status: SHIPPED | OUTPATIENT
Start: 2019-12-21 | End: 2019-12-28

## 2019-12-22 PROCEDURE — 3331090001 HH PPS REVENUE CREDIT

## 2019-12-22 PROCEDURE — 3331090002 HH PPS REVENUE DEBIT

## 2019-12-23 ENCOUNTER — HOME CARE VISIT (OUTPATIENT)
Dept: SCHEDULING | Facility: HOME HEALTH | Age: 74
End: 2019-12-23
Payer: MEDICARE

## 2019-12-23 ENCOUNTER — HOME CARE VISIT (OUTPATIENT)
Dept: HOME HEALTH SERVICES | Facility: HOME HEALTH | Age: 74
End: 2019-12-23
Payer: MEDICARE

## 2019-12-23 PROCEDURE — 3331090001 HH PPS REVENUE CREDIT

## 2019-12-23 PROCEDURE — 3331090002 HH PPS REVENUE DEBIT

## 2019-12-24 PROCEDURE — 3331090001 HH PPS REVENUE CREDIT

## 2019-12-24 PROCEDURE — 3331090002 HH PPS REVENUE DEBIT

## 2019-12-25 PROCEDURE — 3331090002 HH PPS REVENUE DEBIT

## 2019-12-25 PROCEDURE — 3331090001 HH PPS REVENUE CREDIT

## 2019-12-26 ENCOUNTER — HOME CARE VISIT (OUTPATIENT)
Dept: SCHEDULING | Facility: HOME HEALTH | Age: 74
End: 2019-12-26
Payer: MEDICARE

## 2019-12-26 PROCEDURE — 3331090002 HH PPS REVENUE DEBIT

## 2019-12-26 PROCEDURE — 3331090001 HH PPS REVENUE CREDIT

## 2019-12-27 PROCEDURE — 3331090001 HH PPS REVENUE CREDIT

## 2019-12-27 PROCEDURE — 3331090002 HH PPS REVENUE DEBIT

## 2019-12-28 PROCEDURE — 3331090002 HH PPS REVENUE DEBIT

## 2019-12-28 PROCEDURE — 3331090001 HH PPS REVENUE CREDIT

## 2019-12-29 PROCEDURE — 3331090002 HH PPS REVENUE DEBIT

## 2019-12-29 PROCEDURE — 3331090001 HH PPS REVENUE CREDIT

## 2019-12-30 ENCOUNTER — HOME CARE VISIT (OUTPATIENT)
Dept: HOME HEALTH SERVICES | Facility: HOME HEALTH | Age: 74
End: 2019-12-30
Payer: MEDICARE

## 2019-12-30 PROCEDURE — 3331090002 HH PPS REVENUE DEBIT

## 2019-12-30 PROCEDURE — 3331090001 HH PPS REVENUE CREDIT

## 2019-12-31 ENCOUNTER — HOME CARE VISIT (OUTPATIENT)
Dept: HOME HEALTH SERVICES | Facility: HOME HEALTH | Age: 74
End: 2019-12-31
Payer: MEDICARE

## 2019-12-31 PROCEDURE — 3331090001 HH PPS REVENUE CREDIT

## 2019-12-31 PROCEDURE — 3331090002 HH PPS REVENUE DEBIT

## 2020-01-01 PROCEDURE — 3331090001 HH PPS REVENUE CREDIT

## 2020-01-01 PROCEDURE — 3331090002 HH PPS REVENUE DEBIT

## 2020-01-02 ENCOUNTER — HOME CARE VISIT (OUTPATIENT)
Dept: SCHEDULING | Facility: HOME HEALTH | Age: 75
End: 2020-01-02
Payer: MEDICARE

## 2020-01-02 VITALS
TEMPERATURE: 98.8 F | OXYGEN SATURATION: 98 % | HEART RATE: 64 BPM | SYSTOLIC BLOOD PRESSURE: 126 MMHG | DIASTOLIC BLOOD PRESSURE: 100 MMHG

## 2020-01-02 PROCEDURE — 3331090002 HH PPS REVENUE DEBIT

## 2020-01-02 PROCEDURE — G0299 HHS/HOSPICE OF RN EA 15 MIN: HCPCS

## 2020-01-02 PROCEDURE — 3331090001 HH PPS REVENUE CREDIT

## 2020-01-03 ENCOUNTER — HOME CARE VISIT (OUTPATIENT)
Dept: HOME HEALTH SERVICES | Facility: HOME HEALTH | Age: 75
End: 2020-01-03
Payer: MEDICARE

## 2020-01-03 PROCEDURE — 3331090001 HH PPS REVENUE CREDIT

## 2020-01-03 PROCEDURE — 3331090002 HH PPS REVENUE DEBIT

## 2020-01-04 PROCEDURE — 3331090001 HH PPS REVENUE CREDIT

## 2020-01-04 PROCEDURE — 3331090002 HH PPS REVENUE DEBIT

## 2020-01-05 PROCEDURE — 3331090001 HH PPS REVENUE CREDIT

## 2020-01-05 PROCEDURE — 3331090002 HH PPS REVENUE DEBIT

## 2020-01-06 ENCOUNTER — HOME CARE VISIT (OUTPATIENT)
Dept: SCHEDULING | Facility: HOME HEALTH | Age: 75
End: 2020-01-06
Payer: MEDICARE

## 2020-01-06 PROCEDURE — 3331090002 HH PPS REVENUE DEBIT

## 2020-01-06 PROCEDURE — 3331090001 HH PPS REVENUE CREDIT

## 2020-01-06 PROCEDURE — G0151 HHCP-SERV OF PT,EA 15 MIN: HCPCS

## 2020-01-07 ENCOUNTER — HOME CARE VISIT (OUTPATIENT)
Dept: HOME HEALTH SERVICES | Facility: HOME HEALTH | Age: 75
End: 2020-01-07
Payer: MEDICARE

## 2020-01-07 ENCOUNTER — HOME CARE VISIT (OUTPATIENT)
Dept: SCHEDULING | Facility: HOME HEALTH | Age: 75
End: 2020-01-07
Payer: MEDICARE

## 2020-01-07 VITALS
OXYGEN SATURATION: 96 % | RESPIRATION RATE: 18 BRPM | HEART RATE: 68 BPM | DIASTOLIC BLOOD PRESSURE: 68 MMHG | TEMPERATURE: 99.2 F | SYSTOLIC BLOOD PRESSURE: 118 MMHG

## 2020-01-07 PROCEDURE — G0300 HHS/HOSPICE OF LPN EA 15 MIN: HCPCS

## 2020-01-07 PROCEDURE — 3331090001 HH PPS REVENUE CREDIT

## 2020-01-07 PROCEDURE — G0151 HHCP-SERV OF PT,EA 15 MIN: HCPCS

## 2020-01-07 PROCEDURE — 3331090002 HH PPS REVENUE DEBIT

## 2020-01-08 ENCOUNTER — HOME CARE VISIT (OUTPATIENT)
Dept: HOME HEALTH SERVICES | Facility: HOME HEALTH | Age: 75
End: 2020-01-08
Payer: MEDICARE

## 2020-01-08 PROCEDURE — 3331090001 HH PPS REVENUE CREDIT

## 2020-01-08 PROCEDURE — 3331090002 HH PPS REVENUE DEBIT

## 2020-01-09 ENCOUNTER — HOME CARE VISIT (OUTPATIENT)
Dept: SCHEDULING | Facility: HOME HEALTH | Age: 75
End: 2020-01-09
Payer: MEDICARE

## 2020-01-09 VITALS
HEART RATE: 68 BPM | TEMPERATURE: 97.8 F | OXYGEN SATURATION: 97 % | DIASTOLIC BLOOD PRESSURE: 70 MMHG | SYSTOLIC BLOOD PRESSURE: 127 MMHG | RESPIRATION RATE: 18 BRPM

## 2020-01-09 PROCEDURE — G0299 HHS/HOSPICE OF RN EA 15 MIN: HCPCS

## 2020-01-09 PROCEDURE — 3331090001 HH PPS REVENUE CREDIT

## 2020-01-09 PROCEDURE — 3331090002 HH PPS REVENUE DEBIT

## 2020-01-09 PROCEDURE — 3331090003 HH PPS REVENUE ADJ

## 2020-01-10 PROCEDURE — 3331090001 HH PPS REVENUE CREDIT

## 2020-01-10 PROCEDURE — 3331090002 HH PPS REVENUE DEBIT

## 2020-01-11 PROCEDURE — 3331090001 HH PPS REVENUE CREDIT

## 2020-01-11 PROCEDURE — 3331090002 HH PPS REVENUE DEBIT

## 2020-01-12 PROCEDURE — 3331090001 HH PPS REVENUE CREDIT

## 2020-01-12 PROCEDURE — 3331090002 HH PPS REVENUE DEBIT

## 2020-01-13 ENCOUNTER — PATIENT OUTREACH (OUTPATIENT)
Dept: CARDIOLOGY CLINIC | Age: 75
End: 2020-01-13

## 2020-01-13 PROCEDURE — 3331090002 HH PPS REVENUE DEBIT

## 2020-01-13 PROCEDURE — 3331090001 HH PPS REVENUE CREDIT

## 2020-01-13 NOTE — PROGRESS NOTES
Patient has graduated from the Transitions of Care Coordination  program on 1/13/20. Patient/family has the ability to self-manage at this time Care management goals have been completed. Patient was referred to the [facility] team for further management. Patient has open ACM open with Martine Hoffman RN. Goals Addressed                 This Visit's Progress     COMPLETED: Prevent complications post hospitalization. 12/16/19  CTN unable to reach pt on phone, unable to LM as recording states Mailbox is full. Attempted to contact guardian, DEONDRE on VM requesting return call---mkrw    12/17/19  CTN was able to reach patient today to review discharge instructions and red flags. Appts:    PCP Dr Brenda Hunt   ---Patient gave CTN permission to make appt. CTN scheduled appt for 12/19/19 at 9:30am. When CTN called patient back to give her information, she asked that I contact daughter as well. CTN LM on VM of pt's daughter providing detailed message regarding appt day/time. Asked daughter for return call to confirm. Patient states she has an appt this morning with Heme/Onc Dr Kirsten Marquis at 10:00 to review results of tests. She can not tell CTN why she sees this MD other than to report it is for a blood disorder, not cancer. [facility] is scheduled to see pt today. Red Flags:    Pt reports that she is taking her abx as ordered and that her daughter takes care of her medications. Unable to perform med rec with pt. As stated above, CTN LM for daughter to call. Per [facility] RN Note 12/15/19, she was unable to perform med rec as well due to med bottles not in pt's home. She did not get return call from daughter. CTN will notify PCP to see if med rec can be performed in office on Thursday if no return call received. Patient reports she continues to have pain in arm, side that she had when she presented to the hospital. Patient was admitted with CP --tests r/o cardiac and pulmonary sources.  Patient to discuss with PCP this Thursday at appt.    Reviewed s/sx of UTI and hygiene with patient to help prevent reinfection, pt verbalizes understanding. CTN will follow up next week with pt for updates---mkrw    12/18/19  CTN received SM back from Amarjit Garcia, PCP nurse stating that she will perform med rec with daughter when pt comes to appt. ---mkrw    01/13/20  CTN unable to reach patient on phone, LM on  requesting return call. Per chart notes pt attended PCP follow up on 12/19/19. Patient also participated with JERICHO RAWLS [facility] during YULISSA period. Pt had 1 ED visit on 12/18/19---did not result in admission. CTN will notify ACM Anai Powell of [facility] management end---mkrw            Patient has Care Transition Nurse's contact information for any further questions, concerns, or needs.   Patients upcoming visits:    Future Appointments   Date Time Provider Reji Smith   1/16/2020  8:30 AM Lilliana Mcdaniel [place] [address] [facility]   2/18/2020  8:40 AM Deejay Dukes  [address]

## 2020-01-14 ENCOUNTER — HOSPITAL ENCOUNTER (OUTPATIENT)
Dept: LAB | Age: 75
Discharge: HOME | End: 2020-01-14
Payer: MEDICARE

## 2020-01-14 PROCEDURE — 3331090002 HH PPS REVENUE DEBIT

## 2020-01-14 PROCEDURE — 81001 URINALYSIS AUTO W/SCOPE: CPT

## 2020-01-14 PROCEDURE — 3331090001 HH PPS REVENUE CREDIT

## 2020-01-15 DIAGNOSIS — R10.13 EPIGASTRIC ABDOMINAL PAIN: ICD-10-CM

## 2020-01-15 LAB
APPEARANCE UR: ABNORMAL
BACTERIA #/AREA URNS HPF: ABNORMAL /[HPF]
BILIRUB UR QL STRIP: NEGATIVE
CASTS URNS MICRO: ABNORMAL
CASTS URNS QL MICRO: PRESENT /LPF
COLOR UR: YELLOW
CRYSTALS URNS MICRO: ABNORMAL
EPI CELLS #/AREA URNS HPF: ABNORMAL /HPF (ref 0–10)
GLUCOSE UR QL: NEGATIVE
HGB UR QL STRIP: NEGATIVE
KETONES UR QL STRIP: ABNORMAL
LEUKOCYTE ESTERASE UR QL STRIP: ABNORMAL
MICRO URNS: ABNORMAL
MUCOUS THREADS URNS QL MICRO: PRESENT
NITRITE UR QL STRIP: NEGATIVE
PH UR STRIP: 6.5 [PH] (ref 5–7.5)
PROT UR QL STRIP: ABNORMAL
RBC #/AREA URNS HPF: ABNORMAL /HPF (ref 0–2)
SP GR UR: 1.03 (ref 1–1.03)
UNIDENT CRYS URNS QL MICRO: PRESENT
UROBILINOGEN UR STRIP-MCNC: 0.2 MG/DL (ref 0.2–1)
WBC #/AREA URNS HPF: ABNORMAL /HPF (ref 0–5)

## 2020-01-15 PROCEDURE — 3331090002 HH PPS REVENUE DEBIT

## 2020-01-15 PROCEDURE — 3331090001 HH PPS REVENUE CREDIT

## 2020-01-15 RX ORDER — POTASSIUM CHLORIDE 20 MEQ/1
TABLET, EXTENDED RELEASE ORAL
Qty: 90 TAB | Refills: 1 | Status: SHIPPED | OUTPATIENT
Start: 2020-01-15 | End: 2020-06-29

## 2020-01-15 RX ORDER — ATORVASTATIN CALCIUM 20 MG/1
TABLET, FILM COATED ORAL
Qty: 90 TAB | Refills: 0 | Status: SHIPPED | OUTPATIENT
Start: 2020-01-15 | End: 2020-04-14

## 2020-01-15 RX ORDER — SUCRALFATE 1 G/1
TABLET ORAL
Qty: 360 TAB | Refills: 3 | Status: SHIPPED | OUTPATIENT
Start: 2020-01-15

## 2020-01-16 LAB — BACTERIA UR CULT: NORMAL

## 2020-01-16 PROCEDURE — 3331090001 HH PPS REVENUE CREDIT

## 2020-01-16 PROCEDURE — 3331090002 HH PPS REVENUE DEBIT

## 2020-01-16 NOTE — PROGRESS NOTES
Call pt and daughter- urine looks infected but culture grew no bacteria.  Please re submit urine culture, dx = uti

## 2020-01-17 ENCOUNTER — HOSPITAL ENCOUNTER (OUTPATIENT)
Dept: LAB | Age: 75
Discharge: HOME | End: 2020-01-17
Payer: MEDICARE

## 2020-01-17 ENCOUNTER — TELEPHONE (OUTPATIENT)
Dept: INTERNAL MEDICINE CLINIC | Age: 75
End: 2020-01-17

## 2020-01-17 DIAGNOSIS — N39.0 URINARY TRACT INFECTION WITHOUT HEMATURIA, SITE UNSPECIFIED: Primary | ICD-10-CM

## 2020-01-17 PROCEDURE — 87077 CULTURE AEROBIC IDENTIFY: CPT

## 2020-01-17 PROCEDURE — 87086 URINE CULTURE/COLONY COUNT: CPT

## 2020-01-17 PROCEDURE — 87186 SC STD MICRODIL/AGAR DIL: CPT

## 2020-01-17 PROCEDURE — 87088 URINE BACTERIA CULTURE: CPT

## 2020-01-17 NOTE — TELEPHONE ENCOUNTER
----- Message from Donald Hernandez MD sent at 1/16/2020  6:12 PM EST -----  Call pt and daughter- urine looks infected but culture grew no bacteria.  Please re submit urine culture, dx = uti

## 2020-01-20 ENCOUNTER — TELEPHONE (OUTPATIENT)
Dept: INTERNAL MEDICINE CLINIC | Age: 75
End: 2020-01-20

## 2020-01-20 RX ORDER — FLUDROCORTISONE ACETATE 0.1 MG/1
TABLET ORAL
Qty: 180 TAB | Refills: 1 | Status: SHIPPED | OUTPATIENT
Start: 2020-01-20

## 2020-01-20 NOTE — TELEPHONE ENCOUNTER
Kandy Love (Self) [phone] (H)     Pt is requesting a call back regarding an infection she thinks she may have.

## 2020-01-22 ENCOUNTER — TELEPHONE (OUTPATIENT)
Dept: INTERNAL MEDICINE CLINIC | Age: 75
End: 2020-01-22

## 2020-01-22 DIAGNOSIS — N39.0 RECURRENT UTI: Primary | ICD-10-CM

## 2020-01-22 LAB
BACTERIA UR CULT: ABNORMAL
BACTERIA UR CULT: ABNORMAL

## 2020-01-22 RX ORDER — NITROFURANTOIN 25; 75 MG/1; MG/1
100 CAPSULE ORAL 2 TIMES DAILY
Qty: 14 CAP | Refills: 0 | Status: SHIPPED | OUTPATIENT
Start: 2020-01-22 | End: 2020-01-29

## 2020-01-22 NOTE — TELEPHONE ENCOUNTER
Spoke with Sweta with [facility]. She request last colonoscopy.  Advised will need to reach GI Dr Haq Govern

## 2020-01-22 NOTE — TELEPHONE ENCOUNTER
----- Message from Yordan Soriano MD sent at 1/22/2020  1:08 PM EST -----  Call pt and daughter- culture shows another uti  - macrobid 100 mg bid x 7 d  - advise urogynecology consult with Dr Tahmina Ortiz or partner to evaluate for causes.  Give name, number for them to schedule

## 2020-01-22 NOTE — TELEPHONE ENCOUNTER
Sweta from Dr. Hollingsworth Offer office [phone]     Dr. Jewel Stoddard asking when patient's last colonoscopy was done. Please fax if you find one.     Fax# [phone]

## 2020-01-22 NOTE — TELEPHONE ENCOUNTER
Spoke with patient and daughter Faith Dulce) informed patient per provider result note. Understanding verbalized.

## 2020-01-22 NOTE — TELEPHONE ENCOUNTER
Call pt and daughter- culture shows another uti  - macrobid 100 mg bid x 7 d  - advise urogynecology consult with Dr Kameron Holden or partner to evaluate for causes.  Give name, number for them to schedule

## 2020-01-23 ENCOUNTER — PATIENT OUTREACH (OUTPATIENT)
Dept: INTERNAL MEDICINE CLINIC | Age: 75
End: 2020-01-23

## 2020-01-24 ENCOUNTER — PATIENT OUTREACH (OUTPATIENT)
Dept: INTERNAL MEDICINE CLINIC | Age: 75
End: 2020-01-24

## 2020-01-24 ENCOUNTER — TELEPHONE (OUTPATIENT)
Dept: INTERNAL MEDICINE CLINIC | Age: 75
End: 2020-01-24

## 2020-01-24 NOTE — PROGRESS NOTES
Goals        Patient Stated     relief of physical symptoms and attend follow ups (pt-stated)      1/24/2020 Patient called in to office and spoke with Lori Valentine LPN. See note 1/24/3030. Due to ACM inability to reach patient by phone yesterday and today along with patient continued health complaints handled today by Nurse Jacob Stone will extend case management episode of care and will attempt to reach patient next week for FU. Cathy Schultz RN  Ambulatory Care Manager     1/23/2020 Patient completed YULISSA 30 day care management. Complex case management has passed 90 day following also. Attempted to call patient for last  FU. Voice mailbox is full and not accepting message. Will attempt to reach patient again and then close episode of care. Cathy Schultz RN  Ambulatory Care Manager     11/15/19 Patient VM full. Contacted patient's daughter, Anastacio Peoples. CHARMAINE in chart. Daughter reports:  - Patient is same. Continues with burning in chest and Neuro has MRI scheduled for next week. Thought that complaints may be connected to back issue.   - ACM offered to schedule appointment with PCP. Daughter declined. They will set something up if they think it is necessary after results of MRI are known. - contact information supplied. ACM will FU with patient after MRI  Cathy Schultz RN  Ambulatory Care Manager    10/21/19   Skills and education necessary to properly manage symptoms of vaginal burning and importance of following up with providers  History: LOV 10/11/19 diagnosis of vaginal burning, neuropathy and hypomanic  Current level of understanding: Patient appears to be unaware of cause of burning sensation. States: \"May be fibromyalgia\" is mentioned only once and not brought up again. More focused on symptoms. Education provided on alternatives such as exam by gynecologist and giving time for adjusted dosage of gabapentin to work.   Desired Outcome: Patient will be free of vaginal burning within 1 month; will consider and discuss gynecologist FU with daughter by next outreach, and take medications as directed  Plan: Follow patient for the next 30 days with weekly outreaches to provide education and support. Re-evaluate at the end of 30 days to determine if patient needs additional Care Management. Encourage NP advise of trying to leave vaginal area open to air at night (less adult brief contact time with skin), follow up with specialist and give increased dose of gabapentin time to work.    Linette Alcantar RN

## 2020-01-27 ENCOUNTER — OFFICE VISIT (OUTPATIENT)
Dept: INTERNAL MEDICINE CLINIC | Age: 75
End: 2020-01-27

## 2020-01-27 VITALS
BODY MASS INDEX: 23.21 KG/M2 | SYSTOLIC BLOOD PRESSURE: 122 MMHG | RESPIRATION RATE: 16 BRPM | DIASTOLIC BLOOD PRESSURE: 80 MMHG | OXYGEN SATURATION: 98 % | TEMPERATURE: 97.6 F | HEART RATE: 77 BPM | WEIGHT: 131 LBS | HEIGHT: 63 IN

## 2020-01-27 DIAGNOSIS — N94.9 VAGINAL BURNING: Primary | ICD-10-CM

## 2020-01-27 DIAGNOSIS — L98.9 PERINEAL IRRITATION IN FEMALE: ICD-10-CM

## 2020-01-27 LAB
BILIRUB UR QL STRIP: NEGATIVE
GLUCOSE UR-MCNC: NORMAL MG/DL
KETONES P FAST UR STRIP-MCNC: NORMAL MG/DL
PH UR STRIP: 7 [PH] (ref 4.6–8)
PROT UR QL STRIP: NORMAL
SP GR UR STRIP: 1.03 (ref 1–1.03)
UA UROBILINOGEN AMB POC: NORMAL (ref 0.2–1)
URINALYSIS CLARITY POC: CLEAR
URINALYSIS COLOR POC: YELLOW
URINE BLOOD POC: NEGATIVE
URINE LEUKOCYTES POC: NORMAL
URINE NITRITES POC: NEGATIVE

## 2020-01-27 RX ORDER — NYSTATIN 100000 [USP'U]/G
POWDER TOPICAL
Qty: 120 G | Refills: 1 | Status: SHIPPED | OUTPATIENT
Start: 2020-01-27 | End: 2020-02-26

## 2020-01-27 NOTE — PROGRESS NOTES
Verified name and birth date for privacy precautions. Chart reviewed in preparation for today's visit. Chief Complaint   Patient presents with    Vaginal Pain     x 2 days           Health Maintenance Due   Topic    Shingrix Vaccine Age 50> (1 of 2)    FOOT EXAM Q1     MEDICARE YEARLY EXAM     MICROALBUMIN Q1          Wt Readings from Last 3 Encounters:   01/27/20 131 lb (59.4 kg)   12/19/19 132 lb 9.6 oz (60.1 kg)   12/13/19 130 lb 15.3 oz (59.4 kg)     Temp Readings from Last 3 Encounters:   01/27/20 97.6 °F (36.4 °C) (Oral)   01/09/20 97.8 °F (36.6 °C) (Temporal)   01/07/20 99.2 °F (37.3 °C) (Temporal)     BP Readings from Last 3 Encounters:   01/27/20 122/80   01/09/20 127/70   01/07/20 118/68     Pulse Readings from Last 3 Encounters:   01/27/20 77   01/09/20 68   01/07/20 68         Learning Assessment:  :     Learning Assessment 1/27/2020 9/5/2017 5/28/2014   PRIMARY LEARNER Patient Patient Patient   HIGHEST LEVEL OF EDUCATION - PRIMARY LEARNER  SOME COLLEGE - SOME COLLEGE   BARRIERS PRIMARY LEARNER NONE - NONE   CO-LEARNER CAREGIVER No - No   PRIMARY LANGUAGE ENGLISH ENGLISH ENGLISH   LEARNER PREFERENCE PRIMARY DEMONSTRATION VIDEOS OTHER (COMMENT)     - DEMONSTRATION -   ANSWERED BY patient  patient patient   RELATIONSHIP SELF SELF SELF       Depression Screening:  :     3 most recent PHQ Screens 1/27/2020   Little interest or pleasure in doing things Not at all   Feeling down, depressed, irritable, or hopeless Not at all   Total Score PHQ 2 0       Fall Risk Assessment:  :     Fall Risk Assessment, last 12 mths 1/27/2020   Able to walk? Yes   Fall in past 12 months? Yes   Fall with injury? No   Number of falls in past 12 months 2   Fall Risk Score 2       Abuse Screening:  :     Abuse Screening Questionnaire 1/27/2020 10/1/2015   Do you ever feel afraid of your partner? N N   Are you in a relationship with someone who physically or mentally threatens you? N N   Is it safe for you to go home?  Angella Osei Coordination of Care Questionnaire:  :     1) Have you been to an emergency room, urgent care clinic since your last visit? no   Hospitalized since your last visit? no             2) Have you seen or consulted any other health care providers outside of [address] [place] [address] since your last visit? no  (Include any pap smears or colon screenings in this section.)    3) Do you have an Advance Directive on file? yes      Patient is currently accompanied by her daughter & I have received verbal consent from Moe Atkins to discuss any/all medical information while he/she is present in the room.     ------------------------------------------------

## 2020-01-27 NOTE — PROGRESS NOTES
76 yo female c/o \"vaginal burning\". She is currently on 5 of a 7 day course of Macrobid, and she continues to use a vaginal \"insert\" (Cleocin Vaginal Cream) daily for the past 1/1/2 mos prescribed by Allison Zaragoza, but states it \"feels like razor blades\" inside. She will be seeing a Uro-Gynecologist, Dr. Chaparrita Slade with [facility] in 2 days. She reports a burning in the upper GI tract and stools have been runny for several weeks. PE: Elderly WF is alert   She is accompanied by her daughter   LESLIE - 97.6   BP - 122/80   Perineal skin into labial area and extended to daniel-anal area is red; white creamy material is draining from vagina (Cleocin cream from last PM)   Multiple large external hemorrhoids  Urinalysis: 1+ protein and trace leukocytes    Imp: Perineal Irritation likely from being wet    Plan: Nystatin Powder after washing and drying 2-4 times/day   Advised to skip using Cleocin Vag cream tomorrow evening in preparation for Uro-Gyn visit on 1/19   She will see Dr Harish Noonan in about a month for f/u of other issues  _____________________________  Expected course of current diagnosed problem(s) as well as expected progression and possible complications, and desired follow up with provider are discussed with patient. Patient is encouraged to be back in touch with any questions or concerns. Patient expresses understanding of plan of care. Patient is given AVS which includes diagnoses, current medications, vitals.

## 2020-01-27 NOTE — PROGRESS NOTES
Results for orders placed or performed in visit on 01/27/20   AMB POC URINALYSIS DIP STICK AUTO W/O MICRO   Result Value Ref Range    Color (UA POC) Yellow     Clarity (UA POC) Clear     Glucose (UA POC) Trace Negative    Bilirubin (UA POC) Negative Negative    Ketones (UA POC) Trace Negative    Specific gravity (UA POC) 1.030 1.001 - 1.035    Blood (UA POC) Negative Negative    pH (UA POC) 7.0 4.6 - 8.0    Protein (UA POC) 1+ Negative    Urobilinogen (UA POC) 1 mg/dL 0.2 - 1    Nitrites (UA POC) Negative Negative    Leukocyte esterase (UA POC) Trace Negative

## 2020-01-30 ENCOUNTER — PATIENT OUTREACH (OUTPATIENT)
Dept: INTERNAL MEDICINE CLINIC | Age: 75
End: 2020-01-30

## 2020-01-30 NOTE — PROGRESS NOTES
Goals        Patient Stated     relief of physical symptoms and attend follow ups (pt-stated)      2020 patient's VM is full and cannot receive messages. ACM called daughter, Payal Meza. CHARMAINE in chart, ID verified name and . Patient's daughter reports:  - patient attended appointment with NP at [address]  [facility] 2020. An external exam was performed and patient was given cream to apply externally. An appointment with Dr. Beatriz Hwang for an internal exam is scheduled in 2 weeks. Patient was told a request for patient records would be sent to PCP for Dr. Beatriz Hwang to review prior to that appointment. There is no note showing that has been done at this outreach. - ACM informed patient's daughter VM is full and would be helpful if patient could empty so ACM could contact. Daughter requests ACM contact her for now. Patient does not know how to empty VM and only answers her phone if it is one of her daughters calling.   - CCM will be extended another 30 days to assist patient with concerns regarding the vaginal burning and pain along with FU appointments. - Patient has FU with PCP 2020. Israel Garrett RN  Ambulatory Care Manager     2020 Patient called in to office and spoke with Jj Bethea LPN. See note 3030. Due to ACM inability to reach patient by phone yesterday and today along with patient continued health complaints handled today by Nurse Juan Plate will extend case management episode of care and will attempt to reach patient next week for FU. Israel Garrett RN  Ambulatory Care Manager     2020 Patient completed YULISSA 30 day care management. Complex case management has passed 90 day following also. Attempted to call patient for last  FU. Voice mailbox is full and not accepting message. Will attempt to reach patient again and then close episode of care. Israel Garrett RN  Ambulatory Care Manager     11/15/19 Patient VM full. Contacted patient's daughter, Payal Meza. CHARMAINE in chart.  Daughter reports:  - Patient is same. Continues with burning in chest and Neuro has MRI scheduled for next week. Thought that complaints may be connected to back issue.   - ACM offered to schedule appointment with PCP. Daughter declined. They will set something up if they think it is necessary after results of MRI are known. - contact information supplied. ACM will FU with patient after MRI  Shabbir Dupree RN  Ambulatory Care Manager    10/21/19   Skills and education necessary to properly manage symptoms of vaginal burning and importance of following up with providers  History: LOV 10/11/19 diagnosis of vaginal burning, neuropathy and hypomanic  Current level of understanding: Patient appears to be unaware of cause of burning sensation. States: \"May be fibromyalgia\" is mentioned only once and not brought up again. More focused on symptoms. Education provided on alternatives such as exam by gynecologist and giving time for adjusted dosage of gabapentin to work. Desired Outcome: Patient will be free of vaginal burning within 1 month; will consider and discuss gynecologist FU with daughter by next outreach, and take medications as directed  Plan: Follow patient for the next 30 days with weekly outreaches to provide education and support. Re-evaluate at the end of 30 days to determine if patient needs additional Care Management. Encourage NP advise of trying to leave vaginal area open to air at night (less adult brief contact time with skin), follow up with specialist and give increased dose of gabapentin time to work.    Shabbir Dupree RN

## 2020-02-01 ENCOUNTER — HOSPITAL ENCOUNTER (EMERGENCY)
Age: 75
Discharge: HOME | End: 2020-02-01
Attending: EMERGENCY MEDICINE
Payer: MEDICARE

## 2020-02-01 VITALS
HEIGHT: 63 IN | RESPIRATION RATE: 14 BRPM | TEMPERATURE: 98.4 F | DIASTOLIC BLOOD PRESSURE: 84 MMHG | SYSTOLIC BLOOD PRESSURE: 153 MMHG | BODY MASS INDEX: 23.59 KG/M2 | WEIGHT: 133.16 LBS | OXYGEN SATURATION: 96 % | HEART RATE: 66 BPM

## 2020-02-01 DIAGNOSIS — R10.2 VAGINAL PAIN: Primary | ICD-10-CM

## 2020-02-01 DIAGNOSIS — N39.0 URINARY TRACT INFECTION WITHOUT HEMATURIA, SITE UNSPECIFIED: ICD-10-CM

## 2020-02-01 LAB
APPEARANCE UR: CLEAR
BACTERIA URNS QL MICRO: ABNORMAL /HPF
BILIRUB UR QL: NEGATIVE
COLOR UR: ABNORMAL
EPITH CASTS URNS QL MICRO: ABNORMAL /LPF
GLUCOSE UR STRIP.AUTO-MCNC: NEGATIVE MG/DL
HGB UR QL STRIP: NEGATIVE
KETONES UR QL STRIP.AUTO: 15 MG/DL
LEUKOCYTE ESTERASE UR QL STRIP.AUTO: ABNORMAL
NITRITE UR QL STRIP.AUTO: NEGATIVE
PH UR STRIP: 6.5 [PH] (ref 5–8)
PROT UR STRIP-MCNC: NEGATIVE MG/DL
RBC #/AREA URNS HPF: ABNORMAL /HPF (ref 0–5)
SP GR UR REFRACTOMETRY: 1.01 (ref 1–1.03)
UROBILINOGEN UR QL STRIP.AUTO: 0.2 EU/DL (ref 0.2–1)
WBC URNS QL MICRO: ABNORMAL /HPF (ref 0–4)

## 2020-02-01 PROCEDURE — 87086 URINE CULTURE/COLONY COUNT: CPT

## 2020-02-01 PROCEDURE — 87186 SC STD MICRODIL/AGAR DIL: CPT

## 2020-02-01 PROCEDURE — 87077 CULTURE AEROBIC IDENTIFY: CPT

## 2020-02-01 PROCEDURE — 81001 URINALYSIS AUTO W/SCOPE: CPT

## 2020-02-01 PROCEDURE — 99283 EMERGENCY DEPT VISIT LOW MDM: CPT

## 2020-02-01 RX ORDER — NITROFURANTOIN 25; 75 MG/1; MG/1
CAPSULE ORAL
COMMUNITY
End: 2020-02-15

## 2020-02-01 RX ORDER — CEPHALEXIN 250 MG/1
250 CAPSULE ORAL 2 TIMES DAILY
Qty: 7 CAP | Refills: 0 | OUTPATIENT
Start: 2020-02-01 | End: 2020-02-15

## 2020-02-01 NOTE — DISCHARGE INSTRUCTIONS
Patient Education        Urinary Tract Infection in Pregnancy: Care Instructions  Your Care Instructions    A urinary tract infection, or UTI, is an infection of the bladder and other urinary structures. Most UTIs occur in the bladder. They often cause pain or burning when you urinate. UTI is the most common bacterial infection in pregnancy. If untreated, a UTI could lead to problems such as a kidney infection or  labor. Most UTIs can be cured with antibiotics. Your doctor will prescribe an antibiotic that is safe during pregnancy. Be sure to finish your medicine so that the infection does not spread to your kidneys. Follow-up care is a key part of your treatment and safety. Be sure to make and go to all appointments, and call your doctor if you are having problems. It's also a good idea to know your test results and keep a list of the medicines you take. How can you care for yourself at home? · Take your antibiotics as directed. Do not stop taking them just because you feel better. You need to take the full course of antibiotics. · Drink extra water and other fluids for the next day or two. This will help wash out the bacteria causing the infection. If you have kidney, heart, or liver disease and have to limit fluids, talk with your doctor before you increase the amount of fluids you drink. · Do not drink alcohol. · Urinate often. Try to empty your bladder each time. Preventing UTIs  · Drink plenty of fluids, enough so that your urine is light yellow or clear like water. This helps you urinate often, which clears bacteria from your system. If you have kidney, heart, or liver disease and have to limit fluids, talk with your doctor before you increase the amount of fluids you drink. · Urinate when you first have the urge. · Urinate right after you have sex. This is the best way for women to avoid UTIs.   · When going to the bathroom, wipe from front to back to keep bacteria from entering the vagina or urethra. When should you call for help? Call your doctor now or seek immediate medical care if:    · You have symptoms of a worse urinary tract infection. These may include:  ? Pain or burning when you urinate. ? A frequent need to urinate without being able to pass much urine. ? Pain in the flank, which is just below the rib cage and above the waist on either side of the back. ? Blood in your urine. ? A fever.    Watch closely for changes in your health, and be sure to contact your doctor if:    · You do not get better as expected. Where can you learn more? Go to http://ash-jeronimo.info/. Enter M982 in the search box to learn more about \"Urinary Tract Infection in Pregnancy: Care Instructions. \"  Current as of: May 29, 2019  Content Version: 12.2  © 1460-0077 [employer]. Care instructions adapted under license by [employer] (which disclaims liability or warranty for this information). If you have questions about a medical condition or this instruction, always ask your healthcare professional. [address] any warranty or liability for your use of this information.

## 2020-02-01 NOTE — ED TRIAGE NOTES
Pt. Has chronic vaginal pain and has been seen here many times for same. Pt. Has be instructed to call gynecologist. Daughter is aware of ongoing issue and per pt. Hasn't been contacted. Pt. Complains of vaginal pain.

## 2020-02-01 NOTE — ED NOTES
Pt. States she is unable to urinate at present time. Gave her some water and asked to let me know when she's done with water. Updated her with conversation with daughter and informed her she would be picking her up.  Pt. Has call button

## 2020-02-01 NOTE — ED NOTES
Dr. Areli Culver and I have reviewed discharge instructions with the patient and daughter. The patient and Rosario verbalized understanding.

## 2020-02-01 NOTE — ED PROVIDER NOTES
HPI     Pt is a 75 y.o. F here with c/o vaginal pain. She has hx of chronic vaginal pain x 3 months. She has seen her gynecologist for this and she is currently on macrobid. However, she comes in bc she says the burning pain is worsening. No new sx have occurred.       Past Medical History:   Diagnosis Date    Bipolar disorder ([facility]     Nazmy    Chronic pain     BACK PAIN    Diabetes (HCC)     BORDERLINE TX WITH DIET AND EXERCISE    DJD (degenerative joint disease)     Fibromyalgia     Genital herpes     GERD (gastroesophageal reflux disease)     Hypercholesterolemia     IBS (irritable bowel syndrome)     Ill-defined condition     fibromyligia    Lumbar disc disease     stimulation-Honza    Migraine     Nausea & vomiting     Other ill-defined conditions(799.89)     SEASONAL allergies    Other ill-defined conditions(799.89)     dysphagia has had esophagus dilated    Paget's disease     Parkinson disease ([facility]     Pulmonary embolism (HCC)     RLS (restless legs syndrome)     Spinal stenosis        Past Surgical History:   Procedure Laterality Date    COLONOSCOPY N/A 6/27/2016    COLONOSCOPY performed by Larose Curling, MD at [facility] END[facility]COPY    COLONOSCOPY N/A 2/22/2019    COLONOSCOPY performed by Deric Navarro MD at [facility]. [address], COLON, DIAGNOSTIC      12, due 13    HX APPENDECTOMY      HX BACK SURGERY  9/17/2013    back surgery - total of 3 as of 12/20/2013    HX BREAST BIOPSY Right years ago    negative    HX CHOLECYSTECTOMY      HX HEENT      T & A    HX HYSTERECTOMY      total for fibroid tumors    HX ORTHOPAEDIC      lumbar laminectomy then fusion/neurostimulator implanted - inactive now but still in    HX ORTHOPAEDIC      REMOVAL OF NEUROSTIMULATOR    HX OTHER SURGICAL      EGD x 2 with dilation/colonoscopy - no polyps per pt    TILT TABLE EVALUATION  8/2/2016              Family History:   Problem Relation Age of Onset    Colon Cancer Mother     Cancer Mother 68        colon    Heart Disease Father     Heart Disease Maternal Grandmother     Heart Disease Maternal Grandfather     Heart Disease Other        Social History     Socioeconomic History    Marital status:      Spouse name: Not on file    Number of children: Not on file    Years of education: Not on file    Highest education level: Not on file   Occupational History    Occupation: volunteer     Employer: RETIRED     Comment: [employer]   Social Needs    Financial resource strain: Not on file    Food insecurity:     Worry: Not on file     Inability: Not on file    Transportation needs:     Medical: Not on file     Non-medical: Not on file   Tobacco Use    Smoking status: Never Smoker    Smokeless tobacco: Never Used   Substance and Sexual Activity    Alcohol use: No     Comment: 1 per month    Drug use: No    Sexual activity: Not on file   Lifestyle    Physical activity:     Days per week: Not on file     Minutes per session: Not on file    Stress: Not on file   Relationships    Social connections:     Talks on phone: Not on file     Gets together: Not on file     Attends Voodoo service: Not on file     Active member of club or organization: Not on file     Attends meetings of clubs or organizations: Not on file     Relationship status: Not on file    Intimate partner violence:     Fear of current or ex partner: Not on file     Emotionally abused: Not on file     Physically abused: Not on file     Forced sexual activity: Not on file   Other Topics Concern    Not on file   Social History Narrative    Not on file         ALLERGIES: Adhesive; Hydrocodone; Lorazepam; Other medication; Percocet [oxycodone-acetaminophen]; Sudafed [pseudoephedrine hcl]; Wellbutrin [bupropion hcl]; Zithromax [azithromycin]; and Zyrtec [cetirizine]    Review of Systems   Constitutional: Negative for chills, diaphoresis and fever. HENT: Negative for congestion and trouble swallowing.     Eyes: Negative for photophobia and visual disturbance. Respiratory: Negative for cough, chest tightness and shortness of breath. Cardiovascular: Negative for chest pain, palpitations and leg swelling. Gastrointestinal: Negative for abdominal pain, diarrhea, nausea and vomiting. Genitourinary: Positive for vaginal pain. Negative for difficulty urinating, dysuria, flank pain and frequency. Musculoskeletal: Negative for back pain and myalgias. Skin: Negative for rash and wound. Neurological: Negative for dizziness, weakness, light-headedness and headaches. Hematological: Negative for adenopathy. Does not bruise/bleed easily. Psychiatric/Behavioral: Negative for agitation and confusion. All other systems reviewed and are negative. Vitals:    02/01/20 1556   BP: (!) 160/91   Pulse: 66   Resp: 14   Temp: 98.4 °F (36.9 °C)   SpO2: 98%   Weight: 60.4 kg (133 lb 2.5 oz)   Height: 5' 3\" (1.6 m)            Physical Exam  Vitals signs reviewed. Constitutional:       General: She is not in acute distress. Appearance: She is well-developed. She is not diaphoretic. HENT:      Head: Normocephalic and atraumatic. Eyes:      Pupils: Pupils are equal, round, and reactive to light. Neck:      Musculoskeletal: Normal range of motion. Cardiovascular:      Rate and Rhythm: Normal rate. Pulmonary:      Effort: Pulmonary effort is normal.   Abdominal:      General: There is no distension. Musculoskeletal: Normal range of motion. Neurological:      Mental Status: She is alert and oriented to person, place, and time. MDM       Procedures    Patient's results have been reviewed with them. Patient and/or family have verbally conveyed their understanding and agreement of the patient's signs, symptoms, diagnosis, treatment and prognosis and additionally agree to follow up as recommended or return to the Emergency Room should their condition change prior to follow-up.   Discharge instructions have also been provided to the patient with some educational information regarding their diagnosis as well a list of reasons why they would want to return to the ER prior to their follow-up appointment should their condition change.     Malik Dorman MD

## 2020-02-03 ENCOUNTER — TELEPHONE (OUTPATIENT)
Dept: INTERNAL MEDICINE CLINIC | Age: 75
End: 2020-02-03

## 2020-02-03 NOTE — TELEPHONE ENCOUNTER
On Call Note   Reason for call: patient states she was seen last week by NP [facility] for vaginal burning. She notes this has significantly worsened and she notes has a burning sensation in her chest and stomach. She denies fever, vomiting, cough, SOB. She states she does not have transportation and does not have anyone she can call for help. She was given the number for dispatch health. She states if she cannot get evaluated by them she will call EMS for transport to the ED. She was advised to return call if she cannot get evaluated today.      Her chart was checked later in the day and it was noted she had been seen in the ED

## 2020-02-04 ENCOUNTER — TELEPHONE (OUTPATIENT)
Dept: INTERNAL MEDICINE CLINIC | Age: 75
End: 2020-02-04

## 2020-02-04 NOTE — TELEPHONE ENCOUNTER
Spoke with daughter Dali Bagley), she explained patient completed first round of antibiotics for UTI and started second course from ER. Will complete tomorrow. Patient seen by psychiatrist two weeks ago and was recommended to stay on course of treatment for UTI. Daughter confirmed patient taking Gabapentin 1200 mg daily. She would like to know is the chest, vaginal and feet burning  a neuropathy outbreak that has intensified. She has agreed to increase medication. Does not want to increase if will make patient drowsy.  Has appointment with urogynecology on 02/12/2020

## 2020-02-04 NOTE — TELEPHONE ENCOUNTER
Tried to reach patient. No answer. Mailbox full. Called daughter Aldo North), informedreceived VRBO from Dr Herbert Souza. Patient directed to take Gabapentin 600 mg in AM, 300 mg at lunch and 600 mg at dinner. Advised to see specialist as directed. Understanding verbalized.

## 2020-02-04 NOTE — TELEPHONE ENCOUNTER
Patient complain of vaginal and chest burning. She states can not continue to live like this and need something.

## 2020-02-05 LAB
BACTERIA SPEC CULT: ABNORMAL
CC UR VC: ABNORMAL
SERVICE CMNT-IMP: ABNORMAL

## 2020-02-15 ENCOUNTER — HOSPITAL ENCOUNTER (EMERGENCY)
Age: 75
Discharge: HOME | End: 2020-02-15
Attending: EMERGENCY MEDICINE | Admitting: EMERGENCY MEDICINE
Payer: MEDICARE

## 2020-02-15 VITALS
OXYGEN SATURATION: 99 % | HEART RATE: 88 BPM | RESPIRATION RATE: 16 BRPM | SYSTOLIC BLOOD PRESSURE: 149 MMHG | DIASTOLIC BLOOD PRESSURE: 76 MMHG | TEMPERATURE: 98.9 F

## 2020-02-15 DIAGNOSIS — T83.511A URINARY TRACT INFECTION ASSOCIATED WITH INDWELLING URETHRAL CATHETER, INITIAL ENCOUNTER (HCC): Primary | ICD-10-CM

## 2020-02-15 DIAGNOSIS — N39.0 URINARY TRACT INFECTION ASSOCIATED WITH INDWELLING URETHRAL CATHETER, INITIAL ENCOUNTER (HCC): Primary | ICD-10-CM

## 2020-02-15 LAB
APPEARANCE UR: ABNORMAL
BACTERIA URNS QL MICRO: ABNORMAL /HPF
BILIRUB UR QL: NEGATIVE
COLOR UR: ABNORMAL
EPITH CASTS URNS QL MICRO: ABNORMAL /LPF
GLUCOSE UR STRIP.AUTO-MCNC: NEGATIVE MG/DL
HGB UR QL STRIP: ABNORMAL
HYALINE CASTS URNS QL MICRO: ABNORMAL /LPF (ref 0–5)
KETONES UR QL STRIP.AUTO: ABNORMAL MG/DL
LEUKOCYTE ESTERASE UR QL STRIP.AUTO: ABNORMAL
MUCOUS THREADS URNS QL MICRO: ABNORMAL /LPF
NITRITE UR QL STRIP.AUTO: NEGATIVE
PH UR STRIP: 6.5 [PH] (ref 5–8)
PROT UR STRIP-MCNC: 300 MG/DL
RBC #/AREA URNS HPF: >100 /HPF (ref 0–5)
SP GR UR REFRACTOMETRY: 1.02 (ref 1–1.03)
UR CULT HOLD, URHOLD: NORMAL
UROBILINOGEN UR QL STRIP.AUTO: 1 EU/DL (ref 0.2–1)
WBC URNS QL MICRO: >100 /HPF (ref 0–4)

## 2020-02-15 PROCEDURE — 87186 SC STD MICRODIL/AGAR DIL: CPT

## 2020-02-15 PROCEDURE — 99284 EMERGENCY DEPT VISIT MOD MDM: CPT

## 2020-02-15 PROCEDURE — 87086 URINE CULTURE/COLONY COUNT: CPT

## 2020-02-15 PROCEDURE — 87077 CULTURE AEROBIC IDENTIFY: CPT

## 2020-02-15 PROCEDURE — 81001 URINALYSIS AUTO W/SCOPE: CPT

## 2020-02-15 PROCEDURE — 93005 ELECTROCARDIOGRAM TRACING: CPT

## 2020-02-15 RX ORDER — CEPHALEXIN 500 MG/1
500 CAPSULE ORAL 2 TIMES DAILY
Qty: 14 CAP | Refills: 0 | Status: SHIPPED | OUTPATIENT
Start: 2020-02-15 | End: 2020-02-22

## 2020-02-15 NOTE — ED NOTES
[id number] attempted to set up transport home through [address]; pt's medicaid is inactive; will try another transport service    7536 called [facility] to set up pt transport home; ETA 5 min. I have reviewed discharge instructions with the patient. The patient verbalized understanding. 16

## 2020-02-15 NOTE — DISCHARGE INSTRUCTIONS
Patient Education        Learning About Urinary Catheter Care to Prevent Infection  What is a urinary catheter? A urinary catheter is a flexible plastic tube used to drain urine from your bladder when you can't urinate on your own. The catheter allows urine to drain from the bladder into a bag. Two types of drainage bags may be used with a urinary catheter. · A bedside bag is a large bag that you can hang on the side of your bed or on a chair. You can use it overnight or anytime you will be sitting or lying down for a long time. · A leg bag is a small bag that you can use during the day. It is usually attached to your thigh or calf and hidden under your clothes. Having a urinary catheter increases your risk of getting a urinary tract infection. Germs may get on the catheter and cause an infection in your bladder or kidneys. The longer you have a catheter, the more likely it is that you will get an infection. You can help prevent this problem with good hygiene and careful handling of your catheter and drainage bags. How can you help prevent infection? Take care to be clean  · Always wash your hands well before and after you handle your catheter. · Clean the skin around the catheter twice a day using soap and water. Dry with a clean towel afterward. You can shower with your catheter and drainage bag in place unless your doctor told you not to. · When you clean around the catheter, check the surrounding skin for signs of infection. Look for things like pus or irritated, swollen, red, or tender skin around the catheter. Be careful with your drainage bag  · Always keep the drainage bag below the level of your bladder. This will help keep urine from flowing back into your bladder. · Check often to see that urine is flowing through the catheter into the drainage bag. · Empty the drainage bag when it is half full. This will keep it from overflowing or backing up.   · When you empty the drainage bag, do not let the tubing or drain spout touch anything. Be careful with your catheter  · Do not unhook the catheter from the drain tube. That could let germs get into the tube. · Make sure that the catheter tubing does not get twisted or kinked. · Do not tug or pull on the catheter. And make sure that the drainage bag does not drag or pull on the catheter. · Do not put powder or lotion on the skin around the catheter. · Talk with your doctor about your options for sexual intercourse while wearing a catheter. How do you empty a urine drainage bag? If your doctor has asked you to keep a record, write down the amount of urine in the bag before you empty it. Wash your hands before and after you touch the bag. 1. Remove the drain spout from its sleeve at the bottom of the drainage bag.  2. Open the valve on the drain spout. Let the urine flow out into the toilet or a container. Be careful not to let the tubing or drain spout touch anything. 3. After you empty the bag, close the valve. Then put the drain spout back into its sleeve at the bottom of the collection bag. How do you add a bedside bag to a leg bag? Wash your hands before and after you handle the bags. 1. Empty the leg bag attached to the catheter. 2. Put a clean towel under the leg bag.  3. Use an alcohol wipe to clean the tip of the bedside bag. Then connect the bedside bag to the leg bag. How can you clean a bedside drainage bag? Many people clean their bedside bag in the morning if they switch to a leg bag. To clean a bedside drainage ba. Remove the bedside bag from the leg bag.  2. Fill the bag with 2 parts vinegar and 3 parts water. Let it stand for 20 minutes. 3. Empty the bag, and let it air dry. When should you call for help? Call your doctor now or seek immediate medical care if:  · You have symptoms of a urinary infection. These may include:  ? Pain or burning when you urinate.   ? A frequent need to urinate without being able to pass much urine.  ? Pain in the flank, which is just below the rib cage and above the waist on either side of the back. ? Blood in your urine. ? A fever. · Your urine smells bad. · You see large blood clots in your urine. · No urine or very little urine is flowing into the bag for 4 or more hours. Watch closely for changes in your health, and be sure to contact your doctor if:  · The area around the catheter becomes irritated, swollen, red, or tender, or there is pus draining from it. · Urine is leaking from the place where the catheter enters your body. Follow-up care is a key part of your treatment and safety. Be sure to make and go to all appointments, and call your doctor if you are having problems. It's also a good idea to know your test results and keep a list of the medicines you take. Where can you learn more? Go to http://ash-jeronimo.info/. Enter U010 in the search box to learn more about \"Learning About Urinary Catheter Care to Prevent Infection. \"  Current as of: December 19, 2018  Content Version: 12.2  © 5861-5532 [employer]. Care instructions adapted under license by [employer] (which disclaims liability or warranty for this information). If you have questions about a medical condition or this instruction, always ask your healthcare professional. [place] [address] any warranty or liability for your use of this information. Patient Education        Urinary Tract Infection in Women: Care Instructions  Your Care Instructions    A urinary tract infection, or UTI, is a general term for an infection anywhere between the kidneys and the urethra (where urine comes out). Most UTIs are bladder infections. They often cause pain or burning when you urinate. UTIs are caused by bacteria and can be cured with antibiotics. Be sure to complete your treatment so that the infection goes away.   Follow-up care is a key part of your treatment and safety. Be sure to make and go to all appointments, and call your doctor if you are having problems. It's also a good idea to know your test results and keep a list of the medicines you take. How can you care for yourself at home? · Take your antibiotics as directed. Do not stop taking them just because you feel better. You need to take the full course of antibiotics. · Drink extra water and other fluids for the next day or two. This may help wash out the bacteria that are causing the infection. (If you have kidney, heart, or liver disease and have to limit fluids, talk with your doctor before you increase your fluid intake.)  · Avoid drinks that are carbonated or have caffeine. They can irritate the bladder. · Urinate often. Try to empty your bladder each time. · To relieve pain, take a hot bath or lay a heating pad set on low over your lower belly or genital area. Never go to sleep with a heating pad in place. To prevent UTIs  · Drink plenty of water each day. This helps you urinate often, which clears bacteria from your system. (If you have kidney, heart, or liver disease and have to limit fluids, talk with your doctor before you increase your fluid intake.)  · Urinate when you need to. · Urinate right after you have sex. · Change sanitary pads often. · Avoid douches, bubble baths, feminine hygiene sprays, and other feminine hygiene products that have deodorants. · After going to the bathroom, wipe from front to back. When should you call for help? Call your doctor now or seek immediate medical care if:    · Symptoms such as fever, chills, nausea, or vomiting get worse or appear for the first time.     · You have new pain in your back just below your rib cage.  This is called flank pain.     · There is new blood or pus in your urine.     · You have any problems with your antibiotic medicine.    Watch closely for changes in your health, and be sure to contact your doctor if:    · You are not getting better after taking an antibiotic for 2 days.     · Your symptoms go away but then come back. Where can you learn more? Go to http://ash-jeronimo.info/. Enter B364 in the search box to learn more about \"Urinary Tract Infection in Women: Care Instructions. \"  Current as of: December 19, 2018  Content Version: 12.2  © 0166-3421 [employer]. Care instructions adapted under license by [employer] (which disclaims liability or warranty for this information). If you have questions about a medical condition or this instruction, always ask your healthcare professional. [place] [address] any warranty or liability for your use of this information.

## 2020-02-15 NOTE — ED TRIAGE NOTES
Pt had vaginal pain/burning for a couple of month and gotten worst in last month. Pt had a mendes placed x1 week ago by Samantha Camarena MD. Pt complaint of burning sensation and lower back pain. Pain level 8/10. Denies taking any medication for pain tx.

## 2020-02-15 NOTE — ED PROVIDER NOTES
71-year-old female who presents from home via EMS with a chief complaint of vaginal pain. Patient says she is had a Jc catheter in place for about a month trying to treat chronic vaginal issues and urinary retention that she is been having. Since then she is been having vaginal pain. Tonight she noticed that the urine collecting in her Jc bag was darker than usual.  She also says the vagina feels like it is on fire and that nothing she does helps. She is unable to clarify what she is tried at home for pain. She denies taking any pain medications. She denies any vaginal bleeding, discharge. No fevers or abdominal pain. Patient also adds that she is been having epigastric chest pain off and on for the past several months and has been evaluated for this before and told that it was noncardiac. Patient denies any other complaints at this time.            Past Medical History:   Diagnosis Date    Bipolar disorder ([address]     Nazmy    Chronic pain     BACK PAIN    Diabetes ([address]     BORDERLINE TX WITH DIET AND EXERCISE    DJD (degenerative joint disease)     Fibromyalgia     Genital herpes     GERD (gastroesophageal reflux disease)     Hypercholesterolemia     IBS (irritable bowel syndrome)     Ill-defined condition     fibromyligia    Lumbar disc disease     stimulation-Honza    Migraine     Nausea & vomiting     Other ill-defined conditions(799.89)     SEASONAL allergies    Other ill-defined conditions(799.89)     dysphagia has had esophagus dilated    Paget's disease     Parkinson disease ([address]     Pulmonary embolism (HCC)     RLS (restless legs syndrome)     Spinal stenosis        Past Surgical History:   Procedure Laterality Date    COLONOSCOPY N/A 6/27/2016    COLONOSCOPY performed by Annamarie Myles MD at [facility] ENDOSCOPY    COLONOSCOPY N/A 2/22/2019    COLONOSCOPY performed by Nickolas Medrano MD at [facility]. [address], COLON, DIAGNOSTIC      12, due 15    HX APPENDECTOMY      HX BACK SURGERY  9/17/2013    back surgery - total of 3 as of 12/20/2013    HX BREAST BIOPSY Right years ago    negative    HX CHOLECYSTECTOMY      HX HEENT      T & A    HX HYSTERECTOMY      total for fibroid tumors    HX ORTHOPAEDIC      lumbar laminectomy then fusion/neurostimulator implanted - inactive now but still in    HX ORTHOPAEDIC      REMOVAL OF NEUROSTIMULATOR    HX OTHER SURGICAL      EGD x 2 with dilation/colonoscopy - no polyps per pt    TILT TABLE EVALUATION  8/2/2016              Family History:   Problem Relation Age of Onset    Colon Cancer Mother     Cancer Mother 68        colon    Heart Disease Father     Heart Disease Maternal Grandmother     Heart Disease Maternal Grandfather     Heart Disease Other        Social History     Socioeconomic History    Marital status:      Spouse name: Not on file    Number of children: Not on file    Years of education: Not on file    Highest education level: Not on file   Occupational History    Occupation: volunteer     Employer: RETIRED     Comment: [employer]   Social Needs    Financial resource strain: Not on file    Food insecurity:     Worry: Not on file     Inability: Not on file    Transportation needs:     Medical: Not on file     Non-medical: Not on file   Tobacco Use    Smoking status: Never Smoker    Smokeless tobacco: Never Used   Substance and Sexual Activity    Alcohol use: No     Comment: 1 per month    Drug use: No    Sexual activity: Not on file   Lifestyle    Physical activity:     Days per week: Not on file     Minutes per session: Not on file    Stress: Not on file   Relationships    Social connections:     Talks on phone: Not on file     Gets together: Not on file     Attends Bahai service: Not on file     Active member of club or organization: Not on file     Attends meetings of clubs or organizations: Not on file     Relationship status: Not on file    Intimate partner violence: Fear of current or ex partner: Not on file     Emotionally abused: Not on file     Physically abused: Not on file     Forced sexual activity: Not on file   Other Topics Concern    Not on file   Social History Narrative    Not on file         ALLERGIES: Adhesive; Hydrocodone; Lorazepam; Other medication; Percocet [oxycodone-acetaminophen]; Sudafed [pseudoephedrine hcl]; Wellbutrin [bupropion hcl]; Zithromax [azithromycin]; and Zyrtec [cetirizine]    Review of Systems   Constitutional: Negative for fever. HENT: Negative for facial swelling. Eyes: Negative for visual disturbance. Respiratory: Negative for chest tightness. Cardiovascular: Negative for chest pain. Gastrointestinal: Negative for abdominal pain. Genitourinary: Negative for difficulty urinating and dysuria. Musculoskeletal: Negative for arthralgias. Skin: Negative for rash. Neurological: Negative for headaches. Hematological: Negative for adenopathy. Psychiatric/Behavioral: Negative for suicidal ideas. There were no vitals filed for this visit. Physical Exam  Vitals signs and nursing note reviewed. Constitutional:       General: She is not in acute distress. Appearance: She is well-developed. She is not diaphoretic. HENT:      Head: Normocephalic and atraumatic. Eyes:      General: No scleral icterus. Pupils: Pupils are equal, round, and reactive to light. Neck:      Musculoskeletal: Normal range of motion and neck supple. Thyroid: No thyromegaly. Cardiovascular:      Rate and Rhythm: Normal rate and regular rhythm. Heart sounds: Normal heart sounds. No murmur. Pulmonary:      Effort: Pulmonary effort is normal. No respiratory distress. Breath sounds: Normal breath sounds. Abdominal:      General: Bowel sounds are normal. There is no distension. Palpations: Abdomen is soft. Tenderness: There is no abdominal tenderness.    Genitourinary:     Comments: Jc catheter in place draining clear, dark-colored urine. Musculoskeletal: Normal range of motion. Skin:     General: Skin is warm and dry. Findings: No rash. Neurological:      Mental Status: She is alert and oriented to person, place, and time. MDM  Number of Diagnoses or Management Options  Urinary tract infection associated with indwelling urethral catheter, initial encounter [facility]):   Diagnosis management comments: Assessment: Of urinary tract infection with an indwelling Jc catheter. Will send urine culture and treat empirically with Keflex. Patient is to follow-up with her uro-gyn physician as soon as possible.          Procedures

## 2020-02-16 LAB
ATRIAL RATE: 65 BPM
CALCULATED P AXIS, ECG09: 4 DEGREES
CALCULATED R AXIS, ECG10: 7 DEGREES
CALCULATED T AXIS, ECG11: 79 DEGREES
DIAGNOSIS, 93000: NORMAL
P-R INTERVAL, ECG05: 182 MS
Q-T INTERVAL, ECG07: 486 MS
QRS DURATION, ECG06: 86 MS
QTC CALCULATION (BEZET), ECG08: 505 MS
VENTRICULAR RATE, ECG03: 65 BPM

## 2020-02-17 LAB
BACTERIA SPEC CULT: ABNORMAL
CC UR VC: ABNORMAL
SERVICE CMNT-IMP: ABNORMAL

## 2020-02-18 ENCOUNTER — OFFICE VISIT (OUTPATIENT)
Dept: CARDIOLOGY CLINIC | Age: 75
End: 2020-02-18

## 2020-02-18 DIAGNOSIS — I95.1 ORTHOSTATIC HYPOTENSION: Primary | ICD-10-CM

## 2020-02-25 ENCOUNTER — PATIENT OUTREACH (OUTPATIENT)
Dept: INTERNAL MEDICINE CLINIC | Age: 75
End: 2020-02-25

## 2020-02-25 NOTE — PROGRESS NOTES
Goals        Patient Stated     relief of physical symptoms and attend follow ups (pt-stated)      2020 spoke with patient's daughter, Toni Elizondo. ID verified by name and . Daughter reports:  - not much change noticed with increase in gabapentin dosage. - patient is now staying with daughter so daughter can manage mendes catheter placed for 2 weeks by Dr. Clifton Fabian (urogynocologist). Daughter states patient is not mentally or physically able to care for catheter. Per daughter, catheter placed due to UTI and will be removed once UTI is cleared. They have FU appt with DR. Linares in 1-2 weeks. \  - patient has seen psychiatrist, but only medication tweaked was sleeping med. Stated the doctor did not want to change medications due to increased sedation risk. - FU with PCP in the morning. Reynold Kapoor RN  Ambulatory Care Manager     2020 patient's VM is full and cannot receive messages. ACM called daughter, Toni Elizondo. CHARMAINE in chart, ID verified name and . Patient's daughter reports:  - patient attended appointment with NP at [address] [place] [address] for [facility] 2020. An external exam was performed and patient was given cream to apply externally. An appointment with Dr. Clifton Fabian for an internal exam is scheduled in 2 weeks. Patient was told a request for patient records would be sent to PCP for Dr. Clifton Fabian to review prior to that appointment. There is no note showing that has been done at this outreach. - ACM informed patient's daughter VM is full and would be helpful if patient could empty so ACM could contact. Daughter requests ACM contact her for now. Patient does not know how to empty VM and only answers her phone if it is one of her daughters calling.   - CCM will be extended another 30 days to assist patient with concerns regarding the vaginal burning and pain along with FU appointments. - Patient has FU with PCP 2020.    Reynold Kapoor RN  Ambulatory Care Manager     2020 Patient called in to office and spoke with Abad Chan LPN. See note 1/24/3030. Due to ACM inability to reach patient by phone yesterday and today along with patient continued health complaints handled today by Nurse Shaq Vyas will extend case management episode of care and will attempt to reach patient next week for FU. Amanda Glass RN  Ambulatory Care Manager     1/23/2020 Patient completed YULISSA 30 day care management. Complex case management has passed 90 day following also. Attempted to call patient for last  FU. Voice mailbox is full and not accepting message. Will attempt to reach patient again and then close episode of care. Amanda Glass RN  Ambulatory Care Manager     11/15/19 Patient VM full. Contacted patient's daughter, Roverto Valdovinos. CHARMAINE in chart. Daughter reports:  - Patient is same. Continues with burning in chest and Neuro has MRI scheduled for next week. Thought that complaints may be connected to back issue.   - ACM offered to schedule appointment with PCP. Daughter declined. They will set something up if they think it is necessary after results of MRI are known. - contact information supplied. ACM will FU with patient after MRI  Amanda Glass RN  Ambulatory Care Manager    10/21/19   Skills and education necessary to properly manage symptoms of vaginal burning and importance of following up with providers  History: LOV 10/11/19 diagnosis of vaginal burning, neuropathy and hypomanic  Current level of understanding: Patient appears to be unaware of cause of burning sensation. States: \"May be fibromyalgia\" is mentioned only once and not brought up again. More focused on symptoms. Education provided on alternatives such as exam by gynecologist and giving time for adjusted dosage of gabapentin to work.   Desired Outcome: Patient will be free of vaginal burning within 1 month; will consider and discuss gynecologist FU with daughter by next outreach, and take medications as directed  Plan: Follow patient for the next 30 days with weekly outreaches to provide education and support. Re-evaluate at the end of 30 days to determine if patient needs additional Care Management. Encourage NP advise of trying to leave vaginal area open to air at night (less adult brief contact time with skin), follow up with specialist and give increased dose of gabapentin time to work.    Jocelyne Maria RN

## 2020-02-26 ENCOUNTER — HOSPITAL ENCOUNTER (OUTPATIENT)
Dept: LAB | Age: 75
Discharge: HOME | End: 2020-02-26
Payer: MEDICARE

## 2020-02-26 ENCOUNTER — OFFICE VISIT (OUTPATIENT)
Dept: INTERNAL MEDICINE CLINIC | Age: 75
End: 2020-02-26

## 2020-02-26 ENCOUNTER — PATIENT OUTREACH (OUTPATIENT)
Dept: INTERNAL MEDICINE CLINIC | Age: 75
End: 2020-02-26

## 2020-02-26 VITALS
HEART RATE: 74 BPM | OXYGEN SATURATION: 99 % | BODY MASS INDEX: 24.38 KG/M2 | DIASTOLIC BLOOD PRESSURE: 76 MMHG | RESPIRATION RATE: 16 BRPM | WEIGHT: 137.6 LBS | SYSTOLIC BLOOD PRESSURE: 136 MMHG | TEMPERATURE: 97.6 F | HEIGHT: 63 IN

## 2020-02-26 DIAGNOSIS — G62.9 NEUROPATHY: ICD-10-CM

## 2020-02-26 DIAGNOSIS — N39.0 URINARY TRACT INFECTION WITHOUT HEMATURIA, SITE UNSPECIFIED: ICD-10-CM

## 2020-02-26 DIAGNOSIS — F31.0 BIPOLAR AFFECTIVE DISORDER, CURRENT EPISODE HYPOMANIC (HCC): ICD-10-CM

## 2020-02-26 DIAGNOSIS — N94.9 VAGINAL BURNING: ICD-10-CM

## 2020-02-26 DIAGNOSIS — E78.2 MIXED HYPERLIPIDEMIA: Primary | ICD-10-CM

## 2020-02-26 DIAGNOSIS — E11.21 TYPE 2 DIABETES WITH NEPHROPATHY (HCC): ICD-10-CM

## 2020-02-26 DIAGNOSIS — I95.1 ORTHOSTATIC HYPOTENSION: ICD-10-CM

## 2020-02-26 PROCEDURE — 80061 LIPID PANEL: CPT

## 2020-02-26 PROCEDURE — 36415 COLL VENOUS BLD VENIPUNCTURE: CPT

## 2020-02-26 PROCEDURE — 80048 BASIC METABOLIC PNL TOTAL CA: CPT

## 2020-02-26 PROCEDURE — 83036 HEMOGLOBIN GLYCOSYLATED A1C: CPT

## 2020-02-26 PROCEDURE — 80076 HEPATIC FUNCTION PANEL: CPT

## 2020-02-26 NOTE — PROGRESS NOTES
Goals        Patient Stated     relief of physical symptoms and attend follow ups (pt-stated)      2020 Met with patient and daughterAxel in office today for introduction to self. Patient reports:  - tired of UTI and UTI symptoms  - Has FU with Dr. Rema Johnson scheduled. - waiting to see PCP today for FU  - ACM will FU in 1-2 weeks  David Darby RN  Ambulatory Care Manager    2020 spoke with patient's daughterAxel. ID verified by name and . Daughter reports:  - not much change noticed with increase in gabapentin dosage. - patient is now staying with daughter so daughter can manage mendes catheter placed for 2 weeks by Dr. Rema Johnson (urogynocologist). Daughter states patient is not mentally or physically able to care for catheter. Per daughter, catheter placed due to UTI and will be removed once UTI is cleared. They have FU appt with DR. Linares in 1-2 weeks. \  - patient has seen psychiatrist, but only medication tweaked was sleeping med. Stated the doctor did not want to change medications due to increased sedation risk. - FU with PCP in the morning. David Darby RN  Ambulatory Care Manager     2020 patient's VM is full and cannot receive messages. ACM called daughterAxel. CHARMAINE in chart, ID verified name and . Patient's daughter reports:  - patient attended appointment with NP at [address]  [facility] 2020. An external exam was performed and patient was given cream to apply externally. An appointment with Dr. Rema Johnson for an internal exam is scheduled in 2 weeks. Patient was told a request for patient records would be sent to PCP for Dr. Rema Johnson to review prior to that appointment. There is no note showing that has been done at this outreach. - ACM informed patient's daughter VM is full and would be helpful if patient could empty so ACM could contact. Daughter requests ACM contact her for now.  Patient does not know how to empty VM and only answers her phone if it is one of her daughters calling.   - CCM will be extended another 30 days to assist patient with concerns regarding the vaginal burning and pain along with FU appointments. - Patient has FU with PCP 2/26/2020. Lan Alvarenga RN  Ambulatory Care Manager     1/24/2020 Patient called in to office and spoke with Lora Arvizu LPN. See note 1/24/3030. Due to ACM inability to reach patient by phone yesterday and today along with patient continued health complaints handled today by Nurse Leela Goldberg will extend case management episode of care and will attempt to reach patient next week for FU. Lan Alvarenga RN  Ambulatory Care Manager     1/23/2020 Patient completed YULISSA 30 day care management. Complex case management has passed 90 day following also. Attempted to call patient for last  FU. Voice mailbox is full and not accepting message. Will attempt to reach patient again and then close episode of care. Lan Alvarenga RN  Ambulatory Care Manager     11/15/19 Patient VM full. Contacted patient's daughter, Pamella Lopez. CHARMAINE in chart. Daughter reports:  - Patient is same. Continues with burning in chest and Neuro has MRI scheduled for next week. Thought that complaints may be connected to back issue.   - ACM offered to schedule appointment with PCP. Daughter declined. They will set something up if they think it is necessary after results of MRI are known. - contact information supplied. ACM will FU with patient after MRI  Lan Alvarenga RN  Ambulatory Care Manager    10/21/19   Skills and education necessary to properly manage symptoms of vaginal burning and importance of following up with providers  History: LOV 10/11/19 diagnosis of vaginal burning, neuropathy and hypomanic  Current level of understanding: Patient appears to be unaware of cause of burning sensation. States: \"May be fibromyalgia\" is mentioned only once and not brought up again. More focused on symptoms.  Education provided on alternatives such as exam by gynecologist and giving time for adjusted dosage of gabapentin to work. Desired Outcome: Patient will be free of vaginal burning within 1 month; will consider and discuss gynecologist FU with daughter by next outreach, and take medications as directed  Plan: Follow patient for the next 30 days with weekly outreaches to provide education and support. Re-evaluate at the end of 30 days to determine if patient needs additional Care Management. Encourage NP advise of trying to leave vaginal area open to air at night (less adult brief contact time with skin), follow up with specialist and give increased dose of gabapentin time to work.    Naeem Francois RN

## 2020-02-26 NOTE — PROGRESS NOTES
HISTORY OF PRESENT ILLNESS  Phill Suárez is a 67 y.o. female. HPI Subjective:  Brinda Kawasaki is seen today accompanied by her daughter for follow up of chronic medical problems of diabetes and others, along with ongoing problem of vaginal burning. 1. Vaginal burning. She rates intensity as 10/10. She had a catheter for a while from Dr. Timothy Watson, her urologist, but could not take it, so it was removed. They are also trying the medication Myrbetriq. Eventually Botox may be tried. The thought here apparently is that urinary incontinence is leading to irritation of the vaginal areas. 2. Bipolar disorder. She is up to date on psychiatry and no medication changes have been made. 3. Diabetes, elevated cholesterol. Due for routine labs. 4. Low blood pressure, tremors. She is up to date with specialists. There has been question of a neurosurgeon procedure to treat her tremors. As long as it is not too invasive, this may be of benefit to lower her medication burden. Social History:  Notable for her staying at [employer] for a while. I told her this seemed like a really good idea. Current Outpatient Medications   Medication Sig    fludrocortisone (FLORINEF) 0.1 mg tablet TAKE 1 TABLET BY MOUTH TWICE DAILY    potassium chloride (K-DUR, KLOR-CON) 20 mEq tablet TAKE 1 TABLET BY MOUTH DAILY    atorvastatin (LIPITOR) 20 mg tablet TAKE 1 TABLET BY MOUTH EVERY EVENING    sucralfate (CARAFATE) 1 gram tablet TAKE 1 TABLET BY MOUTH FOUR TIMES DAILY (Patient taking differently: Take  by mouth daily.)    gabapentin (NEURONTIN) 300 mg capsule 600mg with breakfast, 300 mg with  lunch, 600mg at dinner- new directions  Indications: Neuropathic Pain    pyridostigmine (MESTINON) 60 mg tablet TAKE 1 TABLET BY MOUTH THREE TIMES DAILY    donepezil (ARICEPT) 5 mg tablet Take 5 mg by mouth nightly.  dexlansoprazole (DEXILANT) 60 mg CpDB capsule (delayed release) Take 1 Cap by mouth daily.     FLUoxetine (PROZAC) 40 mg capsule Take 40 mg by mouth daily.  droxidopa (NORTHERA) 300 mg cap Take 600 mg by mouth two (2) times a day. (Patient taking differently: Take 1,200 mg by mouth two (2) times a day.)    carbidopa-levodopa (SINEMET)  mg per tablet Take 1 Tab by mouth three (3) times daily.  acetaminophen (TYLENOL) 325 mg tablet Take 325 mg by mouth every four (4) hours as needed for Pain.  QUEtiapine (SEROQUEL) 25 mg tablet Take 50 mg by mouth nightly. Take 2 tabs qhs prn Adrienne Jackman NP/Dr Mary Grace Solano     No current facility-administered medications for this visit. Will consult Neftaly Chaves to do a med review for side effects    Review of Systems   Constitutional: Negative for weight loss. Respiratory: Negative. Cardiovascular: Negative for chest pain, palpitations, leg swelling and PND. Genitourinary: Negative for frequency. Musculoskeletal: Negative for myalgias. Neurological: Positive for tremors. Negative for focal weakness. Psychiatric/Behavioral: The patient is nervous/anxious. Physical Exam  Vitals signs and nursing note reviewed. Neck:      Vascular: No carotid bruit. Cardiovascular:      Rate and Rhythm: Normal rate and regular rhythm. Heart sounds: No murmur. No friction rub. No gallop. Pulmonary:      Effort: Pulmonary effort is normal. No respiratory distress. Breath sounds: Normal breath sounds. ASSESSMENT and PLAN  Diagnoses and all orders for this visit:    1. Mixed hyperlipidemia  -     LIPID PANEL  -     HEPATIC FUNCTION PANEL    2. Type 2 diabetes with nephropathy (HCC)  -     METABOLIC PANEL, BASIC  -     HEMOGLOBIN A1C WITH EAG    3. Urinary tract infection without hematuria, site unspecified- See urologist as directed     4. Orthostatic hypotension- See cardiologist as directed. 5. Neuropathy- Continue current regimen of prescription and / or OTC medications     6.  Bipolar affective disorder, current episode hypomanic [facility])- See psychiatrist as directed     7. Vaginal burning- See specialists  as directed.  Proceed with plan as discussed     Plan was reviewed with patient and family, understanding expressed

## 2020-02-27 LAB
ALBUMIN SERPL-MCNC: 4.4 G/DL (ref 3.7–4.7)
ALP SERPL-CCNC: 85 IU/L (ref 39–117)
ALT SERPL-CCNC: 21 IU/L (ref 0–32)
AST SERPL-CCNC: 20 IU/L (ref 0–40)
BILIRUB DIRECT SERPL-MCNC: 0.14 MG/DL (ref 0–0.4)
BILIRUB SERPL-MCNC: 0.3 MG/DL (ref 0–1.2)
BUN SERPL-MCNC: 23 MG/DL (ref 8–27)
BUN/CREAT SERPL: 28 (ref 12–28)
CALCIUM SERPL-MCNC: 9.2 MG/DL (ref 8.7–10.3)
CHLORIDE SERPL-SCNC: 105 MMOL/L (ref 96–106)
CHOLEST SERPL-MCNC: 134 MG/DL (ref 100–199)
CO2 SERPL-SCNC: 25 MMOL/L (ref 20–29)
CREAT SERPL-MCNC: 0.83 MG/DL (ref 0.57–1)
EST. AVERAGE GLUCOSE BLD GHB EST-MCNC: 128 MG/DL
GLUCOSE SERPL-MCNC: 142 MG/DL (ref 65–99)
HBA1C MFR BLD: 6.1 % (ref 4.8–5.6)
HDLC SERPL-MCNC: 65 MG/DL
LDLC SERPL CALC-MCNC: 47 MG/DL (ref 0–99)
POTASSIUM SERPL-SCNC: 3.5 MMOL/L (ref 3.5–5.2)
PROT SERPL-MCNC: 6.5 G/DL (ref 6–8.5)
SODIUM SERPL-SCNC: 146 MMOL/L (ref 134–144)
TRIGL SERPL-MCNC: 111 MG/DL (ref 0–149)
VLDLC SERPL CALC-MCNC: 22 MG/DL (ref 5–40)

## 2020-03-03 ENCOUNTER — APPOINTMENT (OUTPATIENT)
Dept: CT IMAGING | Age: 75
DRG: 698 | End: 2020-03-03
Attending: EMERGENCY MEDICINE
Payer: MEDICARE

## 2020-03-03 ENCOUNTER — HOSPITAL ENCOUNTER (INPATIENT)
Age: 75
LOS: 5 days | Discharge: HOME | DRG: 698 | End: 2020-03-08
Attending: EMERGENCY MEDICINE | Admitting: STUDENT IN AN ORGANIZED HEALTH CARE EDUCATION/TRAINING PROGRAM
Payer: MEDICARE

## 2020-03-03 DIAGNOSIS — E86.0 DEHYDRATION: ICD-10-CM

## 2020-03-03 DIAGNOSIS — N39.0 URINARY TRACT INFECTION ASSOCIATED WITH INDWELLING URETHRAL CATHETER, INITIAL ENCOUNTER (HCC): Primary | ICD-10-CM

## 2020-03-03 DIAGNOSIS — R41.82 ALTERED MENTAL STATUS, UNSPECIFIED ALTERED MENTAL STATUS TYPE: ICD-10-CM

## 2020-03-03 DIAGNOSIS — T83.511A URINARY TRACT INFECTION ASSOCIATED WITH INDWELLING URETHRAL CATHETER, INITIAL ENCOUNTER (HCC): Primary | ICD-10-CM

## 2020-03-03 LAB
ALBUMIN SERPL-MCNC: 3.2 G/DL (ref 3.5–5)
ALBUMIN/GLOB SERPL: 0.9 {RATIO} (ref 1.1–2.2)
ALP SERPL-CCNC: 99 U/L (ref 45–117)
ALT SERPL-CCNC: 17 U/L (ref 12–78)
ANION GAP SERPL CALC-SCNC: 9 MMOL/L (ref 5–15)
APPEARANCE UR: ABNORMAL
AST SERPL-CCNC: 86 U/L (ref 15–37)
BACTERIA URNS QL MICRO: ABNORMAL /HPF
BASOPHILS # BLD: 0.1 K/UL (ref 0–0.1)
BASOPHILS NFR BLD: 0 % (ref 0–1)
BILIRUB SERPL-MCNC: 0.9 MG/DL (ref 0.2–1)
BILIRUB UR QL: NEGATIVE
BUN SERPL-MCNC: 23 MG/DL (ref 6–20)
BUN/CREAT SERPL: 22 (ref 12–20)
CALCIUM SERPL-MCNC: 8.5 MG/DL (ref 8.5–10.1)
CHLORIDE SERPL-SCNC: 101 MMOL/L (ref 97–108)
CO2 SERPL-SCNC: 29 MMOL/L (ref 21–32)
COLOR UR: ABNORMAL
COMMENT, HOLDF: NORMAL
CREAT SERPL-MCNC: 1.03 MG/DL (ref 0.55–1.02)
DIFFERENTIAL METHOD BLD: ABNORMAL
EOSINOPHIL # BLD: 0 K/UL (ref 0–0.4)
EOSINOPHIL NFR BLD: 0 % (ref 0–7)
EPITH CASTS URNS QL MICRO: ABNORMAL /LPF
ERYTHROCYTE [DISTWIDTH] IN BLOOD BY AUTOMATED COUNT: 14 % (ref 11.5–14.5)
GLOBULIN SER CALC-MCNC: 3.6 G/DL (ref 2–4)
GLUCOSE BLD STRIP.AUTO-MCNC: 185 MG/DL (ref 65–100)
GLUCOSE SERPL-MCNC: 162 MG/DL (ref 65–100)
GLUCOSE UR STRIP.AUTO-MCNC: NEGATIVE MG/DL
HCT VFR BLD AUTO: 39.7 % (ref 35–47)
HGB BLD-MCNC: 12.6 G/DL (ref 11.5–16)
HGB UR QL STRIP: ABNORMAL
IMM GRANULOCYTES # BLD AUTO: 0.1 K/UL (ref 0–0.04)
IMM GRANULOCYTES NFR BLD AUTO: 1 % (ref 0–0.5)
INR PPP: 1.1 (ref 0.9–1.1)
KETONES UR QL STRIP.AUTO: NEGATIVE MG/DL
LACTATE SERPL-SCNC: 1 MMOL/L (ref 0.4–2)
LEUKOCYTE ESTERASE UR QL STRIP.AUTO: ABNORMAL
LYMPHOCYTES # BLD: 2.5 K/UL (ref 0.8–3.5)
LYMPHOCYTES NFR BLD: 16 % (ref 12–49)
MCH RBC QN AUTO: 30.5 PG (ref 26–34)
MCHC RBC AUTO-ENTMCNC: 31.7 G/DL (ref 30–36.5)
MCV RBC AUTO: 96.1 FL (ref 80–99)
MONOCYTES # BLD: 1.4 K/UL (ref 0–1)
MONOCYTES NFR BLD: 9 % (ref 5–13)
NEUTS SEG # BLD: 10.9 K/UL (ref 1.8–8)
NEUTS SEG NFR BLD: 74 % (ref 32–75)
NITRITE UR QL STRIP.AUTO: NEGATIVE
NRBC # BLD: 0 K/UL (ref 0–0.01)
NRBC BLD-RTO: 0 PER 100 WBC
PH UR STRIP: 6 [PH] (ref 5–8)
PLATELET # BLD AUTO: 283 K/UL (ref 150–400)
PMV BLD AUTO: 9.5 FL (ref 8.9–12.9)
POTASSIUM SERPL-SCNC: 3.1 MMOL/L (ref 3.5–5.1)
PROT SERPL-MCNC: 6.8 G/DL (ref 6.4–8.2)
PROT UR STRIP-MCNC: 100 MG/DL
PROTHROMBIN TIME: 10.5 SEC (ref 9–11.1)
RBC # BLD AUTO: 4.13 M/UL (ref 3.8–5.2)
RBC #/AREA URNS HPF: ABNORMAL /HPF (ref 0–5)
SAMPLES BEING HELD,HOLD: NORMAL
SERVICE CMNT-IMP: ABNORMAL
SODIUM SERPL-SCNC: 139 MMOL/L (ref 136–145)
SP GR UR REFRACTOMETRY: 1.01 (ref 1–1.03)
TROPONIN I SERPL-MCNC: <0.05 NG/ML
UROBILINOGEN UR QL STRIP.AUTO: 0.2 EU/DL (ref 0.2–1)
WBC # BLD AUTO: 15 K/UL (ref 3.6–11)
WBC URNS QL MICRO: >100 /HPF (ref 0–4)

## 2020-03-03 PROCEDURE — 74011250637 HC RX REV CODE- 250/637: Performed by: EMERGENCY MEDICINE

## 2020-03-03 PROCEDURE — 81001 URINALYSIS AUTO W/SCOPE: CPT

## 2020-03-03 PROCEDURE — 70450 CT HEAD/BRAIN W/O DYE: CPT

## 2020-03-03 PROCEDURE — 96361 HYDRATE IV INFUSION ADD-ON: CPT

## 2020-03-03 PROCEDURE — 96365 THER/PROPH/DIAG IV INF INIT: CPT

## 2020-03-03 PROCEDURE — 85610 PROTHROMBIN TIME: CPT

## 2020-03-03 PROCEDURE — 85025 COMPLETE CBC W/AUTO DIFF WBC: CPT

## 2020-03-03 PROCEDURE — 74011250636 HC RX REV CODE- 250/636: Performed by: EMERGENCY MEDICINE

## 2020-03-03 PROCEDURE — 65270000029 HC RM PRIVATE

## 2020-03-03 PROCEDURE — 87086 URINE CULTURE/COLONY COUNT: CPT

## 2020-03-03 PROCEDURE — 51702 INSERT TEMP BLADDER CATH: CPT

## 2020-03-03 PROCEDURE — 87186 SC STD MICRODIL/AGAR DIL: CPT

## 2020-03-03 PROCEDURE — 87077 CULTURE AEROBIC IDENTIFY: CPT

## 2020-03-03 PROCEDURE — 83605 ASSAY OF LACTIC ACID: CPT

## 2020-03-03 PROCEDURE — 80053 COMPREHEN METABOLIC PANEL: CPT

## 2020-03-03 PROCEDURE — 84484 ASSAY OF TROPONIN QUANT: CPT

## 2020-03-03 PROCEDURE — 93005 ELECTROCARDIOGRAM TRACING: CPT

## 2020-03-03 PROCEDURE — 82962 GLUCOSE BLOOD TEST: CPT

## 2020-03-03 PROCEDURE — 77030034696 HC CATH URETH FOL 2W BARD -A

## 2020-03-03 PROCEDURE — 36415 COLL VENOUS BLD VENIPUNCTURE: CPT

## 2020-03-03 PROCEDURE — 74011000258 HC RX REV CODE- 258: Performed by: EMERGENCY MEDICINE

## 2020-03-03 PROCEDURE — 99285 EMERGENCY DEPT VISIT HI MDM: CPT

## 2020-03-03 RX ORDER — IPRATROPIUM BROMIDE AND ALBUTEROL SULFATE 2.5; .5 MG/3ML; MG/3ML
3 SOLUTION RESPIRATORY (INHALATION)
Status: COMPLETED | OUTPATIENT
Start: 2020-03-04 | End: 2020-03-04

## 2020-03-03 RX ORDER — POTASSIUM CHLORIDE 750 MG/1
40 TABLET, FILM COATED, EXTENDED RELEASE ORAL
Status: COMPLETED | OUTPATIENT
Start: 2020-03-04 | End: 2020-03-03

## 2020-03-03 RX ADMIN — CEFTRIAXONE 1 G: 1 INJECTION, POWDER, FOR SOLUTION INTRAMUSCULAR; INTRAVENOUS at 21:57

## 2020-03-03 RX ADMIN — SODIUM CHLORIDE 1000 ML: 900 INJECTION, SOLUTION INTRAVENOUS at 23:36

## 2020-03-03 RX ADMIN — SODIUM CHLORIDE 1000 ML: 900 INJECTION, SOLUTION INTRAVENOUS at 21:16

## 2020-03-03 RX ADMIN — POTASSIUM CHLORIDE 40 MEQ: 750 TABLET, FILM COATED, EXTENDED RELEASE ORAL at 23:32

## 2020-03-04 ENCOUNTER — APPOINTMENT (OUTPATIENT)
Dept: GENERAL RADIOLOGY | Age: 75
DRG: 698 | End: 2020-03-04
Attending: STUDENT IN AN ORGANIZED HEALTH CARE EDUCATION/TRAINING PROGRAM
Payer: MEDICARE

## 2020-03-04 LAB
ALBUMIN SERPL-MCNC: 2.6 G/DL (ref 3.5–5)
ALBUMIN/GLOB SERPL: 0.8 {RATIO} (ref 1.1–2.2)
ALP SERPL-CCNC: 79 U/L (ref 45–117)
ALT SERPL-CCNC: 38 U/L (ref 12–78)
ANION GAP SERPL CALC-SCNC: 3 MMOL/L (ref 5–15)
AST SERPL-CCNC: 34 U/L (ref 15–37)
ATRIAL RATE: 67 BPM
ATRIAL RATE: 68 BPM
BASOPHILS # BLD: 0 K/UL (ref 0–0.1)
BASOPHILS NFR BLD: 0 % (ref 0–1)
BILIRUB SERPL-MCNC: 0.5 MG/DL (ref 0.2–1)
BUN SERPL-MCNC: 18 MG/DL (ref 6–20)
BUN/CREAT SERPL: 21 (ref 12–20)
CALCIUM SERPL-MCNC: 8.4 MG/DL (ref 8.5–10.1)
CALCULATED P AXIS, ECG09: 33 DEGREES
CALCULATED P AXIS, ECG09: 7 DEGREES
CALCULATED R AXIS, ECG10: 29 DEGREES
CALCULATED R AXIS, ECG10: 4 DEGREES
CALCULATED T AXIS, ECG11: 68 DEGREES
CALCULATED T AXIS, ECG11: 76 DEGREES
CHLORIDE SERPL-SCNC: 113 MMOL/L (ref 97–108)
CO2 SERPL-SCNC: 23 MMOL/L (ref 21–32)
CREAT SERPL-MCNC: 0.86 MG/DL (ref 0.55–1.02)
DIAGNOSIS, 93000: NORMAL
DIAGNOSIS, 93000: NORMAL
DIFFERENTIAL METHOD BLD: ABNORMAL
EOSINOPHIL # BLD: 0.1 K/UL (ref 0–0.4)
EOSINOPHIL NFR BLD: 1 % (ref 0–7)
ERYTHROCYTE [DISTWIDTH] IN BLOOD BY AUTOMATED COUNT: 14.4 % (ref 11.5–14.5)
GLOBULIN SER CALC-MCNC: 3.3 G/DL (ref 2–4)
GLUCOSE BLD STRIP.AUTO-MCNC: 133 MG/DL (ref 65–100)
GLUCOSE BLD STRIP.AUTO-MCNC: 136 MG/DL (ref 65–100)
GLUCOSE BLD STRIP.AUTO-MCNC: 158 MG/DL (ref 65–100)
GLUCOSE BLD STRIP.AUTO-MCNC: 204 MG/DL (ref 65–100)
GLUCOSE SERPL-MCNC: 105 MG/DL (ref 65–100)
HCT VFR BLD AUTO: 33.5 % (ref 35–47)
HGB BLD-MCNC: 10.4 G/DL (ref 11.5–16)
IMM GRANULOCYTES # BLD AUTO: 0.1 K/UL (ref 0–0.04)
IMM GRANULOCYTES NFR BLD AUTO: 1 % (ref 0–0.5)
LYMPHOCYTES # BLD: 1.6 K/UL (ref 0.8–3.5)
LYMPHOCYTES NFR BLD: 12 % (ref 12–49)
MAGNESIUM SERPL-MCNC: 1.8 MG/DL (ref 1.6–2.4)
MAGNESIUM SERPL-MCNC: 1.9 MG/DL (ref 1.6–2.4)
MCH RBC QN AUTO: 30.5 PG (ref 26–34)
MCHC RBC AUTO-ENTMCNC: 31 G/DL (ref 30–36.5)
MCV RBC AUTO: 98.2 FL (ref 80–99)
MONOCYTES # BLD: 1.6 K/UL (ref 0–1)
MONOCYTES NFR BLD: 12 % (ref 5–13)
NEUTS SEG # BLD: 9.6 K/UL (ref 1.8–8)
NEUTS SEG NFR BLD: 74 % (ref 32–75)
NRBC # BLD: 0 K/UL (ref 0–0.01)
NRBC BLD-RTO: 0 PER 100 WBC
P-R INTERVAL, ECG05: 154 MS
P-R INTERVAL, ECG05: 160 MS
PLATELET # BLD AUTO: 194 K/UL (ref 150–400)
POTASSIUM SERPL-SCNC: 3.7 MMOL/L (ref 3.5–5.1)
PROT SERPL-MCNC: 5.9 G/DL (ref 6.4–8.2)
Q-T INTERVAL, ECG07: 352 MS
Q-T INTERVAL, ECG07: 442 MS
QRS DURATION, ECG06: 84 MS
QRS DURATION, ECG06: 88 MS
QTC CALCULATION (BEZET), ECG08: 371 MS
QTC CALCULATION (BEZET), ECG08: 469 MS
RBC # BLD AUTO: 3.41 M/UL (ref 3.8–5.2)
RBC MORPH BLD: ABNORMAL
SERVICE CMNT-IMP: ABNORMAL
SODIUM SERPL-SCNC: 139 MMOL/L (ref 136–145)
T4 FREE SERPL-MCNC: 1.1 NG/DL (ref 0.8–1.5)
TROPONIN I SERPL-MCNC: <0.05 NG/ML
TSH SERPL DL<=0.05 MIU/L-ACNC: 0.26 UIU/ML (ref 0.36–3.74)
VENTRICULAR RATE, ECG03: 67 BPM
VENTRICULAR RATE, ECG03: 68 BPM
VIT B12 SERPL-MCNC: 338 PG/ML (ref 193–986)
WBC # BLD AUTO: 13 K/UL (ref 3.6–11)

## 2020-03-04 PROCEDURE — 74011000258 HC RX REV CODE- 258: Performed by: STUDENT IN AN ORGANIZED HEALTH CARE EDUCATION/TRAINING PROGRAM

## 2020-03-04 PROCEDURE — 97162 PT EVAL MOD COMPLEX 30 MIN: CPT

## 2020-03-04 PROCEDURE — 85025 COMPLETE CBC W/AUTO DIFF WBC: CPT

## 2020-03-04 PROCEDURE — 65660000000 HC RM CCU STEPDOWN

## 2020-03-04 PROCEDURE — 82962 GLUCOSE BLOOD TEST: CPT

## 2020-03-04 PROCEDURE — 74011250636 HC RX REV CODE- 250/636: Performed by: STUDENT IN AN ORGANIZED HEALTH CARE EDUCATION/TRAINING PROGRAM

## 2020-03-04 PROCEDURE — 97116 GAIT TRAINING THERAPY: CPT

## 2020-03-04 PROCEDURE — 71045 X-RAY EXAM CHEST 1 VIEW: CPT

## 2020-03-04 PROCEDURE — 82607 VITAMIN B-12: CPT

## 2020-03-04 PROCEDURE — 84439 ASSAY OF FREE THYROXINE: CPT

## 2020-03-04 PROCEDURE — 80053 COMPREHEN METABOLIC PANEL: CPT

## 2020-03-04 PROCEDURE — 83735 ASSAY OF MAGNESIUM: CPT

## 2020-03-04 PROCEDURE — 97535 SELF CARE MNGMENT TRAINING: CPT

## 2020-03-04 PROCEDURE — 84443 ASSAY THYROID STIM HORMONE: CPT

## 2020-03-04 PROCEDURE — 93005 ELECTROCARDIOGRAM TRACING: CPT

## 2020-03-04 PROCEDURE — 84484 ASSAY OF TROPONIN QUANT: CPT

## 2020-03-04 PROCEDURE — 36415 COLL VENOUS BLD VENIPUNCTURE: CPT

## 2020-03-04 PROCEDURE — 74011250637 HC RX REV CODE- 250/637: Performed by: STUDENT IN AN ORGANIZED HEALTH CARE EDUCATION/TRAINING PROGRAM

## 2020-03-04 PROCEDURE — 74011000250 HC RX REV CODE- 250: Performed by: EMERGENCY MEDICINE

## 2020-03-04 PROCEDURE — 65270000029 HC RM PRIVATE

## 2020-03-04 PROCEDURE — 97165 OT EVAL LOW COMPLEX 30 MIN: CPT

## 2020-03-04 PROCEDURE — 74011250637 HC RX REV CODE- 250/637: Performed by: INTERNAL MEDICINE

## 2020-03-04 PROCEDURE — 84480 ASSAY TRIIODOTHYRONINE (T3): CPT

## 2020-03-04 RX ORDER — SUCRALFATE 1 G/1
1 TABLET ORAL DAILY
Status: DISCONTINUED | OUTPATIENT
Start: 2020-03-04 | End: 2020-03-08 | Stop reason: HOSPADM

## 2020-03-04 RX ORDER — DROXIDOPA 300 MG/1
600 CAPSULE ORAL 2 TIMES DAILY
Status: DISCONTINUED | OUTPATIENT
Start: 2020-03-04 | End: 2020-03-08 | Stop reason: HOSPADM

## 2020-03-04 RX ORDER — ATORVASTATIN CALCIUM 20 MG/1
20 TABLET, FILM COATED ORAL
Status: DISCONTINUED | OUTPATIENT
Start: 2020-03-04 | End: 2020-03-08 | Stop reason: HOSPADM

## 2020-03-04 RX ORDER — SODIUM CHLORIDE 0.9 % (FLUSH) 0.9 %
5-40 SYRINGE (ML) INJECTION AS NEEDED
Status: DISCONTINUED | OUTPATIENT
Start: 2020-03-04 | End: 2020-03-08 | Stop reason: HOSPADM

## 2020-03-04 RX ORDER — SODIUM CHLORIDE 9 MG/ML
75 INJECTION, SOLUTION INTRAVENOUS CONTINUOUS
Status: DISPENSED | OUTPATIENT
Start: 2020-03-04 | End: 2020-03-05

## 2020-03-04 RX ORDER — PANTOPRAZOLE SODIUM 20 MG/1
20 TABLET, DELAYED RELEASE ORAL
Status: DISCONTINUED | OUTPATIENT
Start: 2020-03-04 | End: 2020-03-08 | Stop reason: HOSPADM

## 2020-03-04 RX ORDER — QUETIAPINE FUMARATE 25 MG/1
50 TABLET, FILM COATED ORAL
Status: DISCONTINUED | OUTPATIENT
Start: 2020-03-04 | End: 2020-03-08 | Stop reason: HOSPADM

## 2020-03-04 RX ORDER — FLUDROCORTISONE ACETATE 0.1 MG/1
0.1 TABLET ORAL 2 TIMES DAILY
Status: DISCONTINUED | OUTPATIENT
Start: 2020-03-04 | End: 2020-03-08 | Stop reason: HOSPADM

## 2020-03-04 RX ORDER — POTASSIUM CHLORIDE 750 MG/1
40 TABLET, FILM COATED, EXTENDED RELEASE ORAL
Status: COMPLETED | OUTPATIENT
Start: 2020-03-04 | End: 2020-03-04

## 2020-03-04 RX ORDER — DONEPEZIL HYDROCHLORIDE 5 MG/1
5 TABLET, FILM COATED ORAL
Status: DISCONTINUED | OUTPATIENT
Start: 2020-03-04 | End: 2020-03-08 | Stop reason: HOSPADM

## 2020-03-04 RX ORDER — ENOXAPARIN SODIUM 100 MG/ML
40 INJECTION SUBCUTANEOUS EVERY 24 HOURS
Status: DISCONTINUED | OUTPATIENT
Start: 2020-03-04 | End: 2020-03-08 | Stop reason: HOSPADM

## 2020-03-04 RX ORDER — POTASSIUM CHLORIDE 750 MG/1
20 TABLET, FILM COATED, EXTENDED RELEASE ORAL DAILY
Status: DISCONTINUED | OUTPATIENT
Start: 2020-03-04 | End: 2020-03-08 | Stop reason: HOSPADM

## 2020-03-04 RX ORDER — PYRIDOSTIGMINE BROMIDE 60 MG/1
60 TABLET ORAL EVERY 8 HOURS
Status: DISCONTINUED | OUTPATIENT
Start: 2020-03-04 | End: 2020-03-08 | Stop reason: HOSPADM

## 2020-03-04 RX ORDER — SODIUM CHLORIDE 0.9 % (FLUSH) 0.9 %
5-40 SYRINGE (ML) INJECTION EVERY 8 HOURS
Status: DISCONTINUED | OUTPATIENT
Start: 2020-03-04 | End: 2020-03-08 | Stop reason: HOSPADM

## 2020-03-04 RX ORDER — ACETAMINOPHEN 325 MG/1
650 TABLET ORAL
Status: DISCONTINUED | OUTPATIENT
Start: 2020-03-04 | End: 2020-03-08 | Stop reason: HOSPADM

## 2020-03-04 RX ORDER — CARBIDOPA AND LEVODOPA 25; 100 MG/1; MG/1
1 TABLET ORAL 3 TIMES DAILY
Status: DISCONTINUED | OUTPATIENT
Start: 2020-03-04 | End: 2020-03-08 | Stop reason: HOSPADM

## 2020-03-04 RX ORDER — MORPHINE SULFATE 2 MG/ML
1 INJECTION, SOLUTION INTRAMUSCULAR; INTRAVENOUS
Status: DISCONTINUED | OUTPATIENT
Start: 2020-03-04 | End: 2020-03-07

## 2020-03-04 RX ORDER — FLUOXETINE HYDROCHLORIDE 20 MG/1
40 CAPSULE ORAL DAILY
Status: DISCONTINUED | OUTPATIENT
Start: 2020-03-04 | End: 2020-03-06

## 2020-03-04 RX ADMIN — FLUOXETINE 40 MG: 20 CAPSULE ORAL at 09:36

## 2020-03-04 RX ADMIN — POTASSIUM CHLORIDE 40 MEQ: 750 TABLET, FILM COATED, EXTENDED RELEASE ORAL at 16:11

## 2020-03-04 RX ADMIN — CEFTRIAXONE 1 G: 1 INJECTION, POWDER, FOR SOLUTION INTRAMUSCULAR; INTRAVENOUS at 21:10

## 2020-03-04 RX ADMIN — IPRATROPIUM BROMIDE AND ALBUTEROL SULFATE 3 ML: .5; 3 SOLUTION RESPIRATORY (INHALATION) at 00:29

## 2020-03-04 RX ADMIN — FLUDROCORTISONE ACETATE 0.1 MG: 0.1 TABLET ORAL at 18:19

## 2020-03-04 RX ADMIN — ACETAMINOPHEN 650 MG: 325 TABLET ORAL at 04:12

## 2020-03-04 RX ADMIN — QUETIAPINE FUMARATE 50 MG: 25 TABLET ORAL at 21:11

## 2020-03-04 RX ADMIN — ATORVASTATIN CALCIUM 20 MG: 20 TABLET, FILM COATED ORAL at 04:12

## 2020-03-04 RX ADMIN — PANTOPRAZOLE SODIUM 20 MG: 20 TABLET, DELAYED RELEASE ORAL at 07:24

## 2020-03-04 RX ADMIN — QUETIAPINE FUMARATE 50 MG: 25 TABLET ORAL at 04:13

## 2020-03-04 RX ADMIN — Medication 10 ML: at 21:12

## 2020-03-04 RX ADMIN — POTASSIUM CHLORIDE 20 MEQ: 750 TABLET, FILM COATED, EXTENDED RELEASE ORAL at 09:36

## 2020-03-04 RX ADMIN — SODIUM CHLORIDE 75 ML/HR: 900 INJECTION, SOLUTION INTRAVENOUS at 03:26

## 2020-03-04 RX ADMIN — CARBIDOPA AND LEVODOPA 1 TABLET: 25; 100 TABLET ORAL at 21:11

## 2020-03-04 RX ADMIN — CARBIDOPA AND LEVODOPA 1 TABLET: 25; 100 TABLET ORAL at 09:36

## 2020-03-04 RX ADMIN — ACETAMINOPHEN 650 MG: 325 TABLET ORAL at 17:35

## 2020-03-04 RX ADMIN — PYRIDOSTIGMINE BROMIDE 60 MG: 60 TABLET ORAL at 22:08

## 2020-03-04 RX ADMIN — DROXIDOPA 600 MG: 300 CAPSULE ORAL at 22:08

## 2020-03-04 RX ADMIN — DONEPEZIL HYDROCHLORIDE 5 MG: 5 TABLET, FILM COATED ORAL at 21:11

## 2020-03-04 RX ADMIN — DONEPEZIL HYDROCHLORIDE 5 MG: 5 TABLET, FILM COATED ORAL at 04:13

## 2020-03-04 RX ADMIN — ATORVASTATIN CALCIUM 20 MG: 20 TABLET, FILM COATED ORAL at 21:11

## 2020-03-04 RX ADMIN — PYRIDOSTIGMINE BROMIDE 60 MG: 60 TABLET ORAL at 07:24

## 2020-03-04 RX ADMIN — SUCRALFATE 1 G: 1 TABLET ORAL at 09:36

## 2020-03-04 RX ADMIN — FLUDROCORTISONE ACETATE 0.1 MG: 0.1 TABLET ORAL at 09:36

## 2020-03-04 RX ADMIN — CARBIDOPA AND LEVODOPA 1 TABLET: 25; 100 TABLET ORAL at 15:07

## 2020-03-04 RX ADMIN — PYRIDOSTIGMINE BROMIDE 60 MG: 60 TABLET ORAL at 15:07

## 2020-03-04 RX ADMIN — ACETAMINOPHEN 650 MG: 325 TABLET ORAL at 22:10

## 2020-03-04 RX ADMIN — SODIUM CHLORIDE 75 ML/HR: 900 INJECTION, SOLUTION INTRAVENOUS at 15:56

## 2020-03-04 RX ADMIN — ALUMINUM HYDROXIDE AND MAGNESIUM HYDROXIDE 15 ML: 200; 200 SUSPENSION ORAL at 18:19

## 2020-03-04 NOTE — ED TRIAGE NOTES
States that she fell yesterday at 2 pm and became confused and difficulty walking. States that it has not resolved. Also has a urinary catheter and family concerned for UTI.

## 2020-03-04 NOTE — PROGRESS NOTES
Problem: Self Care Deficits Care Plan (Adult)  Goal: *Acute Goals and Plan of Care (Insert Text)  Description    FUNCTIONAL STATUS PRIOR TO ADMISSION: Pt reports living with her daughter for the past 2 weeks, prior to that was living in an apartment. Uses rollator for functional mobility PRN, has had several falls. Performs dressing and bathing without physical assistance, daughter supervises bathing for safety. Pt is alone at times during the day as daughter works. HOME SUPPORT: The patient lived with daughter who provided some assistance. Occupational Therapy Goals  Initiated 3/4/2020  1. Patient will perform grooming with supervision/set-up standing within 7 day(s). 2.  Patient will perform lower body dressing with supervision/set-up within 7 day(s). 3.  Patient will perform toilet transfers with modified independence within 7 day(s). 4.  Patient will perform all aspects of toileting with modified independence within 7 day(s). 5.  Patient will participate in upper extremity therapeutic exercise/activities with independence for 5 minutes within 7 day(s). 6.  Patient will utilize energy conservation techniques during functional activities with verbal cues within 7 day(s)     Outcome: Progressing Towards Goal  OCCUPATIONAL THERAPY EVALUATION  Patient: Julián Preciado (72 y.o. female)  Date: 3/4/2020  Primary Diagnosis: UTI (urinary tract infection) [N39.0]  AMS (altered mental status) [R41.82]        Precautions: Fall      ASSESSMENT  Based on the objective data described below, the patient presents with generalized weakness, decreased balance and endurance, impaired functional mobility, AMS and baseline Parkinson's disease following admission for UTI. Pt has been living with her daughter, uses rollator for mobility, supervision-mod I for ADLs. Has had several falls in the last year. Pt received supine in bed, on 2 L O2, VSS. Alert and oriented x3, at times perseverative and confused in conversation. SBA for bed mobility, fair sitting balance with min A to maintain while donning socks. Stood to Jini and ambulated to doorway of room and back with CGA. After returning to bed pt reported chest pain, vitals monitored and nurse informed who was with pt and RRT at end of session. Pt is below her functional baseline, at risk for falls, and per pt is alone at times at home. Pending progress recommend SNF at discharge. Pre activity: 134/71, HR: 68 bpm, spO2: 98% on room air  Post activity: 135/67, HR: 72 bpm, spO: 92%. Current Level of Function Impacting Discharge (ADLs/self-care): up to min A transfers, min-mod A LB ADLs, setup-min A UB ADLs. Functional Outcome Measure: The patient scored 30/100 on the Barthel Index outcome measure. Other factors to consider for discharge: history of Parkinson's disease, several falls, has been staying with daughter     Patient will benefit from skilled therapy intervention to address the above noted impairments. PLAN :  Recommendations and Planned Interventions: self care training, functional mobility training, therapeutic exercise, balance training, therapeutic activities, endurance activities, patient education, home safety training, and family training/education    Frequency/Duration: Patient will be followed by occupational therapy 5 times a week to address goals. Recommendation for discharge: (in order for the patient to meet his/her long term goals)  Therapy up to 5 days/week in SNF setting    This discharge recommendation:  A follow-up discussion with the attending provider and/or case management is planned    IF patient discharges home will need the following DME: TBD       SUBJECTIVE:   Patient stated My legs have felt weak.     OBJECTIVE DATA SUMMARY:   HISTORY:   Past Medical History:   Diagnosis Date    Bipolar disorder ([facility]     Nazmy    Chronic pain     BACK PAIN    Diabetes ([facility]     BORDERLINE TX WITH DIET AND EXERCISE    DJD (degenerative joint disease)     Fibromyalgia     Genital herpes     GERD (gastroesophageal reflux disease)     Hypercholesterolemia     IBS (irritable bowel syndrome)     Ill-defined condition     fibromyligia    Lumbar disc disease     stimulation-Honza    Migraine     Nausea & vomiting     Other ill-defined conditions(799.89)     SEASONAL allergies    Other ill-defined conditions(799.89)     dysphagia has had esophagus dilated    Paget's disease     Parkinson disease ([facility]     Pulmonary embolism (HCC)     RLS (restless legs syndrome)     Spinal stenosis      Past Surgical History:   Procedure Laterality Date    COLONOSCOPY N/A 6/27/2016    COLON[facility]lin [facility]COPY    COLONOSCOPY N/A 2/22/2019    COLONOSCOPY performed by Thao Butler MD at [facility]. [address], COLON, DIAGNOSTIC      12, due 13    HX APPENDECTOMY      HX BACK SURGERY  9/17/2013    back surgery - total of 3 as of 12/20/2013    HX BREAST BIOPSY Right years ago    negative    HX CHOLECYSTECTOMY      HX HEENT      T & A    HX HYSTERECTOMY      total for fibroid tumors    HX ORTHOPAEDIC      lumbar laminectomy then fusion/neurostimulator implanted - inactive now but still in    HX ORTHOPAEDIC      REMOVAL OF NEUROSTIMULATOR    HX OTHER SURGICAL      EGD x 2 with dilation/colonoscopy - no polyps per pt    TILT TABLE EVALUATION  8/2/2016            Expanded or extensive additional review of patient history:     Home Situation  Home Environment: Private residence(moved in with daughter 2 weeks ago)  # Steps to Enter: 4  One/Two Story Residence: Two story, live on 1st floor  Living Alone: No(but does stay home alone often)  Support Systems: Child(lexis), Family member(s)  Current DME Used/Available at Home: [place] [employer], rollator, Shower chair(shower chair at [facility])  Tub or Shower Type: Tub/Shower combination    Hand dominance: Right    EXAMINATION OF PERFORMANCE DEFICITS:  Cognitive/Behavioral Status:  Neurologic State: Alert  Orientation Level: Disoriented to situation;Oriented to person;Oriented to place;Oriented to time  Cognition: Follows commands  Perception: Appears intact  Perseveration: Perseverates during conversation  Safety/Judgement: Fall prevention;Decreased insight into deficits; Awareness of environment      Vision/Perceptual:              Acuity: Within Defined Limits         Range of Motion:    AROM: Generally decreased, functional  PROM: Generally decreased, functional                Strength:  Strength: Generally decreased, functional        Coordination:  Coordination: Generally decreased, functional  Fine Motor Skills-Upper: Left Intact; Right Intact(BUE tremor)    Gross Motor Skills-Upper: Left Intact; Right Intact(BUE tremor)    Tone & Sensation:  Tone: Normal  Sensation: Impaired(pt reports neuropathy in B feet)          Balance:  Sitting: Intact  Standing: Impaired; Without support  Standing - Static: Good;Constant support  Standing - Dynamic : Fair;Constant support    Functional Mobility and Transfers for ADLs:  Bed Mobility:  Rolling: Stand-by assistance  Supine to Sit: Stand-by assistance  Sit to Supine: Stand-by assistance  Scooting: Contact guard assistance    Transfers:  Sit to Stand: Contact guard assistance;Assist x1  Stand to Sit: Contact guard assistance;Assist x1    ADL Assessment:  Feeding: Setup    Oral Facial Hygiene/Grooming: Setup(seated)    Bathing: Adaptive equipment; Additional time; Moderate assistance    Upper Body Dressing: Minimum assistance    Lower Body Dressing: Moderate assistance    Toileting: Minimum assistance     ADL Intervention and task modifications:     Lower Body Dressing Assistance  Socks: Minimum assistance(Min A for steadying)  Leg Crossed Method Used: Yes  Position Performed: Seated edge of bed         Cognitive Retraining  Safety/Judgement: Fall prevention;Decreased insight into deficits; Awareness of environment    Functional Measure:  Barthel Index:    Bathin  Bladder: 0  Bowels: 10  Groomin  Dressin  Feedin  Mobility: 0  Stairs: 0  Toilet Use: 0  Transfer (Bed to Chair and Back): 10  Total: 30/100        The Barthel ADL Index: Guidelines  1. The index should be used as a record of what a patient does, not as a record of what a patient could do. 2. The main aim is to establish degree of independence from any help, physical or verbal, however minor and for whatever reason. 3. The need for supervision renders the patient not independent. 4. A patient's performance should be established using the best available evidence. Asking the patient, friends/relatives and nurses are the usual sources, but direct observation and common sense are also important. However direct testing is not needed. 5. Usually the patient's performance over the preceding 24-48 hours is important, but occasionally longer periods will be relevant. 6. Middle categories imply that the patient supplies over 50 per cent of the effort. 7. Use of aids to be independent is allowed. Jalen Parker., Barthel, D.W. (). Functional evaluation: the Barthel Index. [facility] (14)2. [facility] tamie PRABHU Camp, Bettina Gonzalez., Laura Auguste., [facility], [address] [facility] (). Measuring the change indisability after inpatient rehabilitation; comparison of the responsiveness of the Barthel Index and Functional [place] Measure. Journal of Neurology, Neurosurgery, and Psychiatry, 66(4), 019-146. Kamar Palm NSPENSER.DOMENICO, HUMBERTO Navarrete, & Rufina Phillips M.A. (2004.) Assessment of post-stroke quality of life in cost-effectiveness studies: The usefulness of the Barthel Index and the EuroQoL-5D.  Quality of Life Research, 15, 300-04        Occupational Therapy Evaluation Charge Determination   History Examination Decision-Making   LOW Complexity : Brief history review  MEDIUM Complexity : 3-5 performance deficits relating to physical, cognitive , or psychosocial skils that result in activity limitations and / or participation restrictions MEDIUM Complexity : Patient may present with comorbidities that affect occupational performnce. Miniml to moderate modification of tasks or assistance (eg, physical or verbal ) with assesment(s) is necessary to enable patient to complete evaluation       Based on the above components, the patient evaluation is determined to be of the following complexity level: LOW   Pain Ratin/10    Activity Tolerance:   Fair  Please refer to the flowsheet for vital signs taken during this treatment. After treatment patient left in no apparent distress:    Supine in bed, Call bell within reach, and Caregiver / family present    COMMUNICATION/EDUCATION:   The patients plan of care was discussed with: Physical therapist and Registered nurse. Home safety education was provided and the patient/caregiver indicated understanding., Patient/family have participated as able in goal setting and plan of care. , and Patient/family agree to work toward stated goals and plan of care. This patients plan of care is appropriate for delegation to EWA.     Thank you for this referral.  Vero Moreno, OT  Time Calculation: 25 mins

## 2020-03-04 NOTE — ED NOTES
TRANSFER - OUT REPORT:    Verbal report given to Veronica Nguyễn RN on Rhea Aranda  being transferred to [address] [place] [address] ([facility]) for routine progression of care       Report consisted of patients Situation, Background, Assessment and   Recommendations(SBAR). Information from the following report(s) ED Summary was reviewed with the receiving nurse. Lines:   Peripheral IV 03/03/20 Right Antecubital (Active)   Site Assessment Clean, dry, & intact 3/3/2020  7:46 PM   Phlebitis Assessment 0 3/3/2020  7:46 PM   Infiltration Assessment 0 3/3/2020  7:46 PM   Dressing Status Clean, dry, & intact 3/3/2020  7:46 PM        Opportunity for questions and clarification was provided.       Patient transported with:   Monitor   Medical Transport

## 2020-03-04 NOTE — PROGRESS NOTES
Hospitalist Progress Note  Janet Maxwell MD  Answering service: [phone] OR [phone] from in house phone      Date of Service:  3/4/2020  NAME:  Juan Rivers  :  1945  MRN:  [id number]      Admission Summary:   Patient is 25-year-old female with past medical history significant for orthostatic hypotension, Parkinson's disease, GERD, hyperlipidemia, neuropathy, dementia , depression and bipolar disorder who presented to [facility] emergency department on account of altered mental status. Family reports patient was noted to be confused, not the usual herself and weaker. Family also reported  earlier in the afternoon she was extremely weak and had a GLF. No reported fever, chills, nausea, vomiting, shortness of breath, chest pain, cough, palpitation, syncope or presyncope, seizures, urinary or bowel complaints.     With [facility] emergency department, V/S were unremarkable. Lab work-ups: Leukocytosis, UA remarkable for pyuria, bacteriuria and leukocyte esterase. Received a dose of ceftriaxone. Interval history / Subjective:      Patient seen and examined this morning. She is awake, oriented. She denied N/V or fever.    Later the day RR was called due to chest pain     Assessment & Plan:     # Acute metabolic encephalopathy likely due to UTI  - Presenting with increased confusion, weakness  - Leukocytosis and UA remarkable for pyuria, bacteriuria and leukocyte esterase  - low TSH, check T3/T4  - CT of the head and chest x-ray unremarkable for acute process  - on abx for UTI      # Complicated UTI  - UA suggestive, urine culture pending   - Continue ceftriaxone (recent culture was pansensitive)     # Hypokalemia/ hypomagnesemia    - Replete potassium and Mg   - check BMP, Mg in am      # Parkinson's disease with h/o orthostatic hypotension:   - Continue Florinef ,carbidopa-levodopa     # GERD  - Stable  - Continue home PPI     # Hyperlipidemia  - Continue home statin    # Dementia   - Continue home donezepil     # Bipolar/depression  - No SI/HI  - Continue home meds           Patient's Baseline: Ambulates with  walker  DVT ppx: Enoxaparin  Code status: DNR  Disposition: TBD  Care plan discussed with patient/family nurse.        Hospital Problems  Date Reviewed: 2/18/2020          Codes Class Noted POA    UTI (urinary tract infection) ICD-10-CM: N39.0  ICD-9-CM: 599.0  3/3/2020 Unknown        AMS (altered mental status) ICD-10-CM: R41.82  ICD-9-CM: 780.97  3/3/2020 Unknown                Review of Systems:   Pertinent positive mentioned in interval hx/HPI. Other systems reviewed and negative     Vital Signs:    Last 24hrs VS reviewed since prior progress note. Most recent are:  Visit Vitals  /73   Pulse 68   Temp 97.8 °F (36.6 °C)   Resp 22   Wt 62.3 kg (137 lb 5.6 oz)   SpO2 100%   BMI 24.33 kg/m²         Intake/Output Summary (Last 24 hours) at 3/4/2020 1619  Last data filed at 3/4/2020 1546  Gross per 24 hour   Intake    Output 1100 ml   Net -1100 ml        Physical Examination:             Constitutional:  No acute distress,   ENT:  Oral mucous moist, oropharynx benign. Neck supple,    Resp:  CTA bilaterally. No wheezing/rhonchi/rales. No accessory muscle use   CV:  Regular rhythm, normal rate, no murmurs, gallops, rubs    GI:  Soft, non distended, non tender. normoactive bowel sounds, no hepatosplenomegaly     Musculoskeletal:  No edema, warm, 2+ pulses throughout    Neurologic:  Moves all extremities.             Data Review:   I personally reviewed labs and imaging     Labs:     Recent Labs     03/03/20 1943   WBC 15.0*   HGB 12.6   HCT 39.7        Recent Labs     03/04/20  0345 03/03/20 1943   NA  --  139   K  --  3.1*   CL  --  101   CO2  --  29   BUN  --  23*   CREA  --  1.03*   GLU  --  162*   CA  --  8.5   MG 1.8  --      Recent Labs     03/03/20 1943   SGOT 86*   ALT 17   AP 99   TBILI 0.9   TP 6.8   ALB 3.2* GLOB 3.6     Recent Labs     03/03/20 1943   INR 1.1   PTP 10.5      No results for input(s): FE, TIBC, PSAT, FERR in the last 72 hours. Lab Results   Component Value Date/Time    Folate 14.0 02/21/2019 07:22 AM      No results for input(s): PH, PCO2, PO2 in the last 72 hours.   Recent Labs     03/03/20 1943   TROIQ <0.05     Lab Results   Component Value Date/Time    Cholesterol, total 134 02/26/2020 09:40 AM    HDL Cholesterol 65 02/26/2020 09:40 AM    LDL, calculated 47 02/26/2020 09:40 AM    Triglyceride 111 02/26/2020 09:40 AM    CHOL/HDL Ratio 3.3 07/23/2010 08:37 AM     Lab Results   Component Value Date/Time    Glucose (POC) 133 (H) 03/04/2020 04:10 PM    Glucose (POC) 136 (H) 03/04/2020 03:33 PM    Glucose (POC) 158 (H) 03/04/2020 11:24 AM    Glucose (POC) 185 (H) 03/03/2020 07:44 PM    Glucose (POC) 101 (H) 01/13/2017 04:14 PM     Lab Results   Component Value Date/Time    Color YELLOW/STRAW 03/03/2020 08:47 PM    Appearance CLOUDY (A) 03/03/2020 08:47 PM    Specific gravity 1.010 03/03/2020 08:47 PM    Specific gravity 1.020 02/15/2020 05:46 AM    pH (UA) 6.0 03/03/2020 08:47 PM    Protein 100 (A) 03/03/2020 08:47 PM    Glucose NEGATIVE  03/03/2020 08:47 PM    Ketone NEGATIVE  03/03/2020 08:47 PM    Bilirubin NEGATIVE  03/03/2020 08:47 PM    Urobilinogen 0.2 03/03/2020 08:47 PM    Nitrites NEGATIVE  03/03/2020 08:47 PM    Leukocyte Esterase LARGE (A) 03/03/2020 08:47 PM    Epithelial cells FEW 03/03/2020 08:47 PM    Bacteria 3+ (A) 03/03/2020 08:47 PM    WBC >100 (H) 03/03/2020 08:47 PM    RBC 10-20 03/03/2020 08:47 PM         Medications Reviewed:     Current Facility-Administered Medications   Medication Dose Route Frequency    atorvastatin (LIPITOR) tablet 20 mg  20 mg Oral QHS    carbidopa-levodopa (SINEMET)  mg per tablet 1 Tab  1 Tab Oral TID    pantoprazole (PROTONIX) tablet 20 mg  20 mg Oral ACB    donepeziL (ARICEPT) tablet 5 mg  5 mg Oral QHS    sucralfate (CARAFATE) tablet 1 g 1 g Oral DAILY    QUEtiapine (SEROquel) tablet 50 mg  50 mg Oral QHS    FLUoxetine (PROzac) capsule 40 mg  40 mg Oral DAILY    pyridostigmine (MESTINON) tablet 60 mg  60 mg Oral Q8H    potassium chloride SR (KLOR-CON 10) tablet 20 mEq  20 mEq Oral DAILY    sodium chloride (NS) flush 5-40 mL  5-40 mL IntraVENous Q8H    sodium chloride (NS) flush 5-40 mL  5-40 mL IntraVENous PRN    acetaminophen (TYLENOL) tablet 650 mg  650 mg Oral Q4H PRN    morphine injection 1 mg  1 mg IntraVENous Q4H PRN    enoxaparin (LOVENOX) injection 40 mg  40 mg SubCUTAneous Q24H    cefTRIAXone (ROCEPHIN) 1 g in 0.9% sodium chloride (MBP/ADV) 50 mL  1 g IntraVENous Q24H    0.9% sodium chloride infusion  75 mL/hr IntraVENous CONTINUOUS    fludrocortisone (FLORINEF) tablet 0.1 mg  0.1 mg Oral BID     ______________________________________________________________________  EXPECTED LENGTH OF STAY: - - -  ACTUAL LENGTH OF STAY:          1                 Michelle Biswas MD   Patient has given Verbal permission to discuss medical care with   persons present in the room and and also with contact as listed on face sheet.

## 2020-03-04 NOTE — PROGRESS NOTES
Problem: Mobility Impaired (Adult and Pediatric)  Goal: *Acute Goals and Plan of Care (Insert Text)  Description  FUNCTIONAL STATUS PRIOR TO ADMISSION: Patient was modified independent using a rollator \"occasionally\" for functional mobility. Pt reports that she moved in with her daughter 2 weeks ago, but may still have own apartment. Pt stated that she is often home alone while her daughter is at work. Pt reports several falls within the last year. HOME SUPPORT PRIOR TO ADMISSION: The patient lived with daughter to provide some assistance. Physical Therapy Goals  Initiated 3/4/2020  1. Patient will transfer from bed to chair and chair to bed with modified independence using the least restrictive device within 7 day(s). 2.  Patient will ambulate with modified independence for 100 feet with the least restrictive device within 7 day(s). 3.  Patient will ascend/descend 4 stairs with handrail(s) with minimal assistance/contact guard assist within 7 day(s). Outcome: Progressing Towards Goal  PHYSICAL THERAPY EVALUATION  Patient: Corbin Parkinson (47 y.o. female)  Date: 3/4/2020  Primary Diagnosis: UTI (urinary tract infection) [N39.0]  AMS (altered mental status) [R41.82]        Precautions: Falls         ASSESSMENT  Based on the objective data described below, the patient presents with generalized weakness, decreased functional mobility, impaired balance and gait, history of multiple falls s/p admission 3/3 for AMS after ground level fall. Pt tolerated evaluation fairly well. Pt required CGA for transfers with RW and gait training x 10 ft with min A. Pt with generalized instability, decreased lauren, shuffle steps, guarded/rigid posture. Pt returned to supine position in bed. Once back in supine position, pt reported onset of chest pain. Vitals taken (see below) Notified RN and rapid response for chest pain called and team arrived.      Patient remains at risk for frequent falls, limited support at home, is alone during the day and will benefit from SNF placement upon discharge to continue therapy efforts. Pre activity: 134/71, HR: 68 bpm, spO2: 98% on room air  Post activity: 135/67, HR: 72 bpm, spO: 92%. Current Level of Function Impacting Discharge (mobility/balance): CGA transfers with RW, gait training with min A x 10 ft    Functional Outcome Measure: The patient scored Total Score: 16/28 on the Tinetti outcome measure which is indicative of high fall risk. Other factors to consider for discharge: history of frequent falls     Patient will benefit from skilled therapy intervention to address the above noted impairments. PLAN :  Recommendations and Planned Interventions: bed mobility training, transfer training, gait training, therapeutic exercises, neuromuscular re-education, patient and family training/education, and therapeutic activities      Frequency/Duration: Patient will be followed by physical therapy:  5 times a week to address goals. Recommendation for discharge: (in order for the patient to meet his/her long term goals)  Therapy up to 5 days/week in SNF setting    This discharge recommendation:  Has been made in collaboration with the attending provider and/or case management           SUBJECTIVE:   Patient stated I have fallen about 5-6 times in the past 2 weeks since I have been at my daughters.     OBJECTIVE DATA SUMMARY:   HISTORY:    Past Medical History:   Diagnosis Date    Bipolar disorder ([facility] [address]     Nazmy    Chronic pain     BACK PAIN    Diabetes ([facility] [address]     BORDERLINE TX WITH DIET AND EXERCISE    DJD (degenerative joint disease)     Fibromyalgia     Genital herpes     GERD (gastroesophageal reflux disease)     Hypercholesterolemia     IBS (irritable bowel syndrome)     Ill-defined condition     fibromyligia    Lumbar disc disease     stimulation-Honza    Migraine     Nausea & vomiting     Other ill-defined conditions(639.89)     SEASONAL allergies    Other ill-defined conditions(799.89)     dysphagia has had esophagus dilated    Paget's disease     Parkinson disease (HCC)     Pulmonary embolism (HCC)     RLS (restless legs syndrome)     Spinal stenosis      Past Surgical History:   Procedure Laterality Date    COLONOSCOPY N/A 6/27/2016    COLONOSCOPY performed by Opal Myrick MD at [facility] ENDOSCOPY    COLONOSCOPY N/A 2/22/2019    COLONOSCOPY performed by Janelle Plasencia MD at [facility]. [address], COLON, DIAGNOSTIC      12, due 15    HX APPENDECTOMY      HX BACK SURGERY  9/17/2013    back surgery - total of 3 as of 12/20/2013    HX BREAST BIOPSY Right years ago    negative    HX CHOLECYSTECTOMY      HX HEENT      T & A    HX HYSTERECTOMY      total for fibroid tumors    HX ORTHOPAEDIC      lumbar laminectomy then fusion/neurostimulator implanted - inactive now but still in    HX ORTHOPAEDIC      REMOVAL OF NEUROSTIMULATOR    HX OTHER SURGICAL      EGD x 2 with dilation/colonoscopy - no polyps per pt    TILT TABLE EVALUATION  8/2/2016            Personal factors and/or comorbidities impacting plan of care:     Home Situation  Home Environment: Private residence(moved in with daughter 2 weeks ago)  # Steps to Enter: 4  One/Two Story Residence: Two story, live on 1st floor  Living Alone: No(but does stay home alone often)  Support Systems: Child(lexis), Family member(s)  Current DME Used/Available at Home: Shraddha Rascon, ana paula, Shower chair(shower chair at [facility])    EXAMINATION/PRESENTATION/DECISION MAKING:   Critical Behavior:  Neurologic State: Alert  Orientation Level: Disoriented to situation  Cognition: Follows commands     Hearing:     Skin:    Edema:   Range Of Motion:  AROM: Generally decreased, functional           PROM: Generally decreased, functional           Strength:    Strength: Generally decreased, functional                    Tone & Sensation:                  Sensation: Impaired(pt reports neuropathy in B feet) Coordination:  Coordination: Generally decreased, functional  Vision:      Functional Mobility:  Bed Mobility:  Rolling: Stand-by assistance  Supine to Sit: Stand-by assistance  Sit to Supine: Stand-by assistance  Scooting: Contact guard assistance  Transfers:  Sit to Stand: Contact guard assistance;Assist x1  Stand to Sit: Contact guard assistance;Assist x1                       Balance:   Sitting: Intact  Standing: Impaired; Without support  Standing - Static: Good;Constant support  Standing - Dynamic : Fair;Constant support  Ambulation/Gait Training:  Distance (ft): 10 Feet (ft)  Assistive Device: Gait belt;Walker, rolling  Ambulation - Level of Assistance: Minimal assistance;Assist x1        Gait Abnormalities: Decreased step clearance;Shuffling gait; Step to gait        Base of Support: Narrowed     Speed/Penny: Pace decreased (<100 feet/min)  Step Length: Left shortened;Right shortened  Swing Pattern: Left symmetrical;Right symmetrical                  Stairs: Therapeutic Exercises:       Functional Measure:  Tinetti test:    Sitting Balance: 1  Arises: 1  Attempts to Rise: 2  Immediate Standing Balance: 1  Standing Balance: 1  Nudged: 1  Eyes Closed: 0  Turn 360 Degrees - Continuous/Discontinuous: 0  Turn 360 Degrees - Steady/Unsteady: 1  Sitting Down: 1  Balance Score: 9 Balance total score  Indication of Gait: 1  R Step Length/Height: 0  L Step Length/Height: 0  R Foot Clearance: 1  L Foot Clearance: 1  Step Symmetry: 1  Step Continuity: 0  Path: 1  Trunk: 1  Walking Time: 1  Gait Score: 7 Gait total score  Total Score: 16/28 Overall total score         Tinetti Tool Score Risk of Falls  <19 = High Fall Risk  19-24 = Moderate Fall Risk  25-28 = Low Fall Risk  Tinetti ME. Performance-Oriented Assessment of Mobility Problems in Elderly Patients. [address]; [id number].  (Scoring Description: PT Bulletin Feb. 10, 1993)    Older adults: Ebony Enriquez et al, 2009; n = [address] elderly evaluated with ABC, CRUZ, ADL, and IADL)  · Mean CRUZ score for males aged 69-68 years = 26.21(3.40)  · Mean CRUZ score for females age 69-68 years = 25.16(4.30)  · Mean CRUZ score for males over 80 years = 23.29(6.02)  · Mean CRUZ score for females over 80 years = 17.20(8.32)           Based on the above components, the patient evaluation is determined to be of the following complexity level: MEDIUM    Pain Rating:  Pt c/o chest pain--notified RN and rapid response team called    Activity Tolerance:   Fair  Please refer to the flowsheet for vital signs taken during this treatment. After treatment patient left in no apparent distress:   Supine in bed, Call bell within reach, and Caregiver / family present    COMMUNICATION/EDUCATION:   The patients plan of care was discussed with: Occupational therapist and Registered nurse. Fall prevention education was provided and the patient/caregiver indicated understanding., Patient/family have participated as able in goal setting and plan of care. , and Patient/family agree to work toward stated goals and plan of care.     Thank you for this referral.  Kandace Peterson, PT, DPT   Time Calculation: 25 mins

## 2020-03-04 NOTE — PROGRESS NOTES
Bedside shift change report given to [facility], RN (oncoming nurse) by Royer Warner RN (offgoing nurse). Report included the following information SBAR, Kardex, ED Summary, Intake/Output, MAR and Recent Results.

## 2020-03-04 NOTE — PROGRESS NOTES
Pt complained of chest pain radiating to back while working with PT. Rapid Response called. Protocol was followed. Labs were drowned. EKG was done.

## 2020-03-04 NOTE — ED PROVIDER NOTES
77-year-old female with past medical history significant for bipolar disorder, irritable bowel syndrome, high cholesterol with chronic indwelling Jc x3 weeks following recurrent urinary tract infections presents with family with reports of 1 day confusion associated with fatigue and lightheadedness with generalized weakness. Patient reports she fell yesterday at approximately 2 PM secondary to generalized weakness. Denies loss of consciousness. Patient lives with daughters. Daughters concerned about possible urinary tract infection. Daughters note that patient has had decreased energy today and intermittent confusion, placing food in posterior abdomen instead of microwave. Denies fever, chills, nausea, vomiting, chest pain, shortness of breath. Denies pain after fall. Denies head trauma. Denies blood thinner use.     Denies tobacco use, drug use, alcohol use   primary care physicianSchtrumpf           Past Medical History:   Diagnosis Date    Bipolar disorder ([address]     Nazmy    Chronic pain     BACK PAIN    Diabetes ([address]     BORDERLINE TX WITH DIET AND EXERCISE    DJD (degenerative joint disease)     Fibromyalgia     Genital herpes     GERD (gastroesophageal reflux disease)     Hypercholesterolemia     IBS (irritable bowel syndrome)     Ill-defined condition     fibromyligia    Lumbar disc disease     stimulation-Honza    Migraine     Nausea & vomiting     Other ill-defined conditions(799.89)     SEASONAL allergies    Other ill-defined conditions(799.89)     dysphagia has had esophagus dilated    Paget's disease     Parkinson disease ([address]     Pulmonary embolism (HCC)     RLS (restless legs syndrome)     Spinal stenosis        Past Surgical History:   Procedure Laterality Date    COLONOSCOPY N/A 6/27/2016    COLONOSCOPY performed by Bebe Conklin MD at [facility] ENDOSCOPY    COLONOSCOPY N/A 2/22/2019    COLONOSCOPY performed by Thao Butler MD at [facility]. Andrei Valera, COLON, DIAGNOSTIC      12, due 15    HX APPENDECTOMY      HX BACK SURGERY  9/17/2013    back surgery - total of 3 as of 12/20/2013    HX BREAST BIOPSY Right years ago    negative    HX CHOLECYSTECTOMY      HX HEENT      T & A    HX HYSTERECTOMY      total for fibroid tumors    HX ORTHOPAEDIC      lumbar laminectomy then fusion/neurostimulator implanted - inactive now but still in    HX ORTHOPAEDIC      REMOVAL OF NEUROSTIMULATOR    HX OTHER SURGICAL      EGD x 2 with dilation/colonoscopy - no polyps per pt    TILT TABLE EVALUATION  8/2/2016              Family History:   Problem Relation Age of Onset    Colon Cancer Mother     Cancer Mother 68        colon    Heart Disease Father     Heart Disease Maternal Grandmother     Heart Disease Maternal Grandfather     Heart Disease Other        Social History     Socioeconomic History    Marital status:      Spouse name: Not on file    Number of children: Not on file    Years of education: Not on file    Highest education level: Not on file   Occupational History    Occupation: volunteer     Employer: RETIRED     Comment: [employer]   Social Needs    Financial resource strain: Not on file    Food insecurity:     Worry: Not on file     Inability: Not on file    Transportation needs:     Medical: Not on file     Non-medical: Not on file   Tobacco Use    Smoking status: Never Smoker    Smokeless tobacco: Never Used   Substance and Sexual Activity    Alcohol use: No     Comment: 1 per month    Drug use: No    Sexual activity: Not on file   Lifestyle    Physical activity:     Days per week: Not on file     Minutes per session: Not on file    Stress: Not on file   Relationships    Social connections:     Talks on phone: Not on file     Gets together: Not on file     Attends Scientologist service: Not on file     Active member of club or organization: Not on file     Attends meetings of clubs or organizations: Not on file     Relationship status: Not on file    Intimate partner violence:     Fear of current or ex partner: Not on file     Emotionally abused: Not on file     Physically abused: Not on file     Forced sexual activity: Not on file   Other Topics Concern    Not on file   Social History Narrative    Not on file         ALLERGIES: Adhesive; Hydrocodone; Lorazepam; Other medication; Percocet [oxycodone-acetaminophen]; Sudafed [pseudoephedrine hcl]; Wellbutrin [bupropion hcl]; Zithromax [azithromycin]; and Zyrtec [cetirizine]    Review of Systems   Constitutional: Positive for appetite change and fatigue. Negative for chills and fever. HENT: Negative for congestion, nosebleeds and rhinorrhea. Eyes: Negative for pain and redness. Respiratory: Negative for cough and shortness of breath. Cardiovascular: Negative for chest pain and palpitations. Gastrointestinal: Negative for abdominal pain, nausea and vomiting. Genitourinary: Negative for dysuria, frequency, vaginal bleeding and vaginal pain. Musculoskeletal: Negative for myalgias. Skin: Negative for rash and wound. Neurological: Positive for weakness. Negative for seizures and syncope. Hematological: Does not bruise/bleed easily. Psychiatric/Behavioral: Positive for confusion. Negative for agitation, dysphoric mood and suicidal ideas. The patient is not nervous/anxious. Vitals:    03/1945 03/03/20 2000 03/03/20 2015 03/03/20 2215   BP: 121/71 120/81 140/75 156/74   Pulse:  74 69 66   Resp:  (!) 35 16 16   Temp: 97.6 °F (36.4 °C)      SpO2: 96% 96% 98% 99%   Weight:                Physical Exam  Vitals signs and nursing note reviewed. Constitutional:       Appearance: She is well-developed. HENT:      Head: Normocephalic and atraumatic. Comments: Dry mm  Eyes:      Pupils: Pupils are equal, round, and reactive to light. Neck:      Musculoskeletal: Normal range of motion and neck supple. Trachea: No tracheal deviation.    Cardiovascular:      Rate and Rhythm: Normal rate and regular rhythm. Heart sounds: Normal heart sounds. Pulmonary:      Effort: Pulmonary effort is normal. No respiratory distress. Breath sounds: Normal breath sounds. No stridor. No wheezing or rales. Chest:      Chest wall: No tenderness. Abdominal:      General: Bowel sounds are normal. There is no distension. Palpations: Abdomen is soft. Tenderness: There is no abdominal tenderness. There is no rebound. Musculoskeletal: Normal range of motion. General: No tenderness. Skin:     General: Skin is warm and dry. Coloration: Skin is not pale. Findings: No rash. Neurological:      Mental Status: She is alert and oriented to person, place, and time. GCS: GCS eye subscore is 4. GCS verbal subscore is 5. GCS motor subscore is 6. Cranial Nerves: No cranial nerve deficit or dysarthria. Sensory: No sensory deficit. Comments: Intermittent confusion with perseveration          MDM  Number of Diagnoses or Management Options  Altered mental status, unspecified altered mental status type:   Dehydration:   Urinary tract infection associated with indwelling urethral catheter, initial encounter [facility]):   Diagnosis management comments: 62-year-old female presents with 24 hours intermittent confusion, fatigue, generalized weakness. Patient with indwelling Jc secondary to recurrent urinary tract infections per family. Patient is afebrile, nontoxic, hemodynamically stable, left upper extremity tremor (daughters report decreased at this time), no respiratory distress, clear to auscultation bilaterally, normal room air oxygen saturation, speaking complete sentences. Patient with intermittent confusion during exam, suddenly will perseverate on 1 thought for a few minutes and then is back to baseline, alert and oriented x4. Plan- IV fluid hydration, CBC/CMP/UA, lactate, head CT.     Labs remarkable for urinary tract infection       Amount and/or Complexity of Data Reviewed  Clinical lab tests: ordered and reviewed  Tests in the radiology section of CPT®: ordered and reviewed  Independent visualization of images, tracings, or specimens: yes    Patient Progress  Patient progress: stable         Procedures      Hospitalist Alli Serve for Admission  11:27 PM    ED Room Number: SER08/08  Patient Name and age:  Angeles Martinez 67 y.o.  female  Working Diagnosis:   1. Urinary tract infection associated with indwelling urethral catheter, initial encounter ([facility]    2. Dehydration    3. Altered mental status, unspecified altered mental status type      Readmission: no  Isolation Requirements:  no  Recommended Level of Care:  med/surg  Code Status:  Do Not Resuscitate  Department:[facility] - [phone]    Midnight  Patient with increased tremors and motion artifact noted on cardiac monitor. Patient awake and alert and answering questions appropriately, normal palpable pulse. Expiratory wheeze noted. ED EKG interpretation:  Rhythm: normal sinus rhythm; and regular . Rate (approx.): 67; Axis: normal; P wave: normal; QRS interval: normal ; ST/T wave: non-specific changes; no acute ischemia. . This EKG was interpreted by Mile Turner MD,ED Provider. 12:18 AM  Mile Turner MD spoke with Dr. Yrn Card for Hospitalist. Discussed available diagnostic tests and clinical findings. He/She is in agreement with care plans as outlined. He/she accepts patient for admission.

## 2020-03-04 NOTE — PROGRESS NOTES
Spiritual Care Assessment/Progress Note  [facility]      NAME: Max De      MRN: [id number]  AGE: 67 y.o. SEX: female  Baptism Affiliation: Presbyterian   Language: English     3/4/2020     Total Time (in minutes): 10     [phone] through conversation with:         []Patient        [] Family    [] Friend(s)        Reason for Consult: Rapid response team     Spiritual beliefs: (Please include comment if needed)     [] Identifies with a amy tradition:         [] Supported by a amy community:            [] Claims no spiritual orientation:           [] Seeking spiritual identity:                [] Adheres to an individual form of spirituality:           [x] Not able to assess:                           Identified resources for coping:      [] Prayer                               [] Music                  [] Guided Imagery     [] Family/friends                 [] Pet visits     [] Devotional reading                         [x] Unknown     [] Other:                                               Interventions offered during this visit: (See comments for more details)                Plan of Care:     [] Support spiritual and/or cultural needs    [] Support AMD and/or advance care planning process      [] Support grieving process   [] Coordinate Rites and/or Rituals    [] Coordination with community clergy   [] No spiritual needs identified at this time   [] Detailed Plan of Care below (See Comments)  [] Make referral to Music Therapy  [] Make referral to Pet Therapy     [] Make referral to Addiction services  [] Make referral to [facility]  [] Make referral to Spiritual Care Partner  [] No future visits requested        [x] Follow up visits as needed     Comments:  responded to Rapid Response Team (RRT) room 511. Staff with patient providing care. No family or visitors present.   Will continue to follow up as needed and upon request as able.    Visited by Rev. Sandra Coronado MDiv, [facility], [facility]: [phone])

## 2020-03-04 NOTE — ED NOTES
Patient has history of Parkinson's. Patient became increasingly tremorous. Monitor was difficult to interpret. EKG was ordered. EKG was unable to be obtained due to sever tremors. Dr. Viola Scott notified. EKG cancelled. Patient's family reports patient becomes more tremorous which stressed and she had not taken her night medications. Dr. Viola Scott advised patient to take home night medications. Patient reported wheezing. Duo Neb ordered.

## 2020-03-04 NOTE — H&P
Patient was evaluated at the bedside @2:00 am 3/4/20      HISTORY AND PHYSICAL  Rafi Flores MD        PCP: Malvin Martinez MD    Please note that this dictation was completed with [employer], the computer voice recognition software. Quite often unanticipated grammatical, syntax, homophones, and other interpretive errors are inadvertently transcribed by the computer software. Please disregard these errors. Please excuse any errors that have escaped final proofreading. CHIEF COMPLAINTS   AMS       HISTORY OF PRESENT ILLNESS  Patient is 68-year-old female with past medical history significant for orthostatic hypotension, Parkinson's disease, GERD, hyperlipidemia, neuropathy, dementia , depression and bipolar disorder who presented to [facility] emergency department on account of altered mental status. Family reports patient was noted to be confused, not the usual herself and weaker. Family also reported  earlier in the afternoon she was extremely weak and had a GLF. No reported fever, chills, nausea, vomiting, shortness of breath, chest pain, cough, palpitation, syncope or presyncope, seizures, urinary or bowel complaints. With [facility] emergency department, V/S were unremarkable. Lab work-ups: Leukocytosis, UA remarkable for pyuria, bacteriuria and leukocyte esterase. Received a dose of ceftriaxone.        PMH/PSH:  Past Medical History:   Diagnosis Date    Bipolar disorder ([facility]     Nazmy    Chronic pain     BACK PAIN    Diabetes ([facility]     BORDERLINE TX WITH DIET AND EXERCISE    DJD (degenerative joint disease)     Fibromyalgia     Genital herpes     GERD (gastroesophageal reflux disease)     Hypercholesterolemia     IBS (irritable bowel syndrome)     Ill-defined condition     fibromyligia    Lumbar disc disease     stimulation-Honza    Migraine     Nausea & vomiting     Other ill-defined conditions(799.89)     SEASONAL allergies    Other ill-defined conditions(799.89) dysphagia has had esophagus dilated    Paget's disease     Parkinson disease ([facility] [address]     Pulmonary embolism (HCC)     RLS (restless legs syndrome)     Spinal stenosis      Past Surgical History:   Procedure Laterality Date    COLONOSCOPY N/A 6/27/2016    COLONOSCOPY performed by Gena Hilton MD at [address] COLONOSCOPY N/A 2/22/2019    COLONOSCOPY performed by Saniya Kincaid MD at [facility]. [address], COLON, DIAGNOSTIC      12, due 13    HX APPENDECTOMY      HX BACK SURGERY  9/17/2013    back surgery - total of 3 as of 12/20/2013    HX BREAST BIOPSY Right years ago    negative    HX CHOLECYSTECTOMY      HX HEENT      T & A    HX HYSTERECTOMY      total for fibroid tumors    HX ORTHOPAEDIC      lumbar laminectomy then fusion/neurostimulator implanted - inactive now but still in    HX ORTHOPAEDIC      REMOVAL OF NEUROSTIMULATOR    HX OTHER SURGICAL      EGD x 2 with dilation/colonoscopy - no polyps per pt    TILT TABLE EVALUATION  8/2/2016            Home meds:   Prior to Admission medications    Medication Sig Start Date End Date Taking? Authorizing Provider   fludrocortisone (FLORINEF) 0.1 mg tablet TAKE 1 TABLET BY MOUTH TWICE DAILY 1/20/20   Aden Prime B, NP   potassium chloride (K-DUR, KLOR-CON) 20 mEq tablet TAKE 1 TABLET BY MOUTH DAILY 1/15/20   Roe Mcdaniel MD   atorvastatin (LIPITOR) 20 mg tablet TAKE 1 TABLET BY MOUTH EVERY EVENING 1/15/20   Roe Mcdaniel MD   sucralfate (CARAFATE) 1 gram tablet TAKE 1 TABLET BY MOUTH FOUR TIMES DAILY  Patient taking differently: Take  by mouth daily.  1/15/20   Roe Mcdaniel MD   gabapentin (NEURONTIN) 300 mg capsule 600mg with breakfast, 300 mg with  lunch, 600mg at dinner- new directions  Indications: Neuropathic Pain 12/19/19   Lexie Alvarez MD   pyridostigmine (MESTINON) 60 mg tablet TAKE 1 TABLET BY MOUTH THREE TIMES DAILY 12/11/19   Josephine Edmondson MD   donepezil (ARICEPT) 5 mg tablet Take 5 mg by mouth nightly. Other, MD Bruce   dexlansoprazole (DEXILANT) 60 mg CpDB capsule (delayed release) Take 1 Cap by mouth daily. Siri Jack MD   FLUoxetine (PROZAC) 40 mg capsule Take 40 mg by mouth daily. 11/8/19   Yair Roman RN   droxidopa (NORTHERA) 300 mg cap Take 600 mg by mouth two (2) times a day. Patient taking differently: Take 1,200 mg by mouth two (2) times a day. 7/25/19   Deejay Dukes MD   carbidopa-levodopa (SINEMET)  mg per tablet Take 1 Tab by mouth three (3) times daily. Provider, Historical   acetaminophen (TYLENOL) 325 mg tablet Take 325 mg by mouth every four (4) hours as needed for Pain. Provider, Historical   QUEtiapine (SEROQUEL) 25 mg tablet Take 50 mg by mouth nightly. Take 2 tabs qhs prn Pankaj Navarro NP/ [facility]    Provider, Historical       Allergies:   Allergies   Allergen Reactions    Adhesive Itching    Hydrocodone Itching    Lorazepam Other (comments)    Other Medication Rash     Tide detergent    Percocet [Oxycodone-Acetaminophen] Itching    Sudafed [Pseudoephedrine Hcl] Other (comments)     Vertigo    Wellbutrin [Bupropion Hcl] Nausea and Vomiting    Zithromax [Azithromycin] Vertigo    Zyrtec [Cetirizine] Nausea Only       FH:  Family History   Problem Relation Age of Onset    Colon Cancer Mother     Cancer Mother 68        colon    Heart Disease Father     Heart Disease Maternal Grandmother     Heart Disease Maternal Grandfather     Heart Disease Other        SH:  Social History     Tobacco Use    Smoking status: Never Smoker    Smokeless tobacco: Never Used   Substance Use Topics    Alcohol use: No     Comment: 1 per month       ROS:   Not obtained: AMS      PHYSICAL EXAM:  Visit Vitals  /72 (BP 1 Location: Right arm, BP Patient Position: At rest)   Pulse 80   Temp 100 °F (37.8 °C)   Resp 18   Wt 62.3 kg (137 lb 5.6 oz)   SpO2 100%   BMI 24.33 kg/m²       General:          Alert, appears confused, no distress, appears stated age. HEENT:           Atraumatic, anicteric sclerae, pink conjunctivae                          No oral ulcers, mucosa moist, throat clear, dentition fair  Neck:               Supple, symmetrical,  thyroid: non tender  Lungs:             Clear to auscultation bilaterally. No Wheezing or Rhonchi. No rales. Chest wall:      No tenderness  No Accessory muscle use. Heart:              Regular  rhythm,  No  murmur   No edema  Abdomen:        Soft, non-tender. Not distended. Bowel sounds normal  Extremities:     No cyanosis. No clubbing,                            Skin turgor normal, Capillary refill normal, Radial dial pulse 2+  Skin:                Not pale. Not Jaundiced  No rashes   Psych:             Not anxious or agitated. Neurologic:      Appears confused, oriented to PPT, CNII-XII intact. Motor and sensory exam grossly intact. Labs/Imaging:  Recent Results (from the past 24 hour(s))   SAMPLES BEING HELD    Collection Time: 03/03/20  7:43 PM   Result Value Ref Range    SAMPLES BEING HELD 1RED,1PST,1BLUE, 1LAV     COMMENT        Add-on orders for these samples will be processed based on acceptable specimen integrity and analyte stability, which may vary by analyte. CBC WITH AUTOMATED DIFF    Collection Time: 03/03/20  7:43 PM   Result Value Ref Range    WBC 15.0 (H) 3.6 - 11.0 K/uL    RBC 4.13 3.80 - 5.20 M/uL    HGB 12.6 11.5 - 16.0 g/dL    HCT 39.7 35.0 - 47.0 %    MCV 96.1 80.0 - 99.0 FL    MCH 30.5 26.0 - 34.0 PG    MCHC 31.7 30.0 - 36.5 g/dL    RDW 14.0 11.5 - 14.5 %    PLATELET 457 417 - 238 K/uL    MPV 9.5 8.9 - 12.9 FL    NRBC 0.0 0  WBC    ABSOLUTE NRBC 0.00 0.00 - 0.01 K/uL    NEUTROPHILS 74 32 - 75 %    LYMPHOCYTES 16 12 - 49 %    MONOCYTES 9 5 - 13 %    EOSINOPHILS 0 0 - 7 %    BASOPHILS 0 0 - 1 %    IMMATURE GRANULOCYTES 1 (H) 0.0 - 0.5 %    ABS. NEUTROPHILS 10.9 (H) 1.8 - 8.0 K/UL    ABS. LYMPHOCYTES 2.5 0.8 - 3.5 K/UL    ABS. MONOCYTES 1.4 (H) 0.0 - 1.0 K/UL    ABS. EOSINOPHILS 0.0 0.0 - 0.4 K/UL    ABS. BASOPHILS 0.1 0.0 - 0.1 K/UL    ABS. IMM. GRANS. 0.1 (H) 0.00 - 0.04 K/UL    DF AUTOMATED     METABOLIC PANEL, COMPREHENSIVE    Collection Time: 03/03/20  7:43 PM   Result Value Ref Range    Sodium 139 136 - 145 mmol/L    Potassium 3.1 (L) 3.5 - 5.1 mmol/L    Chloride 101 97 - 108 mmol/L    CO2 29 21 - 32 mmol/L    Anion gap 9 5 - 15 mmol/L    Glucose 162 (H) 65 - 100 mg/dL    BUN 23 (H) 6 - 20 MG/DL    Creatinine 1.03 (H) 0.55 - 1.02 MG/DL    BUN/Creatinine ratio 22 (H) 12 - 20      GFR est AA >60 >60 ml/min/1.73m2    GFR est non-AA 52 (L) >60 ml/min/1.73m2    Calcium 8.5 8.5 - 10.1 MG/DL    Bilirubin, total 0.9 0.2 - 1.0 MG/DL    ALT (SGPT) 17 12 - 78 U/L    AST (SGOT) 86 (H) 15 - 37 U/L    Alk.  phosphatase 99 45 - 117 U/L    Protein, total 6.8 6.4 - 8.2 g/dL    Albumin 3.2 (L) 3.5 - 5.0 g/dL    Globulin 3.6 2.0 - 4.0 g/dL    A-G Ratio 0.9 (L) 1.1 - 2.2     PROTHROMBIN TIME + INR    Collection Time: 03/03/20  7:43 PM   Result Value Ref Range    INR 1.1 0.9 - 1.1      Prothrombin time 10.5 9.0 - 11.1 sec   TROPONIN I    Collection Time: 03/03/20  7:43 PM   Result Value Ref Range    Troponin-I, Qt. <0.05 <0.05 ng/mL   GLUCOSE, POC    Collection Time: 03/03/20  7:44 PM   Result Value Ref Range    Glucose (POC) 185 (H) 65 - 100 mg/dL    Performed by DAYLIN AMI    EKG, 12 LEAD, INITIAL    Collection Time: 03/03/20  8:05 PM   Result Value Ref Range    Ventricular Rate 67 BPM    Atrial Rate 67 BPM    P-R Interval 160 ms    QRS Duration 84 ms    Q-T Interval 352 ms    QTC Calculation (Bezet) 371 ms    Calculated P Axis 33 degrees    Calculated R Axis 4 degrees    Calculated T Axis 76 degrees    Diagnosis       Normal sinus rhythm  Nonspecific T wave abnormality  Abnormal ECG  When compared with ECG of 15-FEB-2020 05:02,  QT has shortened     URINALYSIS W/ RFLX MICROSCOPIC    Collection Time: 03/03/20  8:47 PM   Result Value Ref Range    Color YELLOW/STRAW      Appearance CLOUDY (A) CLEAR      Specific gravity 1.010 1.003 - 1.030      pH (UA) 6.0 5.0 - 8.0      Protein 100 (A) NEG mg/dL    Glucose NEGATIVE  NEG mg/dL    Ketone NEGATIVE  NEG mg/dL    Bilirubin NEGATIVE  NEG      Blood MODERATE (A) NEG      Urobilinogen 0.2 0.2 - 1.0 EU/dL    Nitrites NEGATIVE  NEG      Leukocyte Esterase LARGE (A) NEG     URINE MICROSCOPIC ONLY    Collection Time: 03/03/20  8:47 PM   Result Value Ref Range    WBC >100 (H) 0 - 4 /hpf    RBC 10-20 0 - 5 /hpf    Epithelial cells FEW FEW /lpf    Bacteria 3+ (A) NEG /hpf   LACTIC ACID    Collection Time: 03/03/20  9:34 PM   Result Value Ref Range    Lactic acid 1.0 0.4 - 2.0 MMOL/L       Recent Labs     03/03/20 1943   WBC 15.0*   HGB 12.6   HCT 39.7        Recent Labs     03/03/20 1943      K 3.1*      CO2 29   BUN 23*   CREA 1.03*   *   CA 8.5     Recent Labs     03/03/20 1943   SGOT 86*   ALT 17   AP 99   TBILI 0.9   TP 6.8   ALB 3.2*   GLOB 3.6       Recent Labs     03/03/20 1943   TROIQ <0.05       Recent Labs     03/03/20 1943   INR 1.1   PTP 10.5        No results for input(s): PH, PCO2, PO2 in the last 72 hours. CT HEAD WO CONT  Narrative: INDICATION: weakness    EXAM:  HEAD CT WITHOUT CONTRAST    COMPARISON: February 24, 2019    TECHNIQUE:  Routine noncontrast axial head CT was performed. Sagittal and  coronal reconstructions were generated. CT dose reduction was achieved through use of a standardized protocol tailored  for this examination and automatic exposure control for dose modulation. FINDINGS:    Ventricles: Midline, no hydrocephalus. Intracranial Hemorrhage: None. Brain Parenchyma/Brainstem: Patchy white matter hypodensities throughout the  supratentorial brain. Basal Cisterns: Normal.  Paranasal Sinuses: Visualized sinuses are clear. Additional Comments: N/A. Impression: IMPRESSION:    No acute process.           Assessment & Plan:   ===================  Acute metabolic encephalopathy likely due to UTI  Presenting with increased confusion, weakness  Leukocytosis and UA remarkable for pyuria, bacteriuria and leukocyte esterase  Continue ceftriaxone (recent culture was pansensitive)  Neurochecks  Check B12 and TSH  CT of the head and chest x-ray unremarkable for acute process    Complicated UTI  Presenting with AMS, leukocytosis and UA remarkable for pyuria, bacteriuria and leukocyte esterase  Recent imaging study was unremarkable  Recent CS was pansensitive  Continue ceftriaxone  Cultures pending    Hypokalemia   Replete   Check MG     Parkinson's disease with h/o orthostatic hypotension:   Continue Florinef ,carbidopa-levodopa    GERD  Stable  Continue home PPI     Hyperlipidemia  Continue home statin       Continue    Dementia   Continue home donezepil     Bipolar/depression  No SI/HI  Continue home meds         Patient's Baseline: Ambulates with  walker  DVT ppx: Enoxaparin  Code status: DNR  Disposition: TBD  Care plan discussed with patient/family nurse.                 Signed By: Gerson Garcia MD     March 4, 2020

## 2020-03-05 LAB
ANION GAP SERPL CALC-SCNC: 3 MMOL/L (ref 5–15)
BUN SERPL-MCNC: 11 MG/DL (ref 6–20)
BUN/CREAT SERPL: 16 (ref 12–20)
CALCIUM SERPL-MCNC: 8.5 MG/DL (ref 8.5–10.1)
CHLORIDE SERPL-SCNC: 112 MMOL/L (ref 97–108)
CO2 SERPL-SCNC: 27 MMOL/L (ref 21–32)
CREAT SERPL-MCNC: 0.67 MG/DL (ref 0.55–1.02)
ERYTHROCYTE [DISTWIDTH] IN BLOOD BY AUTOMATED COUNT: 14.2 % (ref 11.5–14.5)
GLUCOSE BLD STRIP.AUTO-MCNC: 114 MG/DL (ref 65–100)
GLUCOSE BLD STRIP.AUTO-MCNC: 145 MG/DL (ref 65–100)
GLUCOSE BLD STRIP.AUTO-MCNC: 93 MG/DL (ref 65–100)
GLUCOSE SERPL-MCNC: 107 MG/DL (ref 65–100)
HCT VFR BLD AUTO: 33.2 % (ref 35–47)
HGB BLD-MCNC: 10.6 G/DL (ref 11.5–16)
MCH RBC QN AUTO: 31.1 PG (ref 26–34)
MCHC RBC AUTO-ENTMCNC: 31.9 G/DL (ref 30–36.5)
MCV RBC AUTO: 97.4 FL (ref 80–99)
NRBC # BLD: 0 K/UL (ref 0–0.01)
NRBC BLD-RTO: 0 PER 100 WBC
PLATELET # BLD AUTO: 182 K/UL (ref 150–400)
PMV BLD AUTO: 9.7 FL (ref 8.9–12.9)
POTASSIUM SERPL-SCNC: 4.6 MMOL/L (ref 3.5–5.1)
RBC # BLD AUTO: 3.41 M/UL (ref 3.8–5.2)
SERVICE CMNT-IMP: ABNORMAL
SERVICE CMNT-IMP: ABNORMAL
SERVICE CMNT-IMP: NORMAL
SODIUM SERPL-SCNC: 142 MMOL/L (ref 136–145)
WBC # BLD AUTO: 10.8 K/UL (ref 3.6–11)

## 2020-03-05 PROCEDURE — 85027 COMPLETE CBC AUTOMATED: CPT

## 2020-03-05 PROCEDURE — 65660000000 HC RM CCU STEPDOWN

## 2020-03-05 PROCEDURE — 80048 BASIC METABOLIC PNL TOTAL CA: CPT

## 2020-03-05 PROCEDURE — 74011250636 HC RX REV CODE- 250/636: Performed by: HOSPITALIST

## 2020-03-05 PROCEDURE — 74011250636 HC RX REV CODE- 250/636: Performed by: STUDENT IN AN ORGANIZED HEALTH CARE EDUCATION/TRAINING PROGRAM

## 2020-03-05 PROCEDURE — 82962 GLUCOSE BLOOD TEST: CPT

## 2020-03-05 PROCEDURE — 74011000250 HC RX REV CODE- 250: Performed by: HOSPITALIST

## 2020-03-05 PROCEDURE — 94760 N-INVAS EAR/PLS OXIMETRY 1: CPT

## 2020-03-05 PROCEDURE — 74011000258 HC RX REV CODE- 258: Performed by: HOSPITALIST

## 2020-03-05 PROCEDURE — 36415 COLL VENOUS BLD VENIPUNCTURE: CPT

## 2020-03-05 PROCEDURE — 74011250637 HC RX REV CODE- 250/637: Performed by: STUDENT IN AN ORGANIZED HEALTH CARE EDUCATION/TRAINING PROGRAM

## 2020-03-05 PROCEDURE — 65270000029 HC RM PRIVATE

## 2020-03-05 RX ORDER — LIDOCAINE 4 G/100G
1 PATCH TOPICAL EVERY 24 HOURS
Status: DISCONTINUED | OUTPATIENT
Start: 2020-03-05 | End: 2020-03-08 | Stop reason: HOSPADM

## 2020-03-05 RX ORDER — ONDANSETRON 2 MG/ML
4 INJECTION INTRAMUSCULAR; INTRAVENOUS
Status: DISCONTINUED | OUTPATIENT
Start: 2020-03-05 | End: 2020-03-08 | Stop reason: HOSPADM

## 2020-03-05 RX ADMIN — Medication 10 ML: at 21:41

## 2020-03-05 RX ADMIN — CARBIDOPA AND LEVODOPA 1 TABLET: 25; 100 TABLET ORAL at 15:25

## 2020-03-05 RX ADMIN — ACETAMINOPHEN 650 MG: 325 TABLET ORAL at 08:51

## 2020-03-05 RX ADMIN — CARBIDOPA AND LEVODOPA 1 TABLET: 25; 100 TABLET ORAL at 08:25

## 2020-03-05 RX ADMIN — PYRIDOSTIGMINE BROMIDE 60 MG: 60 TABLET ORAL at 21:40

## 2020-03-05 RX ADMIN — PANTOPRAZOLE SODIUM 20 MG: 20 TABLET, DELAYED RELEASE ORAL at 07:13

## 2020-03-05 RX ADMIN — ENOXAPARIN SODIUM 40 MG: 40 INJECTION SUBCUTANEOUS at 08:26

## 2020-03-05 RX ADMIN — PYRIDOSTIGMINE BROMIDE 60 MG: 60 TABLET ORAL at 15:25

## 2020-03-05 RX ADMIN — Medication 10 ML: at 07:13

## 2020-03-05 RX ADMIN — POTASSIUM CHLORIDE 20 MEQ: 750 TABLET, FILM COATED, EXTENDED RELEASE ORAL at 08:26

## 2020-03-05 RX ADMIN — CEFTRIAXONE 1 G: 1 INJECTION, POWDER, FOR SOLUTION INTRAMUSCULAR; INTRAVENOUS at 21:36

## 2020-03-05 RX ADMIN — CARBIDOPA AND LEVODOPA 1 TABLET: 25; 100 TABLET ORAL at 21:35

## 2020-03-05 RX ADMIN — ONDANSETRON 4 MG: 2 INJECTION, SOLUTION INTRAMUSCULAR; INTRAVENOUS at 15:51

## 2020-03-05 RX ADMIN — FLUOXETINE 40 MG: 20 CAPSULE ORAL at 08:25

## 2020-03-05 RX ADMIN — ATORVASTATIN CALCIUM 20 MG: 20 TABLET, FILM COATED ORAL at 21:35

## 2020-03-05 RX ADMIN — QUETIAPINE FUMARATE 50 MG: 25 TABLET ORAL at 21:35

## 2020-03-05 RX ADMIN — SUCRALFATE 1 G: 1 TABLET ORAL at 08:25

## 2020-03-05 RX ADMIN — DONEPEZIL HYDROCHLORIDE 5 MG: 5 TABLET, FILM COATED ORAL at 21:36

## 2020-03-05 RX ADMIN — DROXIDOPA 600 MG: 300 CAPSULE ORAL at 08:53

## 2020-03-05 RX ADMIN — Medication 10 ML: at 15:26

## 2020-03-05 RX ADMIN — PYRIDOSTIGMINE BROMIDE 60 MG: 60 TABLET ORAL at 07:13

## 2020-03-05 NOTE — PROGRESS NOTES
Problem: Pressure Injury - Risk of  Goal: *Prevention of pressure injury  Description  Document Chaitanya Scale and appropriate interventions in the flowsheet. Outcome: Progressing Towards Goal  Note: Pressure Injury Interventions:  Sensory Interventions: Assess changes in LOC    Moisture Interventions: Absorbent underpads    Activity Interventions: Pressure redistribution bed/mattress(bed type), PT/OT evaluation    Mobility Interventions: PT/OT evaluation    Nutrition Interventions: Document food/fluid/supplement intake                     Problem: Falls - Risk of  Goal: *Absence of Falls  Description  Document Delkey Cespedeses Fall Risk and appropriate interventions in the flowsheet.   Outcome: Progressing Towards Goal  Note: Fall Risk Interventions:  Mobility Interventions: PT Consult for mobility concerns    Mentation Interventions: Adequate sleep, hydration, pain control    Medication Interventions: Patient to call before getting OOB    Elimination Interventions: Call light in reach, Toileting schedule/hourly rounds    History of Falls Interventions: Door open when patient unattended

## 2020-03-05 NOTE — CDMP QUERY
Query 1 of 2 Patient admitted with AMS, UTI. Noted history of 'chronic indwelling Mendes x 3 weeks d/t recurrent UTI' in ED provider note. If possible, please document in the progress notes and d/c summary if you are evaluating and / or treating any of the following: 
 
? UTI due to chronic Mendes catheter 
? UTI not due to Mendes ? Other ? Clinically unable to determine The medical record reflects the following: 
   Risk Factors: indwelling mendes x 3 weeks; 
 
   Clinical Indicators: UA= cloudy, blood mod; Leuks Lg; WBC >100; Bact 3+; UCX= >100k GNR; 
 
 
ED Atri PN 3/3- \"Urinary tract infection associated with indwelling urethral catheter, initial encounter\"; Treatment: Rocephin 1g IV; Thank you, Galileo Ng RN, BSN, [facility] Clinical  
[facility] 
[phone]

## 2020-03-05 NOTE — PROGRESS NOTES
YULISSA:  -SNF placement ([facility])  -possible psych consult    Reason for Admission:   UTI, history of Parkinson's disease, dementia, depression               RUR Score:   29      PCP: First and Last name:Milind Mcdaniel   Name of Practice:    Are you a current patient: Yes/No: yes   Approximate date of last visit:             Resources/supports as identified by patient/family:   Temporarily staying with daughter Annemarie Cardoza (the past 3 weeks) due to needing assist with mendes care. Has an apartment x 14 years at [facility] facing patient (as identified by patient/family and CM): Finances/Medication cost?     No concerns               Transportation? Daughter will transport              Support system or lack thereof? See above  Living arrangements? See above           Self-care/ADLs/Cognition? Some memory impairments due to dementia          Current Advanced Directive/Advance Care Plan: On file, dinesh Cardoza is [facility]                          Plan for utilizing home health:    n/a                 CM met with patient and then called dinesh Cardoza ([phone]) to discuss discharge planning. Annemarie Cardoza would like patient to be placed at [facility] (SNF) upon discharge and has already been in contact with them. CM to send referral via CC link. Patient did voice to CM that she feels down and sometimes does not have any will to live. CM will relay to the MD. Patient did acknowledge that she should not feel this way as she has a lot of grandchildren to live for, but feels frustrated with her illness at times. Care Management Interventions  PCP Verified by CM:  Yes  Palliative Care Criteria Met (RRAT>21 & CHF Dx)?: No  MyChart Signup: No  Discharge Durable Medical Equipment: No  Health Maintenance Reviewed: Yes  Physical Therapy Consult: Yes  Occupational Therapy Consult: Yes  Speech Therapy Consult: No  Current Support Network: Other  Confirm Follow Up Transport: Family   Resource Information Provided?: No  Discharge Location  Discharge Placement: Skilled nursing facility    S.  [employer] , [facility]

## 2020-03-05 NOTE — PROGRESS NOTES
Dr. Orellana Lay made aware bp in 180's/ 184/95. Verbal order to hold florinef and droxidopa forr now.

## 2020-03-05 NOTE — CDMP QUERY
Query 2 of 2 Patient admitted with AMS, UTI. Noted 2 or more SIRS criteria present (WBC, temp, HR). If possible, please document in the progress notes and d/c summary if you are evaluating and / or treating any of the following: 
 
? Sepsis, POA, suspected or probable causative organism (please specify) ? No Sepsis, suspected or probable localized infection (please specify) ? Sepsis was ruled out (include corresponding diagnosis for patients clinical picture and treatment) ? Other, please specify ? Clinically unable to determine The medical record reflects the following: 
   Risk Factors: UTI; 
 
   Clinical Indicators: WBC 15.0-> 13.0; T 101.8; RR >20; AMS; 
UCx= GNR+ Treatment: 2L 0.9% NS bolus; 1g Rocephin IV Thank you, Galileo Ng RN, BSN, [facility] Clinical  
[facility] 
[phone]

## 2020-03-05 NOTE — ROUTINE PROCESS
Bedside shift change report given to Naresh Russell (oncoming nurse) by Nilda Muñoz (offgoing nurse). Report included the following information SBAR, Procedure Summary, Intake/Output, MAR and Recent Results.

## 2020-03-05 NOTE — PROGRESS NOTES
Physical Therapy  Chart reviewed for updates and attempted to see patient for PT treatment. Patient in bed resting on arrival. She reports recent nausea, improved but remains mildly (nurse aware), and requesting to defer activity at this time. Discussed with her asking for nursing assistance to get up to chair for dinner meal if nausea continues to improve/resolve and patient in agreement. Per her request, will defer therapy at this time and follow up tomorrow.    Anastasiya Mcguire, PT, DPT

## 2020-03-06 ENCOUNTER — TELEPHONE (OUTPATIENT)
Dept: INTERNAL MEDICINE CLINIC | Age: 75
End: 2020-03-06

## 2020-03-06 LAB
BACTERIA SPEC CULT: ABNORMAL
CC UR VC: ABNORMAL
GLUCOSE BLD STRIP.AUTO-MCNC: 111 MG/DL (ref 65–100)
GLUCOSE BLD STRIP.AUTO-MCNC: 122 MG/DL (ref 65–100)
GLUCOSE BLD STRIP.AUTO-MCNC: 129 MG/DL (ref 65–100)
GLUCOSE BLD STRIP.AUTO-MCNC: 139 MG/DL (ref 65–100)
SERVICE CMNT-IMP: ABNORMAL
T3 SERPL-MCNC: 59 NG/DL (ref 71–180)

## 2020-03-06 PROCEDURE — 97530 THERAPEUTIC ACTIVITIES: CPT

## 2020-03-06 PROCEDURE — 74011000250 HC RX REV CODE- 250: Performed by: HOSPITALIST

## 2020-03-06 PROCEDURE — 74011250636 HC RX REV CODE- 250/636: Performed by: HOSPITALIST

## 2020-03-06 PROCEDURE — 82962 GLUCOSE BLOOD TEST: CPT

## 2020-03-06 PROCEDURE — 74011250636 HC RX REV CODE- 250/636: Performed by: STUDENT IN AN ORGANIZED HEALTH CARE EDUCATION/TRAINING PROGRAM

## 2020-03-06 PROCEDURE — 65270000032 HC RM SEMIPRIVATE

## 2020-03-06 PROCEDURE — 74011250637 HC RX REV CODE- 250/637: Performed by: HOSPITALIST

## 2020-03-06 PROCEDURE — 74011000258 HC RX REV CODE- 258: Performed by: HOSPITALIST

## 2020-03-06 PROCEDURE — 74011250637 HC RX REV CODE- 250/637: Performed by: NURSE PRACTITIONER

## 2020-03-06 PROCEDURE — 74011250637 HC RX REV CODE- 250/637: Performed by: STUDENT IN AN ORGANIZED HEALTH CARE EDUCATION/TRAINING PROGRAM

## 2020-03-06 RX ORDER — MEMANTINE HYDROCHLORIDE 10 MG/1
5 TABLET ORAL 2 TIMES DAILY
Status: DISCONTINUED | OUTPATIENT
Start: 2020-03-06 | End: 2020-03-08 | Stop reason: HOSPADM

## 2020-03-06 RX ORDER — FLUOXETINE HYDROCHLORIDE 20 MG/1
60 CAPSULE ORAL DAILY
Status: DISCONTINUED | OUTPATIENT
Start: 2020-03-07 | End: 2020-03-08 | Stop reason: HOSPADM

## 2020-03-06 RX ORDER — PHENAZOPYRIDINE HYDROCHLORIDE 100 MG/1
200 TABLET, FILM COATED ORAL
Status: COMPLETED | OUTPATIENT
Start: 2020-03-06 | End: 2020-03-07

## 2020-03-06 RX ADMIN — MINERAL OIL AND WHITE PETROLATUM: 150; 830 OINTMENT OPHTHALMIC at 16:01

## 2020-03-06 RX ADMIN — SUCRALFATE 1 G: 1 TABLET ORAL at 10:19

## 2020-03-06 RX ADMIN — QUETIAPINE FUMARATE 50 MG: 25 TABLET ORAL at 21:20

## 2020-03-06 RX ADMIN — MEMANTINE HYDROCHLORIDE 5 MG: 10 TABLET ORAL at 18:50

## 2020-03-06 RX ADMIN — POTASSIUM CHLORIDE 20 MEQ: 750 TABLET, FILM COATED, EXTENDED RELEASE ORAL at 10:19

## 2020-03-06 RX ADMIN — ACETAMINOPHEN 650 MG: 325 TABLET ORAL at 19:58

## 2020-03-06 RX ADMIN — FLUOXETINE 40 MG: 20 CAPSULE ORAL at 10:19

## 2020-03-06 RX ADMIN — ATORVASTATIN CALCIUM 20 MG: 20 TABLET, FILM COATED ORAL at 21:20

## 2020-03-06 RX ADMIN — CARBIDOPA AND LEVODOPA 1 TABLET: 25; 100 TABLET ORAL at 16:00

## 2020-03-06 RX ADMIN — PYRIDOSTIGMINE BROMIDE 60 MG: 60 TABLET ORAL at 16:00

## 2020-03-06 RX ADMIN — Medication 10 ML: at 21:20

## 2020-03-06 RX ADMIN — MINERAL OIL AND WHITE PETROLATUM: 150; 830 OINTMENT OPHTHALMIC at 21:21

## 2020-03-06 RX ADMIN — CEFTRIAXONE 1 G: 1 INJECTION, POWDER, FOR SOLUTION INTRAMUSCULAR; INTRAVENOUS at 21:19

## 2020-03-06 RX ADMIN — PANTOPRAZOLE SODIUM 20 MG: 20 TABLET, DELAYED RELEASE ORAL at 07:30

## 2020-03-06 RX ADMIN — CARBIDOPA AND LEVODOPA 1 TABLET: 25; 100 TABLET ORAL at 21:20

## 2020-03-06 RX ADMIN — DONEPEZIL HYDROCHLORIDE 5 MG: 5 TABLET, FILM COATED ORAL at 21:20

## 2020-03-06 RX ADMIN — Medication 10 ML: at 16:10

## 2020-03-06 RX ADMIN — PYRIDOSTIGMINE BROMIDE 60 MG: 60 TABLET ORAL at 07:17

## 2020-03-06 RX ADMIN — ENOXAPARIN SODIUM 40 MG: 40 INJECTION SUBCUTANEOUS at 07:17

## 2020-03-06 RX ADMIN — PYRIDOSTIGMINE BROMIDE 60 MG: 60 TABLET ORAL at 21:22

## 2020-03-06 RX ADMIN — Medication 10 ML: at 07:18

## 2020-03-06 RX ADMIN — CARBIDOPA AND LEVODOPA 1 TABLET: 25; 100 TABLET ORAL at 10:19

## 2020-03-06 NOTE — PROGRESS NOTES
Problem: Self Care Deficits Care Plan (Adult)  Goal: *Acute Goals and Plan of Care (Insert Text)  Description    FUNCTIONAL STATUS PRIOR TO ADMISSION: Pt reports living with her daughter for the past 2 weeks, prior to that was living in an apartment. Uses rollator for functional mobility PRN, has had several falls. Performs dressing and bathing without physical assistance, daughter supervises bathing for safety. Pt is alone at times during the day as daughter works. HOME SUPPORT: The patient lived with daughter who provided some assistance. Occupational Therapy Goals  Initiated 3/4/2020  1. Patient will perform grooming with supervision/set-up standing within 7 day(s). 2.  Patient will perform lower body dressing with supervision/set-up within 7 day(s). 3.  Patient will perform toilet transfers with modified independence within 7 day(s). 4.  Patient will perform all aspects of toileting with modified independence within 7 day(s). 5.  Patient will participate in upper extremity therapeutic exercise/activities with independence for 5 minutes within 7 day(s). 6.  Patient will utilize energy conservation techniques during functional activities with verbal cues within 7 day(s). Outcome: Progressing Towards Goal     OCCUPATIONAL THERAPY TREATMENT  Patient: Juan Rivers (86 y.o. female)  Date: 3/6/2020  Diagnosis: UTI (urinary tract infection) [N39.0]  AMS (altered mental status) [R41.82]   <principal problem not specified>       Precautions:    Chart, occupational therapy assessment, plan of care, and goals were reviewed. ASSESSMENT  Patient continues with skilled OT services and is not progressing towards goals. Pt with limitated participation this date 2/2 paranoia and confusion , refusing or politely declining performing ADLs. Additionally, limited by elevated BP.   BP as follows:    Supine:  186/95  Sittin/90  Standin/84  Sitting after transfer:  176/101  Standing: 127/76    Patient was asymptomatic throughout session, RN informed. Patient with posterior lean in standing, requiring intermittent min A for static balance unsupported. Paranoia limited session, as pt verbalized frequently throughout session that her tray items and other hospital items were  contaminated. Additionally, she was difficult to redirect. Pt agreeable to sit up in chair. Chair alarm placed. 2/2 concerns for safety and risk for falls, recommend SNF at discharge. Current Level of Function Impacting Discharge (ADLs): up to max A depending on ability to redirect/if pt refused. Catheterized at this time. Other factors to consider for discharge: was living alone prior to recent stay with daughter, below baseline, fall risk         PLAN :  Patient continues to benefit from skilled intervention to address the above impairments. Continue treatment per established plan of care. to address goals. Recommend with staff: Recommend with nursing patient to complete as able in order to maintain strength, endurance and independence: OOB to chair 3x/day and mobilizing to the bathroom for toileting with min assist and walker. Thank you for your assistance. Recommend next OT session: grooming tasks     Recommendation for discharge: (in order for the patient to meet his/her long term goals)  Therapy up to 5 days/week in SNF setting    This discharge recommendation:  Has not yet been discussed the attending provider and/or case management    IF patient discharges home will need the following DME: TBD       SUBJECTIVE:   Patient stated I don't want any of that stuff, its all contaminated in here (referring to food tray).     OBJECTIVE DATA SUMMARY:   Cognitive/Behavioral Status:  Neurologic State: Alert;Confused  Orientation Level: Oriented to person;Oriented to time  Cognition: Follows commands; Impaired decision making             Functional Mobility and Transfers for ADLs:  Bed Mobility:  Supine to Sit: Contact guard assistance; Additional time(cues for using bedrail)    Transfers:  Sit to Stand: Contact guard assistance          Balance:  Sitting: Intact  Standing: Impaired; Without support  Standing - Static: Fair;Unsupported(posterior COG)  Standing - Dynamic : Fair;Unsupported(posterior COG)    ADL Intervention:  Feeding  Feeding Assistance: Set-up(cues - pt verbalized paranoia about cleanliness of food ites)  Container Management: Set-up  Cutting Food: Set-up                        Lower Body Dressing Assistance  Socks: Minimum assistance              Pain:  None reported    Activity Tolerance:   Fair  Please refer to the flowsheet for vital signs taken during this treatment. After treatment patient left in no apparent distress:   Sitting in chair, Call bell within reach, and Bed / chair alarm activated    COMMUNICATION/COLLABORATION:   The patients plan of care was discussed with: Physical therapist and Registered nurse.      Ebony Roberts OT  Time Calculation: 33 mins

## 2020-03-06 NOTE — PROGRESS NOTES
Patient's eye has some swelling and redness under it and patient stated that\" it is itchy and feels like something is in it\". RN did not see anything in patient's eye. RN paged hospitalist on call. Will continue too monitor.

## 2020-03-06 NOTE — PROGRESS NOTES
Hospitalist Progress Note  Papo Mcghee MD  Answering service: [phone] OR [phone] from in house phone      Date of Service:  3/5/2020  NAME:  Rony Hogan  :  1945  MRN:  [id number]      Admission Summary:   Patient is 67-year-old female with past medical history significant for orthostatic hypotension, Parkinson's disease, GERD, hyperlipidemia, neuropathy, dementia , depression and bipolar disorder who presented to [facility] emergency department on account of altered mental status. Family reports patient was noted to be confused, not the usual herself and weaker. Family also reported  earlier in the afternoon she was extremely weak and had a GLF. No reported fever, chills, nausea, vomiting, shortness of breath, chest pain, cough, palpitation, syncope or presyncope, seizures, urinary or bowel complaints.     With short-[facility] emergency department, V/S were unremarkable. Lab work-ups: Leukocytosis, UA remarkable for pyuria, bacteriuria and leukocyte esterase. Received a dose of ceftriaxone. Interval history / Subjective:      Patient seen and examined     Daughter at bed side. Patient states that she feels nauseous.   She has a chronic mendes catheter -was seeing uroGYN as OP,per daughter she has a follow up next week for voiding trail    Assessment & Plan:     # Acute metabolic encephalopathy likely due to UTI-resolved  - Presenting with increased confusion, weakness  - Leukocytosis and UA remarkable for pyuria, bacteriuria and leukocyte esterase  - low TSH, check T3/T4  - CT of the head and chest x-ray unremarkable for acute process  - on abx for UTI      # CAUTI(POA)  - UA suggestive, urine culture-gram negative rods,sensitivities pending  - Continue ceftriaxone (recent culture was pansensitive)     # Hypokalemia/ hypomagnesemia  -resolved  -on supplements     # Parkinson's disease with h/o orthostatic hypotension:   - Hold Florinef ,droxidopa today as BP high 180s supine.-on   -on  carbidopa-levodopa,pyridostigmine    #Lewy body dementia  -on aricept     # GERD  - Stable  - Continue home PPI     # Hyperlipidemia  - Continue home statin         # Anxiety/depression  - Continue home meds  -psych consulted as patient appears depressed.           Patient's Baseline: Ambulates with  walker  DVT ppx: Enoxaparin  Code status: DNR  Disposition: TBD  Care plan discussed with patient/family nurse.        Hospital Problems  Date Reviewed: 2/18/2020          Codes Class Noted POA    UTI (urinary tract infection) ICD-10-CM: N39.0  ICD-9-CM: 599.0  3/3/2020 Unknown        AMS (altered mental status) ICD-10-CM: R41.82  ICD-9-CM: 780.97  3/3/2020 Unknown                Review of Systems:   Pertinent positive mentioned in interval hx/HPI. Other systems reviewed and negative     Vital Signs:    Last 24hrs VS reviewed since prior progress note. Most recent are:  Visit Vitals  BP (!) 179/91 (BP 1 Location: Left arm, BP Patient Position: At rest)   Pulse 70   Temp 99.6 °F (37.6 °C)   Resp 16   Wt 62.3 kg (137 lb 5.6 oz)   SpO2 95%   BMI 24.33 kg/m²         Intake/Output Summary (Last 24 hours) at 3/5/2020 2254  Last data filed at 3/5/2020 7424  Gross per 24 hour   Intake 2125 ml   Output 3200 ml   Net -1075 ml        Physical Examination:             Constitutional:  No acute distress,   ENT:  Oral mucous moist, oropharynx benign. Neck supple,    Resp:  CTA bilaterally. No wheezing/rhonchi/rales. No accessory muscle use   CV:  Regular rhythm, normal rate    GI:  Soft, non distended, non tender. normoactive bowel sounds    Musculoskeletal:  No LE edema    Neurologic:  Moves all extremities. ,tremors +,alert,awake           Data Review:   I personally reviewed labs     Labs:     Recent Labs     03/05/20  0501 03/04/20  1557   WBC 10.8 13.0*   HGB 10.6* 10.4*   HCT 33.2* 33.5*    194     Recent Labs     03/05/20  0501 03/04/20  1554 03/04/20  0345 03/03/20 1943    139  --  139   K 4.6 3.7  --  3.1*   * 113*  --  101   CO2 27 23  --  29   BUN 11 18  --  23*   CREA 0.67 0.86  --  1.03*   * 105*  --  162*   CA 8.5 8.4*  --  8.5   MG  --  1.9 1.8  --      Recent Labs     03/04/20  1554 03/03/20 1943   SGOT 34 86*   ALT 38 17   AP 79 99   TBILI 0.5 0.9   TP 5.9* 6.8   ALB 2.6* 3.2*   GLOB 3.3 3.6     Recent Labs     03/03/20 1943   INR 1.1   PTP 10.5      No results for input(s): FE, TIBC, PSAT, FERR in the last 72 hours. Lab Results   Component Value Date/Time    Folate 14.0 02/21/2019 07:22 AM      No results for input(s): PH, PCO2, PO2 in the last 72 hours.   Recent Labs     03/04/20  1554 03/03/20 1943   TROIQ <0.05 <0.05     Lab Results   Component Value Date/Time    Cholesterol, total 134 02/26/2020 09:40 AM    HDL Cholesterol 65 02/26/2020 09:40 AM    LDL, calculated 47 02/26/2020 09:40 AM    Triglyceride 111 02/26/2020 09:40 AM    CHOL/HDL Ratio 3.3 07/23/2010 08:37 AM     Lab Results   Component Value Date/Time    Glucose (POC) 114 (H) 03/05/2020 09:21 PM    Glucose (POC) 93 03/05/2020 04:21 PM    Glucose (POC) 145 (H) 03/05/2020 11:20 AM    Glucose (POC) 204 (H) 03/04/2020 09:20 PM    Glucose (POC) 133 (H) 03/04/2020 04:10 PM     Lab Results   Component Value Date/Time    Color YELLOW/STRAW 03/03/2020 08:47 PM    Appearance CLOUDY (A) 03/03/2020 08:47 PM    Specific gravity 1.010 03/03/2020 08:47 PM    Specific gravity 1.020 02/15/2020 05:46 AM    pH (UA) 6.0 03/03/2020 08:47 PM    Protein 100 (A) 03/03/2020 08:47 PM    Glucose NEGATIVE  03/03/2020 08:47 PM    Ketone NEGATIVE  03/03/2020 08:47 PM    Bilirubin NEGATIVE  03/03/2020 08:47 PM    Urobilinogen 0.2 03/03/2020 08:47 PM    Nitrites NEGATIVE  03/03/2020 08:47 PM    Leukocyte Esterase LARGE (A) 03/03/2020 08:47 PM    Epithelial cells FEW 03/03/2020 08:47 PM    Bacteria 3+ (A) 03/03/2020 08:47 PM    WBC >100 (H) 03/03/2020 08:47 PM    RBC 10-20 03/03/2020 08:47 PM Medications Reviewed:     Current Facility-Administered Medications   Medication Dose Route Frequency    ondansetron (ZOFRAN) injection 4 mg  4 mg IntraVENous Q8H PRN    lidocaine 4 % patch 1 Patch  1 Patch TransDERmal Q24H    atorvastatin (LIPITOR) tablet 20 mg  20 mg Oral QHS    carbidopa-levodopa (SINEMET)  mg per tablet 1 Tab  1 Tab Oral TID    pantoprazole (PROTONIX) tablet 20 mg  20 mg Oral ACB    donepeziL (ARICEPT) tablet 5 mg  5 mg Oral QHS    sucralfate (CARAFATE) tablet 1 g  1 g Oral DAILY    QUEtiapine (SEROquel) tablet 50 mg  50 mg Oral QHS    FLUoxetine (PROzac) capsule 40 mg  40 mg Oral DAILY    pyridostigmine (MESTINON) tablet 60 mg  60 mg Oral Q8H    potassium chloride SR (KLOR-CON 10) tablet 20 mEq  20 mEq Oral DAILY    sodium chloride (NS) flush 5-40 mL  5-40 mL IntraVENous Q8H    sodium chloride (NS) flush 5-40 mL  5-40 mL IntraVENous PRN    acetaminophen (TYLENOL) tablet 650 mg  650 mg Oral Q4H PRN    morphine injection 1 mg  1 mg IntraVENous Q4H PRN    enoxaparin (LOVENOX) injection 40 mg  40 mg SubCUTAneous Q24H    cefTRIAXone (ROCEPHIN) 1 g in 0.9% sodium chloride (MBP/ADV) 50 mL  1 g IntraVENous Q24H    fludrocortisone (FLORINEF) tablet 0.1 mg  0.1 mg Oral BID    aluminum-magnesium hydroxide (MAALOX) oral suspension 15 mL  15 mL Oral QID PRN    droxidopa cap 600 mg (Patient Supplied)  600 mg Oral BID     ______________________________________________________________________  EXPECTED LENGTH OF STAY: 3d 21h  ACTUAL LENGTH OF STAY:          2                 Winsome Covington MD   Patient has given Verbal permission to discuss medical care with   persons present in the room and and also with contact as listed on face sheet.

## 2020-03-06 NOTE — PROGRESS NOTES
2064- This RN paged Dr. Kathryn Arrington concerning elevated BP of 188/90.    0711-Dr. Kathryn Arrington stated she does not want to treat BP under 684 systolic as pt may be orthostatic when standing due to Parkinson's. Unless pt is symptomatic or develops a headache, MD is okay with leaving SBP under 200. MD ordered RN to continue holding florinef and droxidopa today.

## 2020-03-06 NOTE — PROGRESS NOTES
Problem: Mobility Impaired (Adult and Pediatric)  Goal: *Acute Goals and Plan of Care (Insert Text)  Description  FUNCTIONAL STATUS PRIOR TO ADMISSION: Patient was modified independent using a rollator \"occasionally\" for functional mobility. Pt reports that she moved in with her daughter 2 weeks ago, but may still have own apartment. Pt stated that she is often home alone while her daughter is at work. Pt reports several falls within the last year. HOME SUPPORT PRIOR TO ADMISSION: The patient lived with daughter to provide some assistance. Physical Therapy Goals  Initiated 3/4/2020  1. Patient will transfer from bed to chair and chair to bed with modified independence using the least restrictive device within 7 day(s). 2.  Patient will ambulate with modified independence for 100 feet with the least restrictive device within 7 day(s). 3.  Patient will ascend/descend 4 stairs with handrail(s) with minimal assistance/contact guard assist within 7 day(s). Outcome: Progressing Towards Goal     PHYSICAL THERAPY TREATMENT  Patient: Phill Suárez (52 y.o. female)  Date: 3/6/2020  Diagnosis: UTI (urinary tract infection) [N39.0]  AMS (altered mental status) [R41.82]   <principal problem not specified>       Precautions:    Chart, physical therapy assessment, plan of care and goals were reviewed. ASSESSMENT  Patient continues with skilled PT services and is not progressing towards goals. Patient limited by elevated BP this date and patient confusion/paranoia. BP as follows:  Supine:  186/95  Sittin/90  Standin/84  Sitting after transfer:  176/101  Standin/76  Patient asymptomatic throughout session and RN aware. Patient with posterior lean in standing, requiring min A for static balance unsupported. Able to self correct with cues but does not initiated on her own.   Paranoia limited session as well-concerned that her tray was contaminated and concerned about hearing talking on other side of curtain. Difficult to redirect. Remained OOB with chair alarm on. Recommend SNF at discharge. Current Level of Function Impacting Discharge (mobility/balance): CGA, posterior COG/lean    Other factors to consider for discharge:          PLAN :  Patient continues to benefit from skilled intervention to address the above impairments. Continue treatment per established plan of care. to address goals. Recommendation for discharge: (in order for the patient to meet his/her long term goals)  Therapy up to 5 days/week in SNF setting    This discharge recommendation:  Has been made in collaboration with the attending provider and/or case management    IF patient discharges home will need the following DME: to be determined (TBD)       SUBJECTIVE:   Patient stated No that tray is contaminated.     OBJECTIVE DATA SUMMARY:   Critical Behavior:  Neurologic State: Alert, Confused  Orientation Level: Oriented to person, Oriented to time  Cognition: Follows commands, Impaired decision making  Safety/Judgement: Fall prevention, Decreased insight into deficits, Awareness of environment  Functional Mobility Training:  Bed Mobility:     Supine to Sit: Contact guard assistance; Additional time(cues for using bedrail)              Transfers:  Sit to Stand: Contact guard assistance  Stand to Sit: Contact guard assistance  Stand Pivot Transfers: Contact guard assistance                          Balance:  Sitting: Intact  Standing: Impaired; Without support  Standing - Static: Fair;Unsupported(posterior COG)  Standing - Dynamic : Fair;Unsupported(posterior COG)  Ambulation/Gait Training:              Gait Description (WDL): (bed to chair, limited by elevated BP)                                         Stairs: Therapeutic Exercises:     Pain Rating:      Activity Tolerance:   Fair  Please refer to the flowsheet for vital signs taken during this treatment.     After treatment patient left in no apparent distress:   Sitting in chair, Call bell within reach, and Bed / chair alarm activated    COMMUNICATION/COLLABORATION:   The patients plan of care was discussed with: Occupational therapist and Registered nurse.      Raffaele Burleson, PT   Time Calculation: 20 mins

## 2020-03-06 NOTE — PROGRESS NOTES
Bedside and Verbal shift change report given to Margie Reeder [facility] (oncoming nurse) by Yannick Townsend RN (offgoing nurse). Report included the following information SBAR, Kardex, Intake/Output and MAR.

## 2020-03-06 NOTE — TELEPHONE ENCOUNTER
----- Message from Atha Oppenheim, MD sent at 3/4/2020  3:49 PM EST -----  Regarding: FW: acs consult    ----- Message -----  From: Jarett Alexander  Sent: 3/3/2020  11:26 AM EST  To: Atha Oppenheim, MD  Subject: RE: acs consult                                  I reviewed her medications and none of them have vaginal burning/irritation as an adverse effect. The only thing close would be the donepezil and pyridostigmine can cause increased urination which might worsen any irritation but that would be a stretch and these medications are not new. Maria Del Rosarioernestina Fierro  ----- Message -----  From: Andria Pittman MD  Sent: 3/1/2020   3:05 PM EST  To: Atha Oppenheim, MD, #  Subject: acs consult                                      Please review meds for the side effect of vaginal burning.  She's on some things I'm not super familiar with- Thx !!

## 2020-03-06 NOTE — PROGRESS NOTES
Hospitalist Progress Note  Alex Felzi MD  Answering service: [phone] OR [phone] from in house phone      Date of Service:  3/6/2020  NAME:  Navin Aguilar  :  1945  MRN:  [id number]      Admission Summary:   Patient is 76-year-old female with past medical history significant for orthostatic hypotension, Parkinson's disease, GERD, hyperlipidemia, neuropathy, dementia , depression and bipolar disorder who presented to [facility] department on account of altered mental status. Family reports patient was noted to be confused, not the usual herself and weaker. Family also reported  earlier in the afternoon she was extremely weak and had a GLF. No reported fever, chills, nausea, vomiting, shortness of breath, chest pain, cough, palpitation, syncope or presyncope, seizures, urinary or bowel complaints.     With short-[facility] emergency department, V/S were unremarkable. Lab work-ups: Leukocytosis, UA remarkable for pyuria, bacteriuria and leukocyte esterase. Received a dose of ceftriaxone. Interval history / Subjective:      Patient seen and examined     She has underlying dementia.     Appears comfortable,denied any abdominal pain today      Assessment & Plan:     # Acute metabolic encephalopathy likely due to UTI-resolved  - Presenting with increased confusion, weakness  - Leukocytosis and UA remarkable for pyuria, bacteriuria and leukocyte esterase  - low TSH, check T3/T4  - CT of the head and chest x-ray unremarkable for acute process  - on abx for UTI      #Sepsis(POA)-resolved  # CAUTI- catheter associated UTI (POA)  - Urine cultures growing pan sensitive  E coli  -will switch to kelfex -will do 10 day course as she has mendes cath    # Hypokalemia/ hypomagnesemia  -resolved  -on supplements     # Parkinson's disease with h/o orthostatic hypotension:   - Hold Florinef ,droxidopa today as BP high 180s supine.-on   -on carbidopa-levodopa,pyridostigmine    #Lewy body dementia  -on aricept     # GERD  - Stable  - Continue home PPI     # Hyperlipidemia  - Continue home statin         # Anxiety/depression  - Continue home meds  -psych consulted as patient appears depressed(briefly expressed to  that she has no will to live) -waiting for psych assessment           Patient's Baseline: Ambulates with  walker  DVT ppx: Enoxaparin  Code status: DNR  Disposition: SNF when bed available  Care plan discussed with patient/family nurse.        Hospital Problems  Date Reviewed: 2/18/2020          Codes Class Noted POA    UTI (urinary tract infection) ICD-10-CM: N39.0  ICD-9-CM: 599.0  3/3/2020 Unknown        AMS (altered mental status) ICD-10-CM: R41.82  ICD-9-CM: 780.97  3/3/2020 Unknown                Review of Systems:   Pertinent positive mentioned in interval hx/HPI. Other systems reviewed and negative     Vital Signs:    Last 24hrs VS reviewed since prior progress note. Most recent are:  Visit Vitals  BP (!) 176/97 (BP 1 Location: Left arm, BP Patient Position: At rest)   Pulse 74   Temp 99 °F (37.2 °C)   Resp 18   Wt 67 kg (147 lb 12.8 oz)   SpO2 97%   BMI 26.18 kg/m²         Intake/Output Summary (Last 24 hours) at 3/6/2020 1845  Last data filed at 3/6/2020 1330  Gross per 24 hour   Intake 300 ml   Output 3800 ml   Net -3500 ml        Physical Examination:             Constitutional:  No acute distress,   ENT:  Oral mucous moist, oropharynx benign. Neck supple,    Resp:  CTA bilaterally. No wheezing/rhonchi/rales. No accessory muscle use   CV:  Regular rhythm, normal rate    GI:  Soft, non distended, non tender. normoactive bowel sounds    Musculoskeletal:  No LE edema    Neurologic:  Moves all extremities. ,tremors +,alert,awake           Data Review:   I personally reviewed labs     Labs:     Recent Labs     03/05/20  0501 03/04/20  1557   WBC 10.8 13.0*   HGB 10.6* 10.4*   HCT 33.2* 33.5*    194     Recent Labs 03/05/20  0501 03/04/20  1554 03/04/20  0345 03/03/20 1943    139  --  139   K 4.6 3.7  --  3.1*   * 113*  --  101   CO2 27 23  --  29   BUN 11 18  --  23*   CREA 0.67 0.86  --  1.03*   * 105*  --  162*   CA 8.5 8.4*  --  8.5   MG  --  1.9 1.8  --      Recent Labs     03/04/20  1554 03/03/20 1943   SGOT 34 86*   ALT 38 17   AP 79 99   TBILI 0.5 0.9   TP 5.9* 6.8   ALB 2.6* 3.2*   GLOB 3.3 3.6     Recent Labs     03/03/20 1943   INR 1.1   PTP 10.5      No results for input(s): FE, TIBC, PSAT, FERR in the last 72 hours. Lab Results   Component Value Date/Time    Folate 14.0 02/21/2019 07:22 AM      No results for input(s): PH, PCO2, PO2 in the last 72 hours.   Recent Labs     03/04/20  1554 03/03/20 1943   TROIQ <0.05 <0.05     Lab Results   Component Value Date/Time    Cholesterol, total 134 02/26/2020 09:40 AM    HDL Cholesterol 65 02/26/2020 09:40 AM    LDL, calculated 47 02/26/2020 09:40 AM    Triglyceride 111 02/26/2020 09:40 AM    CHOL/HDL Ratio 3.3 07/23/2010 08:37 AM     Lab Results   Component Value Date/Time    Glucose (POC) 111 (H) 03/06/2020 04:06 PM    Glucose (POC) 139 (H) 03/06/2020 11:14 AM    Glucose (POC) 122 (H) 03/06/2020 06:27 AM    Glucose (POC) 114 (H) 03/05/2020 09:21 PM    Glucose (POC) 93 03/05/2020 04:21 PM     Lab Results   Component Value Date/Time    Color YELLOW/STRAW 03/03/2020 08:47 PM    Appearance CLOUDY (A) 03/03/2020 08:47 PM    Specific gravity 1.010 03/03/2020 08:47 PM    Specific gravity 1.020 02/15/2020 05:46 AM    pH (UA) 6.0 03/03/2020 08:47 PM    Protein 100 (A) 03/03/2020 08:47 PM    Glucose NEGATIVE  03/03/2020 08:47 PM    Ketone NEGATIVE  03/03/2020 08:47 PM    Bilirubin NEGATIVE  03/03/2020 08:47 PM    Urobilinogen 0.2 03/03/2020 08:47 PM    Nitrites NEGATIVE  03/03/2020 08:47 PM    Leukocyte Esterase LARGE (A) 03/03/2020 08:47 PM    Epithelial cells FEW 03/03/2020 08:47 PM    Bacteria 3+ (A) 03/03/2020 08:47 PM    WBC >100 (H) 03/03/2020 08:47 PM RBC 10-20 03/03/2020 08:47 PM         Medications Reviewed:     Current Facility-Administered Medications   Medication Dose Route Frequency    white petrolatum-mineral oiL (AKWA TEARS) 83-15 % ophthalmic ointment   Both Eyes Q12H    [START ON 3/7/2020] FLUoxetine (PROzac) capsule 60 mg  60 mg Oral DAILY    memantine (NAMENDA) tablet 5 mg  5 mg Oral BID    ondansetron (ZOFRAN) injection 4 mg  4 mg IntraVENous Q8H PRN    lidocaine 4 % patch 1 Patch  1 Patch TransDERmal Q24H    atorvastatin (LIPITOR) tablet 20 mg  20 mg Oral QHS    carbidopa-levodopa (SINEMET)  mg per tablet 1 Tab  1 Tab Oral TID    pantoprazole (PROTONIX) tablet 20 mg  20 mg Oral ACB    donepeziL (ARICEPT) tablet 5 mg  5 mg Oral QHS    sucralfate (CARAFATE) tablet 1 g  1 g Oral DAILY    QUEtiapine (SEROquel) tablet 50 mg  50 mg Oral QHS    pyridostigmine (MESTINON) tablet 60 mg  60 mg Oral Q8H    potassium chloride SR (KLOR-CON 10) tablet 20 mEq  20 mEq Oral DAILY    sodium chloride (NS) flush 5-40 mL  5-40 mL IntraVENous Q8H    sodium chloride (NS) flush 5-40 mL  5-40 mL IntraVENous PRN    acetaminophen (TYLENOL) tablet 650 mg  650 mg Oral Q4H PRN    morphine injection 1 mg  1 mg IntraVENous Q4H PRN    enoxaparin (LOVENOX) injection 40 mg  40 mg SubCUTAneous Q24H    cefTRIAXone (ROCEPHIN) 1 g in 0.9% sodium chloride (MBP/ADV) 50 mL  1 g IntraVENous Q24H    fludrocortisone (FLORINEF) tablet 0.1 mg  0.1 mg Oral BID    aluminum-magnesium hydroxide (MAALOX) oral suspension 15 mL  15 mL Oral QID PRN    droxidopa cap 600 mg (Patient Supplied)  600 mg Oral BID     ______________________________________________________________________  EXPECTED LENGTH OF STAY: 3d 21h  ACTUAL LENGTH OF STAY:          3                 Jaswinder Gonzalez MD   Patient has given Verbal permission to discuss medical care with   persons present in the room and and also with contact as listed on face sheet.

## 2020-03-06 NOTE — COMMUNITY CARE MANAGEMENT
1. Increase prozac to 60mg. 2. Add namenda 5mg bid. 3. Continue aricept and seroquel. 4. Does not meet acute inpatient psych criteria, please dc to home once medically cleared. Full consult dictated. Thank you for this kind consult.

## 2020-03-07 LAB
GLUCOSE BLD STRIP.AUTO-MCNC: 113 MG/DL (ref 65–100)
SERVICE CMNT-IMP: ABNORMAL

## 2020-03-07 PROCEDURE — 65270000032 HC RM SEMIPRIVATE

## 2020-03-07 PROCEDURE — 74011250637 HC RX REV CODE- 250/637: Performed by: HOSPITALIST

## 2020-03-07 PROCEDURE — 74011000250 HC RX REV CODE- 250: Performed by: HOSPITALIST

## 2020-03-07 PROCEDURE — 74011250637 HC RX REV CODE- 250/637: Performed by: STUDENT IN AN ORGANIZED HEALTH CARE EDUCATION/TRAINING PROGRAM

## 2020-03-07 PROCEDURE — 74011250637 HC RX REV CODE- 250/637: Performed by: INTERNAL MEDICINE

## 2020-03-07 PROCEDURE — 74011250636 HC RX REV CODE- 250/636: Performed by: STUDENT IN AN ORGANIZED HEALTH CARE EDUCATION/TRAINING PROGRAM

## 2020-03-07 PROCEDURE — 74011250637 HC RX REV CODE- 250/637: Performed by: NURSE PRACTITIONER

## 2020-03-07 PROCEDURE — 82962 GLUCOSE BLOOD TEST: CPT

## 2020-03-07 RX ORDER — FLUOXETINE HYDROCHLORIDE 20 MG/1
60 CAPSULE ORAL DAILY
Qty: 1 CAP | Refills: 0 | Status: ON HOLD
Start: 2020-03-08 | End: 2020-06-03

## 2020-03-07 RX ORDER — MEMANTINE HYDROCHLORIDE 5 MG/1
5 TABLET ORAL 2 TIMES DAILY
Qty: 1 TAB | Refills: 0 | Status: SHIPPED
Start: 2020-03-07 | End: 2020-03-08

## 2020-03-07 RX ORDER — CEPHALEXIN 250 MG/1
500 CAPSULE ORAL 2 TIMES DAILY
Status: DISCONTINUED | OUTPATIENT
Start: 2020-03-07 | End: 2020-03-08 | Stop reason: HOSPADM

## 2020-03-07 RX ORDER — PHENAZOPYRIDINE HYDROCHLORIDE 200 MG/1
200 TABLET, FILM COATED ORAL
Qty: 1 TAB | Refills: 0 | Status: SHIPPED
Start: 2020-03-07 | End: 2020-03-08

## 2020-03-07 RX ORDER — CEPHALEXIN 500 MG/1
500 CAPSULE ORAL 2 TIMES DAILY
Qty: 1 CAP | Refills: 0 | Status: SHIPPED
Start: 2020-03-07 | End: 2020-03-08 | Stop reason: SDUPTHER

## 2020-03-07 RX ADMIN — CARBIDOPA AND LEVODOPA 1 TABLET: 25; 100 TABLET ORAL at 15:45

## 2020-03-07 RX ADMIN — POTASSIUM CHLORIDE 20 MEQ: 750 TABLET, FILM COATED, EXTENDED RELEASE ORAL at 08:34

## 2020-03-07 RX ADMIN — PYRIDOSTIGMINE BROMIDE 60 MG: 60 TABLET ORAL at 13:21

## 2020-03-07 RX ADMIN — QUETIAPINE FUMARATE 50 MG: 25 TABLET ORAL at 21:53

## 2020-03-07 RX ADMIN — FLUDROCORTISONE ACETATE 0.1 MG: 0.1 TABLET ORAL at 08:34

## 2020-03-07 RX ADMIN — ENOXAPARIN SODIUM 40 MG: 40 INJECTION SUBCUTANEOUS at 07:33

## 2020-03-07 RX ADMIN — DROXIDOPA 600 MG: 300 CAPSULE ORAL at 17:55

## 2020-03-07 RX ADMIN — MINERAL OIL AND WHITE PETROLATUM: 150; 830 OINTMENT OPHTHALMIC at 21:55

## 2020-03-07 RX ADMIN — Medication 10 ML: at 21:53

## 2020-03-07 RX ADMIN — CARBIDOPA AND LEVODOPA 1 TABLET: 25; 100 TABLET ORAL at 21:53

## 2020-03-07 RX ADMIN — MINERAL OIL AND WHITE PETROLATUM: 150; 830 OINTMENT OPHTHALMIC at 08:36

## 2020-03-07 RX ADMIN — ATORVASTATIN CALCIUM 20 MG: 20 TABLET, FILM COATED ORAL at 21:53

## 2020-03-07 RX ADMIN — PANTOPRAZOLE SODIUM 20 MG: 20 TABLET, DELAYED RELEASE ORAL at 06:47

## 2020-03-07 RX ADMIN — PYRIDOSTIGMINE BROMIDE 60 MG: 60 TABLET ORAL at 21:53

## 2020-03-07 RX ADMIN — DONEPEZIL HYDROCHLORIDE 5 MG: 5 TABLET, FILM COATED ORAL at 21:53

## 2020-03-07 RX ADMIN — SUCRALFATE 1 G: 1 TABLET ORAL at 08:33

## 2020-03-07 RX ADMIN — DROXIDOPA 600 MG: 300 CAPSULE ORAL at 08:36

## 2020-03-07 RX ADMIN — FLUOXETINE 60 MG: 20 CAPSULE ORAL at 08:32

## 2020-03-07 RX ADMIN — MEMANTINE HYDROCHLORIDE 5 MG: 10 TABLET ORAL at 08:34

## 2020-03-07 RX ADMIN — FLUDROCORTISONE ACETATE 0.1 MG: 0.1 TABLET ORAL at 17:50

## 2020-03-07 RX ADMIN — Medication 10 ML: at 05:16

## 2020-03-07 RX ADMIN — ACETAMINOPHEN 650 MG: 325 TABLET ORAL at 08:14

## 2020-03-07 RX ADMIN — PHENAZOPYRIDINE 200 MG: 100 TABLET ORAL at 13:24

## 2020-03-07 RX ADMIN — PYRIDOSTIGMINE BROMIDE 60 MG: 60 TABLET ORAL at 05:20

## 2020-03-07 RX ADMIN — CEPHALEXIN 500 MG: 250 CAPSULE ORAL at 17:53

## 2020-03-07 RX ADMIN — CARBIDOPA AND LEVODOPA 1 TABLET: 25; 100 TABLET ORAL at 08:34

## 2020-03-07 RX ADMIN — ACETAMINOPHEN 650 MG: 325 TABLET ORAL at 15:44

## 2020-03-07 RX ADMIN — Medication 10 ML: at 13:24

## 2020-03-07 RX ADMIN — MEMANTINE HYDROCHLORIDE 5 MG: 10 TABLET ORAL at 17:49

## 2020-03-07 NOTE — ROUTINE PROCESS
Perfect serve to Dr. Katty Orozco: Mendes is putting out adequately. The only redness near the mendes is at the folds of her perineal area and I just applied cream to the area. She says it feels like there is burning in her urethra but I did not see anything abnormal. Waiting on call back from case management. Per Dr. Katty Orozco give PRN pyridium. Case management is working on placement with [facility].

## 2020-03-07 NOTE — PROGRESS NOTES
Hospitalist Progress Note  Wicho Perez MD  Answering service: [phone] OR [phone] from in house phone      Date of Service:  3/7/2020  NAME:  Jacob Tejada  :  1945  MRN:  [id number]      Admission Summary:   Patient is 65-year-old female with past medical history significant for orthostatic hypotension, Parkinson's disease, GERD, hyperlipidemia, neuropathy, dementia , depression and bipolar disorder who presented to short [facility] emergency department on account of altered mental status. Family reports patient was noted to be confused, not the usual herself and weaker. Family also reported  earlier in the afternoon she was extremely weak and had a GLF. No reported fever, chills, nausea, vomiting, shortness of breath, chest pain, cough, palpitation, syncope or presyncope, seizures, urinary or bowel complaints.     With short-[facility] emergency department, V/S were unremarkable. Lab work-ups: Leukocytosis, UA remarkable for pyuria, bacteriuria and leukocyte esterase. Received a dose of ceftriaxone. Interval history / Subjective:      Patient seen and examined     She has underlying dementia. Complained of burning in the bladder/urethra. Waiting for bed at [facility]. Assessment & Plan:     # Acute metabolic encephalopathy likely due to UTI-resolved       #Sepsis(POA)-resolved  # CAUTI- catheter associated UTI (POA)  -Mendes cath was placed at urogyn office few days ago(per daughter she had severe inflammation and other findings)  -Mendes changed this admission  - Urine cultures growing pan sensitive  E coli. Received IV ceftriaxone.  -will switch to kelfex -total of 10 day antibiotic course as she has mendes cath  -pyridium for bladder spasm  -She follows up with OP uro gyn for voiding trail next week and further studies.     # Hypokalemia/ hypomagnesemia  -resolved  -on supplements     # Parkinson's disease with h/o orthostatic hypotension:   - on Florinef ,droxidopa. -on  carbidopa-levodopa,pyridostigmine    #Lewy body dementia  -on aricept     # GERD  - Stable  - Continue home PPI     # Hyperlipidemia  - Continue home statin       # Anxiety/depression  - Continue seroquel,aricept. Prozac dose was increased to 60 mg and nemanda was added        Patient's Baseline: Ambulates with  walker  DVT ppx: Enoxaparin  Code status: DNR  Disposition: SNF tomorrow  Care plan discussed with patient/family nurse and     Updated patient's daughter with the plan.        Hospital Problems  Date Reviewed: 2/18/2020          Codes Class Noted POA    UTI (urinary tract infection) ICD-10-CM: N39.0  ICD-9-CM: 599.0  3/3/2020 Unknown        AMS (altered mental status) ICD-10-CM: R41.82  ICD-9-CM: 780.97  3/3/2020 Unknown                Review of Systems:   Limited due to patient's dementia     Vital Signs:    Last 24hrs VS reviewed since prior progress note. Most recent are:  Visit Vitals  /71   Pulse 75   Temp 97.6 °F (36.4 °C)   Resp 18   Wt 67 kg (147 lb 12.8 oz)   SpO2 96%   BMI 26.18 kg/m²         Intake/Output Summary (Last 24 hours) at 3/7/2020 1501  Last data filed at 3/7/2020 1324  Gross per 24 hour   Intake 290 ml   Output 2150 ml   Net -1860 ml        Physical Examination:             Constitutional:  No acute distress,   ENT:  Oral mucous moist, oropharynx benign. Neck supple,    Resp:  clear to auscultation b/l   CV:  Regular rhythm, normal rate    GI:  Soft, non distended, non tender. normoactive bowel sounds. Jc in place. No erythema in the genital area    Musculoskeletal:  No LE edema    Neurologic:  Moves all extremities. ,tremors +,alert,awake           Data Review:   I personally reviewed labs     Labs:     Recent Labs     03/05/20  0501 03/04/20  1557   WBC 10.8 13.0*   HGB 10.6* 10.4*   HCT 33.2* 33.5*    194     Recent Labs     03/05/20  0501 03/04/20  1554    139   K 4.6 3.7   * 113*   CO2 27 23 BUN 11 18   CREA 0.67 0.86   * 105*   CA 8.5 8.4*   MG  --  1.9     Recent Labs     03/04/20  1554   SGOT 34   ALT 38   AP 79   TBILI 0.5   TP 5.9*   ALB 2.6*   GLOB 3.3     No results for input(s): INR, PTP, APTT, INREXT, INREXT in the last 72 hours. No results for input(s): FE, TIBC, PSAT, FERR in the last 72 hours. Lab Results   Component Value Date/Time    Folate 14.0 02/21/2019 07:22 AM      No results for input(s): PH, PCO2, PO2 in the last 72 hours.   Recent Labs     03/04/20  1554   TROIQ <0.05     Lab Results   Component Value Date/Time    Cholesterol, total 134 02/26/2020 09:40 AM    HDL Cholesterol 65 02/26/2020 09:40 AM    LDL, calculated 47 02/26/2020 09:40 AM    Triglyceride 111 02/26/2020 09:40 AM    CHOL/HDL Ratio 3.3 07/23/2010 08:37 AM     Lab Results   Component Value Date/Time    Glucose (POC) 129 (H) 03/06/2020 09:16 PM    Glucose (POC) 111 (H) 03/06/2020 04:06 PM    Glucose (POC) 139 (H) 03/06/2020 11:14 AM    Glucose (POC) 122 (H) 03/06/2020 06:27 AM    Glucose (POC) 114 (H) 03/05/2020 09:21 PM     Lab Results   Component Value Date/Time    Color YELLOW/STRAW 03/03/2020 08:47 PM    Appearance CLOUDY (A) 03/03/2020 08:47 PM    Specific gravity 1.010 03/03/2020 08:47 PM    Specific gravity 1.020 02/15/2020 05:46 AM    pH (UA) 6.0 03/03/2020 08:47 PM    Protein 100 (A) 03/03/2020 08:47 PM    Glucose NEGATIVE  03/03/2020 08:47 PM    Ketone NEGATIVE  03/03/2020 08:47 PM    Bilirubin NEGATIVE  03/03/2020 08:47 PM    Urobilinogen 0.2 03/03/2020 08:47 PM    Nitrites NEGATIVE  03/03/2020 08:47 PM    Leukocyte Esterase LARGE (A) 03/03/2020 08:47 PM    Epithelial cells FEW 03/03/2020 08:47 PM    Bacteria 3+ (A) 03/03/2020 08:47 PM    WBC >100 (H) 03/03/2020 08:47 PM    RBC 10-20 03/03/2020 08:47 PM         Medications Reviewed:     Current Facility-Administered Medications   Medication Dose Route Frequency    white petrolatum-mineral oiL (AKWA TEARS) 83-15 % ophthalmic ointment   Both Eyes Q12H    FLUoxetine (PROzac) capsule 60 mg  60 mg Oral DAILY    memantine (NAMENDA) tablet 5 mg  5 mg Oral BID    ondansetron (ZOFRAN) injection 4 mg  4 mg IntraVENous Q8H PRN    lidocaine 4 % patch 1 Patch  1 Patch TransDERmal Q24H    atorvastatin (LIPITOR) tablet 20 mg  20 mg Oral QHS    carbidopa-levodopa (SINEMET)  mg per tablet 1 Tab  1 Tab Oral TID    pantoprazole (PROTONIX) tablet 20 mg  20 mg Oral ACB    donepeziL (ARICEPT) tablet 5 mg  5 mg Oral QHS    sucralfate (CARAFATE) tablet 1 g  1 g Oral DAILY    QUEtiapine (SEROquel) tablet 50 mg  50 mg Oral QHS    pyridostigmine (MESTINON) tablet 60 mg  60 mg Oral Q8H    potassium chloride SR (KLOR-CON 10) tablet 20 mEq  20 mEq Oral DAILY    sodium chloride (NS) flush 5-40 mL  5-40 mL IntraVENous Q8H    sodium chloride (NS) flush 5-40 mL  5-40 mL IntraVENous PRN    acetaminophen (TYLENOL) tablet 650 mg  650 mg Oral Q4H PRN    enoxaparin (LOVENOX) injection 40 mg  40 mg SubCUTAneous Q24H    fludrocortisone (FLORINEF) tablet 0.1 mg  0.1 mg Oral BID    aluminum-magnesium hydroxide (MAALOX) oral suspension 15 mL  15 mL Oral QID PRN    droxidopa cap 600 mg (Patient Supplied)  600 mg Oral BID     ______________________________________________________________________  EXPECTED LENGTH OF STAY: 3d 21h  ACTUAL LENGTH OF STAY:          4                 Mel Up MD

## 2020-03-07 NOTE — PROGRESS NOTES
Cm received a call from Delilah Matta with [facility] ([phone]) and they can accept the patient tomorrow. Daughter Enolia Boeck ( [phone]) will provide transportation @ 11 am on Sunday. Packet placed on patients chart and daughter will send wit her.       Nurse to call report to [facility] ([phone])

## 2020-03-07 NOTE — CONSULTS
[facility]    Name:  Karyle Hausen  MR#:  [id number]  :  1945  ACCOUNT #:  [de-identified]  DATE OF SERVICE:  2020    REASON FOR CONSULTATION:  Depression. HISTORY OF PRESENT ILLNESS:  The patient is a 67-year-old female, who is currently being seen in the medical unit for psychiatric consultation for complaints mentioned above. Her past medical history is significant for orthostatic hypotension, Parkinson's disease, GERD, hyperlipidemia, neuropathy, dementia, depression, and bipolar disorder. She is a very poor historian. She is unable to provide much history. She denies ever being depressed or with any history of mental illness. She also does not recall who is prescribing her psychotropic medication. She currently states that she is at her apartment and she does not do much because her daughter does everything for her. She is in her apartment and she is waiting for her granddaughter's soccer game. She is aware that the current president is Rupinder and the one before him is Obama. She states it is 2020, but does not know the date and the day of the week. Looking at her chart, she made some statement that she does not have any will to live and she is feeling down. When I asked her about this, she denies. She is sleeping okay. She denies suicidal ideation, homicidal ideation, auditory or visual hallucinations. PAST MEDICAL HISTORY:  See H and P. PAST PSYCHIATRIC HOSPITALIZATION:  Per history, she has been diagnosed with dementia. There also has been history of depression and bipolar disorder, which she denied. She is currently on Prozac, Prilosec, and Seroquel. PSYCHOSOCIAL HISTORY:  She is a . She has 3 children. MENTAL STATUS EXAMINATION:  She is alert and oriented x2. She is calm and pleasant during the interview. Notable confusion is evident. Limited eye contact. Speech soft and slow.   Thought process slow but logical.  She denies suicidal ideation, homicidal ideation, auditory or visual hallucinations. Memory is impaired. Insight is poor. Judgment is poor. ASSESSMENT AND PLAN:  The patient carries a diagnosis of dementia with behavioral disturbance. I will go ahead and add Namenda 5 mg twice a day to help with her cognition. She does not recall saying that she does not have the will to live and was feeling down, but I will take the liberty to increase her Prozac to 60 mg to help with depression and mood. She denies suicidal ideation. She does not meet acute inpatient psychiatric criteria. Please discharge to home once medically stable. Please continue Seroquel and Aricept at current dosing. Thank you for this consult, please call with questions.         WILLARD MCCULLOUGH NP    [id number]  D:  03/06/2020 17:49  T:  03/07/2020 1:10  JOB #:  [id number]

## 2020-03-08 VITALS
WEIGHT: 147.8 LBS | BODY MASS INDEX: 26.18 KG/M2 | TEMPERATURE: 98.2 F | OXYGEN SATURATION: 98 % | RESPIRATION RATE: 18 BRPM | HEART RATE: 82 BPM | SYSTOLIC BLOOD PRESSURE: 177 MMHG | DIASTOLIC BLOOD PRESSURE: 92 MMHG

## 2020-03-08 LAB
GLUCOSE BLD STRIP.AUTO-MCNC: 102 MG/DL (ref 65–100)
SERVICE CMNT-IMP: ABNORMAL

## 2020-03-08 PROCEDURE — 74011250637 HC RX REV CODE- 250/637: Performed by: HOSPITALIST

## 2020-03-08 PROCEDURE — 77030040831 HC BAG URINE DRNG MDII -A

## 2020-03-08 PROCEDURE — 74011250637 HC RX REV CODE- 250/637: Performed by: NURSE PRACTITIONER

## 2020-03-08 PROCEDURE — 77030012865 HC BG URIN LEG MDII -A

## 2020-03-08 PROCEDURE — 82962 GLUCOSE BLOOD TEST: CPT

## 2020-03-08 PROCEDURE — 74011250636 HC RX REV CODE- 250/636: Performed by: STUDENT IN AN ORGANIZED HEALTH CARE EDUCATION/TRAINING PROGRAM

## 2020-03-08 PROCEDURE — 74011250637 HC RX REV CODE- 250/637: Performed by: STUDENT IN AN ORGANIZED HEALTH CARE EDUCATION/TRAINING PROGRAM

## 2020-03-08 RX ORDER — MEMANTINE HYDROCHLORIDE 5 MG/1
5 TABLET ORAL 2 TIMES DAILY
Qty: 10 TAB | Refills: 0 | Status: ON HOLD | OUTPATIENT
Start: 2020-03-08 | End: 2020-06-03

## 2020-03-08 RX ORDER — CEPHALEXIN 500 MG/1
500 CAPSULE ORAL 2 TIMES DAILY
Qty: 12 CAP | Refills: 0 | Status: SHIPPED | OUTPATIENT
Start: 2020-03-08 | End: 2020-03-14

## 2020-03-08 RX ORDER — PHENAZOPYRIDINE HYDROCHLORIDE 100 MG/1
100 TABLET, FILM COATED ORAL
Qty: 10 TAB | Refills: 0 | Status: SHIPPED | OUTPATIENT
Start: 2020-03-08

## 2020-03-08 RX ADMIN — FLUDROCORTISONE ACETATE 0.1 MG: 0.1 TABLET ORAL at 10:11

## 2020-03-08 RX ADMIN — MEMANTINE HYDROCHLORIDE 5 MG: 10 TABLET ORAL at 08:28

## 2020-03-08 RX ADMIN — PANTOPRAZOLE SODIUM 20 MG: 20 TABLET, DELAYED RELEASE ORAL at 07:23

## 2020-03-08 RX ADMIN — CARBIDOPA AND LEVODOPA 1 TABLET: 25; 100 TABLET ORAL at 08:27

## 2020-03-08 RX ADMIN — ENOXAPARIN SODIUM 40 MG: 40 INJECTION SUBCUTANEOUS at 07:23

## 2020-03-08 RX ADMIN — MINERAL OIL AND WHITE PETROLATUM: 150; 830 OINTMENT OPHTHALMIC at 09:00

## 2020-03-08 RX ADMIN — FLUOXETINE 60 MG: 20 CAPSULE ORAL at 08:27

## 2020-03-08 RX ADMIN — POTASSIUM CHLORIDE 20 MEQ: 750 TABLET, FILM COATED, EXTENDED RELEASE ORAL at 08:27

## 2020-03-08 RX ADMIN — Medication 10 ML: at 07:26

## 2020-03-08 RX ADMIN — CEPHALEXIN 500 MG: 250 CAPSULE ORAL at 10:10

## 2020-03-08 RX ADMIN — SUCRALFATE 1 G: 1 TABLET ORAL at 08:27

## 2020-03-08 RX ADMIN — PYRIDOSTIGMINE BROMIDE 60 MG: 60 TABLET ORAL at 07:26

## 2020-03-08 NOTE — PROGRESS NOTES
TRANSFER - OUT REPORT:    Verbal report given to [place] (name) on Cate Montilla  being transferred to [facility] (unit) for routine progression of care       Report consisted of patients Situation, Background, Assessment and   Recommendations(SBAR). Information from the following report(s) SBAR, Kardex and Recent Results was reviewed with the receiving nurse. Lines:       Opportunity for questions and clarification was provided.       Patient transported with: Daughter Randy Barroso

## 2020-03-08 NOTE — ROUTINE PROCESS
I agree with the charting of Orlando Miller RN. Bedside and Verbal shift change report given to Alex Ridley (oncoming nurse) by Laurence Victor (offgoing nurse). Report included the following information SBAR, Kardex and Recent Results.

## 2020-03-08 NOTE — PROGRESS NOTES
Bedside and Verbal shift change report given to Dorota Landa RN (oncoming nurse) by Mihaela Allen RN (offgoing nurse). Report included the following information SBAR, Kardex, Intake/Output and MAR.

## 2020-03-08 NOTE — PROGRESS NOTES
Spiritual Care Partner Volunteer visited patient in room 511/01 on 3.08.2020. Documented by: : Rev. Nat Barakat.  [place] Query; [facility], to contact [address] call: [phone]

## 2020-03-08 NOTE — DISCHARGE SUMMARY
Discharge Summary       PATIENT ID: Idamae Hodgkins  MRN: [id number]   YOB: 1945    DATE OF ADMISSION: 3/3/2020  7:33 PM    DATE OF DISCHARGE: 3/8/2020   PRIMARY CARE PROVIDER: Shaneka Ashley MD     ATTENDING PHYSICIAN: Keely Thacker MD    DISCHARGING PROVIDER: Isaak Jimenez MD    To contact this individual call [phone] and ask the  to page. If unavailable ask to be transferred the Adult Hospitalist Department. CONSULTATIONS: None    PROCEDURES/SURGERIES: * No surgery found *    [address] COURSE:     DISCHARGE DIAGNOSES / PLAN:    79-year-old female with past medical history significant for orthostatic hypotension, Parkinson's disease, GERD, hyperlipidemia, neuropathy, dementia , depression and bipolar disorder who presented to short [facility] emergency department on account of altered mental status.  Family reports patient was noted to be confused, not the usual herself and weaker.  Family also reported Raya Leanne in the afternoon she was extremely weak and had a GLF.  No reported fever, chills, nausea, vomiting, shortness of breath, chest pain, cough, palpitation, syncope or presyncope, seizures, urinary or bowel complaints. With short-[facility] emergency department, V/S were unremarkable.  Lab work-ups: Leukocytosis, UA remarkable for pyuria, bacteriuria and leukocyte esterase.  Received a dose of ceftriaxone. Acute metabolic encephalopathy with underlying dementia  UTI-resolved       #Sepsis(POA)-resolved  # CAUTI- catheter associated UTI (POA)  - Urine cultures growing pan sensitive  E coli. Received 4 days of IV ceftriaxone.  -will switch to kelfex -will do 6 day course as she has mendes cath  [facility] cath was changed on 3/8/2020  -Patient follows with urogynecologist -mendes cath was placed in the office and plan is voiding trail and exam next week per patient's daughter       # Hypokalemia/ hypomagnesemia  -resolved  -on supplements     # Parkinson's disease with h/o orthostatic hypotension:   -On  Florinef ,droxidopa  -on  carbidopa-levodopa,pyridostigmine     #Lewy body dementia  -on aricept     # GERD  - Stable  - Continue home PPI     # Hyperlipidemia  - Continue home statin           # Anxiety/depression  -Psych recommended to increased prosac to 60 mg daily and added nemanda    PENDING TEST RESULTS:   At the time of discharge the following test results are still pending: none    FOLLOW UP APPOINTMENTS:    Follow-up Information     Follow up With Specialties Details Why Contact Info    Isaac Maria MD Internal Medicine In 1 week  [address] [facility]  [address] [facility]  [phone]             ADDITIONAL CARE RECOMMENDATIONS:   -follow up with gynecologist after mendes catheter removal next week  -We prescribed keflex for UTI. -Psychiatrist increased  Fluoxetine to 60 mg and added nemanda     DIET: Regular Diet      ACTIVITY: Activity as tolerated    WOUND CARE: na    EQUIPMENT needed: na      DISCHARGE MEDICATIONS:  Discharge Medication List as of 3/8/2020  1:35 PM      CONTINUE these medications which have CHANGED    Details   cephALEXin (KEFLEX) 500 mg capsule Take 1 Cap by mouth two (2) times a day for 6 days. , Print, Disp-12 Cap, R-0      memantine (NAMENDA) 5 mg tablet Take 1 Tab by mouth two (2) times a day., Print, Disp-10 Tab, R-0      phenazopyridine (PYRIDIUM) 100 mg tablet Take 1 Tab by mouth three (3) times daily as needed for Pain. Indications: urinary tract irritation, Print, Disp-10 Tab, R-0      FLUoxetine (PROZAC) 20 mg capsule Take 3 Caps by mouth daily. , No Print, Disp-1 Cap, R-0         CONTINUE these medications which have NOT CHANGED    Details   fludrocortisone (FLORINEF) 0.1 mg tablet TAKE 1 TABLET BY MOUTH TWICE DAILY, Normal, Disp-180 Tab, R-1**Patient requests 90 days supply**      potassium chloride (K-DUR, KLOR-CON) 20 mEq tablet TAKE 1 TABLET BY MOUTH DAILY, Normal, Disp-90 Tab, R-1      atorvastatin (LIPITOR) 20 mg tablet TAKE 1 TABLET BY MOUTH EVERY EVENING, Normal, Disp-90 Tab, R-0      sucralfate (CARAFATE) 1 gram tablet TAKE 1 TABLET BY MOUTH FOUR TIMES DAILY, Normal, Disp-360 Tab, R-3      pyridostigmine (MESTINON) 60 mg tablet TAKE 1 TABLET BY MOUTH THREE TIMES DAILY, Normal, Disp-90 Tab, R-5      donepezil (ARICEPT) 5 mg tablet Take 5 mg by mouth nightly., Historical Med      dexlansoprazole (DEXILANT) 60 mg CpDB capsule (delayed release) Take 1 Cap by mouth daily. , Historical Med      droxidopa (NORTHERA) 300 mg cap Take 600 mg by mouth two (2) times a day., Print, Disp-360 Cap, R-3      carbidopa-levodopa (SINEMET)  mg per tablet Take 1 Tab by mouth three (3) times daily. , Historical Med      acetaminophen (TYLENOL) 325 mg tablet Take 325 mg by mouth every four (4) hours as needed for Pain., Historical Med      QUEtiapine (SEROQUEL) 25 mg tablet Take 50 mg by mouth nightly. Take 2 tabs qhs prn Teresa Carrillo NP/Dr Lukas Sharif, Historical Med      gabapentin (NEURONTIN) 300 mg capsule 600mg with breakfast, 300 mg with  lunch, 600mg at dinner- new directions  Indications: Neuropathic Pain, Normal, Disp-150 Cap, R-5               NOTIFY YOUR PHYSICIAN FOR ANY OF THE FOLLOWING:   Fever over 101 degrees for 24 hours. Chest pain, shortness of breath, fever, chills, nausea, vomiting, diarrhea, change in mentation, falling, weakness, bleeding. Severe pain or pain not relieved by medications. Or, any other signs or symptoms that you may have questions about.     DISPOSITION:    Home With:   OT  PT  HH  RN      X Long term SNF/Inpatient Rehab    Independent/assisted living    Hospice    Other:       PATIENT CONDITION AT DISCHARGE:     Functional status    Poor    X Deconditioned     Independent      Cognition     Lucid     Forgetful    X Dementia      Catheters/lines (plus indication)   X Jc     PICC     PEG     None      Code status     Full code    X DNR      PHYSICAL EXAMINATION AT DISCHARGE:  Constitutional:  No acute distress,elderly patient   ENT:  Oral mucous moist, oropharynx benign. Neck supple, EOMI   Resp:  no wheezing,clear to ausculation b/l   CV:  Regular rhythm, normal rate, S1,S2 wnl    GI:  Soft, non distended, non tender. normoactive bowel sounds. Jc in place. No erythema in the genital area    Musculoskeletal:  No LE edema    Neurologic:  Moves all extremities. ,tremors +,alert,awake           CHRONIC MEDICAL DIAGNOSES:  Problem List as of 3/8/2020 Date Reviewed: 2/18/2020          Codes Class Noted - Resolved    UTI (urinary tract infection) ICD-10-CM: N39.0  ICD-9-CM: 599.0  3/3/2020 - Present        AMS (altered mental status) ICD-10-CM: R41.82  ICD-9-CM: 780.97  3/3/2020 - Present        Chest pain ICD-10-CM: R07.9  ICD-9-CM: 786.50  12/11/2019 - Present        Type 2 diabetes mellitus with diabetic neuropathy ([facility] ICD-10-CM: E11.40  ICD-9-CM: 250.60, 357.2  9/26/2019 - Present        Type 2 diabetes with nephropathy ([facility] ICD-10-CM: E11.21  ICD-9-CM: 250.40, 583.81  7/26/2018 - Present        Syncope ICD-10-CM: R55  ICD-9-CM: 780.2  5/28/2018 - Present        Spinal stenosis ICD-10-CM: M48.00  ICD-9-CM: 724.00  Unknown - Present        Near syncope ICD-10-CM: R55  ICD-9-CM: 780.2  8/2/2016 - Present    Overview Signed 8/2/2016  4:12 PM by Anusha Cabral MD     Tilt test 8/2/2016: normal supine BP but abrupt drop in SBP consistent with orthostatic hypotension  Midodrine 5 mg tid has not improved.   Add mestinon 60 mg tid               Resting tremor ICD-10-CM: R25.9  ICD-9-CM: 781.0  7/29/2016 - Present        Orthostatic hypotension ICD-10-CM: I95.1  ICD-9-CM: 458.0  7/5/2016 - Present        Advance directive on file ICD-10-CM: Z78.9  ICD-9-CM: V49.89  5/24/2016 - Present        Mixed hyperlipidemia ICD-10-CM: E78.2  ICD-9-CM: 272.2  2/2/2016 - Present        Tubulovillous adenoma ICD-10-CM: D36.9  ICD-9-CM: 229.9  8/21/2012 - Present        Abnormal mammogram ICD-10-CM: R92.8  ICD-9-CM: 793.80  6/4/2012 - Present        Encounter for long-term (current) use of other medications ICD-10-CM: Z79.899  ICD-9-CM: V58.69  11/30/2011 - Present        Rupertoertoe ICD-10-CM: M20.40  ICD-9-CM: 735.4  9/12/2011 - Present        Allergic rhinitis, cause unspecified ICD-10-CM: J30.9  ICD-9-CM: 477.9  1/26/2011 - Present        Other diseases of nasal cavity and sinuses(478.19) ICD-10-CM: J34.89  ICD-9-CM: 478.19  7/19/2010 - Present        IBS (irritable bowel syndrome) ICD-10-CM: K58.9  ICD-9-CM: 564.1  Unknown - Present        RLS (restless legs syndrome) ICD-10-CM: G25.81  ICD-9-CM: 333.94  Unknown - Present        Bipolar disorder ([facility] ICD-10-CM: F31.9  ICD-9-CM: 296.80  Unknown - Present        Fibromyalgia ICD-10-CM: M79.7  ICD-9-CM: 729.1  Unknown - Present        DJD (degenerative joint disease) ICD-10-CM: M19.90  ICD-9-CM: 715.90  Unknown - Present        Lumbar disc disease ICD-10-CM: M51.9  ICD-9-CM: 722.93  Unknown - Present        Paget's disease of bone ICD-10-CM: M88.9  ICD-9-CM: 731.0  Unknown - Present        Migraine ICD-10-CM: 346  ICD-9-CM: 798  Unknown - Present        Genital herpes ICD-10-CM: A60.00  ICD-9-CM: 054.10  Unknown - Present        RESOLVED: GI bleed ICD-10-CM: K92.2  ICD-9-CM: 578.9  2/21/2019 - 2/22/2019        RESOLVED: Rectal bleeding ICD-10-CM: K62.5  ICD-9-CM: 569.3  6/25/2016 - 6/27/2016        RESOLVED: Rectal bleed ICD-10-CM: K62.5  ICD-9-CM: 569.3  6/25/2016 - 6/27/2016        RESOLVED: Pulmonary embolism ([facility] ICD-10-CM: I26.99  ICD-9-CM: 415.19  Unknown - 9/18/2018    Overview Addendum 9/15/2016  8:39 AM by TAI [address] Dr 8/26/16  [facility] RLL    6/9/16 CTA [facility] normal CTA no PE    Hypercoagulable work up negative with th exception of low factor II at 72%  D dimer 0.22  Xarelto d/c'd 9/13/16             RESOLVED: Diabetes mellitus type 2, controlled, without complications ([facility] YPT-19-VE: E11.9  ICD-9-CM: 250.00 10/1/2015 - 9/18/2018        RESOLVED: DM w/o complication type II ([facility] ICD-10-CM: E11.9  ICD-9-CM: 250.00  11/30/2011 - 10/1/2015        RESOLVED: Hypercholesterolemia ICD-10-CM: E78.00  ICD-9-CM: 272.0  Unknown - 10/1/2015        RESOLVED: Diabetes ([facility] ICD-10-CM: E11.9  ICD-9-CM: 250.00  Unknown - 11/30/2011              30 minutes were spent with the patient on counseling and coordination of care    Signed:   Katy He MD  3/8/2020  6:11 PM

## 2020-03-10 ENCOUNTER — PATIENT OUTREACH (OUTPATIENT)
Dept: CASE MANAGEMENT | Age: 75
End: 2020-03-10

## 2020-03-10 NOTE — PROGRESS NOTES
Community Care Team documentation for patient in [facility]  Initial Follow Up       Patient was discharged to [facility]. Information included in this progress note has been provided to SNF. Hospital Admission and Diagnosis: [address] 03/03/2020-03/08/2020 Sepsis     PCP : Deanna Brown MD    SNF Attending:  Yodit Pino MD    Spoke with SNF team.  PT/OT Update: reported patient was getting speech therapy evaluation today due to impaired cognition; min assist with ADLS/self care; tremors from Parkinson's; ambulating 100 feet with rolling walker with min assist; had 2 loss of balance episodes requiring mod assist to recover; high risk for falls;  mobility min assist to CGA. LOS/Disposition: [facility]     Community Care Team will follow up weekly with [facility] until discharge. Medications were not reconciled and general patient assessment was not completed during this [facility] outreach. Vasquez Valentin [facility]  [phone]  This note will not be viewable in [address]

## 2020-03-11 NOTE — DISCHARGE INSTRUCTIONS
Discharge SNF/Rehab Instructions/LTAC       PATIENT ID: Kimberley Buchanan  MRN: [id number]   YOB: 1945    DATE OF ADMISSION: 3/3/2020  7:33 PM    DATE OF DISCHARGE: 3/7/2020    PRIMARY CARE PROVIDER: George Valenzuela MD       ATTENDING PHYSICIAN: Ella Castillo MD  DISCHARGING PROVIDER: Meeta Hsu MD     To contact this individual call [phone] and ask the  to page. If unavailable ask to be transferred the Adult Hospitalist Department. CONSULTATIONS: IP CONSULT TO PSYCHIATRY    PROCEDURES/SURGERIES: * No surgery found *    [address] COURSE:     DISCHARGE DIAGNOSES / PLAN:    43-year-old female with past medical history significant for orthostatic hypotension, Parkinson's disease, GERD, hyperlipidemia, neuropathy, dementia , depression and bipolar disorder who presented to [facility] emergency department on account of altered mental status.  Family reports patient was noted to be confused, not the usual herself and weaker.  Family also reported Jun[facility] Chalet in the afternoon she was extremely weak and had a GLF.  No reported fever, chills, nausea, vomiting, shortness of breath, chest pain, cough, palpitation, syncope or presyncope, seizures, urinary or bowel complaints. With short-[facility] emergency department, V/S were unremarkable.  Lab work-ups: Leukocytosis, UA remarkable for pyuria, bacteriuria and leukocyte esterase.  Received a dose of ceftriaxone. Acute metabolic encephalopathy with underlying dementia  UTI-resolved       #Sepsis(POA)-resolved  # CAUTI- catheter associated UTI (POA)  - Urine cultures growing pan sensitive  E coli. Received 4 days of IV ceftriaxone.  -will switch to kelfex -will do 6 day course as she has mendes cath  [facility] cath was changed on 3/8/2020  -Patient follows with urogynecologist -mendes cath was placed in the office and plan is voiding trail and exam next week per patient's daughter       # Hypokalemia/ hypomagnesemia -resolved  -on supplements     # Parkinson's disease with h/o orthostatic hypotension:   -On  Florinef ,droxidopa  -on  carbidopa-levodopa,pyridostigmine     #Lewy body dementia  -on aricept     # GERD  - Stable  - Continue home PPI     # Hyperlipidemia  - Continue home statin           # Anxiety/depression  -Psych recommended to increased prosac to 60 mg daily and added nemanda    PENDING TEST RESULTS:   At the time of discharge the following test results are still pending: none    FOLLOW UP APPOINTMENTS:    Follow-up Information     Follow up With Specialties Details Why Contact Info    Jono Whipple MD Internal Medicine In 1 week  [address] [facility]  [address] [facility]  [phone]             ADDITIONAL CARE RECOMMENDATIONS:   -follow up with gynecologist after mendes catheter removal next week  -We prescribed keflex for UTI. -Psychiatrist increased  Fluoxetine to 60 mg and added nemanda     DIET: Regular Diet      ACTIVITY: Activity as tolerated    WOUND CARE: na    EQUIPMENT needed: na      DISCHARGE MEDICATIONS:   See Medication Reconciliation Form      NOTIFY YOUR PHYSICIAN FOR ANY OF THE FOLLOWING:   Fever over 101 degrees for 24 hours. Chest pain, shortness of breath, fever, chills, nausea, vomiting, diarrhea, change in mentation, falling, weakness, bleeding. Severe pain or pain not relieved by medications. Or, any other signs or symptoms that you may have questions about.     DISPOSITION:    Home With:   OT  PT  HH  RN      X SNF/Inpatient Rehab/LTAC    Independent/assisted living    Hospice    Other:       PATIENT CONDITION AT DISCHARGE:     Functional status    Poor    X Deconditioned     Independent      Cognition     Lucid     Forgetful    X Dementia      Catheters/lines (plus indication)   X Mendes     PICC     PEG     None      Code status     Full code    X DNR      PHYSICAL EXAMINATION AT DISCHARGE:   Refer to Progress Note***      CHRONIC MEDICAL DIAGNOSES:  Problem List as of 3/7/2020 Date Reviewed: 2/18/2020          Codes Class Noted - Resolved    UTI (urinary tract infection) ICD-10-CM: N39.0  ICD-9-CM: 599.0  3/3/2020 - Present        AMS (altered mental status) ICD-10-CM: R41.82  ICD-9-CM: 780.97  3/3/2020 - Present        Chest pain ICD-10-CM: R07.9  ICD-9-CM: 786.50  12/11/2019 - Present        Type 2 diabetes mellitus with diabetic neuropathy ([facility] ICD-10-CM: E11.40  ICD-9-CM: 250.60, 357.2  9/26/2019 - Present        Type 2 diabetes with nephropathy ([facility] ICD-10-CM: E11.21  ICD-9-CM: 250.40, 583.81  7/26/2018 - Present        Syncope ICD-10-CM: R55  ICD-9-CM: 780.2  5/28/2018 - Present        Spinal stenosis ICD-10-CM: M48.00  ICD-9-CM: 724.00  Unknown - Present        Near syncope ICD-10-CM: R55  ICD-9-CM: 780.2  8/2/2016 - Present    Overview Signed 8/2/2016  4:12 PM by Lakshmi Chu MD     Tilt test 8/2/2016: normal supine BP but abrupt drop in SBP consistent with orthostatic hypotension  Midodrine 5 mg tid has not improved.   Add mestinon 60 mg tid               Resting tremor ICD-10-CM: R25.9  ICD-9-CM: 781.0  7/29/2016 - Present        Orthostatic hypotension ICD-10-CM: I95.1  ICD-9-CM: 458.0  7/5/2016 - Present        Advance directive on file ICD-10-CM: Z78.9  ICD-9-CM: V49.89  5/24/2016 - Present        Mixed hyperlipidemia ICD-10-CM: E78.2  ICD-9-CM: 272.2  2/2/2016 - Present        Tubulovillous adenoma ICD-10-CM: D36.9  ICD-9-CM: 229.9  8/21/2012 - Present        Abnormal mammogram ICD-10-CM: R92.8  ICD-9-CM: 793.80  6/4/2012 - Present        Encounter for long-term (current) use of other medications ICD-10-CM: Z79.899  ICD-9-CM: V58.69  11/30/2011 - Present        Hammertoe ICD-10-CM: M20.40  ICD-9-CM: 735.4  9/12/2011 - Present        Allergic rhinitis, cause unspecified ICD-10-CM: J30.9  ICD-9-CM: 477.9  1/26/2011 - Present        Other diseases of nasal cavity and sinuses(478.19) ICD-10-CM: J34.89  ICD-9-CM: 478.19  7/19/2010 - Present        IBS (irritable bowel syndrome) ICD-10-CM: K58.9  ICD-9-CM: 564.1  Unknown - Present        RLS (restless legs syndrome) ICD-10-CM: G25.81  ICD-9-CM: 333.94  Unknown - Present        Bipolar disorder (HCC) ICD-10-CM: F31.9  ICD-9-CM: 296.80  Unknown - Present        Fibromyalgia ICD-10-CM: M79.7  ICD-9-CM: 729.1  Unknown - Present        DJD (degenerative joint disease) ICD-10-CM: M19.90  ICD-9-CM: 715.90  Unknown - Present        Lumbar disc disease ICD-10-CM: M51.9  ICD-9-CM: 722.93  Unknown - Present        Paget's disease of bone ICD-10-CM: M88.9  ICD-9-CM: 731.0  Unknown - Present        Migraine ICD-10-CM: 346  ICD-9-CM: 699  Unknown - Present        Genital herpes ICD-10-CM: A60.00  ICD-9-CM: 054.10  Unknown - Present        RESOLVED: GI bleed ICD-10-CM: K92.2  ICD-9-CM: 578.9  2/21/2019 - 2/22/2019        RESOLVED: Rectal bleeding ICD-10-CM: K62.5  ICD-9-CM: 569.3  6/25/2016 - 6/27/2016        RESOLVED: Rectal bleed ICD-10-CM: K62.5  ICD-9-CM: 569.3  6/25/2016 - 6/27/2016        RESOLVED: Pulmonary embolism ([facility] ICD-10-CM: I26.99  ICD-9-CM: 415.19  Unknown - 9/18/2018    Overview Addendum 9/15/2016  8:39 AM by TAI [address] Dr 8/26/16  CTA at [facility] 5/7/16 small PE RLL    6/9/16 CTA [facility] normal CTA no PE    Hypercoagulable work up negative with th exception of low factor II at 72%  D dimer 0.22  Xarelto d/c'd 9/13/16             RESOLVED: Diabetes mellitus type 2, controlled, without complications ([facility] RVI-54-OR: E11.9  ICD-9-CM: 250.00  10/1/2015 - 9/18/2018        RESOLVED: DM w/o complication type II ([facility] ICD-10-CM: E11.9  ICD-9-CM: 250.00  11/30/2011 - 10/1/2015        RESOLVED: Hypercholesterolemia ICD-10-CM: E78.00  ICD-9-CM: 272.0  Unknown - 10/1/2015        RESOLVED: Diabetes ([facility] ICD-10-CM: E11.9  ICD-9-CM: 250.00  Unknown - 11/30/2011                      Signed:   Brown Sy MD  3/7/2020  10:47 AM

## 2020-03-17 ENCOUNTER — PATIENT OUTREACH (OUTPATIENT)
Dept: CASE MANAGEMENT | Age: 75
End: 2020-03-17

## 2020-03-17 NOTE — PROGRESS NOTES
Community Care Team Documentation for Patient in [facility]  Subsequent Follow up     Patient remains at [facility] ([facility]). See previous [facility] Team notes. Spoke with SNF team.  SNF medical update: patient is at increased fall risk due to Parkinsons gait shuffle and  decreased cognition. . Staff is  concerned about patients ability to manage medications. PT/OT update: independent bed mobility, min assist transfers, UB ADL's, LB ADL's and feeding/grooming independent, ambulating 225 ft with RW and SBA-CGA, 8 stairs with min assist and bilateral hand rails, standing balance CGA, projected last therapy date 3/25. LOS/Disposition:  TBD pt will need additional support at home. Medications were not reconciled and general patient assessment was not completed during this skilled nursing facility outreach.      Cassia Byers RN-CCT  [phone]

## 2020-03-24 ENCOUNTER — PATIENT OUTREACH (OUTPATIENT)
Dept: CASE MANAGEMENT | Age: 75
End: 2020-03-24

## 2020-03-24 NOTE — PROGRESS NOTES
Community Care Team Documentation for Patient in [facility]  Subsequent Follow up     Patient remains at [facility] ([facility]). See previous [facility] Team notes. Spoke with SNF team.  PT/OT update: currently bed mobility independent,  ambulating 450 ft with RW and supervision, stairs supervision. Therapy is recommending supervision for patient due to impared cognition,  last therapy date 3/25. LOS/Disposition:  [place] [facility] Medications were not reconciled and general patient assessment was not completed during this skilled nursing facility outreach.      Zen Childress RN-CCT  [phone]

## 2020-03-26 ENCOUNTER — TELEPHONE (OUTPATIENT)
Dept: INTERNAL MEDICINE CLINIC | Age: 75
End: 2020-03-26

## 2020-03-27 ENCOUNTER — PATIENT OUTREACH (OUTPATIENT)
Dept: CASE MANAGEMENT | Age: 75
End: 2020-03-27

## 2020-03-27 NOTE — TELEPHONE ENCOUNTER
[facility] gave VO to follow orders.  Understanding verbalized

## 2020-03-27 NOTE — PROGRESS NOTES
Patient contacted regarding recent discharge and COVID-19 risk   Care Transition Nurse/ Ambulatory Care Manager contacted the family Daughter Roverto Valdovinos) by telephone to perform post discharge assessment. Verified name and  with family as identifiers. Patient has following risk factors of:senior. CTN/ACM reviewed discharge instructions, medical action plan and red flags related to discharge diagnosis. Reviewed and educated them on any new and changed medications related to discharge diagnosis. Advised obtaining a 90-day supply of all daily and as-needed medications. Education provided regarding infection prevention, and signs and symptoms of COVID-19 and when to seek medical attention with family who verbalized understanding. Discussed exposure protocols and quarantine from [address] you at higher risk for severe illness  and given an opportunity for questions and concerns. The family agrees to contact the COVID-19 hotline [phone] or PCP office for questions related to their healthcare. CTN/ACM provided contact information for future reference. Went over signs and symptoms of COVID-19 per daughter pt has not had any S&S of COVID-19    From CDC: Are you at higher risk for severe illness?  Wash your hands often.  Avoid close contact (6 feet, which is about two arm lengths) with people who are sick.  Put distance between yourself and other people if COVID-19 is spreading in your community.  Clean and disinfect frequently touched surfaces.  Avoid all cruise travel and non-essential air travel.  Call your healthcare professional if you have concerns about COVID-19 and your underlying condition or if you are sick.     For more information on steps you can take to protect yourself, see CDC's How to Protect Yourself

## 2020-04-01 ENCOUNTER — APPOINTMENT (OUTPATIENT)
Dept: GENERAL RADIOLOGY | Age: 75
End: 2020-04-01
Attending: EMERGENCY MEDICINE
Payer: MEDICARE

## 2020-04-01 ENCOUNTER — HOSPITAL ENCOUNTER (EMERGENCY)
Age: 75
Discharge: HOME | End: 2020-04-01
Attending: EMERGENCY MEDICINE | Admitting: EMERGENCY MEDICINE
Payer: MEDICARE

## 2020-04-01 VITALS
OXYGEN SATURATION: 95 % | RESPIRATION RATE: 16 BRPM | HEIGHT: 66 IN | HEART RATE: 69 BPM | SYSTOLIC BLOOD PRESSURE: 161 MMHG | WEIGHT: 133.38 LBS | BODY MASS INDEX: 21.44 KG/M2 | TEMPERATURE: 98.8 F | DIASTOLIC BLOOD PRESSURE: 89 MMHG

## 2020-04-01 DIAGNOSIS — R07.89 OTHER CHEST PAIN: Primary | ICD-10-CM

## 2020-04-01 LAB
ALBUMIN SERPL-MCNC: 3.4 G/DL (ref 3.5–5)
ALBUMIN/GLOB SERPL: 1.1 {RATIO} (ref 1.1–2.2)
ALP SERPL-CCNC: 88 U/L (ref 45–117)
ALT SERPL-CCNC: 39 U/L (ref 12–78)
ANION GAP SERPL CALC-SCNC: 3 MMOL/L (ref 5–15)
APPEARANCE UR: CLEAR
AST SERPL-CCNC: 23 U/L (ref 15–37)
ATRIAL RATE: 69 BPM
BACTERIA URNS QL MICRO: NEGATIVE /HPF
BASOPHILS # BLD: 0 K/UL (ref 0–0.1)
BASOPHILS NFR BLD: 0 % (ref 0–1)
BILIRUB SERPL-MCNC: 0.6 MG/DL (ref 0.2–1)
BILIRUB UR QL: NEGATIVE
BUN SERPL-MCNC: 18 MG/DL (ref 6–20)
BUN/CREAT SERPL: 23 (ref 12–20)
CALCIUM SERPL-MCNC: 8.9 MG/DL (ref 8.5–10.1)
CALCULATED P AXIS, ECG09: 62 DEGREES
CALCULATED R AXIS, ECG10: 42 DEGREES
CALCULATED T AXIS, ECG11: 80 DEGREES
CHLORIDE SERPL-SCNC: 110 MMOL/L (ref 97–108)
CK SERPL-CCNC: 31 U/L (ref 26–192)
CO2 SERPL-SCNC: 29 MMOL/L (ref 21–32)
COLOR UR: ABNORMAL
COMMENT, HOLDF: NORMAL
CREAT SERPL-MCNC: 0.77 MG/DL (ref 0.55–1.02)
DIAGNOSIS, 93000: NORMAL
DIFFERENTIAL METHOD BLD: NORMAL
EOSINOPHIL # BLD: 0.2 K/UL (ref 0–0.4)
EOSINOPHIL NFR BLD: 3 % (ref 0–7)
EPITH CASTS URNS QL MICRO: ABNORMAL /LPF
ERYTHROCYTE [DISTWIDTH] IN BLOOD BY AUTOMATED COUNT: 13.3 % (ref 11.5–14.5)
GLOBULIN SER CALC-MCNC: 3.2 G/DL (ref 2–4)
GLUCOSE SERPL-MCNC: 90 MG/DL (ref 65–100)
GLUCOSE UR STRIP.AUTO-MCNC: NEGATIVE MG/DL
HCT VFR BLD AUTO: 38.1 % (ref 35–47)
HGB BLD-MCNC: 12.2 G/DL (ref 11.5–16)
HGB UR QL STRIP: NEGATIVE
IMM GRANULOCYTES # BLD AUTO: 0 K/UL (ref 0–0.04)
IMM GRANULOCYTES NFR BLD AUTO: 0 % (ref 0–0.5)
KETONES UR QL STRIP.AUTO: NEGATIVE MG/DL
LEUKOCYTE ESTERASE UR QL STRIP.AUTO: ABNORMAL
LYMPHOCYTES # BLD: 2 K/UL (ref 0.8–3.5)
LYMPHOCYTES NFR BLD: 41 % (ref 12–49)
MCH RBC QN AUTO: 30.4 PG (ref 26–34)
MCHC RBC AUTO-ENTMCNC: 32 G/DL (ref 30–36.5)
MCV RBC AUTO: 95 FL (ref 80–99)
MONOCYTES # BLD: 0.4 K/UL (ref 0–1)
MONOCYTES NFR BLD: 8 % (ref 5–13)
NEUTS SEG # BLD: 2.4 K/UL (ref 1.8–8)
NEUTS SEG NFR BLD: 48 % (ref 32–75)
NITRITE UR QL STRIP.AUTO: NEGATIVE
NRBC # BLD: 0 K/UL (ref 0–0.01)
NRBC BLD-RTO: 0 PER 100 WBC
P-R INTERVAL, ECG05: 174 MS
PH UR STRIP: 7.5 [PH] (ref 5–8)
PLATELET # BLD AUTO: 201 K/UL (ref 150–400)
PMV BLD AUTO: 9.4 FL (ref 8.9–12.9)
POTASSIUM SERPL-SCNC: 3.5 MMOL/L (ref 3.5–5.1)
PROT SERPL-MCNC: 6.6 G/DL (ref 6.4–8.2)
PROT UR STRIP-MCNC: NEGATIVE MG/DL
Q-T INTERVAL, ECG07: 434 MS
QRS DURATION, ECG06: 84 MS
QTC CALCULATION (BEZET), ECG08: 465 MS
RBC # BLD AUTO: 4.01 M/UL (ref 3.8–5.2)
RBC #/AREA URNS HPF: ABNORMAL /HPF (ref 0–5)
SAMPLES BEING HELD,HOLD: NORMAL
SODIUM SERPL-SCNC: 142 MMOL/L (ref 136–145)
SP GR UR REFRACTOMETRY: 1.01 (ref 1–1.03)
TROPONIN I SERPL-MCNC: <0.05 NG/ML
UR CULT HOLD, URHOLD: NORMAL
UROBILINOGEN UR QL STRIP.AUTO: 0.2 EU/DL (ref 0.2–1)
VENTRICULAR RATE, ECG03: 69 BPM
WBC # BLD AUTO: 5 K/UL (ref 3.6–11)
WBC URNS QL MICRO: ABNORMAL /HPF (ref 0–4)

## 2020-04-01 PROCEDURE — 85025 COMPLETE CBC W/AUTO DIFF WBC: CPT

## 2020-04-01 PROCEDURE — 71045 X-RAY EXAM CHEST 1 VIEW: CPT

## 2020-04-01 PROCEDURE — 82550 ASSAY OF CK (CPK): CPT

## 2020-04-01 PROCEDURE — 80053 COMPREHEN METABOLIC PANEL: CPT

## 2020-04-01 PROCEDURE — 81001 URINALYSIS AUTO W/SCOPE: CPT

## 2020-04-01 PROCEDURE — 87086 URINE CULTURE/COLONY COUNT: CPT

## 2020-04-01 PROCEDURE — 99285 EMERGENCY DEPT VISIT HI MDM: CPT

## 2020-04-01 PROCEDURE — 36415 COLL VENOUS BLD VENIPUNCTURE: CPT

## 2020-04-01 PROCEDURE — 84484 ASSAY OF TROPONIN QUANT: CPT

## 2020-04-01 PROCEDURE — 77030019905 HC CATH URETH INTMIT MDII -A

## 2020-04-01 PROCEDURE — 93005 ELECTROCARDIOGRAM TRACING: CPT

## 2020-04-01 NOTE — PROGRESS NOTES
Date of previous inpatient admission/ ED visit? Last IP admission was 03/03/2020-03/08/2020 for UTI. Pt RRAT score is 31- HIGH. Pt has had 6 ED visits in the past 6 months and 1 IP admission in the past 12 months. What brought the patient back to ED? Shortness of Breath; Diarrhea    Did patient decline recommended services during last admission/ ED visit (if yes, what)? No    Has patient seen a provider since their last inpatient admission/ED visit (if yes, when)? No    PCP: First and Last name: Jessica Reece MD   Name of Practice: [facility]   Are you a current patient: Yes/No: Yes   Approximate date of last visit: 02/26/2020    CM Interventions:  From previous inpatient admission/ED visit: CM arranged for transition of care to [facility] SNF. From current inpatient admission/ED visit: Pt has been evaluated in the ED; plan for pt to discharge back to home. CM notified of needed transportation assistance. CM contacted pt's daughter, Srinivasa Monzon (ph#: [phone]), to discuss CM role and to assess pt needs. CM verified demographics including insurance and emergency contact information. Pt lives alone in an apartment at [address] [place] [address]. CM contacted [facility] to confirm [place] return to [place]; representative, Aryan Mejia, confirm pt to return home. Daughter reports no concerns for discharge at this time. CM arranged BLS transport with [facility] via [employer]. CM to place PCS Form, facesheet, and ED visit summary on chart for transport. CM to follow and assist with disposition needs as they arise. Care Management Interventions  PCP Verified by CM: Yes  Last Visit to PCP: 02/26/20  Palliative Care Criteria Met (RRAT>21 & CHF Dx)?: No  Mode of Transport at Discharge: BLS  Transition of Care Consult (CM Consult):  Other(Readmission Assessment)  MyChart Signup: No(MyChart Active)  Discharge Durable Medical Equipment: No  Physical Therapy Consult: No  Occupational Therapy Consult: No  Speech Therapy Consult: No  Current Support Network: Lives Alone, Own Home, Other  Confirm Follow Up Transport: Other (see comment)  Discharge Location  Discharge Placement: Home(Disposition TBD/subject to change pending care recommendations)    Regina Landa RN, BSN  Care Management Department

## 2020-04-01 NOTE — ED TRIAGE NOTES
Pt arrives by EMS from [address] [place] [address] with reports of 2 hours of chest pain that she woke with today, has been self quarantined at facility since Saturday due to possible COVID exposure at facility, pt reports some shortness of breath off and on, diarrhea this morning

## 2020-04-01 NOTE — DISCHARGE INSTRUCTIONS

## 2020-04-01 NOTE — ED PROVIDER NOTES
Please note that this dictation was completed with [employer], the computer voice recognition software.  Quite often unanticipated grammatical, syntax, homophones, and other interpretive errors are inadvertently transcribed by the computer software.  Please disregard these errors.  Please excuse any errors that have escaped final proofreading. 62-year-old white female past medical history remarkable for bipolar disorder, chronic back pain, diabetes, DJD, fibromyalgia, genital herpes, GERD, high cholesterol, IBS, migraines, nausea and vomiting, seasonal allergies, PJs disease, Parkinson's disease, PE, restless leg syndrome presents complaining of \"have not felt worth a darn in about 3 weeks. \"  Patient states she was admitted to the hospital for several days approximately 3 weeks ago diagnosed with a UTI \"which they cannot seem to get rid of.\"  Patient states she has had constant chest pain since then worsening at times with exertion associated with some cough may be some shortness of breath. Patient states she has had no nausea or vomiting, but that does not feel like eating much. Patient states she is tolerating p.o. liquids normally is dry been drinking plenty of water. Patient states she is also had some dysuria over the same 3-week period which is constant in nature. She denies vaginal bleeding or vaginal discharge. She denies abdominal pains or diarrhea as well. Her chest pain she describes just a constant, diffuse achiness which does not seem to get better with any of her medications.     pt denies HA, vison changes, diff swallowing, CP, SOB, Abd pain, F/Ch, N/V, D/Cons or other current systemic complaints    Social/ PSH reviewed in EMR    EMR Chart Reviewed           Past Medical History:   Diagnosis Date    Bipolar disorder ([address]     Nazmy    Chronic pain     BACK PAIN    Diabetes ([address]     BORDERLINE [place] WITH DIET AND EXERCISE    DJD (degenerative joint disease)     Fibromyalgia     Genital herpes  GERD (gastroesophageal reflux disease)     Hypercholesterolemia     IBS (irritable bowel syndrome)     Ill-defined condition     fibromyligia    Lumbar disc disease     stimulation-Honza    Migraine     Nausea & vomiting     Other ill-defined conditions(799.89)     SEASONAL allergies    Other ill-defined conditions(799.89)     dysphagia has had esophagus dilated    Paget's disease     Parkinson disease ([facility]     Pulmonary embolism (HCC)     RLS (restless legs syndrome)     Spinal stenosis        Past Surgical History:   Procedure Laterality Date    COLONOSCOPY N/A 6/27/2016    COLONOSCOPY performed by Juan Carlos Phelps MD at [facility] ENDOSCOPY    COLONOSCOPY N/A 2/22/2019    COLONOSCOPY performed by Mak Alfaro MD at [facility] ENDOSCOPY    ENDOSCOPY, COLON, DIAGNOSTIC      12, due 13    HX APPENDECTOMY      HX BACK SURGERY  9/17/2013    back surgery - total of 3 as of 12/20/2013    HX BREAST BIOPSY Right years ago    negative    HX CHOLECYSTECTOMY      HX HEENT      T & A    HX HYSTERECTOMY      total for fibroid tumors    HX ORTHOPAEDIC      lumbar laminectomy then fusion/neurostimulator implanted - inactive now but still in    HX ORTHOPAEDIC      REMOVAL OF NEUROSTIMULATOR    HX OTHER SURGICAL      EGD x 2 with dilation/colonoscopy - no polyps per pt    TILT TABLE EVALUATION  8/2/2016              Family History:   Problem Relation Age of Onset    Colon Cancer Mother     Cancer Mother 68        colon    Heart Disease Father     Heart Disease Maternal Grandmother     Heart Disease Maternal Grandfather     Heart Disease Other        Social History     Socioeconomic History    Marital status:      Spouse name: Not on file    Number of children: Not on file    Years of education: Not on file    Highest education level: Not on file   Occupational History    Occupation: volunteer     Employer: RETIRED     Comment: [employer]   Social Needs    Financial resource strain: Not on file  Food insecurity     Worry: Not on file     Inability: Not on file    Transportation needs     Medical: Not on file     Non-medical: Not on file   Tobacco Use    Smoking status: Never Smoker    Smokeless tobacco: Never Used   Substance and Sexual Activity    Alcohol use: No     Comment: 1 per month    Drug use: No    Sexual activity: Not on file   Lifestyle    Physical activity     Days per week: Not on file     Minutes per session: Not on file    Stress: Not on file   Relationships    Social connections     Talks on phone: Not on file     Gets together: Not on file     Attends Sikhism service: Not on file     Active member of club or organization: Not on file     Attends meetings of clubs or organizations: Not on file     Relationship status: Not on file    Intimate partner violence     Fear of current or ex partner: Not on file     Emotionally abused: Not on file     Physically abused: Not on file     Forced sexual activity: Not on file   Other Topics Concern    Not on file   Social History Narrative    Not on file         ALLERGIES: Adhesive; Hydrocodone; Lorazepam; Other medication; Percocet [oxycodone-acetaminophen]; Sudafed [pseudoephedrine hcl]; Wellbutrin [bupropion hcl]; Zithromax [azithromycin]; and Zyrtec [cetirizine]    Review of Systems   Constitutional: Negative for chills and fever. HENT: Negative for drooling, trouble swallowing and voice change. Eyes: Negative for visual disturbance. Respiratory: Positive for cough, chest tightness and shortness of breath. Negative for wheezing and stridor. Cardiovascular: Positive for chest pain. Negative for palpitations and leg swelling. Gastrointestinal: Negative for abdominal pain, diarrhea, nausea and vomiting. Genitourinary: Positive for dysuria. Negative for decreased urine volume, flank pain, vaginal bleeding and vaginal discharge. Musculoskeletal: Negative for back pain. Skin: Negative for rash.    Neurological: Negative for speech difficulty. All other systems reviewed and are negative. Vitals:    04/01/20 1029 04/01/20 1100 04/01/20 1300 04/01/20 1500   BP: 101/84 (!) 138/92 (!) 165/91 161/89   Pulse: 68 67 67 69   Resp: 14 16 15 16   Temp: 98.8 °F (37.1 °C)      SpO2: 99% 96% 95% 95%   Weight: 60.5 kg (133 lb 6.1 oz)      Height: 5' 6\" (1.676 m)               Physical Exam  Vitals signs and nursing note reviewed. Constitutional:       General: She is not in acute distress. Appearance: Normal appearance. She is well-developed. She is not ill-appearing, toxic-appearing or diaphoretic. HENT:      Head: Normocephalic and atraumatic. Comments: Cn intact         Right Ear: External ear normal.      Left Ear: External ear normal.      Nose: Nose normal.      Mouth/Throat:      Mouth: Mucous membranes are moist.   Eyes:      General: No scleral icterus. Right eye: No discharge. Left eye: No discharge. Extraocular Movements: Extraocular movements intact. Conjunctiva/sclera: Conjunctivae normal.      Pupils: Pupils are equal, round, and reactive to light. Neck:      Musculoskeletal: Normal range of motion and neck supple. Vascular: No JVD. Trachea: No tracheal deviation. Cardiovascular:      Rate and Rhythm: Normal rate and regular rhythm. Heart sounds: Normal heart sounds. No murmur. No friction rub. No gallop. Pulmonary:      Effort: Pulmonary effort is normal. No respiratory distress. Breath sounds: Normal breath sounds. No wheezing or rales. Chest:      Chest wall: No tenderness. Abdominal:      General: Bowel sounds are normal.      Palpations: Abdomen is soft. Tenderness: There is no abdominal tenderness. There is no guarding or rebound. Genitourinary:     Vagina: No vaginal discharge. Comments: Pt denies vaginal complaints    '(+) burning when I pee';   Musculoskeletal: Normal range of motion.          General: No swelling, tenderness, deformity or signs of injury. Right lower leg: No edema. Left lower leg: No edema. Skin:     General: Skin is warm and dry. Capillary Refill: Capillary refill takes less than 2 seconds. Coloration: Skin is not pale. Findings: No erythema or rash. Neurological:      General: No focal deficit present. Mental Status: She is alert and oriented to person, place, and time. Cranial Nerves: No cranial nerve deficit. Motor: No abnormal muscle tone. Coordination: Coordination normal.   Psychiatric:         Behavior: Behavior normal.         Thought Content: Thought content normal.          MDM       Procedures    Chief Complaint   Patient presents with    Shortness of Breath    Chest Pain    Diarrhea       10:33 AM  The patients presenting problems have been discussed, and they are in agreement with the care plan formulated and outlined with them. I have encouraged them to ask questions as they arise throughout their visit. MEDICATIONS GIVEN:  Medications - No data to display    LABS REVIEWED:  Labs Reviewed   SAMPLES BEING HELD       RADIOLOGY RESULTS:  The following have been ordered and reviewed:  _____________________________________________________________________  _____________________________________________________________________    EKG interpretation:   Rhythm: normal sinus rhythm; and regular . Rate (approx.): 80; Axis: normal; P wave: normal; QRS interval: normal ; ST/T wave: normal; Negative acute significant segmental elevations/ unchanged compared to study dated 03/04/2020    PROCEDURES:        CONSULTATIONS:       PROGRESS NOTES:      DIAGNOSIS:    1. Other chest pain        PLAN:  1-Chest Pain / neg ed evaluation - will have pt follow-up with Cardiology;       ED COURSE: The patients hospital course has been uncomplicated. 5:31 PM  Ángela De Santiago's  results have been reviewed with her. She has been counseled regarding her diagnosis.   She verbally conveys understanding and agreement of the signs, symptoms, diagnosis, treatment and prognosis and additionally agrees to Call/ Arrange follow up as recommended with Dr. Stefany Stallings MD in 24 - 48 hours. She also agrees with the care-plan and conveys that all of her questions have been answered. I have also put together some discharge instructions for her that include: 1) educational information regarding their diagnosis, 2) how to care for their diagnosis at home, as well a 3) list of reasons why they would want to return to the ED prior to their follow-up appointment, should their condition change or for concerns.

## 2020-04-02 ENCOUNTER — TELEPHONE (OUTPATIENT)
Dept: INTERNAL MEDICINE CLINIC | Age: 75
End: 2020-04-02

## 2020-04-02 ENCOUNTER — PATIENT OUTREACH (OUTPATIENT)
Dept: FAMILY MEDICINE CLINIC | Age: 75
End: 2020-04-02

## 2020-04-02 LAB
BACTERIA SPEC CULT: NORMAL
CC UR VC: NORMAL
SERVICE CMNT-IMP: NORMAL

## 2020-04-02 NOTE — PROGRESS NOTES
COVID-19 Screening Initial Follow-up Note    Patient contacted regarding COVID-19  risk. Care Transition Nurse/ Ambulatory Care Manager contacted the family by telephone to perform post discharge assessment. Verified name and  with family as identifiers. Provided introduction to self, and explanation of the CTN/ACM role, and reason for call due to risk factors for infection and/or exposure to COVID-19. Symptoms reviewed with family who verbalized the following symptoms: reflux, vaginal burning. Daughter denies any respiratory symptoms c/w COVID 19. Had virtual visit with uro/gyn today-did give her rx for yeast infection(as she was on recent antibiotic). Patient has following risk factors of: h/o DM2. . CTN/ACM reviewed discharge instructions, medical action plan and red flags such as increased shortness of breath, increasing fever and signs of decompensation with family who verbalized understanding. Discussed exposure protocols and quarantine with CDC Guidelines What to do if you are sick with coronavirus disease 2019 Family who was given an opportunity for questions and concerns. The family agrees to contact the [facility] exposure line [phone], [facility] department [address]  [phone] and PCP office for questions related to their healthcare. CTN/ACM provided contact information for future reference.     Reviewed and educated family on any new and changed medications related to discharge diagnosis     Plan for follow-up call in 14 days based on severity of symptoms and risk factors

## 2020-04-02 NOTE — TELEPHONE ENCOUNTER
Sergey Upton ([address] [place] [address] ) [phone]     States her mother is having vaginal burning again and would like to know what to do.

## 2020-04-02 NOTE — TELEPHONE ENCOUNTER
Daughter Yasmin Knapp) explained suffering from vaginal burning again. Advised urology for evaluation. Understanding verbalized.

## 2020-04-14 RX ORDER — ATORVASTATIN CALCIUM 20 MG/1
TABLET, FILM COATED ORAL
Qty: 90 TAB | Refills: 0 | Status: SHIPPED | OUTPATIENT
Start: 2020-04-14

## 2020-04-16 ENCOUNTER — PATIENT OUTREACH (OUTPATIENT)
Dept: CASE MANAGEMENT | Age: 75
End: 2020-04-16

## 2020-04-16 NOTE — PROGRESS NOTES
Patient resolved from Transition of Care episode on 04/16/2020  Patient/family has been provided the following resources and education related to COVID-19:                         Signs, symptoms and red flags related to COVID-19            CDC exposure and quarantine guidelines            [facility] exposure contact - [phone]            Contact for their local Department of Health                 Daughter currently reports that the following symptoms have improved: symptoms resolved; no new symptoms noted. No further outreach scheduled with this CTN/ACM. Episode of Care resolved. Patient has this CTN/ACM contact information if future needs arise.

## 2020-04-17 ENCOUNTER — APPOINTMENT (OUTPATIENT)
Dept: GENERAL RADIOLOGY | Age: 75
End: 2020-04-17
Attending: EMERGENCY MEDICINE
Payer: MEDICARE

## 2020-04-17 ENCOUNTER — APPOINTMENT (OUTPATIENT)
Dept: CT IMAGING | Age: 75
End: 2020-04-17
Attending: EMERGENCY MEDICINE
Payer: MEDICARE

## 2020-04-17 ENCOUNTER — TELEPHONE (OUTPATIENT)
Dept: INTERNAL MEDICINE CLINIC | Age: 75
End: 2020-04-17

## 2020-04-17 ENCOUNTER — HOSPITAL ENCOUNTER (EMERGENCY)
Age: 75
Discharge: HOME | End: 2020-04-17
Attending: EMERGENCY MEDICINE
Payer: MEDICARE

## 2020-04-17 ENCOUNTER — TELEPHONE (OUTPATIENT)
Dept: CASE MANAGEMENT | Age: 75
End: 2020-04-17

## 2020-04-17 VITALS
HEIGHT: 66 IN | DIASTOLIC BLOOD PRESSURE: 75 MMHG | TEMPERATURE: 97.8 F | RESPIRATION RATE: 13 BRPM | HEART RATE: 63 BPM | WEIGHT: 133.82 LBS | BODY MASS INDEX: 21.51 KG/M2 | OXYGEN SATURATION: 97 % | SYSTOLIC BLOOD PRESSURE: 167 MMHG

## 2020-04-17 DIAGNOSIS — S50.02XA CONTUSION OF LEFT ELBOW, INITIAL ENCOUNTER: ICD-10-CM
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
## 2020-04-17 DIAGNOSIS — K64.9 HEMORRHOIDS, UNSPECIFIED HEMORRHOID TYPE: Primary | ICD-10-CM

## 2020-04-17 DIAGNOSIS — S09.90XA CLOSED HEAD INJURY, INITIAL ENCOUNTER: ICD-10-CM

## 2020-04-17 LAB
ALBUMIN SERPL-MCNC: 3.3 G/DL (ref 3.5–5)
ALBUMIN/GLOB SERPL: 1 {RATIO} (ref 1.1–2.2)
ALP SERPL-CCNC: 87 U/L (ref 45–117)
ALT SERPL-CCNC: 16 U/L (ref 12–78)
ANION GAP SERPL CALC-SCNC: 9 MMOL/L (ref 5–15)
AST SERPL-CCNC: 30 U/L (ref 15–37)
ATRIAL RATE: 66 BPM
BASOPHILS # BLD: 0.1 K/UL (ref 0–0.1)
BASOPHILS NFR BLD: 1 % (ref 0–1)
BILIRUB SERPL-MCNC: 0.5 MG/DL (ref 0.2–1)
BUN SERPL-MCNC: 20 MG/DL (ref 6–20)
BUN/CREAT SERPL: 28 (ref 12–20)
CALCIUM SERPL-MCNC: 8.7 MG/DL (ref 8.5–10.1)
CALCULATED P AXIS, ECG09: -10 DEGREES
CALCULATED R AXIS, ECG10: 5 DEGREES
CALCULATED T AXIS, ECG11: 62 DEGREES
CHLORIDE SERPL-SCNC: 107 MMOL/L (ref 97–108)
CO2 SERPL-SCNC: 29 MMOL/L (ref 21–32)
COMMENT, HOLDF: NORMAL
CREAT SERPL-MCNC: 0.72 MG/DL (ref 0.55–1.02)
DIAGNOSIS, 93000: NORMAL
DIFFERENTIAL METHOD BLD: ABNORMAL
EOSINOPHIL # BLD: 0.3 K/UL (ref 0–0.4)
EOSINOPHIL NFR BLD: 3 % (ref 0–7)
ERYTHROCYTE [DISTWIDTH] IN BLOOD BY AUTOMATED COUNT: 14 % (ref 11.5–14.5)
GLOBULIN SER CALC-MCNC: 3.3 G/DL (ref 2–4)
GLUCOSE SERPL-MCNC: 92 MG/DL (ref 65–100)
HCT VFR BLD AUTO: 37.4 % (ref 35–47)
HEMOCCULT STL QL: NEGATIVE
HGB BLD-MCNC: 11.8 G/DL (ref 11.5–16)
IMM GRANULOCYTES # BLD AUTO: 0 K/UL (ref 0–0.04)
IMM GRANULOCYTES NFR BLD AUTO: 1 % (ref 0–0.5)
LYMPHOCYTES # BLD: 2.2 K/UL (ref 0.8–3.5)
LYMPHOCYTES NFR BLD: 28 % (ref 12–49)
MCH RBC QN AUTO: 30.3 PG (ref 26–34)
MCHC RBC AUTO-ENTMCNC: 31.6 G/DL (ref 30–36.5)
MCV RBC AUTO: 96.1 FL (ref 80–99)
MONOCYTES # BLD: 0.7 K/UL (ref 0–1)
MONOCYTES NFR BLD: 8 % (ref 5–13)
NEUTS SEG # BLD: 4.8 K/UL (ref 1.8–8)
NEUTS SEG NFR BLD: 59 % (ref 32–75)
NRBC # BLD: 0 K/UL (ref 0–0.01)
NRBC BLD-RTO: 0 PER 100 WBC
P-R INTERVAL, ECG05: 156 MS
PLATELET # BLD AUTO: 292 K/UL (ref 150–400)
PMV BLD AUTO: 9.3 FL (ref 8.9–12.9)
POTASSIUM SERPL-SCNC: 3.9 MMOL/L (ref 3.5–5.1)
PROT SERPL-MCNC: 6.6 G/DL (ref 6.4–8.2)
Q-T INTERVAL, ECG07: 414 MS
QRS DURATION, ECG06: 86 MS
QTC CALCULATION (BEZET), ECG08: 434 MS
RBC # BLD AUTO: 3.89 M/UL (ref 3.8–5.2)
SAMPLES BEING HELD,HOLD: NORMAL
SODIUM SERPL-SCNC: 145 MMOL/L (ref 136–145)
TROPONIN I SERPL-MCNC: <0.05 NG/ML
VENTRICULAR RATE, ECG03: 66 BPM
WBC # BLD AUTO: 8 K/UL (ref 3.6–11)

## 2020-04-17 PROCEDURE — 82272 OCCULT BLD FECES 1-3 TESTS: CPT

## 2020-04-17 PROCEDURE — 93005 ELECTROCARDIOGRAM TRACING: CPT

## 2020-04-17 PROCEDURE — 84484 ASSAY OF TROPONIN QUANT: CPT

## 2020-04-17 PROCEDURE — 85025 COMPLETE CBC W/AUTO DIFF WBC: CPT

## 2020-04-17 PROCEDURE — 74011250636 HC RX REV CODE- 250/636: Performed by: EMERGENCY MEDICINE

## 2020-04-17 PROCEDURE — 80053 COMPREHEN METABOLIC PANEL: CPT

## 2020-04-17 PROCEDURE — 36415 COLL VENOUS BLD VENIPUNCTURE: CPT

## 2020-04-17 PROCEDURE — 99285 EMERGENCY DEPT VISIT HI MDM: CPT

## 2020-04-17 PROCEDURE — 73080 X-RAY EXAM OF ELBOW: CPT

## 2020-04-17 PROCEDURE — 70450 CT HEAD/BRAIN W/O DYE: CPT

## 2020-04-17 RX ORDER — HYDROCORTISONE 25 MG/G
CREAM TOPICAL 4 TIMES DAILY
Qty: 30 G | Refills: 0 | Status: SHIPPED | OUTPATIENT
Start: 2020-04-17 | End: 2021-09-03

## 2020-04-17 RX ADMIN — SODIUM CHLORIDE 500 ML: 900 INJECTION, SOLUTION INTRAVENOUS at 05:16

## 2020-04-17 NOTE — ED TRIAGE NOTES
Pt arrived via [address] with chief complaint of GLF with questioned LOC. Pt reports she fell back into the wall and bumped her head and left elbow. Pt states \"I have had bloody spots in my stool for the past week and tonight I had just finished coming back from the bathroom when I felt funny and fell down. \"

## 2020-04-17 NOTE — ED NOTES
5:11 AM Attempted medication reconciliation, list provided by patient from June 2017, multiple discrepancies noted, patient unable to verbalize/remember her medications; most recent reconciliation left in chart.   5:21 AM Patient to CT via stretcher accompanied by Joselo Nelson. No distress noted. 5:43 AM Patient returned from CT.  6:07 Robyn Almeida MD at bedside to discuss results and pending discharge. 6:09 AM [facility] contacted for patient discharge r/t frequent falls and being transported without medical equipment. ETA of 30-45 minutes per dispatch.  6:42 AM While RN was discontinuing IV, patient reports she's 'going to the bathroom'; RN offered to assist to bedpan/BSC, patient declined, 'I already started'. New brief applied and patient assisted into a position of comfort. Aware of pending [facility] transport ETA.  6:53 AM [facility] in department; patient provided with discharge paperwork and personal belongings including wallet, keys, and personal paperwork placed in belongings bag. Patient left the department in no acute distress.

## 2020-04-17 NOTE — TELEPHONE ENCOUNTER
Yoli Archer Dyke [phone]        The patient is still has had some  complains about the burning in her chest. She says it is acid reflux .  She also said she has vaginal burning

## 2020-04-17 NOTE — ED PROVIDER NOTES
65-year-old female with past medical history significant for diabetes, high cholesterol, Parkinson's disease, restless leg syndrome, bipolar disorder, fibromyalgia presents from [address] [place] [address] independent living after fall with head trauma in the bathroom. Patient reports that she had a loose bowel movements in the middle of the night and saw small blood on the outside of the green-brown stool and when she wiped and fell when cleaning herself. Patient reports that she is noticed small blood in stool intermittently over the past 1 week approximately. Unknown loss of consciousness. Complains of posterior head pain and left elbow pain after fall. Denies any recent illness, fever, chills, abdominal pain, chest pain, shortness of breath, urinary complaints. Denies blood thinner use.     Denies tobacco use, drug use, alcohol use  Primary care physician Jonas Nunes           Past Medical History:   Diagnosis Date    Bipolar disorder ([address]     Nazmy    Chronic pain     BACK PAIN    Diabetes ([address]     BORDERLINE TX WITH DIET AND EXERCISE    DJD (degenerative joint disease)     Fibromyalgia     Genital herpes     GERD (gastroesophageal reflux disease)     Hypercholesterolemia     IBS (irritable bowel syndrome)     Ill-defined condition     fibromyligia    Lumbar disc disease     stimulation-Honza    Migraine     Nausea & vomiting     Other ill-defined conditions(799.89)     SEASONAL allergies    Other ill-defined conditions(799.89)     dysphagia has had esophagus dilated    Paget's disease     Parkinson disease ([address]     Pulmonary embolism (HCC)     RLS (restless legs syndrome)     Spinal stenosis        Past Surgical History:   Procedure Laterality Date    COLONOSCOPY N/A 6/27/2016    COLONOSCOPY performed by [facility]aden Carmen MD at [facility] ENDOSCOPY    COLONOSCOPY N/A 2/22/2019    COLONOSCOPY performed by Eyal Dow MD at [facility]. [address], COLON, DIAGNOSTIC      12, due 13    HX APPENDECTOMY      HX BACK SURGERY  9/17/2013    back surgery - total of 3 as of 12/20/2013    HX BREAST BIOPSY Right years ago    negative    HX CHOLECYSTECTOMY      HX HEENT      T & A    HX HYSTERECTOMY      total for fibroid tumors    HX ORTHOPAEDIC      lumbar laminectomy then fusion/neurostimulator implanted - inactive now but still in    HX ORTHOPAEDIC      REMOVAL OF NEUROSTIMULATOR    HX OTHER SURGICAL      EGD x 2 with dilation/colonoscopy - no polyps per pt    TILT TABLE EVALUATION  8/2/2016              Family History:   Problem Relation Age of Onset    Colon Cancer Mother     Cancer Mother 68        colon    Heart Disease Father     Heart Disease Maternal Grandmother     Heart Disease Maternal Grandfather     Heart Disease Other        Social History     Socioeconomic History    Marital status:      Spouse name: Not on file    Number of children: Not on file    Years of education: Not on file    Highest education level: Not on file   Occupational History    Occupation: volunteer     Employer: RETIRED     Comment: [employer]   Social Needs    Financial resource strain: Not on file    Food insecurity     Worry: Not on file     Inability: Not on file    Transportation needs     Medical: Not on file     Non-medical: Not on file   Tobacco Use    Smoking status: Never Smoker    Smokeless tobacco: Never Used   Substance and Sexual Activity    Alcohol use: No     Comment: 1 per month    Drug use: No    Sexual activity: Not on file   Lifestyle    Physical activity     Days per week: Not on file     Minutes per session: Not on file    Stress: Not on file   Relationships    Social connections     Talks on phone: Not on file     Gets together: Not on file     Attends Cheondoism service: Not on file     Active member of club or organization: Not on file     Attends meetings of clubs or organizations: Not on file     Relationship status: Not on file    Intimate partner violence     Fear of current or ex partner: Not on file     Emotionally abused: Not on file     Physically abused: Not on file     Forced sexual activity: Not on file   Other Topics Concern    Not on file   Social History Narrative    Not on file         ALLERGIES: Adhesive; Hydrocodone; Lorazepam; Other medication; Percocet [oxycodone-acetaminophen]; Sudafed [pseudoephedrine hcl]; Wellbutrin [bupropion hcl]; Zithromax [azithromycin]; and Zyrtec [cetirizine]    Review of Systems   Constitutional: Negative for chills and fever. HENT: Negative for congestion, nosebleeds and rhinorrhea. Eyes: Negative for pain and redness. Respiratory: Negative for cough and shortness of breath. Cardiovascular: Negative for chest pain and palpitations. Gastrointestinal: Positive for blood in stool. Negative for abdominal pain, nausea and vomiting. Genitourinary: Negative for dysuria, frequency, vaginal bleeding and vaginal pain. Musculoskeletal: Positive for arthralgias. Negative for myalgias. Skin: Negative for rash and wound. Neurological: Positive for headaches. Negative for seizures, syncope and weakness. Hematological: Does not bruise/bleed easily. Psychiatric/Behavioral: Negative for agitation, confusion, dysphoric mood and suicidal ideas. The patient is not nervous/anxious. Vitals:    04/17/20 0457   BP: 159/87   Pulse: 65   Resp: 14   Temp: 98.1 °F (36.7 °C)   SpO2: 98%   Weight: 60.7 kg (133 lb 13.1 oz)   Height: 5' 6\" (1.676 m)            Physical Exam  Vitals signs and nursing note reviewed. Constitutional:       Appearance: She is well-developed. HENT:      Head: Normocephalic. Eyes:      Pupils: Pupils are equal, round, and reactive to light. Neck:      Musculoskeletal: Normal range of motion and neck supple. Trachea: No tracheal deviation. Cardiovascular:      Rate and Rhythm: Normal rate and regular rhythm. Heart sounds: Normal heart sounds.    Pulmonary:      Effort: Pulmonary effort is normal. No respiratory distress. Breath sounds: Normal breath sounds. No stridor. No wheezing or rales. Chest:      Chest wall: No tenderness. Abdominal:      General: Bowel sounds are normal. There is no distension. Palpations: Abdomen is soft. Tenderness: There is no abdominal tenderness. There is no rebound. Genitourinary:     Rectum: Guaiac result negative. External hemorrhoid present. Comments: Green/brown stool, no gross blood  Musculoskeletal: Normal range of motion. General: Tenderness present. Left elbow: She exhibits normal range of motion, no swelling, no deformity and no laceration. Tenderness found. Olecranon process tenderness noted. Skin:     General: Skin is warm and dry. Coloration: Skin is not pale. Findings: No rash. Neurological:      Mental Status: She is alert and oriented to person, place, and time. Cranial Nerves: No cranial nerve deficit. MDM  Number of Diagnoses or Management Options  Closed head injury, initial encounter:   Contusion of left elbow, initial encounter:   Fall, initial encounter:   Hemorrhoids, unspecified hemorrhoid type:   Diagnosis management comments: 76-year-old female presents with complaints of posterior head pain and left elbow pain after ground-level fall in bathroom following loose bowel movement with green-brown stool and small blood. Denies lightheadedness, syncope. Patient is well-appearing, nontoxic, hemodynamically stable, afebrile, no respiratory distress, clear to auscultation bilaterally, small occipital tenderness to palpation no step-offs or crepitus, no lacerations or abrasions noted to head, no cervical tenderness to palpation step-offs or crepitus, abdomen soft/nontender/nondistended, multiple swollen external hemorrhoids without tenderness to palpation, green-brown stool without gross blood.   Planhead CT, left elbow x-ray, CBC/CMP, EKG, cardiac monitor, IV fluid hydration. Amount and/or Complexity of Data Reviewed  Clinical lab tests: ordered and reviewed  Tests in the radiology section of CPT®: ordered and reviewed  Independent visualization of images, tracings, or specimens: yes    Patient Progress  Patient progress: stable         Procedures      ED EKG interpretation:  Rhythm: normal sinus rhythm; and regular . Rate (approx.): 66; Axis: normal; P wave: normal; QRS interval: normal ; ST/T wave: normal; no ischemia. Unchanged from 4/1/20. This EKG was interpreted by Heather Salcedo MD,ED Provider.

## 2020-04-17 NOTE — TELEPHONE ENCOUNTER
After Hours SSED/CM referral received and appreciated TRST 6 and 7 ED visits in 6 months. Call placed to patient daughter answered phone introduced self states her mother is doing better \" no symptoms of  COVID. \". Willie Wong provided phone patient verbalizes no need for services \"things are still right now\" has PT. Support received from [employer] no transitional care needs verbalized. Patient to follow up w/  office.

## 2020-04-17 NOTE — DISCHARGE INSTRUCTIONS
Patient Education        Preventing Falls: Care Instructions  Your Care Instructions    Getting around your home safely can be a challenge if you have injuries or health problems that make it easy for you to fall. Loose rugs and furniture in walkways are among the dangers for many older people who have problems walking or who have poor eyesight. People who have conditions such as arthritis, osteoporosis, or dementia also have to be careful not to fall. You can make your home safer with a few simple measures. Follow-up care is a key part of your treatment and safety. Be sure to make and go to all appointments, and call your doctor if you are having problems. It's also a good idea to know your test results and keep a list of the medicines you take. How can you care for yourself at home? Taking care of yourself  · You may get dizzy if you do not drink enough water. To prevent dehydration, drink plenty of fluids, enough so that your urine is light yellow or clear like water. Choose water and other caffeine-free clear liquids. If you have kidney, heart, or liver disease and have to limit fluids, talk with your doctor before you increase the amount of fluids you drink. · Exercise regularly to improve your strength, muscle tone, and balance. Walk if you can. Swimming may be a good choice if you cannot walk easily. · Have your vision and hearing checked each year or any time you notice a change. If you have trouble seeing and hearing, you might not be able to avoid objects and could lose your balance. · Know the side effects of the medicines you take. Ask your doctor or pharmacist whether the medicines you take can affect your balance. Sleeping pills or sedatives can affect your balance. · Limit the amount of alcohol you drink. Alcohol can impair your balance and other senses. · Ask your doctor whether calluses or corns on your feet need to be removed.  If you wear loose-fitting shoes because of calluses or corns, you can lose your balance and fall. · Talk to your doctor if you have numbness in your feet. Preventing falls at home  · Remove raised doorway thresholds, throw rugs, and clutter. Repair loose carpet or raised areas in the floor. · Move furniture and electrical cords to keep them out of walking paths. · Use nonskid floor wax, and wipe up spills right away, especially on ceramic tile floors. · If you use a walker or cane, put rubber tips on it. If you use crutches, clean the bottoms of them regularly with an abrasive pad, such as steel wool. · Keep your house well lit, especially [place], and outside walkways. Use night-lights in areas such as hallways and bathrooms. Add extra light switches or use remote switches (such as switches that go on or off when you clap your hands) to make it easier to turn lights on if you have to get up during the night. · Install sturdy handrails on stairways. · Move items in your cabinets so that the things you use a lot are on the lower shelves (about waist level). · Keep a cordless phone and a flashlight with new batteries by your bed. If possible, put a phone in each of the main rooms of your house, or carry a cell phone in case you fall and cannot reach a phone. Or, you can wear a device around your neck or wrist. You push a button that sends a signal for help. · Wear low-heeled shoes that fit well and give your feet good support. Use footwear with nonskid soles. Check the heels and soles of your shoes for wear. Repair or replace worn heels or soles. · Do not wear socks without shoes on wood floors. · Walk on the grass when the sidewalks are slippery. If you live in an area that gets snow and ice in the winter, sprinkle salt on slippery steps and sidewalks. Preventing falls in the bath  · Install grab bars and nonskid mats inside and outside your shower or tub and near the toilet and sinks. · Use shower chairs and bath benches.   · Use a hand-held shower head that will allow you to sit while showering. · Get into a tub or shower by putting the weaker leg in first. Get out of a tub or shower with your strong side first.  · Repair loose toilet seats and consider installing a raised toilet seat to make getting on and off the toilet easier. · Keep your bathroom door unlocked while you are in the shower. Where can you learn more? Go to http://ash-jeronimo.info/. Enter [id number] [id number] in the search box to learn more about \"Preventing Falls: Care Instructions. \"  Current as of: March 16, 2018  Content Version: 11.8  © 4664-3731 [employer]. Care instructions adapted under license by [employer] (which disclaims liability or warranty for this information). If you have questions about a medical condition or this instruction, always ask your healthcare professional. [place] [address] any warranty or liability for your use of this information. Patient Education        Learning About a Closed Head Injury  What is a closed head injury? A closed head injury happens when your head gets hit hard. The strong force of the blow causes your brain to shake in your skull. This movement can cause the brain to bruise, swell, or tear. Sometimes nerves or blood vessels also get damaged. This can cause bleeding in or around the brain. A concussion is a type of closed head injury. What are the symptoms? If you have a mild concussion, you may have a mild headache or feel \"not quite right. \" These symptoms are common. They usually go away over a few days to 4 weeks. But sometimes after a concussion, you feel like you can't function as well as before the injury. And you have new symptoms. This is called postconcussive syndrome. You may:  · Find it harder to solve problems, think, concentrate, or remember. · Have headaches. · Have changes in your sleep patterns, such as not being able to sleep or sleeping all the time.   · Have changes in your personality. · Not be interested in your usual activities. · Feel angry or anxious without a clear reason. · Lose your sense of taste or smell. · Be dizzy, lightheaded, or unsteady. It may be hard to stand or walk. How is a closed head injury treated? Any person who may have a concussion needs to see a doctor. Some people have to stay in the hospital to be watched. Others can go home safely. If you go home, follow your doctor's instructions. He or she will tell you if you need someone to watch you closely for the next 24 hours or longer. Rest is the best treatment. Get plenty of sleep at night. And try to rest during the day. · Avoid activities that are physically or mentally demanding. These include housework, exercise, and schoolwork. And don't play video games, send text messages, or use the computer. You may need to change your school or work schedule to be able to avoid these activities. · Ask your doctor when it's okay to drive, ride a bike, or operate machinery. · Take an over-the-counter pain medicine, such as acetaminophen (Tylenol), ibuprofen (Advil, Motrin), or naproxen (Aleve). Be safe with medicines. Read and follow all instructions on the label. · Check with your doctor before you use any other medicines for pain. · Do not drink alcohol or use illegal drugs. They can slow recovery. They can also increase your risk of getting a second head injury. Follow-up care is a key part of your treatment and safety. Be sure to make and go to all appointments, and call your doctor if you are having problems. It's also a good idea to know your test results and keep a list of the medicines you take. Where can you learn more? Go to http://ash-jeronimo.info/  Enter E235 in the search box to learn more about \"Learning About a Closed Head Injury. \"  Current as of: November 19, 2019Content Version: 12.4  © 9380-6251 [employer].   Care instructions adapted under license by [address] (which disclaims liability or warranty for this information). If you have questions about a medical condition or this instruction, always ask your healthcare professional. [address] any warranty or liability for your use of this information. Patient Education        Hemorrhoids: Care Instructions  Your Care Instructions    Hemorrhoids are enlarged veins that develop in the anal canal. Bleeding during bowel movements, itching, swelling, and rectal pain are the most common symptoms. They can be uncomfortable at times, but hemorrhoids rarely are a serious problem. You can treat most hemorrhoids with simple changes to your diet and bowel habits. These changes include eating more fiber and not straining to pass stools. Most hemorrhoids do not need surgery or other treatment unless they are very large and painful or bleed a lot. Follow-up care is a key part of your treatment and safety. Be sure to make and go to all appointments, and call your doctor if you are having problems. It's also a good idea to know your test results and keep a list of the medicines you take. How can you care for yourself at home? · Sit in a few inches of warm water (sitz bath) 3 times a day and after bowel movements. The warm water helps with pain and itching. · Put ice on your anal area several times a day for 10 minutes at a time. Put a thin cloth between the ice and your skin. Follow this by placing a warm, wet towel on the area for another 10 to 20 minutes. · Take pain medicines exactly as directed. ? If the doctor gave you a prescription medicine for pain, take it as prescribed. ? If you are not taking a prescription pain medicine, ask your doctor if you can take an over-the-counter medicine. · Keep the anal area clean, but be gentle. Use water and a fragrance-free soap, such as [place], or use baby wipes or medicated pads, such as Tucks.   · Wear cotton underwear and loose clothing to decrease moisture in the anal area. · Eat more fiber. Include foods such as whole-grain breads and cereals, raw vegetables, raw and dried fruits, and beans. · Drink plenty of fluids, enough so that your urine is light yellow or clear like water. If you have kidney, heart, or liver disease and have to limit fluids, talk with your doctor before you increase the amount of fluids you drink. · Use a stool softener that contains bran or psyllium. You can save money by buying bran or psyllium (available in bulk at most health food stores) and sprinkling it on foods or stirring it into fruit juice. Or you can use a product such as Metamucil or Hydrocil. · Practice healthy bowel habits. ? Go to the bathroom as soon as you have the urge. ? Avoid straining to pass stools. Relax and give yourself time to let things happen naturally. ? Do not hold your breath while passing stools. ? Do not read while sitting on the toilet. Get off the toilet as soon as you have finished. · Take your medicines exactly as prescribed. Call your doctor if you think you are having a problem with your medicine. When should you call for help? Call 911 anytime you think you may need emergency care. For example, call if:    · You pass maroon or very bloody stools.    Call your doctor now or seek immediate medical care if:    · You have increased pain.     · You have increased bleeding.    Watch closely for changes in your health, and be sure to contact your doctor if:    · Your symptoms have not improved after 3 or 4 days. Where can you learn more? Go to http://ash-jeronimo.info/  Enter F228 in the search box to learn more about \"Hemorrhoids: Care Instructions. \"  Current as of: August 11, 2019Content Version: 12.4  © 5496-1241 [employer]. Care instructions adapted under license by [employer] (which disclaims liability or warranty for this information).  If you have questions about a medical condition or this instruction, always ask your healthcare professional. [place] [address] any warranty or liability for your use of this information.

## 2020-04-20 ENCOUNTER — TELEPHONE (OUTPATIENT)
Dept: INTERNAL MEDICINE CLINIC | Age: 75
End: 2020-04-20

## 2020-04-20 ENCOUNTER — PATIENT OUTREACH (OUTPATIENT)
Dept: FAMILY MEDICINE CLINIC | Age: 75
End: 2020-04-20

## 2020-04-20 NOTE — PROGRESS NOTES
Called and LM for daughter,Rosario, to call me back regarding  ED visit to [facility] ER. Dx-hemorrhoids, fall, closed head injury and contusion left elbow. Called and spoke to daughter, [address], . Patient contacted regarding recent discharge and COVID-19 risk   Care Transition Nurse/ Ambulatory Care Manager contacted the family by telephone to perform post discharge assessment. Verified name and  with family as identifiers. [address] says mother is doing well. Had talked to pcp office yesterday re appt for f/u. Reminded to schedule appt with GI, . Denies any respiratory symptoms. Patient has following risk factors of: DM. CTN/ACM reviewed discharge instructions, medical action plan and red flags related to discharge diagnosis. Reviewed and educated them on any new and changed medications related to discharge diagnosis. Advised obtaining a 90-day supply of all daily and as-needed medications. Education provided regarding infection prevention, and signs and symptoms of COVID-19 and when to seek medical attention with family who verbalized understanding. Discussed exposure protocols and quarantine from [address] you at higher risk for severe illness  and given an opportunity for questions and concerns. The family agrees to contact the COVID-19 hotline [phone] or PCP office for questions related to their healthcare. CTN/ACM provided contact information for future reference. From CDC: Are you at higher risk for severe illness?  Wash your hands often.  Avoid close contact (6 feet, which is about two arm lengths) with people who are sick.  Put distance between yourself and other people if COVID-19 is spreading in your community.  Clean and disinfect frequently touched surfaces.  Avoid all cruise travel and non-essential air travel.  Call your healthcare professional if you have concerns about COVID-19 and your underlying condition or if you are sick.     For more information on steps you can take to protect yourself, see CDC's How to Protect Yourself      Patient/family/caregiver given information for [employer] [facility] and agrees to enroll no  Patient's preferred e-mail:  na  Patient's preferred phone number: na      Plan for follow-up call in 7-14 days based on severity of symptoms and risk factors.

## 2020-04-20 NOTE — TELEPHONE ENCOUNTER
Verified patient identity with two identifiers. Spoke with Santiago Perry with [facility],   The chest burning is not new, has been a chronic issue. Does not appear she is on a GERD medication. Started new soap which could be the vaginal irritant. This new soap has been d/c'd. Patient has left for [place] today. Santiago Perry is not sure how long she will be gone or who she is with.

## 2020-04-20 NOTE — TELEPHONE ENCOUNTER
Verified patient identity with two identifiers. Spoke with patient daughter Cody Santiago, on HIPAA, she had just dropped patient back at her appt, they were in [place] for the weekend. Daughter states no complaints at this time.

## 2020-04-22 ENCOUNTER — HOSPITAL ENCOUNTER (EMERGENCY)
Age: 75
Discharge: HOME | End: 2020-04-22
Attending: STUDENT IN AN ORGANIZED HEALTH CARE EDUCATION/TRAINING PROGRAM | Admitting: STUDENT IN AN ORGANIZED HEALTH CARE EDUCATION/TRAINING PROGRAM
Payer: MEDICARE

## 2020-04-22 VITALS
DIASTOLIC BLOOD PRESSURE: 62 MMHG | TEMPERATURE: 98.3 F | RESPIRATION RATE: 17 BRPM | SYSTOLIC BLOOD PRESSURE: 123 MMHG | OXYGEN SATURATION: 97 % | WEIGHT: 133 LBS | HEIGHT: 66 IN | BODY MASS INDEX: 21.38 KG/M2 | HEART RATE: 71 BPM

## 2020-04-22 DIAGNOSIS — N76.0 VAGINITIS AND VULVOVAGINITIS: Primary | ICD-10-CM

## 2020-04-22 LAB
ALBUMIN SERPL-MCNC: 3.7 G/DL (ref 3.5–5)
ALBUMIN/GLOB SERPL: 1.1 {RATIO} (ref 1.1–2.2)
ALP SERPL-CCNC: 100 U/L (ref 45–117)
ALT SERPL-CCNC: 46 U/L (ref 12–78)
ANION GAP SERPL CALC-SCNC: 3 MMOL/L (ref 5–15)
APPEARANCE UR: CLEAR
AST SERPL-CCNC: 38 U/L (ref 15–37)
BACTERIA URNS QL MICRO: NEGATIVE /HPF
BASOPHILS # BLD: 0.1 K/UL (ref 0–0.1)
BASOPHILS NFR BLD: 1 % (ref 0–1)
BILIRUB SERPL-MCNC: 0.6 MG/DL (ref 0.2–1)
BILIRUB UR QL: NEGATIVE
BUN SERPL-MCNC: 15 MG/DL (ref 6–20)
BUN/CREAT SERPL: 20 (ref 12–20)
CALCIUM SERPL-MCNC: 9.4 MG/DL (ref 8.5–10.1)
CHLORIDE SERPL-SCNC: 107 MMOL/L (ref 97–108)
CO2 SERPL-SCNC: 30 MMOL/L (ref 21–32)
COLOR UR: NORMAL
COMMENT, HOLDF: NORMAL
CREAT SERPL-MCNC: 0.75 MG/DL (ref 0.55–1.02)
DIFFERENTIAL METHOD BLD: ABNORMAL
EOSINOPHIL # BLD: 0.3 K/UL (ref 0–0.4)
EOSINOPHIL NFR BLD: 5 % (ref 0–7)
EPITH CASTS URNS QL MICRO: NORMAL /LPF
ERYTHROCYTE [DISTWIDTH] IN BLOOD BY AUTOMATED COUNT: 13.5 % (ref 11.5–14.5)
GLOBULIN SER CALC-MCNC: 3.5 G/DL (ref 2–4)
GLUCOSE SERPL-MCNC: 91 MG/DL (ref 65–100)
GLUCOSE UR STRIP.AUTO-MCNC: NEGATIVE MG/DL
HCT VFR BLD AUTO: 39.8 % (ref 35–47)
HGB BLD-MCNC: 12.9 G/DL (ref 11.5–16)
HGB UR QL STRIP: NEGATIVE
HYALINE CASTS URNS QL MICRO: NORMAL /LPF (ref 0–5)
IMM GRANULOCYTES # BLD AUTO: 0 K/UL (ref 0–0.04)
IMM GRANULOCYTES NFR BLD AUTO: 1 % (ref 0–0.5)
KETONES UR QL STRIP.AUTO: NEGATIVE MG/DL
LEUKOCYTE ESTERASE UR QL STRIP.AUTO: NEGATIVE
LYMPHOCYTES # BLD: 2.2 K/UL (ref 0.8–3.5)
LYMPHOCYTES NFR BLD: 35 % (ref 12–49)
MCH RBC QN AUTO: 30.8 PG (ref 26–34)
MCHC RBC AUTO-ENTMCNC: 32.4 G/DL (ref 30–36.5)
MCV RBC AUTO: 95 FL (ref 80–99)
MONOCYTES # BLD: 0.5 K/UL (ref 0–1)
MONOCYTES NFR BLD: 8 % (ref 5–13)
NEUTS SEG # BLD: 3.2 K/UL (ref 1.8–8)
NEUTS SEG NFR BLD: 50 % (ref 32–75)
NITRITE UR QL STRIP.AUTO: NEGATIVE
NRBC # BLD: 0 K/UL (ref 0–0.01)
NRBC BLD-RTO: 0 PER 100 WBC
PH UR STRIP: 8 [PH] (ref 5–8)
PLATELET # BLD AUTO: 295 K/UL (ref 150–400)
PMV BLD AUTO: 9.4 FL (ref 8.9–12.9)
POTASSIUM SERPL-SCNC: 3.6 MMOL/L (ref 3.5–5.1)
PROT SERPL-MCNC: 7.2 G/DL (ref 6.4–8.2)
PROT UR STRIP-MCNC: NEGATIVE MG/DL
RBC # BLD AUTO: 4.19 M/UL (ref 3.8–5.2)
RBC #/AREA URNS HPF: NORMAL /HPF (ref 0–5)
SAMPLES BEING HELD,HOLD: NORMAL
SODIUM SERPL-SCNC: 140 MMOL/L (ref 136–145)
SP GR UR REFRACTOMETRY: 1.01 (ref 1–1.03)
UR CULT HOLD, URHOLD: NORMAL
UROBILINOGEN UR QL STRIP.AUTO: 0.2 EU/DL (ref 0.2–1)
WBC # BLD AUTO: 6.3 K/UL (ref 3.6–11)
WBC URNS QL MICRO: NORMAL /HPF (ref 0–4)

## 2020-04-22 PROCEDURE — 99285 EMERGENCY DEPT VISIT HI MDM: CPT

## 2020-04-22 PROCEDURE — 85025 COMPLETE CBC W/AUTO DIFF WBC: CPT

## 2020-04-22 PROCEDURE — 74011250636 HC RX REV CODE- 250/636: Performed by: STUDENT IN AN ORGANIZED HEALTH CARE EDUCATION/TRAINING PROGRAM

## 2020-04-22 PROCEDURE — 80053 COMPREHEN METABOLIC PANEL: CPT

## 2020-04-22 PROCEDURE — 81001 URINALYSIS AUTO W/SCOPE: CPT

## 2020-04-22 PROCEDURE — 74011250637 HC RX REV CODE- 250/637: Performed by: STUDENT IN AN ORGANIZED HEALTH CARE EDUCATION/TRAINING PROGRAM

## 2020-04-22 RX ORDER — CHLORPHENIRAMINE MALEATE 4 MG
TABLET ORAL
Status: COMPLETED | OUTPATIENT
Start: 2020-04-22 | End: 2020-04-22

## 2020-04-22 RX ORDER — CHLORPHENIRAMINE MALEATE 4 MG
TABLET ORAL 2 TIMES DAILY
Status: DISCONTINUED | OUTPATIENT
Start: 2020-04-22 | End: 2020-04-22

## 2020-04-22 RX ORDER — BROMPHENIRAMIN/PSEUDOEPHEDRINE 1-15MG/5ML
1 LIQUID (ML) ORAL
Qty: 1 TUBE | Refills: 0 | Status: SHIPPED | OUTPATIENT
Start: 2020-04-22 | End: 2020-04-25

## 2020-04-22 RX ADMIN — CLOTRIMAZOLE: 10 CREAM TOPICAL at 12:49

## 2020-04-22 RX ADMIN — SODIUM CHLORIDE 1000 ML: 900 INJECTION, SOLUTION INTRAVENOUS at 10:27

## 2020-04-22 NOTE — PROGRESS NOTES
Date of previous inpatient admission/ ED visit? 4/17/20 ED [facility] Fall  4/1/20  ED [facility]  Chest Pain      What brought the patient back to ED? Rash/perineum    Did patient decline recommended services during last admission/ ED visit (if yes, what)? N/A    Has patient seen a provider since their last inpatient admission/ED visit (if yes, when)? N/A    PCP: First and Last name:   Dr. Azucena Flores   Name of Practice:  [facility]   Are you a current patient: Yes/No:    Yes   Approximate date of last visit:   10/2019    CM Interventions:    Received call from ED requesting stretcher transport be set up for return to [place] [address]. Discussed with RN, she had called daughter earlier to discuss her discharge. [facility]/PCS placed on bedside chart.       Janeen Poe, RN, BSN, [place]  ED Care Management  [phone]

## 2020-04-22 NOTE — DISCHARGE INSTRUCTIONS
- Continue clotrimazole cream at bedtime for 3 days  - Make sure to change your Depends on a regular basis and keep the vulvar area as dry as possible during the day. Sitting in urine can predispose you to further vaginal and vulvar infections.   - Return for fevers, abdominal or flank pain, worsening vulvar pain, or any new or worsening symptoms

## 2020-04-22 NOTE — ED PROVIDER NOTES
Chief Complaint   Patient presents with    Generalized Body Aches    Urinary Pain     This is a 25-year-old female with chronic pain, fibromyalgia, Parkinson's disease, bipolar disorder, presenting from independent living for a complaint of dysuria over the last 6 months. She says that she is not seen a primary physician since her symptoms started, but today it became so intense that she decided to be evaluated. She denies any fevers, vomiting, abdominal or flank pain, she was admitted last month for urinary tract infection that was pansensitive to antimicrobials and had an indwelling Jc catheter at that time that has since been removed. No recent urinary instrumentation. She reports having generalized body aches and pains. No other systemic complaints.     Onset of symptoms:  6 months ago  Character of symptoms: Dysuria  Severity:   Severe  Modifying factors:  No alleviating or exacerbating factors      Past Medical History:   Diagnosis Date    Bipolar disorder (HCC)     Nazmy    Chronic pain     BACK PAIN    Diabetes ([address]     BORDERLINE TX WITH DIET AND EXERCISE    DJD (degenerative joint disease)     Fibromyalgia     Genital herpes     GERD (gastroesophageal reflux disease)     Hypercholesterolemia     IBS (irritable bowel syndrome)     Ill-defined condition     fibromyligia    Lumbar disc disease     stimulation-Honza    Migraine     Nausea & vomiting     Other ill-defined conditions(799.89)     SEASONAL allergies    Other ill-defined conditions(799.89)     dysphagia has had esophagus dilated    Paget's disease     Parkinson disease ([address]     Pulmonary embolism ([facility])     RLS (restless legs syndrome)     Spinal stenosis        Past Surgical History:   Procedure Laterality Date    COLONOSCOPY N/A 6/27/2016    COLONOSCOPY performed by Renuka Ayala MD at [address] [place] [address] ENDOSCOPY    COLONOSCOPY N/A 2/22/2019    COLONOSCOPY performed by Viridiana Vazquez MD at [facility]. Andrei Valera, COLON, DIAGNOSTIC      12, due 15    HX APPENDECTOMY      HX BACK SURGERY  9/17/2013    back surgery - total of 3 as of 12/20/2013    HX BREAST BIOPSY Right years ago    negative    HX CHOLECYSTECTOMY      HX HEENT      T & A    HX HYSTERECTOMY      total for fibroid tumors    HX ORTHOPAEDIC      lumbar laminectomy then fusion/neurostimulator implanted - inactive now but still in    HX ORTHOPAEDIC      REMOVAL OF NEUROSTIMULATOR    HX OTHER SURGICAL      EGD x 2 with dilation/colonoscopy - no polyps per pt    TILT TABLE EVALUATION  8/2/2016              Family History:   Problem Relation Age of Onset    Colon Cancer Mother     Cancer Mother 68        colon    Heart Disease Father     Heart Disease Maternal Grandmother     Heart Disease Maternal Grandfather     Heart Disease Other        Social History     Socioeconomic History    Marital status:      Spouse name: Not on file    Number of children: Not on file    Years of education: Not on file    Highest education level: Not on file   Occupational History    Occupation: volunteer     Employer: RETIRED     Comment: [facility] [employer]   Social Needs    Financial resource strain: Not on file    Food insecurity     Worry: Not on file     Inability: Not on file    Transportation needs     Medical: Not on file     Non-medical: Not on file   Tobacco Use    Smoking status: Never Smoker    Smokeless tobacco: Never Used   Substance and Sexual Activity    Alcohol use: No     Comment: 1 per month    Drug use: No    Sexual activity: Not on file   Lifestyle    Physical activity     Days per week: Not on file     Minutes per session: Not on file    Stress: Not on file   Relationships    Social connections     Talks on phone: Not on file     Gets together: Not on file     Attends Hoahaoism service: Not on file     Active member of club or organization: Not on file     Attends meetings of clubs or organizations: Not on file     Relationship status: Not on file    Intimate partner violence     Fear of current or ex partner: Not on file     Emotionally abused: Not on file     Physically abused: Not on file     Forced sexual activity: Not on file   Other Topics Concern    Not on file   Social History Narrative    Not on file     ALLERGIES: Adhesive; Hydrocodone; Lorazepam; Other medication; Percocet [oxycodone-acetaminophen]; Sudafed [pseudoephedrine hcl]; Wellbutrin [bupropion hcl]; Zithromax [azithromycin]; and Zyrtec [cetirizine]    Review of Systems   Constitutional: Positive for fever. HENT: Negative for facial swelling. Eyes: Negative for redness. Respiratory: Negative for cough and shortness of breath. Cardiovascular: Positive for chest pain. Gastrointestinal: Negative for abdominal pain and vomiting. Genitourinary: Positive for dysuria. Musculoskeletal: Negative for back pain. Skin: Negative for rash. Neurological: Positive for weakness. Psychiatric/Behavioral: Negative for confusion.        Vitals:    04/22/20 0948 04/22/20 1030 04/22/20 1130   BP: 167/89 153/78 174/85   Pulse: 70 67 64   Resp: 16 13 13   Temp: 98.3 °F (36.8 °C)     SpO2: 98% 97% 97%   Weight: 60.3 kg (133 lb)     Height: 5' 6\" (1.676 m)              Physical Exam  General:  Awake and alert, NAD  HEENT:  NC/AT, equal pupils, moist mucous membranes  Neck:   Normal inspection, full range of motion  Cardiac:  RRR, no murmurs  Respiratory:  Clear bilaterally, no wheezes, rales, rhonchi  Abdomen:  Soft and nontender, nondistended  Back:   No CVA tenderness  Extremities: Warm and well perfused, no peripheral edema  Neuro:  Moving all extremities symmetrically without gross motor deficit  Skin:   No rashes, ecchymoses, or pallor    RESULTS  Recent Results (from the past 12 hour(s))   SAMPLES BEING HELD    Collection Time: 04/22/20  9:56 AM   Result Value Ref Range    SAMPLES BEING HELD 1BC(LAV),1BC(NAVY),1PST,1BLU,1RED,1LAV     COMMENT        Add-on orders for these samples will be processed based on acceptable specimen integrity and analyte stability, which may vary by analyte. CBC WITH AUTOMATED DIFF    Collection Time: 04/22/20  9:56 AM   Result Value Ref Range    WBC 6.3 3.6 - 11.0 K/uL    RBC 4.19 3.80 - 5.20 M/uL    HGB 12.9 11.5 - 16.0 g/dL    HCT 39.8 35.0 - 47.0 %    MCV 95.0 80.0 - 99.0 FL    MCH 30.8 26.0 - 34.0 PG    MCHC 32.4 30.0 - 36.5 g/dL    RDW 13.5 11.5 - 14.5 %    PLATELET 526 428 - 300 K/uL    MPV 9.4 8.9 - 12.9 FL    NRBC 0.0 0  WBC    ABSOLUTE NRBC 0.00 0.00 - 0.01 K/uL    NEUTROPHILS 50 32 - 75 %    LYMPHOCYTES 35 12 - 49 %    MONOCYTES 8 5 - 13 %    EOSINOPHILS 5 0 - 7 %    BASOPHILS 1 0 - 1 %    IMMATURE GRANULOCYTES 1 (H) 0.0 - 0.5 %    ABS. NEUTROPHILS 3.2 1.8 - 8.0 K/UL    ABS. LYMPHOCYTES 2.2 0.8 - 3.5 K/UL    ABS. MONOCYTES 0.5 0.0 - 1.0 K/UL    ABS. EOSINOPHILS 0.3 0.0 - 0.4 K/UL    ABS. BASOPHILS 0.1 0.0 - 0.1 K/UL    ABS. IMM. GRANS. 0.0 0.00 - 0.04 K/UL    DF AUTOMATED     METABOLIC PANEL, COMPREHENSIVE    Collection Time: 04/22/20  9:56 AM   Result Value Ref Range    Sodium 140 136 - 145 mmol/L    Potassium 3.6 3.5 - 5.1 mmol/L    Chloride 107 97 - 108 mmol/L    CO2 30 21 - 32 mmol/L    Anion gap 3 (L) 5 - 15 mmol/L    Glucose 91 65 - 100 mg/dL    BUN 15 6 - 20 MG/DL    Creatinine 0.75 0.55 - 1.02 MG/DL    BUN/Creatinine ratio 20 12 - 20      GFR est AA >60 >60 ml/min/1.73m2    GFR est non-AA >60 >60 ml/min/1.73m2    Calcium 9.4 8.5 - 10.1 MG/DL    Bilirubin, total 0.6 0.2 - 1.0 MG/DL    ALT (SGPT) 46 12 - 78 U/L    AST (SGOT) 38 (H) 15 - 37 U/L    Alk.  phosphatase 100 45 - 117 U/L    Protein, total 7.2 6.4 - 8.2 g/dL    Albumin 3.7 3.5 - 5.0 g/dL    Globulin 3.5 2.0 - 4.0 g/dL    A-G Ratio 1.1 1.1 - 2.2     URINALYSIS W/MICROSCOPIC    Collection Time: 04/22/20 10:33 AM   Result Value Ref Range    Color YELLOW/STRAW      Appearance CLEAR CLEAR      Specific gravity 1.009 1.003 - 1.030      pH (UA) 8.0 5.0 - 8.0      Protein Negative NEG mg/dL    Glucose Negative NEG mg/dL    Ketone Negative NEG mg/dL    Bilirubin Negative NEG      Blood Negative NEG      Urobilinogen 0.2 0.2 - 1.0 EU/dL    Nitrites Negative NEG      Leukocyte Esterase Negative NEG      WBC 0-4 0 - 4 /hpf    RBC 0-5 0 - 5 /hpf    Epithelial cells FEW FEW /lpf    Bacteria Negative NEG /hpf    Hyaline cast 0-2 0 - 5 /lpf   URINE CULTURE HOLD SAMPLE    Collection Time: 04/22/20 10:33 AM   Result Value Ref Range    Urine culture hold        Urine on hold in Microbiology dept for 2 days. If unpreserved urine is submitted, it cannot be used for addtional testing after 24 hours, recollection will be required. IMAGING  No results found. Procedures - none unless documented below    ED course: Labs reviewed. No signs of UTI with culture pending. Vulvar excoriation on exam consistent with vulvovaginitis due to candidal infection and her Depends is saturated. Discussed need to have her Depends changed on a more regular basis, otherwise at higher risk for recurrent yeast infections. Clotrimazole applied here, will have her continue at home, return precautions communicated. I left a message for case management as I think she would benefit from some form of home health care. Will discharge home. The patient has been given verbal and written advice regarding today's presentation including reasons to re-attend, specifically signs and symptoms of concern or worsening condition were discussed and understood by the patient.      Impression: Vulvovaginitis  Disposition: Discharge home

## 2020-04-22 NOTE — PROGRESS NOTES
Received voice mail from Dr. Carlitos Rusos requesting additional additional care for patient. Dr. Carlitos Russo asked for call back tomorrow, she will be at [facility] ED. Safe Mail sent to TOYA/CM team for follow up tomorrow afternoon.     Scott Brown RN, BSN, [facility]  ED Care Management  [phone]

## 2020-04-23 ENCOUNTER — TELEPHONE (OUTPATIENT)
Dept: CASE MANAGEMENT | Age: 75
End: 2020-04-23

## 2020-04-23 ENCOUNTER — PATIENT OUTREACH (OUTPATIENT)
Dept: CARDIOLOGY CLINIC | Age: 75
End: 2020-04-23

## 2020-04-23 NOTE — TELEPHONE ENCOUNTER
Call received from [facility] provided updates informing patient unable to afford additional services at this time/ CM requested [place] MSW order ([facility] is Provider)/ and  for daughter Jayne Yung to return call regarding transitional care needs.

## 2020-04-23 NOTE — TELEPHONE ENCOUNTER
Re Attempted call to Dr. Marciano Barraza - call transferred/parked and unable to connect. This writer will leave name/number w/ [facility] [place] [employer]. w/ Lora Valverde at [facility] provided [facility] for [facility].

## 2020-04-23 NOTE — TELEPHONE ENCOUNTER
After Hours f/u requested - Call placed to [facility] for [address] [place] this writer will try again. Call placed to [facility] provider [phone]) spoke w/ Clinical Manager Yusra Rossi discussed case . Noted 3 ED visits since 4/1/20. Patient lives in independent living  - [facility] has noted challenges patient does not make an effort to bathe and needs medications administered. Previous at [place] [id number] for rehab and returned back to apartment (patient is unable to afford additional help in the home secondary to limited funds). Support received from daughter/ROSARIO Ponce who does medications. Discussed need for HH/ MSW - Shweta Oakley states patient would need to agree to MSW order. CM asked agency to f/u w/ Dr. Adriana Barger. Agency was unaware of NC visit until patient return. Shweta Oakley expressed agency is limited w/ the role secondary to residing in [place].

## 2020-04-24 NOTE — PROGRESS NOTES
04/24/20  CTN unable to reach patient's daughter on phone after 2 attempts, there is no direct phone number for patient. CTN notes on chart that Darryle Hacking, CM with [facility] is working with [address] [place] [address] to have SW involvement and she has LM for pt's daughter as well due to multiple ED visits.   CTN will close YULISSA at this time---dreww

## 2020-05-01 DIAGNOSIS — E11.49 OTHER DIABETIC NEUROLOGICAL COMPLICATION ASSOCIATED WITH TYPE 2 DIABETES MELLITUS (HCC): Primary | ICD-10-CM

## 2020-05-04 ENCOUNTER — TELEPHONE (OUTPATIENT)
Dept: INTERNAL MEDICINE CLINIC | Age: 75
End: 2020-05-04

## 2020-05-04 RX ORDER — GABAPENTIN 600 MG/1
TABLET ORAL
Qty: 60 TAB | Refills: 4 | Status: SHIPPED | OUTPATIENT
Start: 2020-05-04 | End: 2021-09-03

## 2020-05-04 NOTE — TELEPHONE ENCOUNTER
Timi Luxor from [facility] is requesting an order for Home Health to do a speech evaluation.

## 2020-05-04 NOTE — TELEPHONE ENCOUNTER
Spoke with pt's daughter Josie Chavira - advised her MD wanted to know strength and how often pt is taking Gabapentin? She takes 600 mg with breakfast and dinner then 300 mg lunch.  Will forward to MD.

## 2020-05-12 ENCOUNTER — TELEPHONE (OUTPATIENT)
Dept: INTERNAL MEDICINE CLINIC | Age: 75
End: 2020-05-12

## 2020-05-12 NOTE — TELEPHONE ENCOUNTER
Kahlil Smith MD recommends she call the daughter Josie Chavira with specific questions re some of Benny's sx. She should also confirm specialist visits with Josie Chavira. Pt does have a tele med visit with Elizabeth Almonte NP for Dr Gutierrez's office. Manjit Samson has spoken with Josie Chavira in regards to all o fthis. Also advised her pt's chest burning is also longstanding.

## 2020-05-12 NOTE — TELEPHONE ENCOUNTER
Bishop with [address] [facility] called today. She is there seeing pt for speech therapy. Pt was complaining of vaginal burning (which she knows is long standing issue) and chest burning (symptoms sounding like GERD). She wanted to know if pt has seen GI doctor? Advised her she has seen Dr Alissa Stephens in past. She was going to call his office to see if she could schedule an appt for pt. Wanted to know if Dr Isidro Damon had any suggestions?  Will forward to MD.

## 2020-06-03 ENCOUNTER — APPOINTMENT (OUTPATIENT)
Dept: GENERAL RADIOLOGY | Age: 75
DRG: 517 | End: 2020-06-03
Attending: EMERGENCY MEDICINE
Payer: MEDICARE

## 2020-06-03 ENCOUNTER — APPOINTMENT (OUTPATIENT)
Dept: CT IMAGING | Age: 75
DRG: 517 | End: 2020-06-03
Attending: EMERGENCY MEDICINE
Payer: MEDICARE

## 2020-06-03 ENCOUNTER — HOSPITAL ENCOUNTER (INPATIENT)
Age: 75
LOS: 6 days | Discharge: NURSING, REHAB OR LONG-TERM CARE | DRG: 517 | End: 2020-06-10
Attending: EMERGENCY MEDICINE | Admitting: HOSPITALIST
Payer: MEDICARE

## 2020-06-03 ENCOUNTER — TELEPHONE (OUTPATIENT)
Dept: INTERNAL MEDICINE CLINIC | Age: 75
End: 2020-06-03

## 2020-06-03 DIAGNOSIS — E87.6 HYPOKALEMIA: ICD-10-CM

## 2020-06-03 DIAGNOSIS — S32.010A COMPRESSION FRACTURE OF L1 VERTEBRA, INITIAL ENCOUNTER (HCC): ICD-10-CM

## 2020-06-03 DIAGNOSIS — R55 SYNCOPE AND COLLAPSE: Primary | ICD-10-CM

## 2020-06-03 LAB
ALBUMIN SERPL-MCNC: 3.6 G/DL (ref 3.5–5)
ALBUMIN/GLOB SERPL: 1 {RATIO} (ref 1.1–2.2)
ALP SERPL-CCNC: 94 U/L (ref 45–117)
ALT SERPL-CCNC: 23 U/L (ref 12–78)
ANION GAP SERPL CALC-SCNC: 4 MMOL/L (ref 5–15)
ANION GAP SERPL CALC-SCNC: 4 MMOL/L (ref 5–15)
APPEARANCE UR: CLEAR
APTT PPP: 22.5 SEC (ref 22.1–32)
AST SERPL-CCNC: 33 U/L (ref 15–37)
ATRIAL RATE: 68 BPM
BACTERIA URNS QL MICRO: NEGATIVE /HPF
BASOPHILS # BLD: 0 K/UL (ref 0–0.1)
BASOPHILS NFR BLD: 0 % (ref 0–1)
BILIRUB SERPL-MCNC: 1 MG/DL (ref 0.2–1)
BILIRUB UR QL: NEGATIVE
BNP SERPL-MCNC: 376 PG/ML (ref 0–450)
BUN SERPL-MCNC: 7 MG/DL (ref 6–20)
BUN SERPL-MCNC: 9 MG/DL (ref 6–20)
BUN/CREAT SERPL: 11 (ref 12–20)
BUN/CREAT SERPL: 13 (ref 12–20)
CALCIUM SERPL-MCNC: 8.5 MG/DL (ref 8.5–10.1)
CALCIUM SERPL-MCNC: 8.8 MG/DL (ref 8.5–10.1)
CALCULATED P AXIS, ECG09: 67 DEGREES
CALCULATED R AXIS, ECG10: 52 DEGREES
CALCULATED T AXIS, ECG11: 141 DEGREES
CHLORIDE SERPL-SCNC: 101 MMOL/L (ref 97–108)
CHLORIDE SERPL-SCNC: 103 MMOL/L (ref 97–108)
CK SERPL-CCNC: 154 U/L (ref 26–192)
CO2 SERPL-SCNC: 35 MMOL/L (ref 21–32)
CO2 SERPL-SCNC: 39 MMOL/L (ref 21–32)
COLOR UR: ABNORMAL
COMMENT, HOLDF: NORMAL
CREAT SERPL-MCNC: 0.63 MG/DL (ref 0.55–1.02)
CREAT SERPL-MCNC: 0.71 MG/DL (ref 0.55–1.02)
DIAGNOSIS, 93000: NORMAL
DIFFERENTIAL METHOD BLD: ABNORMAL
EOSINOPHIL # BLD: 0.1 K/UL (ref 0–0.4)
EOSINOPHIL NFR BLD: 1 % (ref 0–7)
EPITH CASTS URNS QL MICRO: ABNORMAL /LPF
ERYTHROCYTE [DISTWIDTH] IN BLOOD BY AUTOMATED COUNT: 13.5 % (ref 11.5–14.5)
GLOBULIN SER CALC-MCNC: 3.7 G/DL (ref 2–4)
GLUCOSE SERPL-MCNC: 104 MG/DL (ref 65–100)
GLUCOSE SERPL-MCNC: 114 MG/DL (ref 65–100)
GLUCOSE UR STRIP.AUTO-MCNC: NEGATIVE MG/DL
HCT VFR BLD AUTO: 43.3 % (ref 35–47)
HGB BLD-MCNC: 13.8 G/DL (ref 11.5–16)
HGB UR QL STRIP: NEGATIVE
IMM GRANULOCYTES # BLD AUTO: 0.1 K/UL (ref 0–0.04)
IMM GRANULOCYTES NFR BLD AUTO: 1 % (ref 0–0.5)
INR PPP: 1.1 (ref 0.9–1.1)
KETONES UR QL STRIP.AUTO: NEGATIVE MG/DL
LEUKOCYTE ESTERASE UR QL STRIP.AUTO: NEGATIVE
LYMPHOCYTES # BLD: 1.6 K/UL (ref 0.8–3.5)
LYMPHOCYTES NFR BLD: 16 % (ref 12–49)
MAGNESIUM SERPL-MCNC: 1.6 MG/DL (ref 1.6–2.4)
MCH RBC QN AUTO: 30.5 PG (ref 26–34)
MCHC RBC AUTO-ENTMCNC: 31.9 G/DL (ref 30–36.5)
MCV RBC AUTO: 95.8 FL (ref 80–99)
MONOCYTES # BLD: 0.6 K/UL (ref 0–1)
MONOCYTES NFR BLD: 6 % (ref 5–13)
NEUTS SEG # BLD: 7.7 K/UL (ref 1.8–8)
NEUTS SEG NFR BLD: 76 % (ref 32–75)
NITRITE UR QL STRIP.AUTO: NEGATIVE
NRBC # BLD: 0 K/UL (ref 0–0.01)
NRBC BLD-RTO: 0 PER 100 WBC
P-R INTERVAL, ECG05: 154 MS
PH UR STRIP: >8.5 [PH] (ref 5–8)
PLATELET # BLD AUTO: 278 K/UL (ref 150–400)
PMV BLD AUTO: 10.1 FL (ref 8.9–12.9)
POTASSIUM SERPL-SCNC: 2.3 MMOL/L (ref 3.5–5.1)
POTASSIUM SERPL-SCNC: 2.3 MMOL/L (ref 3.5–5.1)
PROT SERPL-MCNC: 7.3 G/DL (ref 6.4–8.2)
PROT UR STRIP-MCNC: NEGATIVE MG/DL
PROTHROMBIN TIME: 10.4 SEC (ref 9–11.1)
Q-T INTERVAL, ECG07: 462 MS
QRS DURATION, ECG06: 88 MS
QTC CALCULATION (BEZET), ECG08: 491 MS
RBC # BLD AUTO: 4.52 M/UL (ref 3.8–5.2)
RBC #/AREA URNS HPF: ABNORMAL /HPF (ref 0–5)
SAMPLES BEING HELD,HOLD: NORMAL
SODIUM SERPL-SCNC: 142 MMOL/L (ref 136–145)
SODIUM SERPL-SCNC: 144 MMOL/L (ref 136–145)
SP GR UR REFRACTOMETRY: 1.01 (ref 1–1.03)
THERAPEUTIC RANGE,PTTT: NORMAL SECS (ref 58–77)
TROPONIN I SERPL-MCNC: <0.05 NG/ML
UA: UC IF INDICATED,UAUC: ABNORMAL
UROBILINOGEN UR QL STRIP.AUTO: 0.2 EU/DL (ref 0.2–1)
VENTRICULAR RATE, ECG03: 68 BPM
WBC # BLD AUTO: 10.1 K/UL (ref 3.6–11)
WBC URNS QL MICRO: ABNORMAL /HPF (ref 0–4)

## 2020-06-03 PROCEDURE — 85610 PROTHROMBIN TIME: CPT

## 2020-06-03 PROCEDURE — 99218 HC RM OBSERVATION: CPT

## 2020-06-03 PROCEDURE — 83735 ASSAY OF MAGNESIUM: CPT

## 2020-06-03 PROCEDURE — 81001 URINALYSIS AUTO W/SCOPE: CPT

## 2020-06-03 PROCEDURE — 74011250637 HC RX REV CODE- 250/637: Performed by: NURSE PRACTITIONER

## 2020-06-03 PROCEDURE — 73110 X-RAY EXAM OF WRIST: CPT

## 2020-06-03 PROCEDURE — 96376 TX/PRO/DX INJ SAME DRUG ADON: CPT

## 2020-06-03 PROCEDURE — 72125 CT NECK SPINE W/O DYE: CPT

## 2020-06-03 PROCEDURE — 93005 ELECTROCARDIOGRAM TRACING: CPT

## 2020-06-03 PROCEDURE — 83880 ASSAY OF NATRIURETIC PEPTIDE: CPT

## 2020-06-03 PROCEDURE — 99285 EMERGENCY DEPT VISIT HI MDM: CPT

## 2020-06-03 PROCEDURE — 85025 COMPLETE CBC W/AUTO DIFF WBC: CPT

## 2020-06-03 PROCEDURE — 77030038269 HC DRN EXT URIN PURWCK BARD -A

## 2020-06-03 PROCEDURE — 71045 X-RAY EXAM CHEST 1 VIEW: CPT

## 2020-06-03 PROCEDURE — 74011250637 HC RX REV CODE- 250/637: Performed by: HOSPITALIST

## 2020-06-03 PROCEDURE — 70450 CT HEAD/BRAIN W/O DYE: CPT

## 2020-06-03 PROCEDURE — 84484 ASSAY OF TROPONIN QUANT: CPT

## 2020-06-03 PROCEDURE — 36415 COLL VENOUS BLD VENIPUNCTURE: CPT

## 2020-06-03 PROCEDURE — 74011250636 HC RX REV CODE- 250/636: Performed by: NURSE PRACTITIONER

## 2020-06-03 PROCEDURE — 96365 THER/PROPH/DIAG IV INF INIT: CPT

## 2020-06-03 PROCEDURE — 80053 COMPREHEN METABOLIC PANEL: CPT

## 2020-06-03 PROCEDURE — 96375 TX/PRO/DX INJ NEW DRUG ADDON: CPT

## 2020-06-03 PROCEDURE — 96366 THER/PROPH/DIAG IV INF ADDON: CPT

## 2020-06-03 PROCEDURE — 82550 ASSAY OF CK (CPK): CPT

## 2020-06-03 PROCEDURE — 74011250637 HC RX REV CODE- 250/637: Performed by: EMERGENCY MEDICINE

## 2020-06-03 PROCEDURE — 74176 CT ABD & PELVIS W/O CONTRAST: CPT

## 2020-06-03 PROCEDURE — 74011250636 HC RX REV CODE- 250/636: Performed by: EMERGENCY MEDICINE

## 2020-06-03 PROCEDURE — 85730 THROMBOPLASTIN TIME PARTIAL: CPT

## 2020-06-03 RX ORDER — ATORVASTATIN CALCIUM 10 MG/1
20 TABLET, FILM COATED ORAL
Status: DISCONTINUED | OUTPATIENT
Start: 2020-06-03 | End: 2020-06-10 | Stop reason: HOSPADM

## 2020-06-03 RX ORDER — MORPHINE SULFATE 2 MG/ML
2 INJECTION, SOLUTION INTRAMUSCULAR; INTRAVENOUS ONCE
Status: COMPLETED | OUTPATIENT
Start: 2020-06-03 | End: 2020-06-03

## 2020-06-03 RX ORDER — FLUOXETINE HYDROCHLORIDE 20 MG/1
40 CAPSULE ORAL DAILY
COMMUNITY

## 2020-06-03 RX ORDER — POTASSIUM CHLORIDE 7.45 MG/ML
10 INJECTION INTRAVENOUS
Status: COMPLETED | OUTPATIENT
Start: 2020-06-03 | End: 2020-06-03

## 2020-06-03 RX ORDER — POTASSIUM CHLORIDE 750 MG/1
40 TABLET, FILM COATED, EXTENDED RELEASE ORAL
Status: COMPLETED | OUTPATIENT
Start: 2020-06-03 | End: 2020-06-03

## 2020-06-03 RX ORDER — QUETIAPINE FUMARATE 25 MG/1
50 TABLET, FILM COATED ORAL
Status: DISCONTINUED | OUTPATIENT
Start: 2020-06-03 | End: 2020-06-03

## 2020-06-03 RX ORDER — POTASSIUM CHLORIDE 14.9 MG/ML
10 INJECTION INTRAVENOUS ONCE
Status: COMPLETED | OUTPATIENT
Start: 2020-06-03 | End: 2020-06-03

## 2020-06-03 RX ORDER — FLUOXETINE HYDROCHLORIDE 20 MG/1
40 CAPSULE ORAL DAILY
Status: DISCONTINUED | OUTPATIENT
Start: 2020-06-04 | End: 2020-06-10 | Stop reason: HOSPADM

## 2020-06-03 RX ORDER — DONEPEZIL HYDROCHLORIDE 5 MG/1
5 TABLET, FILM COATED ORAL
Status: DISCONTINUED | OUTPATIENT
Start: 2020-06-03 | End: 2020-06-10 | Stop reason: HOSPADM

## 2020-06-03 RX ORDER — MORPHINE SULFATE 2 MG/ML
INJECTION, SOLUTION INTRAMUSCULAR; INTRAVENOUS
Status: DISPENSED
Start: 2020-06-03 | End: 2020-06-03

## 2020-06-03 RX ORDER — FLUDROCORTISONE ACETATE 0.1 MG/1
0.1 TABLET ORAL 2 TIMES DAILY
Status: DISCONTINUED | OUTPATIENT
Start: 2020-06-03 | End: 2020-06-10 | Stop reason: HOSPADM

## 2020-06-03 RX ORDER — CARBIDOPA, LEVODOPA AND ENTACAPONE 25; 200; 100 MG/1; MG/1; MG/1
1 TABLET, FILM COATED ORAL 3 TIMES DAILY
Status: ON HOLD | COMMUNITY
End: 2020-06-03

## 2020-06-03 RX ORDER — GABAPENTIN 600 MG/1
600 TABLET ORAL 2 TIMES DAILY
Status: DISCONTINUED | OUTPATIENT
Start: 2020-06-03 | End: 2020-06-10 | Stop reason: HOSPADM

## 2020-06-03 RX ORDER — QUETIAPINE FUMARATE 25 MG/1
25 TABLET, FILM COATED ORAL
Status: DISCONTINUED | OUTPATIENT
Start: 2020-06-03 | End: 2020-06-10 | Stop reason: HOSPADM

## 2020-06-03 RX ORDER — ACETAMINOPHEN 325 MG/1
650 TABLET ORAL 4 TIMES DAILY
Status: DISCONTINUED | OUTPATIENT
Start: 2020-06-03 | End: 2020-06-10 | Stop reason: HOSPADM

## 2020-06-03 RX ORDER — CARBIDOPA AND LEVODOPA 25; 100 MG/1; MG/1
1 TABLET ORAL 3 TIMES DAILY
Status: DISCONTINUED | OUTPATIENT
Start: 2020-06-03 | End: 2020-06-10 | Stop reason: HOSPADM

## 2020-06-03 RX ORDER — DROXIDOPA 300 MG/1
600 CAPSULE ORAL EVERY 12 HOURS
Status: DISCONTINUED | OUTPATIENT
Start: 2020-06-03 | End: 2020-06-10 | Stop reason: HOSPADM

## 2020-06-03 RX ORDER — IBUPROFEN 200 MG
400 TABLET ORAL 3 TIMES DAILY
COMMUNITY
End: 2020-06-10

## 2020-06-03 RX ORDER — MORPHINE SULFATE 2 MG/ML
1 INJECTION, SOLUTION INTRAMUSCULAR; INTRAVENOUS
Status: DISCONTINUED | OUTPATIENT
Start: 2020-06-03 | End: 2020-06-10 | Stop reason: HOSPADM

## 2020-06-03 RX ORDER — CARBIDOPA AND LEVODOPA 25; 100 MG/1; MG/1
1 TABLET ORAL 3 TIMES DAILY
COMMUNITY

## 2020-06-03 RX ADMIN — ATORVASTATIN CALCIUM 20 MG: 10 TABLET, FILM COATED ORAL at 21:45

## 2020-06-03 RX ADMIN — GABAPENTIN 600 MG: 600 TABLET, FILM COATED ORAL at 19:14

## 2020-06-03 RX ADMIN — POTASSIUM CHLORIDE 10 MEQ: 200 INJECTION, SOLUTION INTRAVENOUS at 14:54

## 2020-06-03 RX ADMIN — POTASSIUM CHLORIDE 10 MEQ: 7.46 INJECTION, SOLUTION INTRAVENOUS at 20:18

## 2020-06-03 RX ADMIN — DROXIDOPA 600 MG: 300 CAPSULE ORAL at 22:34

## 2020-06-03 RX ADMIN — POTASSIUM CHLORIDE 40 MEQ: 750 TABLET, FILM COATED, EXTENDED RELEASE ORAL at 20:18

## 2020-06-03 RX ADMIN — ACETAMINOPHEN 650 MG: 325 TABLET, FILM COATED ORAL at 19:14

## 2020-06-03 RX ADMIN — QUETIAPINE FUMARATE 25 MG: 25 TABLET ORAL at 21:43

## 2020-06-03 RX ADMIN — POTASSIUM CHLORIDE 10 MEQ: 200 INJECTION, SOLUTION INTRAVENOUS at 13:25

## 2020-06-03 RX ADMIN — FLUDROCORTISONE ACETATE 0.1 MG: 0.1 TABLET ORAL at 19:14

## 2020-06-03 RX ADMIN — POTASSIUM BICARBONATE 20 MEQ: 782 TABLET, EFFERVESCENT ORAL at 13:25

## 2020-06-03 RX ADMIN — DONEPEZIL HYDROCHLORIDE 5 MG: 5 TABLET, FILM COATED ORAL at 21:43

## 2020-06-03 RX ADMIN — MORPHINE SULFATE 2 MG: 2 INJECTION, SOLUTION INTRAMUSCULAR; INTRAVENOUS at 11:35

## 2020-06-03 RX ADMIN — POTASSIUM CHLORIDE 10 MEQ: 7.46 INJECTION, SOLUTION INTRAVENOUS at 21:34

## 2020-06-03 RX ADMIN — CARBIDOPA AND LEVODOPA 1 TABLET: 25; 100 TABLET ORAL at 21:43

## 2020-06-03 RX ADMIN — POTASSIUM CHLORIDE 10 MEQ: 7.46 INJECTION, SOLUTION INTRAVENOUS at 22:37

## 2020-06-03 RX ADMIN — ACETAMINOPHEN 650 MG: 325 TABLET, FILM COATED ORAL at 21:45

## 2020-06-03 NOTE — PROGRESS NOTES
TRANSFER - IN REPORT:    Verbal report received from Alba Gan RN (name) on Carol Gaxiola  being received from [place] ED (unit) for routine progression of care      Report consisted of patients Situation, Background, Assessment and   Recommendations(SBAR). Information from the following report(s) SBAR was reviewed with the receiving nurse. Opportunity for questions and clarification was provided. Assessment completed upon patients arrival to unit and care assumed.

## 2020-06-03 NOTE — TELEPHONE ENCOUNTER
Leonor Reid with [facility] called said that the pt had a fall earlier today and was taken to [facility] and pt has low potassium      [phone]

## 2020-06-03 NOTE — ED NOTES
TRANSFER - OUT REPORT:    Verbal report given to Olivia Hill RN(name) on Cinthia Crooks  being transferred to (unit) for routine progression of care       Report consisted of patients Situation, Background, Assessment and   Recommendations(SBAR). Information from the following report(s) SBAR, Kardex, ED Summary, MAR, Recent Results and Cardiac Rhythm NSR was reviewed with the receiving nurse. Lines:   Peripheral IV 06/03/20 Left Antecubital (Active)   Site Assessment Clean, dry, & intact 6/3/2020  1:25 PM   Phlebitis Assessment 0 6/3/2020  1:25 PM   Infiltration Assessment 0 6/3/2020  1:25 PM   Dressing Status Clean, dry, & intact 6/3/2020  1:25 PM   Hub Color/Line Status Pink 6/3/2020  1:25 PM        Opportunity for questions and clarification was provided.       Patient transported with:   Monitor  O2 @ 2 liters

## 2020-06-03 NOTE — ED NOTES
Voicemail left with daughter to relay information for admission including [facility] bed number.

## 2020-06-03 NOTE — ED NOTES
Daughter updated on plan for admission at this time. Patient resting quietly in ED bed. Pt reports pain is manageable at this time. Lights dimmed per patient request. Call light in reach. IV Potassium infusing at this time.

## 2020-06-03 NOTE — ED TRIAGE NOTES
Pt states that she felt lightheaded and dizzy this morning at 5:30 causing a GLF, pt c/o lower back pain and left wrist pain, pt states that she did hit her head, pt states that she did have LOC. Pt denies use of blood thinners.

## 2020-06-03 NOTE — ED NOTES
Patient shallow breathing with pain and oxygen saturation dropping to 88% intermittently. ER MD aware. Pain medication to follow. Pt placed on 2L nasal cannula O2 at this time.

## 2020-06-03 NOTE — PROGRESS NOTES
Primary Nurse Wally Howard and Lencho Ribeiro., RN performed a dual skin assessment on this patient No impairment noted  Chaitanya score is 13    Patient has a scratch on left foot and a scab on left arm. Sacrum pink, but blanchable.

## 2020-06-03 NOTE — ED PROVIDER NOTES
HPI   The patient is a 58-year-old white female with a history of chronic pain bipolar disorder fibromyalgia chronic low back pain Parkinson's disease who is at assisted living when she had a spell where she may have passed out after getting up off the commode. In the fall she complains of low back pain and she hit her head. She has mild neck pain. She states her left hip hurts slightly though it is not foreshortened or externally rotated. She is awake and alert and baseline mental status.   Past Medical History:   Diagnosis Date    Bipolar disorder ([address]     Nazmy    Chronic pain     BACK PAIN    Diabetes (HCC)     BORDERLINE TX WITH DIET AND EXERCISE    DJD (degenerative joint disease)     Fibromyalgia     Genital herpes     GERD (gastroesophageal reflux disease)     Hypercholesterolemia     IBS (irritable bowel syndrome)     Ill-defined condition     fibromyligia    Lumbar disc disease     stimulation-Honza    Migraine     Nausea & vomiting     Other ill-defined conditions(799.89)     SEASONAL allergies    Other ill-defined conditions(799.89)     dysphagia has had esophagus dilated    Paget's disease     Parkinson disease ([address]     Pulmonary embolism (HCC)     RLS (restless legs syndrome)     Spinal stenosis        Past Surgical History:   Procedure Laterality Date    COLONOSCOPY N/A 6/27/2016    [facility] ENDOSCOPY    COLONOSCOPY N/A 2/22/2019    COLONOSCOPY performed by Melissa Rascon MD at [facility]. [address], COLON, DIAGNOSTIC      12, due 13    HX APPENDECTOMY      HX BACK SURGERY  9/17/2013    back surgery - total of 3 as of 12/20/2013    HX BREAST BIOPSY Right years ago    negative    HX CHOLECYSTECTOMY      HX HEENT      T & A    HX HYSTERECTOMY      total for fibroid tumors    HX ORTHOPAEDIC      lumbar laminectomy then fusion/neurostimulator implanted - inactive now but still in    HX ORTHOPAEDIC      REMOVAL OF NEUROSTIMULATOR    HX OTHER SURGICAL      EGD x 2 with dilation/colonoscopy - no polyps per pt    TILT TABLE EVALUATION  8/2/2016              Family History:   Problem Relation Age of Onset    Colon Cancer Mother     Cancer Mother 68        colon    Heart Disease Father     Heart Disease Maternal Grandmother     Heart Disease Maternal Grandfather     Heart Disease Other        Social History     Socioeconomic History    Marital status:      Spouse name: Not on file    Number of children: Not on file    Years of education: Not on file    Highest education level: Not on file   Occupational History    Occupation: volunteer     Employer: RETIRED     Comment: [employer]   Social Needs    Financial resource strain: Not on file    Food insecurity     Worry: Not on file     Inability: Not on file    Transportation needs     Medical: Not on file     Non-medical: Not on file   Tobacco Use    Smoking status: Never Smoker    Smokeless tobacco: Never Used   Substance and Sexual Activity    Alcohol use: No     Comment: 1 per month    Drug use: No    Sexual activity: Not on file   Lifestyle    Physical activity     Days per week: Not on file     Minutes per session: Not on file    Stress: Not on file   Relationships    Social connections     Talks on phone: Not on file     Gets together: Not on file     Attends Sabianism service: Not on file     Active member of club or organization: Not on file     Attends meetings of clubs or organizations: Not on file     Relationship status: Not on file    Intimate partner violence     Fear of current or ex partner: Not on file     Emotionally abused: Not on file     Physically abused: Not on file     Forced sexual activity: Not on file   Other Topics Concern    Not on file   Social History Narrative    Not on file         ALLERGIES: Adhesive; Hydrocodone; Lorazepam; Other medication; Percocet [oxycodone-acetaminophen]; Sudafed [pseudoephedrine hcl];  Wellbutrin [bupropion hcl]; Zithromax [azithromycin]; and Zyrtec [cetirizine]    Review of Systems   All other systems reviewed and are negative. Vitals:    06/03/20 1053   BP: 157/86   Pulse: 72   Resp: 18   Temp: 99.5 °F (37.5 °C)   SpO2: 97%   Weight: 59.9 kg (132 lb 0.9 oz)   Height: 5' 6\" (1.676 m)            Physical Exam  Vitals signs and nursing note reviewed. Constitutional:       Appearance: She is well-developed. Comments: Elderly chronically ill somewhat debilitated with parkinsonian tremor   HENT:      Head: Normocephalic and atraumatic. Mouth/Throat:      Pharynx: No oropharyngeal exudate. Eyes:      General: No scleral icterus. Conjunctiva/sclera: Conjunctivae normal.   Neck:      Musculoskeletal: Neck supple. Thyroid: No thyromegaly. Cardiovascular:      Rate and Rhythm: Normal rate and regular rhythm. Heart sounds: Normal heart sounds. No murmur. No friction rub. No gallop. Pulmonary:      Effort: Pulmonary effort is normal. No respiratory distress. Breath sounds: Normal breath sounds. No stridor. No wheezing or rales. Abdominal:      General: Bowel sounds are normal.      Palpations: Abdomen is soft. Tenderness: There is no abdominal tenderness. There is no guarding or rebound. Musculoskeletal: Normal range of motion. Comments: Leg not for foreshortened or externally rotated   Lymphadenopathy:      Cervical: No cervical adenopathy. Skin:     General: Skin is warm and dry. Neurological:      Mental Status: She is alert and oriented to person, place, and time.           MDM       Procedures

## 2020-06-04 ENCOUNTER — APPOINTMENT (OUTPATIENT)
Dept: GENERAL RADIOLOGY | Age: 75
DRG: 517 | End: 2020-06-04
Attending: HOSPITALIST
Payer: MEDICARE

## 2020-06-04 ENCOUNTER — APPOINTMENT (OUTPATIENT)
Dept: MRI IMAGING | Age: 75
DRG: 517 | End: 2020-06-04
Attending: HOSPITALIST
Payer: MEDICARE

## 2020-06-04 PROBLEM — S32.010A COMPRESSION FRACTURE OF L1 LUMBAR VERTEBRA (HCC): Status: ACTIVE | Noted: 2020-06-04

## 2020-06-04 LAB
ALBUMIN SERPL-MCNC: 2.9 G/DL (ref 3.5–5)
ALBUMIN/GLOB SERPL: 1 {RATIO} (ref 1.1–2.2)
ALP SERPL-CCNC: 110 U/L (ref 45–117)
ALT SERPL-CCNC: 12 U/L (ref 12–78)
ANION GAP SERPL CALC-SCNC: 6 MMOL/L (ref 5–15)
AST SERPL-CCNC: 71 U/L (ref 15–37)
ATRIAL RATE: 67 BPM
BASOPHILS # BLD: 0.1 K/UL (ref 0–0.1)
BASOPHILS NFR BLD: 1 % (ref 0–1)
BILIRUB SERPL-MCNC: 0.9 MG/DL (ref 0.2–1)
BUN SERPL-MCNC: 9 MG/DL (ref 6–20)
BUN/CREAT SERPL: 15 (ref 12–20)
CALCIUM SERPL-MCNC: 8.6 MG/DL (ref 8.5–10.1)
CALCULATED P AXIS, ECG09: 17 DEGREES
CALCULATED R AXIS, ECG10: 19 DEGREES
CALCULATED T AXIS, ECG11: 52 DEGREES
CHLORIDE SERPL-SCNC: 102 MMOL/L (ref 97–108)
CO2 SERPL-SCNC: 32 MMOL/L (ref 21–32)
CREAT SERPL-MCNC: 0.6 MG/DL (ref 0.55–1.02)
DIAGNOSIS, 93000: NORMAL
DIFFERENTIAL METHOD BLD: ABNORMAL
EOSINOPHIL # BLD: 0.2 K/UL (ref 0–0.4)
EOSINOPHIL NFR BLD: 3 % (ref 0–7)
ERYTHROCYTE [DISTWIDTH] IN BLOOD BY AUTOMATED COUNT: 13.5 % (ref 11.5–14.5)
GLOBULIN SER CALC-MCNC: 3 G/DL (ref 2–4)
GLUCOSE SERPL-MCNC: 120 MG/DL (ref 65–100)
HCT VFR BLD AUTO: 36.6 % (ref 35–47)
HGB BLD-MCNC: 12 G/DL (ref 11.5–16)
IMM GRANULOCYTES # BLD AUTO: 0 K/UL (ref 0–0.04)
IMM GRANULOCYTES NFR BLD AUTO: 1 % (ref 0–0.5)
LYMPHOCYTES # BLD: 2.2 K/UL (ref 0.8–3.5)
LYMPHOCYTES NFR BLD: 29 % (ref 12–49)
MAGNESIUM SERPL-MCNC: 1.7 MG/DL (ref 1.6–2.4)
MCH RBC QN AUTO: 30.3 PG (ref 26–34)
MCHC RBC AUTO-ENTMCNC: 32.8 G/DL (ref 30–36.5)
MCV RBC AUTO: 92.4 FL (ref 80–99)
MONOCYTES # BLD: 0.5 K/UL (ref 0–1)
MONOCYTES NFR BLD: 7 % (ref 5–13)
NEUTS SEG # BLD: 4.7 K/UL (ref 1.8–8)
NEUTS SEG NFR BLD: 59 % (ref 32–75)
NRBC # BLD: 0 K/UL (ref 0–0.01)
NRBC BLD-RTO: 0 PER 100 WBC
P-R INTERVAL, ECG05: 144 MS
PLATELET # BLD AUTO: 208 K/UL (ref 150–400)
PMV BLD AUTO: 9.1 FL (ref 8.9–12.9)
POTASSIUM SERPL-SCNC: 3.1 MMOL/L (ref 3.5–5.1)
PROT SERPL-MCNC: 5.9 G/DL (ref 6.4–8.2)
Q-T INTERVAL, ECG07: 460 MS
QRS DURATION, ECG06: 86 MS
QTC CALCULATION (BEZET), ECG08: 486 MS
RBC # BLD AUTO: 3.96 M/UL (ref 3.8–5.2)
SODIUM SERPL-SCNC: 140 MMOL/L (ref 136–145)
TROPONIN I SERPL-MCNC: <0.05 NG/ML
VENTRICULAR RATE, ECG03: 67 BPM
WBC # BLD AUTO: 7.7 K/UL (ref 3.6–11)

## 2020-06-04 PROCEDURE — 96376 TX/PRO/DX INJ SAME DRUG ADON: CPT

## 2020-06-04 PROCEDURE — 36415 COLL VENOUS BLD VENIPUNCTURE: CPT

## 2020-06-04 PROCEDURE — 65270000029 HC RM PRIVATE

## 2020-06-04 PROCEDURE — 77030038269 HC DRN EXT URIN PURWCK BARD -A

## 2020-06-04 PROCEDURE — 83735 ASSAY OF MAGNESIUM: CPT

## 2020-06-04 PROCEDURE — 74011250636 HC RX REV CODE- 250/636: Performed by: NURSE PRACTITIONER

## 2020-06-04 PROCEDURE — 74011250636 HC RX REV CODE- 250/636: Performed by: HOSPITALIST

## 2020-06-04 PROCEDURE — 84484 ASSAY OF TROPONIN QUANT: CPT

## 2020-06-04 PROCEDURE — 72148 MRI LUMBAR SPINE W/O DYE: CPT

## 2020-06-04 PROCEDURE — 99218 HC RM OBSERVATION: CPT

## 2020-06-04 PROCEDURE — 74011250637 HC RX REV CODE- 250/637: Performed by: HOSPITALIST

## 2020-06-04 PROCEDURE — 80053 COMPREHEN METABOLIC PANEL: CPT

## 2020-06-04 PROCEDURE — 93005 ELECTROCARDIOGRAM TRACING: CPT

## 2020-06-04 PROCEDURE — 77010033678 HC OXYGEN DAILY

## 2020-06-04 PROCEDURE — 74011000250 HC RX REV CODE- 250: Performed by: HOSPITALIST

## 2020-06-04 PROCEDURE — 85025 COMPLETE CBC W/AUTO DIFF WBC: CPT

## 2020-06-04 PROCEDURE — C9113 INJ PANTOPRAZOLE SODIUM, VIA: HCPCS | Performed by: HOSPITALIST

## 2020-06-04 PROCEDURE — 71045 X-RAY EXAM CHEST 1 VIEW: CPT

## 2020-06-04 PROCEDURE — 96375 TX/PRO/DX INJ NEW DRUG ADDON: CPT

## 2020-06-04 RX ORDER — MAGNESIUM SULFATE HEPTAHYDRATE 40 MG/ML
2 INJECTION, SOLUTION INTRAVENOUS ONCE
Status: COMPLETED | OUTPATIENT
Start: 2020-06-04 | End: 2020-06-04

## 2020-06-04 RX ORDER — CARBIDOPA AND LEVODOPA 25; 100 MG/1; MG/1
1 TABLET ORAL 3 TIMES DAILY
Status: DISCONTINUED | OUTPATIENT
Start: 2020-06-04 | End: 2020-06-04

## 2020-06-04 RX ORDER — POTASSIUM CHLORIDE 7.45 MG/ML
10 INJECTION INTRAVENOUS
Status: COMPLETED | OUTPATIENT
Start: 2020-06-04 | End: 2020-06-04

## 2020-06-04 RX ORDER — SUCRALFATE 1 G/1
1 TABLET ORAL
Status: DISCONTINUED | OUTPATIENT
Start: 2020-06-04 | End: 2020-06-10 | Stop reason: HOSPADM

## 2020-06-04 RX ORDER — CLONIDINE HYDROCHLORIDE 0.1 MG/1
0.1 TABLET ORAL
Status: DISCONTINUED | OUTPATIENT
Start: 2020-06-04 | End: 2020-06-10 | Stop reason: HOSPADM

## 2020-06-04 RX ADMIN — CARBIDOPA AND LEVODOPA 1 TABLET: 25; 100 TABLET ORAL at 09:21

## 2020-06-04 RX ADMIN — ACETAMINOPHEN 650 MG: 325 TABLET, FILM COATED ORAL at 09:21

## 2020-06-04 RX ADMIN — MORPHINE SULFATE 1 MG: 2 INJECTION, SOLUTION INTRAMUSCULAR; INTRAVENOUS at 15:59

## 2020-06-04 RX ADMIN — SODIUM CHLORIDE 40 MG: 9 INJECTION INTRAMUSCULAR; INTRAVENOUS; SUBCUTANEOUS at 21:06

## 2020-06-04 RX ADMIN — DROXIDOPA 600 MG: 300 CAPSULE ORAL at 12:09

## 2020-06-04 RX ADMIN — ACETAMINOPHEN 650 MG: 325 TABLET, FILM COATED ORAL at 18:06

## 2020-06-04 RX ADMIN — MORPHINE SULFATE 1 MG: 2 INJECTION, SOLUTION INTRAMUSCULAR; INTRAVENOUS at 08:47

## 2020-06-04 RX ADMIN — DROXIDOPA 600 MG: 300 CAPSULE ORAL at 21:12

## 2020-06-04 RX ADMIN — ATORVASTATIN CALCIUM 20 MG: 10 TABLET, FILM COATED ORAL at 21:06

## 2020-06-04 RX ADMIN — FLUDROCORTISONE ACETATE 0.1 MG: 0.1 TABLET ORAL at 18:06

## 2020-06-04 RX ADMIN — CARBIDOPA AND LEVODOPA 1 TABLET: 25; 100 TABLET ORAL at 21:06

## 2020-06-04 RX ADMIN — DONEPEZIL HYDROCHLORIDE 5 MG: 5 TABLET, FILM COATED ORAL at 21:06

## 2020-06-04 RX ADMIN — ACETAMINOPHEN 650 MG: 325 TABLET, FILM COATED ORAL at 21:06

## 2020-06-04 RX ADMIN — SUCRALFATE 1 G: 1 TABLET ORAL at 21:07

## 2020-06-04 RX ADMIN — POTASSIUM CHLORIDE 10 MEQ: 10 INJECTION, SOLUTION INTRAVENOUS at 08:26

## 2020-06-04 RX ADMIN — SUCRALFATE 1 G: 1 TABLET ORAL at 09:21

## 2020-06-04 RX ADMIN — CLONIDINE HYDROCHLORIDE 0.1 MG: 0.1 TABLET ORAL at 09:21

## 2020-06-04 RX ADMIN — SODIUM CHLORIDE 500 ML: 900 INJECTION, SOLUTION INTRAVENOUS at 19:37

## 2020-06-04 RX ADMIN — GABAPENTIN 600 MG: 600 TABLET, FILM COATED ORAL at 09:22

## 2020-06-04 RX ADMIN — POTASSIUM CHLORIDE 10 MEQ: 10 INJECTION, SOLUTION INTRAVENOUS at 09:00

## 2020-06-04 RX ADMIN — SUCRALFATE 1 G: 1 TABLET ORAL at 18:06

## 2020-06-04 RX ADMIN — FLUDROCORTISONE ACETATE 0.1 MG: 0.1 TABLET ORAL at 09:21

## 2020-06-04 RX ADMIN — FLUOXETINE 40 MG: 20 CAPSULE ORAL at 09:21

## 2020-06-04 RX ADMIN — SODIUM CHLORIDE 40 MG: 9 INJECTION INTRAMUSCULAR; INTRAVENOUS; SUBCUTANEOUS at 09:20

## 2020-06-04 RX ADMIN — GABAPENTIN 600 MG: 600 TABLET, FILM COATED ORAL at 18:06

## 2020-06-04 RX ADMIN — CARBIDOPA AND LEVODOPA 1 TABLET: 25; 100 TABLET ORAL at 15:59

## 2020-06-04 RX ADMIN — MAGNESIUM SULFATE HEPTAHYDRATE 2 G: 40 INJECTION, SOLUTION INTRAVENOUS at 09:21

## 2020-06-04 NOTE — PROGRESS NOTES
06/04/20 0822   Vital Signs   Temp 98.8 °F (37.1 °C)   Temp Source Oral   Pulse (Heart Rate) 72   Resp Rate 16   O2 Sat (%) 91 %   Level of Consciousness Alert   BP (!) 192/102   MAP (Calculated) 132   BP 1 Location Left arm   More BP/Pulse rows needed? Yes   MEWS Score 1   Additional Blood Pressure/Pulse Data   BP 2 (!) 190/100   MAP 2 (Calculated) 130   BP 2 Location Right arm   Pain 1   Pain Scale 1 Numeric (0 - 10)   Pain Intensity 1 4   Pain Location 1 Back   Pain Orientation 1 Upper   Pain Description 1 Aching   POSS Scale   Opioid Sedation Scale 1     Patient's blood pressure high. Patient complaining of chest discomfort and remote tele reported runs of vtach. Rapid response called. ECG, Troponin, Magnesium iv, morphine 1mg iv, clonidine 0.1mg tab ordered. Willow Seaman RN, Viri Soares RN, Dr. Karen Mcclain, respiratory and CCU RN present.

## 2020-06-04 NOTE — CONSULTS
NEUROSURGERY SPINE CONSULT NOTE    Subjective:     Date of Consultation:  June 4, 2020  Referring Physician: Dr. Madie Mejia is a 67 y.o. female who is being seen for acute back pain after a fall. .She has history of PD, dementia, orthostatic hypotension and lives in an assisted living facility. She was walking back from the bathroom when her legs gave out yesterday. She fell on her back and hit her head on the floor. She had immediate back pain after her fall. She was brought to [facility] via EMS. CT scan showed a mild acute appearing superior endplate fracture L1. MRI was obtained revealing this fracture as acute/subacute without invasion of the spinal canal. She reports having back surgery several years ago and is not sure who her surgeon was. She states her back pain is severe with movement. Currently her pain is a 3/10 after receiving IV morphine. Her pain does not radiate down her legs. She reports no issues with back pain prior to her fall. She can move all extremities against gravity and resistance. She denies any sensory impairment. She states she is afraid to get up because she doesn't want to fall again. Denies recent balance issues. No bowel or bladder changes.     Patient Active Problem List    Diagnosis Date Noted    UTI (urinary tract infection) 03/03/2020    AMS (altered mental status) 03/03/2020    Chest pain 12/11/2019    Type 2 diabetes mellitus with diabetic neuropathy ([facility] [address] 09/26/2019    Type 2 diabetes with nephropathy ([facility] [address] 07/26/2018    Syncope 05/28/2018    Spinal stenosis     Near syncope 08/02/2016    Resting tremor 07/29/2016    Orthostatic hypotension 07/05/2016    Advance directive on file 05/24/2016    Mixed hyperlipidemia 02/02/2016    Tubulovillous adenoma 08/21/2012    Abnormal mammogram 06/04/2012    Encounter for long-term (current) use of other medications 11/30/2011    Hammertoe 09/12/2011    Allergic rhinitis, cause unspecified 01/26/2011    Other diseases of nasal cavity and sinuses(478.19) 07/19/2010    IBS (irritable bowel syndrome)     RLS (restless legs syndrome)     Bipolar disorder (HCC)     Fibromyalgia     DJD (degenerative joint disease)     Lumbar disc disease     Paget's disease of bone     Migraine     Genital herpes      Family History   Problem Relation Age of Onset    Colon Cancer Mother     Cancer Mother 68        colon    Heart Disease Father     Heart Disease Maternal Grandmother     Heart Disease Maternal Grandfather     Heart Disease Other       Social History     Tobacco Use    Smoking status: Never Smoker    Smokeless tobacco: Never Used   Substance Use Topics    Alcohol use: No     Comment: 1 per month     Past Medical History:   Diagnosis Date    Bipolar disorder ([address]     Nazmy    Chronic pain     BACK PAIN    Diabetes ([address]     BORDERLINE TX WITH DIET AND EXERCISE    DJD (degenerative joint disease)     Fibromyalgia     Genital herpes     GERD (gastroesophageal reflux disease)     Hypercholesterolemia     IBS (irritable bowel syndrome)     Ill-defined condition     fibromyligia    Lumbar disc disease     stimulation-Honza    Migraine     Nausea & vomiting     Other ill-defined conditions(799.89)     SEASONAL allergies    Other ill-defined conditions(799.89)     dysphagia has had esophagus dilated    Paget's disease     Parkinson disease ([address]     Pulmonary embolism (HCC)     RLS (restless legs syndrome)     Spinal stenosis       Past Surgical History:   Procedure Laterality Date    COLONOSCOPY N/A 6/27/2016    COLONOSCOPY performed by Randal Mock MD at [facility] ENDOSCOPY    COLONOSCOPY N/A 2/22/2019    COLONOSCOPY performed by Willie Hansen MD at [facility] ENDOSCOPY    ENDOSCOPY, COLON, DIAGNOSTIC      12, due 15    HX APPENDECTOMY      HX BACK SURGERY  9/17/2013    back surgery - total of 3 as of 12/20/2013    HX BREAST BIOPSY Right years ago    negative    HX CHOLECYSTECTOMY      HX HEENT T & A    HX HYSTERECTOMY      total for fibroid tumors    HX ORTHOPAEDIC      lumbar laminectomy then fusion/neurostimulator implanted - inactive now but still in    HX ORTHOPAEDIC      REMOVAL OF NEUROSTIMULATOR    HX OTHER SURGICAL      EGD x 2 with dilation/colonoscopy - no polyps per pt    TILT TABLE EVALUATION  8/2/2016           Prior to Admission medications    Medication Sig Start Date End Date Taking? Authorizing Provider   ibuprofen (MOTRIN) 200 mg tablet Take 400 mg by mouth three (3) times daily. Yes Provider, Historical   carbidopa-levodopa (Sinemet)  mg per tablet Take 1 Tab by mouth three (3) times daily. Yes Provider, Historical   FLUoxetine (PROzac) 20 mg capsule Take 40 mg by mouth daily. Yes Provider, Historical   gabapentin (NEURONTIN) 600 mg tablet TAKE 1 TABLET BY MOUTH TWICE DAILY WITH BREAKFAST AND DINNER( 9AM AND 9PM) 5/4/20   Kieran Mcdaniel MD   hydrocortisone (Anusol-HC) 2.5 % rectal cream Insert  into rectum four (4) times daily. 4/17/20   Aleyda Meza MD   atorvastatin (LIPITOR) 20 mg tablet TAKE 1 TABLET BY MOUTH EVERY EVENING 4/14/20   Kieran Mcdaniel MD   phenazopyridine (PYRIDIUM) 100 mg tablet Take 1 Tab by mouth three (3) times daily as needed for Pain. Indications: urinary tract irritation 3/8/20   Mary Asencio MD   fludrocortisone (FLORINEF) 0.1 mg tablet TAKE 1 TABLET BY MOUTH TWICE DAILY 1/20/20   Zia DIANA NP   potassium chloride (K-DUR, KLOR-CON) 20 mEq tablet TAKE 1 TABLET BY MOUTH DAILY 1/15/20   Kieran Mcdaniel MD   sucralfate (CARAFATE) 1 gram tablet TAKE 1 TABLET BY MOUTH FOUR TIMES DAILY  Patient taking differently: Take  by mouth daily. 1/15/20   Kieran Mcdaniel MD   gabapentin (NEURONTIN) 300 mg capsule 600mg with breakfast, 300 mg with  lunch, 600mg at dinner- new directions  Indications: Neuropathic Pain 12/19/19   Yariel Campos MD   donepezil (ARICEPT) 5 mg tablet Take 5 mg by mouth nightly. Other, MD Bruce   droxidopa (NORTHERA) 300 mg cap Take 600 mg by mouth two (2) times a day. 7/25/19   Kathryn Wall MD   acetaminophen (TYLENOL) 325 mg tablet Take 325 mg by mouth every four (4) hours as needed for Pain. Provider, Historical   QUEtiapine (SEROQUEL) 25 mg tablet Take 50 mg by mouth nightly. Take 2 tabs qhs prn Monica Lara NP/ [facility]    Provider, Historical     Current Facility-Administered Medications   Medication Dose Route Frequency    pantoprazole (PROTONIX) 40 mg in 0.9% sodium chloride 10 mL injection  40 mg IntraVENous Q12H    cloNIDine HCL (CATAPRES) tablet 0.1 mg  0.1 mg Oral Q6H PRN    sucralfate (CARAFATE) tablet 1 g  1 g Oral AC&HS    atorvastatin (LIPITOR) tablet 20 mg  20 mg Oral QHS    carbidopa-levodopa (SINEMET)  mg per tablet 1 Tab  1 Tab Oral TID    donepeziL (ARICEPT) tablet 5 mg  5 mg Oral QHS    droxidopa cap 600 mg (Patient Supplied)  600 mg Oral Q12H    fludrocortisone (FLORINEF) tablet 0.1 mg  0.1 mg Oral BID    FLUoxetine (PROzac) capsule 40 mg  40 mg Oral DAILY    gabapentin (NEURONTIN) tablet 600 mg  600 mg Oral BID    QUEtiapine (SEROquel) tablet 25 mg  25 mg Oral QHS    acetaminophen (TYLENOL) tablet 650 mg  650 mg Oral QID    morphine injection 1 mg  1 mg IntraVENous Q6H PRN      Allergies   Allergen Reactions    Adhesive Itching    Hydrocodone Itching    Lorazepam Other (comments)    Other Medication Rash     Tide detergent    Percocet [Oxycodone-Acetaminophen] Itching    Sudafed [Pseudoephedrine Hcl] Other (comments)     Vertigo    Wellbutrin [Bupropion Hcl] Nausea and Vomiting    Zithromax [Azithromycin] Vertigo    Zyrtec [Cetirizine] Nausea Only        Review of Systems:  A comprehensive review of systems was negative except for that written in the HPI.     Mental Status: mild dementia    Objective:     Patient Vitals for the past 8 hrs:   BP Temp Pulse Resp SpO2   06/04/20 1544 130/75 98.6 °F (37 °C) (!) 57 16 99 %   06/04/20 1209 135/79  61     20 1132 118/76  66     20 1000 (!) 177/92  76     20 0914 (!) 194/99  70       Temp (24hrs), Av.6 °F (37 °C), Min:98.3 °F (36.8 °C), Max:99 °F (37.2 °C)      EXAM:   General:  Alert, cooperative, well noursished, well developed, appears stated age   Eyes:  Sclera anicteric. PERRL   Lungs:   Equal chest expansion. No accessory muscle use. CV:  Pulses palpable throughout   Abdomen:   Soft, non-tender. non-distended   Extremities: No cyanosis or edema   Skin: Skin color, texture, turgor normal. no acute rash or lesions   Neuro: Speech clear. Face symmetric. Extraocular muscle movement grossly intact. Strength equal and symmetric throughout. Sensation intact to light touch throughout. No clonus. Downgoing plantar reflex. Gait deferred. Musculoskeletal:  No swelling or deformity               Imaging Review:     Lumbar MRI  ALIGNMENT:  Grade 1 retrolisthesis L2-3 approximate 5 mm, and grade 1  anterolisthesis L4-5-1 proximally 5 mm similar to prior study. VERTEBRAL BODIES:  Mild superior endplate compression deformity and marrow edema  L1. No retropulsion. MARROW SIGNAL:  Marrow edema superior L1 endplate. SPINAL CORD:  Terminates at L1.   ADDITIONAL COMMENTS:  Prior posterior decompression and fusion L3-5. Partial  ankylosis L5-S1.     L1/2:  Mild disc bulge and ligamentum flavum thickening with facet degeneration  causing mild spinal canal and bilateral foraminal stenosis.     L2/3:  Disc bulge, facet degeneration and ligament flavum thickening cause  moderate spinal canal stenosis. Severe right and moderate left foraminal  stenosis.     L3/4:  Posterior decompression. Mild bilateral foraminal stenosis.     L4/5:  Posterior decompression. Suspect mild bilateral foraminal stenosis.      L5/S1:  The spinal canal and foramina are widely patent.     Labs:   Recent Results (from the past 24 hour(s))   METABOLIC PANEL, BASIC    Collection Time: 20  6:59 PM Result Value Ref Range    Sodium 142 136 - 145 mmol/L    Potassium 2.3 (LL) 3.5 - 5.1 mmol/L    Chloride 103 97 - 108 mmol/L    CO2 35 (H) 21 - 32 mmol/L    Anion gap 4 (L) 5 - 15 mmol/L    Glucose 114 (H) 65 - 100 mg/dL    BUN 7 6 - 20 MG/DL    Creatinine 0.63 0.55 - 1.02 MG/DL    BUN/Creatinine ratio 11 (L) 12 - 20      GFR est AA >60 >60 ml/min/1.73m2    GFR est non-AA >60 >60 ml/min/1.73m2    Calcium 8.5 8.5 - 10.1 MG/DL   CBC WITH AUTOMATED DIFF    Collection Time: 06/04/20  1:10 AM   Result Value Ref Range    WBC 7.7 3.6 - 11.0 K/uL    RBC 3.96 3.80 - 5.20 M/uL    HGB 12.0 11.5 - 16.0 g/dL    HCT 36.6 35.0 - 47.0 %    MCV 92.4 80.0 - 99.0 FL    MCH 30.3 26.0 - 34.0 PG    MCHC 32.8 30.0 - 36.5 g/dL    RDW 13.5 11.5 - 14.5 %    PLATELET 585 112 - 262 K/uL    MPV 9.1 8.9 - 12.9 FL    NRBC 0.0 0  WBC    ABSOLUTE NRBC 0.00 0.00 - 0.01 K/uL    NEUTROPHILS 59 32 - 75 %    LYMPHOCYTES 29 12 - 49 %    MONOCYTES 7 5 - 13 %    EOSINOPHILS 3 0 - 7 %    BASOPHILS 1 0 - 1 %    IMMATURE GRANULOCYTES 1 (H) 0.0 - 0.5 %    ABS. NEUTROPHILS 4.7 1.8 - 8.0 K/UL    ABS. LYMPHOCYTES 2.2 0.8 - 3.5 K/UL    ABS. MONOCYTES 0.5 0.0 - 1.0 K/UL    ABS. EOSINOPHILS 0.2 0.0 - 0.4 K/UL    ABS. BASOPHILS 0.1 0.0 - 0.1 K/UL    ABS. IMM. GRANS. 0.0 0.00 - 0.04 K/UL    DF AUTOMATED     METABOLIC PANEL, COMPREHENSIVE    Collection Time: 06/04/20  1:10 AM   Result Value Ref Range    Sodium 140 136 - 145 mmol/L    Potassium 3.1 (L) 3.5 - 5.1 mmol/L    Chloride 102 97 - 108 mmol/L    CO2 32 21 - 32 mmol/L    Anion gap 6 5 - 15 mmol/L    Glucose 120 (H) 65 - 100 mg/dL    BUN 9 6 - 20 MG/DL    Creatinine 0.60 0.55 - 1.02 MG/DL    BUN/Creatinine ratio 15 12 - 20      GFR est AA >60 >60 ml/min/1.73m2    GFR est non-AA >60 >60 ml/min/1.73m2    Calcium 8.6 8.5 - 10.1 MG/DL    Bilirubin, total 0.9 0.2 - 1.0 MG/DL    ALT (SGPT) 12 12 - 78 U/L    AST (SGOT) 71 (H) 15 - 37 U/L    Alk.  phosphatase 110 45 - 117 U/L    Protein, total 5.9 (L) 6.4 - 8.2 g/dL    Albumin 2.9 (L) 3.5 - 5.0 g/dL    Globulin 3.0 2.0 - 4.0 g/dL    A-G Ratio 1.0 (L) 1.1 - 2.2     EKG, 12 LEAD, INITIAL    Collection Time: 06/04/20  8:00 AM   Result Value Ref Range    Ventricular Rate 67 BPM    Atrial Rate 67 BPM    P-R Interval 144 ms    QRS Duration 86 ms    Q-T Interval 460 ms    QTC Calculation (Bezet) 486 ms    Calculated P Axis 17 degrees    Calculated R Axis 19 degrees    Calculated T Axis 52 degrees    Diagnosis       Normal sinus rhythm  Nonspecific T wave abnormality  Prolonged QT  Abnormal ECG  When compared with ECG of 04-JUN-2020 08:00,  premature atrial complexes are no longer present  Nonspecific T wave abnormality now evident in Inferior leads     TROPONIN I    Collection Time: 06/04/20  8:44 AM   Result Value Ref Range    Troponin-I, Qt. <0.05 <0.05 ng/mL         Impression/plan:   Ms. Janet Hagan is a pleasant 68 y/o female admitted s/p GLF with acute onset LBP. She has an acute L1 compression fracture. She is neurovascularly intact but nonambulatory due to pain and fear of falling. Discussed conservative management vs kyphoplasty. Patient is leaning towards a kyphoplasty due to her level of pain but would like to think about it. She does have some chronic stenosis and degenerative spondylolisthesis on her MRI but it doesn't appear to be symptomatic. She can followup with her spine surgeon at later date if it does become symptomatic. If patient agreeable recommend IR consult for kyphoplasty. If she elects for conservative management recommend PT obtain and fit patient for [place] lumbosacral orthosis for mobilization out of bed. Plan above discussed with Dr. Delmi He    Thank you for the opportunity to participate in this patient's care.       Sole Monsivais NP

## 2020-06-04 NOTE — H&P
[address] [facility] Adult  Hospitalist Group  History and Physical    Primary Care Provider: Juan Macias MD  Date of Service:  6/3/2020    Subjective:     Alexandre Marlow is a 67 y.o. female with PMH of parkinson's disease,dementia,orthostatic hypotension and other comorbidities came to ER after a fall. Patient states that she woke up this morning and while walking to the bathroom,her legs gave away and she fell down. She complaints of 7/10 lower back pain -describes it as sharp,non radiating,aggrevated with movement,relieved with morphine in ER. Patient reported to ER physician that she lost consciousness. She denied any chest pain,nasuea/vomiting,headache,SOB,dysuria,abdominal pain,muscle  Weakness or numbness/tingling. In the ER,work up showed hypokalemia. CT scan showed Mild acute appearing superior endplate fracture L1  Hospitalist was consulted for admission    Review of Systems:    A comprehensive review of systems was negative except for that written in the History of Present Illness.      Past Medical History:   Diagnosis Date    Bipolar disorder ([address]     Nazmy    Chronic pain     BACK PAIN    Diabetes ([address]     BORDERLINE TX WITH DIET AND EXERCISE    DJD (degenerative joint disease)     Fibromyalgia     Genital herpes     GERD (gastroesophageal reflux disease)     Hypercholesterolemia     IBS (irritable bowel syndrome)     Ill-defined condition     fibromyligia    Lumbar disc disease     stimulation-Honza    Migraine     Nausea & vomiting     Other ill-defined conditions(799.89)     SEASONAL allergies    Other ill-defined conditions(799.89)     dysphagia has had esophagus dilated    Paget's disease     Parkinson disease ([address]     Pulmonary embolism (HCC)     RLS (restless legs syndrome)     Spinal stenosis       Past Surgical History:   Procedure Laterality Date    COLONOSCOPY N/A 6/27/2016    COLONOSCOPY performed by Michael Guerrero MD at [address] COLONOSCOPY N/A 2/22/2019    COLONOSCOPY performed by Uzma Meza MD [facility] ENDOSCOPY    ENDOSCOPY, COLON, DIAGNOSTIC      12, due 13    HX APPENDECTOMY      HX BACK SURGERY  9/17/2013    back surgery - total of 3 as of 12/20/2013    HX BREAST BIOPSY Right years ago    negative    HX CHOLECYSTECTOMY      HX HEENT      T & A    HX HYSTERECTOMY      total for fibroid tumors    HX ORTHOPAEDIC      lumbar laminectomy then fusion/neurostimulator implanted - inactive now but still in    HX ORTHOPAEDIC      REMOVAL OF NEUROSTIMULATOR    HX OTHER SURGICAL      EGD x 2 with dilation/colonoscopy - no polyps per pt    TILT TABLE EVALUATION  8/2/2016          Prior to Admission medications    Medication Sig Start Date End Date Taking? Authorizing Provider   ibuprofen (MOTRIN) 200 mg tablet Take 400 mg by mouth three (3) times daily. Yes Provider, Historical   carbidopa-levodopa (Sinemet)  mg per tablet Take 1 Tab by mouth three (3) times daily. Yes Provider, Historical   FLUoxetine (PROzac) 20 mg capsule Take 40 mg by mouth daily. Yes Provider, Historical   gabapentin (NEURONTIN) 600 mg tablet TAKE 1 TABLET BY MOUTH TWICE DAILY WITH BREAKFAST AND DINNER( 9AM AND 9PM) 5/4/20   Christina Mcdaniel MD   hydrocortisone (Anusol-HC) 2.5 % rectal cream Insert  into rectum four (4) times daily. 4/17/20   Emma Alcantar MD   atorvastatin (LIPITOR) 20 mg tablet TAKE 1 TABLET BY MOUTH EVERY EVENING 4/14/20   Christina Mcdaniel MD   phenazopyridine (PYRIDIUM) 100 mg tablet Take 1 Tab by mouth three (3) times daily as needed for Pain.  Indications: urinary tract irritation 3/8/20   Domingo Pena MD   fludrocortisone (FLORINEF) 0.1 mg tablet TAKE 1 TABLET BY MOUTH TWICE DAILY 1/20/20   Darlys Hymen B, NP   potassium chloride (K-DUR, KLOR-CON) 20 mEq tablet TAKE 1 TABLET BY MOUTH DAILY 1/15/20   Christina Mcdaniel MD   sucralfate (CARAFATE) 1 gram tablet TAKE 1 TABLET BY MOUTH FOUR TIMES DAILY  Patient taking differently: Take  by mouth daily. 1/15/20   Dino Mcdaniel MD   gabapentin (NEURONTIN) 300 mg capsule 600mg with breakfast, 300 mg with  lunch, 600mg at dinner- new directions  Indications: Neuropathic Pain 12/19/19   Juan Macias MD   donepezil (ARICEPT) 5 mg tablet Take 5 mg by mouth nightly. Other, MD Bruce   droxidopa (NORTHERA) 300 mg cap Take 600 mg by mouth two (2) times a day. 7/25/19   Rahat Osorio MD   acetaminophen (TYLENOL) 325 mg tablet Take 325 mg by mouth every four (4) hours as needed for Pain. Provider, Historical   QUEtiapine (SEROQUEL) 25 mg tablet Take 50 mg by mouth nightly. Take 2 tabs qhs prn Ravi Keto NP/ [facility]    Provider, Historical     Allergies   Allergen Reactions    Adhesive Itching    Hydrocodone Itching    Lorazepam Other (comments)    Other Medication Rash     Tide detergent    Percocet [Oxycodone-Acetaminophen] Itching    Sudafed [Pseudoephedrine Hcl] Other (comments)     Vertigo    Wellbutrin [Bupropion Hcl] Nausea and Vomiting    Zithromax [Azithromycin] Vertigo    Zyrtec [Cetirizine] Nausea Only      Family History   Problem Relation Age of Onset    Colon Cancer Mother     Cancer Mother 68        colon    Heart Disease Father     Heart Disease Maternal Grandmother     Heart Disease Maternal Grandfather     Heart Disease Other         SOCIAL HISTORY:  Patient resides at [facility].   Patient ambulates walker   Smoking history: does not smoke  Alcohol history: does not drink alcohol        Objective:       Physical Exam:   Gen: elderly patient,in no acute distress  HEENT: EOMI,anicteric, no pallor,hearing intact to voice, moist mucous membranes,sinus non tender  Neck:  Supple, without masses, thyroid non-tender  Resp:  No accessory muscle use, clear breath sounds without wheezes rales or rhonchi  Card:  No murmurs, normal S1, S2   Abd:  Soft, non-tender, non-distended, normoactive bowel sounds are present  Lymph:  No palpable lymph nodes  Musc:  No LE edema  Skin:  No rashes or ulcers, skin turgor is good  Neuro:  alert and oriented 2-3, has UE tremors, muscle strength intact, able to move b/L LE against resistnace  Psych:  not anxious or agitated         ECG: initial EKG has artifact  Repeat EKG ordered    Data Review: All diagnostic labs and studies have been reviewed. Xr Wrist Lt Ap/lat/obl Min 3v    Result Date: 6/3/2020  IMPRESSION: Postsurgical changes and osteoarthritis. No acute abnormality. Ct Head Wo Cont    Result Date: 6/3/2020  IMPRESSION: No acute process or change compared to the prior exam.     Ct Spine Cerv Wo Cont    Result Date: 6/3/2020  IMPRESSION: Multilevel spondylosis and osseous fusion with spinal stenosis and neural foraminal narrowing as above. No acute abnormality. Ct Abd Pelv Wo Cont    Result Date: 6/3/2020  IMPRESSION: Mild acute appearing superior endplate fracture L1. Status post cholecystectomy, appendectomy, and hysterectomy. No acute intra-abdominal abnormality. Xr Chest Port    Result Date: 6/3/2020  Impression: No acute process. Assessment:   # Acute mild L1 compression fracture after a fall:  #Mechanical fall vs syncope:  -CT head - no acute abnormalities  -Repeat EKG. Check orthostatic vitals ,tele monitoring  -Pain management -IV morphine prn  - MRI L spine for eval    #Hypokalemia due to florinef:  Hypomagnesemia:  -repleted in ER,recheck again    # Parkinson's disease  #Orthosattic hypotension:  -resume carbidopa,northera and florinef    #Dementia:  -continue  Home meds     # Hyperlipidemia  - Continue home statin        # Anxiety/depression  - Continue seroquel, Prozac. Discussed code status -what resusciation means in case of cardiac or resp arrest -patient said she is DNR/DNI.  Daughter,Elizabeth also confirmed that patient is DNR/DNI        Signed By: Shawna Maldonado MD     Barbara 3, 2020

## 2020-06-04 NOTE — PROGRESS NOTES
"I think I'm having an allergic reaction. I feel pinchy to my skin and itching in my ears after handling clams at 10PM today" Spiritual Care Assessment/Progress Note  [facility]      NAME: Alexandre Marlow      MRN: [id number]  AGE: 67 y.o. SEX: female  Sabianist Affiliation: [facility]   Language: English     6/4/2020     Total Time (in minutes): 20     Spiritual Assessment begun in [place] through conversation with:         []Patient        [] Family    [] Friend(s)        Reason for Consult: Rapid response team     Spiritual beliefs: (Please include comment if needed)     [] Identifies with a amy tradition:         [] Supported by a amy community:            [] Claims no spiritual orientation:           [] Seeking spiritual identity:                [] Adheres to an individual form of spirituality:           [x] Not able to assess:                           Identified resources for coping:      [] Prayer                               [] Music                  [] Guided Imagery     [] Family/friends                 [] Pet visits     [] Devotional reading                         [] Unknown     [] Other:                                              Interventions offered during this visit: (See comments for more details)    Patient Interventions: Initial visit           Plan of Care:     [] Support spiritual and/or cultural needs    [] Support AMD and/or advance care planning process      [] Support grieving process   [] Coordinate Rites and/or Rituals    [] Coordination with community clergy   [] No spiritual needs identified at this time   [] Detailed Plan of Care below (See Comments)  [] Make referral to Music Therapy  [] Make referral to Pet Therapy     [] Make referral to Addiction services  [] Make referral to [facility]  [] Make referral to Spiritual Care Partner  [] No future visits requested        [x] Follow up visits as needed     Responded to RRT. Pt was being attended to by medical staff and no family present. Chaplains will continue to offer support as needed.   Chaplain German MDiv, MS, BCC  [phone]

## 2020-06-04 NOTE — PROGRESS NOTES
RUR- Observation Status    YULISSA- Disposition TBD-  Patient resides at [address] apartment with [address] CRM LVM for the daughter/ROSARIO Ponce [phone]. Care Management Interventions  PCP Verified by CM: Yes(Dr. Mariann Madera)  Palliative Care Criteria Met (RRAT>21 & CHF Dx)?: No  Mode of Transport at Discharge:  Other (see comment)(TBD)  Transition of Care Consult (CM Consult): Discharge Planning  MyChart Signup: No  Discharge Durable Medical Equipment: No(owns walker and Rollator)  Health Maintenance Reviewed: Yes  Physical Therapy Consult: No  Occupational Therapy Consult: No  Speech Therapy Consult: No  Current Support Network: Lives Alone(Indendent apartment at [employer]- with [address])  Confirm Follow Up Transport: Family  The Plan for Transition of Care is Related to the Following Treatment Goals : Medical work up - follow for discharge planning  The Patient and/or Patient Representative was Provided with a Choice of Provider and Agrees with the Discharge Plan?: Yes  Name of the Patient Representative Who was Provided with a Choice of Provider and Agrees with the Discharge Plan: the patient and the daughter  Freedom of Choice List was Provided with Basic Dialogue that Supports the Patient's Individualized Plan of Care/Goals, Treatment Preferences and Shares the Quality Data Associated with the Providers?: Yes  [place] Resource Information Provided?: No  Discharge Location  Discharge Placement: Home with home health(TBD)    Reason for Admission:   Syncope/fall                   RUR Score:     Observation status                Plan for utilizing home health:      TBD- The patient is open to [facility]     PCP: First and Last name:  Dr. Citlali Valencia   Name of Practice:    Are you a current patient: Yes/No: yes   Approximate date of last visit: patient could not recall     Can you participate in a virtual visit with your PCP: No Current Advanced Directive/Advance Care Plan: ON File and verified- DNR                         Transition of Care Plan:      TBD- Home Health    CRM met with the patient, introduced self, explained role of CRM and offered supports. The patient lives in an independent apartment at [facility]. She is followed by [facility] [phone]. The patient owns a walker and a Rollator. The patient requested that [address] [place] [address] Avenue call her daughter for discharge planning. [phone]. Will follow for transition of care.      Clarissa Ornelas, [facility] [address]   [phone]

## 2020-06-04 NOTE — PROGRESS NOTES
Patient urine very dark in color, brown. Output today around 500. No fluids ordered. Patient ate lunch and dinner, drinking fluids. Paged NP Emaline Lefort to report. Awaiting call back. NP Emaline Lefort returned call and ordered a 500 bolus to run. Continuing to monitor.

## 2020-06-04 NOTE — PROGRESS NOTES
[address] [facility]  Hospitalist Group                                                                                          Hospitalist Progress Note  Janeth Avila MD  Answering service: [phone] from in house phone        Date of Service:  2020  NAME:  Cinthia Crooks  :  1945  MRN:  [id number]      Admission Summary:     Cinthia Crooks is a 67 y.o. female with PMH of parkinson's disease,dementia,orthostatic hypotension and other comorbidities came to ER after a fall.     Patient states that she woke up this morning and while walking to the bathroom,her legs gave away and she fell down. She complaints of 7/10 lower back pain -describes it as sharp,non radiating,aggrevated with movement,relieved with morphine in ER. Patient reported to ER physician that she lost consciousness. She denied any chest pain,nasuea/vomiting,headache,SOB,dysuria,abdominal pain,muscle  Weakness or numbness/tingling.     In the ER,work up showed hypokalemia. CT scan showed Mild acute appearing superior endplate fracture L1  Hospitalist was consulted for admission       Interval history / Subjective:     RRT was called this morning at 8:30 am for epigastric burning pain which radiate to the back, no shortness of breath or palpitation. There is a report of nonstained VT on monitor but it was artifact     Assessment & Plan:     Acute mild L1 compression fracture after a fall  -MRI lumbar spine show Mild acute/subacute superior endplate compression fracture L1. Postsurgical and degenerative changes remainder lumbar spine as above.  -continue prn morphine   -consult to Orthopedic surgeon     Epigastric pain radiate to the back  -no left side chest pain   -add iv protonix, po carafate  -chest x ray   -EKG normal sinus rhythm vent rate 67 bpm, non specific st t wave, QTc 486, check troponin     Mechanical fall vs syncope:  -CT head - no acute abnormalities  -Repeat EKG.    -Check orthostatic vitals ,tele monitoring  -IV morphine prn  -PT/OT      Hypokalemia due to florinef  -replace with kcl and monitor k level    Hypomagnesemia:  -repleted in ER,recheck again     Parkinson's disease  -continue home sinemet    Orthosattic hypotension:  -resume carbidopa, northera and florinef  -BP elevated, add prn clonidine   -check orthostatic BP     Dementia:  -continue home aricept      Hyperlipidemia  - Continue home statin     Anxiety/depression  - Continue  Prozac.  -Hold seroquel for prolong QT        Code status: DNR  DVT prophylaxis: SCD    Care Plan discussed with: Patient/Family and Nurse  Anticipated Disposition: Home w/Family  Anticipated Discharge: 24 hours to 48 hours     Hospital Problems  Date Reviewed: 2/18/2020          Codes Class Noted POA    Syncope ICD-10-CM: R55  ICD-9-CM: 780.2  5/28/2018 Unknown                  Vital Signs:    Last 24hrs VS reviewed since prior progress note. Most recent are:  Visit Vitals  BP (!) 192/102 (BP 1 Location: Left arm)   Pulse 72   Temp 98.8 °F (37.1 °C)   Resp 16   Ht 5' 6\" (1.676 m)   Wt 59.9 kg (132 lb 0.9 oz)   SpO2 91%   BMI 21.31 kg/m²         Intake/Output Summary (Last 24 hours) at 6/4/2020 0274  Last data filed at 6/3/2020 1550  Gross per 24 hour   Intake 100 ml   Output 800 ml   Net -700 ml        Physical Examination:             Constitutional:  No acute distress, cooperative, pleasant    ENT:  Oral mucosa moist, oropharynx benign. Resp:  CTA bilaterally. No wheezing/rhonchi/rales. No accessory muscle use   CV:  Regular rhythm, normal rate, no murmurs, gallops, rubs    GI:  Soft, non distended, non tender.  normoactive bowel sounds, no hepatosplenomegaly     Musculoskeletal:  No edema,     Neurologic:  Conscious and alert, oriented to place and person, Moves all extremities, CN II-XII reviewed     Skin:  Good turgor, no rashes or ulcers       Data Review:    Review and/or order of clinical lab test  Review and/or order of tests in the radiology section of CPT  Review and/or order of tests in the medicine section of CPT      Labs:     Recent Labs     06/04/20  0110 06/03/20  1056   WBC 7.7 10.1   HGB 12.0 13.8   HCT 36.6 43.3    278     Recent Labs     06/04/20  0110 06/03/20  1859 06/03/20  1300 06/03/20  1056    142  --  144   K 3.1* 2.3*  --  2.3*    103  --  101   CO2 32 35*  --  39*   BUN 9 7  --  9   CREA 0.60 0.63  --  0.71   * 114*  --  104*   CA 8.6 8.5  --  8.8   MG  --   --  1.6  --      Recent Labs     06/04/20  0110 06/03/20  1056   ALT 12 23    94   TBILI 0.9 1.0   TP 5.9* 7.3   ALB 2.9* 3.6   GLOB 3.0 3.7     Recent Labs     06/03/20  1056   INR 1.1   PTP 10.4   APTT 22.5      No results for input(s): FE, TIBC, PSAT, FERR in the last 72 hours. Lab Results   Component Value Date/Time    Folate 14.0 02/21/2019 07:22 AM      No results for input(s): PH, PCO2, PO2 in the last 72 hours.   Recent Labs     06/03/20  1056   TROIQ <0.05     Lab Results   Component Value Date/Time    Cholesterol, total 134 02/26/2020 09:40 AM    HDL Cholesterol 65 02/26/2020 09:40 AM    LDL, calculated 47 02/26/2020 09:40 AM    Triglyceride 111 02/26/2020 09:40 AM    CHOL/HDL Ratio 3.3 07/23/2010 08:37 AM     Lab Results   Component Value Date/Time    Glucose (POC) 102 (H) 03/08/2020 06:21 AM    Glucose (POC) 113 (H) 03/07/2020 09:18 PM    Glucose (POC) 129 (H) 03/06/2020 09:16 PM    Glucose (POC) 111 (H) 03/06/2020 04:06 PM    Glucose (POC) 139 (H) 03/06/2020 11:14 AM     Lab Results   Component Value Date/Time    Color YELLOW/STRAW 06/03/2020 12:54 PM    Appearance CLEAR 06/03/2020 12:54 PM    Specific gravity 1.015 06/03/2020 12:54 PM    Specific gravity 1.009 04/22/2020 10:33 AM    pH (UA) >8.5 (H) 06/03/2020 12:54 PM    Protein Negative 06/03/2020 12:54 PM    Glucose Negative 06/03/2020 12:54 PM    Ketone Negative 06/03/2020 12:54 PM    Bilirubin Negative 06/03/2020 12:54 PM    Urobilinogen 0.2 06/03/2020 12:54 PM    Nitrites Negative 06/03/2020 12:54 PM    Leukocyte Esterase Negative 06/03/2020 12:54 PM    Epithelial cells FEW 06/03/2020 12:54 PM    Bacteria Negative 06/03/2020 12:54 PM    WBC 0-4 06/03/2020 12:54 PM    RBC 0-5 06/03/2020 12:54 PM         Medications Reviewed:     Current Facility-Administered Medications   Medication Dose Route Frequency    potassium chloride 10 mEq in 100 ml IVPB  10 mEq IntraVENous Q1H    atorvastatin (LIPITOR) tablet 20 mg  20 mg Oral QHS    carbidopa-levodopa (SINEMET)  mg per tablet 1 Tab  1 Tab Oral TID    donepeziL (ARICEPT) tablet 5 mg  5 mg Oral QHS    droxidopa cap 600 mg (Patient Supplied)  600 mg Oral Q12H    fludrocortisone (FLORINEF) tablet 0.1 mg  0.1 mg Oral BID    FLUoxetine (PROzac) capsule 40 mg  40 mg Oral DAILY    gabapentin (NEURONTIN) tablet 600 mg  600 mg Oral BID    QUEtiapine (SEROquel) tablet 25 mg  25 mg Oral QHS    acetaminophen (TYLENOL) tablet 650 mg  650 mg Oral QID    morphine injection 1 mg  1 mg IntraVENous Q6H PRN     ______________________________________________________________________  EXPECTED LENGTH OF STAY: - - -  ACTUAL LENGTH OF STAY:          0                 Juany Melgar MD

## 2020-06-04 NOTE — PROGRESS NOTES
Bedside and Verbal shift change report given to Wu Bryant (oncoming nurse) by Pete Hill (offgoing nurse). Report included the following information SBAR, Kardex, ED Summary and MAR.

## 2020-06-04 NOTE — PROGRESS NOTES
Ortho consult called to Dr. Carmita Pino. Dr. Hay Ax office called back and asked for us to consult with Alexandrea Stein NP.  Message sent to vivienne.

## 2020-06-05 LAB
ALBUMIN SERPL-MCNC: 2.8 G/DL (ref 3.5–5)
ALBUMIN/GLOB SERPL: 1 {RATIO} (ref 1.1–2.2)
ALP SERPL-CCNC: 104 U/L (ref 45–117)
ALT SERPL-CCNC: 22 U/L (ref 12–78)
ANION GAP SERPL CALC-SCNC: 6 MMOL/L (ref 5–15)
AST SERPL-CCNC: 43 U/L (ref 15–37)
BILIRUB SERPL-MCNC: 0.7 MG/DL (ref 0.2–1)
BUN SERPL-MCNC: 13 MG/DL (ref 6–20)
BUN/CREAT SERPL: 21 (ref 12–20)
CALCIUM SERPL-MCNC: 8.3 MG/DL (ref 8.5–10.1)
CHLORIDE SERPL-SCNC: 102 MMOL/L (ref 97–108)
CO2 SERPL-SCNC: 30 MMOL/L (ref 21–32)
CREAT SERPL-MCNC: 0.61 MG/DL (ref 0.55–1.02)
ERYTHROCYTE [DISTWIDTH] IN BLOOD BY AUTOMATED COUNT: 13.2 % (ref 11.5–14.5)
GLOBULIN SER CALC-MCNC: 2.8 G/DL (ref 2–4)
GLUCOSE SERPL-MCNC: 111 MG/DL (ref 65–100)
HCT VFR BLD AUTO: 36.7 % (ref 35–47)
HGB BLD-MCNC: 12.2 G/DL (ref 11.5–16)
MCH RBC QN AUTO: 30.7 PG (ref 26–34)
MCHC RBC AUTO-ENTMCNC: 33.2 G/DL (ref 30–36.5)
MCV RBC AUTO: 92.4 FL (ref 80–99)
NRBC # BLD: 0 K/UL (ref 0–0.01)
NRBC BLD-RTO: 0 PER 100 WBC
PLATELET # BLD AUTO: 201 K/UL (ref 150–400)
PMV BLD AUTO: 9.5 FL (ref 8.9–12.9)
POTASSIUM SERPL-SCNC: 2.6 MMOL/L (ref 3.5–5.1)
PROT SERPL-MCNC: 5.6 G/DL (ref 6.4–8.2)
RBC # BLD AUTO: 3.97 M/UL (ref 3.8–5.2)
SODIUM SERPL-SCNC: 138 MMOL/L (ref 136–145)
WBC # BLD AUTO: 5.3 K/UL (ref 3.6–11)

## 2020-06-05 PROCEDURE — 74011250637 HC RX REV CODE- 250/637: Performed by: INTERNAL MEDICINE

## 2020-06-05 PROCEDURE — L0464 TLSO 4MOD SACRO-SCAP PRE: HCPCS

## 2020-06-05 PROCEDURE — 74011250636 HC RX REV CODE- 250/636: Performed by: NURSE PRACTITIONER

## 2020-06-05 PROCEDURE — 65270000029 HC RM PRIVATE

## 2020-06-05 PROCEDURE — 74011250636 HC RX REV CODE- 250/636: Performed by: HOSPITALIST

## 2020-06-05 PROCEDURE — C9113 INJ PANTOPRAZOLE SODIUM, VIA: HCPCS | Performed by: HOSPITALIST

## 2020-06-05 PROCEDURE — 80053 COMPREHEN METABOLIC PANEL: CPT

## 2020-06-05 PROCEDURE — 74011250637 HC RX REV CODE- 250/637: Performed by: HOSPITALIST

## 2020-06-05 PROCEDURE — 97535 SELF CARE MNGMENT TRAINING: CPT

## 2020-06-05 PROCEDURE — 77030038269 HC DRN EXT URIN PURWCK BARD -A

## 2020-06-05 PROCEDURE — 97165 OT EVAL LOW COMPLEX 30 MIN: CPT

## 2020-06-05 PROCEDURE — 97530 THERAPEUTIC ACTIVITIES: CPT

## 2020-06-05 PROCEDURE — 85027 COMPLETE CBC AUTOMATED: CPT

## 2020-06-05 PROCEDURE — 74011000250 HC RX REV CODE- 250: Performed by: HOSPITALIST

## 2020-06-05 PROCEDURE — 97116 GAIT TRAINING THERAPY: CPT

## 2020-06-05 PROCEDURE — 36415 COLL VENOUS BLD VENIPUNCTURE: CPT

## 2020-06-05 PROCEDURE — 74011250637 HC RX REV CODE- 250/637: Performed by: NURSE PRACTITIONER

## 2020-06-05 PROCEDURE — 97161 PT EVAL LOW COMPLEX 20 MIN: CPT

## 2020-06-05 PROCEDURE — 77030027138 HC INCENT SPIROMETER -A

## 2020-06-05 RX ORDER — CALCIUM CARBONATE 200(500)MG
200 TABLET,CHEWABLE ORAL
Status: DISCONTINUED | OUTPATIENT
Start: 2020-06-05 | End: 2020-06-10 | Stop reason: HOSPADM

## 2020-06-05 RX ORDER — PHENAZOPYRIDINE HYDROCHLORIDE 100 MG/1
100 TABLET, FILM COATED ORAL
Status: DISCONTINUED | OUTPATIENT
Start: 2020-06-05 | End: 2020-06-10 | Stop reason: HOSPADM

## 2020-06-05 RX ORDER — POTASSIUM CHLORIDE 7.45 MG/ML
10 INJECTION INTRAVENOUS
Status: DISCONTINUED | OUTPATIENT
Start: 2020-06-05 | End: 2020-06-05

## 2020-06-05 RX ORDER — POTASSIUM CHLORIDE 7.45 MG/ML
10 INJECTION INTRAVENOUS
Status: COMPLETED | OUTPATIENT
Start: 2020-06-05 | End: 2020-06-05

## 2020-06-05 RX ORDER — POTASSIUM CHLORIDE 750 MG/1
40 TABLET, FILM COATED, EXTENDED RELEASE ORAL
Status: COMPLETED | OUTPATIENT
Start: 2020-06-05 | End: 2020-06-05

## 2020-06-05 RX ORDER — HYDROCORTISONE 25 MG/G
CREAM TOPICAL 4 TIMES DAILY
Status: DISCONTINUED | OUTPATIENT
Start: 2020-06-05 | End: 2020-06-10 | Stop reason: HOSPADM

## 2020-06-05 RX ADMIN — CALCIUM CARBONATE (ANTACID) CHEW TAB 500 MG 200 MG: 500 CHEW TAB at 10:04

## 2020-06-05 RX ADMIN — POTASSIUM CHLORIDE 10 MEQ: 10 INJECTION, SOLUTION INTRAVENOUS at 08:17

## 2020-06-05 RX ADMIN — CARBIDOPA AND LEVODOPA 1 TABLET: 25; 100 TABLET ORAL at 22:03

## 2020-06-05 RX ADMIN — DONEPEZIL HYDROCHLORIDE 5 MG: 5 TABLET, FILM COATED ORAL at 22:02

## 2020-06-05 RX ADMIN — SODIUM CHLORIDE 40 MG: 9 INJECTION INTRAMUSCULAR; INTRAVENOUS; SUBCUTANEOUS at 21:43

## 2020-06-05 RX ADMIN — SUCRALFATE 1 G: 1 TABLET ORAL at 22:02

## 2020-06-05 RX ADMIN — ACETAMINOPHEN 650 MG: 325 TABLET, FILM COATED ORAL at 22:02

## 2020-06-05 RX ADMIN — ACETAMINOPHEN 650 MG: 325 TABLET, FILM COATED ORAL at 09:54

## 2020-06-05 RX ADMIN — ACETAMINOPHEN 650 MG: 325 TABLET, FILM COATED ORAL at 13:56

## 2020-06-05 RX ADMIN — POTASSIUM CHLORIDE 10 MEQ: 10 INJECTION, SOLUTION INTRAVENOUS at 08:50

## 2020-06-05 RX ADMIN — CARBIDOPA AND LEVODOPA 1 TABLET: 25; 100 TABLET ORAL at 18:53

## 2020-06-05 RX ADMIN — CARBIDOPA AND LEVODOPA 1 TABLET: 25; 100 TABLET ORAL at 09:54

## 2020-06-05 RX ADMIN — CALCIUM CARBONATE (ANTACID) CHEW TAB 500 MG 200 MG: 500 CHEW TAB at 18:53

## 2020-06-05 RX ADMIN — SUCRALFATE 1 G: 1 TABLET ORAL at 10:32

## 2020-06-05 RX ADMIN — GABAPENTIN 600 MG: 600 TABLET, FILM COATED ORAL at 09:54

## 2020-06-05 RX ADMIN — SUCRALFATE 1 G: 1 TABLET ORAL at 18:53

## 2020-06-05 RX ADMIN — PHENAZOPYRIDINE 100 MG: 100 TABLET ORAL at 10:32

## 2020-06-05 RX ADMIN — DROXIDOPA 600 MG: 300 CAPSULE ORAL at 23:30

## 2020-06-05 RX ADMIN — FLUDROCORTISONE ACETATE 0.1 MG: 0.1 TABLET ORAL at 18:53

## 2020-06-05 RX ADMIN — SODIUM CHLORIDE 40 MG: 9 INJECTION INTRAMUSCULAR; INTRAVENOUS; SUBCUTANEOUS at 09:41

## 2020-06-05 RX ADMIN — GABAPENTIN 600 MG: 600 TABLET, FILM COATED ORAL at 18:53

## 2020-06-05 RX ADMIN — POTASSIUM CHLORIDE 40 MEQ: 750 TABLET, FILM COATED, EXTENDED RELEASE ORAL at 06:31

## 2020-06-05 RX ADMIN — SUCRALFATE 1 G: 1 TABLET ORAL at 06:31

## 2020-06-05 RX ADMIN — DROXIDOPA 600 MG: 300 CAPSULE ORAL at 10:10

## 2020-06-05 RX ADMIN — ATORVASTATIN CALCIUM 20 MG: 10 TABLET, FILM COATED ORAL at 22:02

## 2020-06-05 RX ADMIN — ACETAMINOPHEN 650 MG: 325 TABLET, FILM COATED ORAL at 18:53

## 2020-06-05 RX ADMIN — HYDROCORTISONE: 25 CREAM TOPICAL at 18:00

## 2020-06-05 RX ADMIN — POTASSIUM CHLORIDE 10 MEQ: 10 INJECTION, SOLUTION INTRAVENOUS at 06:31

## 2020-06-05 RX ADMIN — POTASSIUM CHLORIDE 40 MEQ: 750 TABLET, FILM COATED, EXTENDED RELEASE ORAL at 10:13

## 2020-06-05 RX ADMIN — FLUOXETINE 40 MG: 20 CAPSULE ORAL at 09:53

## 2020-06-05 NOTE — PROGRESS NOTES
Bedside shift change report given to Yolie (oncoming nurse) by Abiola Fine (offgoing nurse). Report included the following information SBAR, Kardex, Intake/Output, MAR and Recent Results.

## 2020-06-05 NOTE — PROGRESS NOTES
Bedside shift change report given to Brando Baum (oncoming nurse) by Ashish Marks (offgoing nurse). Report included the following information SBAR, Kardex, Intake/Output and MAR.

## 2020-06-05 NOTE — PROGRESS NOTES
Problem: Self Care Deficits Care Plan (Adult)  Goal: *Acute Goals and Plan of Care (Insert Text)  Description: FUNCTIONAL STATUS PRIOR TO ADMISSION: Patient was modified independent using a rolling walker for functional mobility, has a rollator but prefers RW. Patient was modified independent for basic and instrumental ADLs, hx of Parkinson's dx. HOME SUPPORT: The patient lived alone at [facility]    Occupational Therapy Goals  Initiated 6/5/2020    1. Patient will perform lower body dressing with supervision/set-up using AE PRN within 7 days. 2.  Patient will perform toilet transfer with supervision/set-up using most appropriate DME within 7 days. 3.  Patient will grooming at the sink with supervision/set-up within 7 days. 4.  Patient will don/doff back brace at minimal assistance/contact guard assist within 7 days. 5.  Patient will verbalize/demonstrate 3/3 back precautions during ADL tasks without cues within 7 days. Outcome: Not Met     OCCUPATIONAL THERAPY EVALUATION  Patient: Ruben Sumner (6 y.o. female)  Date: 6/5/2020  Primary Diagnosis: Syncope [R55]  Compression fracture of L1 lumbar vertebra (HCC) [S32.010A]        Precautions: Fall, Back       ASSESSMENT  Based on the objective data described below, the patient presents with impaired balance, safety awareness, memory, strength, & activity tolerance, as well as LUE/LLE pain s/p GLF with acute L1 fx, potential kyphoplasty pending family decision. PTA, patient Mod I for ADLs and mobility with RW support, hx of Parkinson's dx, lives alone in an [address] [place] [address] apartment. She now presents below her baseline, requiring increased time for all mobility, increased time and mod cues required for implementing spinal precautions. She demo'd good BLE ROM to tailor sit, but decreased standing tolerance noted with ambulation to/from the bathroom. TLSO issued and educated on, patient requiring Max A to don.  Considering she lives alone, was Mod I prior to [facility], recommend brief IPR stay to maximize safety & functional independence. Current Level of Function Impacting Discharge (ADLs/self-care): up to Max A for upper body ADLs, Min A for lower body ADLs and mobility with RW    Functional Outcome Measure: The patient scored Total: 40/100 on the Barthel Index outcome measure which is indicative of 60% impaired ability to care for basic self needs/dependency on others; inferred 75% dependency on others for instrumental ADLs. Other factors to consider for discharge: fall risk, physical assist for all ADLs and mobility, lives alone     Patient will benefit from skilled therapy intervention to address the above noted impairments. PLAN :  Recommendations and Planned Interventions: self care training, functional mobility training, therapeutic exercise, balance training, therapeutic activities, endurance activities, patient education, home safety training, and family training/education    Frequency/Duration: Patient will be followed by occupational therapy 5 times a week to address goals. Recommendation for discharge: (in order for the patient to meet his/her long term goals)  Therapy 3 hours per day 5-7 days per week    This discharge recommendation:  Has been made in collaboration with the attending provider and/or case management    IF patient discharges home will need the following DME: May benefit from reacher       SUBJECTIVE:   Patient stated You may have to repeat things, my memory is almost gone.     OBJECTIVE DATA SUMMARY:   HISTORY:   Past Medical History:   Diagnosis Date    Bipolar disorder ([facility]     Nazmy    Chronic pain     BACK PAIN    Diabetes ([facility]     BORDERLINE TX WITH DIET AND EXERCISE    DJD (degenerative joint disease)     Fibromyalgia     Genital herpes     GERD (gastroesophageal reflux disease)     Hypercholesterolemia     IBS (irritable bowel syndrome)     Ill-defined condition     fibromyligia    Lumbar disc disease stimulation-Honza    Migraine     Nausea & vomiting     Other ill-defined conditions(799.89)     SEASONAL allergies    Other ill-defined conditions(799.89)     dysphagia has had esophagus dilated    Paget's disease     Parkinson disease (HCC)     Pulmonary embolism (HCC)     RLS (restless legs syndrome)     Spinal stenosis      Past Surgical History:   Procedure Laterality Date    COLONOSCOPY N/A 6/27/2016    COLONOSCOPY performed by Tayla Diaz MD at [address] [place] [address] ENDOSCOPY    COLONOSCOPY N/A 2/22/2019    COLONOSCOPY performed by Heidi Astudillo MD at [facility]. [address], COLON, DIAGNOSTIC      12, due 15    HX APPENDECTOMY      HX BACK SURGERY  9/17/2013    back surgery - total of 3 as of 12/20/2013    HX BREAST BIOPSY Right years ago    negative    HX CHOLECYSTECTOMY      HX HEENT      T & A    HX HYSTERECTOMY      total for fibroid tumors    HX ORTHOPAEDIC      lumbar laminectomy then fusion/neurostimulator implanted - inactive now but still in    [address] [place] [address]    HX OTHER SURGICAL      EGD x 2 with dilation/colonoscopy - no polyps per pt    TILT TABLE EVALUATION  8/2/2016            Expanded or extensive additional review of patient history:     Home Situation  Home Environment: Independent living([facility])  One/Two Story Residence: One story  Support Systems: Child(lexis)  Current DME Used/Available at Home: Walker, rollator, Walker, rolling, Shower chair, Grab bars  Tub or Shower Type: Other (comment)(cutout tub)    Hand dominance: Right    EXAMINATION OF PERFORMANCE DEFICITS:  Cognitive/Behavioral Status:  Neurologic State: Alert  Orientation Level: Oriented X4(intermittent confusion)  Cognition: Decreased attention/concentration; Follows commands;Memory loss; Impulsive;Poor safety awareness  Perception: Appears intact  Perseveration: Perseverates during conversation  Safety/Judgement: Awareness of environment;Decreased awareness of need for assistance;Decreased awareness of need for safety;Decreased insight into deficits    Skin: Appears intact    Edema: None    Hearing: Auditory  Auditory Impairment: None    Vision/Perceptual:    Tracking: Able to track stimulus in all quadrants w/o difficulty                      Acuity: Within Defined Limits    Corrective Lenses: Glasses    Range of Motion:  AROM: Generally decreased, functional(LUE/LLE decreased)                         Strength:  Strength: Generally decreased, functional                Coordination:  Coordination: Within functional limits(baseline BUE tremors d/t Parkinson's)  Fine Motor Skills-Upper: Left Intact; Right Intact    Gross Motor Skills-Upper: Left Intact; Right Intact    Tone & Sensation:  Tone: Normal  Sensation: Intact                      Balance:  Sitting: Intact  Standing: Impaired; With support(RW)  Standing - Static: Good  Standing - Dynamic : Good;Fair    Functional Mobility and Transfers for ADLs:  Bed Mobility:  Supine to Sit: Maximum assistance  Sit to Supine: (in chair)  Scooting: Contact guard assistance; Additional time    Transfers:  Sit to Stand: Minimum assistance  Stand to Sit: Minimum assistance  Bathroom Mobility: Contact guard assistance;Minimum assistance  Toilet Transfer : Minimum assistance  Assistive Device : Gait Belt;Walker, rolling    ADL Assessment:  Feeding: Setup    Oral Facial Hygiene/Grooming: Setup    Bathing: Minimum assistance    Upper Body Dressing: Setup    Lower Body Dressing: Minimum assistance    Toileting: Minimum assistance                ADL Intervention and task modifications:   Lower Body Bathing  Lower Body : Compensatory technique training  Position Performed: Seated edge of bed  Cues: Tactile cues provided;Verbal cues provided;Visual cues provided    Upper Body Dressing Assistance  Dressing Assistance: Maximum assistance  Orthotics(Brace): Maximum assistance; Compensatory technique training(TLSO)  Cues: Physical assistance; Tactile cues provided;Verbal cues provided;Visual cues provided    Lower Body Dressing Assistance  Dressing Assistance: Minimum assistance  Pants With Elastic Waist: Minimum assistance; Compensatory technique training(simulated, A for stand balance)  Socks: Supervision; Compensatory technique training  Leg Crossed Method Used: Yes  Position Performed: Seated edge of bed;Standing  Cues: Physical assistance; Tactile cues provided;Verbal cues provided;Visual cues provided    Toileting  Toileting Assistance: Minimum assistance(simulated in bathroom)  Bladder Hygiene: Supervision  Bowel Hygiene: Contact guard assistance  Clothing Management: Minimum assistance  Cues: Tactile cues provided;Verbal cues provided;Visual cues provided  Adaptive Equipment: Grab bars; Walker    Cognitive Retraining  Safety/Judgement: Awareness of environment;Decreased awareness of need for assistance;Decreased awareness of need for safety;Decreased insight into deficits    Dressing brace: Patient instructed and demonstrated to don/doff velcro on brace using dominant side, keeping non-dominant side intact. Patient instructed and demonstrated in meantime of being able to stand with back against wall to don/doff brace, to don/doff seated using lap and bed/chair surface to support brace while manipulating. Dressing lower body: Patient instructed to don brace first and on the benefits to remain seated to don all clothing to increase independence with precautions and pain management. Patient instructed and demonstrated tailor sitting for lower body dressing with Min A & RW support. Toileting: Patient instructed on the benefits of using flushable wet wipes and toilet tongs if decreased reach or pain for daniel care. Also, the benefits of a reacher to aid in clothing management. Patient instruction and indicated understanding to not strain i.e. holding breath to bear down during a bowel movement, lifting/activity, and sexual activity.      Standing: Patient instructed and indicated understanding to walk up to sink/counter top/surfaces, step into walker, square off while using objects, slide objects along surfaces, to increase adherence to back precautions and fall prevention. Patient instructed to increase amount of time standing in order to increase independence and tolerance with ADLs. During prolonged standing, can open cabinet door or place foot on stool to decrease spinal pressure/increase pain. Therapeutic Exercise:  Ambulation to/from bathroom with CGA-Min A and RW support     Functional Measure:  Barthel Index:    Bathin  Bladder: 5  Bowels: 5  Groomin  Dressin  Feeding: 10  Mobility: 0  Stairs: 0  Toilet Use: 5  Transfer (Bed to Chair and Back): 10  Total: 40/100        The Barthel ADL Index: Guidelines  1. The index should be used as a record of what a patient does, not as a record of what a patient could do. 2. The main aim is to establish degree of independence from any help, physical or verbal, however minor and for whatever reason. 3. The need for supervision renders the patient not independent. 4. A patient's performance should be established using the best available evidence. Asking the patient, friends/relatives and nurses are the usual sources, but direct observation and common sense are also important. However direct testing is not needed. 5. Usually the patient's performance over the preceding 24-48 hours is important, but occasionally longer periods will be relevant. 6. Middle categories imply that the patient supplies over 50 per cent of the effort. 7. Use of aids to be independent is allowed. Bruce Walter., Barthel, D.W. ([id number]). Functional evaluation: the Barthel Index. [address] [facility] (14)2. PRABHU Orozco, Sandy Hernandez., Suki Hernandez., Bhavik, [address] [place] [address] (). Measuring the change indisability after inpatient rehabilitation; comparison of the responsiveness of the Barthel Index and Functional [place] Measure.  Journal of Neurology, Neurosurgery, and Psychiatry, 66(4), 191-810. KIERRA Ruffin, HUMBERTO Navarrete, & Waleska Ramirez M.A. (2004.) Assessment of post-stroke quality of life in cost-effectiveness studies: The usefulness of the Barthel Index and the EuroQoL-5D. Quality of Life Research, 15, 043-50         Occupational Therapy Evaluation Charge Determination   History Examination Decision-Making   LOW Complexity : Brief history review  LOW Complexity : 1-3 performance deficits relating to physical, cognitive , or psychosocial skils that result in activity limitations and / or participation restrictions  LOW Complexity : No comorbidities that affect functional and no verbal or physical assistance needed to complete eval tasks       Based on the above components, the patient evaluation is determined to be of the following complexity level: LOW   Pain Rating:  LUE/LLE pain reported    Activity Tolerance:   Good  Please refer to the flowsheet for vital signs taken during this treatment. After treatment patient left in no apparent distress:    Sitting in chair, Call bell within reach, and Bed / chair alarm activated    COMMUNICATION/EDUCATION:   The patients plan of care was discussed with: Physical therapist and Registered nurse. Home safety education was provided and the patient/caregiver indicated understanding., Patient/family have participated as able in goal setting and plan of care. , and Patient/family agree to work toward stated goals and plan of care. This patients plan of care is appropriate for delegation to [facility].     Thank you for this referral.  BROWN Boykin, OTR/L  Time Calculation: 36 mins

## 2020-06-05 NOTE — PROGRESS NOTES
Problem: Mobility Impaired (Adult and Pediatric)  Goal: *Acute Goals and Plan of Care (Insert Text)  Description: FUNCTIONAL STATUS PRIOR TO ADMISSION: Pt lives at [facility]. She is mod I with RW but has hx falls. She also has baseline Parkinson's Disease and dementia. HOME SUPPORT PRIOR TO ADMISSION: Pt lives alone and is independent with ADLs. Daughter lives locally and assists with IADLs    Physical Therapy Goals  Initiated 6/5/2020  1. Patient will move from supine to sit and sit to supine , scoot up and down and roll side to side in bed with minimal assistance/contact guard assist within 7 day(s). 2.  Patient will transfer from bed to chair and chair to bed with minimal assistance/contact guard assist using the least restrictive device within 7 day(s). 3.  Patient will perform sit to stand with minimal assistance/contact guard assist within 7 day(s). 4.  Patient will ambulate with modified independence for 50 feet with the least restrictive device within 7 day(s). Outcome: Progressing Towards Goal  PHYSICAL THERAPY EVALUATION  Patient: Vipul Hinson (8 y.o. female)  Date: 6/5/2020  Primary Diagnosis: Syncope [R55]  Compression fracture of L1 lumbar vertebra (HCC) [S32.010A]        Precautions: Fall, brace when OOB      ASSESSMENT  Based on the objective data described below, the patient presents with confusion, impaired balance, L sided pain (from fall?), unsteady gait, and overall decline in functional mobility s/p admission for GLF with L1 compression fx. At baseline, pt lives at [facility], is independent with ADLs, and mod I ambulating with RW. She has hx of Parkinson's Disease as well as dementia. Educated pt on back precautions and provided verbal cuing to adhere to them during mobility (pt able to recall 2/3 at end of session). She required maxA for supine>sit transfer. TLSO brace donned.   Pt transferred sit<>stand with CGA and ambulated with Oscar using RW - noted mild L knee buckling but pt able to self correct. Strength equal in BLEs, although pt reports pain in L side. Session ended with pt seated in chair with all need met. Recommending IP rehab upon discharge. Note pt to decide on kyphoplasty. Current Level of Function Impacting Discharge (mobility/balance): MaxA for bed mobility, Oscar for transfers and ambulation     Functional Outcome Measure: The patient scored 40/100 on the Barthel Index outcome measure which is indicative of 60% impairment and dependence on others for basic mobility and self care tasks. Other factors to consider for discharge: fall risk, hx falls, hx Parkinson's Disease, hx dementia, leaves alone      Patient will benefit from skilled therapy intervention to address the above noted impairments. PLAN :  Recommendations and Planned Interventions: bed mobility training, transfer training, gait training, therapeutic exercises, patient and family training/education, and therapeutic activities      Frequency/Duration: Patient will be followed by physical therapy:  daily to address goals. Recommendation for discharge: (in order for the patient to meet his/her long term goals)  Therapy 3 hours per day 5-7 days per week    This discharge recommendation:  Has not yet been discussed the attending provider and/or case management    IF patient discharges home will need the following DME: patient owns DME required for discharge         SUBJECTIVE:   Patient stated I can get up alone?   After being educated on calling for assistance and not getting up alone     OBJECTIVE DATA SUMMARY:   HISTORY:    Past Medical History:   Diagnosis Date    Bipolar disorder ([facility]     Nazmy    Chronic pain     BACK PAIN    Diabetes ([facility]     BORDERLINE TX WITH DIET AND EXERCISE    DJD (degenerative joint disease)     Fibromyalgia     Genital herpes     GERD (gastroesophageal reflux disease)     Hypercholesterolemia     IBS (irritable bowel syndrome) Ill-defined condition     fibromyligia    Lumbar disc disease     stimulation-Honza    Migraine     Nausea & vomiting     Other ill-defined conditions(799.89)     SEASONAL allergies    Other ill-defined conditions(799.89)     dysphagia has had esophagus dilated    Paget's disease     Parkinson disease (HCC)     Pulmonary embolism (HCC)     RLS (restless legs syndrome)     Spinal stenosis      Past Surgical History:   Procedure Laterality Date    COLONOSCOPY N/A 6/27/2016    COLONOSCOPY performed by Ramo Carbone MD at [facility] ENDOSCOPY    COLONOSCOPY N/A 2/22/2019    COLONOSCOPY performed by Klaus Vanessa MD at [facility]. [address], [address], DIAGNOSTIC      12, due 15    HX APPENDECTOMY      HX BACK SURGERY  9/17/2013    back surgery - total of 3 as of 12/20/2013    HX BREAST BIOPSY Right years ago    negative    HX CHOLECYSTECTOMY      HX HEENT      T & A    HX HYSTERECTOMY      total for fibroid tumors    HX ORTHOPAEDIC      lumbar laminectomy then fusion/neurostimulator implanted - inactive now but still in    [address] [place] [address]    HX OTHER SURGICAL      EGD x 2 with dilation/colonoscopy - no polyps per pt    TILT TABLE EVALUATION  8/2/2016          Home Situation  Home Environment: Independent living([facility])  One/Two Story Residence: One story  Support Systems: Child(lexis)  Current DME Used/Available at Home: Walker, rollator, Walker, rolling, Shower chair, Grab bars  Tub or Shower Type: Other (comment)(cutout tub)    EXAMINATION/PRESENTATION/DECISION MAKING:   Critical Behavior:  Neurologic State: Alert  Orientation Level: Oriented X4(intermittent confusion)  Cognition: Decreased attention/concentration, Follows commands, Memory loss, Impulsive, Poor safety awareness  Safety/Judgement: Awareness of environment, Decreased awareness of need for assistance, Decreased awareness of need for safety, Decreased insight into deficits  Hearing:   Auditory  Auditory Impairment: None    Range Of Motion:  AROM: Generally decreased, functional(LUE/LLE decreased)    Strength:    Strength: Generally decreased, functional    Tone & Sensation:   Tone: Normal  Sensation: Intact    Coordination:  Coordination: Within functional limits(baseline BUE tremors d/t Parkinson's)    Vision:   Tracking: Able to track stimulus in all quadrants w/o difficulty  Acuity: Within Defined Limits  Corrective Lenses: Glasses  Functional Mobility:  Bed Mobility:  Supine to Sit: Maximum assistance  Sit to Supine: (in chair)  Scooting: Contact guard assistance; Additional time    Transfers:  Sit to Stand: Minimum assistance  Stand to Sit: Minimum assistance    Balance:   Sitting: Intact  Standing: Impaired; With support(RW)  Standing - Static: Good  Standing - Dynamic : Good;Fair    Ambulation/Gait Training:  Distance (ft): 20 Feet (ft)  Assistive Device: Gait belt;Walker, rolling  Ambulation - Level of Assistance: Minimal assistance  Gait Abnormalities: Antalgic;Decreased step clearance;Shuffling gait  Base of Support: Narrowed  Stance: Left decreased  Speed/Penny: Shuffled; Slow  Step Length: Left shortened;Right shortened  Swing Pattern: Left asymmetrical;Right asymmetrical      Functional Measure:  Barthel Index:    Bathin  Bladder: 5  Bowels: 5  Groomin  Dressin  Feeding: 10  Mobility: 0  Stairs: 0  Toilet Use: 5  Transfer (Bed to Chair and Back): 10  Total: 40/100       The Barthel ADL Index: Guidelines  1. The index should be used as a record of what a patient does, not as a record of what a patient could do. 2. The main aim is to establish degree of independence from any help, physical or verbal, however minor and for whatever reason. 3. The need for supervision renders the patient not independent. 4. A patient's performance should be established using the best available evidence.  Asking the patient, friends/relatives and nurses are the usual sources, but direct observation and common sense are also important. However direct testing is not needed. 5. Usually the patient's performance over the preceding 24-48 hours is important, but occasionally longer periods will be relevant. 6. Middle categories imply that the patient supplies over 50 per cent of the effort. 7. Use of aids to be independent is allowed. Bertina Aschoff., Barthel, D.W. (1758). Functional evaluation: the Barthel Index. [address] [facility] (14)2. Allen Muñoz tamie PRABHU Camp, Meche Bruner., Kate Bautista., Bhavik, [address] (1999). Measuring the change indisability after inpatient rehabilitation; comparison of the responsiveness of the Barthel Index and Functional [place] Measure. Journal of Neurology, Neurosurgery, and Psychiatry, 66(4), 346-405. Dom Asif, [place].DOMENICO, HUMBERTO Navarrete, & Alice Walker MJODY. (2004.) Assessment of post-stroke quality of life in cost-effectiveness studies: The usefulness of the Barthel Index and the EuroQoL-5D. Quality of Life Research, 15, 473-26           Physical Therapy Evaluation Charge Determination   History Examination Presentation Decision-Making   HIGH Complexity :3+ comorbidities / personal factors will impact the outcome/ POC  HIGH Complexity : 4+ Standardized tests and measures addressing body structure, function, activity limitation and / or participation in recreation  LOW Complexity : Stable, uncomplicated  Other outcome measures Barthel  MEDIUM      Based on the above components, the patient evaluation is determined to be of the following complexity level: LOW     Activity Tolerance:   Good  Please refer to the flowsheet for vital signs taken during this treatment. After treatment patient left in no apparent distress:   Sitting in chair and Call bell within reach    COMMUNICATION/EDUCATION:   The patients plan of care was discussed with: Occupational therapist and Registered nurse.      Fall prevention education was provided and the patient/caregiver indicated understanding., Patient/family have participated as able in goal setting and plan of care. , and Patient/family agree to work toward stated goals and plan of care.     Thank you for this referral.  Frank Saenz, PT, DPT   Time Calculation: 34 mins

## 2020-06-05 NOTE — PROGRESS NOTES
[address] [facility] Adult  Hospitalist Group                                                                                          Hospitalist Progress Note  Crista Peraza MD  Answering service: [phone] from in house phone        Date of Service:  2020  NAME:  Sonal Fatima  :  1945  MRN:  [id number]      Admission Summary:     Sonal Fatima is a 67 y.o. female with PMH of parkinson's disease,dementia,orthostatic hypotension and other comorbidities came to ER after a fall.     Patient states that she woke up this morning and while walking to the bathroom,her legs gave away and she fell down. She complaints of 7/10 lower back pain -describes it as sharp,non radiating,aggrevated with movement,relieved with morphine in ER. Patient reported to ER physician that she lost consciousness. She denied any chest pain,nasuea/vomiting,headache,SOB,dysuria,abdominal pain,muscle  Weakness or numbness/tingling.     In the ER,work up showed hypokalemia. CT scan showed Mild acute appearing superior endplate fracture L1  Hospitalist was consulted for admission       Interval history / Subjective:     Waiting on decision by patient regarding kyphoplasty. Neurosurgery following. Afebrile. BP better  Today I discussed with patient in detail regarding kyphoplasty. She still not sure whether she should go with the procedure or niot. Wants to think about it     Assessment & Plan:     Acute mild L1 compression fracture after a fall  -MRI lumbar spine show Mild acute/subacute superior endplate compression fracture L1.  Postsurgical and degenerative changes remainder lumbar spine as above.  -continue prn morphine   -consult to Orthopedic surgeon   -patient to decide regarding kyphoplasty     Epigastric pain  Burning sensation   -no left side chest pain   -add iv protonix, po carafate  -chest x ray   -EKG normal sinus rhythm vent rate 67 bpm, non specific st t wave, QTc 486, check troponin  -tums added, if persistent GI may be consulted for possible EGD. Mechanical fall vs syncope:  -CT head - no acute abnormalities  -Repeat EKG. -Check orthostatic vitals ,tele monitoring  -PT/OT      Hypokalemia due to florinef  -replace with kcl and monitor k level    Hypomagnesemia:resolved      Parkinson's disease  -continue home sinemet    Orthosattic hypotension:  -resume carbidopa, northera and florinef  -BP elevated, add prn clonidine   -check orthostatic BP     Dementia:  -continue home aricept      Hyperlipidemia  - Continue home statin     Anxiety/depression  - Continue  Prozac.  -Hold seroquel for prolong QT        Code status: DNR  DVT prophylaxis: SCD    Care Plan discussed with: Patient/Family and Nurse  Anticipated Disposition: Home w/Family  Anticipated Discharge: 24 hours to 48 hours     Hospital Problems  Date Reviewed: 2/18/2020          Codes Class Noted POA    Compression fracture of L1 lumbar vertebra ([facility] [address] ICD-10-CM: S32.010A  ICD-9-CM: 805.4  6/4/2020 Unknown        Syncope ICD-10-CM: R55  ICD-9-CM: 780.2  5/28/2018 Unknown                  Vital Signs:    Last 24hrs VS reviewed since prior progress note. Most recent are:  Visit Vitals  /85   Pulse 67   Temp 97.6 °F (36.4 °C)   Resp 16   Ht 5' 6\" (1.676 m)   Wt 59.9 kg (132 lb 0.9 oz)   SpO2 95%   BMI 21.31 kg/m²         Intake/Output Summary (Last 24 hours) at 6/5/2020 1210  Last data filed at 6/5/2020 9611  Gross per 24 hour   Intake    Output 850 ml   Net -850 ml        Physical Examination:             Constitutional:  No acute distress, cooperative, pleasant    ENT:  Oral mucosa moist, oropharynx benign. Resp:  CTA bilaterally. No wheezing/rhonchi/rales. No accessory muscle use   CV:  Regular rhythm, normal rate, no murmurs, gallops, rubs    GI:  Soft, non distended, non tender.  normoactive bowel sounds, no hepatosplenomegaly     Musculoskeletal:  No edema,     Neurologic:  Conscious and alert, oriented to place and person, Moves all extremities, CN II-XII reviewed     Skin:  Good turgor, no rashes or ulcers       Data Review:    Review and/or order of clinical lab test  Review and/or order of tests in the radiology section of CPT  Review and/or order of tests in the medicine section of CPT      Labs:     Recent Labs     06/05/20 0420 06/04/20  0110   WBC 5.3 7.7   HGB 12.2 12.0   HCT 36.7 36.6    208     Recent Labs     06/05/20  0420 06/04/20  0844 06/04/20  0110 06/03/20  1859 06/03/20  1300     --  140 142  --    K 2.6*  --  3.1* 2.3*  --      --  102 103  --    CO2 30  --  32 35*  --    BUN 13  --  9 7  --    CREA 0.61  --  0.60 0.63  --    *  --  120* 114*  --    CA 8.3*  --  8.6 8.5  --    MG  --  1.7  --   --  1.6     Recent Labs     06/05/20  0420 06/04/20  0110 06/03/20  1056   ALT 22 12 23    110 94   TBILI 0.7 0.9 1.0   TP 5.6* 5.9* 7.3   ALB 2.8* 2.9* 3.6   GLOB 2.8 3.0 3.7     Recent Labs     06/03/20  1056   INR 1.1   PTP 10.4   APTT 22.5      No results for input(s): FE, TIBC, PSAT, FERR in the last 72 hours. Lab Results   Component Value Date/Time    Folate 14.0 02/21/2019 07:22 AM      No results for input(s): PH, PCO2, PO2 in the last 72 hours.   Recent Labs     06/04/20  0844 06/03/20  1056   TROIQ <0.05 <0.05     Lab Results   Component Value Date/Time    Cholesterol, total 134 02/26/2020 09:40 AM    HDL Cholesterol 65 02/26/2020 09:40 AM    LDL, calculated 47 02/26/2020 09:40 AM    Triglyceride 111 02/26/2020 09:40 AM    CHOL/HDL Ratio 3.3 07/23/2010 08:37 AM     Lab Results   Component Value Date/Time    Glucose (POC) 102 (H) 03/08/2020 06:21 AM    Glucose (POC) 113 (H) 03/07/2020 09:18 PM    Glucose (POC) 129 (H) 03/06/2020 09:16 PM    Glucose (POC) 111 (H) 03/06/2020 04:06 PM    Glucose (POC) 139 (H) 03/06/2020 11:14 AM     Lab Results   Component Value Date/Time    Color YELLOW/STRAW 06/03/2020 12:54 PM    Appearance CLEAR 06/03/2020 12:54 PM    Specific gravity 1.015 06/03/2020 12:54 PM    Specific gravity 1.009 04/22/2020 10:33 AM    pH (UA) >8.5 (H) 06/03/2020 12:54 PM    Protein Negative 06/03/2020 12:54 PM    Glucose Negative 06/03/2020 12:54 PM    Ketone Negative 06/03/2020 12:54 PM    Bilirubin Negative 06/03/2020 12:54 PM    Urobilinogen 0.2 06/03/2020 12:54 PM    Nitrites Negative 06/03/2020 12:54 PM    Leukocyte Esterase Negative 06/03/2020 12:54 PM    Epithelial cells FEW 06/03/2020 12:54 PM    Bacteria Negative 06/03/2020 12:54 PM    WBC 0-4 06/03/2020 12:54 PM    RBC 0-5 06/03/2020 12:54 PM         Medications Reviewed:     Current Facility-Administered Medications   Medication Dose Route Frequency    calcium carbonate (TUMS) chewable tablet 200 mg [elemental]  200 mg Oral TID PRN    hydrocortisone (ANUSOL-HC) 2.5 % rectal cream   Rectal QID    phenazopyridine (PYRIDIUM) tablet 100 mg  100 mg Oral TID PRN    pantoprazole (PROTONIX) 40 mg in 0.9% sodium chloride 10 mL injection  40 mg IntraVENous Q12H    cloNIDine HCL (CATAPRES) tablet 0.1 mg  0.1 mg Oral Q6H PRN    sucralfate (CARAFATE) tablet 1 g  1 g Oral AC&HS    atorvastatin (LIPITOR) tablet 20 mg  20 mg Oral QHS    carbidopa-levodopa (SINEMET)  mg per tablet 1 Tab  1 Tab Oral TID    donepeziL (ARICEPT) tablet 5 mg  5 mg Oral QHS    droxidopa cap 600 mg (Patient Supplied)  600 mg Oral Q12H    fludrocortisone (FLORINEF) tablet 0.1 mg  0.1 mg Oral BID    FLUoxetine (PROzac) capsule 40 mg  40 mg Oral DAILY    gabapentin (NEURONTIN) tablet 600 mg  600 mg Oral BID    [Held by provider] QUEtiapine (SEROquel) tablet 25 mg  25 mg Oral QHS    acetaminophen (TYLENOL) tablet 650 mg  650 mg Oral QID    morphine injection 1 mg  1 mg IntraVENous Q6H PRN     ______________________________________________________________________  EXPECTED LENGTH OF STAY: 2d 21h  ACTUAL LENGTH OF STAY:          1                 Machelle Huff MD

## 2020-06-06 PROCEDURE — C9113 INJ PANTOPRAZOLE SODIUM, VIA: HCPCS | Performed by: HOSPITALIST

## 2020-06-06 PROCEDURE — 77030038269 HC DRN EXT URIN PURWCK BARD -A

## 2020-06-06 PROCEDURE — 74011250637 HC RX REV CODE- 250/637: Performed by: HOSPITALIST

## 2020-06-06 PROCEDURE — 97535 SELF CARE MNGMENT TRAINING: CPT

## 2020-06-06 PROCEDURE — 74011250636 HC RX REV CODE- 250/636: Performed by: HOSPITALIST

## 2020-06-06 PROCEDURE — 74011250637 HC RX REV CODE- 250/637: Performed by: INTERNAL MEDICINE

## 2020-06-06 PROCEDURE — 65270000029 HC RM PRIVATE

## 2020-06-06 PROCEDURE — 74011000250 HC RX REV CODE- 250: Performed by: HOSPITALIST

## 2020-06-06 PROCEDURE — 77030040361 HC SLV COMPR DVT MDII -B

## 2020-06-06 PROCEDURE — 97116 GAIT TRAINING THERAPY: CPT

## 2020-06-06 RX ORDER — POTASSIUM CHLORIDE 750 MG/1
40 TABLET, FILM COATED, EXTENDED RELEASE ORAL DAILY
Status: DISCONTINUED | OUTPATIENT
Start: 2020-06-06 | End: 2020-06-07

## 2020-06-06 RX ORDER — POTASSIUM CHLORIDE 7.45 MG/ML
10 INJECTION INTRAVENOUS
Status: DISPENSED | OUTPATIENT
Start: 2020-06-06 | End: 2020-06-06

## 2020-06-06 RX ADMIN — CARBIDOPA AND LEVODOPA 1 TABLET: 25; 100 TABLET ORAL at 21:40

## 2020-06-06 RX ADMIN — CARBIDOPA AND LEVODOPA 1 TABLET: 25; 100 TABLET ORAL at 09:07

## 2020-06-06 RX ADMIN — POTASSIUM CHLORIDE 10 MEQ: 10 INJECTION, SOLUTION INTRAVENOUS at 08:47

## 2020-06-06 RX ADMIN — POTASSIUM CHLORIDE 10 MEQ: 10 INJECTION, SOLUTION INTRAVENOUS at 14:07

## 2020-06-06 RX ADMIN — POTASSIUM CHLORIDE 10 MEQ: 10 INJECTION, SOLUTION INTRAVENOUS at 11:16

## 2020-06-06 RX ADMIN — CLONIDINE HYDROCHLORIDE 0.1 MG: 0.1 TABLET ORAL at 09:07

## 2020-06-06 RX ADMIN — HYDROCORTISONE: 25 CREAM TOPICAL at 17:29

## 2020-06-06 RX ADMIN — MORPHINE SULFATE 1 MG: 2 INJECTION, SOLUTION INTRAMUSCULAR; INTRAVENOUS at 09:06

## 2020-06-06 RX ADMIN — HYDROCORTISONE: 25 CREAM TOPICAL at 09:21

## 2020-06-06 RX ADMIN — ATORVASTATIN CALCIUM 20 MG: 10 TABLET, FILM COATED ORAL at 21:40

## 2020-06-06 RX ADMIN — SUCRALFATE 1 G: 1 TABLET ORAL at 21:39

## 2020-06-06 RX ADMIN — POTASSIUM CHLORIDE 40 MEQ: 750 TABLET, FILM COATED, EXTENDED RELEASE ORAL at 09:06

## 2020-06-06 RX ADMIN — ACETAMINOPHEN 650 MG: 325 TABLET, FILM COATED ORAL at 17:28

## 2020-06-06 RX ADMIN — SODIUM CHLORIDE 40 MG: 9 INJECTION INTRAMUSCULAR; INTRAVENOUS; SUBCUTANEOUS at 09:06

## 2020-06-06 RX ADMIN — SUCRALFATE 1 G: 1 TABLET ORAL at 17:28

## 2020-06-06 RX ADMIN — HYDROCORTISONE: 25 CREAM TOPICAL at 13:15

## 2020-06-06 RX ADMIN — HYDROCORTISONE: 25 CREAM TOPICAL at 00:00

## 2020-06-06 RX ADMIN — ACETAMINOPHEN 650 MG: 325 TABLET, FILM COATED ORAL at 21:39

## 2020-06-06 RX ADMIN — SUCRALFATE 1 G: 1 TABLET ORAL at 07:50

## 2020-06-06 RX ADMIN — FLUDROCORTISONE ACETATE 0.1 MG: 0.1 TABLET ORAL at 17:28

## 2020-06-06 RX ADMIN — GABAPENTIN 600 MG: 600 TABLET, FILM COATED ORAL at 17:28

## 2020-06-06 RX ADMIN — ACETAMINOPHEN 650 MG: 325 TABLET, FILM COATED ORAL at 09:06

## 2020-06-06 RX ADMIN — CALCIUM CARBONATE (ANTACID) CHEW TAB 500 MG 200 MG: 500 CHEW TAB at 06:30

## 2020-06-06 RX ADMIN — GABAPENTIN 600 MG: 600 TABLET, FILM COATED ORAL at 09:07

## 2020-06-06 RX ADMIN — DROXIDOPA 600 MG: 300 CAPSULE ORAL at 11:16

## 2020-06-06 RX ADMIN — SUCRALFATE 1 G: 1 TABLET ORAL at 13:15

## 2020-06-06 RX ADMIN — SODIUM CHLORIDE 40 MG: 9 INJECTION INTRAMUSCULAR; INTRAVENOUS; SUBCUTANEOUS at 20:52

## 2020-06-06 RX ADMIN — FLUOXETINE 40 MG: 20 CAPSULE ORAL at 09:07

## 2020-06-06 RX ADMIN — FLUDROCORTISONE ACETATE 0.1 MG: 0.1 TABLET ORAL at 09:06

## 2020-06-06 RX ADMIN — CARBIDOPA AND LEVODOPA 1 TABLET: 25; 100 TABLET ORAL at 17:28

## 2020-06-06 RX ADMIN — DONEPEZIL HYDROCHLORIDE 5 MG: 5 TABLET, FILM COATED ORAL at 21:39

## 2020-06-06 RX ADMIN — HYDROCORTISONE: 25 CREAM TOPICAL at 21:44

## 2020-06-06 RX ADMIN — ACETAMINOPHEN 650 MG: 325 TABLET, FILM COATED ORAL at 13:15

## 2020-06-06 RX ADMIN — DROXIDOPA 600 MG: 300 CAPSULE ORAL at 23:58

## 2020-06-06 NOTE — PROGRESS NOTES
Bedside and Verbal shift change report given to BECK Billingsley (oncoming nurse) by Carolyn Cabrera RN (offgoing nurse). Report included the following information SBAR, Kardex, Intake/Output and Recent Results.

## 2020-06-06 NOTE — PROGRESS NOTES
[address] [facility] Adult  Hospitalist Group                                                                                          Hospitalist Progress Note  Margaret Stephens MD  Answering service: [phone] from in house phone        Date of Service:  2020  NAME:  Boaz Carlos  :  1945  MRN:  [id number]      Admission Summary:     Boaz Carlos is a 67 y.o. female with PMH of parkinson's disease,dementia,orthostatic hypotension and other comorbidities came to ER after a fall.     Patient states that she woke up this morning and while walking to the bathroom,her legs gave away and she fell down. She complaints of 7/10 lower back pain -describes it as sharp,non radiating,aggrevated with movement,relieved with morphine in ER. Patient reported to ER physician that she lost consciousness. She denied any chest pain,nasuea/vomiting,headache,SOB,dysuria,abdominal pain,muscle  Weakness or numbness/tingling.     In the ER,work up showed hypokalemia. CT scan showed Mild acute appearing superior endplate fracture L1  Hospitalist was consulted for admission       Interval history / Subjective: Follow-up for back pain  -Patient seen and examined at the bedside. Labs, imaging and notes reviewed. Discussed with nursing and her daughter on the phone. Patient on complaint today is numbness on the left leg, she did not complain much about back pain today. She wants me to talk with her daughter about the kyphoplasty which I did and the daughter is in agreement. Assessment & Plan:     Acute mild L1 compression fracture after a fall  -MRI lumbar spine show Mild acute/subacute superior endplate compression fracture L1.  Postsurgical and degenerative changes remainder lumbar spine as above.  -continue prn morphine   -Neurosurgery suggested IR consult for kyphoplasty.  -We will get IR assess her for kyphoplasty Monday    Epigastric pain  Burning sensation   -no left side chest pain   -add iv protonix, po carafate  -chest x ray   -EKG normal sinus rhythm vent rate 67 bpm, non specific st t wave.  -tums added, if persistent GI may be consulted for possible EGD. Mechanical fall vs syncope: She is on Florinef chronically. -CT head - no acute abnormalities  -Check orthostatic vitals ,tele monitoring  -PT/OT      Hypokalemia due to florinef  -replace with kcl and monitor k level    Hypomagnesemia:resolved      Parkinson's disease  -continue home sinemet    Orthosattic hypotension:  -resume carbidopa, northera and florinef  -BP elevated, add prn clonidine   -check orthostatic BP     Dementia:  -continue home aricept      Hyperlipidemia  - Continue home statin     Anxiety/depression  - Continue  Prozac.  -Hold seroquel for prolong QT        Code status: DNR  DVT prophylaxis: SCD    Care Plan discussed with: Patient/Family and Nurse  Anticipated Disposition: Home w/Family  Anticipated Discharge: 24 hours to 48 hours     Hospital Problems  Date Reviewed: 2/18/2020          Codes Class Noted POA    Compression fracture of L1 lumbar vertebra ([facility] [address] ICD-10-CM: S32.010A  ICD-9-CM: 805.4  6/4/2020 Unknown        Syncope ICD-10-CM: R55  ICD-9-CM: 780.2  5/28/2018 Unknown                  Vital Signs:    Last 24hrs VS reviewed since prior progress note. Most recent are:  Visit Vitals  /84   Pulse 64   Temp 98.1 °F (36.7 °C)   Resp 17   Ht 5' 6\" (1.676 m)   Wt 59.9 kg (132 lb 0.9 oz)   SpO2 95%   BMI 21.31 kg/m²         Intake/Output Summary (Last 24 hours) at 6/6/2020 0710  Last data filed at 6/6/2020 0040  Gross per 24 hour   Intake    Output 1350 ml   Net -1350 ml        Physical Examination:             Constitutional:  No acute distress, cooperative, pleasant    ENT:  Oral mucosa moist, oropharynx benign. Resp:  CTA bilaterally. No wheezing/rhonchi/rales. No accessory muscle use   CV:  Regular rhythm, normal rate, no murmurs, gallops, rubs    GI:  Soft, non distended, non tender.  normoactive bowel sounds, no hepatosplenomegaly     Musculoskeletal:  No edema,     Neurologic:  Conscious and alert, oriented to place and person, Moves all extremities, CN II-XII reviewed     Skin:  Good turgor, no rashes or ulcers       Data Review:    Review and/or order of clinical lab test  Review and/or order of tests in the radiology section of CPT  Review and/or order of tests in the medicine section of [facility]      Labs:     Recent Labs     06/05/20  0420 06/04/20  0110   WBC 5.3 7.7   HGB 12.2 12.0   HCT 36.7 36.6    208     Recent Labs     06/05/20  0420 06/04/20  0844 06/04/20  0110 06/03/20  1859 06/03/20  1300     --  140 142  --    K 2.6*  --  3.1* 2.3*  --      --  102 103  --    CO2 30  --  32 35*  --    BUN 13  --  9 7  --    CREA 0.61  --  0.60 0.63  --    *  --  120* 114*  --    CA 8.3*  --  8.6 8.5  --    MG  --  1.7  --   --  1.6     Recent Labs     06/05/20  0420 06/04/20  0110 06/03/20  1056   ALT 22 12 23    110 94   TBILI 0.7 0.9 1.0   TP 5.6* 5.9* 7.3   ALB 2.8* 2.9* 3.6   GLOB 2.8 3.0 3.7     Recent Labs     06/03/20  1056   INR 1.1   PTP 10.4   APTT 22.5      No results for input(s): FE, TIBC, PSAT, FERR in the last 72 hours. Lab Results   Component Value Date/Time    Folate 14.0 02/21/2019 07:22 AM      No results for input(s): PH, PCO2, PO2 in the last 72 hours.   Recent Labs     06/04/20  0844 06/03/20  1056   TROIQ <0.05 <0.05     Lab Results   Component Value Date/Time    Cholesterol, total 134 02/26/2020 09:40 AM    HDL Cholesterol 65 02/26/2020 09:40 AM    LDL, calculated 47 02/26/2020 09:40 AM    Triglyceride 111 02/26/2020 09:40 AM    CHOL/HDL Ratio 3.3 07/23/2010 08:37 AM     Lab Results   Component Value Date/Time    Glucose (POC) 102 (H) 03/08/2020 06:21 AM    Glucose (POC) 113 (H) 03/07/2020 09:18 PM    Glucose (POC) 129 (H) 03/06/2020 09:16 PM    Glucose (POC) 111 (H) 03/06/2020 04:06 PM    Glucose (POC) 139 (H) 03/06/2020 11:14 AM     Lab Results   Component Value Date/Time    Color YELLOW/STRAW 06/03/2020 12:54 PM    Appearance CLEAR 06/03/2020 12:54 PM    Specific gravity 1.015 06/03/2020 12:54 PM    Specific gravity 1.009 04/22/2020 10:33 AM    pH (UA) >8.5 (H) 06/03/2020 12:54 PM    Protein Negative 06/03/2020 12:54 PM    Glucose Negative 06/03/2020 12:54 PM    Ketone Negative 06/03/2020 12:54 PM    Bilirubin Negative 06/03/2020 12:54 PM    Urobilinogen 0.2 06/03/2020 12:54 PM    Nitrites Negative 06/03/2020 12:54 PM    Leukocyte Esterase Negative 06/03/2020 12:54 PM    Epithelial cells FEW 06/03/2020 12:54 PM    Bacteria Negative 06/03/2020 12:54 PM    WBC 0-4 06/03/2020 12:54 PM    RBC 0-5 06/03/2020 12:54 PM         Medications Reviewed:     Current Facility-Administered Medications   Medication Dose Route Frequency    calcium carbonate (TUMS) chewable tablet 200 mg [elemental]  200 mg Oral TID PRN    hydrocortisone (ANUSOL-HC) 2.5 % rectal cream   Rectal QID    phenazopyridine (PYRIDIUM) tablet 100 mg  100 mg Oral TID PRN    pantoprazole (PROTONIX) 40 mg in 0.9% sodium chloride 10 mL injection  40 mg IntraVENous Q12H    cloNIDine HCL (CATAPRES) tablet 0.1 mg  0.1 mg Oral Q6H PRN    sucralfate (CARAFATE) tablet 1 g  1 g Oral AC&HS    atorvastatin (LIPITOR) tablet 20 mg  20 mg Oral QHS    carbidopa-levodopa (SINEMET)  mg per tablet 1 Tab  1 Tab Oral TID    donepeziL (ARICEPT) tablet 5 mg  5 mg Oral QHS    droxidopa cap 600 mg (Patient Supplied)  600 mg Oral Q12H    fludrocortisone (FLORINEF) tablet 0.1 mg  0.1 mg Oral BID    FLUoxetine (PROzac) capsule 40 mg  40 mg Oral DAILY    gabapentin (NEURONTIN) tablet 600 mg  600 mg Oral BID    [Held by provider] QUEtiapine (SEROquel) tablet 25 mg  25 mg Oral QHS    acetaminophen (TYLENOL) tablet 650 mg  650 mg Oral QID    morphine injection 1 mg  1 mg IntraVENous Q6H PRN     ______________________________________________________________________  EXPECTED LENGTH OF STAY: 2d 21h  ACTUAL LENGTH OF STAY: 2                 Alonza Cushing, MD

## 2020-06-06 NOTE — PROGRESS NOTES
Problem: Self Care Deficits Care Plan (Adult)  Goal: *Acute Goals and Plan of Care (Insert Text)  Description: FUNCTIONAL STATUS PRIOR TO ADMISSION: Patient was modified independent using a rolling walker for functional mobility, has a rollator but prefers RW. Patient was modified independent for basic and instrumental ADLs, hx of Parkinson's dx. HOME SUPPORT: The patient lived alone at [facility]    Occupational Therapy Goals  Initiated 6/5/2020    1. Patient will perform lower body dressing with supervision/set-up using AE PRN within 7 days. 2.  Patient will perform toilet transfer with supervision/set-up using most appropriate DME within 7 days. 3.  Patient will grooming at the sink with supervision/set-up within 7 days. 4.  Patient will don/doff back brace at minimal assistance/contact guard assist within 7 days. 5.  Patient will verbalize/demonstrate 3/3 back precautions during ADL tasks without cues within 7 days. Outcome: Not Met    OCCUPATIONAL THERAPY TREATMENT  Patient: Anabel Flynn (44 y.o. female)  Date: 6/6/2020  Diagnosis: Syncope [R55]  Compression fracture of L1 lumbar vertebra ([facility] [address] [S32.010A]   <principal problem not specified>       Precautions:    Chart, occupational therapy assessment, plan of care, and goals were reviewed. ASSESSMENT  Patient continues with skilled OT services and is progressing towards goals. Patient cleared by RN to be seen, received in bed and agreeable to treatment. Patient noted to be intermittently confused but easily redirected. Required mod A to use log roll technique to come to sitting on the EOB - noted posterior lean in sitting and then also in standing, required multimodal cues to adjust position. Patient required max A to brenda TLSO brace sitting EOB. Patient required up to min A to stand and transfer to chair using RW. Patient left sitting up in chair - able to self feed IND in sitting.  Patient left with call bell in reach, RN present. Will continue to follow, recommend IPR once medically cleared for D/C. Current Level of Function Impacting Discharge (ADLs): up to max A for ADLs    Other factors to consider for discharge: was mod I at baseline, living alone         PLAN :  Patient continues to benefit from skilled intervention to address the above impairments. Continue treatment per established plan of care. to address goals. Recommend with staff: Recommend with nursing patient to complete as able in order to maintain strength, endurance and independence: ADLs with supervision/setup, once Egress Test completed OOB to chair 3x/day and mobilizing to the bathroom for toileting with 1 assist. Thank you for your assistance. Recommend next OT session: full dressing routine    Recommendation for discharge: (in order for the patient to meet his/her long term goals)  Therapy 3 hours per day 5-7 days per week    This discharge recommendation:  Has been made in collaboration with the attending provider and/or case management    IF patient discharges home will need the following DME: patient owns DME required for discharge       SUBJECTIVE:   Patient stated I am at the doctor's office, right?     OBJECTIVE DATA SUMMARY:   Cognitive/Behavioral Status:  Neurologic State: Alert  Orientation Level: Oriented to person;Oriented to situation;Oriented to time;Disoriented to place(reporting she is at the doctor's office)  Cognition: Decreased attention/concentration; Follows commands; Impaired decision making;Memory loss;Poor safety awareness  Perception: Cues to maintain midline in standing(mild posterior lean in sitting and standing)  Perseveration: No perseveration noted  Safety/Judgement: Awareness of environment; Fall prevention;Decreased awareness of need for safety;Decreased insight into deficits    Functional Mobility and Transfers for ADLs:  Bed Mobility:  Supine to Sit: Moderate assistance; Additional time;Assist x1    Transfers:  Sit to Stand: Contact guard assistance; Additional time;Assist x1  Bed to Chair: Contact guard assistance; Additional time;Assist x1    Balance:  Sitting: Impaired  Sitting - Static: Good (unsupported)  Sitting - Dynamic: Fair (occasional)  Standing: Impaired; With support  Standing - Static: Constant support;Good  Standing - Dynamic : Constant support;Good;Fair    ADL Intervention:  Upper Body Dressing Assistance  Orthotics(Brace): Total assistance (dependent)  Cues: Physical assistance; Tactile cues provided;Verbal cues provided    Cognitive Retraining  Safety/Judgement: Awareness of environment; Fall prevention;Decreased awareness of need for safety;Decreased insight into deficits    Pain:  Reporting mild pain in backside - reporting relief with change of position, otherwise no reports of pain    Activity Tolerance:   Good  Please refer to the flowsheet for vital signs taken during this treatment. After treatment patient left in no apparent distress:   Sitting in chair, Call bell within reach, and Caregiver / family present    COMMUNICATION/COLLABORATION:   The patients plan of care was discussed with: Physical therapist and Registered nurse.      Alicia Mcgee OT  Time Calculation: 18 mins

## 2020-06-06 NOTE — PROGRESS NOTES
Paged Dr. Chau Jaquez regarding DVT prophylaxis. Per Dr. Chau Jaquez, telephone order for SCDs given.

## 2020-06-06 NOTE — PROGRESS NOTES
Problem: Mobility Impaired (Adult and Pediatric)  Goal: *Acute Goals and Plan of Care (Insert Text)  Description: FUNCTIONAL STATUS PRIOR TO ADMISSION: Pt lives at [facility]. She is mod I with RW but has hx falls. She also has baseline Parkinson's Disease and dementia. HOME SUPPORT PRIOR TO ADMISSION: Pt lives alone and is independent with ADLs. Daughter lives locally and assists with IADLs    Physical Therapy Goals  Initiated 6/5/2020  1. Patient will move from supine to sit and sit to supine , scoot up and down and roll side to side in bed with minimal assistance/contact guard assist within 7 day(s). 2.  Patient will transfer from bed to chair and chair to bed with minimal assistance/contact guard assist using the least restrictive device within 7 day(s). 3.  Patient will perform sit to stand with minimal assistance/contact guard assist within 7 day(s). 4.  Patient will ambulate with modified independence for 50 feet with the least restrictive device within 7 day(s). Outcome: Progressing Towards Goal  PHYSICAL THERAPY TREATMENT  Patient: Ines Kang (44 y.o. female)  Date: 6/6/2020  Diagnosis: Syncope [R55]  Compression fracture of L1 lumbar vertebra ([facility] [address] [S32.010A]   <principal problem not specified>       Precautions:  brace when up OOB, spine precautions  Chart, physical therapy assessment, plan of care and goals were reviewed. ASSESSMENT  Patient continues with skilled PT services and is progressing towards goals. Patient able to tolerate increased ambulation distance today, however continues to require min A for balance and safety. Noted short, slow steps and frequent rest breaks taken due to decreased activity tolerance. Patient will benefit from skilled PT in order to improve independence with all functional mobility prior to discharge home.      Current Level of Function Impacting Discharge (mobility/balance): min A for tranfers and gait    Other factors to consider for discharge: increased assistance with mobility, fall risk         PLAN :  Patient continues to benefit from skilled intervention to address the above impairments. Continue treatment per established plan of care. to address goals. Recommendation for discharge: (in order for the patient to meet his/her long term goals)  Therapy 3 hours per day 5-7 days per week    This discharge recommendation:  A follow-up discussion with the attending provider and/or case management is planned    IF patient discharges home will need the following DME: to be determined (TBD)       SUBJECTIVE:   Patient stated I don't like using my walker, it slows me down.     OBJECTIVE DATA SUMMARY:   Critical Behavior:  Neurologic State: Alert  Orientation Level: Oriented X4(intermittent confusion)  Cognition: Decreased attention/concentration, Follows commands, Memory loss, Impulsive, Poor safety awareness  Safety/Judgement: Awareness of environment, Decreased awareness of need for assistance, Decreased awareness of need for safety, Decreased insight into deficits  Functional Mobility Training:  Bed Mobility:      Up in the chair on arrival              Transfers:  Sit to Stand: Contact guard assistance  Stand to Sit: Contact guard assistance                             Balance:  Sitting: Intact  Standing: Impaired; With support  Standing - Static: Good;Constant support  Standing - Dynamic : Good;Constant support  Ambulation/Gait Training:  Distance (ft): 75 Feet (ft)  Assistive Device: Gait belt;Walker, rolling  Ambulation - Level of Assistance: Minimal assistance        Gait Abnormalities: Antalgic;Decreased step clearance        Base of Support: Narrowed  Stance: Left decreased  Speed/Penny: Shuffled; Slow  Step Length: Left shortened;Right shortened  Swing Pattern: Left asymmetrical;Right asymmetrical               Patient ambulates with short, slow steps and unsteady gait, tactile cues for balance      Pain Rating:  No reports of pain    Activity Tolerance:   Good and requires rest breaks  Please refer to the flowsheet for vital signs taken during this treatment. After treatment patient left in no apparent distress:   Sitting in chair and Call bell within reach    COMMUNICATION/COLLABORATION:   The patients plan of care was discussed with: Registered nurse.      Priscila Gomez, PT   Time Calculation: 13 mins

## 2020-06-07 LAB
ALBUMIN SERPL-MCNC: 3.2 G/DL (ref 3.5–5)
ALBUMIN/GLOB SERPL: 1 {RATIO} (ref 1.1–2.2)
ALP SERPL-CCNC: 104 U/L (ref 45–117)
ALT SERPL-CCNC: 25 U/L (ref 12–78)
ANION GAP SERPL CALC-SCNC: 6 MMOL/L (ref 5–15)
AST SERPL-CCNC: 20 U/L (ref 15–37)
ATRIAL RATE: 68 BPM
BASOPHILS # BLD: 0 K/UL (ref 0–0.1)
BASOPHILS NFR BLD: 1 % (ref 0–1)
BILIRUB SERPL-MCNC: 0.6 MG/DL (ref 0.2–1)
BUN SERPL-MCNC: 12 MG/DL (ref 6–20)
BUN/CREAT SERPL: 17 (ref 12–20)
CALCIUM SERPL-MCNC: 8.7 MG/DL (ref 8.5–10.1)
CALCULATED P AXIS, ECG09: 36 DEGREES
CALCULATED R AXIS, ECG10: 8 DEGREES
CALCULATED T AXIS, ECG11: 53 DEGREES
CHLORIDE SERPL-SCNC: 107 MMOL/L (ref 97–108)
CO2 SERPL-SCNC: 27 MMOL/L (ref 21–32)
CREAT SERPL-MCNC: 0.69 MG/DL (ref 0.55–1.02)
DIAGNOSIS, 93000: NORMAL
DIFFERENTIAL METHOD BLD: NORMAL
EOSINOPHIL # BLD: 0.3 K/UL (ref 0–0.4)
EOSINOPHIL NFR BLD: 5 % (ref 0–7)
ERYTHROCYTE [DISTWIDTH] IN BLOOD BY AUTOMATED COUNT: 13.6 % (ref 11.5–14.5)
FOLATE SERPL-MCNC: 16.5 NG/ML (ref 5–21)
GLOBULIN SER CALC-MCNC: 3.3 G/DL (ref 2–4)
GLUCOSE SERPL-MCNC: 105 MG/DL (ref 65–100)
HCT VFR BLD AUTO: 39 % (ref 35–47)
HGB BLD-MCNC: 12.9 G/DL (ref 11.5–16)
IMM GRANULOCYTES # BLD AUTO: 0 K/UL (ref 0–0.04)
IMM GRANULOCYTES NFR BLD AUTO: 0 % (ref 0–0.5)
LYMPHOCYTES # BLD: 2.1 K/UL (ref 0.8–3.5)
LYMPHOCYTES NFR BLD: 38 % (ref 12–49)
MAGNESIUM SERPL-MCNC: 1.7 MG/DL (ref 1.6–2.4)
MCH RBC QN AUTO: 30.9 PG (ref 26–34)
MCHC RBC AUTO-ENTMCNC: 33.1 G/DL (ref 30–36.5)
MCV RBC AUTO: 93.3 FL (ref 80–99)
MONOCYTES # BLD: 0.4 K/UL (ref 0–1)
MONOCYTES NFR BLD: 7 % (ref 5–13)
NEUTS SEG # BLD: 2.8 K/UL (ref 1.8–8)
NEUTS SEG NFR BLD: 49 % (ref 32–75)
NRBC # BLD: 0 K/UL (ref 0–0.01)
NRBC BLD-RTO: 0 PER 100 WBC
P-R INTERVAL, ECG05: 170 MS
PLATELET # BLD AUTO: 237 K/UL (ref 150–400)
PMV BLD AUTO: 9.7 FL (ref 8.9–12.9)
POTASSIUM SERPL-SCNC: 3.1 MMOL/L (ref 3.5–5.1)
PROT SERPL-MCNC: 6.5 G/DL (ref 6.4–8.2)
Q-T INTERVAL, ECG07: 352 MS
QRS DURATION, ECG06: 86 MS
QTC CALCULATION (BEZET), ECG08: 374 MS
RBC # BLD AUTO: 4.18 M/UL (ref 3.8–5.2)
SODIUM SERPL-SCNC: 140 MMOL/L (ref 136–145)
VENTRICULAR RATE, ECG03: 68 BPM
VIT B12 SERPL-MCNC: 373 PG/ML (ref 193–986)
WBC # BLD AUTO: 5.6 K/UL (ref 3.6–11)

## 2020-06-07 PROCEDURE — 93005 ELECTROCARDIOGRAM TRACING: CPT

## 2020-06-07 PROCEDURE — 82746 ASSAY OF FOLIC ACID SERUM: CPT

## 2020-06-07 PROCEDURE — 77030038269 HC DRN EXT URIN PURWCK BARD -A

## 2020-06-07 PROCEDURE — 74011250637 HC RX REV CODE- 250/637: Performed by: HOSPITALIST

## 2020-06-07 PROCEDURE — 83735 ASSAY OF MAGNESIUM: CPT

## 2020-06-07 PROCEDURE — 74011250637 HC RX REV CODE- 250/637: Performed by: INTERNAL MEDICINE

## 2020-06-07 PROCEDURE — 74011000250 HC RX REV CODE- 250: Performed by: HOSPITALIST

## 2020-06-07 PROCEDURE — 74011250636 HC RX REV CODE- 250/636: Performed by: HOSPITALIST

## 2020-06-07 PROCEDURE — 82607 VITAMIN B-12: CPT

## 2020-06-07 PROCEDURE — 97530 THERAPEUTIC ACTIVITIES: CPT

## 2020-06-07 PROCEDURE — 80053 COMPREHEN METABOLIC PANEL: CPT

## 2020-06-07 PROCEDURE — 65270000029 HC RM PRIVATE

## 2020-06-07 PROCEDURE — 97116 GAIT TRAINING THERAPY: CPT

## 2020-06-07 PROCEDURE — C9113 INJ PANTOPRAZOLE SODIUM, VIA: HCPCS | Performed by: HOSPITALIST

## 2020-06-07 PROCEDURE — 85025 COMPLETE CBC W/AUTO DIFF WBC: CPT

## 2020-06-07 PROCEDURE — 36415 COLL VENOUS BLD VENIPUNCTURE: CPT

## 2020-06-07 RX ORDER — POTASSIUM CHLORIDE 750 MG/1
40 TABLET, FILM COATED, EXTENDED RELEASE ORAL 2 TIMES DAILY
Status: COMPLETED | OUTPATIENT
Start: 2020-06-07 | End: 2020-06-07

## 2020-06-07 RX ADMIN — SUCRALFATE 1 G: 1 TABLET ORAL at 17:29

## 2020-06-07 RX ADMIN — CALCIUM CARBONATE (ANTACID) CHEW TAB 500 MG 200 MG: 500 CHEW TAB at 21:18

## 2020-06-07 RX ADMIN — GABAPENTIN 600 MG: 600 TABLET, FILM COATED ORAL at 17:29

## 2020-06-07 RX ADMIN — SUCRALFATE 1 G: 1 TABLET ORAL at 07:04

## 2020-06-07 RX ADMIN — DROXIDOPA 600 MG: 300 CAPSULE ORAL at 23:23

## 2020-06-07 RX ADMIN — POTASSIUM CHLORIDE 40 MEQ: 750 TABLET, FILM COATED, EXTENDED RELEASE ORAL at 17:29

## 2020-06-07 RX ADMIN — GABAPENTIN 600 MG: 600 TABLET, FILM COATED ORAL at 09:00

## 2020-06-07 RX ADMIN — CARBIDOPA AND LEVODOPA 1 TABLET: 25; 100 TABLET ORAL at 21:00

## 2020-06-07 RX ADMIN — SODIUM CHLORIDE 40 MG: 9 INJECTION INTRAMUSCULAR; INTRAVENOUS; SUBCUTANEOUS at 09:01

## 2020-06-07 RX ADMIN — DROXIDOPA 600 MG: 300 CAPSULE ORAL at 12:33

## 2020-06-07 RX ADMIN — MORPHINE SULFATE 1 MG: 2 INJECTION, SOLUTION INTRAMUSCULAR; INTRAVENOUS at 08:57

## 2020-06-07 RX ADMIN — FLUDROCORTISONE ACETATE 0.1 MG: 0.1 TABLET ORAL at 17:30

## 2020-06-07 RX ADMIN — SUCRALFATE 1 G: 1 TABLET ORAL at 11:02

## 2020-06-07 RX ADMIN — HYDROCORTISONE: 25 CREAM TOPICAL at 11:08

## 2020-06-07 RX ADMIN — DONEPEZIL HYDROCHLORIDE 5 MG: 5 TABLET, FILM COATED ORAL at 21:00

## 2020-06-07 RX ADMIN — FLUDROCORTISONE ACETATE 0.1 MG: 0.1 TABLET ORAL at 11:02

## 2020-06-07 RX ADMIN — CARBIDOPA AND LEVODOPA 1 TABLET: 25; 100 TABLET ORAL at 09:00

## 2020-06-07 RX ADMIN — ATORVASTATIN CALCIUM 20 MG: 10 TABLET, FILM COATED ORAL at 21:00

## 2020-06-07 RX ADMIN — ACETAMINOPHEN 650 MG: 325 TABLET, FILM COATED ORAL at 12:37

## 2020-06-07 RX ADMIN — ACETAMINOPHEN 650 MG: 325 TABLET, FILM COATED ORAL at 09:00

## 2020-06-07 RX ADMIN — CARBIDOPA AND LEVODOPA 1 TABLET: 25; 100 TABLET ORAL at 17:30

## 2020-06-07 RX ADMIN — POTASSIUM CHLORIDE 40 MEQ: 750 TABLET, FILM COATED, EXTENDED RELEASE ORAL at 09:01

## 2020-06-07 RX ADMIN — HYDROCORTISONE: 25 CREAM TOPICAL at 18:00

## 2020-06-07 RX ADMIN — SUCRALFATE 1 G: 1 TABLET ORAL at 21:00

## 2020-06-07 RX ADMIN — HYDROCORTISONE: 25 CREAM TOPICAL at 21:02

## 2020-06-07 RX ADMIN — CLONIDINE HYDROCHLORIDE 0.1 MG: 0.1 TABLET ORAL at 21:18

## 2020-06-07 RX ADMIN — ACETAMINOPHEN 650 MG: 325 TABLET, FILM COATED ORAL at 17:29

## 2020-06-07 RX ADMIN — ACETAMINOPHEN 650 MG: 325 TABLET, FILM COATED ORAL at 21:00

## 2020-06-07 RX ADMIN — FLUOXETINE 40 MG: 20 CAPSULE ORAL at 09:00

## 2020-06-07 RX ADMIN — SODIUM CHLORIDE 40 MG: 9 INJECTION INTRAMUSCULAR; INTRAVENOUS; SUBCUTANEOUS at 21:00

## 2020-06-07 NOTE — PROGRESS NOTES
[facility] Adult  Hospitalist Group                                                                                          Hospitalist Progress Note  Ahmet Wheeler MD  Answering service: [phone] from in house phone        Date of Service:  2020  NAME:  Randi Culver  :  1945  MRN:  [id number]      Admission Summary:     Randi Culver is a 67 y.o. female with PMH of parkinson's disease,dementia,orthostatic hypotension and other comorbidities came to ER after a fall.     Patient states that she woke up this morning and while walking to the bathroom,her legs gave away and she fell down. She complaints of 7/10 lower back pain -describes it as sharp,non radiating,aggrevated with movement,relieved with morphine in ER. Patient reported to ER physician that she lost consciousness. She denied any chest pain,nasuea/vomiting,headache,SOB,dysuria,abdominal pain,muscle  Weakness or numbness/tingling.     In the ER,work up showed hypokalemia. CT scan showed Mild acute appearing superior endplate fracture L1  Hospitalist was consulted for admission       Interval history / Subjective: Follow-up for back pain  -Patient seen and examined at the bedside. She is up in a chair wearing back brace. No complaints       Assessment & Plan:     Acute mild L1 compression fracture after a fall  -MRI lumbar spine show Mild acute/subacute superior endplate compression fracture L1. Postsurgical and degenerative changes remainder lumbar spine as above.  -continue prn morphine   -Neurosurgery suggested IR consult for kyphoplasty.  -We will get IR assess her for kyphoplasty Monday    Epigastric pain  Burning sensation   -no left side chest pain   -add iv protonix, po carafate  -chest x ray   -EKG normal sinus rhythm vent rate 67 bpm, non specific st t wave.  -tums added, if persistent GI may be consulted for possible EGD. Mechanical fall vs syncope: She is on Florinef chronically.   -CT head - no acute abnormalities  -Check orthostatic vitals ,tele monitoring  -PT/OT      Hypokalemia due to florinef  -replace with kcl and monitor k level    Hypomagnesemia:resolved      Parkinson's disease  -continue home sinemet    Orthosattic hypotension:  -resume carbidopa, northera and florinef  -BP elevated, add prn clonidine   -check orthostatic BP     Dementia:  -continue home aricept      Hyperlipidemia  - Continue home statin     Anxiety/depression  - Continue  Prozac.  -Hold seroquel for prolong QT        Code status: DNR  DVT prophylaxis: SCD    Care Plan discussed with: Patient/Family and Nurse     Updated her daughter Khloe Stewart who is her POA and discussed plan of care on 6/6  Daughter is interested in kyphoplasty  Anticipated Disposition: Home w/Family  Anticipated Discharge: 24 hours to 48 hours     Hospital Problems  Date Reviewed: 2/18/2020          Codes Class Noted POA    Compression fracture of L1 lumbar vertebra ([facility] [address] ICD-10-CM: S32.010A  ICD-9-CM: 805.4  6/4/2020 Unknown        Syncope ICD-10-CM: R55  ICD-9-CM: 780.2  5/28/2018 Unknown                  Vital Signs:    Last 24hrs VS reviewed since prior progress note. Most recent are:  Visit Vitals  /81   Pulse 69   Temp 97.2 °F (36.2 °C)   Resp 16   Ht 5' 6\" (1.676 m)   Wt 59.9 kg (132 lb 0.9 oz)   SpO2 95%   BMI 21.31 kg/m²         Intake/Output Summary (Last 24 hours) at 6/7/2020 1417  Last data filed at 6/6/2020 1700  Gross per 24 hour   Intake    Output 875 ml   Net -875 ml        Physical Examination:             Constitutional:  No acute distress, cooperative, pleasant    ENT:  Oral mucosa moist, oropharynx benign. Resp:  CTA bilaterally. No wheezing/rhonchi/rales. No accessory muscle use   CV:  Regular rhythm, normal rate, no murmurs, gallops, rubs    GI:  Soft, non distended, non tender. normoactive bowel sounds, no hepatosplenomegaly     Musculoskeletal:  No edema, wearing the brace.     Neurologic:  Conscious and alert, oriented to place and person, Moves all extremities, CN II-XII reviewed     Skin:  Good turgor, no rashes or ulcers       Data Review:    Review and/or order of clinical lab test  Review and/or order of tests in the radiology section of CPT  Review and/or order of tests in the medicine section of CPT      Labs:     Recent Labs     06/07/20  0841 06/05/20  0420   WBC 5.6 5.3   HGB 12.9 12.2   HCT 39.0 36.7    201     Recent Labs     06/07/20  0841 06/05/20  0420    138   K 3.1* 2.6*    102   CO2 27 30   BUN 12 13   CREA 0.69 0.61   * 111*   CA 8.7 8.3*   MG 1.7  --      Recent Labs     06/07/20  0841 06/05/20  0420   ALT 25 22    104   TBILI 0.6 0.7   TP 6.5 5.6*   ALB 3.2* 2.8*   GLOB 3.3 2.8     No results for input(s): INR, PTP, APTT, INREXT, INREXT in the last 72 hours. No results for input(s): FE, TIBC, PSAT, FERR in the last 72 hours. Lab Results   Component Value Date/Time    Folate 16.5 06/07/2020 08:41 AM      No results for input(s): PH, PCO2, PO2 in the last 72 hours. No results for input(s): CPK, CKNDX, TROIQ in the last 72 hours.     No lab exists for component: CPKMB  Lab Results   Component Value Date/Time    Cholesterol, total 134 02/26/2020 09:40 AM    HDL Cholesterol 65 02/26/2020 09:40 AM    LDL, calculated 47 02/26/2020 09:40 AM    Triglyceride 111 02/26/2020 09:40 AM    CHOL/HDL Ratio 3.3 07/23/2010 08:37 AM     Lab Results   Component Value Date/Time    Glucose (POC) 102 (H) 03/08/2020 06:21 AM    Glucose (POC) 113 (H) 03/07/2020 09:18 PM    Glucose (POC) 129 (H) 03/06/2020 09:16 PM    Glucose (POC) 111 (H) 03/06/2020 04:06 PM    Glucose (POC) 139 (H) 03/06/2020 11:14 AM     Lab Results   Component Value Date/Time    Color YELLOW/STRAW 06/03/2020 12:54 PM    Appearance CLEAR 06/03/2020 12:54 PM    Specific gravity 1.015 06/03/2020 12:54 PM    Specific gravity 1.009 04/22/2020 10:33 AM    pH (UA) >8.5 (H) 06/03/2020 12:54 PM    Protein Negative 06/03/2020 12:54 PM Glucose Negative 06/03/2020 12:54 PM    Ketone Negative 06/03/2020 12:54 PM    Bilirubin Negative 06/03/2020 12:54 PM    Urobilinogen 0.2 06/03/2020 12:54 PM    Nitrites Negative 06/03/2020 12:54 PM    Leukocyte Esterase Negative 06/03/2020 12:54 PM    Epithelial cells FEW 06/03/2020 12:54 PM    Bacteria Negative 06/03/2020 12:54 PM    WBC 0-4 06/03/2020 12:54 PM    RBC 0-5 06/03/2020 12:54 PM         Medications Reviewed:     Current Facility-Administered Medications   Medication Dose Route Frequency    potassium chloride SR (KLOR-CON 10) tablet 40 mEq  40 mEq Oral DAILY    calcium carbonate (TUMS) chewable tablet 200 mg [elemental]  200 mg Oral TID PRN    hydrocortisone (ANUSOL-HC) 2.5 % rectal cream   Rectal QID    phenazopyridine (PYRIDIUM) tablet 100 mg  100 mg Oral TID PRN    pantoprazole (PROTONIX) 40 mg in 0.9% sodium chloride 10 mL injection  40 mg IntraVENous Q12H    cloNIDine HCL (CATAPRES) tablet 0.1 mg  0.1 mg Oral Q6H PRN    sucralfate (CARAFATE) tablet 1 g  1 g Oral AC&HS    atorvastatin (LIPITOR) tablet 20 mg  20 mg Oral QHS    carbidopa-levodopa (SINEMET)  mg per tablet 1 Tab  1 Tab Oral TID    donepeziL (ARICEPT) tablet 5 mg  5 mg Oral QHS    droxidopa cap 600 mg (Patient Supplied)  600 mg Oral Q12H    fludrocortisone (FLORINEF) tablet 0.1 mg  0.1 mg Oral BID    FLUoxetine (PROzac) capsule 40 mg  40 mg Oral DAILY    gabapentin (NEURONTIN) tablet 600 mg  600 mg Oral BID    [Held by provider] QUEtiapine (SEROquel) tablet 25 mg  25 mg Oral QHS    acetaminophen (TYLENOL) tablet 650 mg  650 mg Oral QID    morphine injection 1 mg  1 mg IntraVENous Q6H PRN     ______________________________________________________________________  EXPECTED LENGTH OF STAY: 2d 21h  ACTUAL LENGTH OF STAY:          3                 Lonie Sandifer, MD

## 2020-06-07 NOTE — PROGRESS NOTES
Bedside and Verbal shift change report given to Doron hutton RN (oncoming nurse) by Ck Stubbs RN (offgoing nurse). Report included the following information SBAR.

## 2020-06-07 NOTE — PROGRESS NOTES
Problem: Mobility Impaired (Adult and Pediatric)  Goal: *Acute Goals and Plan of Care (Insert Text)  Description: FUNCTIONAL STATUS PRIOR TO ADMISSION: Pt lives at [facility]. She is mod I with RW but has hx falls. She also has baseline Parkinson's Disease and dementia. HOME SUPPORT PRIOR TO ADMISSION: Pt lives alone and is independent with ADLs. Daughter lives locally and assists with IADLs    Physical Therapy Goals  Initiated 6/5/2020  1. Patient will move from supine to sit and sit to supine , scoot up and down and roll side to side in bed with minimal assistance/contact guard assist within 7 day(s). 2.  Patient will transfer from bed to chair and chair to bed with minimal assistance/contact guard assist using the least restrictive device within 7 day(s). 3.  Patient will perform sit to stand with minimal assistance/contact guard assist within 7 day(s). 4.  Patient will ambulate with modified independence for 50 feet with the least restrictive device within 7 day(s). Outcome: Progressing Towards Goal   PHYSICAL THERAPY TREATMENT  Patient: Ángela Peraza (24 y.o. female)  Date: 6/7/2020  Diagnosis: Syncope [R55]  Compression fracture of L1 lumbar vertebra ([facility] [address] [S32.010A]   <principal problem not specified>       Precautions:   No bending, no lifting greater than 5 lbs, no twisting, log-roll technique, repositioning every 20-30 min except when sleeping, brace when OOB (if ordered)  Chart, physical therapy assessment, plan of care and goals were reviewed. ASSESSMENT  Patient continues with skilled PT services and is progressing slowly towards goals. Pt seen this morning following RN clearance. Pt agreeable and motivated to move/participate, however, she has difficulty complying with her spinal precautions. Upon arrival, pt noted to be twisted in her bed. Pt also requires cues for walker safety as she reports normally using her rollator inconsistently as well.   Pt presents with a posterior LOB/safety concerns when standing statically/performing hand hygiene. She is unable to contribute during daniel-care post +BM. Furthermore, pt is easily distracted during gait training and this leads to path deviations and safety concerns. Pt will benefit from intensive 3 hrs of therapy per day for optimal recovery. Current Level of Function Impacting Discharge (mobility/balance): upwards of min-modA with DME use    Other factors to consider for discharge: pt is from ILF-this is unsafe for discharge         PLAN :  Patient continues to benefit from skilled intervention to address the above impairments. Continue treatment per established plan of care. to address goals. Recommendation for discharge: (in order for the patient to meet his/her long term goals)  Therapy 3 hours per day 5-7 days per week    This discharge recommendation:  Has been made in collaboration with the attending provider and/or case management    IF patient discharges home will need the following DME: facility has been recommended for optimal recovery       SUBJECTIVE:   Patient stated Oh, I don't know where I am going. ..  Pt passed her room despite cues for return (x2). OBJECTIVE DATA SUMMARY:   Critical Behavior:  Neurologic State: Alert  Orientation Level: Oriented X4  Cognition: Follows commands  Safety/Judgement: Awareness of environment, Fall prevention, Decreased awareness of need for safety, Decreased insight into deficits    Spinal diagnosis intervention:  The patient stated 2/3 back precautions when prompted. Reviewed all 3 back precautions, log roll technique, and sitting for 30 minutes at a time. Functional Mobility Training:    Bed Mobility:  Supine to Sit: Minimum assistance; Additional time(pt educated on logroll/alignment/sit from R SL)  Sit to Supine: (NT; OOB to chair)  Scooting: Stand-by assistance; Additional time        Transfers:  Sit to Stand: Minimum assistance; Additional time(cues to push from EOB/not pull from RW-posterior LOB concern)  Stand to Sit: Contact guard assistance; Additional time(cues for alignment, reach back, controlled descent)        Bed to Chair: (NT)                    Balance:  Sitting: Impaired; Without support  Sitting - Static: Good (unsupported)  Sitting - Dynamic: Fair (occasional)  Standing: Impaired; With support  Standing - Static: Fair;Constant support(pt with post leab/LOB during standing hand hygiene)  Standing - Dynamic : Fair;Constant support  Ambulation/Gait Training:  Distance (ft): 50 Feet (ft)(x3 with standing rest breaks and cues for safety)  Assistive Device: Gait belt;Walker, rolling;Brace/Splint  Ambulation - Level of Assistance: Minimal assistance; Additional time        Gait Abnormalities: Antalgic;Decreased step clearance(cues for postural extension and walker safety)        Base of Support: Narrowed     Speed/Penny: Slow;Shuffled  Step Length: Right shortened;Left shortened     Pain Rating: \"Oh, it is not that bad. Cheryl Sahu \"    Activity Tolerance:   Fair and requires rest breaks  Please refer to the flowsheet for vital signs taken during this treatment. After treatment patient left in no apparent distress:   Sitting in chair, Call bell within reach, and Bed / chair alarm activated    COMMUNICATION/COLLABORATION:   The patients plan of care was discussed with: Registered nurseAsh Poon   Time Calculation: 31 mins

## 2020-06-08 ENCOUNTER — APPOINTMENT (OUTPATIENT)
Dept: INTERVENTIONAL RADIOLOGY/VASCULAR | Age: 75
DRG: 517 | End: 2020-06-08
Attending: RADIOLOGY
Payer: MEDICARE

## 2020-06-08 LAB
ANION GAP SERPL CALC-SCNC: 5 MMOL/L (ref 5–15)
BUN SERPL-MCNC: 12 MG/DL (ref 6–20)
BUN/CREAT SERPL: 17 (ref 12–20)
CALCIUM SERPL-MCNC: 8.7 MG/DL (ref 8.5–10.1)
CHLORIDE SERPL-SCNC: 107 MMOL/L (ref 97–108)
CO2 SERPL-SCNC: 26 MMOL/L (ref 21–32)
CREAT SERPL-MCNC: 0.71 MG/DL (ref 0.55–1.02)
GLUCOSE BLD STRIP.AUTO-MCNC: 147 MG/DL (ref 65–100)
GLUCOSE SERPL-MCNC: 101 MG/DL (ref 65–100)
POTASSIUM SERPL-SCNC: 3.7 MMOL/L (ref 3.5–5.1)
SARS-COV-2, COV2: NOT DETECTED
SERVICE CMNT-IMP: ABNORMAL
SODIUM SERPL-SCNC: 138 MMOL/L (ref 136–145)
SPECIMEN SOURCE, FCOV2M: NORMAL

## 2020-06-08 PROCEDURE — 36415 COLL VENOUS BLD VENIPUNCTURE: CPT

## 2020-06-08 PROCEDURE — 82962 GLUCOSE BLOOD TEST: CPT

## 2020-06-08 PROCEDURE — 87635 SARS-COV-2 COVID-19 AMP PRB: CPT

## 2020-06-08 PROCEDURE — 74011250637 HC RX REV CODE- 250/637: Performed by: INTERNAL MEDICINE

## 2020-06-08 PROCEDURE — 65270000029 HC RM PRIVATE

## 2020-06-08 PROCEDURE — 77030034842 HC TAMP SPN BN INFL EXP II KYPH -I

## 2020-06-08 PROCEDURE — 74011250636 HC RX REV CODE- 250/636: Performed by: RADIOLOGY

## 2020-06-08 PROCEDURE — 80048 BASIC METABOLIC PNL TOTAL CA: CPT

## 2020-06-08 PROCEDURE — 97530 THERAPEUTIC ACTIVITIES: CPT

## 2020-06-08 PROCEDURE — 74011250636 HC RX REV CODE- 250/636: Performed by: HOSPITALIST

## 2020-06-08 PROCEDURE — 77030021783 HC SYS CEM DEL MEDT -D

## 2020-06-08 PROCEDURE — 0QS03ZZ REPOSITION LUMBAR VERTEBRA, PERCUTANEOUS APPROACH: ICD-10-PCS | Performed by: RADIOLOGY

## 2020-06-08 PROCEDURE — 74011250637 HC RX REV CODE- 250/637: Performed by: HOSPITALIST

## 2020-06-08 PROCEDURE — 74011636320 HC RX REV CODE- 636/320

## 2020-06-08 PROCEDURE — 99152 MOD SED SAME PHYS/QHP 5/>YRS: CPT

## 2020-06-08 PROCEDURE — C9113 INJ PANTOPRAZOLE SODIUM, VIA: HCPCS | Performed by: HOSPITALIST

## 2020-06-08 PROCEDURE — 74011000250 HC RX REV CODE- 250: Performed by: HOSPITALIST

## 2020-06-08 PROCEDURE — 74011250637 HC RX REV CODE- 250/637: Performed by: NURSE PRACTITIONER

## 2020-06-08 PROCEDURE — 0QU03JZ SUPPLEMENT LUMBAR VERTEBRA WITH SYNTHETIC SUBSTITUTE, PERCUTANEOUS APPROACH: ICD-10-PCS | Performed by: RADIOLOGY

## 2020-06-08 PROCEDURE — 74011000250 HC RX REV CODE- 250

## 2020-06-08 PROCEDURE — 97535 SELF CARE MNGMENT TRAINING: CPT

## 2020-06-08 PROCEDURE — 77030030399

## 2020-06-08 PROCEDURE — 77030038269 HC DRN EXT URIN PURWCK BARD -A

## 2020-06-08 RX ORDER — LIDOCAINE HYDROCHLORIDE 20 MG/ML
INJECTION, SOLUTION INFILTRATION; PERINEURAL
Status: COMPLETED
Start: 2020-06-08 | End: 2020-06-08

## 2020-06-08 RX ORDER — BUPIVACAINE HYDROCHLORIDE 5 MG/ML
INJECTION, SOLUTION EPIDURAL; INTRACAUDAL
Status: COMPLETED
Start: 2020-06-08 | End: 2020-06-08

## 2020-06-08 RX ORDER — ZINC OXIDE 20 G/100G
OINTMENT TOPICAL AS NEEDED
Status: DISCONTINUED | OUTPATIENT
Start: 2020-06-08 | End: 2020-06-10 | Stop reason: HOSPADM

## 2020-06-08 RX ORDER — CEFAZOLIN SODIUM 1 G/3ML
2 INJECTION, POWDER, FOR SOLUTION INTRAMUSCULAR; INTRAVENOUS
Status: DISCONTINUED | OUTPATIENT
Start: 2020-06-08 | End: 2020-06-10 | Stop reason: HOSPADM

## 2020-06-08 RX ORDER — MIDAZOLAM HYDROCHLORIDE 1 MG/ML
INJECTION, SOLUTION INTRAMUSCULAR; INTRAVENOUS
Status: DISCONTINUED
Start: 2020-06-08 | End: 2020-06-08

## 2020-06-08 RX ORDER — FENTANYL CITRATE 50 UG/ML
100 INJECTION, SOLUTION INTRAMUSCULAR; INTRAVENOUS
Status: DISCONTINUED | OUTPATIENT
Start: 2020-06-08 | End: 2020-06-08

## 2020-06-08 RX ORDER — BUPIVACAINE HYDROCHLORIDE 5 MG/ML
20 INJECTION, SOLUTION EPIDURAL; INTRACAUDAL
Status: ACTIVE | OUTPATIENT
Start: 2020-06-08 | End: 2020-06-08

## 2020-06-08 RX ORDER — CEFAZOLIN SODIUM 1 G/3ML
INJECTION, POWDER, FOR SOLUTION INTRAMUSCULAR; INTRAVENOUS
Status: DISCONTINUED
Start: 2020-06-08 | End: 2020-06-08

## 2020-06-08 RX ORDER — MIDAZOLAM HYDROCHLORIDE 1 MG/ML
4 INJECTION, SOLUTION INTRAMUSCULAR; INTRAVENOUS
Status: DISCONTINUED | OUTPATIENT
Start: 2020-06-08 | End: 2020-06-08

## 2020-06-08 RX ORDER — LIDOCAINE HYDROCHLORIDE 20 MG/ML
20 INJECTION, SOLUTION INFILTRATION; PERINEURAL ONCE
Status: ACTIVE | OUTPATIENT
Start: 2020-06-08 | End: 2020-06-08

## 2020-06-08 RX ADMIN — IOHEXOL 5 ML: 240 INJECTION, SOLUTION INTRATHECAL; INTRAVASCULAR; INTRAVENOUS; ORAL at 09:03

## 2020-06-08 RX ADMIN — CARBIDOPA AND LEVODOPA 1 TABLET: 25; 100 TABLET ORAL at 20:49

## 2020-06-08 RX ADMIN — BUPIVACAINE HYDROCHLORIDE 20 ML: 5 INJECTION, SOLUTION EPIDURAL; INTRACAUDAL; PERINEURAL at 09:03

## 2020-06-08 RX ADMIN — MORPHINE SULFATE 1 MG: 2 INJECTION, SOLUTION INTRAMUSCULAR; INTRAVENOUS at 05:56

## 2020-06-08 RX ADMIN — SUCRALFATE 1 G: 1 TABLET ORAL at 12:21

## 2020-06-08 RX ADMIN — CALCIUM CARBONATE (ANTACID) CHEW TAB 500 MG 200 MG: 500 CHEW TAB at 07:53

## 2020-06-08 RX ADMIN — DROXIDOPA 600 MG: 300 CAPSULE ORAL at 11:00

## 2020-06-08 RX ADMIN — MIDAZOLAM HYDROCHLORIDE 1 MG: 2 INJECTION, SOLUTION INTRAMUSCULAR; INTRAVENOUS at 09:11

## 2020-06-08 RX ADMIN — GABAPENTIN 600 MG: 600 TABLET, FILM COATED ORAL at 20:48

## 2020-06-08 RX ADMIN — MIDAZOLAM HYDROCHLORIDE 1 MG: 2 INJECTION, SOLUTION INTRAMUSCULAR; INTRAVENOUS at 08:54

## 2020-06-08 RX ADMIN — MORPHINE SULFATE 1 MG: 2 INJECTION, SOLUTION INTRAMUSCULAR; INTRAVENOUS at 20:59

## 2020-06-08 RX ADMIN — SUCRALFATE 1 G: 1 TABLET ORAL at 07:48

## 2020-06-08 RX ADMIN — ACETAMINOPHEN 650 MG: 325 TABLET, FILM COATED ORAL at 20:48

## 2020-06-08 RX ADMIN — SODIUM CHLORIDE 40 MG: 9 INJECTION INTRAMUSCULAR; INTRAVENOUS; SUBCUTANEOUS at 21:07

## 2020-06-08 RX ADMIN — ACETAMINOPHEN 650 MG: 325 TABLET, FILM COATED ORAL at 12:21

## 2020-06-08 RX ADMIN — ZINC OXIDE: 200 OINTMENT TOPICAL at 20:56

## 2020-06-08 RX ADMIN — FENTANYL CITRATE 50 MCG: 50 INJECTION INTRAMUSCULAR; INTRAVENOUS at 09:11

## 2020-06-08 RX ADMIN — FLUOXETINE 40 MG: 20 CAPSULE ORAL at 11:46

## 2020-06-08 RX ADMIN — SUCRALFATE 1 G: 1 TABLET ORAL at 17:30

## 2020-06-08 RX ADMIN — CARBIDOPA AND LEVODOPA 1 TABLET: 25; 100 TABLET ORAL at 11:47

## 2020-06-08 RX ADMIN — ZINC OXIDE: 200 OINTMENT TOPICAL at 05:33

## 2020-06-08 RX ADMIN — LIDOCAINE HYDROCHLORIDE 20 MG: 20 INJECTION, SOLUTION INFILTRATION; PERINEURAL at 09:02

## 2020-06-08 RX ADMIN — FENTANYL CITRATE 100 MCG: 50 INJECTION INTRAMUSCULAR; INTRAVENOUS at 08:54

## 2020-06-08 RX ADMIN — PHENAZOPYRIDINE 100 MG: 100 TABLET ORAL at 05:20

## 2020-06-08 NOTE — PROGRESS NOTES
Problem: Mobility Impaired (Adult and Pediatric)  Goal: *Acute Goals and Plan of Care (Insert Text)  Description: FUNCTIONAL STATUS PRIOR TO ADMISSION: Pt lives at [facility]. She is mod I with RW but has hx falls. She also has baseline Parkinson's Disease and dementia. HOME SUPPORT PRIOR TO ADMISSION: Pt lives alone and is independent with ADLs. Daughter lives locally and assists with IADLs    Physical Therapy Goals  Initiated 6/5/2020  1. Patient will move from supine to sit and sit to supine , scoot up and down and roll side to side in bed with minimal assistance/contact guard assist within 7 day(s). 2.  Patient will transfer from bed to chair and chair to bed with minimal assistance/contact guard assist using the least restrictive device within 7 day(s). 3.  Patient will perform sit to stand with minimal assistance/contact guard assist within 7 day(s). 4.  Patient will ambulate with modified independence for 50 feet with the least restrictive device within 7 day(s). Outcome: Progressing Towards Goal   PHYSICAL THERAPY TREATMENT  Patient: Italo Lauren (89 y.o. female)  Date: 6/8/2020  Diagnosis: Syncope [R55]  Compression fracture of L1 lumbar vertebra ([facility] [address] [S32.010A]   <principal problem not specified>       Precautions:    Chart, physical therapy assessment, plan of care and goals were reviewed. ASSESSMENT  Patient continues with skilled PT services and is progressing towards goals. She underwent kyphoplasty this morning and reports that her pain is improved. She was already up at the EOB on arrival with OT. After her first toileting trip, she needed to change her gown since it was wet and she needed max assist to doff and re-don her brace. She was able to ambulate in the room with the RW with brace in place with min assist for balance. Her safety awareness is fair and she needs frequent redirection and verbal cues for safety.   Continue to recommend SNF level rehab prior to return to her independent living setting. Current Level of Function Impacting Discharge (mobility/balance): min to mod assist for short distance ambulation with the RW    Other factors to consider for discharge: safety awareness, fall risk         PLAN :  Patient continues to benefit from skilled intervention to address the above impairments. Continue treatment per established plan of care. to address goals. Recommendation for discharge: (in order for the patient to meet his/her long term goals)  Therapy up to 5 days/week in SNF setting    This discharge recommendation:  Has been made in collaboration with the attending provider and/or case management    IF patient discharges home will need the following DME: to be determined (TBD)       SUBJECTIVE:   Patient stated It feels a lot better since this morning.     OBJECTIVE DATA SUMMARY:   Critical Behavior:  Neurologic State: Alert  Orientation Level: Oriented to person, Oriented to place, Oriented to situation  Cognition: Appropriate for age attention/concentration  Safety/Judgement: Awareness of environment, Fall prevention, Decreased awareness of need for safety, Decreased insight into deficits  Functional Mobility Training:  Bed Mobility:        Sit to Supine: (up in chair after)           Transfers:  Sit to Stand: Minimum assistance; Adaptive equipment; Additional time(frequent verbal cues for safety)  Stand to Sit: Minimum assistance; Adaptive equipment; Additional time(frequent verbal cues for safety)        Bed to Chair: Minimum assistance; Adaptive equipment; Additional time                    Balance:  Sitting: Intact; Without support  Standing: Impaired; With support  Standing - Static: Fair;Occasional  Standing - Dynamic : Fair;Occasional  Ambulation/Gait Training:  Distance (ft): 60 Feet (ft)(plus 10' with seated rest between)  Assistive Device: Brace/Splint;Gait belt;Walker, rolling  Ambulation - Level of Assistance: Minimal assistance; Adaptive equipment; Additional time        Gait Abnormalities: Decreased step clearance;Shuffling gait(mildly flexed at hips)        Base of Support: Center of gravity altered;Narrowed     Speed/Penny: Shuffled; Slow  Step Length: Right shortened;Left shortened        Interventions: Safety awareness training; Tactile cues; Verbal cues; Visual/Demos           Stairs: Therapeutic Exercises:     Pain Rating:  Decreased pain after kyphoplasty this morning    Activity Tolerance:   Fair and requires rest breaks  Please refer to the flowsheet for vital signs taken during this treatment. After treatment patient left in no apparent distress:   Sitting in chair, Call bell within reach, and Bed / chair alarm activated    COMMUNICATION/COLLABORATION:   The patients plan of care was discussed with: Occupational therapist, Registered nurse, and Case management.      Emili Varela, PT   Time Calculation: 49 mins

## 2020-06-08 NOTE — PROGRESS NOTES
CM verified patient has a qualifying hospital stay for [facility].       TRACIE LechugaW/CRM

## 2020-06-08 NOTE — H&P
Interventional and Vascular Radiology History and Physical    Patient: Michael Marsh 67 y.o. female       Chief Complaint: Dizziness; Wrist Pain; and Back Pain      History of Present Illness: L1 fracture     History:    Past Medical History:   Diagnosis Date    Bipolar disorder (HCC)     Nazmy    Chronic pain     BACK PAIN    Diabetes ([facility] [address]     BORDERLINE TX WITH DIET AND EXERCISE    DJD (degenerative joint disease)     Fibromyalgia     Genital herpes     GERD (gastroesophageal reflux disease)     Hypercholesterolemia     IBS (irritable bowel syndrome)     Ill-defined condition     fibromyligia    Lumbar disc disease     stimulation-Honza    Migraine     Nausea & vomiting     Other ill-defined conditions(799.89)     SEASONAL allergies    Other ill-defined conditions(799.89)     dysphagia has had esophagus dilated    Paget's disease     Parkinson disease ([facility] [address]     Pulmonary embolism (HCC)     RLS (restless legs syndrome)     Spinal stenosis      Family History   Problem Relation Age of Onset    Colon Cancer Mother     Cancer Mother 68        colon    Heart Disease Father     Heart Disease Maternal Grandmother     Heart Disease Maternal Grandfather     Heart Disease Other      Social History     Socioeconomic History    Marital status:      Spouse name: Not on file    Number of children: Not on file    Years of education: Not on file    Highest education level: Not on file   Occupational History    Occupation: volunteer     Employer: RETIRED     Comment: Formerly [employer]   Social Needs    Financial resource strain: Not on file    Food insecurity     Worry: Not on file     Inability: Not on file    Transportation needs     Medical: Not on file     Non-medical: Not on file   Tobacco Use    Smoking status: Never Smoker    Smokeless tobacco: Never Used   Substance and Sexual Activity    Alcohol use: No     Comment: 1 per month    Drug use: No    Sexual activity: Not on file   Lifestyle  Physical activity     Days per week: Not on file     Minutes per session: Not on file    Stress: Not on file   Relationships    Social connections     Talks on phone: Not on file     Gets together: Not on file     Attends Bahai service: Not on file     Active member of club or organization: Not on file     Attends meetings of clubs or organizations: Not on file     Relationship status: Not on file    Intimate partner violence     Fear of current or ex partner: Not on file     Emotionally abused: Not on file     Physically abused: Not on file     Forced sexual activity: Not on file   Other Topics Concern    Not on file   Social History Narrative    Not on file       Allergies:    Allergies   Allergen Reactions    Adhesive Itching    Hydrocodone Itching    Lorazepam Other (comments)    Other Medication Rash     Tide detergent    Percocet [Oxycodone-Acetaminophen] Itching    Sudafed [Pseudoephedrine Hcl] Other (comments)     Vertigo    Wellbutrin [Bupropion Hcl] Nausea and Vomiting    Zithromax [Azithromycin] Vertigo    Zyrtec [Cetirizine] Nausea Only       Current Medications:  Current Facility-Administered Medications   Medication Dose Route Frequency    zinc oxide 20 % ointment   Topical PRN    lidocaine (XYLOCAINE) 20 mg/mL (2 %) injection 400 mg  20 mL SubCUTAneous ONCE    bupivacaine (PF) (MARCAINE) 0.5 % (5 mg/mL) injection 100 mg  20 mL SubCUTAneous RAD ONCE    iohexoL (OMNIPAQUE) 240 mg iodine/mL solution 50 mL  50 mL Other RAD ONCE    ceFAZolin (ANCEF) injection 2 g  2 g IntraVENous ON CALL    calcium carbonate (TUMS) chewable tablet 200 mg [elemental]  200 mg Oral TID PRN    hydrocortisone (ANUSOL-HC) 2.5 % rectal cream   Rectal QID    phenazopyridine (PYRIDIUM) tablet 100 mg  100 mg Oral TID PRN    pantoprazole (PROTONIX) 40 mg in 0.9% sodium chloride 10 mL injection  40 mg IntraVENous Q12H    cloNIDine HCL (CATAPRES) tablet 0.1 mg  0.1 mg Oral Q6H PRN    sucralfate (CARAFATE) tablet 1 g  1 g Oral AC&HS    atorvastatin (LIPITOR) tablet 20 mg  20 mg Oral QHS    carbidopa-levodopa (SINEMET)  mg per tablet 1 Tab  1 Tab Oral TID    donepeziL (ARICEPT) tablet 5 mg  5 mg Oral QHS    droxidopa cap 600 mg (Patient Supplied)  600 mg Oral Q12H    fludrocortisone (FLORINEF) tablet 0.1 mg  0.1 mg Oral BID    FLUoxetine (PROzac) capsule 40 mg  40 mg Oral DAILY    gabapentin (NEURONTIN) tablet 600 mg  600 mg Oral BID    [Held by provider] QUEtiapine (SEROquel) tablet 25 mg  25 mg Oral QHS    acetaminophen (TYLENOL) tablet 650 mg  650 mg Oral QID    morphine injection 1 mg  1 mg IntraVENous Q6H PRN        Physical Exam:  Blood pressure 119/71, pulse 73, temperature 97.5 °F (36.4 °C), resp. rate 16, height 5' 6\" (1.676 m), weight 59.9 kg (132 lb 0.9 oz), SpO2 94 %. LUNG: clear to auscultation bilaterally, HEART: regular rate and rhythm, S1, S2 normal, no murmur, click, rub or gallop      Alerts:    Hospital Problems  Date Reviewed: 2/18/2020          Codes Class Noted POA    Compression fracture of L1 lumbar vertebra ([facility] [address] ICD-10-CM: S32.010A  ICD-9-CM: 805.4  6/4/2020 Unknown        Syncope ICD-10-CM: R55  ICD-9-CM: 780.2  5/28/2018 Unknown              Laboratory:      Recent Labs     06/08/20  0544 06/07/20  0841   HGB  --  12.9   HCT  --  39.0   WBC  --  5.6   PLT  --  237   BUN 12 12   CREA 0.71 0.69   K 3.7 3.1*         Plan of Care/Planned Procedure:  Risks, benefits, and alternatives reviewed with patient and she agrees to proceed with the procedure. Conscious sedation will be performed with IV fentanyl and versed.  Plan is for Galen Franks MD

## 2020-06-08 NOTE — PROGRESS NOTES
YULISSA-D/c to [facility]    CM noted that this pt could benefit from rehab. CM went to meet with pt , but she was off of the floor. MARYLOU spoke with pt's daughter, Bharath ([phone]), to discuss. MARYLOU informed her that the therapists are recommending IPR, She stated that the pt went to [facility] in the past and she would like her to go there for SNF rehab. MARYLOU sent a referral to [facility] via [facility]. During this discussion with pt's daughter, MARYLOU asked her if she would like an LTSS screening and she would not want this to be completed. MARYLOU spoke with Maggi Peña in admissions at [facility] and she stated that she can accept this pt for admission. Pt will need 2 negative COVID tests, MARYLOU informed Chandra Roger. Marvin Riddle, HENRI-SW

## 2020-06-08 NOTE — PROGRESS NOTES
Bedside and Verbal shift change report given to Mary Grace Garcia RN (oncoming nurse) by Tex Espinoza RN (offgoing nurse). Report included the following information SBAR.

## 2020-06-08 NOTE — PROGRESS NOTES
Events noted, patient now s/p kyphoplasty for compression fx. Attempted to see for progression of activity, but she is still too sleepy at this time. We will follow up later today to attempt mobility progression.     Belkis Greene, PT

## 2020-06-08 NOTE — PROGRESS NOTES
Pt to unit for L1 kyphoplasty. Consrnt by Dr Lorin Juarez with daughter Colleen Meier.   To table monitors placed, meds per Dr Lorin Juarez

## 2020-06-08 NOTE — PROGRESS NOTES
Bedside shift change report given to Binu Naidu (oncoming nurse) by Rosibel Lima (offgoing nurse). Report included the following information SBAR, Kardex, Intake/Output and MAR.

## 2020-06-08 NOTE — PROGRESS NOTES
Pt tolerated procedure well.   Two L1 level wounds dressed, clean dry and intact Pt is verbal and vitals are at baseline

## 2020-06-08 NOTE — PROGRESS NOTES
Problem: Self Care Deficits Care Plan (Adult)  Goal: *Acute Goals and Plan of Care (Insert Text)  Description: FUNCTIONAL STATUS PRIOR TO ADMISSION: Patient was modified independent using a rolling walker for functional mobility, has a rollator but prefers RW. Patient was modified independent for basic and instrumental ADLs, hx of Parkinson's dx. HOME SUPPORT: The patient lived alone at [facility] [place]    Occupational Therapy Goals  Initiated 6/5/2020    1. Patient will perform lower body dressing with supervision/set-up using AE PRN within 7 days. 2.  Patient will perform toilet transfer with supervision/set-up using most appropriate DME within 7 days. 3.  Patient will grooming at the sink with supervision/set-up within 7 days. 4.  Patient will don/doff back brace at minimal assistance/contact guard assist within 7 days. 5.  Patient will verbalize/demonstrate 3/3 back precautions during ADL tasks without cues within 7 days. Outcome: Progressing Towards Goal     OCCUPATIONAL THERAPY TREATMENT  Patient: Bridger Lancaster (30 y.o. female)  Date: 6/8/2020  Diagnosis: Syncope [R55]  Compression fracture of L1 lumbar vertebra ([address] [S32.010A]   <principal problem not specified>      Precautions:    Chart, occupational therapy assessment, plan of care, and goals were reviewed. ASSESSMENT  Patient continues with skilled OT services and is progressing towards goals. Pt received supine in bed, incontinent of urine. Pt continues to require frequent cues for safety and to maintain spinal precautions. When prompted, pt immediately able to recall 2/3 (forgetting lifting) but with poor awareness during functional activities.  Pt able to complete log roll to come to seated EOB with verbal cues and mod assist. Pt with good balance once seated upright, donning new gown and TLSO with mod assist. Pt unsteady while standing, requiring max assist to pull up protective undergarment with BUE support via RW for balance. Provided pt education on compensatory dressing techniques using AE - seated in chair, pt able to demonstrate effective use of the reacher for doffing B socks with min verbal cues and min assist for donning socks with sock aide. Provided visual demonstration for use of long-handled shoehorn but pt deferred attempt this session due to increasing back discomfort/pain. Assisted pt with repositioning in chair for pain management, reinforcing importance of maintaining precautions and being aware of body position during ADL completion and at rest as pt needs frequent reminders. At conclusion of session, pt was concerned about her memory and remembering discussed strategies - may benefit from handouts with visuals for dressing techniques as a reference. Continue to recommend inpatient rehab at discharge. Current Level of Function Impacting Discharge (ADLs): mod assist for bed mobility and upper body dressing; up to max assist for lower body ADLs    Other factors to consider for discharge: mod I at baseline; lives alone; intermittent confusion? PLAN :  Patient continues to benefit from skilled intervention to address the above impairments. Continue treatment per established plan of care. to address goals. Recommend with staff: OOB to chair for all meals and toileting at [facility] with RW and min assist    Recommend next OT session: long-handled shoehorn, visual reference for dressing techniques with AE, full dressing routine, donning/doffing brace, balance with toileting    Recommendation for discharge: (in order for the patient to meet his/her long term goals)  Therapy 3 hours per day 5-7 days per week    This discharge recommendation:  Has been made in collaboration with the attending provider and/or case management    IF patient discharges home will need the following DME: TBD       SUBJECTIVE:   Patient stated I'm wet. I'm soaked through.     OBJECTIVE DATA SUMMARY:   Cognitive/Behavioral Status:  Neurologic State: Alert  Orientation Level: Oriented to person;Oriented to place;Oriented to situation  Cognition: Follows commands;Decreased attention/concentration  Perception: Appears intact  Perseveration: Perseverates during conversation  Safety/Judgement: Decreased awareness of need for assistance;Decreased awareness of need for safety;Decreased insight into deficits    Functional Mobility and Transfers for ADLs:  Bed Mobility:  Supine to Sit: Minimum assistance; Additional time(cues for technique and precautions)  Sit to Supine: (up in chair after)  Scooting: Minimum assistance; Additional time    Transfers:  Sit to Stand: Minimum assistance; Adaptive equipment; Additional time  Functional Transfers  Toilet Transfer : Minimum assistance; Additional time; Adaptive equipment  Bed to Chair: Minimum assistance; Adaptive equipment; Additional time    Balance:  Sitting: Intact  Sitting - Static: Good (unsupported)  Sitting - Dynamic: Fair (occasional)  Standing: Impaired  Standing - Static: Fair;Good;Constant support  Standing - Dynamic : Fair;Constant support    ADL Intervention:  Upper Body Dressing Assistance  Dressing Assistance: Moderate assistance;Set-up  Orthotics(Brace): Moderate assistance  Hospital Gown: Minimum  assistance  Cues: Verbal cues provided;Visual cues provided; Tactile cues provided;Physical assistance    Lower Body Dressing Assistance  Dressing Assistance: Moderate assistance  Protective Undergarmet:  Moderate assistance  Socks: Verbal cues (doff with reacher), Min assist (don with sock aide)  Position Performed: Seated in chair;Standing  Cues: Don;Doff;Verbal cues provided;Physical assistance  Adaptive Equipment Used: Walker; Reacher; Sock aide    Toileting  Toileting Assistance: Maximum assistance  Clothing Management: Maximum assistance  Cues: Verbal cues provided;Physical assistance for pants down;Physical assistance for pants up  Adaptive Equipment: Walker    Cognitive Retraining  Safety/Judgement: Decreased awareness of need for assistance;Decreased awareness of need for safety;Decreased insight into deficits    Pain:  Pt reporting pain, describes as improved. Activity Tolerance:   Fair and requires rest breaks  Please refer to the flowsheet for vital signs taken during this treatment. After treatment patient left in no apparent distress:   Seated on [facility] with physical therapist taking over session     COMMUNICATION/COLLABORATION:   The patients plan of care was discussed with: Physical therapist and Registered nurse.      Luis Enrique Duenas OT  Time Calculation: 39 mins

## 2020-06-08 NOTE — PROGRESS NOTES
[facility] Adult  Hospitalist Group                                                                                          Hospitalist Progress Note  Rankin Lefort, MD  Answering service: [phone] from in house phone        Date of Service:  2020  NAME:  Cinthia Crooks  :  1945  MRN:  [id number]      Admission Summary:     Cinthia Crooks is a 67 y.o. female with PMH of parkinson's disease,dementia,orthostatic hypotension and other comorbidities came to ER after a fall.     Patient states that she woke up this morning and while walking to the bathroom,her legs gave away and she fell down. She complaints of 7/10 lower back pain -describes it as sharp,non radiating,aggrevated with movement,relieved with morphine in ER. Patient reported to ER physician that she lost consciousness. She denied any chest pain,nasuea/vomiting,headache,SOB,dysuria,abdominal pain,muscle  Weakness or numbness/tingling.     In the ER,work up showed hypokalemia. CT scan showed Mild acute appearing superior endplate fracture L1  Hospitalist was consulted for admission       Interval history / Subjective: Follow-up for back pain  -Patient seen and examined at the bedside after she returned from kyphoplasty. She thinks the procedure has helped and currently denying pain. Assessment & Plan:     Acute mild L1 compression fracture after a fall  -MRI lumbar spine show Mild acute/subacute superior endplate compression fracture L1. Postsurgical and degenerative changes remainder lumbar spine as above.  -continue prn morphine   -Neurosurgery suggested IR consult for kyphoplasty. -Status post kyphoplasty,     Epigastric pain  Burning sensation   -no left side chest pain   -add iv protonix, po carafate  -chest x ray   -EKG normal sinus rhythm vent rate 67 bpm, non specific st t wave.  -tums added, if persistent GI may be consulted for possible EGD. Mechanical fall vs syncope:   She is on Florinef chronically. -CT head - no acute abnormalities  -Check orthostatic vitals ,tele monitoring  -PT/OT      Hypokalemia due to florinef  -replace with kcl and monitor k level    Hypomagnesemia:resolved      Parkinson's disease  -continue home sinemet    Orthosattic hypotension:  -resume carbidopa, northera and florinef  -BP elevated, add prn clonidine   -check orthostatic BP     Dementia:  -continue home aricept      Hyperlipidemia  - Continue home statin     Anxiety/depression  - Continue  Prozac.  -Hold seroquel for prolong QT    SARS-CoV-2 testing for placement        Code status: DNR  DVT prophylaxis: SCD    Care Plan discussed with: Patient/Family and Nurse     Updated her daughter Maxime Oscar who is her POA and discussed plan of care on 6/6  Daughter is interested in kyphoplasty  Anticipated Disposition: Home w/Family  Anticipated Discharge: 24 hours to 48 hours     Hospital Problems  Date Reviewed: 2/18/2020          Codes Class Noted POA    Compression fracture of L1 lumbar vertebra ([facility] [address] ICD-10-CM: S32.010A  ICD-9-CM: 805.4  6/4/2020 Unknown        Syncope ICD-10-CM: R55  ICD-9-CM: 780.2  5/28/2018 Unknown                  Vital Signs:    Last 24hrs VS reviewed since prior progress note. Most recent are:  Visit Vitals  /82 (BP Patient Position: During activity; Sitting)   Pulse (!) 55   Temp 97.7 °F (36.5 °C)   Resp 16   Ht 5' 6\" (1.676 m)   Wt 59.9 kg (132 lb 0.9 oz)   SpO2 93%   BMI 21.31 kg/m²         Intake/Output Summary (Last 24 hours) at 6/8/2020 1422  Last data filed at 6/8/2020 1154  Gross per 24 hour   Intake 436 ml   Output 300 ml   Net 136 ml        Physical Examination:             Constitutional:  No acute distress, cooperative, pleasant    ENT:  Oral mucosa moist, oropharynx benign. Resp:  CTA bilaterally. No wheezing/rhonchi/rales. No accessory muscle use   CV:  Regular rhythm, normal rate, no murmurs, gallops, rubs    GI:  Soft, non distended, non tender.  normoactive bowel sounds, no hepatosplenomegaly     Musculoskeletal:  No edema, wearing the brace. Neurologic:  Conscious and alert, oriented to place and person, Moves all extremities, CN II-XII reviewed     Skin:  Good turgor, no rashes or ulcers       Data Review:    Review and/or order of clinical lab test  Review and/or order of tests in the radiology section of CPT  Review and/or order of tests in the medicine section of CPT      Labs:     Recent Labs     06/07/20  0841   WBC 5.6   HGB 12.9   HCT 39.0        Recent Labs     06/08/20  0544 06/07/20  0841    140   K 3.7 3.1*    107   CO2 26 27   BUN 12 12   CREA 0.71 0.69   * 105*   CA 8.7 8.7   MG  --  1.7     Recent Labs     06/07/20  0841   ALT 25      TBILI 0.6   TP 6.5   ALB 3.2*   GLOB 3.3     No results for input(s): INR, PTP, APTT, INREXT, INREXT in the last 72 hours. No results for input(s): FE, TIBC, PSAT, FERR in the last 72 hours. Lab Results   Component Value Date/Time    Folate 16.5 06/07/2020 08:41 AM      No results for input(s): PH, PCO2, PO2 in the last 72 hours. No results for input(s): CPK, CKNDX, TROIQ in the last 72 hours.     No lab exists for component: CPKMB  Lab Results   Component Value Date/Time    Cholesterol, total 134 02/26/2020 09:40 AM    HDL Cholesterol 65 02/26/2020 09:40 AM    LDL, calculated 47 02/26/2020 09:40 AM    Triglyceride 111 02/26/2020 09:40 AM    CHOL/HDL Ratio 3.3 07/23/2010 08:37 AM     Lab Results   Component Value Date/Time    Glucose (POC) 147 (H) 06/08/2020 11:33 AM    Glucose (POC) 102 (H) 03/08/2020 06:21 AM    Glucose (POC) 113 (H) 03/07/2020 09:18 PM    Glucose (POC) 129 (H) 03/06/2020 09:16 PM    Glucose (POC) 111 (H) 03/06/2020 04:06 PM     Lab Results   Component Value Date/Time    Color YELLOW/STRAW 06/03/2020 12:54 PM    Appearance CLEAR 06/03/2020 12:54 PM    Specific gravity 1.015 06/03/2020 12:54 PM    Specific gravity 1.009 04/22/2020 10:33 AM    pH (UA) >8.5 (H) 06/03/2020 12:54 PM Protein Negative 06/03/2020 12:54 PM    Glucose Negative 06/03/2020 12:54 PM    Ketone Negative 06/03/2020 12:54 PM    Bilirubin Negative 06/03/2020 12:54 PM    Urobilinogen 0.2 06/03/2020 12:54 PM    Nitrites Negative 06/03/2020 12:54 PM    Leukocyte Esterase Negative 06/03/2020 12:54 PM    Epithelial cells FEW 06/03/2020 12:54 PM    Bacteria Negative 06/03/2020 12:54 PM    WBC 0-4 06/03/2020 12:54 PM    RBC 0-5 06/03/2020 12:54 PM         Medications Reviewed:     Current Facility-Administered Medications   Medication Dose Route Frequency    zinc oxide 20 % ointment   Topical PRN    lidocaine (XYLOCAINE) 20 mg/mL (2 %) injection 400 mg  20 mL SubCUTAneous ONCE    bupivacaine (PF) (MARCAINE) 0.5 % (5 mg/mL) injection 100 mg  20 mL SubCUTAneous RAD ONCE    iohexoL (OMNIPAQUE) 240 mg iodine/mL solution 50 mL  50 mL Other RAD ONCE    ceFAZolin (ANCEF) injection 2 g  2 g IntraVENous ON CALL    calcium carbonate (TUMS) chewable tablet 200 mg [elemental]  200 mg Oral TID PRN    hydrocortisone (ANUSOL-HC) 2.5 % rectal cream   Rectal QID    phenazopyridine (PYRIDIUM) tablet 100 mg  100 mg Oral TID PRN    pantoprazole (PROTONIX) 40 mg in 0.9% sodium chloride 10 mL injection  40 mg IntraVENous Q12H    cloNIDine HCL (CATAPRES) tablet 0.1 mg  0.1 mg Oral Q6H PRN    sucralfate (CARAFATE) tablet 1 g  1 g Oral AC&HS    atorvastatin (LIPITOR) tablet 20 mg  20 mg Oral QHS    carbidopa-levodopa (SINEMET)  mg per tablet 1 Tab  1 Tab Oral TID    donepeziL (ARICEPT) tablet 5 mg  5 mg Oral QHS    droxidopa cap 600 mg (Patient Supplied)  600 mg Oral Q12H    fludrocortisone (FLORINEF) tablet 0.1 mg  0.1 mg Oral BID    FLUoxetine (PROzac) capsule 40 mg  40 mg Oral DAILY    gabapentin (NEURONTIN) tablet 600 mg  600 mg Oral BID    [Held by provider] QUEtiapine (SEROquel) tablet 25 mg  25 mg Oral QHS    acetaminophen (TYLENOL) tablet 650 mg  650 mg Oral QID    morphine injection 1 mg  1 mg IntraVENous Q6H PRN ______________________________________________________________________  EXPECTED LENGTH OF STAY: 2d 21h  ACTUAL LENGTH OF STAY:          4                 Alonza Cushing, MD

## 2020-06-09 LAB
SARS-COV-2, COV2: NOT DETECTED
SPECIMEN SOURCE, FCOV2M: NORMAL

## 2020-06-09 PROCEDURE — 74011000250 HC RX REV CODE- 250: Performed by: HOSPITALIST

## 2020-06-09 PROCEDURE — 74011250636 HC RX REV CODE- 250/636: Performed by: HOSPITALIST

## 2020-06-09 PROCEDURE — 74011250637 HC RX REV CODE- 250/637: Performed by: HOSPITALIST

## 2020-06-09 PROCEDURE — 97535 SELF CARE MNGMENT TRAINING: CPT

## 2020-06-09 PROCEDURE — C9113 INJ PANTOPRAZOLE SODIUM, VIA: HCPCS | Performed by: HOSPITALIST

## 2020-06-09 PROCEDURE — 77030038269 HC DRN EXT URIN PURWCK BARD -A

## 2020-06-09 PROCEDURE — 97530 THERAPEUTIC ACTIVITIES: CPT

## 2020-06-09 PROCEDURE — 87635 SARS-COV-2 COVID-19 AMP PRB: CPT

## 2020-06-09 PROCEDURE — 65270000029 HC RM PRIVATE

## 2020-06-09 RX ADMIN — SODIUM CHLORIDE 40 MG: 9 INJECTION INTRAMUSCULAR; INTRAVENOUS; SUBCUTANEOUS at 21:30

## 2020-06-09 RX ADMIN — GABAPENTIN 600 MG: 600 TABLET, FILM COATED ORAL at 23:30

## 2020-06-09 RX ADMIN — FLUDROCORTISONE ACETATE 0.1 MG: 0.1 TABLET ORAL at 23:37

## 2020-06-09 RX ADMIN — CARBIDOPA AND LEVODOPA 1 TABLET: 25; 100 TABLET ORAL at 09:44

## 2020-06-09 RX ADMIN — FLUDROCORTISONE ACETATE 0.1 MG: 0.1 TABLET ORAL at 09:44

## 2020-06-09 RX ADMIN — SUCRALFATE 1 G: 1 TABLET ORAL at 06:51

## 2020-06-09 RX ADMIN — ACETAMINOPHEN 650 MG: 325 TABLET, FILM COATED ORAL at 09:44

## 2020-06-09 RX ADMIN — DROXIDOPA 600 MG: 300 CAPSULE ORAL at 11:00

## 2020-06-09 RX ADMIN — ATORVASTATIN CALCIUM 20 MG: 10 TABLET, FILM COATED ORAL at 23:24

## 2020-06-09 RX ADMIN — ACETAMINOPHEN 650 MG: 325 TABLET, FILM COATED ORAL at 23:24

## 2020-06-09 RX ADMIN — MORPHINE SULFATE 1 MG: 2 INJECTION, SOLUTION INTRAMUSCULAR; INTRAVENOUS at 15:10

## 2020-06-09 RX ADMIN — SODIUM CHLORIDE 40 MG: 9 INJECTION INTRAMUSCULAR; INTRAVENOUS; SUBCUTANEOUS at 09:44

## 2020-06-09 RX ADMIN — CARBIDOPA AND LEVODOPA 1 TABLET: 25; 100 TABLET ORAL at 23:37

## 2020-06-09 RX ADMIN — SUCRALFATE 1 G: 1 TABLET ORAL at 12:23

## 2020-06-09 RX ADMIN — FLUOXETINE 40 MG: 20 CAPSULE ORAL at 09:44

## 2020-06-09 RX ADMIN — ACETAMINOPHEN 650 MG: 325 TABLET, FILM COATED ORAL at 17:01

## 2020-06-09 RX ADMIN — CARBIDOPA AND LEVODOPA 1 TABLET: 25; 100 TABLET ORAL at 15:06

## 2020-06-09 RX ADMIN — SUCRALFATE 1 G: 1 TABLET ORAL at 16:20

## 2020-06-09 RX ADMIN — SUCRALFATE 1 G: 1 TABLET ORAL at 23:24

## 2020-06-09 RX ADMIN — ACETAMINOPHEN 650 MG: 325 TABLET, FILM COATED ORAL at 12:24

## 2020-06-09 RX ADMIN — DONEPEZIL HYDROCHLORIDE 5 MG: 5 TABLET, FILM COATED ORAL at 23:24

## 2020-06-09 RX ADMIN — MORPHINE SULFATE 1 MG: 2 INJECTION, SOLUTION INTRAMUSCULAR; INTRAVENOUS at 09:06

## 2020-06-09 RX ADMIN — DROXIDOPA 600 MG: 300 CAPSULE ORAL at 23:25

## 2020-06-09 RX ADMIN — GABAPENTIN 600 MG: 600 TABLET, FILM COATED ORAL at 09:44

## 2020-06-09 RX ADMIN — MORPHINE SULFATE 1 MG: 2 INJECTION, SOLUTION INTRAMUSCULAR; INTRAVENOUS at 23:37

## 2020-06-09 NOTE — PROGRESS NOTES
Problem: Mobility Impaired (Adult and Pediatric)  Goal: *Acute Goals and Plan of Care (Insert Text)  Description: FUNCTIONAL STATUS PRIOR TO ADMISSION: Pt lives at [facility]. She is mod I with RW but has hx falls. She also has baseline Parkinson's Disease and dementia. HOME SUPPORT PRIOR TO ADMISSION: Pt lives alone and is independent with ADLs. Daughter lives locally and assists with IADLs    Physical Therapy Goals  Initiated 6/5/2020  1. Patient will move from supine to sit and sit to supine , scoot up and down and roll side to side in bed with minimal assistance/contact guard assist within 7 day(s). 2.  Patient will transfer from bed to chair and chair to bed with minimal assistance/contact guard assist using the least restrictive device within 7 day(s). 3.  Patient will perform sit to stand with minimal assistance/contact guard assist within 7 day(s). 4.  Patient will ambulate with modified independence for 50 feet with the least restrictive device within 7 day(s). Outcome: Progressing Towards Goal       PHYSICAL THERAPY TREATMENT  Patient: Sonal Fatima (44 y.o. female)  Date: 6/9/2020  Diagnosis: Syncope [R55]  Compression fracture of L1 lumbar vertebra ([facility] [address] [S32.010A]   <principal problem not specified>       Precautions:    Chart, physical therapy assessment, plan of care and goals were reviewed. ASSESSMENT  Patient continues with skilled PT services and is progressing towards goals. Pt assisted to EOB, brace donned and noted it required refitting. Pt assisted with adjustment of brace and educated on removal. Pt tolerates standing and ambulates 120ft in hallway with RW and brace. Pt left in chair with repeated instruction for brace. Will continue to follow. Current Level of Function Impacting Discharge (mobility/balance): min A    Other factors to consider for discharge:          PLAN :  Patient continues to benefit from skilled intervention to address the above impairments. Continue treatment per established plan of care. to address goals. Recommendation for discharge: (in order for the patient to meet his/her long term goals)  Therapy up to 5 days/week in SNF setting    This discharge recommendation:  Has been made in collaboration with the attending provider and/or case management    IF patient discharges home will need the following DME: rolling walker       SUBJECTIVE:   Patient stated I dont like this thing.     OBJECTIVE DATA SUMMARY:   Critical Behavior:  Neurologic State: Alert  Orientation Level: Oriented X4  Cognition: Follows commands, Memory loss, Decreased attention/concentration  Safety/Judgement: Awareness of environment  Functional Mobility Training:  Bed Mobility:     Supine to Sit: Minimum assistance;Assist x1;Adaptive equipment; Additional time  Sit to Supine: Minimum assistance;Assist x1;Adaptive equipment; Additional time           Transfers:  Sit to Stand: Contact guard assistance;Assist x1;Adaptive equipment  Stand to Sit: Contact guard assistance; Adaptive equipment;Assist x1                             Balance:  Sitting: Intact; Without support  Sitting - Static: Good (unsupported)  Sitting - Dynamic: Good (unsupported)  Standing: Impaired; With support  Standing - Static: Fair  Standing - Dynamic : Fair  Ambulation/Gait Training:  Distance (ft): 120 Feet (ft)  Assistive Device: Brace/Splint;Gait belt;Walker, rolling  Ambulation - Level of Assistance: Minimal assistance; Adaptive equipment; Additional time        Gait Abnormalities: Decreased step clearance;Shuffling gait        Base of Support: Center of gravity altered;Narrowed     Speed/Penny: Shuffled; Slow  Step Length: Right shortened;Left shortened     Pain Rating:  controlled    Activity Tolerance:   Fair and requires rest breaks  Please refer to the flowsheet for vital signs taken during this treatment.     After treatment patient left in no apparent distress:   Sitting in chair and Call bell within reach    COMMUNICATION/COLLABORATION:   The patients plan of care was discussed with: Registered nurse and Case management.      Veronique Jones, PT   Time Calculation: 25 mins

## 2020-06-09 NOTE — PROGRESS NOTES
Problem: Self Care Deficits Care Plan (Adult)  Goal: *Acute Goals and Plan of Care (Insert Text)  Description: FUNCTIONAL STATUS PRIOR TO ADMISSION: Patient was modified independent using a rolling walker for functional mobility, has a rollator but prefers RW. Patient was modified independent for basic and instrumental ADLs, hx of Parkinson's dx. HOME SUPPORT: The patient lived alone at [facility]    Occupational Therapy Goals  Initiated 6/5/2020    1. Patient will perform lower body dressing with supervision/set-up using AE PRN within 7 days. 2.  Patient will perform toilet transfer with supervision/set-up using most appropriate DME within 7 days. 3.  Patient will grooming at the sink with supervision/set-up within 7 days. 4.  Patient will don/doff back brace at minimal assistance/contact guard assist within 7 days. 5.  Patient will verbalize/demonstrate 3/3 back precautions during ADL tasks without cues within 7 days. Outcome: Progressing Towards Goal     Problem: Patient Education: Go to Patient Education Activity  Goal: Patient/Family Education  Outcome: Progressing Towards Goal  OCCUPATIONAL THERAPY TREATMENT  Patient: Kellie Mejia (24 y.o. female)  Date: 6/9/2020  Diagnosis: Syncope [R55]  Compression fracture of L1 lumbar vertebra ([facility] [address] [S32.010A]   <principal problem not specified>       Precautions:    Chart, occupational therapy assessment, plan of care, and goals were reviewed. ASSESSMENT  Patient continues with skilled OT services and is progressing towards goals. Participation impacted by impaired standing balance and tolerance, impaired reach to feet, need for cues to adhere to spinal precautions, impaired memory, and impaired strength and endurance for functional activity. TLSO donned with max assist. Min cues to recall spinal precautions and use of log roll.      Current Level of Function Impacting Discharge (ADLs): Set up for UE self care and max assist for TLSO, LE self care with max to total assist, toileting in bathroom with max to total assist hygiene and clothing and CGA to min assist for transfer to toilet with RW and grab bar. Other factors to consider for discharge: none         PLAN :  Patient continues to benefit from skilled intervention to address the above impairments. Continue treatment per established plan of care. to address goals. Recommend with staff: Hanna Vora in chair for meals and self care, set up for UE bathing, max assist to don TLSO, max to total assist LE self care, toileting in bathroom wit CGA to min assist transfer with RW and grab bar and max to total assist hygiene and clothing management. Recommend next OT session: LE self care, standing endurance for grooming    Recommendation for discharge: (in order for the patient to meet his/her long term goals)  Therapy up to 5 days/week in SNF setting    This discharge recommendation:  Has been made in collaboration with the attending provider and/or case management    IF patient discharges home will need the following DME: bedside commode, shower chair, and walker: rolling       SUBJECTIVE:   Patient stated It makes my vagina burn.  referring to [address] (nurse notified)    OBJECTIVE DATA SUMMARY:   Cognitive/Behavioral Status:  Neurologic State: Alert  Orientation Level: Oriented X4  Cognition: Follows commands;Memory loss;Decreased attention/concentration  Perception: Appears intact  Perseveration: No perseveration noted  Safety/Judgement: Awareness of environment    Functional Mobility and Transfers for ADLs:  Bed Mobility:  Supine to Sit: Minimum assistance;Assist x1;Adaptive equipment; Additional time  Sit to Supine: Minimum assistance;Assist x1;Adaptive equipment; Additional time    Transfers:  Sit to Stand: Contact guard assistance;Assist x1;Adaptive equipment  Functional Transfers  Toilet Transfer : Assist x1;Minimum assistance  Adaptive Equipment: Grab bars; Kaleb Mayers (comment) Balance:  Sitting: Intact; Without support  Sitting - Static: Good (unsupported)  Sitting - Dynamic: Good (unsupported)  Standing: Impaired; With support  Standing - Static: Fair  Standing - Dynamic : Fair    ADL Intervention:       Grooming  Position Performed: (seated on toilet)  Washing Hands: Set-up              Upper Body Dressing Assistance  Orthotics(Brace): Maximum assistance  Cues: Physical assistance;Verbal cues provided         Toileting  Toileting Assistance: Total assistance(dependent)  Bladder Hygiene: Total assistance (dependent)  Bowel Hygiene: Total assistance (dependent)  Clothing Management: Total assistance (dependent)  Cues: Physical assistance for pants down;Physical assistance for pants up;Verbal cues provided  Adaptive Equipment: Grab bars; Walker    Cognitive Retraining  Safety/Judgement: Awareness of environment      Activity Tolerance:   Fair  Please refer to the flowsheet for vital signs taken during this treatment. After treatment patient left in no apparent distress:   Supine in bed, Patient positioned in right sidelying for pressure relief, Call bell within reach, and Side rails x 3    COMMUNICATION/COLLABORATION:   The patients plan of care was discussed with: Registered nurse.      MAGUI Greer  Time Calculation: 36 mins

## 2020-06-09 NOTE — PROGRESS NOTES
Bedside and Verbal shift change report given to BECK Burrows (oncoming nurse) by Terrence Garnett RN (offgoing nurse). Report included the following information SBAR, Kardex, Intake/Output, MAR and Recent Results.

## 2020-06-10 VITALS
RESPIRATION RATE: 12 BRPM | WEIGHT: 132.06 LBS | HEART RATE: 65 BPM | HEIGHT: 66 IN | BODY MASS INDEX: 21.22 KG/M2 | DIASTOLIC BLOOD PRESSURE: 90 MMHG | TEMPERATURE: 98 F | OXYGEN SATURATION: 95 % | SYSTOLIC BLOOD PRESSURE: 120 MMHG

## 2020-06-10 PROCEDURE — 74011000250 HC RX REV CODE- 250: Performed by: HOSPITALIST

## 2020-06-10 PROCEDURE — 74011250637 HC RX REV CODE- 250/637: Performed by: HOSPITALIST

## 2020-06-10 PROCEDURE — C9113 INJ PANTOPRAZOLE SODIUM, VIA: HCPCS | Performed by: HOSPITALIST

## 2020-06-10 PROCEDURE — 74011250636 HC RX REV CODE- 250/636: Performed by: HOSPITALIST

## 2020-06-10 RX ORDER — PANTOPRAZOLE SODIUM 40 MG/1
40 TABLET, DELAYED RELEASE ORAL DAILY
Qty: 30 TAB | Refills: 0 | Status: SHIPPED | OUTPATIENT
Start: 2020-06-10 | End: 2020-07-10

## 2020-06-10 RX ORDER — CALCIUM CARBONATE 200(500)MG
200 TABLET,CHEWABLE ORAL 3 TIMES DAILY
Qty: 90 TAB | Refills: 0 | Status: SHIPPED | OUTPATIENT
Start: 2020-06-10 | End: 2020-07-10

## 2020-06-10 RX ORDER — ADHESIVE BANDAGE
30 BANDAGE TOPICAL DAILY PRN
Status: DISCONTINUED | OUTPATIENT
Start: 2020-06-10 | End: 2020-06-10 | Stop reason: HOSPADM

## 2020-06-10 RX ADMIN — DROXIDOPA 600 MG: 300 CAPSULE ORAL at 11:35

## 2020-06-10 RX ADMIN — GABAPENTIN 600 MG: 600 TABLET, FILM COATED ORAL at 08:12

## 2020-06-10 RX ADMIN — CARBIDOPA AND LEVODOPA 1 TABLET: 25; 100 TABLET ORAL at 08:12

## 2020-06-10 RX ADMIN — FLUDROCORTISONE ACETATE 0.1 MG: 0.1 TABLET ORAL at 08:12

## 2020-06-10 RX ADMIN — MAGNESIUM HYDROXIDE 30 ML: 400 SUSPENSION ORAL at 10:32

## 2020-06-10 RX ADMIN — SUCRALFATE 1 G: 1 TABLET ORAL at 06:30

## 2020-06-10 RX ADMIN — SUCRALFATE 1 G: 1 TABLET ORAL at 10:32

## 2020-06-10 RX ADMIN — ACETAMINOPHEN 650 MG: 325 TABLET, FILM COATED ORAL at 08:12

## 2020-06-10 RX ADMIN — FLUOXETINE 40 MG: 20 CAPSULE ORAL at 08:12

## 2020-06-10 RX ADMIN — SODIUM CHLORIDE 40 MG: 9 INJECTION INTRAMUSCULAR; INTRAVENOUS; SUBCUTANEOUS at 08:11

## 2020-06-10 NOTE — PROGRESS NOTES
I have seen and examined patient this morning. She complains of regurgitation, acid reflux.   When asked her for chest pain, she said \"not my heart\"

## 2020-06-10 NOTE — DISCHARGE SUMMARY
Discharge Summary         PATIENT ID: Angelo Bennett  MRN: [id number]   YOB: 1945    DATE OF ADMISSION: 6/3/2020 10:29 AM    DATE OF DISCHARGE: 6/10/2020    PRIMARY CARE PROVIDER: Stacy Gamble MD       ATTENDING PHYSICIAN: Angélica Mari MD  DISCHARGING PROVIDER: Cris Willett MD     To contact this individual call [phone] and ask the  to page. If unavailable ask to be transferred the Adult Hospitalist Department. CONSULTATIONS: IP CONSULT TO HOSPITALIST  IP CONSULT TO NEUROSURGERY  IP CONSULT TO INTERVENTIONAL RADIOLOGY    PROCEDURES/SURGERIES: * No surgery found *    ADMITTING [address] [place] [address] COURSE:   Angelo Bennett is a 67 y.o. female with PMH of parkinson's disease,dementia,orthostatic hypotension and other comorbidities came to ER after a fall. In the ER,work up showed hypokalemia. CT scan showed Mild acute appearing superior endplate fracture L1    DISCHARGE DIAGNOSES / PLAN:    Acute L1 compression fracture causing severe pain. -MRI lumbar spine show Mild acute/subacute superior endplate compression fracture L1. Postsurgical and degenerative changes remainder lumbar spine as above.  -Neurosurgery evaluated patient and suggested kyphoplasty. Patient underwent kyphoplasty on 6/8 with significant relief of pain. Epigastric pain,burning sensation, GERD  -Continue with Protonix, Carafate and antacid. She may need gastroenterology evaluation as outpatient if symptoms persist unabated.   -Avoid NSAIDs, alcohol  etc     Mechanical fall vs syncope in the setting of chronic hypertension:    -CT head - no acute abnormalities  -Continue Florinef     Hypokalemia due to florinef  -Replaced     Hypomagnesemia: Replaced and corrected     Parkinson's disease  -continue home sinemet     Orthosattic hypotension:  -resume carbidopa, northera and florinef       Dementia:  -continue home aricept      Hyperlipidemia  - Continue home statin     Anxiety/depression  -Continue preadmission medications     SARS-CoV-2 negative x2. Code status: DNR    PENDING TEST RESULTS:   At the time of discharge the following test results are still pending: None    FOLLOW UP APPOINTMENTS:    Follow-up Information     Follow up With Specialties Details Why Contact Info    [address] [place]) [place], [address]   [address] [address] [phone]    Heather Sandy MD Internal Medicine   [address] [id number] [facility]  [phone]             ADDITIONAL CARE RECOMMENDATIONS:     DIET: Regular Diet and Cardiac Diet        OXYGEN / BiPAP SETTINGS: on room air    ACTIVITY: Activity as tolerated and PT/OT Eval and Treat    WOUND CARE: NA    EQUIPMENT needed: own      DISCHARGE MEDICATIONS:   See Medication Reconciliation Form      NOTIFY YOUR PHYSICIAN FOR ANY OF THE FOLLOWING:   Fever over 101 degrees for 24 hours. Chest pain, shortness of breath, fever, chills, nausea, vomiting, diarrhea, change in mentation, falling, weakness, bleeding. Severe pain or pain not relieved by medications. Or, any other signs or symptoms that you may have questions about. DISPOSITION:    Home With:   OT  PT  HH  RN      x SNF/Inpatient Rehab/LTAC    Independent/assisted living    Hospice    Other:       PATIENT CONDITION AT DISCHARGE:     Functional status   x Poor    x Deconditioned     Independent      Cognition     Lucid     Forgetful    x Dementia      Catheters/lines (plus indication)    Jc     PICC     PEG    x None      Code status     Full code    x DNR      PHYSICAL EXAMINATION AT DISCHARGE:  GENERAL:  Alert, oriented, cooperative, no apparent distress  HEENT:  Normocephalic, atraumatic, non icteric sclerae, non pallor conjuctivae, EOMs intact, PERRLA. NECK: Supple, trachea midline, no adenopathy, no thyromegally or tenderness, no carotid bruit and no JVD.   LUNGS:   Vesicular breath sounds bilaterally, no added sounds. HEART:   S1 and S2 well heard,RRR,  no murmur, click, rub or gallop. ABDOMEB:   Soft, non-tender. Normoactive bowel sounds. No masses,  No organomegaly. EXTREMETIES:  Atraumatic, acyanotic, no edema  PULSES: 2+ and symmetric all extremities. SKIN:  No rashes or lesions  NEUROLOGY:alert and oriented to PPT, CNII-XII intact. Gross tremor mainly in the upper extremities. CHRONIC MEDICAL DIAGNOSES:  Problem List as of 6/10/2020 Date Reviewed: 2/18/2020          Codes Class Noted - Resolved    Compression fracture of L1 lumbar vertebra ([facility] ICD-10-CM: S32.010A  ICD-9-CM: 805.4  6/4/2020 - Present        UTI (urinary tract infection) ICD-10-CM: N39.0  ICD-9-CM: 599.0  3/3/2020 - Present        AMS (altered mental status) ICD-10-CM: R41.82  ICD-9-CM: 780.97  3/3/2020 - Present        Chest pain ICD-10-CM: R07.9  ICD-9-CM: 786.50  12/11/2019 - Present        Type 2 diabetes mellitus with diabetic neuropathy ([facility] ICD-10-CM: E11.40  ICD-9-CM: 250.60, 357.2  9/26/2019 - Present        Type 2 diabetes with nephropathy ([facility] ICD-10-CM: E11.21  ICD-9-CM: 250.40, 583.81  7/26/2018 - Present        Syncope ICD-10-CM: R55  ICD-9-CM: 780.2  5/28/2018 - Present        Spinal stenosis ICD-10-CM: M48.00  ICD-9-CM: 724.00  Unknown - Present        Near syncope ICD-10-CM: R55  ICD-9-CM: 780.2  8/2/2016 - Present    Overview Signed 8/2/2016  4:12 PM by Shayna Sanches MD     Tilt test 8/2/2016: normal supine BP but abrupt drop in SBP consistent with orthostatic hypotension  Midodrine 5 mg tid has not improved.   Add mestinon 60 mg tid               Resting tremor ICD-10-CM: G25.2  ICD-9-CM: 781.0  7/29/2016 - Present        Orthostatic hypotension ICD-10-CM: I95.1  ICD-9-CM: 458.0  7/5/2016 - Present        Advance directive on file ICD-10-CM: Z78.9  ICD-9-CM: V49.89  5/24/2016 - Present        Mixed hyperlipidemia ICD-10-CM: E78.2  ICD-9-CM: 272.2  2/2/2016 - Present        Tubulovillous adenoma ICD-10-CM: D36.9  ICD-9-CM: 229.9  8/21/2012 - Present        Abnormal mammogram ICD-10-CM: R92.8  ICD-9-CM: 793.80  6/4/2012 - Present        Encounter for long-term (current) use of other medications ICD-10-CM: Z79.899  ICD-9-CM: V58.69  11/30/2011 - Present        Hammertoe ICD-10-CM: M20.40  ICD-9-CM: 735.4  9/12/2011 - Present        Allergic rhinitis, cause unspecified ICD-10-CM: J30.9  ICD-9-CM: 477.9  1/26/2011 - Present        Other diseases of nasal cavity and sinuses(478.19) ICD-10-CM: J34.89  ICD-9-CM: 478.19  7/19/2010 - Present        IBS (irritable bowel syndrome) ICD-10-CM: K58.9  ICD-9-CM: 564.1  Unknown - Present        RLS (restless legs syndrome) ICD-10-CM: G25.81  ICD-9-CM: 333.94  Unknown - Present        Bipolar disorder (HCC) ICD-10-CM: F31.9  ICD-9-CM: 296.80  Unknown - Present        Fibromyalgia ICD-10-CM: M79.7  ICD-9-CM: 729.1  Unknown - Present        DJD (degenerative joint disease) ICD-10-CM: M19.90  ICD-9-CM: 715.90  Unknown - Present        Lumbar disc disease ICD-10-CM: M51.9  ICD-9-CM: 722.93  Unknown - Present        Paget's disease of bone ICD-10-CM: M88.9  ICD-9-CM: 731.0  Unknown - Present        Migraine ICD-10-CM: 346  ICD-9-CM: 028  Unknown - Present        Genital herpes ICD-10-CM: A60.00  ICD-9-CM: 054.10  Unknown - Present        RESOLVED: GI bleed ICD-10-CM: K92.2  ICD-9-CM: 578.9  2/21/2019 - 2/22/2019        RESOLVED: Rectal bleeding ICD-10-CM: K62.5  ICD-9-CM: 569.3  6/25/2016 - 6/27/2016        RESOLVED: Rectal bleed ICD-10-CM: K62.5  ICD-9-CM: 569.3  6/25/2016 - 6/27/2016        RESOLVED: Pulmonary embolism ([facility] [address] ICD-10-CM: I26.99  ICD-9-CM: 415.19  Unknown - 9/18/2018    Overview Addendum 9/15/2016  8:39 AM by Wayna Gosselin, NP Dr Clydie Jansky 8/26/16  CTA at [facility] 5/7/16 small PE RLL    6/9/16 CTA [facility] normal CTA no PE    Hypercoagulable work up negative with th exception of low factor II at 72%  D dimer 0.22  Xarelto d/c'd 9/13/16             RESOLVED: Diabetes mellitus type 2, controlled, without complications ([facility] DHF-08-BW: E11.9  ICD-9-CM: 250.00  10/1/2015 - 9/18/2018        RESOLVED: DM w/o complication type II ([facility] ICD-10-CM: E11.9  ICD-9-CM: 250.00  11/30/2011 - 10/1/2015        RESOLVED: Hypercholesterolemia ICD-10-CM: E78.00  ICD-9-CM: 272.0  Unknown - 10/1/2015        RESOLVED: Diabetes ([facility] ICD-10-CM: E11.9  ICD-9-CM: 250.00  Unknown - 11/30/2011                DISCHARGE MEDICATIONS:  Current Discharge Medication List      START taking these medications    Details   calcium carbonate (TUMS) 200 mg calcium (500 mg) chew Take 1 Tab by mouth three (3) times daily for 30 days. Qty: 90 Tab, Refills: 0      pantoprazole (Protonix) 40 mg tablet Take 1 Tab by mouth daily for 30 days. Qty: 30 Tab, Refills: 0         CONTINUE these medications which have NOT CHANGED    Details   carbidopa-levodopa (Sinemet)  mg per tablet Take 1 Tab by mouth three (3) times daily. FLUoxetine (PROzac) 20 mg capsule Take 40 mg by mouth daily. gabapentin (NEURONTIN) 600 mg tablet TAKE 1 TABLET BY MOUTH TWICE DAILY WITH BREAKFAST AND DINNER( 9AM AND 9PM)  Qty: 60 Tab, Refills: 4    Associated Diagnoses: Other diabetic neurological complication associated with type 2 diabetes mellitus (HCC)      hydrocortisone (Anusol-HC) 2.5 % rectal cream Insert  into rectum four (4) times daily. Qty: 30 g, Refills: 0      atorvastatin (LIPITOR) 20 mg tablet TAKE 1 TABLET BY MOUTH EVERY EVENING  Qty: 90 Tab, Refills: 0      phenazopyridine (PYRIDIUM) 100 mg tablet Take 1 Tab by mouth three (3) times daily as needed for Pain.  Indications: urinary tract irritation  Qty: 10 Tab, Refills: 0      fludrocortisone (FLORINEF) 0.1 mg tablet TAKE 1 TABLET BY MOUTH TWICE DAILY  Qty: 180 Tab, Refills: 1    Comments: **Patient requests 90 days supply**      potassium chloride (K-DUR, KLOR-CON) 20 mEq tablet TAKE 1 TABLET BY MOUTH DAILY  Qty: 90 Tab, Refills: 1      sucralfate (CARAFATE) 1 gram tablet TAKE 1 TABLET BY MOUTH FOUR TIMES DAILY  Qty: 360 Tab, Refills: 3    Associated Diagnoses: Epigastric abdominal pain      gabapentin (NEURONTIN) 300 mg capsule 600mg with breakfast, 300 mg with  lunch, 600mg at dinner- new directions  Indications: Neuropathic Pain  Qty: 150 Cap, Refills: 5    Associated Diagnoses: Vaginal burning; Neuropathy      donepezil (ARICEPT) 5 mg tablet Take 5 mg by mouth nightly. droxidopa (NORTHERA) 300 mg cap Take 600 mg by mouth two (2) times a day. Qty: 360 Cap, Refills: 3      acetaminophen (TYLENOL) 325 mg tablet Take 325 mg by mouth every four (4) hours as needed for Pain. QUEtiapine (SEROQUEL) 25 mg tablet Take 50 mg by mouth nightly. Take 2 tabs qhs prn Stephany Duarte NP/Dr Cruz Faster         STOP taking these medications       ibuprofen (MOTRIN) 200 mg tablet Comments:   Reason for Stopping:         carbidopa 25mg-levodopa 100mg-entacapone 200mg (STALEVO 100) tab tablet Comments:   Reason for Stopping:             Greater than 30 minutes were spent with the patient on counseling and coordination of care    Signed:    Wai Louie MD  6/10/2020  10:51 AM

## 2020-06-10 NOTE — PROGRESS NOTES
[facility] Adult  Hospitalist Group                                                                                          Hospitalist Progress Note  Miguelina Fischer MD  Answering service: [phone] or [phone] from in house phone        Date of Service:  2020  NAME:  Marcela Haile  :  1945  MRN:  [id number]      Admission Summary:     Marcela Haile is a 67 y.o. female with PMH of parkinson's disease,dementia,orthostatic hypotension and other comorbidities came to ER after a fall.     Patient states that she woke up this morning and while walking to the bathroom,her legs gave away and she fell down. She complaints of 7/10 lower back pain -describes it as sharp,non radiating,aggrevated with movement,relieved with morphine in ER. Patient reported to ER physician that she lost consciousness. She denied any chest pain,nasuea/vomiting,headache,SOB,dysuria,abdominal pain,muscle  Weakness or numbness/tingling.     In the ER,work up showed hypokalemia. CT scan showed Mild acute appearing superior endplate fracture L1  Hospitalist was consulted for admission       Interval history / Subjective: Follow-up for back pain  -She says she is having some back pain today. -Repeat cognitive testing pending for discharge       Assessment & Plan:     Acute mild L1 compression fracture after a fall  -MRI lumbar spine show Mild acute/subacute superior endplate compression fracture L1. Postsurgical and degenerative changes remainder lumbar spine as above.  -continue prn morphine   -Neurosurgery suggested IR consult for kyphoplasty. -Status post kyphoplasty,     Epigastric pain  Burning sensation   -no left side chest pain   -add iv protonix, po carafate  -chest x ray   -EKG normal sinus rhythm vent rate 67 bpm, non specific st t wave.  -tums added, if persistent GI may be consulted for possible EGD. Mechanical fall vs syncope: She is on Florinef chronically.   -CT head - no acute abnormalities  -Check orthostatic vitals ,tele monitoring  -PT/OT      Hypokalemia due to florinef  -replace with kcl and monitor k level    Hypomagnesemia:resolved      Parkinson's disease  -continue home sinemet    Orthosattic hypotension:  -resume carbidopa, northera and florinef  -BP elevated, add prn clonidine   -check orthostatic BP     Dementia:  -continue home aricept      Hyperlipidemia  - Continue home statin     Anxiety/depression  - Continue  Prozac.  -Hold seroquel for prolong QT    SARS-CoV-2 negative x1, repeat testing pending as required by the nursing facility. Code status: DNR  DVT prophylaxis: SCD    Care Plan discussed with: Patient/Family and Nurse     Updated her daughter Sky Simpson who is her POA and discussed plan of care on 6/6  Daughter is interested in kyphoplasty  Anticipated Disposition: Home w/Family  Anticipated Discharge: 24 hours to 48 hours     Hospital Problems  Date Reviewed: 2/18/2020          Codes Class Noted POA    Compression fracture of L1 lumbar vertebra ([facility] ICD-10-CM: S32.010A  ICD-9-CM: 805.4  6/4/2020 Unknown        Syncope ICD-10-CM: R55  ICD-9-CM: 780.2  5/28/2018 Unknown                  Vital Signs:    Last 24hrs VS reviewed since prior progress note. Most recent are:  Visit Vitals  /69 (BP 1 Location: Right arm, BP Patient Position: At rest)   Pulse 74   Temp 98.3 °F (36.8 °C)   Resp 16   Ht 5' 6\" (1.676 m)   Wt 59.9 kg (132 lb 0.9 oz)   SpO2 92%   BMI 21.31 kg/m²       No intake or output data in the 24 hours ending 06/09/20 2108     Physical Examination:             Constitutional:  No acute distress, cooperative, pleasant    ENT:  Oral mucosa moist, oropharynx benign. Resp:  CTA bilaterally. No wheezing/rhonchi/rales. No accessory muscle use   CV:  Regular rhythm, normal rate, no murmurs, gallops, rubs    GI:  Soft, non distended, non tender. normoactive bowel sounds, no hepatosplenomegaly     Musculoskeletal:  No edema, wearing the brace.     Neurologic: Conscious and alert, oriented to place and person, Moves all extremities, CN II-XII reviewed     Skin:  Good turgor, no rashes or ulcers       Data Review:    Review and/or order of clinical lab test  Review and/or order of tests in the radiology section of CPT  Review and/or order of tests in the medicine section of CPT      Labs:     Recent Labs     06/07/20  0841   WBC 5.6   HGB 12.9   HCT 39.0        Recent Labs     06/08/20  0544 06/07/20  0841    140   K 3.7 3.1*    107   CO2 26 27   BUN 12 12   CREA 0.71 0.69   * 105*   CA 8.7 8.7   MG  --  1.7     Recent Labs     06/07/20  0841   ALT 25      TBILI 0.6   TP 6.5   ALB 3.2*   GLOB 3.3     No results for input(s): INR, PTP, APTT, INREXT, INREXT in the last 72 hours. No results for input(s): FE, TIBC, PSAT, FERR in the last 72 hours. Lab Results   Component Value Date/Time    Folate 16.5 06/07/2020 08:41 AM      No results for input(s): PH, PCO2, PO2 in the last 72 hours. No results for input(s): CPK, CKNDX, TROIQ in the last 72 hours.     No lab exists for component: CPKMB  Lab Results   Component Value Date/Time    Cholesterol, total 134 02/26/2020 09:40 AM    HDL Cholesterol 65 02/26/2020 09:40 AM    LDL, calculated 47 02/26/2020 09:40 AM    Triglyceride 111 02/26/2020 09:40 AM    CHOL/HDL Ratio 3.3 07/23/2010 08:37 AM     Lab Results   Component Value Date/Time    Glucose (POC) 147 (H) 06/08/2020 11:33 AM    Glucose (POC) 102 (H) 03/08/2020 06:21 AM    Glucose (POC) 113 (H) 03/07/2020 09:18 PM    Glucose (POC) 129 (H) 03/06/2020 09:16 PM    Glucose (POC) 111 (H) 03/06/2020 04:06 PM     Lab Results   Component Value Date/Time    Color YELLOW/STRAW 06/03/2020 12:54 PM    Appearance CLEAR 06/03/2020 12:54 PM    Specific gravity 1.015 06/03/2020 12:54 PM    Specific gravity 1.009 04/22/2020 10:33 AM    pH (UA) >8.5 (H) 06/03/2020 12:54 PM    Protein Negative 06/03/2020 12:54 PM    Glucose Negative 06/03/2020 12:54 PM    Ketone Negative 06/03/2020 12:54 PM    Bilirubin Negative 06/03/2020 12:54 PM    Urobilinogen 0.2 06/03/2020 12:54 PM    Nitrites Negative 06/03/2020 12:54 PM    Leukocyte Esterase Negative 06/03/2020 12:54 PM    Epithelial cells FEW 06/03/2020 12:54 PM    Bacteria Negative 06/03/2020 12:54 PM    WBC 0-4 06/03/2020 12:54 PM    RBC 0-5 06/03/2020 12:54 PM         Medications Reviewed:     Current Facility-Administered Medications   Medication Dose Route Frequency    zinc oxide 20 % ointment   Topical PRN    ceFAZolin (ANCEF) injection 2 g  2 g IntraVENous ON CALL    calcium carbonate (TUMS) chewable tablet 200 mg [elemental]  200 mg Oral TID PRN    hydrocortisone (ANUSOL-HC) 2.5 % rectal cream   Rectal QID    phenazopyridine (PYRIDIUM) tablet 100 mg  100 mg Oral TID PRN    pantoprazole (PROTONIX) 40 mg in 0.9% sodium chloride 10 mL injection  40 mg IntraVENous Q12H    cloNIDine HCL (CATAPRES) tablet 0.1 mg  0.1 mg Oral Q6H PRN    sucralfate (CARAFATE) tablet 1 g  1 g Oral AC&HS    atorvastatin (LIPITOR) tablet 20 mg  20 mg Oral QHS    carbidopa-levodopa (SINEMET)  mg per tablet 1 Tab  1 Tab Oral TID    donepeziL (ARICEPT) tablet 5 mg  5 mg Oral QHS    droxidopa cap 600 mg (Patient Supplied)  600 mg Oral Q12H    fludrocortisone (FLORINEF) tablet 0.1 mg  0.1 mg Oral BID    FLUoxetine (PROzac) capsule 40 mg  40 mg Oral DAILY    gabapentin (NEURONTIN) tablet 600 mg  600 mg Oral BID    [Held by provider] QUEtiapine (SEROquel) tablet 25 mg  25 mg Oral QHS    acetaminophen (TYLENOL) tablet 650 mg  650 mg Oral QID    morphine injection 1 mg  1 mg IntraVENous Q6H PRN     ______________________________________________________________________  EXPECTED LENGTH OF STAY: 2d 4h  ACTUAL LENGTH OF STAY:          5                 Jessica Ontiveros MD

## 2020-06-10 NOTE — DISCHARGE INSTRUCTIONS
Discharge SNF/Rehab Instructions/LTAC       PATIENT ID: Sonal Fatima  MRN: [id number]   YOB: 1945    DATE OF ADMISSION: 6/3/2020 10:29 AM    DATE OF DISCHARGE: 6/10/2020    PRIMARY CARE PROVIDER: Heather Sandy MD       ATTENDING PHYSICIAN: Adamaris Robins MD  DISCHARGING PROVIDER: Kamryn Blandon MD     To contact this individual call [phone] and ask the  to page. If unavailable ask to be transferred the Adult Hospitalist Department. CONSULTATIONS: IP CONSULT TO HOSPITALIST  IP CONSULT TO NEUROSURGERY  IP CONSULT TO INTERVENTIONAL RADIOLOGY    PROCEDURES/SURGERIES: * No surgery found *    ADMITTING [address] [place] [address] COURSE:   Sonal Fatima is a 67 y.o. female with PMH of parkinson's disease,dementia,orthostatic hypotension and other comorbidities came to ER after a fall. In the ER,work up showed hypokalemia. CT scan showed Mild acute appearing superior endplate fracture L1    DISCHARGE DIAGNOSES / PLAN:    Acute L1 compression fracture causing severe pain. -MRI lumbar spine show Mild acute/subacute superior endplate compression fracture L1. Postsurgical and degenerative changes remainder lumbar spine as above.  -Neurosurgery evaluated patient and suggested kyphoplasty. Patient underwent kyphoplasty on 6/8 with significant relief of pain. Epigastric pain,burning sensation, GERD  -Continue with Protonix, Carafate and antacid. She may need gastroenterology evaluation as outpatient if symptoms persist unabated.   -Avoid NSAIDs, alcohol  etc     Mechanical fall vs syncope in the setting of chronic hypertension:    -CT head - no acute abnormalities  -Continue Florinef     Hypokalemia due to florinef  -Replaced     Hypomagnesemia: Replaced and corrected     Parkinson's disease  -continue home sinemet     Orthosattic hypotension:  -resume carbidopa, northera and florinef       Dementia:  -continue home aricept      Hyperlipidemia  - Continue home statin Anxiety/depression  -Continue preadmission medications     SARS-CoV-2 negative x2. Code status: DNR    PENDING TEST RESULTS:   At the time of discharge the following test results are still pending: None    FOLLOW UP APPOINTMENTS:    Follow-up Information     Follow up With Specialties Details Why Contact Info    [address]) [place], [address]   [address] [address]  [phone]    Heather Sandy MD Internal Medicine   [address]   [address] [facility]  [phone]             ADDITIONAL CARE RECOMMENDATIONS:     DIET: Regular Diet and Cardiac Diet        OXYGEN / BiPAP SETTINGS: on room air    ACTIVITY: Activity as tolerated and PT/OT Eval and Treat    WOUND CARE: NA    EQUIPMENT needed: own      DISCHARGE MEDICATIONS:   See Medication Reconciliation Form      NOTIFY YOUR PHYSICIAN FOR ANY OF THE FOLLOWING:   Fever over 101 degrees for 24 hours. Chest pain, shortness of breath, fever, chills, nausea, vomiting, diarrhea, change in mentation, falling, weakness, bleeding. Severe pain or pain not relieved by medications. Or, any other signs or symptoms that you may have questions about. DISPOSITION:    Home With:   OT  PT  HH  RN      x SNF/Inpatient Rehab/LTAC    Independent/assisted living    Hospice    Other:       PATIENT CONDITION AT DISCHARGE:     Functional status   x Poor    x Deconditioned     Independent      Cognition     Lucid     Forgetful    x Dementia      Catheters/lines (plus indication)    Jc     PICC     PEG    x None      Code status     Full code    x DNR      PHYSICAL EXAMINATION AT DISCHARGE:  GENERAL:  Alert, oriented, cooperative, no apparent distress  HEENT:  Normocephalic, atraumatic, non icteric sclerae, non pallor conjuctivae, EOMs intact, PERRLA. NECK: Supple, trachea midline, no adenopathy, no thyromegally or tenderness, no carotid bruit and no JVD.   LUNGS:   Vesicular breath sounds bilaterally, no added sounds. HEART:   S1 and S2 well heard,RRR,  no murmur, click, rub or gallop. ABDOMEB:   Soft, non-tender. Normoactive bowel sounds. No masses,  No organomegaly. EXTREMETIES:  Atraumatic, acyanotic, no edema  PULSES: 2+ and symmetric all extremities. SKIN:  No rashes or lesions  NEUROLOGY:alert and oriented to PPT, CNII-XII intact. Gross tremor mainly in the upper extremities. CHRONIC MEDICAL DIAGNOSES:  Problem List as of 6/10/2020 Date Reviewed: 2/18/2020          Codes Class Noted - Resolved    Compression fracture of L1 lumbar vertebra ([facility] ICD-10-CM: S32.010A  ICD-9-CM: 805.4  6/4/2020 - Present        UTI (urinary tract infection) ICD-10-CM: N39.0  ICD-9-CM: 599.0  3/3/2020 - Present        AMS (altered mental status) ICD-10-CM: R41.82  ICD-9-CM: 780.97  3/3/2020 - Present        Chest pain ICD-10-CM: R07.9  ICD-9-CM: 786.50  12/11/2019 - Present        Type 2 diabetes mellitus with diabetic neuropathy ([facility] ICD-10-CM: E11.40  ICD-9-CM: 250.60, 357.2  9/26/2019 - Present        Type 2 diabetes with nephropathy ([facility] ICD-10-CM: E11.21  ICD-9-CM: 250.40, 583.81  7/26/2018 - Present        Syncope ICD-10-CM: R55  ICD-9-CM: 780.2  5/28/2018 - Present        Spinal stenosis ICD-10-CM: M48.00  ICD-9-CM: 724.00  Unknown - Present        Near syncope ICD-10-CM: R55  ICD-9-CM: 780.2  8/2/2016 - Present    Overview Signed 8/2/2016  4:12 PM by Radha Chatman MD     Tilt test 8/2/2016: normal supine BP but abrupt drop in SBP consistent with orthostatic hypotension  Midodrine 5 mg tid has not improved.   Add mestinon 60 mg tid               Resting tremor ICD-10-CM: G25.2  ICD-9-CM: 781.0  7/29/2016 - Present        Orthostatic hypotension ICD-10-CM: I95.1  ICD-9-CM: 458.0  7/5/2016 - Present        Advance directive on file ICD-10-CM: Z78.9  ICD-9-CM: V49.89  5/24/2016 - Present        Mixed hyperlipidemia ICD-10-CM: E78.2  ICD-9-CM: 272.2  2/2/2016 - Present Tubulovillous adenoma ICD-10-CM: D36.9  ICD-9-CM: 229.9  8/21/2012 - Present        Abnormal mammogram ICD-10-CM: R92.8  ICD-9-CM: 793.80  6/4/2012 - Present        Encounter for long-term (current) use of other medications ICD-10-CM: Z79.899  ICD-9-CM: V58.69  11/30/2011 - Present        [place]amm[place]oe ICD-10-CM: M20.40  ICD-9-CM: 735.4  9/12/2011 - Present        Allergic rhinitis, cause unspecified ICD-10-CM: J30.9  ICD-9-CM: 477.9  1/26/2011 - Present        Other diseases of nasal cavity and sinuses(478.19) ICD-10-CM: J34.89  ICD-9-CM: 478.19  7/19/2010 - Present        IBS (irritable bowel syndrome) ICD-10-CM: K58.9  ICD-9-CM: 564.1  Unknown - Present        RLS (restless legs syndrome) ICD-10-CM: G25.81  ICD-9-CM: 333.94  Unknown - Present        Bipolar disorder (HCC) ICD-10-CM: F31.9  ICD-9-CM: 296.80  Unknown - Present        Fibromyalgia ICD-10-CM: M79.7  ICD-9-CM: 729.1  Unknown - Present        DJD (degenerative joint disease) ICD-10-CM: M19.90  ICD-9-CM: 715.90  Unknown - Present        Lumbar disc disease ICD-10-CM: M51.9  ICD-9-CM: 722.93  Unknown - Present        Paget's disease of bone ICD-10-CM: M88.9  ICD-9-CM: 731.0  Unknown - Present        Migraine ICD-10-CM: 346  ICD-9-CM: 176  Unknown - Present        Genital herpes ICD-10-CM: A60.00  ICD-9-CM: 054.10  Unknown - Present        RESOLVED: GI bleed ICD-10-CM: K92.2  ICD-9-CM: 578.9  2/21/2019 - 2/22/2019        RESOLVED: Rectal bleeding ICD-10-CM: K62.5  ICD-9-CM: 569.3  6/25/2016 - 6/27/2016        RESOLVED: Rectal bleed ICD-10-CM: K62.5  ICD-9-CM: 569.3  6/25/2016 - 6/27/2016        RESOLVED: Pulmonary embolism ([facility] [address] ICD-10-CM: I26.99  ICD-9-CM: 415.19  Unknown - 9/18/2018    Overview Addendum 9/15/2016  8:39 AM by TAI Cunha Dr 8/26/16  [facility] at [facility] 5/7/16 small PE RLL    6/9/16 CTA [facility] normal CTA no PE    Hypercoagulable work up negative with th exception of low factor II at 72%  D dimer 0.22  Xarelto d/c'd 9/13/16 RESOLVED: Diabetes mellitus type 2, controlled, without complications ([facility] WGY-42-XC: E11.9  ICD-9-CM: 250.00  10/1/2015 - 2018        RESOLVED: DM w/o complication type II ([facility] ICD-10-CM: E11.9  ICD-9-CM: 250.00  2011 - 10/1/2015        RESOLVED: Hypercholesterolemia ICD-10-CM: E78.00  ICD-9-CM: 272.0  Unknown - 10/1/2015        RESOLVED: Diabetes ([facility] ICD-10-CM: E11.9  ICD-9-CM: 250.00  Unknown - 2011                      Signed:    Kindra Olvera MD  6/10/2020  10:44 AM

## 2020-06-10 NOTE — PROGRESS NOTES
YULISSA; Discharge to [facility] SNF today. Daughter will transport patient via car at 11:30 AM.     CM spoke with Daija Perry at [address]. Confirmed they can accept patient today. CM faxed COVID negative tests to [facility] at [phone]    Discharge folder located on hard chart. RN to follow with discharge papers, MAR, Kardex, and call report to #[phone]    Transition of Care Plan to SNF/Rehab    SNF/Rehab Transition:  Patient has been accepted to [facility] and meets criteria for admission. Patient will transported by family/car and expected to leave at 11:30 AM    Communication to Patient/Family:  Met with patient and spoke with daughter Sabine Carrera and they are agreeable to the transition plan. Communication to SNF/Rehab:  Bedside RNStormy, has been notified to update the transition plan to the facility and call report (phone number #[phone]). Discharge information has been updated on the AVS.     Discharge instructions available to view via [address]. Nursing Please include all hard scripts for controlled substances, med rec and dc summary, and AVS in packet. Reviewed and confirmed with facility, Pat in admissions, can manage the patient care needs for the following:     SNF/Rehab Transition:  Patient to follow-up with Home Health:  [facility])  PCP/Specialist: followup after SNF    Reviewed and confirmed with facility,Pat in admissions they can manage the patient care needs for the following:     Ortega Melgar with (X) only those applicable:    Medication:  []  Medications will be available at the facility  []  IV Antibiotics   []  Controlled Substance - hard copy to be sent with patient   []  Weekly Labs   Documents:  [] Hard RX  [] MAR  [] Kardex  [] AVS  []Transfer Summary  []Discharge   Equipment:  []  CPAP/BiPAP  []  Wound Vacuum  []  Jc or Urinary Device  []  PICC/Central Line  []  Nebulizer  []  Ventilator   Treatment:  []Isolation (for MRSA, VRE, etc.)  []Surgical Drain Management  []Tracheostomy Care  []Dressing Changes  []Dialysis with transportation and chair time   []PEG Care  []Oxygen  []Daily Weights for Heart Failure   Dietary:  []Any diet limitations  []Tube Feedings   []Total Parenteral Management (TPN)   Eligible for Medicaid Long Term Services and Supports  Yes:  [] Eligible for medical assistance or will become eligible within 180 days and UAI completed. [] Provider/Patient and/or support system has requested screening. [] UAI copy provided to patient or responsible party,  [] UAI unavailable at discharge will send once processed to SNF provider. [] UAI unavailable at discharged mailed to patient  No:   [x] Private pay and is not financially eligible for Medicaid within the next 180 days. [] Reside out-of-state. [] A residents of a state owned/operated facility that is licensed  by [employer] and [employer] or [facility]  [] Enrollment in [facility] hospice services  [] [address] [place] [address]  [] Patient /Family declines to have screening completed or provide financial information for screening     Financial Resources:  Medicaid    [] Initiated and application pending   [] Full coverage     Advanced Care Plan:  []Surrogate Decision Maker of Care  []POA  []Communicated Code Status  (DDNR\", \"Full\")    Other    IM letter [facility] with daughter/mPOA via phone.

## 2020-06-10 NOTE — PROGRESS NOTES
Bedside and Verbal shift change report given to Stormy RN (oncoming nurse) by Luis Angel Manriquez RN (offgoing nurse). Report included the following information SBAR, Kardex and MAR.

## 2020-06-16 ENCOUNTER — PATIENT OUTREACH (OUTPATIENT)
Dept: CASE MANAGEMENT | Age: 75
End: 2020-06-16

## 2020-06-20 NOTE — PROGRESS NOTES
Community Care Team documentation for patient in [facility]  Initial Follow Up       Patient was discharged to [facility]. Information included in this progress note has been provided to SNF. Hospital Admission and Diagnosis: Acute L1 compression fracture causing severe pain         PCP : Stefany Stallings MD    SNF Attending:  Hillary Putnam MD     Spoke with SNF team.  PT/OT update: Currently bed mobility min assist; bed to chair transfer min assist; ambulating 80 ft with FW walker and min assist:  UB bathing and dressing min assist; LB bathing and dressing mod assist; set up for feeding. Patient is total assist with TSOL brace and back precautoin. LOS/Disposition: TBD    Community Care Team will follow up weekly with [facility] until discharge. Medications were not reconciled and general patient assessment was not completed during this [facility] outreach.       Polly Schultz RN  [phone]

## 2020-06-23 ENCOUNTER — PATIENT OUTREACH (OUTPATIENT)
Dept: CASE MANAGEMENT | Age: 75
End: 2020-06-23

## 2020-06-25 NOTE — PROGRESS NOTES
Community Care Team [facility]ation [facility]  Subsequent Follow up     Patient remains at [facility] See previous [facility] Team notes. Spoke with SNF team. PT/OT update: Currently bed mobility with occasional assist;  transfers min assist; ambulating 100 ft with rolling walker and CGA;  UB ADL's bathing and dressing min assist;  LB ADL's bathing and dresing mod assist. Set up/ supervision for grooming and feeding,  mod assist with TSOL brace and cues for back precautions. LTD 6/30. Disposition:  Plan for discharge home with daughter in [place]. 7/1     Medications were not reconciled and general patient assessment was not completed during this skilled nursing facility outreach.      Clemente Davila RN  [phone]

## 2020-06-29 RX ORDER — POTASSIUM CHLORIDE 20 MEQ/1
TABLET, EXTENDED RELEASE ORAL
Qty: 90 TAB | Refills: 1 | Status: SHIPPED | OUTPATIENT
Start: 2020-06-29

## 2020-06-30 ENCOUNTER — PATIENT OUTREACH (OUTPATIENT)
Dept: CASE MANAGEMENT | Age: 75
End: 2020-06-30

## 2020-07-06 NOTE — PROGRESS NOTES
Late Chart  Community Care Team Documentation for Patient in [facility]  Subsequent Follow up     Patient remains at [facility] ([facility]). See previous [facility] Team notes. Spoke with SNF team. SNF medical update: Patient reported an unwitness fall with no injuries. PT/OT update:  Patient ambulating longer distances with rolling walker and CGA, no other changes from llast week. LCD 6/30. Disposition:  Patient will transition to [facility] at [facility] tomorrow. Medications were not reconciled and general patient assessment was not completed during this skilled nursing facility outreach.      Tylor Baker RN  [phone]

## 2020-07-07 ENCOUNTER — PATIENT OUTREACH (OUTPATIENT)
Dept: CASE MANAGEMENT | Age: 75
End: 2020-07-07

## 2020-07-14 ENCOUNTER — TELEPHONE (OUTPATIENT)
Dept: INTERNAL MEDICINE CLINIC | Age: 75
End: 2020-07-14

## 2020-07-14 NOTE — TELEPHONE ENCOUNTER
Spoke with pt - she states she was moved from independent living to nursing home building. She was in [employer]" for 2 weeks. Tomorrow is her last day. She is use to \"going and doing\" and she is not handling being where she is. She feels she needs something for her nerves. Advised her since tomorrow is last day maybe thing will be better for her. Did she want to wait and see how things go? No she really can't wait. Advised her MD may not prescribe her anything without seeing her and Rahat Vitale may want her to quarantine after leaving facility. MD over heard call - recommend I call pt's daughter to get more details on this. Called pt's daughter Efrain Desouza.

## 2020-07-14 NOTE — TELEPHONE ENCOUNTER
Patient asking for a callback from [place] concerning her being in lockdown for 2 weeks -- is not handling it well.

## 2020-07-15 NOTE — TELEPHONE ENCOUNTER
Spoke with pt's daughter Adam Jeronimo. She states pt is in her \"manic mode\" and calling anyone who will talk to her. She moved from independent living on 7/1/20 to assisted living and having difficult time adjusting. Adam Jeronimo states once she has been there and able to move around facility she will be better. Pt was moved there because she was needing more help. Facility is not allowing them to go out.  Will forward to MD.

## 2020-07-15 NOTE — TELEPHONE ENCOUNTER
Advised pt's daughter [employer] MD said thank you. That she should notify pt's psychiatrist as a precautionary measure. She states she has requested psych referral there at [facility] also.

## 2020-07-24 NOTE — PROGRESS NOTES
Late Chart    Community Care Team Documentation for Patient in [facility]  Discharge Note    Patient has been discharged from [facility] ([facility]). See previous [facility] Team notes. PCP : Ramandeep Cortes MD      Spoke with SNF team. Patient transitioned to [facility] at [facility] on July 1st.     [facility] will sign off at this time. Medications were not reconciled and general patient assessment was not completed during this skilled nursing facility outreach.      Mark Liao RN  [phone]

## 2020-08-14 ENCOUNTER — TELEPHONE (OUTPATIENT)
Dept: CARDIOLOGY CLINIC | Age: 75
End: 2020-08-14

## 2020-08-14 RX ORDER — DROXIDOPA 300 MG/1
600 CAPSULE ORAL 2 TIMES DAILY
Qty: 360 CAP | Refills: 3 | Status: SHIPPED | OUTPATIENT
Start: 2020-08-14 | End: 2021-01-20 | Stop reason: SDUPTHER

## 2020-08-14 RX ORDER — DROXIDOPA 300 MG/1
600 CAPSULE ORAL 2 TIMES DAILY
Qty: 360 CAP | Refills: 3 | Status: CANCELLED | OUTPATIENT
Start: 2020-08-14

## 2020-08-14 RX ORDER — DROXIDOPA 300 MG/1
600 CAPSULE ORAL 2 TIMES DAILY
Qty: 360 CAP | Refills: 3 | Status: SHIPPED | OUTPATIENT
Start: 2020-08-14 | End: 2020-08-14

## 2020-08-14 NOTE — TELEPHONE ENCOUNTER
Faxed [place] presciption to [facility] at fax number [phone]. Fax confirmation received. Attempted to reach patient's daughter by telephone. A message was left notifying the prescription has been faxed. Called and Notified Montserrat Bhatti with [facility] that prescription has been sent.

## 2020-08-14 NOTE — TELEPHONE ENCOUNTER
Maude with [employer] requesting a script for Terisa Marking. Pt has to have the medication by .  Please advise      [phone]  SCA:[phone]

## 2020-08-19 ENCOUNTER — TELEPHONE (OUTPATIENT)
Dept: INTERNAL MEDICINE CLINIC | Age: 75
End: 2020-08-19

## 2020-08-19 NOTE — TELEPHONE ENCOUNTER
Spoke with Ashly Parson with [facility] - advised her Dr Natalie Nieto was pt's surgeon. Dr Brian Barger feels best she call his office for more information. Given number.

## 2020-08-31 NOTE — TELEPHONE ENCOUNTER
[facility]. Is calling in regards to a prior [facility] [place] for [employer]. Please advise.       Phone #: [phone]  Thanks
Patient's daughter states the [facility]) needs an updated prescription faxed (or verbal ordered) to them so that they can mail a refill to patient by monday. Patient's daughter states it needs to be done today because patient will be out of medication today.     Fax: [phone]  Phone: [phone]    She would like a call back to make sure this is taken care of at [phone]
Pharmacy confirmed
Verified patient with two types of identifiers. Called and spoke with patient's daughter, verified on HIPAA. Verified that patient received medication through patient assistance. Patient verbalized understanding and will call with any other questions. Verified patient with two types of identifiers. Called [facility] and notified that patient recieves medication through patient assistance. Prescription sent to pharmacy cancelled. [facility] verbalized understanding and will call with any other questions.
Verified patient with two types of identifiers. Spoke with patient's daughter and notified that Dr. Cristine Tay is currently out of the office; however, I will request one of Dr. Aishwarya Romano partners review. Patient's daughter verbalized understanding and will call with any other questions.
37

## 2020-09-03 ENCOUNTER — TELEPHONE (OUTPATIENT)
Dept: CARDIOLOGY CLINIC | Age: 75
End: 2020-09-03

## 2020-09-03 NOTE — TELEPHONE ENCOUNTER
Verified patient with two types of identifiers. Called and notified [employer] and notified that patient gets medication through the [employer]. Patient already has medication. Patient verbalized understanding and will call with any other questions.

## 2020-09-03 NOTE — TELEPHONE ENCOUNTER
Cinthia with [employer] calling in regards to a prior autho for Jason.         Phone:[phone]  Ref#[id number]

## 2020-12-21 ENCOUNTER — TELEPHONE (OUTPATIENT)
Dept: CARDIOLOGY CLINIC | Age: 75
End: 2020-12-21

## 2020-12-21 NOTE — TELEPHONE ENCOUNTER
Verified patient with two types of identifiers. Spoke with Jeffery Encarnacion who states patient daughter dropped off 6 pills of Northera and full shipment should be in on Wednesday. Patient will have enough for tonight and tomorrow. Notified Jeffery Encarnacion that I spoke with Dr. Rody Sanchez, as Dr. Gregory Escobar is currently out of the office and she states patient will be okay if she missed one day- patient to be mindful of position changes as her BP may drop without medicine. Jeffery Encarnacion verbalized understanding and will call with any other questions.

## 2020-12-21 NOTE — TELEPHONE ENCOUNTER
Luis Antonio Ying from [facility] is calling to inform that patient will be off of Veleta Redhead until Wednesday because that is when daughter will be able to provide the refill of medication and take it to the facility. Patient's daughter wanted them to make MD aware because they are not sure if patient can be off of medication until then or if MD would like to prescribe something in the meantime. Please advise.      Phone: [phone]

## 2021-01-20 ENCOUNTER — TELEPHONE (OUTPATIENT)
Dept: CARDIOLOGY CLINIC | Age: 76
End: 2021-01-20

## 2021-01-20 DIAGNOSIS — G20 PARKINSONIAN SYNDROME ASSOCIATED WITH SYMPTOMATIC ORTHOSTATIC HYPOTENSION (HCC): Primary | ICD-10-CM

## 2021-01-20 DIAGNOSIS — I95.1 PARKINSONIAN SYNDROME ASSOCIATED WITH SYMPTOMATIC ORTHOSTATIC HYPOTENSION (HCC): Primary | ICD-10-CM

## 2021-01-20 RX ORDER — DROXIDOPA 300 MG/1
600 CAPSULE ORAL 2 TIMES DAILY
Qty: 360 CAP | Refills: 3 | Status: SHIPPED | OUTPATIENT
Start: 2021-01-20 | End: 2021-10-01 | Stop reason: SDUPTHER

## 2021-01-20 NOTE — TELEPHONE ENCOUNTER
Cardiologist: Dr. Ronald Jorgensen    Last appt: Visit date not found  No future appointments. Requested Prescriptions     Signed Prescriptions Disp Refills    droxidopa (Northera) 300 mg cap 360 Cap 3     Sig: Take 600 mg by mouth two (2) times a day. Authorizing Provider: HORTENCIA THEODORE     Ordering User: MARICARMEN LEONG         Refills VO per Dr. Ronald Jorgensen.

## 2021-01-20 NOTE — TELEPHONE ENCOUNTER
Faxed Northera presciption to [employer] Patient Assistance Program at fax number [phone]. Fax confirmation received.

## 2021-05-19 NOTE — ANESTHESIA PREPROCEDURE EVALUATION
Anesthetic History No history of anesthetic complications PONV Review of Systems / Medical History Patient summary reviewed, nursing notes reviewed and pertinent labs reviewed Pulmonary Within defined limits Neuro/Psych Within defined limits Cardiovascular Within defined limits GI/Hepatic/Renal 
Within defined limits GERD Endo/Other Within defined limits Diabetes Arthritis Other Findings Physical Exam 
 
Airway Mallampati: II 
TM Distance: > 6 cm Neck ROM: normal range of motion Mouth opening: Normal 
 
 Cardiovascular Regular rate and rhythm,  S1 and S2 normal,  no murmur, click, rub, or gallop Dental 
No notable dental hx Pulmonary Breath sounds clear to auscultation Abdominal 
GI exam deferred Other Findings Anesthetic Plan ASA: 2 Anesthesia type: MAC Anesthetic plan and risks discussed with: Patient [General Appearance - Alert] : alert [General Appearance - In No Acute Distress] : in no acute distress [Sclera] : the sclera and conjunctiva were normal [Outer Ear] : the ears and nose were normal in appearance [Neck Appearance] : the appearance of the neck was normal [Heart Rate And Rhythm] : heart rate was normal and rhythm regular [Heart Sounds] : normal S1 and S2 [Heart Sounds Gallop] : no gallops [Murmurs] : no murmurs [Heart Sounds Pericardial Friction Rub] : no pericardial rub [Edema] : there was no peripheral edema [Abdomen Soft] : soft [Bowel Sounds] : normal bowel sounds [Abdomen Tenderness] : non-tender [] : no hepato-splenomegaly [Abdomen Mass (___ Cm)] : no abdominal mass palpated [Cervical Lymph Nodes Enlarged Posterior Bilaterally] : posterior cervical [Cervical Lymph Nodes Enlarged Anterior Bilaterally] : anterior cervical [No CVA Tenderness] : no ~M costovertebral angle tenderness [No Spinal Tenderness] : no spinal tenderness [Nail Clubbing] : no clubbing  or cyanosis of the fingernails [Abnormal Walk] : normal gait [Musculoskeletal - Swelling] : no joint swelling seen [Motor Tone] : muscle strength and tone were normal [Skin Color & Pigmentation] : normal skin color and pigmentation [No Focal Deficits] : no focal deficits [Oriented To Time, Place, And Person] : oriented to person, place, and time [Impaired Insight] : insight and judgment were intact [Affect] : the affect was normal

## 2021-05-26 ENCOUNTER — TELEPHONE (OUTPATIENT)
Dept: CARDIOLOGY CLINIC | Age: 76
End: 2021-05-26

## 2021-05-26 NOTE — TELEPHONE ENCOUNTER
Called pt daughter Irene Hughes two patient identifiers confirmed. Irene Hughes stated pt BP was 170/100 today and has been running high the last few weeks. Evelin Walls has not been taking the Northera due to high BP readings. Irene Hughes stated facility would like to know if they should make any changes.  Will ask MD/NP

## 2021-05-26 NOTE — TELEPHONE ENCOUNTER
Patient's daughter, Alba Mckenzie, states patient's bp is 170/100 and assisted living facility is concerned, requesting to schedule an appointment.     Phone: [phone]

## 2021-05-27 NOTE — TELEPHONE ENCOUNTER
Called pt daughter Chilo Ayon two patient identifiers confirmed. Notified Chilo Ayon about Dr. Carl Arzola recommendations and will see her next week. Pt verbalized understanding of information discussed w/ no further questions at this time.

## 2021-05-27 NOTE — TELEPHONE ENCOUNTER
Pt daughter Heidi Uriarte called to get an update on her mothers prescription change. The NP at the facility that she is at has stop her medicine which Brenda Moore. They want to try on Hydralvavine 5mg if her BP is over 160/90. Please give her a call back.     Phone [phone]

## 2021-06-03 ENCOUNTER — OFFICE VISIT (OUTPATIENT)
Dept: CARDIOLOGY CLINIC | Age: 76
End: 2021-06-03
Payer: MEDICARE

## 2021-06-03 VITALS
HEIGHT: 66 IN | DIASTOLIC BLOOD PRESSURE: 90 MMHG | SYSTOLIC BLOOD PRESSURE: 140 MMHG | OXYGEN SATURATION: 96 % | RESPIRATION RATE: 18 BRPM | WEIGHT: 155 LBS | HEART RATE: 90 BPM | BODY MASS INDEX: 24.91 KG/M2

## 2021-06-03 DIAGNOSIS — I10 ESSENTIAL HYPERTENSION: ICD-10-CM

## 2021-06-03 DIAGNOSIS — G20 PARKINSONIAN SYNDROME ASSOCIATED WITH SYMPTOMATIC ORTHOSTATIC HYPOTENSION (HCC): Primary | ICD-10-CM

## 2021-06-03 DIAGNOSIS — R14.0 ABDOMINAL DISTENSION: ICD-10-CM

## 2021-06-03 DIAGNOSIS — I95.1 PARKINSONIAN SYNDROME ASSOCIATED WITH SYMPTOMATIC ORTHOSTATIC HYPOTENSION (HCC): Primary | ICD-10-CM

## 2021-06-03 DIAGNOSIS — R42 DIZZINESS: ICD-10-CM

## 2021-06-03 PROCEDURE — G0463 HOSPITAL OUTPT CLINIC VISIT: HCPCS | Performed by: INTERNAL MEDICINE

## 2021-06-03 PROCEDURE — 1101F PT FALLS ASSESS-DOCD LE1/YR: CPT | Performed by: INTERNAL MEDICINE

## 2021-06-03 PROCEDURE — G8536 NO DOC ELDER MAL SCRN: HCPCS | Performed by: INTERNAL MEDICINE

## 2021-06-03 PROCEDURE — G8753 SYS BP > OR = 140: HCPCS | Performed by: INTERNAL MEDICINE

## 2021-06-03 PROCEDURE — G9717 DOC PT DX DEP/BP F/U NT REQ: HCPCS | Performed by: INTERNAL MEDICINE

## 2021-06-03 PROCEDURE — G8399 PT W/DXA RESULTS DOCUMENT: HCPCS | Performed by: INTERNAL MEDICINE

## 2021-06-03 PROCEDURE — G8427 DOCREV CUR MEDS BY ELIG CLIN: HCPCS | Performed by: INTERNAL MEDICINE

## 2021-06-03 PROCEDURE — 99214 OFFICE O/P EST MOD 30 MIN: CPT | Performed by: INTERNAL MEDICINE

## 2021-06-03 PROCEDURE — G8755 DIAS BP > OR = 90: HCPCS | Performed by: INTERNAL MEDICINE

## 2021-06-03 PROCEDURE — 1090F PRES/ABSN URINE INCON ASSESS: CPT | Performed by: INTERNAL MEDICINE

## 2021-06-03 PROCEDURE — G8419 CALC BMI OUT NRM PARAM NOF/U: HCPCS | Performed by: INTERNAL MEDICINE

## 2021-06-03 RX ORDER — HYDRALAZINE HYDROCHLORIDE 10 MG/1
TABLET, FILM COATED ORAL
COMMUNITY
Start: 2021-05-27 | End: 2021-06-03 | Stop reason: SINTOL

## 2021-06-03 RX ORDER — METOPROLOL TARTRATE 25 MG/1
25 TABLET, FILM COATED ORAL 2 TIMES DAILY
Qty: 180 TABLET | Refills: 3 | Status: SHIPPED | OUTPATIENT
Start: 2021-06-03

## 2021-06-03 RX ORDER — FAMOTIDINE 20 MG/1
20 TABLET, FILM COATED ORAL DAILY
COMMUNITY
Start: 2021-05-17

## 2021-06-03 RX ORDER — MIRTAZAPINE 15 MG/1
15 TABLET, FILM COATED ORAL
COMMUNITY
Start: 2021-05-17

## 2021-06-03 RX ORDER — CONJUGATED ESTROGENS 0.62 MG/G
CREAM VAGINAL
COMMUNITY
End: 2021-09-03

## 2021-06-03 RX ORDER — METOPROLOL TARTRATE 25 MG/1
25 TABLET, FILM COATED ORAL 2 TIMES DAILY
Qty: 180 TABLET | Refills: 3 | Status: SHIPPED | OUTPATIENT
Start: 2021-06-03 | End: 2021-06-03

## 2021-06-03 RX ORDER — LORAZEPAM 0.5 MG/1
0.5 TABLET ORAL DAILY
COMMUNITY
Start: 2021-05-26

## 2021-06-03 RX ORDER — DULOXETIN HYDROCHLORIDE 30 MG/1
30 CAPSULE, DELAYED RELEASE ORAL DAILY
COMMUNITY
Start: 2021-05-17

## 2021-06-03 NOTE — PROGRESS NOTES
Cardiac Electrophysiology Office Note     Subjective:      Allyson Stephen is a 67 y.o. female patient who is seen for follow up, has long-standing history of dizziness & near syncope. Previously on [place] 5422 for orthostatic hypotension r/t Parkinson's disease. Over the past few weeks, BP significantly elevated, has held these. Started hydralazine 5 mg po prn elevated BP. Continues with intermittent lightheadedness, now with significant ascites over the past few weeks. Previously with distention due to constipation, but this is different. KOTHARI. No syncope. Previous:  Hypercoagulable work up and d dimer performed. No longer taking Xarelto. She had a tilt table test 8/2/16. Orthostatic hypotension noted. Saw Dr. Shanice Greene. Daughter planning liver transplant.      Patient Active Problem List    Diagnosis Date Noted    Compression fracture of L1 lumbar vertebra ([facility] 06/04/2020    UTI (urinary tract infection) 03/03/2020    AMS (altered mental status) 03/03/2020    Chest pain 12/11/2019    Type 2 diabetes mellitus with diabetic neuropathy ([facility] 09/26/2019    Type 2 diabetes with nephropathy ([facility] 07/26/2018    Syncope 05/28/2018    Spinal stenosis     Near syncope 08/02/2016    Resting tremor 07/29/2016    Orthostatic hypotension 07/05/2016    Advance directive on file 05/24/2016    Mixed hyperlipidemia 02/02/2016    Tubulovillous adenoma 08/21/2012    Abnormal mammogram 06/04/2012    Encounter for long-term (current) use of other medications 11/30/2011    Rupertoertoe 09/12/2011    Allergic rhinitis, cause unspecified 01/26/2011    Other diseases of nasal cavity and sinuses(478.19) 07/19/2010    IBS (irritable bowel syndrome)     RLS (restless legs syndrome)     Bipolar disorder (HCC)     Fibromyalgia     DJD (degenerative joint disease)     Lumbar disc disease     Paget's disease of bone     Migraine     Genital herpes      Current Outpatient Medications Medication Sig Dispense Refill    DULoxetine (CYMBALTA) 30 mg capsule       conjugated estrogens (Premarin) 0.625 mg/gram vaginal cream Premarin 0.625 mg/gram vaginal cream      famotidine (PEPCID) 20 mg tablet       LORazepam (ATIVAN) 0.5 mg tablet       mirtazapine (REMERON) 15 mg tablet       metoprolol tartrate (LOPRESSOR) 25 mg tablet Take 1 Tablet by mouth two (2) times a day. 180 Tablet 3    potassium chloride (K-DUR, KLOR-CON) 20 mEq tablet TAKE 1 TABLET BY MOUTH DAILY 90 Tab 1    carbidopa-levodopa (Sinemet)  mg per tablet Take 1 Tab by mouth three (3) times daily.  atorvastatin (LIPITOR) 20 mg tablet TAKE 1 TABLET BY MOUTH EVERY EVENING 90 Tab 0    gabapentin (NEURONTIN) 300 mg capsule 600mg with breakfast, 300 mg with  lunch, 600mg at dinner- new directions  Indications: Neuropathic Pain 150 Cap 5    donepezil (ARICEPT) 5 mg tablet Take 5 mg by mouth nightly.  acetaminophen (TYLENOL) 325 mg tablet Take 325 mg by mouth every four (4) hours as needed for Pain.  droxidopa (Northera) 300 mg cap Take 600 mg by mouth two (2) times a day. (Patient not taking: Reported on 6/3/2021) 360 Cap 3    FLUoxetine (PROzac) 20 mg capsule Take 40 mg by mouth daily. (Patient not taking: Reported on 6/3/2021)      gabapentin (NEURONTIN) 600 mg tablet TAKE 1 TABLET BY MOUTH TWICE DAILY WITH BREAKFAST AND DINNER( 9AM AND 9PM) (Patient not taking: Reported on 6/3/2021) 60 Tab 4    hydrocortisone (Anusol-HC) 2.5 % rectal cream Insert  into rectum four (4) times daily. (Patient not taking: Reported on 6/3/2021) 30 g 0    phenazopyridine (PYRIDIUM) 100 mg tablet Take 1 Tab by mouth three (3) times daily as needed for Pain.  Indications: urinary tract irritation (Patient not taking: Reported on 6/3/2021) 10 Tab 0    fludrocortisone (FLORINEF) 0.1 mg tablet TAKE 1 TABLET BY MOUTH TWICE DAILY (Patient not taking: Reported on 6/3/2021) 180 Tab 1    sucralfate (CARAFATE) 1 gram tablet TAKE 1 TABLET BY MOUTH FOUR TIMES DAILY (Patient not taking: Reported on 6/3/2021) 360 Tab 3    QUEtiapine (SEROQUEL) 25 mg tablet Take 50 mg by mouth nightly.  Take 2 tabs qhs prn Gregoria Bustamante NP/Dr Chani Willingham (Patient not taking: Reported on 6/3/2021)       Allergies   Allergen Reactions    Adhesive Itching    Hydrocodone Itching    Lorazepam Other (comments)    Other Medication Rash     Tide detergent    Percocet [Oxycodone-Acetaminophen] Itching    Sudafed [Pseudoephedrine Hcl] Other (comments)     Vertigo    Wellbutrin [Bupropion Hcl] Nausea and Vomiting    Zithromax [Azithromycin] Vertigo    Zyrtec [Cetirizine] Nausea Only     Past Medical History:   Diagnosis Date    Bipolar disorder (HCC)     Nazmy    Chronic pain     BACK PAIN    Diabetes ([facility]     BORDERLINE TX WITH DIET AND EXERCISE    DJD (degenerative joint disease)     Fibromyalgia     Genital herpes     GERD (gastroesophageal reflux disease)     Hypercholesterolemia     IBS (irritable bowel syndrome)     Ill-defined condition     fibromyligia    Lumbar disc disease     stimulation-Honza    Migraine     Nausea & vomiting     Other ill-defined conditions(799.89)     SEASONAL allergies    Other ill-defined conditions(799.89)     dysphagia has had esophagus dilated    Paget's disease     Parkinson disease ([facility]     Pulmonary embolism (HCC)     RLS (restless legs syndrome)     Spinal stenosis      Past Surgical History:   Procedure Laterality Date    COLONOSCOPY N/A 6/27/2016    COLONOSCOPY performed by Chandan Jurado MD at [facility] END[facility]COPY    COLONOSCOPY N/A 2/22/2019    COLONOSCOPY performed by Chloé Guzman MD at [facility] ENDOSCOPY    ENDOSCOPY, COLON, DIAGNOSTIC      12, due 15    HX APPENDECTOMY      HX BACK SURGERY  9/17/2013    back surgery - total of 3 as of 12/20/2013    HX BREAST BIOPSY Right years ago    negative    HX CHOLECYSTECTOMY      HX HEENT      T & A    HX HYSTERECTOMY      total for fibroid tumors    HX ORTHOPAEDIC      lumbar laminectomy then fusion/neurostimulator implanted - inactive now but still in    HX ORTHOPAEDIC      REMOVAL OF NEUROSTIMULATOR    HX OTHER SURGICAL      EGD x 2 with dilation/colonoscopy - no polyps per pt    IR KYPHOPLASTY LUMBAR  6/8/2020    TILT TABLE EVALUATION  8/2/2016          Family History   Problem Relation Age of Onset    Colon Cancer Mother     Cancer Mother 68        colon    Heart Disease Father     Heart Disease Maternal Grandmother     Heart Disease Maternal Grandfather     Heart Disease Other      Social History     Tobacco Use    Smoking status: Never Smoker    Smokeless tobacco: Never Used   Substance Use Topics    Alcohol use: No     Comment: 1 per month      Review of Systems: All other review of systems otherwise negative. Constitutional: Negative for fever, chills, weight loss . + intermittent dizziness  HEENT: Negative for nosebleeds, vision changes. Respiratory: Negative for cough, hemoptysis, sputum production, and wheezing. Cardiovascular: No palpitations, orthopnea, claudication, leg swelling, and PND. + KOTHARI, lightheadedness  Gastrointestinal:  No vomiting, blood in stool and melena. Genitourinary: Negative for dysuria, and hematuria. Musculoskeletal: + for myalgias, sciatica   Skin: Negative for rash. Heme: Does not bleed or bruise easily. Neurological: Negative for speech change and focal weakness. Psych: Dementia. Objective:     Visit Vitals  BP (!) 140/90 (BP 1 Location: Right arm, BP Patient Position: Sitting, BP Cuff Size: Adult)   Pulse 90   Resp 18   Ht 5' 6\" (1.676 m)   Wt 155 lb (70.3 kg)   SpO2 96%   BMI 25.02 kg/m²      Physical Exam:   Constitutional: Well-developed and well-nourished. No distress. Head: Normocephalic and atraumatic. Eyes: Pupils are equal, round  Neck: Supple. No JVD present. Cardiovascular: Normal rate, regular rhythm. Exam reveals no gallop and no friction rub.   No murmur heard.  Pulmonary/Chest: Effort normal and breath sounds normal. No wheezes. Abdominal: Distended, protuberant.  + BS x 4 quadrants. No tenderness. Musculoskeletal: No edema. Neurological: Alert, oriented. Faint resting tremor. Skin: Skin is warm and dry. Psychiatric: Normal mood and affect. Behavior is normal. Judgment and thought content normal.      Assessment/Plan:     Imaging/Studies:  Nuclear stress (12/12/2019): LVEF 77%. Echo (12/12/2019): LVEF 51-55%, no RWMA, mild concentric LVH. Mildly dilated LA. Mild to mod MR. Mild TR, mild PH. CTA chest (06/09/2016): Normal, no PE. ICD-10-CM ICD-9-CM    1. Parkinsonian syndrome associated with symptomatic orthostatic hypotension (HCC)  G20 333.0     I95.1     2. Dizziness  R42 780.4    3. Essential hypertension  I10 401.9    4. Abdominal distension  R14.0 787.3         Orthostatic hypotension: R/t Parkinson's disease. With supine HTN. Stopped Florinef & Northera due to recent elevated BP. Stop hydralazine, as it may exacerbate orthostatic hypotension. Start metoprolol 25 mg po bid. Sinus rate 90    Abdominal distension: her daughter in law said she had constipation. Bowel sounds present, no obstruction. Counseled regarding high fiber diet, sufficient water intake. She has been referred to GI by nursing home    Parkinson's disease on sinemet  Resting tremor and flat affect      Follow up with EP TAI Suarez) in 3 months. Future Appointments   Date Time Provider Reji Sarah   9/3/2021  8:20 AM TAI Deleon AMB           Thank you for involving me in this patient's care and please call with further concerns or questions. Truman Nyhan, M.D.   Electrophysiology/Cardiology  [address] [facility] [place]  [address], [place] [address] , [address]  [phone]

## 2021-07-15 NOTE — PROCEDURES
Advised regular use of Amsler grid. EGD Procedure Note    Indications:  GERD, constant burning chest discomfort and associated back pain   Referring Physician: Zuri Covington MD   Anesthesia/Sedation:MAC  Endoscopist:  Dr. Kevin Galvan  Assistant:  Endoscopy Technician-1: Maria C Laurent  Endoscopy RN-1: Darin Nina RN  Surgical Assistant: None  Implants: None      Preoperative diagnosis: GERD, NAUSEA    Postoperative diagnosis: 1. Endosopic Negative GERD      Procedure in Detail:  Informed consent was obtained for the procedure, including sedation. Risks of perforation, hemorrhage, adverse drug reaction, and aspiration were discussed. The patient was placed in the left lateral decubitus position. Based on the pre-procedure assessment, including review of the patient's medical history, medications, allergies, and review of systems, she had been deemed to be an appropriate candidate for moderate sedation; she was therefore sedated with the medications listed above. The patient was monitored continuously with ECG tracing, pulse oximetry, blood pressure monitoring, and direct observations. An Olympus video endoscope was inserted into the mouth and advanced under direct vision to into the esophagus, then stomach and duodenum. A careful inspection was made as the gastroscope was withdrawn, including a retroflexed view of the proximal stomach; findings and interventions are described below. Findings:   Esophagus:normal - Bx from lower, mid and upper; somewta prominent bluish fold above GEJ - no hx of cirrhosis or varices  Stomach: normal   Duodenum/jejunum: normal    Therapies:  See above    Specimens: see above           EBL: None    Complications:   None; patient tolerated the procedure well. Recommendations:  -Continue acid suppression. , -Follow up with me., -No NSAIDS, -check esophagogram and UGI possible CT of chest    Odette Suazo MD

## 2021-08-23 NOTE — PROGRESS NOTES
Cardiac Electrophysiology Office Note     Subjective:      Singh Hutchins is a 67 y.o. female patient who is seen for follow up, has long-standing history of dizziness & near syncope. Previously prescribed Florinef & Northera for orthostatic hypotension r/t Parkinson's disease. Earlier this year, BP significantly elevated. Started metoprolol 25 mg po bid. Her facility staff has been holding Northera when SBP is >140. Continues with intermittent lightheadedness, KOTHARI. No syncope. Still able to ambulate with walker. Legs will become suddenly weak at times, but no falls. Previous:  Hypercoagulable work up and d dimer performed. No longer taking Xarelto. She had a tilt table test 08/02/2016. Orthostatic hypotension noted. Saw Dr. Lou Brown. Daughter planning liver transplant.      Patient Active Problem List    Diagnosis Date Noted    Compression fracture of L1 lumbar vertebra ([facility] [address] 06/04/2020    UTI (urinary tract infection) 03/03/2020    AMS (altered mental status) 03/03/2020    Chest pain 12/11/2019    Type 2 diabetes mellitus with diabetic neuropathy ([facility] [address] 09/26/2019    Type 2 diabetes with nephropathy ([facility] [address] 07/26/2018    Syncope 05/28/2018    Spinal stenosis     Near syncope 08/02/2016    Resting tremor 07/29/2016    Orthostatic hypotension 07/05/2016    Advance directive on file 05/24/2016    Mixed hyperlipidemia 02/02/2016    Tubulovillous adenoma 08/21/2012    Abnormal mammogram 06/04/2012    Encounter for long-term (current) use of other medications 11/30/2011    Hammertoe 09/12/2011    Allergic rhinitis, cause unspecified 01/26/2011    Other diseases of nasal cavity and sinuses(478.19) 07/19/2010    IBS (irritable bowel syndrome)     RLS (restless legs syndrome)     Bipolar disorder (HCC)     Fibromyalgia     DJD (degenerative joint disease)     Lumbar disc disease     Paget's disease of bone     Migraine     Genital herpes      Current Outpatient Medications   Medication Sig Dispense Refill    DULoxetine (CYMBALTA) 30 mg capsule       famotidine (PEPCID) 20 mg tablet Take 20 mg by mouth daily.  LORazepam (ATIVAN) 0.5 mg tablet Take 0.5 mg by mouth daily.  mirtazapine (REMERON) 15 mg tablet Take 15 mg by mouth nightly.  metoprolol tartrate (LOPRESSOR) 25 mg tablet Take 1 Tablet by mouth two (2) times a day. 180 Tablet 3    droxidopa (Northera) 300 mg cap Take 600 mg by mouth two (2) times a day. 360 Cap 3    potassium chloride (K-DUR, KLOR-CON) 20 mEq tablet TAKE 1 TABLET BY MOUTH DAILY 90 Tab 1    carbidopa-levodopa (Sinemet)  mg per tablet Take 1 Tab by mouth three (3) times daily.  FLUoxetine (PROzac) 20 mg capsule Take 40 mg by mouth daily.  atorvastatin (LIPITOR) 20 mg tablet TAKE 1 TABLET BY MOUTH EVERY EVENING 90 Tab 0    phenazopyridine (PYRIDIUM) 100 mg tablet Take 1 Tab by mouth three (3) times daily as needed for Pain. Indications: urinary tract irritation 10 Tab 0    fludrocortisone (FLORINEF) 0.1 mg tablet TAKE 1 TABLET BY MOUTH TWICE DAILY 180 Tab 1    sucralfate (CARAFATE) 1 gram tablet TAKE 1 TABLET BY MOUTH FOUR TIMES DAILY 360 Tab 3    gabapentin (NEURONTIN) 300 mg capsule 600mg with breakfast, 300 mg with  lunch, 600mg at dinner- new directions  Indications: Neuropathic Pain 150 Cap 5    donepezil (ARICEPT) 5 mg tablet Take 5 mg by mouth nightly.  acetaminophen (TYLENOL) 325 mg tablet Take 325 mg by mouth every four (4) hours as needed for Pain.  QUEtiapine (SEROQUEL) 25 mg tablet Take 50 mg by mouth nightly.  Take 2 tabs qhs prn Jacinto Damon NP/Dr Deyanira Terrazas       Allergies   Allergen Reactions    Adhesive Itching    Hydrocodone Itching    Lorazepam Other (comments)    Other Medication Rash     Tide detergent    Percocet [Oxycodone-Acetaminophen] Itching    Sudafed [Pseudoephedrine Hcl] Other (comments)     Vertigo    Wellbutrin [Bupropion Hcl] Nausea and Vomiting    Zithromax [Azithromycin] Vertigo    Zyrtec [Cetirizine] Nausea Only     Past Medical History:   Diagnosis Date    Bipolar disorder ([facility] [address]     Nazmy    Chronic pain     BACK PAIN    Diabetes (HCC)     BORDERLINE TX WITH DIET AND EXERCISE    DJD (degenerative joint disease)     Fibromyalgia     Genital herpes     GERD (gastroesophageal reflux disease)     Hypercholesterolemia     IBS (irritable bowel syndrome)     Ill-defined condition     fibromyligia    Lumbar disc disease     stimulation-Honza    Migraine     Nausea & vomiting     Other ill-defined conditions(799.89)     SEASONAL allergies    Other ill-defined conditions(799.89)     dysphagia has had esophagus dilated    Paget's disease     Parkinson disease ([facility] [address]     Pulmonary embolism (HCC)     RLS (restless legs syndrome)     Spinal stenosis      Past Surgical History:   Procedure Laterality Date    COLONOSCOPY N/A 6/27/2016    COLONOSCOPY performed by Mona Carlin MD at [facility] ENDOSCOPY    COLONOSCOPY N/A 2/22/2019    COLONOSCOPY performed by Nathaniel Reardon MD at [facility] ENDOSCOPY    ENDOSCOPY, COLON, DIAGNOSTIC      12, due 13    HX APPENDECTOMY      HX BACK SURGERY  9/17/2013    back surgery - total of 3 as of 12/20/2013    HX BREAST BIOPSY Right years ago    negative    HX CHOLECYSTECTOMY      HX HEENT      T & A    HX HYSTERECTOMY      total for fibroid tumors    HX ORTHOPAEDIC      lumbar laminectomy then fusion/neurostimulator implanted - inactive now but still in    HX ORTHOPAEDIC      REMOVAL OF NEUROSTIMULATOR    HX OTHER SURGICAL      EGD x 2 with dilation/colonoscopy - no polyps per pt    IR KYPHOPLASTY LUMBAR  6/8/2020    TILT TABLE EVALUATION  8/2/2016          Family History   Problem Relation Age of Onset    Colon Cancer Mother     Cancer Mother 68        colon    Heart Disease Father     Heart Disease Maternal Grandmother     Heart Disease Maternal Grandfather     Heart Disease Other      Social History Tobacco Use    Smoking status: Never Smoker    Smokeless tobacco: Never Used   Substance Use Topics    Alcohol use: No     Comment: 1 per month      Review of Systems: All other review of systems otherwise negative. Constitutional: Negative for fever, chills, weight loss . + dizziness. HEENT: Negative for nosebleeds, vision changes. Respiratory: Negative for cough, hemoptysis, sputum production, and wheezing. Cardiovascular: No palpitations, orthopnea, claudication, leg swelling, and PND. + KOTHARI, lightheadedness  Gastrointestinal:  No vomiting, blood in stool and melena. + constant nausea  Genitourinary: Negative for dysuria, and hematuria. Musculoskeletal: + for myalgias, sciatica   Skin: Negative for rash. Heme: Does not bleed or bruise easily. Neurological: Negative for speech change. + tremor, intermittent leg weakness. Psych: Dementia, anxiety. Objective:     Visit Vitals  BP (!) 140/70 (BP 1 Location: Left arm, BP Patient Position: Sitting, BP Cuff Size: Adult)   Pulse 70   Resp 14   Ht 5' 6\" (1.676 m)   Wt 159 lb 6.4 oz (72.3 kg)   SpO2 97%   BMI 25.73 kg/m²        Physical Exam:   Constitutional: Well-developed and well-nourished. No distress. Head: Normocephalic and atraumatic. Eyes: Pupils are equal, round. Neck: Supple. No JVD present. Cardiovascular: Normal rate, regular rhythm. Exam reveals no gallop and no friction rub. No murmur heard. Pulmonary/Chest: Effort normal and breath sounds normal. No wheezes. Abdominal: Distended but soft. No tenderness. BS x 4 quadrants. Musculoskeletal: No edema. Neurological: Alert, oriented. Resting tremor bilat upper extremities. Skin: Skin is warm and dry. Psychiatric: Normal mood and flat affect. Anxious. Judgment and thought content normal.      Assessment/Plan:     Imaging/Studies:  Nuclear stress (12/12/2019): LVEF 77%. Echo (12/12/2019): LVEF 51-55%, no RWMA, mild concentric LVH. Mildly dilated LA. Mild to mod MR. Mild TR, mild PH. CTA chest (06/09/2016): Normal, no PE. ICD-10-CM ICD-9-CM    1. Parkinsonian syndrome associated with symptomatic orthostatic hypotension (HCC)  G20 333.0     I95.1     2. Essential hypertension  I10 401.9    3. Dizziness  R42 780.4         Orthostatic hypotension: R/t Parkinson's disease. Facility has been holding Northera when SBP is >140 mmHg. Advise holding Florinef rather than Northera in this situation. .      Supine HTN: Continue metoprolol 25 mg po bid, avoid medications like hydralazine, which may exacerbate orthostatic hypotension. OK to hold Florinef if SBP >140. Follow up with Dr. Willa Obrien in 6 months, sooner for new/worsening issues. Future Appointments   Date Time Provider Reji Smith   3/1/2022 11:40 AM MD DOMINIC Hunter BS AMB        Thank you for involving me in this patient's care and please call with further concerns or questions.     Ifeoma January, TAMY Manzo  [facility] [place]  09/03/21

## 2021-09-03 ENCOUNTER — TELEPHONE (OUTPATIENT)
Dept: CARDIOLOGY CLINIC | Age: 76
End: 2021-09-03

## 2021-09-03 ENCOUNTER — OFFICE VISIT (OUTPATIENT)
Dept: CARDIOLOGY CLINIC | Age: 76
End: 2021-09-03
Payer: MEDICARE

## 2021-09-03 VITALS
SYSTOLIC BLOOD PRESSURE: 140 MMHG | HEIGHT: 66 IN | OXYGEN SATURATION: 97 % | RESPIRATION RATE: 14 BRPM | DIASTOLIC BLOOD PRESSURE: 70 MMHG | HEART RATE: 70 BPM | WEIGHT: 159.4 LBS | BODY MASS INDEX: 25.62 KG/M2

## 2021-09-03 DIAGNOSIS — G20 PARKINSONIAN SYNDROME ASSOCIATED WITH SYMPTOMATIC ORTHOSTATIC HYPOTENSION (HCC): Primary | ICD-10-CM

## 2021-09-03 DIAGNOSIS — I10 ESSENTIAL HYPERTENSION: ICD-10-CM

## 2021-09-03 DIAGNOSIS — R42 DIZZINESS: ICD-10-CM

## 2021-09-03 DIAGNOSIS — I95.1 PARKINSONIAN SYNDROME ASSOCIATED WITH SYMPTOMATIC ORTHOSTATIC HYPOTENSION (HCC): Primary | ICD-10-CM

## 2021-09-03 PROCEDURE — 99214 OFFICE O/P EST MOD 30 MIN: CPT | Performed by: NURSE PRACTITIONER

## 2021-09-03 PROCEDURE — G8399 PT W/DXA RESULTS DOCUMENT: HCPCS | Performed by: NURSE PRACTITIONER

## 2021-09-03 PROCEDURE — G8427 DOCREV CUR MEDS BY ELIG CLIN: HCPCS | Performed by: NURSE PRACTITIONER

## 2021-09-03 PROCEDURE — G8754 DIAS BP LESS 90: HCPCS | Performed by: NURSE PRACTITIONER

## 2021-09-03 PROCEDURE — G8753 SYS BP > OR = 140: HCPCS | Performed by: NURSE PRACTITIONER

## 2021-09-03 PROCEDURE — G8536 NO DOC ELDER MAL SCRN: HCPCS | Performed by: NURSE PRACTITIONER

## 2021-09-03 PROCEDURE — G9717 DOC PT DX DEP/BP F/U NT REQ: HCPCS | Performed by: NURSE PRACTITIONER

## 2021-09-03 PROCEDURE — G0463 HOSPITAL OUTPT CLINIC VISIT: HCPCS | Performed by: NURSE PRACTITIONER

## 2021-09-03 PROCEDURE — 1101F PT FALLS ASSESS-DOCD LE1/YR: CPT | Performed by: NURSE PRACTITIONER

## 2021-09-03 PROCEDURE — G8419 CALC BMI OUT NRM PARAM NOF/U: HCPCS | Performed by: NURSE PRACTITIONER

## 2021-09-03 PROCEDURE — 1090F PRES/ABSN URINE INCON ASSESS: CPT | Performed by: NURSE PRACTITIONER

## 2021-09-03 NOTE — TELEPHONE ENCOUNTER
Verified patient with two types of identifiers. Received call from [facility] who wanted clarification of Florinef instructions. She should continue Northera as prescribed on a regular basis. If SBP>140 mmHg, Florinef may be held prn. Yoli Melendez to check BP prior to administering Florinef and do NOT give if SBP is greater than 140. Patient verbalized understanding and will call with any other questions.

## 2021-10-01 DIAGNOSIS — I95.1 PARKINSONIAN SYNDROME ASSOCIATED WITH SYMPTOMATIC ORTHOSTATIC HYPOTENSION (HCC): ICD-10-CM

## 2021-10-01 DIAGNOSIS — G20 PARKINSONIAN SYNDROME ASSOCIATED WITH SYMPTOMATIC ORTHOSTATIC HYPOTENSION (HCC): ICD-10-CM

## 2021-10-01 RX ORDER — DROXIDOPA 300 MG/1
600 CAPSULE ORAL 2 TIMES DAILY
Qty: 360 CAPSULE | Refills: 3 | Status: SHIPPED | OUTPATIENT
Start: 2021-10-01 | End: 2022-02-25

## 2021-10-01 NOTE — TELEPHONE ENCOUNTER
Received fax from [facility] stating they need an updated prescription for patient's Devi Finders. Will ask MD to sign prescription and send back.

## 2021-10-04 ENCOUNTER — TELEPHONE (OUTPATIENT)
Dept: CARDIOLOGY CLINIC | Age: 76
End: 2021-10-04

## 2021-10-04 NOTE — TELEPHONE ENCOUNTER
Faxed Northera Presciption to [employer] [facility] at fax number [phone]. Fax confirmation received.

## 2022-02-18 ENCOUNTER — TELEPHONE (OUTPATIENT)
Dept: CARDIOLOGY CLINIC | Age: 77
End: 2022-02-18

## 2022-02-18 NOTE — TELEPHONE ENCOUNTER
Patient daughter-Rosario contacted and identity verified by two identifiers. Rescheduled 3/15/2022 appointment with Dr. Carey Phillips to 2/25/2022 with Areli Friedman NP- as requested. Rosario verbalized all understanding and had no additional questions.      Future Appointments   Date Time Provider Reji Smith   2/25/2022 10:30 AM TAI Armstrong AMB

## 2022-02-18 NOTE — TELEPHONE ENCOUNTER
Patients daughter calling back - appt was rs to 3/15 - didn't work for them and was wondering if there was another date     [phone]

## 2022-02-19 NOTE — PROGRESS NOTES
Cardiac Electrophysiology Office Note     Subjective:      George Sen is a 65 y.o. female patient who is seen for follow up, has long-standing history of dizziness & near syncope. Previously prescribed Florinef & Northera for orthostatic hypotension r/t Parkinson's disease. In 2021, BP significantly elevated. Started metoprolol 25 mg po bid. Her facility staff has been holding Florinef when SBP is >140. Continues with intermittent lightheadedness, KOTHARI. No syncope. Still able to ambulate with walker. BP controlled, normal.      Previous:  Hypercoagulable eval; d dimer performed. No longer taking Xarelto. Tilt table test 08/02/2016. Orthostatic hypotension noted. Lewy body dementia. Saw Dr. Giovany Villa. Daughter planning liver transplant.      Patient Active Problem List    Diagnosis Date Noted    Compression fracture of L1 lumbar vertebra ([facility] [address] 06/04/2020    UTI (urinary tract infection) 03/03/2020    AMS (altered mental status) 03/03/2020    Chest pain 12/11/2019    Type 2 diabetes mellitus with diabetic neuropathy ([facility] [address] 09/26/2019    Type 2 diabetes with nephropathy ([facility] [address] 07/26/2018    Syncope 05/28/2018    Spinal stenosis     Near syncope 08/02/2016    Resting tremor 07/29/2016    Orthostatic hypotension 07/05/2016    Advance directive on file 05/24/2016    Mixed hyperlipidemia 02/02/2016    Tubulovillous adenoma 08/21/2012    Abnormal mammogram 06/04/2012    Encounter for long-term (current) use of other medications 11/30/2011    Hammertoe 09/12/2011    Allergic rhinitis, cause unspecified 01/26/2011    Other diseases of nasal cavity and sinuses(478.19) 07/19/2010    IBS (irritable bowel syndrome)     RLS (restless legs syndrome)     Bipolar disorder (HCC)     Fibromyalgia     DJD (degenerative joint disease)     Lumbar disc disease     Paget's disease of bone     Migraine     Genital herpes      Current Outpatient Medications   Medication Sig Dispense Refill    pantoprazole (PROTONIX) 40 mg tablet Take 40 mg by mouth daily.  calcium carbonate (TUMS) 200 mg calcium (500 mg) chew Take 1 Tablet by mouth three (3) times daily.  aspirin delayed-release 81 mg tablet Take 81 mg by mouth daily.  polyethylene glycol (Miralax) 17 gram packet Take 17 g by mouth daily.  senna (Senna) 8.6 mg tablet Take 2 Tablets by mouth nightly.  neomycin-bacitracnZn-polymyxnB (NEOSPORIN) 3.5-400-5,000 mg-unit-unit oipk ointment Apply 1 Packet to affected area daily as needed (for skin tears).  droxidopa (Northera) 300 mg cap Take 300 mg by mouth two (2) times a day for 7 days, THEN 300 mg daily for 14 days. 28 Capsule 0    DULoxetine (CYMBALTA) 30 mg capsule Take 30 mg by mouth daily.  famotidine (PEPCID) 20 mg tablet Take 20 mg by mouth daily.  LORazepam (ATIVAN) 0.5 mg tablet Take 0.5 mg by mouth daily.  mirtazapine (REMERON) 15 mg tablet Take 15 mg by mouth nightly.  metoprolol tartrate (LOPRESSOR) 25 mg tablet Take 1 Tablet by mouth two (2) times a day. 180 Tablet 3    potassium chloride (K-DUR, KLOR-CON) 20 mEq tablet TAKE 1 TABLET BY MOUTH DAILY 90 Tab 1    carbidopa-levodopa (Sinemet)  mg per tablet Take 1 Tab by mouth three (3) times daily.  fludrocortisone (FLORINEF) 0.1 mg tablet TAKE 1 TABLET BY MOUTH TWICE DAILY 180 Tab 1    gabapentin (NEURONTIN) 300 mg capsule 600mg with breakfast, 300 mg with  lunch, 600mg at dinner- new directions  Indications: Neuropathic Pain (Patient taking differently: Take 200 mg by mouth three (3) times daily. Indications: neuropathic pain) 150 Cap 5    FLUoxetine (PROzac) 20 mg capsule Take 40 mg by mouth daily.  atorvastatin (LIPITOR) 20 mg tablet TAKE 1 TABLET BY MOUTH EVERY EVENING 90 Tab 0    phenazopyridine (PYRIDIUM) 100 mg tablet Take 1 Tab by mouth three (3) times daily as needed for Pain.  Indications: urinary tract irritation 10 Tab 0    sucralfate (CARAFATE) 1 gram tablet TAKE 1 TABLET BY MOUTH FOUR TIMES DAILY 360 Tab 3    donepezil (ARICEPT) 5 mg tablet Take 5 mg by mouth nightly.  acetaminophen (TYLENOL) 325 mg tablet Take 325 mg by mouth every four (4) hours as needed for Pain. (Patient not taking: Reported on 2/25/2022)      QUEtiapine (SEROQUEL) 25 mg tablet Take 50 mg by mouth nightly.  Take 2 tabs qhs prn Geofm Nini NP/Dr Reid Medico       Allergies   Allergen Reactions    Adhesive Itching    Hydrocodone Itching    Lorazepam Other (comments)    Other Medication Rash     Tide detergent    Percocet [Oxycodone-Acetaminophen] Itching    Sudafed [Pseudoephedrine Hcl] Other (comments)     Vertigo    Wellbutrin [Bupropion Hcl] Nausea and Vomiting    Zithromax [Azithromycin] Vertigo    Zyrtec [Cetirizine] Nausea Only     Past Medical History:   Diagnosis Date    Bipolar disorder (HCC)     Nazmy    Chronic pain     BACK PAIN    Diabetes ([facility] [address]     BORDERLINE TX WITH DIET AND EXERCISE    DJD (degenerative joint disease)     Fibromyalgia     Genital herpes     GERD (gastroesophageal reflux disease)     Hypercholesterolemia     IBS (irritable bowel syndrome)     Ill-defined condition     fibromyligia    Lumbar disc disease     stimulation-Honza    Migraine     Nausea & vomiting     Other ill-defined conditions(799.89)     SEASONAL allergies    Other ill-defined conditions(799.89)     dysphagia has had esophagus dilated    Paget's disease     Parkinson disease ([facility] [address]     Pulmonary embolism (HCC)     RLS (restless legs syndrome)     Spinal stenosis      Past Surgical History:   Procedure Laterality Date    COLONOSCOPY N/A 6/27/2016    COLONOSCOPY performed [facility] Dorothy Zavala MD at [facility] ENDOSCOPY    COLONOSCOPY N/A 2/22/2019    COLONOSCOPY performed by Jordy Witt MD at [facility]. [address], COLON, DIAGNOSTIC      12, due 15    HX APPENDECTOMY      HX BACK SURGERY  9/17/2013    back surgery - total of 3 as of 12/20/2013    HX BREAST BIOPSY Right years ago    negative    HX CHOLECYSTECTOMY      HX HEENT      T & A    HX HYSTERECTOMY      total for fibroid tumors    HX ORTHOPAEDIC      lumbar laminectomy then fusion/neurostimulator implanted - inactive now but still in    HX ORTHOPAEDIC      REMOVAL OF NEUROSTIMULATOR    HX OTHER SURGICAL      EGD x 2 with dilation/colonoscopy - no polyps per pt    IR KYPHOPLASTY LUMBAR  6/8/2020    TILT TABLE EVALUATION  8/2/2016          Family History   Problem Relation Age of Onset    Colon Cancer Mother     Cancer Mother 68        colon    Heart Disease Father     Heart Disease Maternal Grandmother     Heart Disease Maternal Grandfather     Heart Disease Other      Social History     Tobacco Use    Smoking status: Never Smoker    Smokeless tobacco: Never Used   Substance Use Topics    Alcohol use: No     Comment: 1 per month      Review of Systems: All other review of systems otherwise negative. Constitutional: Negative for fever, chills, weight loss . + dizziness. HEENT: Negative for nosebleeds, vision changes. Respiratory: Negative for cough, hemoptysis, sputum production, and wheezing. Cardiovascular: No palpitations, orthopnea, claudication, leg swelling, and PND. + KOTHARI, lightheadedness  Gastrointestinal:  No vomiting, blood in stool and melena. + constant nausea  Genitourinary: Negative for dysuria, and hematuria. Musculoskeletal: + for myalgias, sciatica   Skin: Negative for rash. Heme: Does not bleed or bruise easily. Neurological: Negative for speech change. + tremor, intermittent leg weakness. Psych: Dementia, anxiety. Objective:     Visit Vitals  /68 (BP 1 Location: Right arm, BP Patient Position: Sitting, BP Cuff Size: Adult)   Pulse 62   Resp 16   Ht 5' 6\" (1.676 m)   Wt 152 lb (68.9 kg)   SpO2 100%   BMI 24.53 kg/m²        Physical Exam:   Constitutional: Well-developed and well-nourished. No distress. Head: Normocephalic and atraumatic.    Eyes: Pupils are equal, round. Neck: Supple. No JVD present. Cardiovascular: Normal rate, regular rhythm. Exam reveals no gallop and no friction rub. 2/6 systolic murmur heard. Pulmonary/Chest: Effort normal and breath sounds normal. No wheezes. Abdominal: Distended but soft. No tenderness. BS x 4 quadrants. Musculoskeletal: Moves extremities independently. Ambulates with walker. Vasc/lymphatic: No edema. Neurological: Alert, oriented. Resting tremor bilat upper extremities. Skin: Skin is warm and dry. Psychiatric: Normal mood and flat affect. Anxious. Judgment and thought content normal.      Assessment/Plan:     Imaging/Studies:  Nuclear stress (12/12/2019): LVEF 77%. Echo (12/12/2019): LVEF 51-55%, no RWMA, mild concentric LVH. Mildly dilated LA. Mild to mod MR. Mild TR, mild PH. CTA chest (06/09/2016): Normal, no PE. ICD-10-CM ICD-9-CM    1. Parkinsonian syndrome associated with symptomatic orthostatic hypotension (HCC)  G20 333.0 droxidopa (Northera) 300 mg cap    I95.1  ECHO ADULT COMPLETE   2. Nonrheumatic mitral valve regurgitation  I34.0 424.0 ECHO ADULT COMPLETE   3. Supine hypertension  I10 401.9         Orthostatic hypotension: R/t Parkinson's disease. Facility has been holding Florinef when SBP is >140 mmHg. Currently doing well on Northera 600 mg po bid, but only has 30 pills remaining & [facility] is no longer offering assistance. Apparently available as generic, but still very expensive & only offered through a limited number of pharmacies. She will taper off her current Northera. Her daughter would like to trial her on just the Florinef afterward. Should she have a problem, would then consider midodrine. Daughter will contact [employer] for [employer] to inquire regarding any other possible assistance. Supine HTN: Continue metoprolol 25 mg po bid, avoid medications like hydralazine, which may exacerbate orthostatic hypotension. OK to hold Florinef if SBP >140.      MR: Mild to moderate in 2019. Plan to repeat echo at next visit. Follow up with Dr. Eden Perea in 6 months, sooner for new/worsening issues. Future Appointments   Date Time Provider Reji Smith   8/23/2022  1:00 PM Matthew HERNADEZ   8/23/2022  2:00 PM MD DOMINIC Irwin AMB      Thank you for involving me in this patient's care and please call with further concerns or questions.     TAMY Parish  [facility] [place]  02/25/22

## 2022-02-25 ENCOUNTER — OFFICE VISIT (OUTPATIENT)
Dept: CARDIOLOGY CLINIC | Age: 77
End: 2022-02-25
Payer: MEDICARE

## 2022-02-25 VITALS
BODY MASS INDEX: 24.43 KG/M2 | OXYGEN SATURATION: 100 % | HEIGHT: 66 IN | RESPIRATION RATE: 16 BRPM | WEIGHT: 152 LBS | DIASTOLIC BLOOD PRESSURE: 68 MMHG | HEART RATE: 62 BPM | SYSTOLIC BLOOD PRESSURE: 110 MMHG

## 2022-02-25 DIAGNOSIS — I34.0 NONRHEUMATIC MITRAL VALVE REGURGITATION: ICD-10-CM

## 2022-02-25 DIAGNOSIS — I95.1 PARKINSONIAN SYNDROME ASSOCIATED WITH SYMPTOMATIC ORTHOSTATIC HYPOTENSION (HCC): Primary | ICD-10-CM

## 2022-02-25 DIAGNOSIS — I10 SUPINE HYPERTENSION: ICD-10-CM

## 2022-02-25 DIAGNOSIS — G20 PARKINSONIAN SYNDROME ASSOCIATED WITH SYMPTOMATIC ORTHOSTATIC HYPOTENSION (HCC): Primary | ICD-10-CM

## 2022-02-25 PROCEDURE — 1101F PT FALLS ASSESS-DOCD LE1/YR: CPT | Performed by: NURSE PRACTITIONER

## 2022-02-25 PROCEDURE — G8427 DOCREV CUR MEDS BY ELIG CLIN: HCPCS | Performed by: NURSE PRACTITIONER

## 2022-02-25 PROCEDURE — G8420 CALC BMI NORM PARAMETERS: HCPCS | Performed by: NURSE PRACTITIONER

## 2022-02-25 PROCEDURE — G0463 HOSPITAL OUTPT CLINIC VISIT: HCPCS | Performed by: NURSE PRACTITIONER

## 2022-02-25 PROCEDURE — 99214 OFFICE O/P EST MOD 30 MIN: CPT | Performed by: NURSE PRACTITIONER

## 2022-02-25 PROCEDURE — G9717 DOC PT DX DEP/BP F/U NT REQ: HCPCS | Performed by: NURSE PRACTITIONER

## 2022-02-25 PROCEDURE — 1090F PRES/ABSN URINE INCON ASSESS: CPT | Performed by: NURSE PRACTITIONER

## 2022-02-25 PROCEDURE — G8752 SYS BP LESS 140: HCPCS | Performed by: NURSE PRACTITIONER

## 2022-02-25 PROCEDURE — G8754 DIAS BP LESS 90: HCPCS | Performed by: NURSE PRACTITIONER

## 2022-02-25 PROCEDURE — G8399 PT W/DXA RESULTS DOCUMENT: HCPCS | Performed by: NURSE PRACTITIONER

## 2022-02-25 PROCEDURE — G8536 NO DOC ELDER MAL SCRN: HCPCS | Performed by: NURSE PRACTITIONER

## 2022-02-25 RX ORDER — ASPIRIN 81 MG/1
81 TABLET ORAL DAILY
COMMUNITY

## 2022-02-25 RX ORDER — PANTOPRAZOLE SODIUM 40 MG/1
40 TABLET, DELAYED RELEASE ORAL DAILY
COMMUNITY
Start: 2022-01-26

## 2022-02-25 RX ORDER — ONDANSETRON 4 MG/1
4 TABLET, FILM COATED ORAL AS NEEDED
COMMUNITY

## 2022-02-25 RX ORDER — DROXIDOPA 300 MG/1
CAPSULE ORAL
Qty: 28 CAPSULE | Refills: 0 | Status: SHIPPED | OUTPATIENT
Start: 2022-02-25 | End: 2022-03-18

## 2022-02-25 RX ORDER — POLYETHYLENE GLYCOL 3350 17 G/17G
17 POWDER, FOR SOLUTION ORAL DAILY
COMMUNITY

## 2022-02-25 RX ORDER — CALCIUM CARBONATE 200(500)MG
1 TABLET,CHEWABLE ORAL 3 TIMES DAILY
COMMUNITY

## 2022-02-25 RX ORDER — SENNOSIDES 8.6 MG/1
2 TABLET ORAL
COMMUNITY

## 2022-03-19 PROBLEM — E11.21 TYPE 2 DIABETES WITH NEPHROPATHY (HCC): Status: ACTIVE | Noted: 2018-07-26

## 2022-03-19 PROBLEM — R55 SYNCOPE: Status: ACTIVE | Noted: 2018-05-28

## 2022-03-19 PROBLEM — N39.0 UTI (URINARY TRACT INFECTION): Status: ACTIVE | Noted: 2020-03-03

## 2022-03-19 PROBLEM — S32.010A COMPRESSION FRACTURE OF L1 LUMBAR VERTEBRA (HCC): Status: ACTIVE | Noted: 2020-06-04

## 2022-03-19 PROBLEM — E11.40 TYPE 2 DIABETES MELLITUS WITH DIABETIC NEUROPATHY (HCC): Status: ACTIVE | Noted: 2019-09-26

## 2022-03-19 PROBLEM — R07.9 CHEST PAIN: Status: ACTIVE | Noted: 2019-12-11

## 2022-03-20 PROBLEM — R41.82 AMS (ALTERED MENTAL STATUS): Status: ACTIVE | Noted: 2020-03-03

## 2022-07-13 ENCOUNTER — TELEPHONE (OUTPATIENT)
Dept: CARDIOLOGY CLINIC | Age: 77
End: 2022-07-13

## 2022-07-13 NOTE — TELEPHONE ENCOUNTER
7/13/2022 at 3:26 left message on mobile voicemail to advise that appointments on 8/23/2022 remain scheduled but the appointment with Dr. Archana Ruiz is now with Marja Gilford., NP due to Dr. Margie Anna schedule changes - advised that if this change does not work they may call back

## 2022-09-25 NOTE — PROGRESS NOTES
Cardiac Electrophysiology Office Note     Subjective:      Alexandre Marlow is a 65 y.o. female patient who presents for follow up, has long-standing history of dizziness & near syncope. Previously prescribed Florinef & Northera for orthostatic hypotension r/t Parkinson's disease. In 2021, BP significantly elevated. Started metoprolol 25 mg po bid. Her facility staff has been holding Florinef when SBP is >140. No longer on Northera due to cost.    Continues with intermittent lightheadedness, KOTHARI. No syncope. Still able to ambulate with walker. Echo pending today. BP controlled, normal.      Previous:  Hypercoagulable eval; d dimer performed. No longer taking Xarelto. Tilt table test 08/02/2016. Orthostatic hypotension noted. Lewy body dementia. Saw Dr. Randal Tejeda. Daughter planning liver transplant.      Patient Active Problem List    Diagnosis Date Noted    Compression fracture of L1 lumbar vertebra ([facility] [address] 06/04/2020    UTI (urinary tract infection) 03/03/2020    AMS (altered mental status) 03/03/2020    Chest pain 12/11/2019    Type 2 diabetes mellitus with diabetic neuropathy ([facility] [address] 09/26/2019    Type 2 diabetes with nephropathy ([facility] [address] 07/26/2018    Syncope 05/28/2018    Spinal stenosis     Near syncope 08/02/2016    Resting tremor 07/29/2016    Orthostatic hypotension 07/05/2016    Advance directive on file 05/24/2016    Mixed hyperlipidemia 02/02/2016    Tubulovillous adenoma 08/21/2012    Abnormal mammogram 06/04/2012    Encounter for long-term (current) use of other medications 11/30/2011    Rupertoertoe 09/12/2011    Allergic rhinitis, cause unspecified 01/26/2011    Other diseases of nasal cavity and sinuses(478.19) 07/19/2010    IBS (irritable bowel syndrome)     RLS (restless legs syndrome)     Bipolar disorder (HCC)     Fibromyalgia     DJD (degenerative joint disease)     Lumbar disc disease     Paget's disease of bone     Migraine     Genital herpes      Current Outpatient Medications   Medication Sig Dispense Refill    pantoprazole (PROTONIX) 40 mg tablet Take 40 mg by mouth daily. calcium carbonate (TUMS) 200 mg calcium (500 mg) chew Take 1 Tablet by mouth three (3) times daily. aspirin delayed-release 81 mg tablet Take 81 mg by mouth daily. polyethylene glycol (MIRALAX) 17 gram packet Take 17 g by mouth daily. senna (SENOKOT) 8.6 mg tablet Take 2 Tablets by mouth nightly. neomycin-bacitracnZn-polymyxnB (NEOSPORIN) 3.5-400-5,000 mg-unit-unit oipk ointment Apply 1 Packet to affected area daily as needed (for skin tears). ondansetron hcl (ZOFRAN) 4 mg tablet Take 4 mg by mouth as needed for Nausea or Vomiting (nausea). petrolatum, white-lanolin oint by Apply Externally route three (3) times daily. Apply to rectum day, evening, night, PRN for irritation. DULoxetine (CYMBALTA) 30 mg capsule Take 30 mg by mouth daily. famotidine (PEPCID) 20 mg tablet Take 20 mg by mouth daily. LORazepam (ATIVAN) 0.5 mg tablet Take 0.5 mg by mouth daily. mirtazapine (REMERON) 15 mg tablet Take 15 mg by mouth nightly. metoprolol tartrate (LOPRESSOR) 25 mg tablet Take 1 Tablet by mouth two (2) times a day. 180 Tablet 3    potassium chloride (K-DUR, KLOR-CON) 20 mEq tablet TAKE 1 TABLET BY MOUTH DAILY 90 Tab 1    carbidopa-levodopa (SINEMET)  mg per tablet Take 1 Tab by mouth three (3) times daily. FLUoxetine (PROzac) 20 mg capsule Take 40 mg by mouth daily. atorvastatin (LIPITOR) 20 mg tablet TAKE 1 TABLET BY MOUTH EVERY EVENING 90 Tab 0    phenazopyridine (PYRIDIUM) 100 mg tablet Take 1 Tab by mouth three (3) times daily as needed for Pain.  Indications: urinary tract irritation 10 Tab 0    fludrocortisone (FLORINEF) 0.1 mg tablet TAKE 1 TABLET BY MOUTH TWICE DAILY 180 Tab 1    sucralfate (CARAFATE) 1 gram tablet TAKE 1 TABLET BY MOUTH FOUR TIMES DAILY 360 Tab 3    gabapentin (NEURONTIN) 300 mg capsule 600mg with breakfast, 300 mg with  lunch, 600mg at dinner- new directions  Indications: Neuropathic Pain (Patient taking differently: Take 200 mg by mouth three (3) times daily. Indications: neuropathic pain) 150 Cap 5    donepezil (ARICEPT) 5 mg tablet Take 5 mg by mouth nightly. acetaminophen (TYLENOL) 325 mg tablet Take 325 mg by mouth every four (4) hours as needed for Pain. QUEtiapine (SEROQUEL) 25 mg tablet Take 50 mg by mouth nightly.  Take 2 tabs qhs prn Julio Vizcaino NP/Dr Volodymyr Phelan       Allergies   Allergen Reactions    Adhesive Itching    Hydrocodone Itching    Lorazepam Other (comments)    Other Medication Rash     Tide detergent    Percocet [Oxycodone-Acetaminophen] Itching    Sudafed [Pseudoephedrine Hcl] Other (comments)     Vertigo    Wellbutrin [Bupropion Hcl] Nausea and Vomiting    Zithromax [Azithromycin] Vertigo    Zyrtec [Cetirizine] Nausea Only     Past Medical History:   Diagnosis Date    Bipolar disorder (HCC)     Nazmy    Chronic pain     BACK PAIN    Diabetes ([facility] [address]     BORDERLINE TX WITH DIET AND EXERCISE    DJD (degenerative joint disease)     Fibromyalgia     Genital herpes     GERD (gastroesophageal reflux disease)     Hypercholesterolemia     IBS (irritable bowel syndrome)     Ill-defined condition     fibromyligia    Lumbar disc disease     stimulation-Honza    Migraine     Nausea & vomiting     Other ill-defined conditions(799.89)     SEASONAL allergies    Other ill-defined conditions(799.89)     dysphagia has had esophagus dilated    Paget's disease     Parkinson disease (HCC)     Pulmonary embolism (HCC)     RLS (restless legs syndrome)     Spinal stenosis      Past Surgical History:   Procedure Laterality Date    COLONOSCOPY N/A 6/27/2016    COLONOSCOPY performed by Steve Chavez MD at [facility] ENDOSCOPY    COLONOSCOPY N/A 2/22/2019    COLONOSCOPY performed by Yesi Cunha MD at [facility], COLON, DIAGNOSTIC      12, due 15    HX APPENDECTOMY      HX BACK SURGERY  9/17/2013    back surgery - total of 3 as of 12/20/2013    HX BREAST BIOPSY Right years ago    negative    HX CHOLECYSTECTOMY      HX HEENT      T & A    HX HYSTERECTOMY      total for fibroid tumors    HX ORTHOPAEDIC      lumbar laminectomy then fusion/neurostimulator implanted - inactive now but still in    HX ORTHOPAEDIC      REMOVAL OF NEUROSTIMULATOR    HX OTHER SURGICAL      EGD x 2 with dilation/colonoscopy - no polyps per pt    IR KYPHOPLASTY LUMBAR  6/8/2020    TILT TABLE EVALUATION  8/2/2016          Family History   Problem Relation Age of Onset    Colon Cancer Mother     Cancer Mother 68        colon    Heart Disease Father     Heart Disease Maternal Grandmother     Heart Disease Maternal Grandfather     Heart Disease Other      Social History     Tobacco Use    Smoking status: Never    Smokeless tobacco: Never   Substance Use Topics    Alcohol use: No     Comment: 1 per month      Review of Systems: All other review of systems otherwise negative. Constitutional: Negative for fever, chills, weight loss . + dizziness. HEENT: Negative for nosebleeds, vision changes. Respiratory: Negative for cough, hemoptysis, sputum production, and wheezing. Cardiovascular: No palpitations, orthopnea, claudication, leg swelling, and PND. + KOTHARI, lightheadedness  Gastrointestinal:  No vomiting, blood in stool and melena. + constant nausea  Genitourinary: Negative for dysuria, and hematuria. Musculoskeletal: + for myalgias, sciatica   Skin: Negative for rash. Heme: Does not bleed or bruise easily. Neurological: Negative for speech change. + tremor, intermittent leg weakness. Psych: Dementia, anxiety. Objective:     Visit Vitals  /86 (BP 1 Location: Left upper arm, BP Patient Position: Sitting, BP Cuff Size: Adult)   Pulse 88   Resp 16   Ht 5' 6\" (1.676 m)   Wt 146 lb (66.2 kg)   SpO2 97%   BMI 23.57 kg/m²          Physical Exam:   Constitutional: Well-developed and well-nourished. No distress.    Head: Normocephalic and atraumatic. Eyes: Pupils are equal, round. Neck: Supple. No JVD present. Cardiovascular: Normal rate, regular rhythm. Exam reveals no gallop and no friction rub. 2/6 systolic murmur heard. Pulmonary/Chest: Effort normal and breath sounds normal. No wheezes. Abdominal: Distended but soft. No tenderness. Musculoskeletal: Moves extremities independently. Ambulates with walker. Vasc/lymphatic: No edema. Neurological: Alert, oriented. Resting tremor bilat upper extremities. Skin: Skin is warm and dry. Psychiatric: Normal mood and flat affect. Anxious. Judgment and thought content normal.      Assessment/Plan:     Imaging/Studies:  Nuclear stress (12/12/2019): LVEF 77%. Echo (12/12/2019): LVEF 51-55%, no RWMA, mild concentric LVH. Mildly dilated LA. Mild to mod MR. Mild TR, mild PH. CTA chest (06/09/2016): Normal, no PE. ICD-10-CM ICD-9-CM    1. Orthostatic hypotension  I95.1 458.0       2. Nonrheumatic mitral valve regurgitation  I34.0 424.0       3. Supine hypertension  I10 401.9       4. Parkinson's disease ([facility] [address]  G20 332.0            Orthostatic hypotension: R/t Parkinson's disease. Facility has been holding Florinef when SBP is >140 mmHg. Off Northera due to cost.  Apparently available as generic, but still very expensive & only offered through a limited number of pharmacies. Supine HTN: Continue metoprolol 25 mg po bid, avoid medications like hydralazine, which may exacerbate orthostatic hypotension. OK to hold Florinef if SBP >140. MR: Mild to moderate in 2019. Repeat echo pending today. Follow up with Dr. Rose Pascual in 1 year, sooner for new/worsening issues. No future appointments. Thank you for involving me in this patient's care and please call with further concerns or questions.     TAMY Swanson  [facility] [place]  09/27/22

## 2022-09-27 ENCOUNTER — ANCILLARY PROCEDURE (OUTPATIENT)
Dept: CARDIOLOGY CLINIC | Age: 77
End: 2022-09-27
Payer: MEDICARE

## 2022-09-27 ENCOUNTER — OFFICE VISIT (OUTPATIENT)
Dept: CARDIOLOGY CLINIC | Age: 77
End: 2022-09-27

## 2022-09-27 VITALS
WEIGHT: 146 LBS | BODY MASS INDEX: 23.46 KG/M2 | DIASTOLIC BLOOD PRESSURE: 68 MMHG | SYSTOLIC BLOOD PRESSURE: 110 MMHG | HEIGHT: 66 IN

## 2022-09-27 VITALS
RESPIRATION RATE: 16 BRPM | SYSTOLIC BLOOD PRESSURE: 136 MMHG | DIASTOLIC BLOOD PRESSURE: 86 MMHG | HEIGHT: 66 IN | BODY MASS INDEX: 23.46 KG/M2 | OXYGEN SATURATION: 97 % | WEIGHT: 146 LBS | HEART RATE: 88 BPM

## 2022-09-27 DIAGNOSIS — I95.1 ORTHOSTATIC HYPOTENSION: Primary | ICD-10-CM

## 2022-09-27 DIAGNOSIS — I34.0 NONRHEUMATIC MITRAL VALVE REGURGITATION: ICD-10-CM

## 2022-09-27 DIAGNOSIS — I10 SUPINE HYPERTENSION: ICD-10-CM

## 2022-09-27 DIAGNOSIS — G20 PARKINSON'S DISEASE (HCC): ICD-10-CM

## 2022-09-27 PROCEDURE — G8754 DIAS BP LESS 90: HCPCS | Performed by: NURSE PRACTITIONER

## 2022-09-27 PROCEDURE — 99214 OFFICE O/P EST MOD 30 MIN: CPT | Performed by: NURSE PRACTITIONER

## 2022-09-27 PROCEDURE — 1101F PT FALLS ASSESS-DOCD LE1/YR: CPT | Performed by: NURSE PRACTITIONER

## 2022-09-27 PROCEDURE — G9717 DOC PT DX DEP/BP F/U NT REQ: HCPCS | Performed by: NURSE PRACTITIONER

## 2022-09-27 PROCEDURE — G8427 DOCREV CUR MEDS BY ELIG CLIN: HCPCS | Performed by: NURSE PRACTITIONER

## 2022-09-27 PROCEDURE — G8536 NO DOC ELDER MAL SCRN: HCPCS | Performed by: NURSE PRACTITIONER

## 2022-09-27 PROCEDURE — G8399 PT W/DXA RESULTS DOCUMENT: HCPCS | Performed by: NURSE PRACTITIONER

## 2022-09-27 PROCEDURE — 1090F PRES/ABSN URINE INCON ASSESS: CPT | Performed by: NURSE PRACTITIONER

## 2022-09-27 PROCEDURE — G8752 SYS BP LESS 140: HCPCS | Performed by: NURSE PRACTITIONER

## 2022-09-27 PROCEDURE — 1123F ACP DISCUSS/DSCN MKR DOCD: CPT | Performed by: NURSE PRACTITIONER

## 2022-09-27 PROCEDURE — G8420 CALC BMI NORM PARAMETERS: HCPCS | Performed by: NURSE PRACTITIONER

## 2022-09-29 LAB
ECHO AO ASC DIAM: 3.2 CM
ECHO AO ASCENDING AORTA INDEX: 1.83 CM/M2
ECHO AO ROOT DIAM: 3.2 CM
ECHO AO ROOT INDEX: 1.83 CM/M2
ECHO AV PEAK GRADIENT: 4 MMHG
ECHO AV PEAK VELOCITY: 1 M/S
ECHO AV VELOCITY RATIO: 0.9
ECHO EST RA PRESSURE: 3 MMHG
ECHO LA DIAMETER INDEX: 1.71 CM/M2
ECHO LA DIAMETER: 3 CM
ECHO LA TO AORTIC ROOT RATIO: 0.94
ECHO LA VOL 2C: 34 ML (ref 22–52)
ECHO LA VOL 4C: 20 ML (ref 22–52)
ECHO LA VOL BP: 29 ML (ref 22–52)
ECHO LA VOL/BSA BIPLANE: 17 ML/M2 (ref 16–34)
ECHO LA VOLUME AREA LENGTH: 31 ML
ECHO LA VOLUME INDEX A2C: 19 ML/M2 (ref 16–34)
ECHO LA VOLUME INDEX A4C: 11 ML/M2 (ref 16–34)
ECHO LA VOLUME INDEX AREA LENGTH: 18 ML/M2 (ref 16–34)
ECHO LV E' LATERAL VELOCITY: 6 CM/S
ECHO LV E' SEPTAL VELOCITY: 6 CM/S
ECHO LV FRACTIONAL SHORTENING: 38 % (ref 28–44)
ECHO LV INTERNAL DIMENSION DIASTOLE INDEX: 2.23 CM/M2
ECHO LV INTERNAL DIMENSION DIASTOLIC: 3.9 CM (ref 3.9–5.3)
ECHO LV INTERNAL DIMENSION SYSTOLIC INDEX: 1.37 CM/M2
ECHO LV INTERNAL DIMENSION SYSTOLIC: 2.4 CM
ECHO LV ISOVOLUMETRIC RELAXATION TIME (IVRT): 118 MS
ECHO LV IVSD: 1.5 CM (ref 0.6–0.9)
ECHO LV MASS 2D: 201.5 G (ref 67–162)
ECHO LV MASS INDEX 2D: 115.1 G/M2 (ref 43–95)
ECHO LV POSTERIOR WALL DIASTOLIC: 1.3 CM (ref 0.6–0.9)
ECHO LV RELATIVE WALL THICKNESS RATIO: 0.67
ECHO LVOT PEAK GRADIENT: 3 MMHG
ECHO LVOT PEAK VELOCITY: 0.9 M/S
ECHO MV "A" WAVE DURATION: 178.9 MSEC
ECHO MV A VELOCITY: 1.09 M/S
ECHO MV E DECELERATION TIME (DT): 349 MS
ECHO MV E VELOCITY: 0.58 M/S
ECHO MV E/A RATIO: 0.53
ECHO MV E/E' LATERAL: 9.67
ECHO MV E/E' RATIO (AVERAGED): 9.67
ECHO MV E/E' SEPTAL: 9.67
ECHO RIGHT VENTRICULAR SYSTOLIC PRESSURE (RVSP): 11 MMHG
ECHO RV FREE WALL PEAK S': 17 CM/S
ECHO RV TAPSE: 2 CM (ref 1.7–?)
ECHO TV REGURGITANT MAX VELOCITY: 1.44 M/S
ECHO TV REGURGITANT PEAK GRADIENT: 8 MMHG

## 2022-09-29 PROCEDURE — 93306 TTE W/DOPPLER COMPLETE: CPT | Performed by: INTERNAL MEDICINE

## 2022-09-30 ENCOUNTER — TELEPHONE (OUTPATIENT)
Dept: CARDIOLOGY CLINIC | Age: 77
End: 2022-09-30

## 2022-09-30 NOTE — TELEPHONE ENCOUNTER
----- Message from Shi Meyer NP sent at 9/30/2022  3:06 PM EDT -----  LVEF 55-60%. No change in treatment plan based on results. Future Appointments  10/5/2023  9:40 AM    MD DOMINIC Naik AMB    Attempted to reach patient by telephone. Phone nyumber listed is for her daughter Angie Pisano (emergency contact). Identifies self on voicemail. Message left notifying of above results. Asked for a return call if there are any questions.

## 2022-09-30 NOTE — PROGRESS NOTES
LVEF 55-60%. No change in treatment plan based on results.     Future Appointments  10/5/2023  9:40 AM    MD DOMINIC Correa AMB

## 2023-03-26 ENCOUNTER — APPOINTMENT (OUTPATIENT)
Dept: GENERAL RADIOLOGY | Age: 78
End: 2023-03-26
Payer: MEDICARE

## 2023-03-26 ENCOUNTER — HOSPITAL ENCOUNTER (EMERGENCY)
Age: 78
Discharge: HOME | End: 2023-03-26
Attending: STUDENT IN AN ORGANIZED HEALTH CARE EDUCATION/TRAINING PROGRAM
Payer: MEDICARE

## 2023-03-26 ENCOUNTER — APPOINTMENT (OUTPATIENT)
Dept: CT IMAGING | Age: 78
End: 2023-03-26
Payer: MEDICARE

## 2023-03-26 VITALS
SYSTOLIC BLOOD PRESSURE: 135 MMHG | OXYGEN SATURATION: 96 % | TEMPERATURE: 96.8 F | HEIGHT: 66 IN | BODY MASS INDEX: 20.76 KG/M2 | WEIGHT: 129.19 LBS | DIASTOLIC BLOOD PRESSURE: 82 MMHG | RESPIRATION RATE: 16 BRPM | HEART RATE: 64 BPM

## 2023-03-26 DIAGNOSIS — S80.02XA CONTUSION OF LEFT KNEE, INITIAL ENCOUNTER: ICD-10-CM

## 2023-03-26 DIAGNOSIS — S70.02XA CONTUSION OF LEFT HIP, INITIAL ENCOUNTER: ICD-10-CM

## 2023-03-26 DIAGNOSIS — S09.90XA CLOSED HEAD INJURY, INITIAL ENCOUNTER: Primary | ICD-10-CM

## 2023-03-26 LAB
GLUCOSE BLD STRIP.AUTO-MCNC: 148 MG/DL (ref 65–117)
SERVICE CMNT-IMP: ABNORMAL

## 2023-03-26 PROCEDURE — 73562 X-RAY EXAM OF KNEE 3: CPT

## 2023-03-26 PROCEDURE — 73502 X-RAY EXAM HIP UNI 2-3 VIEWS: CPT

## 2023-03-26 PROCEDURE — 70450 CT HEAD/BRAIN W/O DYE: CPT

## 2023-03-26 PROCEDURE — 82962 GLUCOSE BLOOD TEST: CPT

## 2023-03-26 PROCEDURE — 99284 EMERGENCY DEPT VISIT MOD MDM: CPT

## 2023-03-26 PROCEDURE — 72125 CT NECK SPINE W/O DYE: CPT

## 2023-03-26 NOTE — ED PROVIDER NOTES
The history is provided by a relative. The history is limited by the condition of the patient. Fall  Associated symptoms include headaches. Pertinent negatives include no fever, no abdominal pain, no nausea and no vomiting. Head Injury   Associated symptoms include headaches. Pertinent negatives include no chest pain, no abdominal pain, no nausea, no vomiting, no light-headedness and no cough. Hip Injury      Win Eden is a 77 y.o. female with Hx of dementia, chronic pain, diabetes, fibromyalgia, Parkinson who presents ambulatory with daughter to short [facility] ED with cc of ground-level fall 4 hours ago. Daughter reports nursing home said patient was standing at the nurses station. Nurse turned around to do something and when she turned back, patient was sliding down the wall onto the floor. Patient does not remember falling. Reports headache, left hip pain, left leg pain. Daughter reports increased and disorientation compared to baseline. Daughter also notes that patient's Parkinson tremor has completely resolved since the fall. Denies fever, chills, loss of consciousness, blood thinners, blurry vision, weakness, fatigue, any other concerns. No medications PTA. PCP: Kallie Fay MD    There are no other complaints, changes or physical findings at this time.     Past Medical History:   Diagnosis Date    Bipolar disorder ([address]     Nazmy    Chronic pain     BACK PAIN    Diabetes ([address]     BORDERLINE TX WITH DIET AND EXERCISE    DJD (degenerative joint disease)     Fibromyalgia     Genital herpes     GERD (gastroesophageal reflux disease)     Hypercholesterolemia     IBS (irritable bowel syndrome)     Ill-defined condition     fibromyligia    Lumbar disc disease     stimulation-Honza    Migraine     Nausea & vomiting     Other ill-defined conditions(799.89)     SEASONAL allergies    Other ill-defined conditions(799.89)     dysphagia has had esophagus dilated    Paget's disease     Parkinson disease ([facility] [address]     Pulmonary embolism (HCC)     RLS (restless legs syndrome)     Spinal stenosis        Past Surgical History:   Procedure Laterality Date    COLONOSCOPY N/A 6/27/2016    COLONOSCOPY performed by Germania Mcdonnell MD at [facility] ENDOSCOPY    COLONOSCOPY N/A 2/22/2019    COLONOSCOPY performed by Adriana Wagner MD at [facility] ENDOSCOPY    ENDOSCOPY, COLON, DIAGNOSTIC      12, due 15    HX APPENDECTOMY      HX BACK SURGERY  9/17/2013    back surgery - total of 3 as of 12/20/2013    HX BREAST BIOPSY Right years ago    negative    HX CHOLECYSTECTOMY      HX HEENT      T & A    HX HYSTERECTOMY      total for fibroid tumors    HX ORTHOPAEDIC      lumbar laminectomy then fusion/neurostimulator implanted - inactive now but still in    HX ORTHOPAEDIC      REMOVAL OF NEUROSTIMULATOR    HX OTHER SURGICAL      EGD x 2 with dilation/colonoscopy - no polyps per pt    IR KYPHOPLASTY LUMBAR  6/8/2020    TILT TABLE EVALUATION  8/2/2016              Family History:   Problem Relation Age of Onset    Colon Cancer Mother     Cancer Mother 68        colon    Heart Disease Father     Heart Disease Maternal Grandmother     Heart Disease Maternal Grandfather     Heart Disease Other        Social History     Socioeconomic History    Marital status:      Spouse name: Not on file    Number of children: Not on file    Years of education: Not on file    Highest education level: Not on file   Occupational History    Occupation: volunteer     Employer: RETIRED     Comment: [employer]   Tobacco Use    Smoking status: Never     Passive exposure: Never    Smokeless tobacco: Never   Vaping Use    Vaping Use: Never used   Substance and Sexual Activity    Alcohol use: No     Comment: 1 per month    Drug use: No    Sexual activity: Not Currently   Other Topics Concern    Not on file   Social History Narrative    Not on file     Social Determinants of Health     Financial Resource Strain: Not on file   Food Insecurity: Not on file Transportation Needs: Not on file   Physical Activity: Not on file   Stress: Not on file   Social Connections: Not on file   Intimate Partner Violence: Not on file   Housing Stability: Not on file         ALLERGIES: Adhesive, Hydrocodone, Lorazepam, Other medication, Percocet [oxycodone-acetaminophen], Sudafed [pseudoephedrine hcl], Wellbutrin [bupropion hcl], Zithromax [azithromycin], and Zyrtec [cetirizine]    Review of Systems   Constitutional:  Negative for activity change, appetite change, chills and fever. HENT:  Negative for congestion, rhinorrhea and sore throat. Respiratory:  Negative for cough and shortness of breath. Cardiovascular:  Negative for chest pain. Gastrointestinal:  Negative for abdominal pain, nausea and vomiting. Genitourinary:  Negative for decreased urine volume and difficulty urinating. Musculoskeletal:  Positive for arthralgias and myalgias. Skin:  Negative for color change and rash. Neurological:  Positive for headaches. Negative for light-headedness. Psychiatric/Behavioral:  Positive for confusion. Negative for agitation. The patient is not nervous/anxious. Vitals:    03/26/23 1549 03/26/23 1751   BP: 120/75 135/82   Pulse: 70 64   Resp: 16 16   Temp: 97 °F (36.1 °C) 96.8 °F (36 °C)   SpO2: 99% 96%   Weight: 58.6 kg (129 lb 3 oz)    Height: 5' 6\" (1.676 m)             Physical Exam  Vitals and nursing note reviewed. Constitutional:       Appearance: Normal appearance. HENT:      Head: Normocephalic and atraumatic. Comments: No step-offs or deformities     Right Ear: External ear normal.      Left Ear: External ear normal.      Nose: Nose normal.      Mouth/Throat:      Mouth: Mucous membranes are moist.   Eyes:      Extraocular Movements: Extraocular movements intact. Conjunctiva/sclera: Conjunctivae normal.      Pupils: Pupils are equal, round, and reactive to light. Cardiovascular:      Rate and Rhythm: Normal rate and regular rhythm. Pulses: Normal pulses. Heart sounds: Normal heart sounds. Pulmonary:      Effort: Pulmonary effort is normal.      Breath sounds: Normal breath sounds. Abdominal:      Palpations: Abdomen is soft. Musculoskeletal:         General: Normal range of motion. Cervical back: Normal range of motion and neck supple. Comments: Mild tenderness of anterolateral left hip and left patella. Range of motion intact but limited by pain. Neurovascularly intact. No obvious deformities or skin issues noted. Patient able to ambulate. Skin:     General: Skin is warm and dry. Capillary Refill: Capillary refill takes less than 2 seconds. Neurological:      General: No focal deficit present. Mental Status: She is alert and oriented to person, place, and time. Mental status is at baseline. Cranial Nerves: No cranial nerve deficit. Sensory: No sensory deficit. Motor: No weakness. Coordination: Coordination normal.      Gait: Gait normal.   Psychiatric:         Mood and Affect: Mood normal.         Behavior: Behavior normal.         Thought Content: Thought content normal.         Judgment: Judgment normal.        Medical Decision Making  Ddx: Closed head injury, contusion, sprain, strain, fracture, and others    56-year-old female presents after ground-level fall 4 hours ago, unsure mechanism of fall, with headache, left hip pain, left knee pain. On exam, mild tenderness to anterolateral left hip and left patella; normal neuro exam.  Work-up included CT head/neck, x-ray left hip, x-ray left knee. X-ray hip remarkable for diffuse pagetoid changes of the right pelvic bone, possible Paget's disease (patient has history of Paget's). X-ray knee unremarkable. Discussed option for labs, EKG and urine but daughter declined as she believes this fall was mechanical and patient has no other complaints of dizziness, chest pain, shortness of breath, any other symptoms. . Patient and daughter declined pain medication at this time. Discharged with Motrin, Tylenol, PCP follow-up, strict return precautions. Amount and/or Complexity of Data Reviewed  Labs: ordered. Radiology: ordered. Procedures    LABORATORY TESTS:  Recent Results (from the past 12 hour(s))   GLUCOSE, POC    Collection Time: 03/26/23  3:53 PM   Result Value Ref Range    Glucose (POC) 148 (H) 65 - 117 mg/dL    Performed by Sarah Dave(PCT)        IMAGING RESULTS:  XR HIP LT W OR WO PELV 2-3 VWS   Final Result   No acute osseous abnormality or malalignment. Diffuse pagetoid changes of the right pelvic bone may suggest Paget's disease. XR KNEE LT 3 V   Final Result   No acute abnormality. CT HEAD WO CONT   Final Result   No acute intracranial process is identified. Moderate to severe chronic microvascular ischemic change and moderate cerebral   atrophy. Multilevel canal and foraminal stenoses of the cervical spine. Please see above for additional nonemergent incidental findings. There is no acute fracture or dislocation identified. CT SPINE CERV WO CONT   Final Result   No acute intracranial process is identified. Moderate to severe chronic microvascular ischemic change and moderate cerebral   atrophy. Multilevel canal and foraminal stenoses of the cervical spine. Please see above for additional nonemergent incidental findings. There is no acute fracture or dislocation identified. MEDICATIONS GIVEN:  Medications - No data to display    IMPRESSION:  1. Closed head injury, initial encounter    2. Contusion of left hip, initial encounter    3. Contusion of left knee, initial encounter        PLAN:  1. Discharge Medication List as of 3/26/2023  5:47 PM        2.    Follow-up Information       Follow up With Specialties Details Why Contact Info    [facility] Emergency Medicine Go to  As needed, If symptoms worsen [phone] [phone]  Darnell Tena [address]., MD Internal Medicine Physician Schedule an appointment as soon as possible for a visit             3. Return to ED if worse     Presentation, management, and disposition were discussed with the attending physician, Dr. Tiffany Ram, who is in agreement with plan of care.

## 2023-03-26 NOTE — ED TRIAGE NOTES
Patient arrives daughter with c/o falling around 12pm today and hitting the wall at the nurses station, staff turned around and patient was on the floor. Hx of dementia. No LOC, blood thinners, aspirin, diabetes, blurry vision, weakness, or fatigue. Patient reports headache, left hip pain and left leg pain. Nothing for pain given PTA. Daughter reports patient is more disoriented than her baseline.

## 2023-03-26 NOTE — DISCHARGE INSTRUCTIONS
As discussed, your work-up today was unremarkable for any acute changes. Follow up with your PCP for further management. Return to the ER if you experience severe or worsening symptoms. Your hip x-ray showed changes in your right hip consistent with possible Paget's disease. This is a nonemergent finding which you should address with your PCP at your next visit.

## 2023-03-26 NOTE — ED NOTES
Pt ambulatory out of ED with discharge instructions and prescriptions in hand given by Dr. Rodriguez Vallecillo and Luisa Rapp., PA; pt verbalized understanding of discharge paperwork and time allotted for questions. VSS. Pt alert and oriented. Pt accompanied by daughter.

## 2024-07-23 NOTE — PROGRESS NOTES
1705: Pt became very tachy with HR in 90s when resting to 130s with talking/activity. Levophed started for MAPs below 60 at 1454 and off at 1701. Lidocaine d/c at 1530. Pt asymptomatic. EKG obtained and showed sinus tachycardia with 1st degree AV block with freqeunt and consecutive PVCs. R bundle branch block. Current Vitals at rest are HR 90, Art BP 83/74 (78), RR 14, O2 is 98 on NC 1 L. Automatic BP is 112/85 (92).    1727: Dr. Werner contacted via [employer]    1733: No new orders at this time. Continue to monitor.      Treatment with macrobid appropriate based on C&S

## 2024-08-30 NOTE — PROGRESS NOTES
Contacted nurse supervisor at [facility] and she stated they didn't have the patient listed as a potential weekend discharge and is on the list for Monday. [facility] ([phone]) will accept patient on Monday. Patient/Caregiver provided printed discharge information.

## 2024-11-11 NOTE — TELEPHONE ENCOUNTER
[facility] for the medication Northea. Requests a call back at [phone]. Order number [id number]. Thanks!
Spoke to K[facility] and they stated that they needed the dose and strength for the script. Notified them that we have not sent a script in as of yet but that we are working on patient assistance for the patient. She withdrew the message from the system and told us to contact them if we needed a script filled.
Functional

## 2025-03-18 NOTE — PATIENT INSTRUCTIONS
Subjective   Patient ID: Tegan Flores is a 80 y.o. female who presents for Pessary Check (Patient here for pessary check-no c/o/Pt states urinary and bowels are good/Declined chaperone/).  HPI  Patient presents for pessary check.    Patient with a known history of uterine prolapse with cystorectocele.  Ring with web pessary in place.  Patient denies vaginal bleeding or discharge.  Denies any pelvic pain.  No issues with urination or bowel movements.  Review of Systems    Objective   Physical Exam  Pelvic: External genitalia atrophic, vagina pessary removed and cleaned speculum placed no vaginal lesions pessary replaced  Assessment/Plan   Normal pessary check, patient will follow-up in 3 months or as needed.         Hossein Hamm MD 03/18/25 9:21 AM    You will need to follow up in clinic with Darrell Diaz NP in 3 months. Stop taking hydralazine Stat taking Metoprolol 25 mg 2 times a day

## (undated) DEVICE — CATH IV AUTOGRD BC BLU 22GA 25 -- INSYTE

## (undated) DEVICE — NEEDLE HYPO 18GA L1.5IN PNK S STL HUB POLYPR SHLD REG BVL

## (undated) DEVICE — CONNECTOR TBNG AUX H2O JET DISP FOR OLY 160/180 SER

## (undated) DEVICE — ENDO CARRY-ON PROCEDURE KIT INCLUDES ENZYMATIC SPONGE, GAUZE, BIOHAZARD LABEL, TRAY, LUBRICANT, DIRTY SCOPE LABEL, WATER LABEL, TRAY, DRAWSTRING PAD, AND DEFENDO 4-PIECE KIT.: Brand: ENDO CARRY-ON PROCEDURE KIT

## (undated) DEVICE — SYRINGE MED 20ML STD CLR PLAS LUERLOCK TIP N CTRL DISP

## (undated) DEVICE — QUILTED PREMIUM COMFORT UNDERPAD,EXTRA HEAVY: Brand: WINGS

## (undated) DEVICE — BAG BELONG PT PERS CLEAR HANDL

## (undated) DEVICE — AIRLIFE™ U/CONNECT-IT OXYGEN TUBING 7 FEET (2.1 M) CRUSH-RESISTANT OXYGEN TUBING, VINYL TIPPED: Brand: AIRLIFE™

## (undated) DEVICE — Z DISCONTINUED NO SUB IDED SET EXTN W/ 4 W STPCOCK M SPIN LOK 36IN

## (undated) DEVICE — SET ADMIN 16ML TBNG L100IN 2 Y INJ SITE IV PIGGY BK DISP

## (undated) DEVICE — 1200 GUARD II KIT W/5MM TUBE W/O VAC TUBE: Brand: GUARDIAN

## (undated) DEVICE — SYRINGE 50ML E/T

## (undated) DEVICE — Device

## (undated) DEVICE — SOLIDIFIER FLUID 3000 CC ABSORB

## (undated) DEVICE — Z DISCONTINUED USE 2751540 TUBING IRRIG L10IN DISP PMP ENDOGATOR

## (undated) DEVICE — CATH REFLX PH Z IMPED 1CH 6.4F -- VERSAFLEX

## (undated) DEVICE — BW-412T DISP COMBO CLEANING BRUSH: Brand: SINGLE USE COMBINATION CLEANING BRUSH

## (undated) DEVICE — Device: Brand: MEDICAL ACTION INDUSTRIES

## (undated) DEVICE — KENDALL RADIOLUCENT FOAM MONITORING ELECTRODE -RECTANGULAR SHAPE: Brand: KENDALL

## (undated) DEVICE — NEONATAL-ADULT SPO2 SENSOR: Brand: NELLCOR

## (undated) DEVICE — WRISTBAND ID AD W1XL11.5IN RED POLY ALRG PREPRINTED PERM

## (undated) DEVICE — FORCEPS BX L240CM JAW DIA2.8MM L CAP W/ NDL MIC MESH TOOTH

## (undated) DEVICE — BASIN EMSIS 16OZ GRAPHITE PLAS KID SHP MOLD GRAD FOR ORAL

## (undated) DEVICE — CANN NASAL O2 CAPNOGRAPHY AD -- FILTERLINE

## (undated) DEVICE — TUBING HYDR IRR --

## (undated) DEVICE — SYR 10ML LUER LOK 1/5ML GRAD --